# Patient Record
Sex: FEMALE | Race: WHITE | NOT HISPANIC OR LATINO
[De-identification: names, ages, dates, MRNs, and addresses within clinical notes are randomized per-mention and may not be internally consistent; named-entity substitution may affect disease eponyms.]

---

## 2017-04-27 ENCOUNTER — TRANSCRIPTION ENCOUNTER (OUTPATIENT)
Age: 62
End: 2017-04-27

## 2017-04-27 ENCOUNTER — EMERGENCY (EMERGENCY)
Facility: HOSPITAL | Age: 62
LOS: 1 days | End: 2017-04-27
Attending: EMERGENCY MEDICINE | Admitting: EMERGENCY MEDICINE
Payer: COMMERCIAL

## 2017-04-27 VITALS
OXYGEN SATURATION: 96 % | RESPIRATION RATE: 16 BRPM | HEIGHT: 60 IN | SYSTOLIC BLOOD PRESSURE: 183 MMHG | WEIGHT: 110.23 LBS | HEART RATE: 94 BPM | DIASTOLIC BLOOD PRESSURE: 86 MMHG | TEMPERATURE: 98 F

## 2017-04-27 VITALS
DIASTOLIC BLOOD PRESSURE: 72 MMHG | OXYGEN SATURATION: 97 % | RESPIRATION RATE: 16 BRPM | HEART RATE: 89 BPM | SYSTOLIC BLOOD PRESSURE: 164 MMHG | TEMPERATURE: 98 F

## 2017-04-27 DIAGNOSIS — Z98.89 OTHER SPECIFIED POSTPROCEDURAL STATES: Chronic | ICD-10-CM

## 2017-04-27 PROCEDURE — 99284 EMERGENCY DEPT VISIT MOD MDM: CPT | Mod: 25

## 2017-04-27 PROCEDURE — 72125 CT NECK SPINE W/O DYE: CPT

## 2017-04-27 PROCEDURE — 70450 CT HEAD/BRAIN W/O DYE: CPT

## 2017-04-27 PROCEDURE — 70450 CT HEAD/BRAIN W/O DYE: CPT | Mod: 26

## 2017-04-27 PROCEDURE — 72125 CT NECK SPINE W/O DYE: CPT | Mod: 26

## 2017-04-27 PROCEDURE — 71101 X-RAY EXAM UNILAT RIBS/CHEST: CPT

## 2017-04-27 PROCEDURE — 99284 EMERGENCY DEPT VISIT MOD MDM: CPT

## 2017-04-27 PROCEDURE — 71101 X-RAY EXAM UNILAT RIBS/CHEST: CPT | Mod: 26

## 2017-04-27 NOTE — ED ADULT NURSE NOTE - PMH
COPD (chronic obstructive pulmonary disease)    Depression    Drug abuse  was addicted to pain killers  Protein S deficiency

## 2017-04-27 NOTE — ED ADULT NURSE NOTE - CHIEF COMPLAINT QUOTE
" The doctor from [facility] sent me here,   I fell last night and hit my head on the wall, I have a headache and my neck hurts "

## 2017-04-27 NOTE — ED PROVIDER NOTE - OBJECTIVE STATEMENT
62 yo female on coumadin for chronic afib fell last night at home injuring left side of neck and left ribs. Denies head injury LOC, N, V, Cough SOB, or other symptom or injury.

## 2017-04-27 NOTE — ED ADULT TRIAGE NOTE - CHIEF COMPLAINT QUOTE
" The doctor from [facility] sent me here,   I fell last night and hit my head on the wall, I have a headache and my neck hurts "

## 2017-04-27 NOTE — ED PROVIDER NOTE - MEDICAL DECISION MAKING DETAILS
60 yo female on chronic anticoagulation for afib, fell at home last neck injuring left side of neck and rib cage. Contusion noted left neck over SCM. Neuro intact. Plan- CT head neck, xray left ribs and chest. Ice packs, Tylenol for pain as pt on suboxone chronically for drug abuse.

## 2019-09-26 ENCOUNTER — TRANSCRIPTION ENCOUNTER (OUTPATIENT)
Age: 64
End: 2019-09-26

## 2019-09-26 ENCOUNTER — INPATIENT (INPATIENT)
Facility: HOSPITAL | Age: 64
LOS: 0 days | Discharge: HOME | DRG: 300 | End: 2019-09-26
Attending: INTERNAL MEDICINE | Admitting: INTERNAL MEDICINE
Payer: MEDICAID

## 2019-09-26 VITALS
RESPIRATION RATE: 16 BRPM | HEART RATE: 107 BPM | SYSTOLIC BLOOD PRESSURE: 139 MMHG | OXYGEN SATURATION: 94 % | TEMPERATURE: 98 F | WEIGHT: 100.09 LBS | DIASTOLIC BLOOD PRESSURE: 77 MMHG | HEIGHT: 61 IN

## 2019-09-26 VITALS
OXYGEN SATURATION: 97 % | DIASTOLIC BLOOD PRESSURE: 88 MMHG | TEMPERATURE: 98 F | RESPIRATION RATE: 16 BRPM | SYSTOLIC BLOOD PRESSURE: 150 MMHG

## 2019-09-26 DIAGNOSIS — I82.622 ACUTE EMBOLISM AND THROMBOSIS OF DEEP VEINS OF LEFT UPPER EXTREMITY: ICD-10-CM

## 2019-09-26 DIAGNOSIS — R79.89 OTHER SPECIFIED ABNORMAL FINDINGS OF BLOOD CHEMISTRY: ICD-10-CM

## 2019-09-26 DIAGNOSIS — J44.9 CHRONIC OBSTRUCTIVE PULMONARY DISEASE, UNSPECIFIED: ICD-10-CM

## 2019-09-26 DIAGNOSIS — D72.828 OTHER ELEVATED WHITE BLOOD CELL COUNT: ICD-10-CM

## 2019-09-26 DIAGNOSIS — R07.9 CHEST PAIN, UNSPECIFIED: ICD-10-CM

## 2019-09-26 DIAGNOSIS — D50.0 IRON DEFICIENCY ANEMIA SECONDARY TO BLOOD LOSS (CHRONIC): ICD-10-CM

## 2019-09-26 DIAGNOSIS — L03.114 CELLULITIS OF LEFT UPPER LIMB: ICD-10-CM

## 2019-09-26 DIAGNOSIS — J47.9 BRONCHIECTASIS, UNCOMPLICATED: ICD-10-CM

## 2019-09-26 DIAGNOSIS — D68.59 OTHER PRIMARY THROMBOPHILIA: ICD-10-CM

## 2019-09-26 DIAGNOSIS — F19.10 OTHER PSYCHOACTIVE SUBSTANCE ABUSE, UNCOMPLICATED: ICD-10-CM

## 2019-09-26 DIAGNOSIS — Z98.89 OTHER SPECIFIED POSTPROCEDURAL STATES: Chronic | ICD-10-CM

## 2019-09-26 DIAGNOSIS — J41.0 SIMPLE CHRONIC BRONCHITIS: ICD-10-CM

## 2019-09-26 LAB
ALBUMIN SERPL ELPH-MCNC: 3.1 G/DL — LOW (ref 3.3–5)
ALP SERPL-CCNC: 158 U/L — HIGH (ref 30–120)
ALT FLD-CCNC: 19 U/L DA — SIGNIFICANT CHANGE UP (ref 10–60)
ANION GAP SERPL CALC-SCNC: 6 MMOL/L — SIGNIFICANT CHANGE UP (ref 5–17)
APTT BLD: 43.9 SEC — HIGH (ref 28.5–37)
AST SERPL-CCNC: 18 U/L — SIGNIFICANT CHANGE UP (ref 10–40)
BILIRUB SERPL-MCNC: 0.2 MG/DL — SIGNIFICANT CHANGE UP (ref 0.2–1.2)
BUN SERPL-MCNC: 15 MG/DL — SIGNIFICANT CHANGE UP (ref 7–23)
CALCIUM SERPL-MCNC: 8.8 MG/DL — SIGNIFICANT CHANGE UP (ref 8.4–10.5)
CHLORIDE SERPL-SCNC: 100 MMOL/L — SIGNIFICANT CHANGE UP (ref 96–108)
CK MB BLD-MCNC: 2.1 % — SIGNIFICANT CHANGE UP (ref 0–3.5)
CK MB CFR SERPL CALC: 2.8 NG/ML — SIGNIFICANT CHANGE UP (ref 0–3.6)
CK SERPL-CCNC: 136 U/L — SIGNIFICANT CHANGE UP (ref 26–192)
CO2 SERPL-SCNC: 31 MMOL/L — SIGNIFICANT CHANGE UP (ref 22–31)
CREAT SERPL-MCNC: 0.83 MG/DL — SIGNIFICANT CHANGE UP (ref 0.5–1.3)
GLUCOSE SERPL-MCNC: 88 MG/DL — SIGNIFICANT CHANGE UP (ref 70–99)
HCT VFR BLD CALC: 35.7 % — SIGNIFICANT CHANGE UP (ref 34.5–45)
HGB BLD-MCNC: 11.1 G/DL — LOW (ref 11.5–15.5)
INR BLD: 2.6 RATIO — HIGH (ref 0.88–1.16)
LACTATE SERPL-SCNC: 1 MMOL/L — SIGNIFICANT CHANGE UP (ref 0.7–2)
MCHC RBC-ENTMCNC: 26.5 PG — LOW (ref 27–34)
MCHC RBC-ENTMCNC: 31.1 GM/DL — LOW (ref 32–36)
MCV RBC AUTO: 85.2 FL — SIGNIFICANT CHANGE UP (ref 80–100)
NRBC # BLD: 0 /100 WBCS — SIGNIFICANT CHANGE UP (ref 0–0)
PLATELET # BLD AUTO: 566 K/UL — HIGH (ref 150–400)
POTASSIUM SERPL-MCNC: 3.8 MMOL/L — SIGNIFICANT CHANGE UP (ref 3.5–5.3)
POTASSIUM SERPL-SCNC: 3.8 MMOL/L — SIGNIFICANT CHANGE UP (ref 3.5–5.3)
PROT SERPL-MCNC: 8 G/DL — SIGNIFICANT CHANGE UP (ref 6–8.3)
PROTHROM AB SERPL-ACNC: 29.2 SEC — HIGH (ref 10–12.9)
RBC # BLD: 4.19 M/UL — SIGNIFICANT CHANGE UP (ref 3.8–5.2)
RBC # FLD: 14.5 % — SIGNIFICANT CHANGE UP (ref 10.3–14.5)
SODIUM SERPL-SCNC: 137 MMOL/L — SIGNIFICANT CHANGE UP (ref 135–145)
TROPONIN I SERPL-MCNC: 0 NG/ML — LOW (ref 0.02–0.06)
TROPONIN I SERPL-MCNC: 0 NG/ML — LOW (ref 0.02–0.06)
WBC # BLD: 10.69 K/UL — HIGH (ref 3.8–10.5)
WBC # FLD AUTO: 10.69 K/UL — HIGH (ref 3.8–10.5)

## 2019-09-26 PROCEDURE — 93971 EXTREMITY STUDY: CPT | Mod: 26,LT

## 2019-09-26 PROCEDURE — 99285 EMERGENCY DEPT VISIT HI MDM: CPT

## 2019-09-26 PROCEDURE — 93010 ELECTROCARDIOGRAM REPORT: CPT | Mod: 76

## 2019-09-26 PROCEDURE — 71045 X-RAY EXAM CHEST 1 VIEW: CPT | Mod: 26

## 2019-09-26 RX ORDER — ALBUTEROL 90 UG/1
2 AEROSOL, METERED ORAL
Qty: 1 | Refills: 0
Start: 2019-09-26 | End: 2019-10-25

## 2019-09-26 RX ORDER — APIXABAN 2.5 MG/1
10 TABLET, FILM COATED ORAL EVERY 12 HOURS
Refills: 0 | Status: DISCONTINUED | OUTPATIENT
Start: 2019-09-26 | End: 2019-09-26

## 2019-09-26 RX ORDER — TIOTROPIUM BROMIDE 18 UG/1
1 CAPSULE ORAL; RESPIRATORY (INHALATION)
Qty: 30 | Refills: 0
Start: 2019-09-26 | End: 2019-10-25

## 2019-09-26 RX ORDER — APIXABAN 2.5 MG/1
1 TABLET, FILM COATED ORAL
Qty: 1 | Refills: 0
Start: 2019-09-26

## 2019-09-26 RX ORDER — FLUTICASONE PROPIONATE AND SALMETEROL 50; 250 UG/1; UG/1
1 POWDER ORAL; RESPIRATORY (INHALATION)
Qty: 1 | Refills: 0
Start: 2019-09-26 | End: 2019-10-25

## 2019-09-26 RX ORDER — CEFAZOLIN SODIUM 1 G
1000 VIAL (EA) INJECTION ONCE
Refills: 0 | Status: COMPLETED | OUTPATIENT
Start: 2019-09-26 | End: 2019-09-26

## 2019-09-26 RX ORDER — KETOROLAC TROMETHAMINE 30 MG/ML
30 SYRINGE (ML) INJECTION ONCE
Refills: 0 | Status: DISCONTINUED | OUTPATIENT
Start: 2019-09-26 | End: 2019-09-26

## 2019-09-26 RX ORDER — INFLUENZA VIRUS VACCINE 15; 15; 15; 15 UG/.5ML; UG/.5ML; UG/.5ML; UG/.5ML
0.5 SUSPENSION INTRAMUSCULAR ONCE
Refills: 0 | Status: COMPLETED | OUTPATIENT
Start: 2019-09-26 | End: 2019-09-26

## 2019-09-26 RX ADMIN — Medication 30 MILLIGRAM(S): at 02:34

## 2019-09-26 RX ADMIN — Medication 100 MILLIGRAM(S): at 02:35

## 2019-09-26 RX ADMIN — Medication 30 MILLIGRAM(S): at 02:50

## 2019-09-26 RX ADMIN — APIXABAN 10 MILLIGRAM(S): 2.5 TABLET, FILM COATED ORAL at 10:30

## 2019-09-26 RX ADMIN — Medication 1000 MILLIGRAM(S): at 02:59

## 2019-09-26 NOTE — DISCHARGE NOTE PROVIDER - NSDCCPCAREPLAN_GEN_ALL_CORE_FT
PRINCIPAL DISCHARGE DIAGNOSIS  Diagnosis: Acute deep vein thrombosis (DVT) of other vein of left upper extremity  Assessment and Plan of Treatment: Continue current medications  Follow-up with Dr Peres (hematology) and Dr. Mas within 1 week.      SECONDARY DISCHARGE DIAGNOSES  Diagnosis: COPD (chronic obstructive pulmonary disease)  Assessment and Plan of Treatment: Continue current medications and inhalers  Follow-up with your primary care doctor within 1 week.      Diagnosis: Cellulitis of left upper extremity  Assessment and Plan of Treatment: Finish course of antibiotics.  Follow-up with your primary care doctor within 1 week.

## 2019-09-26 NOTE — ED PROVIDER NOTE - CARE PLAN
Principal Discharge DX:	Chest pain, unspecified type  Secondary Diagnosis:	Cellulitis of left upper extremity Principal Discharge DX:	Chest pain, unspecified type  Secondary Diagnosis:	Cellulitis of left upper extremity  Secondary Diagnosis:	Acute deep vein thrombosis (DVT) of other vein of left upper extremity

## 2019-09-26 NOTE — CONSULT NOTE ADULT - SUBJECTIVE AND OBJECTIVE BOX
Patient is a 64y old  Female who presents with a chief complaint of     HPI:  63 y/o  F with PMH of protein S def, followed by Dr Mas [KATHERINE Wynn] on coumadin since 36yo.   Hx of DVT in leg initially. Multiple family members with ProtS. Mother, 2 sisters. 1 brother. Brother  41yo from PE, was on coumadin for DVT.   1st DVT 36yo, admitted [facility].   2nd DVT in leg at later time, does not recell details/hosp.   in , had PE, admitted [facility].       Now present c/o left hand pain with redness x 3 days. Pt states pain radiates up arm.   On coumadin 5mg daily. INR generally therapeutic per pt.   Denies missed doses  Denies antecedent trauma, no travel, no immobility no IVDU.   Denies CP, palp, ALTMAN.   Pt denies any fever, HA, SOB, No N/V/D/cough/wt loss. HUANG/abd pain.     States PMD had prior discussed change to Xarelto or Eliquis, but had not changed as medication had not been on market for long time, worried about long term AE. Discussed, meds not in use for since .         PAST MEDICAL & SURGICAL HISTORY:  Afib  Pulmonary embolism  DVT (deep venous thrombosis)  COPD (chronic obstructive pulmonary disease)  Depression  Protein S deficiency  Drug abuse: was addicted to pain killers  History of back surgery      HEALTH ISSUES - PROBLEM Dx:  Chest pain (R07.9)  Afib (I48.91)  Pulmonary embolism (I26.99)  DVT (deep venous thrombosis) (I82.409)  COPD (chronic obstructive pulmonary disease) (J44.9)  Depression (F32.9)  Protein S deficiency (D68.59)  Drug abuse (F19.10)  History of back surgery (Z98.89)  Acute deep vein thrombosis (DVT) of other vein of left upper extremity (I82.622)  Cellulitis of left upper extremity (L03.114)      FAMILY HISTORY:  Family history of protein S deficiency (Sibling)  Family history of lung cancer    Mother alive 85yo, prot S def, DVT/PE, on coumadin  Father  54yo, Lung cancer, smoker.   Sister 1/5, alive 63yo, Pro S def, DVT, on coumadin [Maribel]  Patient 2/5   Brother 3/5,  41yo, PE. Was on coumadin for DVT. [John]  Brother 4/5, alive 60yo, neg for Prot S.   Sister 5/5, alive 53yo, Prot S def, no hx of VTE, not on AC. [Renetta]          [SOCIAL HISTORY: ]     smokinPPD smoker x35yrs, quit      EtOH:  in youth. No current EtOH     illicit drugs:  no IVDU. 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[employer], stopped      marital status:       Other:       [ALLERGIES/INTOLERANCES:]  Allergies     No Known Allergies  Intolerances          [MEDICATIONS]  MEDICATIONS  (STANDING):      influenza   Vaccine 0.5 milliLiter(s) IntraMuscular once  MEDICATIONS  (PRN):        [REVIEW OF SYSTEMS: ]  CONSTITUTIONAL: normal, no fever, no shakes, no chills   EYES: No eye pain, no visual disturbances, no discharge  ENMT:  no discharge  NECK: No pain, no stiffness  BREASTS: No pain, no masses, no nipple discharge  RESPIRATORY: No cough, no wheezing, no chills, no hemoptysis; No shortness of breath  CARDIOVASCULAR: No chest pain, no palpitations, no dizziness, no leg swelling  GASTROINTESTINAL: No abdominal, no epigastric pain. No nausea, no vomiting, no hematemesis; No diarrhea , no constipation. No melena, no hematochezia.  GENITOURINARY: No dysuria, no frequency, no hematuria, no incontinence  NEUROLOGICAL: No headaches, no memory loss, no loss of strength, no numbness, no tremors  SKIN: No itching, no burning, no rashes, no lesions   LYMPH NODES: No enlarged glands  ENDOCRINE: No heat or cold intolerance; No hair loss  MUSCULOSKELETAL: No joint pain or swelling; No muscle, no back, + LUE extremity pain  PSYCHIATRIC: No depression, no anxiety, no mood swings, no difficulty sleeping  HEME/LYMPH: No easy bruising, no bleeding gums      [VITALS SIGNS 24hrs]  Vital Signs Last 24 Hrs  T(C): 36.7 (26 Sep 2019 08:01), Max: 36.7 (26 Sep 2019 01:44)  T(F): 98.1 (26 Sep 2019 08:01), Max: 98.1 (26 Sep 2019 07:28)  HR: 82 (26 Sep 2019 08:01) (76 - 107)  BP: 174/93 (26 Sep 2019 08:01) (139/77 - 174/93)  BP(mean): --  RR: 16 (26 Sep 2019 08:01) (16 - 18)  SpO2: 97% (26 Sep 2019 08:01) (94% - 97%)    [PHYSICAL EXAM]  General: adult in NAD,  WN,  WD.  HEENT: clear oropharynx, anicteric sclera, pink conjunctivae.  Neck: supple, no masses.  CV: normal S1S2, no murmur, no rubs, no gallops.  Lungs: clear to auscultation, no wheezes, no rales, no rhonchi.  Abdomen: soft, non-tender, non-distended, no hepatosplenomegaly, normal BS, no guarding.  Ext: no clubbing, no cyanosis, no edema.  Skin: no rashes,  no petechiae, no venous stasis changes.  Neuro: alert and oriented X3, no focal motor deficits.  LN: no SC HUANG.      [LABS:]                        11.1   10.69 )-----------( 566      ( 26 Sep 2019 02:39 )             35.7     CBC Full  -  ( 26 Sep 2019 02:39 )  WBC Count : 10.69 K/uL  RBC Count : 4.19 M/uL  Hemoglobin : 11.1 g/dL  Hematocrit : 35.7 %  Platelet Count - Automated : 566 K/uL  Mean Cell Volume : 85.2 fl  Mean Cell Hemoglobin : 26.5 pg  Mean Cell Hemoglobin Concentration : 31.1 gm/dL        137  |  100  |  15  ----------------------------<  88  3.8   |  31  |  0.83  Ca    8.8      26 Sep 2019 02:38    TPro  8.0  /  Alb  3.1<L>  /  TBili  0.2  /  DBili  x   /  AST  18  /  ALT  19  /  AlkPhos  158<H>    PT/INR - ( 26 Sep 2019 02:38 )   PT: 29.2 sec;   INR: 2.60 ratio    PTT - ( 26 Sep 2019 02:38 )  PTT:43.9 sec    LIVER FUNCTIONS - ( 26 Sep 2019 02:38 )  Alb: 3.1 g/dL / Pro: 8.0 g/dL / ALK PHOS: 158 U/L / ALT: 19 U/L DA / AST: 18 U/L / GGT: x           CARDIAC MARKERS ( 26 Sep 2019 06:42 )  .000 ng/mL / x     / 136 U/L / x     / 2.8 ng/mL  CARDIAC MARKERS ( 26 Sep 2019 02:38 )  .000 ng/mL / x     / x     / x     / x        WBC  TREND (5 Days)  WBC Count: 10.69 K/uL ( @ 02:39)    HGB  TREND (5 Days)  Hemoglobin: 11.1 g/dL ( @ 02:39)    HCT  TREND (5 Days)  Hematocrit: 35.7 % ( @ 02:39)    PLT  TREND (5 Days)  Platelet Count - Automated: 566 K/uL ( @ 02:39)            [RADIOLOGY & ADDITIONAL STUDIES:]    < from: US Duplex Venous Upper Ext Ltd, Left (19 @ 03:39) >  EXAM:  US DPLX UPR EXT VEINS LTD LT                        PROCEDURE DATE:  2019    INTERPRETATION:  CLINICAL INFORMATION: Left upper extremity swelling and pain history of lower and upper extremity DVTs.  COMPARISON: Noneavailable.  TECHNIQUE: Duplex sonography of the LEFT UPPER extremity veins with color and spectral Doppler, with and without compression.      FINDINGS:  The left internal jugular, subclavian, axillary and proximal brachial veins are patent and compressible where applicable. In the mid and distal brachial veins there is echogenic material with absence of color flow and compressibility. The radial and ulna veins appear patent. The basilic and cephalic veins (superficial veins) are patent and without thrombus.     Doppler examination shows normal spontaneous and phasic flow.    IMPRESSION:   Acute deep venous thrombosis in the mid and distal brachial veins of the left upper extremity.  I discussed the findings with Dr. Betts at3:45 AM on 2019 with read back verification.  < end of copied text >          EXAM:  CHEST CT WITHOUT CONTRAST                        PROCEDURE DATE:  Mar  1 2016   INTERPRETATION:  Clinical information: Pneumonia  There are no prior studies present for comparison  Axial images obtained, coronal and sagittal images computer reformatted.   Intravenous contrast material not administered limiting evaluation.    Centrilobular emphysematous changes present with loss of normal airspace architecture most pronounced in the right lower lung. Bronchiectasis  present.    Extensive bilateral apical pleural-parenchymal scarring present. Central airway intact.    Nodular parenchymal lesions present, multifocal. At the right lung apex, 9 mm and 7 mm nodular lesions present. In the left lower lobe, irregular 13 mm nodule, 11 mm nodule, and 12 mm nodules present. Etiology indeterminate,   inflammatory versus neoplastic.      No effusion or vascular congestion.    Although evaluation of hilar regions is limited due to lack of IV contrast, evidence of hilar prominence-adenopathy. Small nonspecific mediastinal lymph nodes present.    No mediastinal lesions evident. No pericardial effusion evident. Normal appearance of adrenal glands. No acute osseous abnormalities evident. A hypodensity in the right hepatic lobe measuring 7 mm, series 4 image 124 likely represents a cyst.    IMPRESSION: Emphysematous changes with bronchiectasis. Pulmonary parenchymal nodular lesions, advise follow-up evaluation, see above report.          Procedure was performed at the [facility]  EXAM:  FOOT MIN 3 VIEWS LEFT    PROCEDURE DATE:  2015  INTERPRETATION:  3 VIEWS OF THE LEFT FOOT.  CLINICAL INDICATION: Left foot trauma with pain.  IMPRESSION: There is a minimally displaced intra-articular fracture at the tibial side base of the first digit proximal phalanx. There is a tiny avulsion fracture at the second digit proximal phalanx also at the tibial side of the bone. Mild interphalangeal joint space narrowing is present.          EXAM:  US TTE W DOPPLER COMPLETE                        PROCEDURE DATE:  Mar  1 2016   INTERPRETATION:  Ordering Physician: JACINTO HERNANDEZ  Indication: Evaluate for endocarditis.  Technician: Idania Alvarez  Study Quality: Fair   A complete echocardiographic study was performed utilizing standard protocol including spectral and color Doppler in all echocardiographic windows.    Height: 5 foot 1 inch  Weight: 102 pounds  BSA: 1.42 sq m  Blood Pressure: 130/70    MEASUREMENTS  IVS: 0.9 cm  PWT: 0.7 cm  LA: 2.9 cm  AO: 2.6 cm  LVIDd: 4.2 cm  LVIDs: 3.0 cm    LVEF: 63%  RVSP: 17 mmHg  RA Pressure: 3 mmHg  IVC: Normal in size with normal respiratory variation    FINDINGS  Left Ventricle: The left ventricle is normal in size and wall thickness.   Left ventricular systolic function is normal with ejection fraction calculated at 63%  Right Ventricle: Right ventricle is normal in size and systolic function  Left Atrium: Left atrium is normal  Right Atrium: Right atrium is normal  Mitral Valve: Mitral valve is mildly thickened without stenosis. Mild mitral regurgitation is present  Aortic Valve: Aortic valve is mildly thickened without stenosis. There is no aortic insufficiency  Tricuspid Valve: Tricuspid valve opens normally. Mild tricuspid regurgitation is present  Pulmonic Valve: Pulmonic valve is not well visualized  Pericardium: No pericardial effusion    CONCLUSIONS:  1. Normal cardiac chamber sizes.  2. Normal left and right ventricular systolic function.  3. Mildly thickened mitral valve without stenosis. Mild mitral regurgitation.  4. Mild tricuspid regurgitation. Pulmonic valve is inadequately visualized.   5. Mildly thickened aortic valve without stenosis or regurgitation.   6  No obvious valvular vegetations noted. Consider FRANCINE for further evaluation if clinically indicated.  JENNA MADRIGAL M.D., ATTENDING CARDIOLOGIST  This document has been electronically signed. Mar  2 2016  7:22A            EXAM:  CT BRAIN                        PROCEDURE DATE:  2017    INTERPRETATION:  Clinical information: Head trauma, patient taking Coumadin  No prior studies present for comparison  Axial images obtained, coronal and sagittal images computer-generated.    Images demonstrate a normal appearance of the ventricles basal cisterns and subarachnoid sulci. There is no sign of mass-effect midline shift epidural or subdural collection. No unusual calcifications are noted.     There is no sign of acute or recent hemorrhage. Prominent CSF collection in the sella turcica empty sella / partial empty sella.  The calvarium appears intact. Paranasal sinuses clear.    IMPRESSION: No acute intracranial pathology.            EXAM:  CT CERVICAL SPINE                        PROCEDURE DATE:  2017    INTERPRETATION:  Clinical information: Neck trauma, patient taking Coumadin, swelling left side of neck.  Axial images obtained, coronal and sagittal images computer reformatted.    No acute fracture or destructive bone lesions.  . Grade 1 retrolisthesis C4 under C3. Grade 1 retrolisthesis C5 over C6. Disc space narrowing present, C4-5, C5-6.   C1-C2 relationship unremarkable. Neural canal patent.    Dense pleural -parenchymal scarring at the lung apices.    There is asymmetrical thickening of the left sternocleidomastoid muscle as compared to the right. Subcutaneous fat infiltrated in the region  lateral to the left sternocleidomastoid muscle. No visible intramuscular hematoma. No adenopathy evident (limited due to lack of IV contrast).   Mastoid air cells normally pneumatized.    IMPRESSION: No acute fracture. Degenerative changes and disc degenerative disease.  Asymmetric enlargement of left sternocleidomastoid mastoid muscle as compared to the right. Clinical follow-up advised. See above report.              EXAM:  RIBS LEFT W CHEST (3 VIEWS)                        PROCEDURE DATE:  2017    INTERPRETATION:  Clinical information: Fall, left rib pain.  Frontal view of the chest, 5 views of the left ribs.    No evidence of left rib fracture or bone destruction.    Frontal chest study is compared to an exam dated 3/3/2016. No evidence of pneumothorax. No sign of infiltrate effusion or congestive failure. Heart size within normal limits. Old right rib fractures evident.   Calcifications aortic knob.    If symptoms persist, recommend short interval follow-up exam, rib fractures may not appear at time of injury.    IMPRESSION:  See above report. Patient is a 64y old  Female who presents with a chief complaint of     HPI:  63 y/o  F with PMH of protein S def, followed by Dr Mas [KATHERINE Wynn] on coumadin since 34yo.   Hx of DVT in leg initially. Multiple family members with ProtS. Mother, 2 sisters. 1 brother. Brother  41yo from PE, was on coumadin for DVT.   1st DVT 34yo, admitted [facility].   2nd DVT in leg at later time, does not recall details/hosp.   in , had PE, admitted [facility].       Now present c/o left hand pain with redness x 3 days. Pt states pain radiates up arm.   On coumadin 5mg daily. INR generally therapeutic per pt.   Denies missed doses  Denies antecedent trauma, no travel, no immobility no IVDU.   Denies CP, palp, ALTMAN.   Pt denies any fever, HA, SOB, No N/V/D/cough/wt loss. HUANG/abd pain.     States PMD had prior discussed change to Xarelto or Eliquis, but had not changed as medication had not been on market for long time, worried about long term AE. Discussed, meds not in use for since .         PAST MEDICAL & SURGICAL HISTORY:  Afib  Pulmonary embolism  DVT (deep venous thrombosis)  COPD (chronic obstructive pulmonary disease)  Depression  Protein S deficiency  Drug abuse: was addicted to pain killers  History of back surgery      HEALTH ISSUES - PROBLEM Dx:  Chest pain (R07.9)  Afib (I48.91)  Pulmonary embolism (I26.99)  DVT (deep venous thrombosis) (I82.409)  COPD (chronic obstructive pulmonary disease) (J44.9)  Depression (F32.9)  Protein S deficiency (D68.59)  Drug abuse (F19.10)  History of back surgery (Z98.89)  Acute deep vein thrombosis (DVT) of other vein of left upper extremity (I82.622)  Cellulitis of left upper extremity (L03.114)              FAMILY HISTORY:  Family history of protein S deficiency (Sibling)  Family history of lung cancer    Mother alive 87yo, prot S def, DVT/PE, on coumadin  Father  54yo, Lung cancer, smoker.   Sister 1/5, alive 65yo, Pro S def, DVT, on coumadin [Maribel]  Patient 2/5   Brother 3/5,  41yo, PE. Was on coumadin for DVT. [John]  Brother 4/5, alive 58yo, neg for Prot S.   Sister 5/5, alive 51yo, Prot S def, no hx of VTE, not on AC. [Renetta]          [SOCIAL HISTORY: ]     smokinPPD smoker x35yrs, quit      EtOH:  in youth. No current EtOH     illicit drugs:  no IVDU. 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[employer], stopped      marital status:       Other:       [ALLERGIES/INTOLERANCES:]  Allergies     No Known Allergies  Intolerances          [MEDICATIONS]  MEDICATIONS  (STANDING):      influenza   Vaccine 0.5 milliLiter(s) IntraMuscular once  MEDICATIONS  (PRN):        [REVIEW OF SYSTEMS: ]  CONSTITUTIONAL: normal, no fever, no shakes, no chills   EYES: No eye pain, no visual disturbances, no discharge  ENMT:  no discharge  NECK: No pain, no stiffness  BREASTS: No pain, no masses, no nipple discharge  RESPIRATORY: No cough, no wheezing, no chills, no hemoptysis; No shortness of breath  CARDIOVASCULAR: No chest pain, no palpitations, no dizziness, no leg swelling  GASTROINTESTINAL: No abdominal, no epigastric pain. No nausea, no vomiting, no hematemesis; No diarrhea , no constipation. No melena, no hematochezia.  GENITOURINARY: No dysuria, no frequency, no hematuria, no incontinence  NEUROLOGICAL: No headaches, no memory loss, no loss of strength, no numbness, no tremors  SKIN: No itching, no burning, no rashes, no lesions   LYMPH NODES: No enlarged glands  ENDOCRINE: No heat or cold intolerance; No hair loss  MUSCULOSKELETAL: No joint pain or swelling; No muscle, no back, + LUE extremity pain  PSYCHIATRIC: No depression, no anxiety, no mood swings, no difficulty sleeping  HEME/LYMPH: No easy bruising, no bleeding gums      [VITALS SIGNS 24hrs]  Vital Signs Last 24 Hrs  T(C): 36.7 (26 Sep 2019 08:01), Max: 36.7 (26 Sep 2019 01:44)  T(F): 98.1 (26 Sep 2019 08:01), Max: 98.1 (26 Sep 2019 07:28)  HR: 82 (26 Sep 2019 08:01) (76 - 107)  BP: 174/93 (26 Sep 2019 08:01) (139/77 - 174/93)  BP(mean): --  RR: 16 (26 Sep 2019 08:01) (16 - 18)  SpO2: 97% (26 Sep 2019 08:01) (94% - 97%)    [PHYSICAL EXAM]  General: adult in NAD,  WN,  WD.  HEENT: clear oropharynx, anicteric sclera, pink conjunctivae.  Neck: supple, no masses.  CV: normal S1S2, no murmur, no rubs, no gallops.  Lungs: clear to auscultation, +wheezes, no rales, no rhonchi.  Abdomen: soft, non-tender, non-distended, no hepatosplenomegaly, normal BS, no guarding.  Ext: no clubbing, no cyanosis, no edema.  Skin: no rashes,  no petechiae, no venous stasis changes.  Neuro: alert and oriented X3, no focal motor deficits.  LN: no SC HUANG.      [LABS:]                        11.1   10.69 )-----------( 566      ( 26 Sep 2019 02:39 )             35.7     CBC Full  -  ( 26 Sep 2019 02:39 )  WBC Count : 10.69 K/uL  RBC Count : 4.19 M/uL  Hemoglobin : 11.1 g/dL  Hematocrit : 35.7 %  Platelet Count - Automated : 566 K/uL  Mean Cell Volume : 85.2 fl  Mean Cell Hemoglobin : 26.5 pg  Mean Cell Hemoglobin Concentration : 31.1 gm/dL        137  |  100  |  15  ----------------------------<  88  3.8   |  31  |  0.83  Ca    8.8      26 Sep 2019 02:38    TPro  8.0  /  Alb  3.1<L>  /  TBili  0.2  /  DBili  x   /  AST  18  /  ALT  19  /  AlkPhos  158<H>    PT/INR - ( 26 Sep 2019 02:38 )   PT: 29.2 sec;   INR: 2.60 ratio    PTT - ( 26 Sep 2019 02:38 )  PTT:43.9 sec    LIVER FUNCTIONS - ( 26 Sep 2019 02:38 )  Alb: 3.1 g/dL / Pro: 8.0 g/dL / ALK PHOS: 158 U/L / ALT: 19 U/L DA / AST: 18 U/L / GGT: x           CARDIAC MARKERS ( 26 Sep 2019 06:42 )  .000 ng/mL / x     / 136 U/L / x     / 2.8 ng/mL  CARDIAC MARKERS ( 26 Sep 2019 02:38 )  .000 ng/mL / x     / x     / x     / x        WBC  TREND (5 Days)  WBC Count: 10.69 K/uL ( @ 02:39)    HGB  TREND (5 Days)  Hemoglobin: 11.1 g/dL ( @ 02:39)    HCT  TREND (5 Days)  Hematocrit: 35.7 % ( @ 02:39)    PLT  TREND (5 Days)  Platelet Count - Automated: 566 K/uL ( @ 02:39)            [RADIOLOGY & ADDITIONAL STUDIES:]    < from: US Duplex Venous Upper Ext Ltd, Left (19 @ 03:39) >  EXAM:  US DPLX UPR EXT VEINS LTD LT                        PROCEDURE DATE:  2019    INTERPRETATION:  CLINICAL INFORMATION: Left upper extremity swelling and pain history of lower and upper extremity DVTs.  COMPARISON: Noneavailable.  TECHNIQUE: Duplex sonography of the LEFT UPPER extremity veins with color and spectral Doppler, with and without compression.      FINDINGS:  The left internal jugular, subclavian, axillary and proximal brachial veins are patent and compressible where applicable. In the mid and distal brachial veins there is echogenic material with absence of color flow and compressibility. The radial and ulna veins appear patent. The basilic and cephalic veins (superficial veins) are patent and without thrombus.     Doppler examination shows normal spontaneous and phasic flow.    IMPRESSION:   Acute deep venous thrombosis in the mid and distal brachial veins of the left upper extremity.  I discussed the findings with Dr. Betts at3:45 AM on 2019 with read back verification.  < end of copied text >          EXAM:  CHEST CT WITHOUT CONTRAST                        PROCEDURE DATE:  Mar  1 2016   INTERPRETATION:  Clinical information: Pneumonia  There are no prior studies present for comparison  Axial images obtained, coronal and sagittal images computer reformatted.   Intravenous contrast material not administered limiting evaluation.    Centrilobular emphysematous changes present with loss of normal airspace architecture most pronounced in the right lower lung. Bronchiectasis  present.    Extensive bilateral apical pleural-parenchymal scarring present. Central airway intact.    Nodular parenchymal lesions present, multifocal. At the right lung apex, 9 mm and 7 mm nodular lesions present. In the left lower lobe, irregular 13 mm nodule, 11 mm nodule, and 12 mm nodules present. Etiology indeterminate,   inflammatory versus neoplastic.      No effusion or vascular congestion.    Although evaluation of hilar regions is limited due to lack of IV contrast, evidence of hilar prominence-adenopathy. Small nonspecific mediastinal lymph nodes present.    No mediastinal lesions evident. No pericardial effusion evident. Normal appearance of adrenal glands. No acute osseous abnormalities evident. A hypodensity in the right hepatic lobe measuring 7 mm, series 4 image 124 likely represents a cyst.    IMPRESSION: Emphysematous changes with bronchiectasis. Pulmonary parenchymal nodular lesions, advise follow-up evaluation, see above report.          Procedure was performed at the [facility]  EXAM:  FOOT MIN 3 VIEWS LEFT    PROCEDURE DATE:  2015  INTERPRETATION:  3 VIEWS OF THE LEFT FOOT.  CLINICAL INDICATION: Left foot trauma with pain.  IMPRESSION: There is a minimally displaced intra-articular fracture at the tibial side base of the first digit proximal phalanx. There is a tiny avulsion fracture at the second digit proximal phalanx also at the tibial side of the bone. Mild interphalangeal joint space narrowing is present.          EXAM:  US TTE W DOPPLER COMPLETE                        PROCEDURE DATE:  Mar  1 2016   INTERPRETATION:  Ordering Physician: JACINTO HERNANDEZ  Indication: Evaluate for endocarditis.  Technician: Idania Alvarez  Study Quality: Fair   A complete echocardiographic study was performed utilizing standard protocol including spectral and color Doppler in all echocardiographic windows.    Height: 5 foot 1 inch  Weight: 102 pounds  BSA: 1.42 sq m  Blood Pressure: 130/70    MEASUREMENTS  IVS: 0.9 cm  PWT: 0.7 cm  LA: 2.9 cm  AO: 2.6 cm  LVIDd: 4.2 cm  LVIDs: 3.0 cm    LVEF: 63%  RVSP: 17 mmHg  RA Pressure: 3 mmHg  IVC: Normal in size with normal respiratory variation    FINDINGS  Left Ventricle: The left ventricle is normal in size and wall thickness.   Left ventricular systolic function is normal with ejection fraction calculated at 63%  Right Ventricle: Right ventricle is normal in size and systolic function  Left Atrium: Left atrium is normal  Right Atrium: Right atrium is normal  Mitral Valve: Mitral valve is mildly thickened without stenosis. Mild mitral regurgitation is present  Aortic Valve: Aortic valve is mildly thickened without stenosis. There is no aortic insufficiency  Tricuspid Valve: Tricuspid valve opens normally. Mild tricuspid regurgitation is present  Pulmonic Valve: Pulmonic valve is not well visualized  Pericardium: No pericardial effusion    CONCLUSIONS:  1. Normal cardiac chamber sizes.  2. Normal left and right ventricular systolic function.  3. Mildly thickened mitral valve without stenosis. Mild mitral regurgitation.  4. Mild tricuspid regurgitation. Pulmonic valve is inadequately visualized.   5. Mildly thickened aortic valve without stenosis or regurgitation.   6  No obvious valvular vegetations noted. Consider FRANCINE for further evaluation if clinically indicated.  JENNA MADRIGAL M.D., ATTENDING CARDIOLOGIST  This document has been electronically signed. Mar  2 2016  7:22A            EXAM:  CT BRAIN                        PROCEDURE DATE:  2017    INTERPRETATION:  Clinical information: Head trauma, patient taking Coumadin  No prior studies present for comparison  Axial images obtained, coronal and sagittal images computer-generated.    Images demonstrate a normal appearance of the ventricles basal cisterns and subarachnoid sulci. There is no sign of mass-effect midline shift epidural or subdural collection. No unusual calcifications are noted.     There is no sign of acute or recent hemorrhage. Prominent CSF collection in the sella turcica empty sella / partial empty sella.  The calvarium appears intact. Paranasal sinuses clear.    IMPRESSION: No acute intracranial pathology.            EXAM:  CT CERVICAL SPINE                        PROCEDURE DATE:  2017    INTERPRETATION:  Clinical information: Neck trauma, patient taking Coumadin, swelling left side of neck.  Axial images obtained, coronal and sagittal images computer reformatted.    No acute fracture or destructive bone lesions.  . Grade 1 retrolisthesis C4 under C3. Grade 1 retrolisthesis C5 over C6. Disc space narrowing present, C4-5, C5-6.   C1-C2 relationship unremarkable. Neural canal patent.    Dense pleural -parenchymal scarring at the lung apices.    There is asymmetrical thickening of the left sternocleidomastoid muscle as compared to the right. Subcutaneous fat infiltrated in the region  lateral to the left sternocleidomastoid muscle. No visible intramuscular hematoma. No adenopathy evident (limited due to lack of IV contrast).   Mastoid air cells normally pneumatized.    IMPRESSION: No acute fracture. Degenerative changes and disc degenerative disease.  Asymmetric enlargement of left sternocleidomastoid mastoid muscle as compared to the right. Clinical follow-up advised. See above report.              EXAM:  RIBS LEFT W CHEST (3 VIEWS)                        PROCEDURE DATE:  2017    INTERPRETATION:  Clinical information: Fall, left rib pain.  Frontal view of the chest, 5 views of the left ribs.    No evidence of left rib fracture or bone destruction.    Frontal chest study is compared to an exam dated 3/3/2016. No evidence of pneumothorax. No sign of infiltrate effusion or congestive failure. Heart size within normal limits. Old right rib fractures evident.   Calcifications aortic knob.    If symptoms persist, recommend short interval follow-up exam, rib fractures may not appear at time of injury.    IMPRESSION:  See above report.

## 2019-09-26 NOTE — ED ADULT NURSE NOTE - NSIMPLEMENTINTERV_GEN_ALL_ED
Implemented All Fall with Harm Risk Interventions:  [place] to call system. Call bell, personal items and telephone within reach. Instruct patient to call for assistance. Room bathroom lighting operational. Non-slip footwear when patient is off stretcher. Physically safe environment: no spills, clutter or unnecessary equipment. Stretcher in lowest position, wheels locked, appropriate side rails in place. Provide visual cue, wrist band, yellow gown, etc. Monitor gait and stability. Monitor for mental status changes and reorient to person, place, and time. Review medications for side effects contributing to fall risk. Reinforce activity limits and safety measures with patient and family. Provide visual clues: red socks.

## 2019-09-26 NOTE — CONSULT NOTE ADULT - ASSESSMENT
[ASSESSMENT and  PLAN]      RECOMMENDATIONS  Transfuse PRBC as clinically indicated.   Transfuse PRBC if Hgb <7.0 or if symptomatic.   Follow CBC    Check Anemia studies.     DVT Prophyalxis    Thank you for consulting us.     I have discussed the above plan of care with patient/family in detail. They expressed understanding of the treatment plan . Risks, benefits and alternatives discussed in detail. I have asked if they have any questions or concerns and appropriately addressed them; all questions answered to their satisfactions and in lay terms.     Discussed with  xxxxxxx.    > xxxxxx minutes spent in direct patient care, examining and counseling patient,  reviewing  the notes, lab data/ imaging , discussion with multidisciplinary team. 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   At increased risk for recurrent thrombosis.     Significant family hx of Prot S def, along with more severe thrombosis, with PE in 3 family members, [mother, pt, brother] with brother having  frm PE.     Current episode apparently unprovoked.   Only possible cause with COPD/bronchiectasis exacerbation, but sx mild.     non-compliant with pulm followup. Counseled and pt agrees to make appts.   hx of substance abuse, on tx.     Fe def anemia. Prior ferritin 11, ferritin 20.   Anemia of chronic disease.   Last colonoscopy .   Mammo 2018  Pap >10yrs    Reactive thrombocytosis, due to lung dz or Fe def.     Reactive leukocytosis, no fever. Likely due to lung dz, DVT.       RECOMMENDATIONS  Given thrombosis on therapeutic INR 2.6, with INR already on high end eg >2.5,  does not sofia to have adequate protection from coumadin.   Start Eliquis 10mg q12h for 7d.  Then switch to Eliquis 5mg q12h.   DC coumadin.     To get antibiotics for possible cellulitis/COPD/bornnchiectasis.   Monitor for bleeding on tx.     To be placed on inhalers, corticosteroids briefly.     Check EKG.     Check Anemia studies.   Outpt  Given contact information for office.   pt states aware of location.   Agrees to make followup to sort out rest of lab abn.     Recommended pt resume pap screening and followup with GI for re-eval as had hx of Fe def.   last menses 52yo.     DVT Prophylaxis: on Couamdin. To change to Eliquis.     Thank you for consulting us.     I have discussed the above plan of care with patient/family in detail. They expressed understanding of the treatment plan . Risks, benefits and alternatives discussed in detail. I have asked if they have any questions or concerns and appropriately addressed them; all questions answered to their satisfactions and in lay terms.     Discussed with Dr Ronquillo    > 55 minutes spent in direct patient care, examining and counseling patient,  reviewing  the notes, lab data/ imaging , discussion with multidisciplinary team.

## 2019-09-26 NOTE — ED ADULT NURSE NOTE - PMH
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

## 2019-09-26 NOTE — ED PROVIDER NOTE - SECONDARY DIAGNOSIS.
Cellulitis of left upper extremity Acute deep vein thrombosis (DVT) of other vein of left upper extremity

## 2019-09-26 NOTE — ED ADULT TRIAGE NOTE - CHIEF COMPLAINT QUOTE
'I went to urgent care, they told me to come here, I think I have a clot'. pt presented with left wrist pain and swelling, pt is on Coumadin for protein s deficiency , c/o left wrist pain and swelling x3 days.

## 2019-09-26 NOTE — ED PROVIDER NOTE - CLINICAL SUMMARY MEDICAL DECISION MAKING FREE TEXT BOX
pt with left hand pain and redness radiating up to chest concerning for cardiac.   will w/u and admit

## 2019-09-26 NOTE — DISCHARGE NOTE NURSING/CASE MANAGEMENT/SOCIAL WORK - PATIENT PORTAL LINK FT
You can access the FollowMyHealth Patient Portal offered by [facility] by registering at the following website: [email or web address]. By joining [facility]’s FollowMyHealth portal, you will also be able to view your health information using other applications (apps) compatible with our system.

## 2019-09-26 NOTE — DISCHARGE NOTE PROVIDER - CARE PROVIDER_API CALL
Hon DIEUDONNE Westfall (MD)  Hematology; Internal Medicine; Medical Oncology  [address], [address]  [place] [address]  Phone: [phone]  Fax: [phone]  Follow Up Time: 1 week    Monroe Mas)  Family Medicine  [address] [place] [id number]  Phone: [phone]  Fax: [phone]  Follow Up Time: 1-3 days

## 2019-09-26 NOTE — DISCHARGE NOTE NURSING/CASE MANAGEMENT/SOCIAL WORK - NSDCVIVACCINE_GEN_ALL_CORE_FT
Influenza , 2016/3/12 13:59 , Rebekah Maldonado (RN)  Pneumococcal , 2016/3/12 14:00 , Rebekah Maldonado (VICKI)

## 2019-09-26 NOTE — DISCHARGE NOTE PROVIDER - HOSPITAL COURSE
65 y/o female in ED c/o left hand pain with redness x 3 days.   pt states h/o protein C def on coumadin.   states also with h/o DVTs and PEs.   pt states pain radiates up arm to chest.   pt denies any fever, HA, sob, n/v/d/abd pain.        Found to have LUE DVT    Seen by [facility]    Changed to eliquis    Mild LUE cellulitis/phlebitis    Doxy x 7 days    Patient wheezing -- reports being chronic and non-compliant with inhalers    Medrol dose pack and restart inhalers        >35 minutes spent on discharge

## 2019-09-26 NOTE — ED PROVIDER NOTE - OBJECTIVE STATEMENT
63 y/o female in ED c/o left hand pain with redness x 3 days.   pt states h/o protein C def on coumadin.   states also with h/o DVTs and PEs.   pt states pain radiates up arm to chest.   pt denies any fever, HA< sob, n/v/d/abd pain. 65 y/o female in ED c/o left hand pain with redness x 3 days.   pt states h/o protein C def on coumadin.   states also with h/o DVTs and PEs.   pt states pain radiates up arm to chest.   pt denies any fever, HA, sob, n/v/d/abd pain.

## 2019-09-26 NOTE — ED PROVIDER NOTE - PMH
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

## 2019-09-26 NOTE — H&P ADULT - HISTORY OF PRESENT ILLNESS
65 y/o female in ED c/o left hand pain with redness x 3 days.   pt states h/o protein C def on coumadin.   states also with h/o DVTs and PEs.   pt states pain radiates up arm to chest.   pt denies any fever, HA, sob, n/v/d/abd pain. She was found to have LUE DVT.

## 2019-09-26 NOTE — DISCHARGE NOTE PROVIDER - PROVIDER TOKENS
PROVIDER:[TOKEN:[[id number]],FOLLOWUP:[1 week]],PROVIDER:[TOKEN:[[id number]],FOLLOWUP:[1-3 days]]

## 2019-09-26 NOTE — H&P ADULT - NSICDXPASTMEDICALHX_GEN_ALL_CORE_FT
PAST MEDICAL HISTORY:  Afib     COPD (chronic obstructive pulmonary disease)     Depression     Drug abuse was addicted to pain killers    DVT (deep venous thrombosis)     Protein S deficiency     Pulmonary embolism

## 2019-09-26 NOTE — H&P ADULT - NSICDXFAMILYHX_GEN_ALL_CORE_FT
FAMILY HISTORY:  Family history of lung cancer    Sibling  Still living? Unknown  Family history of protein S deficiency, Age at diagnosis: Age Unknown

## 2019-09-26 NOTE — H&P ADULT - RESPIRATORY
Moderate asthma with exacerbation, unspecified whether persistent Breath Sounds equal & clear to percussion & auscultation, no accessory muscle use

## 2019-09-27 RX ORDER — BUDESONIDE AND FORMOTEROL FUMARATE DIHYDRATE 160; 4.5 UG/1; UG/1
2 AEROSOL RESPIRATORY (INHALATION)
Qty: 1 | Refills: 0
Start: 2019-09-27

## 2019-10-01 LAB
CULTURE RESULTS: SIGNIFICANT CHANGE UP
CULTURE RESULTS: SIGNIFICANT CHANGE UP
SPECIMEN SOURCE: SIGNIFICANT CHANGE UP
SPECIMEN SOURCE: SIGNIFICANT CHANGE UP

## 2019-10-31 PROCEDURE — 85610 PROTHROMBIN TIME: CPT

## 2019-10-31 PROCEDURE — 93005 ELECTROCARDIOGRAM TRACING: CPT

## 2019-10-31 PROCEDURE — 96365 THER/PROPH/DIAG IV INF INIT: CPT

## 2019-10-31 PROCEDURE — 82553 CREATINE MB FRACTION: CPT

## 2019-10-31 PROCEDURE — 82550 ASSAY OF CK (CPK): CPT

## 2019-10-31 PROCEDURE — 84484 ASSAY OF TROPONIN QUANT: CPT

## 2019-10-31 PROCEDURE — 96375 TX/PRO/DX INJ NEW DRUG ADDON: CPT

## 2019-10-31 PROCEDURE — 80053 COMPREHEN METABOLIC PANEL: CPT

## 2019-10-31 PROCEDURE — 85730 THROMBOPLASTIN TIME PARTIAL: CPT

## 2019-10-31 PROCEDURE — 71045 X-RAY EXAM CHEST 1 VIEW: CPT

## 2019-10-31 PROCEDURE — 36415 COLL VENOUS BLD VENIPUNCTURE: CPT

## 2019-10-31 PROCEDURE — 99285 EMERGENCY DEPT VISIT HI MDM: CPT | Mod: 25

## 2019-10-31 PROCEDURE — 83605 ASSAY OF LACTIC ACID: CPT

## 2019-10-31 PROCEDURE — G0378: CPT

## 2019-10-31 PROCEDURE — 93971 EXTREMITY STUDY: CPT

## 2019-10-31 PROCEDURE — 85027 COMPLETE CBC AUTOMATED: CPT

## 2019-10-31 PROCEDURE — 87040 BLOOD CULTURE FOR BACTERIA: CPT

## 2020-11-26 ENCOUNTER — TRANSCRIPTION ENCOUNTER (OUTPATIENT)
Age: 65
End: 2020-11-26

## 2023-11-05 ENCOUNTER — NON-APPOINTMENT (OUTPATIENT)
Age: 68
End: 2023-11-05

## 2023-11-27 ENCOUNTER — NON-APPOINTMENT (OUTPATIENT)
Age: 68
End: 2023-11-27

## 2023-11-27 PROBLEM — I26.99 OTHER PULMONARY EMBOLISM WITHOUT ACUTE COR PULMONALE: Chronic | Status: ACTIVE | Noted: 2019-09-26

## 2023-11-27 PROBLEM — I48.91 UNSPECIFIED ATRIAL FIBRILLATION: Chronic | Status: ACTIVE | Noted: 2019-09-26

## 2023-11-27 PROBLEM — I82.409 ACUTE EMBOLISM AND THROMBOSIS OF UNSPECIFIED DEEP VEINS OF UNSPECIFIED LOWER EXTREMITY: Chronic | Status: ACTIVE | Noted: 2019-09-26

## 2023-12-07 ENCOUNTER — APPOINTMENT (OUTPATIENT)
Dept: INTERNAL MEDICINE | Facility: CLINIC | Age: 68
End: 2023-12-07

## 2023-12-12 ENCOUNTER — LABORATORY RESULT (OUTPATIENT)
Age: 68
End: 2023-12-12

## 2023-12-12 ENCOUNTER — APPOINTMENT (OUTPATIENT)
Dept: INTERNAL MEDICINE | Facility: CLINIC | Age: 68
End: 2023-12-12
Payer: SELF-PAY

## 2023-12-12 VITALS
WEIGHT: 88 LBS | HEIGHT: 58.5 IN | RESPIRATION RATE: 16 BRPM | BODY MASS INDEX: 17.98 KG/M2 | DIASTOLIC BLOOD PRESSURE: 70 MMHG | HEART RATE: 159 BPM | OXYGEN SATURATION: 91 % | SYSTOLIC BLOOD PRESSURE: 108 MMHG

## 2023-12-12 DIAGNOSIS — Z83.2 FAMILY HISTORY OF DISEASES OF THE BLOOD AND BLOOD-FORMING ORGANS AND CERTAIN DISORDERS INVOLVING THE IMMUNE MECHANISM: ICD-10-CM

## 2023-12-12 DIAGNOSIS — R63.4 ABNORMAL WEIGHT LOSS: ICD-10-CM

## 2023-12-12 DIAGNOSIS — Z80.1 FAMILY HISTORY OF MALIGNANT NEOPLASM OF TRACHEA, BRONCHUS AND LUNG: ICD-10-CM

## 2023-12-12 DIAGNOSIS — Z87.891 PERSONAL HISTORY OF NICOTINE DEPENDENCE: ICD-10-CM

## 2023-12-12 DIAGNOSIS — Z00.00 ENCOUNTER FOR GENERAL ADULT MEDICAL EXAMINATION W/OUT ABNORMAL FINDINGS: ICD-10-CM

## 2023-12-12 DIAGNOSIS — Z80.3 FAMILY HISTORY OF MALIGNANT NEOPLASM OF BREAST: ICD-10-CM

## 2023-12-12 DIAGNOSIS — Z78.9 OTHER SPECIFIED HEALTH STATUS: ICD-10-CM

## 2023-12-12 DIAGNOSIS — Z12.2 ENCOUNTER FOR SCREENING FOR MALIGNANT NEOPLASM OF RESPIRATORY ORGANS: ICD-10-CM

## 2023-12-12 DIAGNOSIS — D68.59 OTHER PRIMARY THROMBOPHILIA: ICD-10-CM

## 2023-12-12 PROCEDURE — 99204 OFFICE O/P NEW MOD 45 MIN: CPT | Mod: 25

## 2023-12-12 PROCEDURE — G0296 VISIT TO DETERM LDCT ELIG: CPT

## 2023-12-12 PROCEDURE — 93000 ELECTROCARDIOGRAM COMPLETE: CPT

## 2023-12-12 RX ORDER — VENLAFAXINE HYDROCHLORIDE 75 MG/1
75 CAPSULE, EXTENDED RELEASE ORAL
Refills: 0 | Status: ACTIVE | COMMUNITY

## 2023-12-12 RX ORDER — BUPRENORPHINE HCL/NALOXONE HCL 8 MG-2 MG
TABLET, SUBLINGUAL SUBLINGUAL
Refills: 0 | Status: ACTIVE | COMMUNITY

## 2023-12-13 DIAGNOSIS — D72.829 ELEVATED WHITE BLOOD CELL COUNT, UNSPECIFIED: ICD-10-CM

## 2023-12-13 PROBLEM — Z12.2 ENCOUNTER FOR SCREENING FOR LUNG CANCER: Status: ACTIVE | Noted: 2023-12-12

## 2023-12-13 LAB
ALBUMIN SERPL ELPH-MCNC: 4.2 G/DL
ALP BLD-CCNC: 149 U/L
ALT SERPL-CCNC: 16 U/L
ANION GAP SERPL CALC-SCNC: 17 MMOL/L
APPEARANCE: ABNORMAL
AST SERPL-CCNC: 18 U/L
BILIRUB SERPL-MCNC: 0.2 MG/DL
BILIRUBIN URINE: NEGATIVE
BLOOD URINE: NEGATIVE
BUN SERPL-MCNC: 13 MG/DL
CALCIUM SERPL-MCNC: 10.4 MG/DL
CHLORIDE SERPL-SCNC: 91 MMOL/L
CHOLEST SERPL-MCNC: 203 MG/DL
CO2 SERPL-SCNC: 27 MMOL/L
COLOR: NORMAL
CREAT SERPL-MCNC: 0.63 MG/DL
EGFR: 97 ML/MIN/1.73M2
ESTIMATED AVERAGE GLUCOSE: 120 MG/DL
GLUCOSE QUALITATIVE U: NEGATIVE MG/DL
GLUCOSE SERPL-MCNC: 95 MG/DL
HBA1C MFR BLD HPLC: 5.8 %
HCT VFR BLD CALC: 34.4 %
HDLC SERPL-MCNC: 65 MG/DL
HGB BLD-MCNC: 10.2 G/DL
KETONES URINE: ABNORMAL MG/DL
LDLC SERPL CALC-MCNC: 119 MG/DL
LEUKOCYTE ESTERASE URINE: ABNORMAL
MCHC RBC-ENTMCNC: 25.4 PG
MCHC RBC-ENTMCNC: 29.7 GM/DL
MCV RBC AUTO: 85.6 FL
NITRITE URINE: NEGATIVE
NONHDLC SERPL-MCNC: 139 MG/DL
PH URINE: 5
PLATELET # BLD AUTO: 855 K/UL
POTASSIUM SERPL-SCNC: 4.2 MMOL/L
PROT SERPL-MCNC: 8.4 G/DL
PROTEIN URINE: 100 MG/DL
RBC # BLD: 4.02 M/UL
RBC # FLD: 15.5 %
SODIUM SERPL-SCNC: 135 MMOL/L
SPECIFIC GRAVITY URINE: >1.03
TRIGL SERPL-MCNC: 112 MG/DL
TSH SERPL-ACNC: 0.94 UIU/ML
UROBILINOGEN URINE: 1 MG/DL
WBC # FLD AUTO: 21.03 K/UL

## 2023-12-14 ENCOUNTER — INPATIENT (INPATIENT)
Facility: HOSPITAL | Age: 68
LOS: 12 days | Discharge: HOME HEALTH | DRG: 871 | End: 2023-12-27
Attending: STUDENT IN AN ORGANIZED HEALTH CARE EDUCATION/TRAINING PROGRAM | Admitting: HOSPITALIST
Payer: MEDICARE

## 2023-12-14 VITALS
RESPIRATION RATE: 24 BRPM | OXYGEN SATURATION: 96 % | TEMPERATURE: 99 F | HEIGHT: 58 IN | DIASTOLIC BLOOD PRESSURE: 95 MMHG | WEIGHT: 87.96 LBS | HEART RATE: 140 BPM | SYSTOLIC BLOOD PRESSURE: 178 MMHG

## 2023-12-14 DIAGNOSIS — Z98.89 OTHER SPECIFIED POSTPROCEDURAL STATES: Chronic | ICD-10-CM

## 2023-12-14 DIAGNOSIS — J44.1 CHRONIC OBSTRUCTIVE PULMONARY DISEASE WITH (ACUTE) EXACERBATION: ICD-10-CM

## 2023-12-14 LAB
ALBUMIN SERPL ELPH-MCNC: 4.2 G/DL — SIGNIFICANT CHANGE UP (ref 3.3–5)
ALBUMIN SERPL ELPH-MCNC: 4.2 G/DL — SIGNIFICANT CHANGE UP (ref 3.3–5)
ALP SERPL-CCNC: 138 U/L — HIGH (ref 40–120)
ALP SERPL-CCNC: 138 U/L — HIGH (ref 40–120)
ALT FLD-CCNC: 17 U/L — SIGNIFICANT CHANGE UP (ref 10–45)
ALT FLD-CCNC: 17 U/L — SIGNIFICANT CHANGE UP (ref 10–45)
ANION GAP SERPL CALC-SCNC: 14 MMOL/L — SIGNIFICANT CHANGE UP (ref 5–17)
ANION GAP SERPL CALC-SCNC: 14 MMOL/L — SIGNIFICANT CHANGE UP (ref 5–17)
APTT BLD: 40.2 SEC — HIGH (ref 24.5–35.6)
APTT BLD: 40.2 SEC — HIGH (ref 24.5–35.6)
AST SERPL-CCNC: 19 U/L — SIGNIFICANT CHANGE UP (ref 10–40)
AST SERPL-CCNC: 19 U/L — SIGNIFICANT CHANGE UP (ref 10–40)
BASE EXCESS BLDV CALC-SCNC: 8.9 MMOL/L — HIGH (ref -2–3)
BASE EXCESS BLDV CALC-SCNC: 8.9 MMOL/L — HIGH (ref -2–3)
BASOPHILS # BLD AUTO: 0.12 K/UL — SIGNIFICANT CHANGE UP (ref 0–0.2)
BASOPHILS # BLD AUTO: 0.12 K/UL — SIGNIFICANT CHANGE UP (ref 0–0.2)
BASOPHILS NFR BLD AUTO: 0.6 % — SIGNIFICANT CHANGE UP (ref 0–2)
BASOPHILS NFR BLD AUTO: 0.6 % — SIGNIFICANT CHANGE UP (ref 0–2)
BILIRUB SERPL-MCNC: 0.2 MG/DL — SIGNIFICANT CHANGE UP (ref 0.2–1.2)
BILIRUB SERPL-MCNC: 0.2 MG/DL — SIGNIFICANT CHANGE UP (ref 0.2–1.2)
BUN SERPL-MCNC: 12 MG/DL — SIGNIFICANT CHANGE UP (ref 7–23)
BUN SERPL-MCNC: 12 MG/DL — SIGNIFICANT CHANGE UP (ref 7–23)
CA-I SERPL-SCNC: 1.25 MMOL/L — SIGNIFICANT CHANGE UP (ref 1.15–1.33)
CA-I SERPL-SCNC: 1.25 MMOL/L — SIGNIFICANT CHANGE UP (ref 1.15–1.33)
CALCIUM SERPL-MCNC: 10.4 MG/DL — SIGNIFICANT CHANGE UP (ref 8.4–10.5)
CALCIUM SERPL-MCNC: 10.4 MG/DL — SIGNIFICANT CHANGE UP (ref 8.4–10.5)
CHLORIDE BLDV-SCNC: 97 MMOL/L — SIGNIFICANT CHANGE UP (ref 96–108)
CHLORIDE BLDV-SCNC: 97 MMOL/L — SIGNIFICANT CHANGE UP (ref 96–108)
CHLORIDE SERPL-SCNC: 94 MMOL/L — LOW (ref 96–108)
CHLORIDE SERPL-SCNC: 94 MMOL/L — LOW (ref 96–108)
CO2 BLDV-SCNC: 37 MMOL/L — HIGH (ref 22–26)
CO2 BLDV-SCNC: 37 MMOL/L — HIGH (ref 22–26)
CO2 SERPL-SCNC: 29 MMOL/L — SIGNIFICANT CHANGE UP (ref 22–31)
CO2 SERPL-SCNC: 29 MMOL/L — SIGNIFICANT CHANGE UP (ref 22–31)
CREAT SERPL-MCNC: 0.41 MG/DL — LOW (ref 0.5–1.3)
CREAT SERPL-MCNC: 0.41 MG/DL — LOW (ref 0.5–1.3)
EGFR: 108 ML/MIN/1.73M2 — SIGNIFICANT CHANGE UP
EGFR: 108 ML/MIN/1.73M2 — SIGNIFICANT CHANGE UP
EOSINOPHIL # BLD AUTO: 0.37 K/UL — SIGNIFICANT CHANGE UP (ref 0–0.5)
EOSINOPHIL # BLD AUTO: 0.37 K/UL — SIGNIFICANT CHANGE UP (ref 0–0.5)
EOSINOPHIL NFR BLD AUTO: 2 % — SIGNIFICANT CHANGE UP (ref 0–6)
EOSINOPHIL NFR BLD AUTO: 2 % — SIGNIFICANT CHANGE UP (ref 0–6)
FLUAV AG NPH QL: SIGNIFICANT CHANGE UP
FLUAV AG NPH QL: SIGNIFICANT CHANGE UP
FLUBV AG NPH QL: SIGNIFICANT CHANGE UP
FLUBV AG NPH QL: SIGNIFICANT CHANGE UP
GAS PNL BLDV: 135 MMOL/L — LOW (ref 136–145)
GAS PNL BLDV: 135 MMOL/L — LOW (ref 136–145)
GAS PNL BLDV: SIGNIFICANT CHANGE UP
GLUCOSE BLDV-MCNC: 150 MG/DL — HIGH (ref 70–99)
GLUCOSE BLDV-MCNC: 150 MG/DL — HIGH (ref 70–99)
GLUCOSE SERPL-MCNC: 128 MG/DL — HIGH (ref 70–99)
GLUCOSE SERPL-MCNC: 128 MG/DL — HIGH (ref 70–99)
HCO3 BLDV-SCNC: 35 MMOL/L — HIGH (ref 22–29)
HCO3 BLDV-SCNC: 35 MMOL/L — HIGH (ref 22–29)
HCT VFR BLD CALC: 32.9 % — LOW (ref 34.5–45)
HCT VFR BLD CALC: 32.9 % — LOW (ref 34.5–45)
HCT VFR BLDA CALC: 32 % — LOW (ref 34.5–46.5)
HCT VFR BLDA CALC: 32 % — LOW (ref 34.5–46.5)
HGB BLD CALC-MCNC: 10.7 G/DL — LOW (ref 11.7–16.1)
HGB BLD CALC-MCNC: 10.7 G/DL — LOW (ref 11.7–16.1)
HGB BLD-MCNC: 9.9 G/DL — LOW (ref 11.5–15.5)
HGB BLD-MCNC: 9.9 G/DL — LOW (ref 11.5–15.5)
IMM GRANULOCYTES NFR BLD AUTO: 0.5 % — SIGNIFICANT CHANGE UP (ref 0–0.9)
IMM GRANULOCYTES NFR BLD AUTO: 0.5 % — SIGNIFICANT CHANGE UP (ref 0–0.9)
INR BLD: 1.5 RATIO — HIGH (ref 0.85–1.18)
INR BLD: 1.5 RATIO — HIGH (ref 0.85–1.18)
LACTATE BLDV-MCNC: 2.8 MMOL/L — HIGH (ref 0.5–2)
LACTATE BLDV-MCNC: 2.8 MMOL/L — HIGH (ref 0.5–2)
LYMPHOCYTES # BLD AUTO: 1.26 K/UL — SIGNIFICANT CHANGE UP (ref 1–3.3)
LYMPHOCYTES # BLD AUTO: 1.26 K/UL — SIGNIFICANT CHANGE UP (ref 1–3.3)
LYMPHOCYTES # BLD AUTO: 6.7 % — LOW (ref 13–44)
LYMPHOCYTES # BLD AUTO: 6.7 % — LOW (ref 13–44)
MCHC RBC-ENTMCNC: 24.5 PG — LOW (ref 27–34)
MCHC RBC-ENTMCNC: 24.5 PG — LOW (ref 27–34)
MCHC RBC-ENTMCNC: 30.1 GM/DL — LOW (ref 32–36)
MCHC RBC-ENTMCNC: 30.1 GM/DL — LOW (ref 32–36)
MCV RBC AUTO: 81.4 FL — SIGNIFICANT CHANGE UP (ref 80–100)
MCV RBC AUTO: 81.4 FL — SIGNIFICANT CHANGE UP (ref 80–100)
MONOCYTES # BLD AUTO: 1.24 K/UL — HIGH (ref 0–0.9)
MONOCYTES # BLD AUTO: 1.24 K/UL — HIGH (ref 0–0.9)
MONOCYTES NFR BLD AUTO: 6.6 % — SIGNIFICANT CHANGE UP (ref 2–14)
MONOCYTES NFR BLD AUTO: 6.6 % — SIGNIFICANT CHANGE UP (ref 2–14)
NEUTROPHILS # BLD AUTO: 15.72 K/UL — HIGH (ref 1.8–7.4)
NEUTROPHILS # BLD AUTO: 15.72 K/UL — HIGH (ref 1.8–7.4)
NEUTROPHILS NFR BLD AUTO: 83.6 % — HIGH (ref 43–77)
NEUTROPHILS NFR BLD AUTO: 83.6 % — HIGH (ref 43–77)
NRBC # BLD: 0 /100 WBCS — SIGNIFICANT CHANGE UP (ref 0–0)
NRBC # BLD: 0 /100 WBCS — SIGNIFICANT CHANGE UP (ref 0–0)
PCO2 BLDV: 57 MMHG — HIGH (ref 39–42)
PCO2 BLDV: 57 MMHG — HIGH (ref 39–42)
PH BLDV: 7.4 — SIGNIFICANT CHANGE UP (ref 7.32–7.43)
PH BLDV: 7.4 — SIGNIFICANT CHANGE UP (ref 7.32–7.43)
PLATELET # BLD AUTO: 771 K/UL — HIGH (ref 150–400)
PLATELET # BLD AUTO: 771 K/UL — HIGH (ref 150–400)
PO2 BLDV: 46 MMHG — HIGH (ref 25–45)
PO2 BLDV: 46 MMHG — HIGH (ref 25–45)
POTASSIUM BLDV-SCNC: 4.2 MMOL/L — SIGNIFICANT CHANGE UP (ref 3.5–5.1)
POTASSIUM BLDV-SCNC: 4.2 MMOL/L — SIGNIFICANT CHANGE UP (ref 3.5–5.1)
POTASSIUM SERPL-MCNC: 4 MMOL/L — SIGNIFICANT CHANGE UP (ref 3.5–5.3)
POTASSIUM SERPL-MCNC: 4 MMOL/L — SIGNIFICANT CHANGE UP (ref 3.5–5.3)
POTASSIUM SERPL-SCNC: 4 MMOL/L — SIGNIFICANT CHANGE UP (ref 3.5–5.3)
POTASSIUM SERPL-SCNC: 4 MMOL/L — SIGNIFICANT CHANGE UP (ref 3.5–5.3)
PROCALCITONIN SERPL-MCNC: 0.24 NG/ML — HIGH (ref 0.02–0.1)
PROCALCITONIN SERPL-MCNC: 0.24 NG/ML — HIGH (ref 0.02–0.1)
PROT SERPL-MCNC: 8.7 G/DL — HIGH (ref 6–8.3)
PROT SERPL-MCNC: 8.7 G/DL — HIGH (ref 6–8.3)
PROTHROM AB SERPL-ACNC: 15.6 SEC — HIGH (ref 9.5–13)
PROTHROM AB SERPL-ACNC: 15.6 SEC — HIGH (ref 9.5–13)
RBC # BLD: 4.04 M/UL — SIGNIFICANT CHANGE UP (ref 3.8–5.2)
RBC # BLD: 4.04 M/UL — SIGNIFICANT CHANGE UP (ref 3.8–5.2)
RBC # FLD: 15.4 % — HIGH (ref 10.3–14.5)
RBC # FLD: 15.4 % — HIGH (ref 10.3–14.5)
RSV RNA NPH QL NAA+NON-PROBE: SIGNIFICANT CHANGE UP
RSV RNA NPH QL NAA+NON-PROBE: SIGNIFICANT CHANGE UP
SAO2 % BLDV: 76 % — SIGNIFICANT CHANGE UP (ref 67–88)
SAO2 % BLDV: 76 % — SIGNIFICANT CHANGE UP (ref 67–88)
SARS-COV-2 RNA SPEC QL NAA+PROBE: SIGNIFICANT CHANGE UP
SARS-COV-2 RNA SPEC QL NAA+PROBE: SIGNIFICANT CHANGE UP
SODIUM SERPL-SCNC: 137 MMOL/L — SIGNIFICANT CHANGE UP (ref 135–145)
SODIUM SERPL-SCNC: 137 MMOL/L — SIGNIFICANT CHANGE UP (ref 135–145)
TROPONIN T, HIGH SENSITIVITY RESULT: 14 NG/L — SIGNIFICANT CHANGE UP (ref 0–51)
TROPONIN T, HIGH SENSITIVITY RESULT: 14 NG/L — SIGNIFICANT CHANGE UP (ref 0–51)
WBC # BLD: 18.81 K/UL — HIGH (ref 3.8–10.5)
WBC # BLD: 18.81 K/UL — HIGH (ref 3.8–10.5)
WBC # FLD AUTO: 18.81 K/UL — HIGH (ref 3.8–10.5)
WBC # FLD AUTO: 18.81 K/UL — HIGH (ref 3.8–10.5)

## 2023-12-14 PROCEDURE — 71275 CT ANGIOGRAPHY CHEST: CPT | Mod: 26,MA

## 2023-12-14 PROCEDURE — 99285 EMERGENCY DEPT VISIT HI MDM: CPT

## 2023-12-14 PROCEDURE — 74177 CT ABD & PELVIS W/CONTRAST: CPT | Mod: 26,MA

## 2023-12-14 PROCEDURE — 99223 1ST HOSP IP/OBS HIGH 75: CPT | Mod: GC

## 2023-12-14 RX ORDER — INFLUENZA VIRUS VACCINE 15; 15; 15; 15 UG/.5ML; UG/.5ML; UG/.5ML; UG/.5ML
0.7 SUSPENSION INTRAMUSCULAR ONCE
Refills: 0 | Status: DISCONTINUED | OUTPATIENT
Start: 2023-12-14 | End: 2023-12-27

## 2023-12-14 RX ORDER — SODIUM CHLORIDE 9 MG/ML
1000 INJECTION, SOLUTION INTRAVENOUS ONCE
Refills: 0 | Status: COMPLETED | OUTPATIENT
Start: 2023-12-14 | End: 2023-12-14

## 2023-12-14 RX ORDER — AZITHROMYCIN 500 MG/1
500 TABLET, FILM COATED ORAL ONCE
Refills: 0 | Status: COMPLETED | OUTPATIENT
Start: 2023-12-14 | End: 2023-12-14

## 2023-12-14 RX ORDER — CEFTRIAXONE 500 MG/1
1000 INJECTION, POWDER, FOR SOLUTION INTRAMUSCULAR; INTRAVENOUS ONCE
Refills: 0 | Status: COMPLETED | OUTPATIENT
Start: 2023-12-14 | End: 2023-12-14

## 2023-12-14 RX ORDER — SODIUM CHLORIDE 9 MG/ML
1000 INJECTION INTRAMUSCULAR; INTRAVENOUS; SUBCUTANEOUS ONCE
Refills: 0 | Status: DISCONTINUED | OUTPATIENT
Start: 2023-12-14 | End: 2023-12-14

## 2023-12-14 RX ADMIN — CEFTRIAXONE 100 MILLIGRAM(S): 500 INJECTION, POWDER, FOR SOLUTION INTRAMUSCULAR; INTRAVENOUS at 13:11

## 2023-12-14 RX ADMIN — Medication 50 MILLIGRAM(S): at 13:31

## 2023-12-14 RX ADMIN — SODIUM CHLORIDE 1000 MILLILITER(S): 9 INJECTION, SOLUTION INTRAVENOUS at 13:32

## 2023-12-14 RX ADMIN — AZITHROMYCIN 255 MILLIGRAM(S): 500 TABLET, FILM COATED ORAL at 13:48

## 2023-12-14 NOTE — ED PROVIDER NOTE - OBJECTIVE STATEMENT
68-year-old female history of protein C deficiency, history of PEs on Eliquis, COPD on 3 L nasal cannula presenting with shortness of breath associated with cough.  Endorsing symptoms worsening over the past 1 week.  States decreased p.o. intake and 20 pound weight loss over the past few months.  Patient had outpatient blood work done which showed elevated white blood cell, as well as platelets.  Patient denies any fever at home, chest pain, abdominal pain, nausea vomiting diarrhea, urinary complaints, melena or hematochezia, recent travel or sick contacts

## 2023-12-14 NOTE — PATIENT PROFILE ADULT - VIOLENT/TRAUMATIC EVENT EXPERIENCED
1) Pt calls and says another ones of his BP readings from yesterday was 190/?.  Today after resting for 5 minutes his BP was 179/93. He has some lightheadedness but not new or worsening. He denies SOB.     2) He said he had vision changes that started a couple months ago where when he reads fonts \"some letters get rearranged in the text making it hard to read but if I cover up one eye then it is fine\".     3) He says yesterday day he was taking the garbage out and after about 5 feet \"his legs would give out and he would fall and takes a while to get back up.\" He said then he would walk another 5 feet and fall again. He says he falls about everyday. He cannot tell if it is leg weakness or balance issues that are causing him to fall. This started back about 1 year or greater ago when he passed out in the shower. She says that is when it all started to go down hill. He said it has been getting worse.     Previous office visit readings  Vitals 11/11/2020 11/11/2020 11/19/2020   SYSTOLIC 185 175 188   DIASTOLIC 90 85 72   Pulse 73 70 91      divorce of son 2 yrs ago who is staying with them

## 2023-12-14 NOTE — ED ADULT TRIAGE NOTE - CHIEF COMPLAINT QUOTE
Shortness of breath worsening x 3 weeks, decreased PO intake and decreased weight. Sent by MD due to elevated WBC

## 2023-12-14 NOTE — H&P ADULT - PROBLEM SELECTOR PLAN 3
Hx of protein S deficiency  Hx of LUE DVT in 2019 and PE on Eliquis at home  - Continue Eliquis 5 mg BID

## 2023-12-14 NOTE — H&P ADULT - PROBLEM SELECTOR PLAN 7
- Takes OTC omeprazole PRN at home for GERD symptoms  - Continue PRN pantoprazole 40 mg QD while inpatient (therapeutic equiv conversion)

## 2023-12-14 NOTE — H&P ADULT - PROBLEM SELECTOR PLAN 1
Chronic progressive exacerbation of COPD in the last two months, possibly 2/2 underlying respiratory infection/PNA  - On 3L NC O2 at home, increasing dyspnea with minimal exertion  - VBG unremarkable, mild hypercarbia  - Advair 500-50 DISKUS (fluticasone-salmeterol) at home  - Continue budesonide-formoterol inhaler while inpatient (therapeutic equivalent conversion)  - Albuterol inhaler PRN q2h  - S/p prednisone 50 mg PO x1 in ED on 12/14  - Continue home NC O2  - Continuous pulse ox

## 2023-12-14 NOTE — ED ADULT NURSE NOTE - OBJECTIVE STATEMENT
Pt is a 68y F, AxO3, PMH COPD on 3L O2 at baseline, hx of PE's recently dc Eliquis, presents to the ED for abnormal labs. Pt states she has felt increasingly more short of breath in the last month, as well as increased fatigue, and a 20 lb weight loss d/t decrease PO intake. Endorsing a productive cough with yellow sputum. Outpatient blood work showed elevated BC, so was referred into ED for eval.  Breathing spontaneous and unlabored, skin warm and dey. Denies chest pain,  symptoms, fevers, dizziness. Pt is well appearing, maintained on cardiac monitor, speaking full sentences, family at beside, no acute distress.

## 2023-12-14 NOTE — ED CLERICAL - NS ED CLERK NOTE PRE-ARRIVAL INFORMATION; ADDITIONAL PRE-ARRIVAL INFORMATION
CC/Reason For referral: Elevated WBC, leukemia, sepsis  Preferred Consultant(if applicable): N/A  Who admits for you (if needed): hospitalist  Do you have documents you would like to fax over? N/A  Would you still like to speak to an ED attending? Yes    call back # [phone] CC/Reason For referral: Elevated WBC, leukemia, sepsis  Preferred Consultant(if applicable): N/A  Who admits for you (if needed): hospitalist  Do you have documents you would like to fax over? N/A  Would you still like to speak to an ED attending? Yes    call back # [phone]

## 2023-12-14 NOTE — PATIENT PROFILE ADULT - FALL HARM RISK - RISK INTERVENTIONS
Assistance OOB with selected safe patient handling equipment/Assistance with ambulation/Communicate Fall Risk and Risk Factors to all staff, patient, and family/Reinforce activity limits and safety measures with patient and family/Visual Cue: Yellow wristband/Bed in lowest position, wheels locked, appropriate side rails in place/Call bell, personal items and telephone in reach/Instruct patient to call for assistance before getting out of bed or chair/Non-slip footwear when patient is out of bed/[place] to call system/Physically safe environment - no spills, clutter or unnecessary equipment/Purposeful Proactive Rounding/Room/bathroom lighting operational, light cord in reach Assistance OOB with selected safe patient handling equipment/Assistance with ambulation/Communicate Fall Risk and Risk Factors to all staff, patient, and family/Reinforce activity limits and safety measures with patient and family/Visual Cue: Yellow wristband/Bed in lowest position, wheels locked, appropriate side rails in place/Call bell, personal items and telephone in reach/Instruct patient to call for assistance before getting out of bed or chair/Non-slip footwear when patient is out of bed/[place] to call system/Physically safe environment - no spills, clutter or unnecessary equipment/Purposeful Proactive Rounding/Room/bathroom lighting operational, light cord in reach

## 2023-12-14 NOTE — H&P ADULT - ASSESSMENT
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 No imaging evidence of PE or malignancy in chest/abdomen/pelvis. Notable for leukocytosis and thrombocytosis, negative RVP. VSS     Pt is accompanied by  (Javier) at bedside. Pt reports experiencing worsening ALTMAN now even with minimal distance ambulation (e.g., from living room to bathroom) requiring supplemental oxygen, which is not her baseline. The symptom worsened gradually over the last 2 months. Also reports intermittent "green mucus" production with coughs during the same time period. No known sick contacts. Pt primarily stays home. Ambulates without assistive devices. Endorses occasional lightheadedness that spontaneously resolves. Denies fevers, chills, headaches, chest pain, nausea, emesis. Pt also reports reduced PO intake in recent months leading to ~20 lb weight loss in the last month, endorses low appetite eating 1 meal/day.     In the ED, vital signs stable T 36.7C,  sinus tachy, /74, on 3L NC O2 SpO2 >95%. PoCUS showed hypovolemia, s/p IVF boluses (NS 1L x1, LR 1L x1). Labs notable for leukocytosis 18.81, thrombocytosis 771k, elevated Alk Phos 138 and procal 0.24. Lactate 1.0. Negative RVP. Unremarkable serum chemistries, troponins, and pro-BNP. VBG unremarkable with normal pH, signs of hypercarbia. CTA chest and CT A/P on 12/14 notable for extensive airway thickening and tree-in-bud nodularity c/w chronic endobronchial dz, negative for PE or malignancy. Blood cultures sent, pending results.   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 No imaging evidence of PE or malignancy in chest/abdomen/pelvis. Notable for leukocytosis and thrombocytosis, negative RVP. VSS     Pt is accompanied by  (Javier) at bedside. Pt reports experiencing worsening ALTMAN now even with minimal distance ambulation (e.g., from living room to bathroom) requiring supplemental oxygen, which is not her baseline. The symptom worsened gradually over the last 2 months. Also reports intermittent "green mucus" production with coughs during the same time period. No known sick contacts. Pt primarily stays home. Ambulates without assistive devices. Endorses occasional lightheadedness that spontaneously resolves. Denies fevers, chills, headaches, chest pain, nausea, emesis. Pt also reports reduced PO intake in recent months leading to ~20 lb weight loss in the last month, endorses low appetite eating 1 meal/day.     In the ED, vital signs stable T 36.7C,  sinus tachy, /74, on 3L NC O2 SpO2 >95%. PoCUS showed hypovolemia, s/p IVF boluses (NS 1L x1, LR 1L x1). Labs notable for leukocytosis 18.81, thrombocytosis 771k, elevated Alk Phos 138 and procal 0.24. Lactate 1.0. Negative RVP. Unremarkable serum chemistries, troponins, and pro-BNP. VBG unremarkable with normal pH, signs of hypercarbia. CTA chest and CT A/P on 12/14 notable for extensive airway thickening and tree-in-bud nodularity c/w chronic endobronchial dz, negative for PE or malignancy. Blood cultures sent, pending results.   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 No imaging evidence of PE or malignancy in chest/abdomen/pelvis. Notable for leukocytosis and thrombocytosis, negative RVP. VSS     Pt is accompanied by  (Javier) at bedside. Pt reports experiencing worsening ALTMAN now even with minimal distance ambulation (e.g., from living room to bathroom) requiring supplemental oxygen, which is not her baseline. The symptom worsened gradually over the last 2 months. Also reports intermittent "green mucus" production with coughs during the same time period. No known sick contacts. Pt primarily stays home. Ambulates without assistive devices. Endorses occasional lightheadedness that spontaneously resolves. Denies fevers, chills, headaches, chest pain, nausea, emesis. Pt also reports reduced PO intake in recent months leading to ~20 lb weight loss in the last month, endorses low appetite eating 1 meal/day.     In the ED, vital signs stable T 36.7C,  sinus tachy, /74, on 3L NC O2 SpO2 >95%. PoCUS showed hypovolemia, s/p IVF boluses (NS 1L x1, LR 1L x1). Labs notable for leukocytosis 18.81, thrombocytosis 771k, elevated Alk Phos 138 and procal 0.24. Lactate 1.0. Negative RVP. Unremarkable serum chemistries, troponins, and pro-BNP. VBG unremarkable with normal pH, signs of hypercarbia. CTA chest and CT A/P on 12/14 notable for extensive airway thickening and tree-in-bud nodularity c/w chronic endobronchial dz, negative for PE or malignancy. Blood cultures sent, pending results.      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 No imaging evidence of PE or malignancy in chest/abdomen/pelvis. Notable for leukocytosis and thrombocytosis, negative RVP. VSS     Pt is accompanied by  (Javier) at bedside. Pt reports experiencing worsening ALTMAN now even with minimal distance ambulation (e.g., from living room to bathroom) requiring supplemental oxygen, which is not her baseline. The symptom worsened gradually over the last 2 months. Also reports intermittent "green mucus" production with coughs during the same time period. No known sick contacts. Pt primarily stays home. Ambulates without assistive devices. Endorses occasional lightheadedness that spontaneously resolves. Denies fevers, chills, headaches, chest pain, nausea, emesis. Pt also reports reduced PO intake in recent months leading to ~20 lb weight loss in the last month, endorses low appetite eating 1 meal/day.     In the ED, vital signs stable T 36.7C,  sinus tachy, /74, on 3L NC O2 SpO2 >95%. PoCUS showed hypovolemia, s/p IVF boluses (NS 1L x1, LR 1L x1). Labs notable for leukocytosis 18.81, thrombocytosis 771k, elevated Alk Phos 138 and procal 0.24. Lactate 1.0. Negative RVP. Unremarkable serum chemistries, troponins, and pro-BNP. VBG unremarkable with normal pH, signs of hypercarbia. CTA chest and CT A/P on 12/14 notable for extensive airway thickening and tree-in-bud nodularity c/w chronic endobronchial dz, negative for PE or malignancy. Blood cultures sent, pending results.      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 No imaging evidence of PE or malignancy in chest/abdomen/pelvis. Notable for leukocytosis and thrombocytosis, negative RVP. VSS, on 3L NC. Infection workup pending, managing COPD. 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 No imaging evidence of PE or malignancy in chest/abdomen/pelvis. Notable for leukocytosis and thrombocytosis, negative RVP. VSS, on 3L NC. Infection workup pending, managing COPD.

## 2023-12-14 NOTE — H&P ADULT - NSHPLABSRESULTS_GEN_ALL_CORE
LABS:                        9.9    18.81 )-----------( 771      ( 14 Dec 2023 13:28 )             32.9     12-14    137  |  94<L>  |  12  ----------------------------<  128<H>  4.0   |  29  |  0.41<L>    Ca    10.4      14 Dec 2023 13:28  Phos  3.2     12-14  Mg     1.7     12-14    TPro  8.7<H>  /  Alb  4.2  /  TBili  0.2  /  DBili  x   /  AST  19  /  ALT  17  /  AlkPhos  138<H>  12-14      PT/INR - ( 14 Dec 2023 13:28 )   PT: 15.6 sec;   INR: 1.50 ratio         PTT - ( 14 Dec 2023 13:28 )  PTT:40.2 sec  CAPILLARY BLOOD GLUCOSE                  Urinalysis Basic - ( 14 Dec 2023 13:28 )    Color: x / Appearance: x / SG: x / pH: x  Gluc: 128 mg/dL / Ketone: x  / Bili: x / Urobili: x   Blood: x / Protein: x / Nitrite: x   Leuk Esterase: x / RBC: x / WBC x   Sq Epi: x / Non Sq Epi: x / Bacteria: x            I & O Summary:      Microbiology:      Radiology:  CTA Chest + CT A/P with IV con (12/14/2023):  - Extensive airway thickening and tree-in-bud nodularity, compatible with chronic endobronchial disease, including MAC  - No CT evidence of pulmonary embolism or malignancy in the thorax, abdomen, and pelvis.

## 2023-12-14 NOTE — PATIENT PROFILE ADULT - FUNCTIONAL ASSESSMENT - BASIC MOBILITY 6.
4-calculated by average/Not able to assess (calculate score using [facility] averaging method)  4-calculated by average/Not able to assess (calculate score using [facility] averaging method)

## 2023-12-14 NOTE — H&P ADULT - HISTORY OF PRESENT ILLNESS
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    Pt is accompanied by  (Javier) at bedside. Pt reports experiencing worsening ALTMAN now even with minimal distance ambulation (e.g., from living room to bathroom) requiring supplemental oxygen, which is not her baseline. The symptom worsened gradually over the last 2 months. Also reports intermittent "green mucus" production with coughs during the same time period. No known sick contacts. Pt primarily stays home. Ambulates without assistive devices. Endorses occasional lightheadedness that spontaneously resolves. Denies fevers, chills, headaches, chest pain, nausea, emesis. Pt also reports reduced PO intake in recent months leading to ~20 lb weight loss in the last month, endorses low appetite eating 1 meal/day.     In the ED, vital signs stable T 36.7C,  sinus tachy, /74, on 3L NC O2 SpO2 >95%. PoCUS showed hypovolemia, s/p IVF boluses (NS 1L x1, LR 1L x1). Labs notable for leukocytosis 18.81, thrombocytosis 771k, elevated Alk Phos 138 and procal 0.24. Lactate 1.0. Negative RVP. Unremarkable serum chemistries, troponins, and pro-BNP. VBG unremarkable with normal pH, signs of hypercarbia. CTA chest and CT A/P on 12/14 notable for extensive airway thickening and tree-in-bud nodularity c/w chronic endobronchial dz, negative for PE or malignancy. Blood cultures sent, pending results. 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    Pt is accompanied by  (Javier) at bedside. Pt reports experiencing worsening ALTMAN now even with minimal distance ambulation (e.g., from living room to bathroom) requiring supplemental oxygen, which is not her baseline. The symptom worsened gradually over the last 2 months. Also reports intermittent "green mucus" production with coughs during the same time period. No known sick contacts. Pt primarily stays home. Ambulates without assistive devices. Endorses occasional lightheadedness that spontaneously resolves. Denies fevers, chills, headaches, chest pain, nausea, emesis. Pt also reports reduced PO intake in recent months leading to ~20 lb weight loss in the last month, endorses low appetite eating 1 meal/day.     In the ED, vital signs stable T 36.7C,  sinus tachy, /74, on 3L NC O2 SpO2 >95%. PoCUS showed hypovolemia, s/p IVF boluses (NS 1L x1, LR 1L x1). Labs notable for leukocytosis 18.81, thrombocytosis 771k, elevated Alk Phos 138 and procal 0.24. Lactate 1.0. Negative RVP. Unremarkable serum chemistries, troponins, and pro-BNP. VBG unremarkable with normal pH, signs of hypercarbia. CTA chest and CT A/P on 12/14 notable for extensive airway thickening and tree-in-bud nodularity c/w chronic endobronchial dz, negative for PE or malignancy. Blood cultures sent, pending results. 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    Pt is accompanied by  (Javier) at bedside. Pt reports experiencing worsening ALTMAN now even with minimal distance ambulation (e.g., from living room to bathroom) requiring supplemental oxygen, which is not her baseline. The symptom worsened gradually over the last 2 months. Also reports intermittent "green mucus" production with coughs during the same time period. No known sick contacts. Pt primarily stays home. Ambulates without assistive devices. Endorses occasional lightheadedness that spontaneously resolves. Denies fevers, chills, headaches, chest pain, nausea, emesis. Pt also reports reduced PO intake in recent months leading to ~20 lb weight loss in the last month, endorses low appetite eating 1 meal/day. Of note, pt reports having had a back surgery in 1980s for herniated disc and took opioid meds for a long time (oxy 15 mg q6h), currently on Suboxone SL.    In the ED, vital signs stable T 36.7C,  sinus tachy, /74, on 3L NC O2 SpO2 >95%. PoCUS showed hypovolemia, s/p IVF boluses (NS 1L x1, LR 1L x1). Labs notable for leukocytosis 18.81, thrombocytosis 771k, elevated Alk Phos 138 and procal 0.24. Lactate 1.0. Negative RVP. Unremarkable serum chemistries, troponins, and pro-BNP. VBG unremarkable with normal pH, signs of hypercarbia. CTA chest and CT A/P on 12/14 notable for extensive airway thickening and tree-in-bud nodularity c/w chronic endobronchial dz, negative for PE or malignancy. Blood cultures sent, pending results. 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    Pt is accompanied by  (Javier) at bedside. Pt reports experiencing worsening ALTMAN now even with minimal distance ambulation (e.g., from living room to bathroom) requiring supplemental oxygen, which is not her baseline. The symptom worsened gradually over the last 2 months. Also reports intermittent "green mucus" production with coughs during the same time period. No known sick contacts. Pt primarily stays home. Ambulates without assistive devices. Endorses occasional lightheadedness that spontaneously resolves. Denies fevers, chills, headaches, chest pain, nausea, emesis. Pt also reports reduced PO intake in recent months leading to ~20 lb weight loss in the last month, endorses low appetite eating 1 meal/day. Of note, pt reports having had a back surgery in 1980s for herniated disc and took opioid meds for a long time (oxy 15 mg q6h), currently on Suboxone SL.    In the ED, vital signs stable T 36.7C,  sinus tachy, /74, on 3L NC O2 SpO2 >95%. PoCUS showed hypovolemia, s/p IVF boluses (NS 1L x1, LR 1L x1). Labs notable for leukocytosis 18.81, thrombocytosis 771k, elevated Alk Phos 138 and procal 0.24. Lactate 1.0. Negative RVP. Unremarkable serum chemistries, troponins, and pro-BNP. VBG unremarkable with normal pH, signs of hypercarbia. CTA chest and CT A/P on 12/14 notable for extensive airway thickening and tree-in-bud nodularity c/w chronic endobronchial dz, negative for PE or malignancy. Blood cultures sent, pending results.

## 2023-12-14 NOTE — PATIENT PROFILE ADULT - ..
Anemia    Atrial fibrillation    CAD (coronary artery disease)    COPD (chronic obstructive pulmonary disease)    ESRD (end stage renal disease) on dialysis  MWF via [facility]  planned AVF creation  Gall stones    Gout    Hyperlipidemia    Hypertension    Pneumonia    Pulmonary hypertension    Sinusitis    Sinusitis, unspecified chronicity, unspecified location    Spinal fusion failure, initial encounter    Spinal stenosis    Tubal ligation status 14-Dec-2023 22:35:33

## 2023-12-14 NOTE — H&P ADULT - NSHPPHYSICALEXAM_GEN_ALL_CORE
VITALS:   T(C): 36.7 (12-14-23 @ 21:24), Max: 37.2 (12-14-23 @ 12:09)  HR: 112 (12-14-23 @ 21:24) (110 - 140)  BP: 135/84 (12-14-23 @ 21:24) (127/79 - 178/95)  RR: 20 (12-14-23 @ 21:24) (18 - 24)  SpO2: 96% (12-14-23 @ 21:24) (95% - 96%)    GENERAL: NAD, sitting in hospital bed  HEAD:  Atraumatic, normocephalic  EYES: EOMI, PERRLA, conjunctiva and sclera clear  ENT: Moist mucous membranes  NECK: Supple, no JVD  HEART: Tachycardic, regular rhythm, no murmurs, rubs, or gallops  LUNGS: Unlabored respirations on supplemental NC oxygen.  Clear to auscultation bilaterally, no crackles, wheezing, or rhonchi  ABDOMEN: Soft, nontender, nondistended, +BS  EXTREMITIES: 2+ peripheral pulses bilaterally. No peripheral edema, clubbing, or cyanosis  NERVOUS SYSTEM:  A&Ox3, no focal deficits   SKIN: No rashes or lesions

## 2023-12-14 NOTE — ED PROVIDER NOTE - PHYSICAL EXAMINATION
Fco Drake DO (PGY2)   Physical Exam:    Gen: NAD, AOx3  Head: NCAT  HEENT: EOMI, PEERLA  Lung: decreased breath sounds throughout all lung fields   CV: RRR, no murmurs, rubs or gallops  Abd: soft, NT, ND, no guarding, no rigidity, no rebound tenderness, no CVA tenderness   MSK: no visible deformities, ROM normal in UE/LE, no back pain  Neuro: No focal sensory or motor deficits. Sensation intact to light touch all extremities.  Skin: Warm, well perfused, no rash, no leg swelling

## 2023-12-14 NOTE — ED PROVIDER NOTE - CLINICAL SUMMARY MEDICAL DECISION MAKING FREE TEXT BOX
68-year-old female history of protein C deficiency, history of PEs on Eliquis, COPD on 3 L nasal cannula presenting with shortness of breath associated with cough.  Endorsing symptoms worsening over the past 1 week.  States decreased p.o. intake and 20 pound weight loss over the past few months.  Patient had outpatient blood work done which showed elevated white blood cell, as well as platelets.  Patient on 3L NC, afebrile, tachycardic. Plan for sepsis labs, CTA chest and abdomen, IV abx for COPD exacerbation, admission  Differential diagnosis includes but not limited to malignancy vs. COPD exacerbation vs. flu vs. covid vs. metabolic derangement

## 2023-12-14 NOTE — H&P ADULT - ATTENDING COMMENTS
68F PMHx protein S deficiency and VTE on eliquis, COPD on 3L at home, HTN, mood disorders. Presenting w/ productive cough x2mo and worsening ALTMAN. Had elevated wbc on outside lab. Also reports anorexia recently and about 20lb weightloss in the last few mo. Admitted for PNA and likely AE-COPD.      ROS neg except per HPI.    PEx: NAd, NCAT, nonicteric sclera, no jvd, heart tachy regular, lung CTAB w/o wheezing or crackles, abd soft nontender, AAOx4.      CTA chest neg for PE. CT a/p w/o evidence of malignancy.    Labs w/ wbc 18, procal 0.24     A&P   #Sepsis d/t CAP   #AE-COPD   -c/w ctx and azithromycin   -f/u cultures   -c/w home inhaler + albuterol prn    -hold off on systemic steroid for now given pt now at home O2 and not wheezing       #h/o substance use   -c/w suboxone      Mgnt of chronic illness per resident note      VTE ppx: full AC with eliquis

## 2023-12-14 NOTE — H&P ADULT - PROBLEM SELECTOR PLAN 8
- DVT ppx: Eliquis 5 mg BID, ambulation  - Diet: regular, DASH/TLC  - PT ordered  - Dispo: pending clinical course

## 2023-12-14 NOTE — H&P ADULT - TIME BILLING
Time-based billing (NON-critical care).   The documented time excludes time spent with resident teaching activities or time spent by the resident in taking care of the patient.   The necessity of the time spent during the encounter on this date of service was due to:     - Ordering, reviewing, and interpreting labs, testing, and imaging.  - Independently obtaining a review of systems and performing a physical exam  - Reviewing prior hospitalization and where necessary, outpatient records.  - Counselling and educating patient and family regarding interpretation of aforementioned items and plan of care.

## 2023-12-14 NOTE — ED PROCEDURE NOTE - PROCEDURE ADDITIONAL DETAILS
B-lines bilaterally, lung sliding bilaterally, no consolidations at bases, no pleural effusions  Poor cardiac windows, did not assess valves, but LV hyperdynamic, no RV dilation, IVC around 1.5-2cm, close to 100% collapse with inspiration, no pericardial effusion

## 2023-12-14 NOTE — H&P ADULT - NSHPSOCIALHISTORY_GEN_ALL_CORE
- EtOH hx: rarely  - Tobacco smoking: quit in 2013, 1.5 ppd x30 years previously  - No other recreational drug use/IVDU  - Lives at home with

## 2023-12-14 NOTE — H&P ADULT - PROBLEM SELECTOR PLAN 2
Notable for leukocytosis and thrombocytosis with unclear etiology. Sinus tachycardia, afebrile, otherwise HDS.  - Elevated alk phos and procal  - Normal lactate, s/p total 2L IVF bolus in ED 12/14  - CTA chest and CT A/P 12/14 notable for extensive airway thickening and tree-in-bud nodularity c/w chronic endobroncial dz, no consolidations  - S/p IV azithro and CTX x1 in ED 12/14  - Ordered urine Legionella and Strep pneumo Ag and MRSA/MSSA PCR  - Continue IV azithro 500 mg q24h and CTX 1 g q24h for empiric CAP coverage  - Follow up blood, urine, sputum cultures

## 2023-12-14 NOTE — ED ADULT NURSE NOTE - CHIEF COMPLAINT
Spoke with the patient and she was under the impression that she would be getting a repeat cervical MRI before she saw the surgeon. Nothing in the notes to indicate that. Lumbar MRI was ordered but not completed. Patient has an appointment on Wed with her neurologist and she will discuss during her visit. She will let us know if anything is needed from Dr. Seaman.   The patient is a 68y Female complaining of shortness of breath.

## 2023-12-14 NOTE — ED PROVIDER NOTE - ATTENDING CONTRIBUTION TO CARE
History and physical as documented above.  Overall non toxic and in NAD, cachectic, on home 3L NC, poor inspiratory and expiratory air movement but no significantly increased WOB, no crackles, tachycardic to 140s, ST on monitor, normotensive, no peripheral edema, abdomen benign, warm on exam. Will check sepsis labs, CTA chest to eval for PE (has been off Eliquis for a few days), CT a/p to eval for malignancy, will give IVF (appears hypovolemic on POCUS), CAP coverage, likely TBA. No role for HFNC or PPV at this time, but has been admitted to ICU for COPD exacerbation before (no intubation required).

## 2023-12-14 NOTE — H&P ADULT - PROBLEM SELECTOR PLAN 6
Hx of back surgery in 1980s for herniated disc, consistent opioid use afterwards for an extended period of time, currently on Suboxone SL film 4 mg BID  - Pharmacy does not have SL film form, give SL tablet for now

## 2023-12-15 DIAGNOSIS — Z29.9 ENCOUNTER FOR PROPHYLACTIC MEASURES, UNSPECIFIED: ICD-10-CM

## 2023-12-15 DIAGNOSIS — J44.1 CHRONIC OBSTRUCTIVE PULMONARY DISEASE WITH (ACUTE) EXACERBATION: ICD-10-CM

## 2023-12-15 DIAGNOSIS — J44.9 CHRONIC OBSTRUCTIVE PULMONARY DISEASE, UNSPECIFIED: ICD-10-CM

## 2023-12-15 DIAGNOSIS — J18.9 PNEUMONIA, UNSPECIFIED ORGANISM: ICD-10-CM

## 2023-12-15 DIAGNOSIS — I10 ESSENTIAL (PRIMARY) HYPERTENSION: ICD-10-CM

## 2023-12-15 DIAGNOSIS — Z86.711 PERSONAL HISTORY OF PULMONARY EMBOLISM: ICD-10-CM

## 2023-12-15 DIAGNOSIS — K21.9 GASTRO-ESOPHAGEAL REFLUX DISEASE WITHOUT ESOPHAGITIS: ICD-10-CM

## 2023-12-15 DIAGNOSIS — M54.9 DORSALGIA, UNSPECIFIED: ICD-10-CM

## 2023-12-15 DIAGNOSIS — F41.9 ANXIETY DISORDER, UNSPECIFIED: ICD-10-CM

## 2023-12-15 LAB
ALBUMIN SERPL ELPH-MCNC: 4.1 G/DL — SIGNIFICANT CHANGE UP (ref 3.3–5)
ALBUMIN SERPL ELPH-MCNC: 4.1 G/DL — SIGNIFICANT CHANGE UP (ref 3.3–5)
ALP SERPL-CCNC: 124 U/L — HIGH (ref 40–120)
ALP SERPL-CCNC: 124 U/L — HIGH (ref 40–120)
ALT FLD-CCNC: 15 U/L — SIGNIFICANT CHANGE UP (ref 10–45)
ALT FLD-CCNC: 15 U/L — SIGNIFICANT CHANGE UP (ref 10–45)
ANION GAP SERPL CALC-SCNC: 11 MMOL/L — SIGNIFICANT CHANGE UP (ref 5–17)
ANION GAP SERPL CALC-SCNC: 11 MMOL/L — SIGNIFICANT CHANGE UP (ref 5–17)
AST SERPL-CCNC: 16 U/L — SIGNIFICANT CHANGE UP (ref 10–40)
AST SERPL-CCNC: 16 U/L — SIGNIFICANT CHANGE UP (ref 10–40)
BASOPHILS # BLD AUTO: 0.06 K/UL — SIGNIFICANT CHANGE UP (ref 0–0.2)
BASOPHILS # BLD AUTO: 0.06 K/UL — SIGNIFICANT CHANGE UP (ref 0–0.2)
BASOPHILS NFR BLD AUTO: 0.5 % — SIGNIFICANT CHANGE UP (ref 0–2)
BASOPHILS NFR BLD AUTO: 0.5 % — SIGNIFICANT CHANGE UP (ref 0–2)
BILIRUB SERPL-MCNC: 0.2 MG/DL — SIGNIFICANT CHANGE UP (ref 0.2–1.2)
BILIRUB SERPL-MCNC: 0.2 MG/DL — SIGNIFICANT CHANGE UP (ref 0.2–1.2)
BUN SERPL-MCNC: 9 MG/DL — SIGNIFICANT CHANGE UP (ref 7–23)
BUN SERPL-MCNC: 9 MG/DL — SIGNIFICANT CHANGE UP (ref 7–23)
CALCIUM SERPL-MCNC: 10.4 MG/DL — SIGNIFICANT CHANGE UP (ref 8.4–10.5)
CALCIUM SERPL-MCNC: 10.4 MG/DL — SIGNIFICANT CHANGE UP (ref 8.4–10.5)
CHLORIDE SERPL-SCNC: 94 MMOL/L — LOW (ref 96–108)
CHLORIDE SERPL-SCNC: 94 MMOL/L — LOW (ref 96–108)
CO2 SERPL-SCNC: 32 MMOL/L — HIGH (ref 22–31)
CO2 SERPL-SCNC: 32 MMOL/L — HIGH (ref 22–31)
CREAT SERPL-MCNC: 0.42 MG/DL — LOW (ref 0.5–1.3)
CREAT SERPL-MCNC: 0.42 MG/DL — LOW (ref 0.5–1.3)
EGFR: 107 ML/MIN/1.73M2 — SIGNIFICANT CHANGE UP
EGFR: 107 ML/MIN/1.73M2 — SIGNIFICANT CHANGE UP
EOSINOPHIL # BLD AUTO: 0.19 K/UL — SIGNIFICANT CHANGE UP (ref 0–0.5)
EOSINOPHIL # BLD AUTO: 0.19 K/UL — SIGNIFICANT CHANGE UP (ref 0–0.5)
EOSINOPHIL NFR BLD AUTO: 1.5 % — SIGNIFICANT CHANGE UP (ref 0–6)
EOSINOPHIL NFR BLD AUTO: 1.5 % — SIGNIFICANT CHANGE UP (ref 0–6)
GLUCOSE SERPL-MCNC: 111 MG/DL — HIGH (ref 70–99)
GLUCOSE SERPL-MCNC: 111 MG/DL — HIGH (ref 70–99)
GRAM STN FLD: ABNORMAL
GRAM STN FLD: ABNORMAL
HCT VFR BLD CALC: 31.7 % — LOW (ref 34.5–45)
HCT VFR BLD CALC: 31.7 % — LOW (ref 34.5–45)
HCV AB S/CO SERPL IA: 0.16 S/CO — SIGNIFICANT CHANGE UP (ref 0–0.99)
HCV AB S/CO SERPL IA: 0.16 S/CO — SIGNIFICANT CHANGE UP (ref 0–0.99)
HCV AB SERPL-IMP: SIGNIFICANT CHANGE UP
HCV AB SERPL-IMP: SIGNIFICANT CHANGE UP
HGB BLD-MCNC: 9.7 G/DL — LOW (ref 11.5–15.5)
HGB BLD-MCNC: 9.7 G/DL — LOW (ref 11.5–15.5)
IMM GRANULOCYTES NFR BLD AUTO: 0.5 % — SIGNIFICANT CHANGE UP (ref 0–0.9)
IMM GRANULOCYTES NFR BLD AUTO: 0.5 % — SIGNIFICANT CHANGE UP (ref 0–0.9)
LEGIONELLA AG UR QL: NEGATIVE — SIGNIFICANT CHANGE UP
LEGIONELLA AG UR QL: NEGATIVE — SIGNIFICANT CHANGE UP
LYMPHOCYTES # BLD AUTO: 15.6 % — SIGNIFICANT CHANGE UP (ref 13–44)
LYMPHOCYTES # BLD AUTO: 15.6 % — SIGNIFICANT CHANGE UP (ref 13–44)
LYMPHOCYTES # BLD AUTO: 2 K/UL — SIGNIFICANT CHANGE UP (ref 1–3.3)
LYMPHOCYTES # BLD AUTO: 2 K/UL — SIGNIFICANT CHANGE UP (ref 1–3.3)
MAGNESIUM SERPL-MCNC: 1.9 MG/DL — SIGNIFICANT CHANGE UP (ref 1.6–2.6)
MAGNESIUM SERPL-MCNC: 1.9 MG/DL — SIGNIFICANT CHANGE UP (ref 1.6–2.6)
MCHC RBC-ENTMCNC: 24.8 PG — LOW (ref 27–34)
MCHC RBC-ENTMCNC: 24.8 PG — LOW (ref 27–34)
MCHC RBC-ENTMCNC: 30.6 GM/DL — LOW (ref 32–36)
MCHC RBC-ENTMCNC: 30.6 GM/DL — LOW (ref 32–36)
MCV RBC AUTO: 81.1 FL — SIGNIFICANT CHANGE UP (ref 80–100)
MCV RBC AUTO: 81.1 FL — SIGNIFICANT CHANGE UP (ref 80–100)
MONOCYTES # BLD AUTO: 0.97 K/UL — HIGH (ref 0–0.9)
MONOCYTES # BLD AUTO: 0.97 K/UL — HIGH (ref 0–0.9)
MONOCYTES NFR BLD AUTO: 7.6 % — SIGNIFICANT CHANGE UP (ref 2–14)
MONOCYTES NFR BLD AUTO: 7.6 % — SIGNIFICANT CHANGE UP (ref 2–14)
MRSA PCR RESULT.: SIGNIFICANT CHANGE UP
MRSA PCR RESULT.: SIGNIFICANT CHANGE UP
NEUTROPHILS # BLD AUTO: 9.51 K/UL — HIGH (ref 1.8–7.4)
NEUTROPHILS # BLD AUTO: 9.51 K/UL — HIGH (ref 1.8–7.4)
NEUTROPHILS NFR BLD AUTO: 74.3 % — SIGNIFICANT CHANGE UP (ref 43–77)
NEUTROPHILS NFR BLD AUTO: 74.3 % — SIGNIFICANT CHANGE UP (ref 43–77)
NIGHT BLUE STAIN TISS: SIGNIFICANT CHANGE UP
NIGHT BLUE STAIN TISS: SIGNIFICANT CHANGE UP
NRBC # BLD: 0 /100 WBCS — SIGNIFICANT CHANGE UP (ref 0–0)
NRBC # BLD: 0 /100 WBCS — SIGNIFICANT CHANGE UP (ref 0–0)
PHOSPHATE SERPL-MCNC: 3 MG/DL — SIGNIFICANT CHANGE UP (ref 2.5–4.5)
PHOSPHATE SERPL-MCNC: 3 MG/DL — SIGNIFICANT CHANGE UP (ref 2.5–4.5)
PLATELET # BLD AUTO: 700 K/UL — HIGH (ref 150–400)
PLATELET # BLD AUTO: 700 K/UL — HIGH (ref 150–400)
POTASSIUM SERPL-MCNC: 3.5 MMOL/L — SIGNIFICANT CHANGE UP (ref 3.5–5.3)
POTASSIUM SERPL-MCNC: 3.5 MMOL/L — SIGNIFICANT CHANGE UP (ref 3.5–5.3)
POTASSIUM SERPL-SCNC: 3.5 MMOL/L — SIGNIFICANT CHANGE UP (ref 3.5–5.3)
POTASSIUM SERPL-SCNC: 3.5 MMOL/L — SIGNIFICANT CHANGE UP (ref 3.5–5.3)
PROT SERPL-MCNC: 8.2 G/DL — SIGNIFICANT CHANGE UP (ref 6–8.3)
PROT SERPL-MCNC: 8.2 G/DL — SIGNIFICANT CHANGE UP (ref 6–8.3)
RBC # BLD: 3.91 M/UL — SIGNIFICANT CHANGE UP (ref 3.8–5.2)
RBC # BLD: 3.91 M/UL — SIGNIFICANT CHANGE UP (ref 3.8–5.2)
RBC # FLD: 15.7 % — HIGH (ref 10.3–14.5)
RBC # FLD: 15.7 % — HIGH (ref 10.3–14.5)
S AUREUS DNA NOSE QL NAA+PROBE: SIGNIFICANT CHANGE UP
S AUREUS DNA NOSE QL NAA+PROBE: SIGNIFICANT CHANGE UP
S PNEUM AG UR QL: NEGATIVE — SIGNIFICANT CHANGE UP
S PNEUM AG UR QL: NEGATIVE — SIGNIFICANT CHANGE UP
SODIUM SERPL-SCNC: 137 MMOL/L — SIGNIFICANT CHANGE UP (ref 135–145)
SODIUM SERPL-SCNC: 137 MMOL/L — SIGNIFICANT CHANGE UP (ref 135–145)
SPECIMEN SOURCE: SIGNIFICANT CHANGE UP
WBC # BLD: 12.8 K/UL — HIGH (ref 3.8–10.5)
WBC # BLD: 12.8 K/UL — HIGH (ref 3.8–10.5)
WBC # FLD AUTO: 12.8 K/UL — HIGH (ref 3.8–10.5)
WBC # FLD AUTO: 12.8 K/UL — HIGH (ref 3.8–10.5)

## 2023-12-15 PROCEDURE — 93010 ELECTROCARDIOGRAM REPORT: CPT

## 2023-12-15 PROCEDURE — 99233 SBSQ HOSP IP/OBS HIGH 50: CPT

## 2023-12-15 PROCEDURE — 99222 1ST HOSP IP/OBS MODERATE 55: CPT

## 2023-12-15 RX ORDER — BUDESONIDE AND FORMOTEROL FUMARATE DIHYDRATE 160; 4.5 UG/1; UG/1
2 AEROSOL RESPIRATORY (INHALATION)
Refills: 0 | Status: DISCONTINUED | OUTPATIENT
Start: 2023-12-15 | End: 2023-12-15

## 2023-12-15 RX ORDER — SODIUM CHLORIDE 9 MG/ML
1000 INJECTION, SOLUTION INTRAVENOUS ONCE
Refills: 0 | Status: COMPLETED | OUTPATIENT
Start: 2023-12-15 | End: 2023-12-15

## 2023-12-15 RX ORDER — ACETAMINOPHEN 500 MG
650 TABLET ORAL EVERY 6 HOURS
Refills: 0 | Status: DISCONTINUED | OUTPATIENT
Start: 2023-12-15 | End: 2023-12-27

## 2023-12-15 RX ORDER — PANTOPRAZOLE SODIUM 20 MG/1
40 TABLET, DELAYED RELEASE ORAL
Refills: 0 | Status: DISCONTINUED | OUTPATIENT
Start: 2023-12-15 | End: 2023-12-17

## 2023-12-15 RX ORDER — FLUTICASONE PROPIONATE AND SALMETEROL 50; 250 UG/1; UG/1
1 POWDER ORAL; RESPIRATORY (INHALATION)
Refills: 0 | DISCHARGE

## 2023-12-15 RX ORDER — FLUTICASONE PROPIONATE 50 MCG
1 SPRAY, SUSPENSION NASAL
Refills: 0 | Status: DISCONTINUED | OUTPATIENT
Start: 2023-12-15 | End: 2023-12-27

## 2023-12-15 RX ORDER — CEFTRIAXONE 500 MG/1
1000 INJECTION, POWDER, FOR SOLUTION INTRAMUSCULAR; INTRAVENOUS EVERY 24 HOURS
Refills: 0 | Status: DISCONTINUED | OUTPATIENT
Start: 2023-12-15 | End: 2023-12-15

## 2023-12-15 RX ORDER — AZITHROMYCIN 500 MG/1
500 TABLET, FILM COATED ORAL EVERY 24 HOURS
Refills: 0 | Status: DISCONTINUED | OUTPATIENT
Start: 2023-12-15 | End: 2023-12-15

## 2023-12-15 RX ORDER — AMLODIPINE BESYLATE 2.5 MG/1
1 TABLET ORAL
Refills: 0 | DISCHARGE

## 2023-12-15 RX ORDER — BUPRENORPHINE AND NALOXONE 2; .5 MG/1; MG/1
1 TABLET SUBLINGUAL
Refills: 0 | DISCHARGE

## 2023-12-15 RX ORDER — APIXABAN 2.5 MG/1
1 TABLET, FILM COATED ORAL
Refills: 0 | DISCHARGE

## 2023-12-15 RX ORDER — ALBUTEROL 90 UG/1
2 AEROSOL, METERED ORAL
Refills: 0 | Status: DISCONTINUED | OUTPATIENT
Start: 2023-12-15 | End: 2023-12-15

## 2023-12-15 RX ORDER — APIXABAN 2.5 MG/1
5 TABLET, FILM COATED ORAL EVERY 12 HOURS
Refills: 0 | Status: DISCONTINUED | OUTPATIENT
Start: 2023-12-15 | End: 2023-12-27

## 2023-12-15 RX ORDER — SODIUM CHLORIDE 9 MG/ML
1000 INJECTION, SOLUTION INTRAVENOUS
Refills: 0 | Status: DISCONTINUED | OUTPATIENT
Start: 2023-12-15 | End: 2023-12-19

## 2023-12-15 RX ORDER — SODIUM CHLORIDE 9 MG/ML
4 INJECTION INTRAMUSCULAR; INTRAVENOUS; SUBCUTANEOUS EVERY 6 HOURS
Refills: 0 | Status: DISCONTINUED | OUTPATIENT
Start: 2023-12-15 | End: 2023-12-27

## 2023-12-15 RX ORDER — VENLAFAXINE HCL 75 MG
75 CAPSULE, EXT RELEASE 24 HR ORAL DAILY
Refills: 0 | Status: DISCONTINUED | OUTPATIENT
Start: 2023-12-15 | End: 2023-12-27

## 2023-12-15 RX ORDER — IPRATROPIUM/ALBUTEROL SULFATE 18-103MCG
3 AEROSOL WITH ADAPTER (GRAM) INHALATION EVERY 6 HOURS
Refills: 0 | Status: DISCONTINUED | OUTPATIENT
Start: 2023-12-15 | End: 2023-12-23

## 2023-12-15 RX ORDER — AMLODIPINE BESYLATE 2.5 MG/1
5 TABLET ORAL DAILY
Refills: 0 | Status: DISCONTINUED | OUTPATIENT
Start: 2023-12-15 | End: 2023-12-27

## 2023-12-15 RX ORDER — BUPRENORPHINE AND NALOXONE 2; .5 MG/1; MG/1
2 TABLET SUBLINGUAL
Refills: 0 | Status: DISCONTINUED | OUTPATIENT
Start: 2023-12-15 | End: 2023-12-22

## 2023-12-15 RX ADMIN — Medication 40 MILLIGRAM(S): at 16:09

## 2023-12-15 RX ADMIN — Medication 3 MILLILITER(S): at 23:53

## 2023-12-15 RX ADMIN — BUPRENORPHINE AND NALOXONE 2 TABLET(S): 2; .5 TABLET SUBLINGUAL at 10:50

## 2023-12-15 RX ADMIN — AMLODIPINE BESYLATE 5 MILLIGRAM(S): 2.5 TABLET ORAL at 06:03

## 2023-12-15 RX ADMIN — Medication 650 MILLIGRAM(S): at 16:09

## 2023-12-15 RX ADMIN — BUPRENORPHINE AND NALOXONE 2 TABLET(S): 2; .5 TABLET SUBLINGUAL at 02:26

## 2023-12-15 RX ADMIN — APIXABAN 5 MILLIGRAM(S): 2.5 TABLET, FILM COATED ORAL at 00:40

## 2023-12-15 RX ADMIN — SODIUM CHLORIDE 4 MILLILITER(S): 9 INJECTION INTRAMUSCULAR; INTRAVENOUS; SUBCUTANEOUS at 23:53

## 2023-12-15 RX ADMIN — AZITHROMYCIN 255 MILLIGRAM(S): 500 TABLET, FILM COATED ORAL at 15:25

## 2023-12-15 RX ADMIN — Medication 1 SPRAY(S): at 18:16

## 2023-12-15 RX ADMIN — SODIUM CHLORIDE 4 MILLILITER(S): 9 INJECTION INTRAMUSCULAR; INTRAVENOUS; SUBCUTANEOUS at 18:19

## 2023-12-15 RX ADMIN — Medication 600 MILLIGRAM(S): at 18:22

## 2023-12-15 RX ADMIN — SODIUM CHLORIDE 1000 MILLILITER(S): 9 INJECTION, SOLUTION INTRAVENOUS at 18:23

## 2023-12-15 RX ADMIN — CEFTRIAXONE 100 MILLIGRAM(S): 500 INJECTION, POWDER, FOR SOLUTION INTRAMUSCULAR; INTRAVENOUS at 13:11

## 2023-12-15 RX ADMIN — BUPRENORPHINE AND NALOXONE 2 TABLET(S): 2; .5 TABLET SUBLINGUAL at 22:07

## 2023-12-15 RX ADMIN — Medication 3 MILLILITER(S): at 18:19

## 2023-12-15 RX ADMIN — SODIUM CHLORIDE 50 MILLILITER(S): 9 INJECTION, SOLUTION INTRAVENOUS at 12:56

## 2023-12-15 RX ADMIN — BUDESONIDE AND FORMOTEROL FUMARATE DIHYDRATE 2 PUFF(S): 160; 4.5 AEROSOL RESPIRATORY (INHALATION) at 06:05

## 2023-12-15 RX ADMIN — APIXABAN 5 MILLIGRAM(S): 2.5 TABLET, FILM COATED ORAL at 18:22

## 2023-12-15 RX ADMIN — SODIUM CHLORIDE 50 MILLILITER(S): 9 INJECTION, SOLUTION INTRAVENOUS at 22:08

## 2023-12-15 RX ADMIN — Medication 75 MILLIGRAM(S): at 12:55

## 2023-12-15 NOTE — PROGRESS NOTE ADULT - PROBLEM SELECTOR PLAN 2
COPD on home 3L O2, not on chronic steroid or antibiotics, never intubated, last exacerbation requiring admission was 2016. Now presenting with worsening ALTMAN and fatigue which appears to be 2/2 PNA as oppose to exacerbation    Plan:  - Sating well on baseline O2  - VBG unremarkable, mild hypercarbia,  - Advair 500-50 DISKUS (fluticasone-salmeterol) at home  - Continue budesonide-formoterol inhaler while inpatient (therapeutic equivalent conversion)  - Albuterol inhaler PRN q2h  - S/p prednisone 50 mg PO x1 in ED on 12/14  - Continue home NC O2  - Continuous pulse ox COPD on home 3L O2, not on chronic steroid or antibiotics, never intubated, last exacerbation requiring admission was 2016. Now presenting with worsening ALTMAN and fatigue which appears to be 2/2 PNA vs. MAC as oppose to exacerbation. Home COPD regimen Advair 500-50 + Albuterol    Plan:  >Sating well on baseline O2 3L  >VBG 7.4/57, bicarb 29 on admission (12/14 @ 1PM)  >s/p prednisone 50 mg PO x1 in ED on 12/14  - c/w Symbicort while inpatient  - c/w Albuterol q2h PRN  - Will Hold off systemic steroid for now  - c/w NC, wean as tolerated, continuous pulse ox On admission WBC 18, tachy to 140s, RR24, SpO2 96% on 3L, afebrile, minimally elevated procal 0.24    Plan:  >CTA Chest + CT A/P (12/14): Extensive airway thickening and tree-in-bud nodularity, compatible with chronic endobronchial disease, including MAC  - Met SIRS criteria on admission by WBC and tachy  - PNA vs. MAC  - S/p IV azithro and CTX x1 in ED 12/14  - Strep [ ], Legionella [ ], MRSA [ ] pending  - c/w CTX + Azithromycin for empiric CAP coverage  - Bcx (12/15):  - Sputum cx w/ AF (12/15):  - f/u pulmonary recs regarding roles of bronch to r/o MAC

## 2023-12-15 NOTE — PROGRESS NOTE ADULT - PROBLEM SELECTOR PLAN 8
- Takes OTC omeprazole PRN at home for GERD symptoms  - Continue PRN pantoprazole 40 mg QD while inpatient (therapeutic equiv conversion) DVT ppx: Eliquis 5 mg BID, ambulation  Diet: regular, DASH/TLC  Dispo: pending PT

## 2023-12-15 NOTE — CONSULT NOTE ADULT - ASSESSMENT
68 year old female with protein S deficiency on eliquis, COPD (3 LPM 24/7 at home), bronchiectasis presenting with progressive dyspnea, productive cough, weight loss found to have abnormal imaging for which pulmonary is consulted.     She has patchy areas of ground glass on her imaging, increased sputum production and wheeze; this in conjunction with her lab abnormalities raises concern for bronchiectasis exacerbation. We have no prior culture data, but at risk for PsA, so would change abx to ensure coverage and avoid Azithro with risk of NTM.   In addition, she has diffuse TIB nodularity, weight loss, chronic dyspnea which could be smoldering infection such as an NTM. However, usually does not cause an acute decompensation. Please send work up as outlined below for NTM. Need to rule out aspiration bronchiolitis as well.    Her emphysematous changes are prominent in the lower lobe, especially on the right. In addition to bronchiectasis work up, important to send A1AT as well.      Recommendations:   - Please STOP Azithromycin, would change from CTX to Levaquin  - Would start steroids, 40 mg pred x 5 days for bronchiectasis exacerbation  - Please send Routine sputum for culture, procalcitonin, urine strep, ESR, CRP  - Please send sputum for AFB x 3  - Please start airways clearance: Albuterol nebulizer followed by nebulized hypertonic saline, followed by use of acapella or chest vest and encouragement to cough and clear   - Please hold inhaled ICS. Can continue with standing duonebs for now.  - Speech and swallow eval  - Please sent RF, MAMADOU, quantitative Immunoglobulins, A1AT, IgE  - Pulmonary will continue to follow    Bibiana Chandler MD 68 year old female with protein S deficiency on eliquis, COPD (3 LPM 24/7 at home), bronchiectasis presenting with progressive dyspnea, productive cough, weight loss found to have abnormal imaging for which pulmonary is consulted.     She has patchy areas of ground glass on her imaging, increased sputum production and wheeze; this in conjunction with her lab abnormalities raises concern for bronchiectasis exacerbation. We have no prior culture data, but at risk for PsA, so would change abx to ensure coverage and avoid Azithro with risk of NTM.   In addition, she has diffuse TIB nodularity, weight loss, chronic dyspnea which could be smoldering infection such as an NTM. However, usually does not cause an acute decompensation. Please send work up as outlined below for NTM. Need to rule out aspiration bronchiolitis as well.    Her emphysematous changes are prominent in the lower lobe, especially on the right. In addition to bronchiectasis work up, important to send A1AT as well.      Recommendations:   - Please STOP Azithromycin, would change from CTX to Levaquin  - Would start steroids, 40 mg pred x 5 days for bronchiectasis exacerbation  - Please send Routine sputum for culture, procalcitonin, urine strep, ESR, CRP  - Please send sputum for AFB x 3  - Please start airways clearance: Albuterol nebulizer followed by nebulized hypertonic saline, followed by use of acapella or chest vest and encouragement to cough and clear   - Please hold inhaled ICS. Can continue with standing duonebs for now.  - Speech and swallow eval  - Please sent RF, MAMADOU, quantitative Immunoglobulins, A1AT, IgE  - Supplemental oxygen to goal >88%  - Pulmonary will continue to follow    Bibiana Chandler MD    #bronchiectasis with exacerbation  #COPD/Emphysema  #C/F acute pulmonary infection  #NTM  #Weight loss

## 2023-12-15 NOTE — PROGRESS NOTE ADULT - SUBJECTIVE AND OBJECTIVE BOX
Patient is a 68y old  Female who presents with a chief complaint of Chronic progressively worsening dyspnea with minimal exertion + coughs (14 Dec 2023 20:58)      ======Overnight/Subjective======  Overnight    Subjective  - c/o productive cough that appears to be baseline    Brief daily plan    ======Medications======  MEDICATIONS  (STANDING):  amLODIPine   Tablet 5 milliGRAM(s) Oral daily  apixaban 5 milliGRAM(s) Oral every 12 hours  azithromycin  IVPB 500 milliGRAM(s) IV Intermittent every 24 hours  budesonide 160 MICROgram(s)/formoterol 4.5 MICROgram(s) Inhaler 2 Puff(s) Inhalation two times a day  buprenorphine 2 mG/naloxone 0.5 mG SL  Tablet 2 Tablet(s) SubLingual <User Schedule>  cefTRIAXone   IVPB 1000 milliGRAM(s) IV Intermittent every 24 hours  influenza  Vaccine (HIGH DOSE) 0.7 milliLiter(s) IntraMuscular once  venlafaxine XR. 75 milliGRAM(s) Oral daily    MEDICATIONS  (PRN):  albuterol    90 MICROgram(s) HFA Inhaler 2 Puff(s) Inhalation every 2 hours PRN Wheezing  pantoprazole    Tablet 40 milliGRAM(s) Oral before breakfast PRN GERD      ======Vital Signs======  T(C): 36.5 (12-15-23 @ 05:14), Max: 37.2 (23 @ 12:09)  T(F): 97.7 (12-15-23 @ 05:14), Max: 99 (23 @ 12:09)  HR: 96 (12-15-23 @ 05:14) (96 - 140)  BP: 129/71 (12-15-23 @ 05:14) (116/77 - 178/95)  BP(mean): 96 (23 @ 21:24) (96 - 96)  RR: 18 (12-15-23 @ 05:14) (18 - 24)  SpO2: 100% (12-15-23 @ 05:14) (95% - 100%)    ======Physical exams======  GENERAL: AAOx3, NAD  Cardiovascular: RRR, S1 S2, no m/r/g. No extremities edema, no JVD  LUNGS: Unlabored respirations, CTABL no wheeze/rhonchi/crackles  ABDOMEN: Soft, NTND, no rebound or guarding, BS presents. No CVA tenderness.  EXTREMITIES: Warm extremities, no clubbing, cyanosis, or edema, pulses 2+ bilaterally  NEURO: CN 2-12 grossly intact, strength 5/5 throughout, sensation intact    ======Labs======                9.7<L>  12.80<H> >----------< 700<H>  (MCV: 81.1)                31.7<L>   137 | 94<L> | 9  -----------------------< 111<H>  3.5 | 32<H> | 0.42<L>    TPro: 8.2 / Alb: 4.1 / TBili: 0.2 / DBili: -- / AlkPhos: 124<H> / ALT: 15 / AST: 16 (12-15-23 @ 07:17)  Ca: 10.4 / Phos: 3.0 / M.9 (12-15-23 @ 07:17)    Gas: 7.40 / 57<H> / 46<H> / 35<H> / 76.0% / 8.9<H> (23 @ 13:10)  PT/INR - ( 14 Dec 2023 13:28 )   PT: 15.6 sec;   INR: 1.50 ratio         PTT - ( 14 Dec 2023 13:28 )  PTT:40.2 sec    ======Microbiology======  Urinalysis Basic - ( 15 Dec 2023 07:17 )    Color: x / Appearance: x / SG: x / pH: x  Gluc: 111 mg/dL / Ketone: x  / Bili: x / Urobili: x   Blood: x / Protein: x / Nitrite: x   Leuk Esterase: x / RBC: x / WBC x   Sq Epi: x / Non Sq Epi: x / Bacteria: x          ======I&O's======  I&O's Summary       Patient is a 68y old  Female who presents with a chief complaint of Chronic progressively worsening dyspnea with minimal exertion + coughs (14 Dec 2023 20:58)      ======Overnight/Subjective======  Overnight    Subjective  - c/o gradual worsening fatigue and ALTMAN since started on home O2 1 year ago, a/w gradual weight loss, acutely worsened 2 months ago  - c/o productive cough that appears to be baseline  - c/o nasal congestion  - Denies fever, chest pain, n/v/d, urinary symptoms  - Pertinent exam: Bilateral scattered end expiratory low pitched rhonchi, no high pitched wheezes heard, no crackles    Brief daily plan    ======Medications======  MEDICATIONS  (STANDING):  amLODIPine   Tablet 5 milliGRAM(s) Oral daily  apixaban 5 milliGRAM(s) Oral every 12 hours  azithromycin  IVPB 500 milliGRAM(s) IV Intermittent every 24 hours  budesonide 160 MICROgram(s)/formoterol 4.5 MICROgram(s) Inhaler 2 Puff(s) Inhalation two times a day  buprenorphine 2 mG/naloxone 0.5 mG SL  Tablet 2 Tablet(s) SubLingual <User Schedule>  cefTRIAXone   IVPB 1000 milliGRAM(s) IV Intermittent every 24 hours  influenza  Vaccine (HIGH DOSE) 0.7 milliLiter(s) IntraMuscular once  venlafaxine XR. 75 milliGRAM(s) Oral daily    MEDICATIONS  (PRN):  albuterol    90 MICROgram(s) HFA Inhaler 2 Puff(s) Inhalation every 2 hours PRN Wheezing  pantoprazole    Tablet 40 milliGRAM(s) Oral before breakfast PRN GERD      ======Vital Signs======  T(C): 36.5 (12-15-23 @ 05:14), Max: 37.2 (23 @ 12:09)  T(F): 97.7 (12-15-23 @ 05:14), Max: 99 (23 @ 12:09)  HR: 96 (12-15-23 @ 05:14) (96 - 140)  BP: 129/71 (12-15-23 @ 05:14) (116/77 - 178/95)  BP(mean): 96 (23 @ 21:24) (96 - 96)  RR: 18 (12-15-23 @ 05:14) (18 - 24)  SpO2: 100% (12-15-23 @ 05:14) (95% - 100%)    ======Physical exams======  GENERAL: AAOx3, NAD  Cardiovascular: RRR, S1 S2, no m/r/g. No extremities edema, no JVD  LUNGS: Unlabored respirations, CTABL no wheeze/rhonchi/crackles  ABDOMEN: Soft, NTND, no rebound or guarding, BS presents. No CVA tenderness.  EXTREMITIES: Warm extremities, no clubbing, cyanosis, or edema, pulses 2+ bilaterally  NEURO: CN 2-12 grossly intact, strength 5/5 throughout, sensation intact    ======Labs======                9.7<L>  12.80<H> >----------< 700<H>  (MCV: 81.1)                31.7<L>   137 | 94<L> | 9  -----------------------< 111<H>  3.5 | 32<H> | 0.42<L>    TPro: 8.2 / Alb: 4.1 / TBili: 0.2 / DBili: -- / AlkPhos: 124<H> / ALT: 15 / AST: 16 (12-15-23 @ 07:17)  Ca: 10.4 / Phos: 3.0 / M.9 (12-15-23 @ 07:17)    Gas: 7.40 / 57<H> / 46<H> / 35<H> / 76.0% / 8.9<H> (23 @ 13:10)  PT/INR - ( 14 Dec 2023 13:28 )   PT: 15.6 sec;   INR: 1.50 ratio         PTT - ( 14 Dec 2023 13:28 )  PTT:40.2 sec    ======Microbiology======  Urinalysis Basic - ( 15 Dec 2023 07:17 )    Color: x / Appearance: x / SG: x / pH: x  Gluc: 111 mg/dL / Ketone: x  / Bili: x / Urobili: x   Blood: x / Protein: x / Nitrite: x   Leuk Esterase: x / RBC: x / WBC x   Sq Epi: x / Non Sq Epi: x / Bacteria: x          ======I&O's======  I&O's Summary       Patient is a 68y old  Female who presents with a chief complaint of Chronic progressively worsening dyspnea with minimal exertion + coughs (14 Dec 2023 20:58)      ======Overnight/Subjective======  Overnight  - No acute event overnight    Subjective  - c/o gradual worsening fatigue and ALTAMN since started on home O2 1 year ago, a/w gradual weight loss, acutely worsened 2 months ago  - c/o productive cough that appears to be baseline  - c/o nasal congestion  - Denies fever, chest pain, n/v/d, urinary symptoms  - Pertinent exam: Bilateral scattered end expiratory low pitched rhonchi, no high pitched wheezes heard, no crackles    Brief daily plan  - Pulm consult regarding roles of bronch to r/o MAC    ======Medications======  MEDICATIONS  (STANDING):  amLODIPine   Tablet 5 milliGRAM(s) Oral daily  apixaban 5 milliGRAM(s) Oral every 12 hours  azithromycin  IVPB 500 milliGRAM(s) IV Intermittent every 24 hours  budesonide 160 MICROgram(s)/formoterol 4.5 MICROgram(s) Inhaler 2 Puff(s) Inhalation two times a day  buprenorphine 2 mG/naloxone 0.5 mG SL  Tablet 2 Tablet(s) SubLingual <User Schedule>  cefTRIAXone   IVPB 1000 milliGRAM(s) IV Intermittent every 24 hours  influenza  Vaccine (HIGH DOSE) 0.7 milliLiter(s) IntraMuscular once  venlafaxine XR. 75 milliGRAM(s) Oral daily    MEDICATIONS  (PRN):  albuterol    90 MICROgram(s) HFA Inhaler 2 Puff(s) Inhalation every 2 hours PRN Wheezing  pantoprazole    Tablet 40 milliGRAM(s) Oral before breakfast PRN GERD      ======Vital Signs======  T(C): 36.5 (12-15-23 @ 05:14), Max: 37.2 (23 @ 12:09)  T(F): 97.7 (12-15-23 @ 05:14), Max: 99 (23 @ 12:09)  HR: 96 (12-15-23 @ 05:14) (96 - 140)  BP: 129/71 (12-15-23 @ 05:14) (116/77 - 178/95)  BP(mean): 96 (23 @ 21:24) (96 - 96)  RR: 18 (12-15-23 @ 05:14) (18 - 24)  SpO2: 100% (12-15-23 @ 05:14) (95% - 100%)    ======Physical exams======  GENERAL: AAOx3, NAD  Cardiovascular: Tachy but regular, S1 S2, no m/r/g. No extremities edema, no JVD  LUNGS: On 3L NC, Bilateral scattered end expiratory low pitched rhonchi, no high pitched wheezes heard, no crackles  ABDOMEN: Soft, NTND, no rebound or guarding, BS presents. No CVA tenderness.  EXTREMITIES: Warm extremities, no clubbing, cyanosis, or edema, pulses 2+ bilaterally  NEURO: CN 2-12 grossly intact, strength 5/5 throughout, sensation intact    ======Labs======                9.7<L>  12.80<H> >----------< 700<H>  (MCV: 81.1)                31.7<L>   137 | 94<L> | 9  -----------------------< 111<H>  3.5 | 32<H> | 0.42<L>    TPro: 8.2 / Alb: 4.1 / TBili: 0.2 / DBili: -- / AlkPhos: 124<H> / ALT: 15 / AST: 16 (12-15-23 @ 07:17)  Ca: 10.4 / Phos: 3.0 / M.9 (12-15-23 @ 07:17)    Gas: 7.40 / 57<H> / 46<H> / 35<H> / 76.0% / 8.9<H> (23 @ 13:10)  PT/INR - ( 14 Dec 2023 13:28 )   PT: 15.6 sec;   INR: 1.50 ratio         PTT - ( 14 Dec 2023 13:28 )  PTT:40.2 sec    ======Microbiology======  Urinalysis Basic - ( 15 Dec 2023 07:17 )    Color: x / Appearance: x / SG: x / pH: x  Gluc: 111 mg/dL / Ketone: x  / Bili: x / Urobili: x   Blood: x / Protein: x / Nitrite: x   Leuk Esterase: x / RBC: x / WBC x   Sq Epi: x / Non Sq Epi: x / Bacteria: x          ======I&O's======  I&O's Summary       Patient is a 68y old  Female who presents with a chief complaint of Chronic progressively worsening dyspnea with minimal exertion + coughs (14 Dec 2023 20:58)      ======Overnight/Subjective======  Overnight  - No acute event overnight    Subjective  - c/o gradual worsening fatigue and ALTMAN since started on home O2 1 year ago, a/w gradual weight loss, acutely worsened 2 months ago  - c/o productive cough that appears to be baseline  - c/o nasal congestion  - Denies fever, chest pain, n/v/d, urinary symptoms  - Pertinent exam: Bilateral scattered end expiratory low pitched rhonchi, no high pitched wheezes heard, no crackles    Brief daily plan  - Pulm consult regarding roles of bronch to r/o MAC    ======Medications======  MEDICATIONS  (STANDING):  amLODIPine   Tablet 5 milliGRAM(s) Oral daily  apixaban 5 milliGRAM(s) Oral every 12 hours  azithromycin  IVPB 500 milliGRAM(s) IV Intermittent every 24 hours  budesonide 160 MICROgram(s)/formoterol 4.5 MICROgram(s) Inhaler 2 Puff(s) Inhalation two times a day  buprenorphine 2 mG/naloxone 0.5 mG SL  Tablet 2 Tablet(s) SubLingual <User Schedule>  cefTRIAXone   IVPB 1000 milliGRAM(s) IV Intermittent every 24 hours  influenza  Vaccine (HIGH DOSE) 0.7 milliLiter(s) IntraMuscular once  venlafaxine XR. 75 milliGRAM(s) Oral daily    MEDICATIONS  (PRN):  albuterol    90 MICROgram(s) HFA Inhaler 2 Puff(s) Inhalation every 2 hours PRN Wheezing  pantoprazole    Tablet 40 milliGRAM(s) Oral before breakfast PRN GERD      ======Vital Signs======  T(C): 36.5 (12-15-23 @ 05:14), Max: 37.2 (23 @ 12:09)  T(F): 97.7 (12-15-23 @ 05:14), Max: 99 (23 @ 12:09)  HR: 96 (12-15-23 @ 05:14) (96 - 140)  BP: 129/71 (12-15-23 @ 05:14) (116/77 - 178/95)  BP(mean): 96 (23 @ 21:24) (96 - 96)  RR: 18 (12-15-23 @ 05:14) (18 - 24)  SpO2: 100% (12-15-23 @ 05:14) (95% - 100%)    ======Physical exams======  GENERAL: AAOx3, NAD  Cardiovascular: Tachy but regular, S1 S2, no m/r/g. No extremities edema, no JVD  LUNGS: On 3L NC, Bilateral scattered end expiratory low pitched rhonchi, no high pitched wheezes heard, no crackles  ABDOMEN: Soft, NTND, no rebound or guarding, BS presents. No CVA tenderness.  EXTREMITIES: Warm extremities, no clubbing, cyanosis, or edema, pulses 2+ bilaterally  NEURO: CN 2-12 grossly intact, strength 5/5 throughout, sensation intact    ======Labs======                9.7<L>  12.80<H> >----------< 700<H>  (MCV: 81.1)                31.7<L>   137 | 94<L> | 9  -----------------------< 111<H>  3.5 | 32<H> | 0.42<L>    TPro: 8.2 / Alb: 4.1 / TBili: 0.2 / DBili: -- / AlkPhos: 124<H> / ALT: 15 / AST: 16 (12-15-23 @ 07:17)  Ca: 10.4 / Phos: 3.0 / M.9 (12-15-23 @ 07:17)    Gas: 7.40 / 57<H> / 46<H> / 35<H> / 76.0% / 8.9<H> (23 @ 13:10)  PT/INR - ( 14 Dec 2023 13:28 )   PT: 15.6 sec;   INR: 1.50 ratio         PTT - ( 14 Dec 2023 13:28 )  PTT:40.2 sec    ======Microbiology======  Urinalysis Basic - ( 15 Dec 2023 07:17 )    Color: x / Appearance: x / SG: x / pH: x  Gluc: 111 mg/dL / Ketone: x  / Bili: x / Urobili: x   Blood: x / Protein: x / Nitrite: x   Leuk Esterase: x / RBC: x / WBC x   Sq Epi: x / Non Sq Epi: x / Bacteria: x          ======I&O's======  I&O's Summary

## 2023-12-15 NOTE — PROGRESS NOTE ADULT - ASSESSMENT
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 No imaging evidence of PE or malignancy in chest/abdomen/pelvis. Notable for leukocytosis and thrombocytosis, negative RVP. VSS, on 3L NC. Infection workup pending, managing COPD. 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 No imaging evidence of PE or malignancy in chest/abdomen/pelvis. Notable for leukocytosis and thrombocytosis, negative RVP. VSS, on 3L NC. Infection workup pending, managing COPD. 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 p/w 2 months progressive worsening ALTMAN, fatigue, unintentional weight loss. Likely PNA vs. less likely COPD exacerbation 68 yoF w/ PMHx of Protein S deficiency c/b LUE DVT/PE (2019) on Eliquis, COPD (baseline 3L O2, no chronic steroid/abx, no intubation, last admission 2016), HTN, anxiety/depression, remote back surgery on Suboxone. p/w 2 months progressive worsening ALTMAN, fatigue, unintentional weight loss. Likely PNA vs. MAC vs. less likely COPD exacerbation

## 2023-12-15 NOTE — PHYSICAL THERAPY INITIAL EVALUATION ADULT - PERTINENT HX OF CURRENT PROBLEM, REHAB EVAL
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 p/w 2 months progressive worsening ALTMAN, fatigue, unintentional weight loss. Likely PNA vs. MAC vs. less likely COPD exacerbation    CT Angio Chest/Abd/Pelvis:  Extensive airway thickening and tree-in-bud nodularity, compatible with chronic endobronchial disease, including MAC. No CT evidence of malignancy in the thorax, abdomen, and pelvis.

## 2023-12-15 NOTE — CONSULT NOTE ADULT - TIME BILLING
Review of patient records, including history, laboratory data, imaging. Patient evaluation and assessment. Coordination of care. Time excludes teaching

## 2023-12-15 NOTE — PROGRESS NOTE ADULT - PROBLEM SELECTOR PLAN 7
Hx of back surgery in 1980s for herniated disc, consistent opioid use afterwards for an extended period of time, currently on Suboxone SL film 4 mg BID  - Pharmacy does not have SL film form, give SL tablet for now Hx of back surgery in 1980s for herniated disc, consistent opioid use afterwards for an extended period of time, currently on Suboxone SL film 4 mg BID    Plan:  - c/w Suboxone SL tab - Takes OTC omeprazole PRN at home for GERD symptoms  - Continue PRN pantoprazole 40 mg QD while inpatient (therapeutic equiv conversion)

## 2023-12-15 NOTE — PROGRESS NOTE ADULT - PROBLEM SELECTOR PLAN 1
Chronic progressive exacerbation of COPD in the last two months, possibly 2/2 underlying respiratory infection/PNA  - On 3L NC O2 at home, increasing dyspnea with minimal exertion  - VBG unremarkable, mild hypercarbia  - Advair 500-50 DISKUS (fluticasone-salmeterol) at home  - Continue budesonide-formoterol inhaler while inpatient (therapeutic equivalent conversion)  - Albuterol inhaler PRN q2h  - S/p prednisone 50 mg PO x1 in ED on 12/14  - Continue home NC O2  - Continuous pulse ox COPD on home 3L O2, not on chronic steroid or antibiotics, never intubated, last exacerbation requiring admission was 2016. Now presenting with worsening ALTMAN and fatigue which appears to be 2/2 PNA vs. MAC as oppose to exacerbation. Home COPD regimen Advair 500-50 + Albuterol    Plan:  >Sating well on baseline O2 3L  >VBG 7.4/57, bicarb 29 on admission (12/14 @ 1PM)  >s/p prednisone 50 mg PO x1 in ED on 12/14  - c/w Symbicort while inpatient  - Will give standing DuoNeb q6 + Hypersal 3% q6 + Mucinex + Flonase for airway clearance  - c/w NC, wean as tolerated, continuous pulse ox  - Will Hold off systemic steroid for now

## 2023-12-15 NOTE — PROGRESS NOTE ADULT - PROBLEM SELECTOR PLAN 6
- Continue home venlafaxine 75 mg QD Home regimen venlafaxine 75 qd    Plan:  - c/w home regimen as above Hx of back surgery in 1980s for herniated disc, consistent opioid use afterwards for an extended period of time, currently on Suboxone SL film 4 mg BID    Plan:  - c/w Suboxone SL tab

## 2023-12-15 NOTE — PHARMACOTHERAPY INTERVENTION NOTE - COMMENTS
Consistent with pulmonology note, recommended to discontinue azithromycin since patient is already on levofloxacin, which provides atypical coverage for the empiric treatment of pneumonia.    Manjinder Hamilton, PharmD, [facility]  Clinical Pharmacy Specialist, Infectious Diseases  Tele-Antimicrobial Stewardship Program (Tele-ASP)  Tele-ASP Phone: [phone]   Consistent with pulmonology note, recommended to discontinue azithromycin since patient is already on levofloxacin, which provides atypical coverage for the empiric treatment of pneumonia.    Manjinder Hamilton, PharmD, [facility]  Clinical Pharmacy Specialist, Infectious Diseases  Tele-Antimicrobial Stewardship Program (Tele-ASP)  Tele-ASP Phone: [phone]

## 2023-12-15 NOTE — PROGRESS NOTE ADULT - PROBLEM SELECTOR PLAN 4
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

## 2023-12-15 NOTE — CHART NOTE - NSCHARTNOTEFT_GEN_A_CORE
Search Terms: dione aceves, 1955Search Date: 12/15/2023 01:07:24 AM  The Drug Utilization Report below displays all of the controlled substance prescriptions, if any, that your patient has filled in the last twelve months. The information displayed on this report is compiled from pharmacy submissions to the Department, and accurately reflects the information as submitted by the pharmacies.    This report was requested by: Kirsten Camp | Reference #: [id number]    Practitioner Count: 1  Pharmacy Count: 1  Current Opioid Prescriptions: 1  Current Benzodiazepine Prescriptions: 0  Current Stimulant Prescriptions: 0      Patient Demographic Information (PDI)       PDI	First Name	Last Name	Birth Date	Gender	Street Address	[facility] Code  A	Dione Aceves	1955	Female	[address]	[place]	[address]    Prescription Information      PDI Filter:    PDI	My Rx	Current Rx	Drug Type	Rx Written	Rx Dispensed	Drug	Quantity	Days Supply	Prescriber Name	Prescriber CONNIE #	Payment Method	Dispenser  A	N	Y	O	11/17/2023	11/17/2023	buprenorphine-naloxone 4-1 mg sl film	60	30	SamanthaAquiles MD	[id number]	Medicare	[id number]  A	N	N	O	10/13/2023	10/17/2023	buprenorphine-naloxone 4-1 mg sl film	54	27	Samantha, Aquiles UREÑA MD	[id number]	Medicare	[id number]  A	N	N	O	10/04/2023	10/04/2023	buprenorphine-naloxone 4-1 mg sl film	14	7	SamanthaAquiles MD	[id number]	Medicare	[id number]  A	N	N	O	09/01/2023	09/05/2023	buprenorphine-naloxone 4-1 mg sl film	51	26	SamanthaAquiles MD	[id number]	Medicare	[id number]  A	N	N	O	08/03/2023	08/04/2023	buprenorphine-naloxone 4-1 mg sl film	60	30	Samantha, Aquiles UREÑA MD	[id number]	Medicare	[id number]  A	N	N	O	06/30/2023	07/03/2023	buprenorphine-naloxone 4-1 mg sl film	60	30	Samantha, Aquiles UREÑA MD	[id number]	Medicare	[id number]  A	N	N	O	05/26/2023	05/30/2023	suboxone 4 mg-1 mg sl film	60	30	Samantha, Aquiles UREÑA MD	[id number]	Medicare	[id number]  A	N	N	O	05/26/2023	05/27/2023	buprenorphine-naloxone 4-1 mg sl film	6	3	SamanthaAquiles MD	[id number]	[id number]  A	N	N	O	04/25/2023	04/25/2023	suboxone 4 mg-1 mg sl film	60	30	SamanthaAquiles MD	[id number]	Medicare	[id number]  A	N	N	O	04/21/2023	04/21/2023	buprenorphine-naloxone 4-1 mg sl film	10	5	SamanthaAquiles MD	[id number]	[id number]  A	N	N	O	03/17/2023	03/21/2023	suboxone 4 mg-1 mg sl film	54	27	Samantha, Aquiles UREÑA MD	[id number]	Medicare	[id number]  A	N	N	O	02/02/2023	02/05/2023	suboxone 4 mg-1 mg sl film	60	30	SamanthaAquiles MD	[id number]	Medicare	[id number]  A	N	N	O	01/30/2023	01/30/2023	buprenorphine-naloxone 4-1 mg sl film	8	4	SamanthaAquiles MD	[id number]	Medicaid	[id number]  A	N	N	O	12/23/2022	12/28/2022	buprenorphine-naloxone 4-1 mg sl film	52	26	SamanthaAquiles MD	[id number]	Medicaid	[id number] Search Terms: dione aceves, 1955Search Date: 12/15/2023 01:07:24 AM  The Drug Utilization Report below displays all of the controlled substance prescriptions, if any, that your patient has filled in the last twelve months. The information displayed on this report is compiled from pharmacy submissions to the Department, and accurately reflects the information as submitted by the pharmacies.    This report was requested by: Kirsten Camp | Reference #: [id number]    Practitioner Count: 1  Pharmacy Count: 1  Current Opioid Prescriptions: 1  Current Benzodiazepine Prescriptions: 0  Current Stimulant Prescriptions: 0      Patient Demographic Information (PDI)       PDI	First Name	Last Name	Birth Date	Gender	Street Address	[facility] Code  A	Dione Aceves	1955	Female	[address]	[place]	[address]    Prescription Information      PDI Filter:    PDI	My Rx	Current Rx	Drug Type	Rx Written	Rx Dispensed	Drug	Quantity	Days Supply	Prescriber Name	Prescriber CONNIE #	Payment Method	Dispenser  A	N	Y	O	11/17/2023	11/17/2023	buprenorphine-naloxone 4-1 mg sl film	60	30	SamanthaAquiles MD	[id number]	Medicare	[id number]  A	N	N	O	10/13/2023	10/17/2023	buprenorphine-naloxone 4-1 mg sl film	54	27	Samantha, Aquiles UREÑA MD	[id number]	Medicare	[id number]  A	N	N	O	10/04/2023	10/04/2023	buprenorphine-naloxone 4-1 mg sl film	14	7	SamanthaAquiles MD	[id number]	Medicare	[id number]  A	N	N	O	09/01/2023	09/05/2023	buprenorphine-naloxone 4-1 mg sl film	51	26	SamanthaAquiles MD	[id number]	Medicare	[id number]  A	N	N	O	08/03/2023	08/04/2023	buprenorphine-naloxone 4-1 mg sl film	60	30	Samantha, Aquiles UREÑA MD	[id number]	Medicare	[id number]  A	N	N	O	06/30/2023	07/03/2023	buprenorphine-naloxone 4-1 mg sl film	60	30	Samantha, Aquiles UREÑA MD	[id number]	Medicare	[id number]  A	N	N	O	05/26/2023	05/30/2023	suboxone 4 mg-1 mg sl film	60	30	Samantha, Aquiles UREÑA MD	[id number]	Medicare	[id number]  A	N	N	O	05/26/2023	05/27/2023	buprenorphine-naloxone 4-1 mg sl film	6	3	SamanthaAquiles MD	[id number]	[id number]  A	N	N	O	04/25/2023	04/25/2023	suboxone 4 mg-1 mg sl film	60	30	SamanthaAquiles MD	[id number]	Medicare	[id number]  A	N	N	O	04/21/2023	04/21/2023	buprenorphine-naloxone 4-1 mg sl film	10	5	SamanthaAquiles MD	[id number]	[facility] #[id number]  A	N	N	O	03/17/2023	03/21/2023	suboxone 4 mg-1 mg sl film	54	27	Samantha, Aquiles UREÑA MD	[id number]	Medicare	[id number]  A	N	N	O	02/02/2023	02/05/2023	suboxone 4 mg-1 mg sl film	60	30	SamanthaAquiles MD	[id number]	Medicare	[id number]  A	N	N	O	01/30/2023	01/30/2023	buprenorphine-naloxone 4-1 mg sl film	8	4	SamanthaAquiles MD	[id number]	Medicaid	[id number]  A	N	N	O	12/23/2022	12/28/2022	buprenorphine-naloxone 4-1 mg sl film	52	26	SamanthaAquiles MD	[id number]	Medicaid	[id number]

## 2023-12-15 NOTE — PHARMACOTHERAPY INTERVENTION NOTE - COMMENTS
Consistent with pulmonology note, recommended to discontinue ceftriaxone since patient is already on levofloxacin.     Manjinder Hamilton, PharmD, BCIDP  Clinical Pharmacy Specialist, Infectious Diseases  Tele-Antimicrobial Stewardship Program (Tele-ASP)  Tele-ASP Phone: [phone]   Consistent with pulmonology note, recommended to discontinue ceftriaxone since patient is already on levofloxacin.     Manjinder Hamilton, PharmD, BCIDP  Clinical Pharmacy Specialist, Infectious Diseases  Tele-Antimicrobial Stewardship Program (Tele-ASP)  Tele-ASP Phone: [phone]

## 2023-12-15 NOTE — PHYSICAL THERAPY INITIAL EVALUATION ADULT - ADDITIONAL COMMENTS
- sub q heparin for DVT ppx Pt lives in a house with  (can assist). Has no stairs to navigate. Reports being independent with functional mobility/ADLs without AD. Owns rolling walker and cane. +drive

## 2023-12-15 NOTE — CONSULT NOTE ADULT - SUBJECTIVE AND OBJECTIVE BOX
HPI: 68 year old female with protein S deficiency on eliquis, COPD (3 LPM 24/ at home), bronchiectasis presenting with progressive dyspnea, productive cough, weight loss found to have abnormal imaging for which pulmonary is consulted.     The patient reports that for the last year or so she has had progressive dyspnea on exertion. She also has had significant weight loss, cough productive of yellow sputum. She has been following with her primary care for COPD, who has kept her on Advair for many years. She has been on home oxygen 3 LPM >2 years. She was unaware of any history of bronchiectasis. She recently saw a new PCP, noting worsening symptoms who obtained labs which showed a significant leukocytosis and was advised to present to [facility] ED.  The patient tells me she is a long time former smoker (1.5 ppd x 40 years). She quit in .     PAST MEDICAL & SURGICAL HISTORY:  Protein S deficiency  Depression  COPD (chronic obstructive pulmonary disease)  DVT (deep venous thrombosis)  Pulmonary embolism  Afib  History of back surgery      FAMILY HISTORY:  Family history of lung cancer    Family history of protein S deficiency (Sibling)        SOCIAL HISTORY:  Smokin.5ppd x 40 years, quit   EtOH Use: Denies    ALLERGIES:  No Known Allergies            HOME PULM MEDICATIONS:  Advair    REVIEW OF SYSTEMS:  Constitutional: No fevers or chills. +weight loss. No fatigue + generalized malaise.  Eyes: No itching or discharge from the eyes  ENT: No ear pain. No ear discharge. No nasal congestion. No post nasal drip. No epistaxis. No throat pain. No sore throat. No difficulty swallowing.   CV: No chest pain. No palpitations. No lightheadedness or dizziness.   Resp: + dyspnea at rest.+dyspnea on exertion. No orthopnea. + wheezing. + cough. No stridor. + sputum production. No chest pain with respiration.  GI: No nausea. No vomiting. No diarrhea.  MSK: No joint pain or pain in any extremities  Integumentary: No skin lesions. No pedal edema.  Neurological: No gross motor weakness. No sensory changes.    [x ] All other systems negative  [ ] Unable to assess ROS because ________    OBJECTIVE:  ICU Vital Signs Last 24 Hrs  T(C): 36.7 (15 Dec 2023 14:05), Max: 36.7 (14 Dec 2023 17:20)  T(F): 98 (15 Dec 2023 14:05), Max: 98.1 (14 Dec 2023 17:20)  HR: 110 (15 Dec 2023 14:05) (96 - 115)  BP: 118/68 (15 Dec 2023 14:05) (116/77 - 143/83)  BP(mean): 96 (14 Dec 2023 21:24) (96 - 96)  ABP: --  ABP(mean): --  RR: 18 (15 Dec 2023 14:05) (18 - 20)  SpO2: 96% (15 Dec 2023 14:05) (95% - 100%)    O2 Parameters below as of 15 Dec 2023 14:05  Patient On (Oxygen Delivery Method): nasal cannula      PHYSICAL EXAM:  Thin elderly female no acute distress  Atraumatic, normocephalic  Normal work of breathing  Speaking in full sentences  Diffuse wheeze L>R  No rhonchi or rales  Cardiovascular: S1 S2 normal. No murmurs, rubs or gallops.   Soft, non-tender, non-distended. No organomegaly.  Ext: Warm to touch. Peripheral pulse palpable. No pedal edema.   Skin: No rashes or skin lesions  Neurological: Motor and sensory examination equal and normal in all four extremities.    HOSPITAL PULM MEDICATIONS:  MEDICATIONS  (STANDING):  albuterol/ipratropium for Nebulization 3 milliLiter(s) Nebulizer every 6 hours  azithromycin  IVPB 500 milliGRAM(s) IV Intermittent every 24 hours  budesonide 160 MICROgram(s)/formoterol 4.5 MICROgram(s) Inhaler 2 Puff(s) Inhalation two times a day  cefTRIAXone   IVPB 1000 milliGRAM(s) IV Intermittent every 24 hours        LABS:                        9.7    12.80 )-----------( 700      ( 15 Dec 2023 07:17 )             31.7     12-15    137  |  94<L>  |  9   ----------------------------<  111<H>  3.5   |  32<H>  |  0.42<L>    Ca    10.4      15 Dec 2023 07:17  Phos  3.0     12-15  Mg     1.9     12-15    TPro  8.2  /  Alb  4.1  /  TBili  0.2  /  DBili  x   /  AST  16  /  ALT  15  /  AlkPhos  124<H>  12-15    PT/INR - ( 14 Dec 2023 13:28 )   PT: 15.6 sec;   INR: 1.50 ratio         PTT - ( 14 Dec 2023 13:28 )  PTT:40.2 sec  Urinalysis Basic - ( 15 Dec 2023 07:17 )    Color: x / Appearance: x / SG: x / pH: x  Gluc: 111 mg/dL / Ketone: x  / Bili: x / Urobili: x   Blood: x / Protein: x / Nitrite: x   Leuk Esterase: x / RBC: x / WBC x   Sq Epi: x / Non Sq Epi: x / Bacteria: x        Venous Blood Gas:   @ 13:10  7.40/57/46/35/76.0  VBG Lactate: 2.8      MICROBIOLOGY:     RADIOLOGY:  [x] Reviewed and interpreted by me  CT Chest 23: Mild bronchiectasis, extensive TIB nodularity, patchy opacities RUL, LLL    CT Chest 3/2016: Emphysematous changes, most prominent in RLL, bronchiectasis, areas of tree in bud nodularity, patchy ggo    PFT: None for review     HPI: 68 year old female with protein S deficiency on eliquis, COPD (3 LPM 24/ at home), bronchiectasis presenting with progressive dyspnea, productive cough, weight loss found to have abnormal imaging for which pulmonary is consulted.     The patient reports that for the last year or so she has had progressive dyspnea on exertion. She also has had significant weight loss, cough productive of yellow sputum. She has been following with her primary care for COPD, who has kept her on Advair for many years. She has been on home oxygen 3 LPM >2 years. She was unaware of any history of bronchiectasis. She recently saw a new PCP, noting worsening symptoms who obtained labs which showed a significant leukocytosis and was advised to present to [facility] ED.  The patient tells me she is a long time former smoker (1.5 ppd x 40 years). She quit in .     PAST MEDICAL & SURGICAL HISTORY:  Protein S deficiency  Depression  COPD (chronic obstructive pulmonary disease)  DVT (deep venous thrombosis)  Pulmonary embolism  Afib  History of back surgery      FAMILY HISTORY:  Family history of lung cancer    Family history of protein S deficiency (Sibling)        SOCIAL HISTORY:  Smokin.5ppd x 40 years, quit   EtOH Use: Denies    ALLERGIES:  No Known Allergies            HOME PULM MEDICATIONS:  Advair    REVIEW OF SYSTEMS:  Constitutional: No fevers or chills. +weight loss. No fatigue + generalized malaise.  Eyes: No itching or discharge from the eyes  ENT: No ear pain. No ear discharge. No nasal congestion. No post nasal drip. No epistaxis. No throat pain. No sore throat. No difficulty swallowing.   CV: No chest pain. No palpitations. No lightheadedness or dizziness.   Resp: + dyspnea at rest.+dyspnea on exertion. No orthopnea. + wheezing. + cough. No stridor. + sputum production. No chest pain with respiration.  GI: No nausea. No vomiting. No diarrhea.  MSK: No joint pain or pain in any extremities  Integumentary: No skin lesions. No pedal edema.  Neurological: No gross motor weakness. No sensory changes.    [x ] All other systems negative  [ ] Unable to assess ROS because ________    OBJECTIVE:  ICU Vital Signs Last 24 Hrs  T(C): 36.7 (15 Dec 2023 14:05), Max: 36.7 (14 Dec 2023 17:20)  T(F): 98 (15 Dec 2023 14:05), Max: 98.1 (14 Dec 2023 17:20)  HR: 110 (15 Dec 2023 14:05) (96 - 115)  BP: 118/68 (15 Dec 2023 14:05) (116/77 - 143/83)  BP(mean): 96 (14 Dec 2023 21:24) (96 - 96)  ABP: --  ABP(mean): --  RR: 18 (15 Dec 2023 14:05) (18 - 20)  SpO2: 96% (15 Dec 2023 14:05) (95% - 100%)    O2 Parameters below as of 15 Dec 2023 14:05  Patient On (Oxygen Delivery Method): nasal cannula      PHYSICAL EXAM:  Thin elderly female no acute distress  Atraumatic, normocephalic  Normal work of breathing  Speaking in full sentences  Diffuse wheeze L>R  No rhonchi or rales  Cardiovascular: S1 S2 normal. No murmurs, rubs or gallops.   Soft, non-tender, non-distended. No organomegaly.  Ext: Warm to touch. Peripheral pulse palpable. No pedal edema.   Skin: No rashes or skin lesions  Neurological: Motor and sensory examination equal and normal in all four extremities.    HOSPITAL PULM MEDICATIONS:  MEDICATIONS  (STANDING):  albuterol/ipratropium for Nebulization 3 milliLiter(s) Nebulizer every 6 hours  azithromycin  IVPB 500 milliGRAM(s) IV Intermittent every 24 hours  budesonide 160 MICROgram(s)/formoterol 4.5 MICROgram(s) Inhaler 2 Puff(s) Inhalation two times a day  cefTRIAXone   IVPB 1000 milliGRAM(s) IV Intermittent every 24 hours        LABS:                        9.7    12.80 )-----------( 700      ( 15 Dec 2023 07:17 )             31.7     12-15    137  |  94<L>  |  9   ----------------------------<  111<H>  3.5   |  32<H>  |  0.42<L>    Ca    10.4      15 Dec 2023 07:17  Phos  3.0     12-15  Mg     1.9     12-15    TPro  8.2  /  Alb  4.1  /  TBili  0.2  /  DBili  x   /  AST  16  /  ALT  15  /  AlkPhos  124<H>  12-15    PT/INR - ( 14 Dec 2023 13:28 )   PT: 15.6 sec;   INR: 1.50 ratio         PTT - ( 14 Dec 2023 13:28 )  PTT:40.2 sec  Urinalysis Basic - ( 15 Dec 2023 07:17 )    Color: x / Appearance: x / SG: x / pH: x  Gluc: 111 mg/dL / Ketone: x  / Bili: x / Urobili: x   Blood: x / Protein: x / Nitrite: x   Leuk Esterase: x / RBC: x / WBC x   Sq Epi: x / Non Sq Epi: x / Bacteria: x        Venous Blood Gas:   @ 13:10  7.40/57/46/35/76.0  VBG Lactate: 2.8      MICROBIOLOGY:     RADIOLOGY:  [x] Reviewed and interpreted by me  CT Chest 23: Mild bronchiectasis, extensive TIB nodularity, patchy opacities RUL, LLL    CT Chest 3/2016: Emphysematous changes, most prominent in RLL, bronchiectasis, areas of tree in bud nodularity, patchy ggo    PFT: None for review

## 2023-12-15 NOTE — PROGRESS NOTE ADULT - PROBLEM SELECTOR PLAN 3
Notable for leukocytosis and thrombocytosis with unclear etiology. Sinus tachycardia, afebrile, otherwise HDS.  - Elevated alk phos and procal  - Normal lactate, s/p total 2L IVF bolus in ED 12/14  - CTA chest and CT A/P 12/14 notable for extensive airway thickening and tree-in-bud nodularity c/w chronic endobroncial dz, no consolidations  - S/p IV azithro and CTX x1 in ED 12/14  - Ordered urine Legionella and Strep pneumo Ag and MRSA/MSSA PCR  - Continue IV azithro 500 mg q24h and CTX 1 g q24h for empiric CAP coverage  - Follow up blood, urine, sputum cultures On admission WBC 18, tachy to 140s, RR24, SpO2 96% on 3L, afebrile, minimally elevated procal 0.24    Plan:  >CTA Chest + CT A/P (12/14): Extensive airway thickening and tree-in-bud nodularity, compatible with chronic endobronchial disease, including MAC  - Met SIRS criteria on admission by WBC and tachy  - PNA vs. MAC  - S/p IV azithro and CTX x1 in ED 12/14  - Strep [ ], Legionella [ ], MRSA [ ] pending  - c/w CTX + Azithromycin for empiric CAP coverage  - Bcx (12/15):  - Sputum cx w/ AF (12/15):  - f/u pulmonary recs regarding roles of bronch to r/o MAC Hx of protein S deficiency c/b LUE DVT and PE in 2019, on Eliquis    Plan:  - c/w Eliquis 5 mg BID

## 2023-12-15 NOTE — PHYSICAL THERAPY INITIAL EVALUATION ADULT - GENERAL OBSERVATIONS, REHAB EVAL
Pt rec'd semisupine in bed, in NAD, +IVL, +NC 3L, Yonatan monitoring. Agreeable to PT. RN cleared pt to be seen.

## 2023-12-15 NOTE — PROGRESS NOTE ADULT - ATTENDING COMMENTS
68F unexplained weight loss with chronic cough. Her respiratory status decline is more subacute in nature, doubt COPD exacerbation given she is at her baseline O2 requirements, not wheezing, and has compensated hypercapnia. CT suggestive of MAC, rather than CAP.   - pulm consult  - obtain AFB sputum x 3  - anemia workup    Rest of plan as above. D/w HS.    The necessity of the time spent during the encounter on this date of service was due to:   - Ordering, reviewing, and interpreting labs, testing, and imaging  - Independently obtaining a review of systems and performing a physical exam  - Reviewing prior hospitalization and where necessary, outpatient records  - Reviewing consultant recommendations/communicating with consultants  - Counselling and educating patient and family regarding interpretation of aforementioned items and plan of care    Time-based billing (NON-critical care). Total minutes spent: 52 68F unexplained weight loss with chronic cough. Her respiratory status decline is more subacute in nature, doubt COPD exacerbation given she is at her baseline O2 requirements, not wheezing, and has compensated hypercapnia. CT suggestive of MAC, rather than CAP.   - pulm consult  - obtain AFB sputum x 3  - anemia workup  - persistent tachycardia, monitor HR    Rest of plan as above. D/w HS.    The necessity of the time spent during the encounter on this date of service was due to:   - Ordering, reviewing, and interpreting labs, testing, and imaging  - Independently obtaining a review of systems and performing a physical exam  - Reviewing prior hospitalization and where necessary, outpatient records  - Reviewing consultant recommendations/communicating with consultants  - Counselling and educating patient and family regarding interpretation of aforementioned items and plan of care    Time-based billing (NON-critical care). Total minutes spent: 52

## 2023-12-15 NOTE — PROGRESS NOTE ADULT - PROBLEM SELECTOR PLAN 9
- DVT ppx: Eliquis 5 mg BID, ambulation  - Diet: regular, DASH/TLC  - PT ordered  - Dispo: pending clinical course DVT ppx: Eliquis 5 mg BID, ambulation  Diet: regular, DASH/TLC  Dispo: pending PT

## 2023-12-16 LAB
A1AT SERPL-MCNC: 190 MG/DL — SIGNIFICANT CHANGE UP (ref 90–200)
A1AT SERPL-MCNC: 190 MG/DL — SIGNIFICANT CHANGE UP (ref 90–200)
ALBUMIN SERPL ELPH-MCNC: 3.8 G/DL — SIGNIFICANT CHANGE UP (ref 3.3–5)
ALBUMIN SERPL ELPH-MCNC: 3.8 G/DL — SIGNIFICANT CHANGE UP (ref 3.3–5)
ALP SERPL-CCNC: 109 U/L — SIGNIFICANT CHANGE UP (ref 40–120)
ALP SERPL-CCNC: 109 U/L — SIGNIFICANT CHANGE UP (ref 40–120)
ALT FLD-CCNC: 17 U/L — SIGNIFICANT CHANGE UP (ref 10–45)
ALT FLD-CCNC: 17 U/L — SIGNIFICANT CHANGE UP (ref 10–45)
ANION GAP SERPL CALC-SCNC: 10 MMOL/L — SIGNIFICANT CHANGE UP (ref 5–17)
ANION GAP SERPL CALC-SCNC: 10 MMOL/L — SIGNIFICANT CHANGE UP (ref 5–17)
ANISOCYTOSIS BLD QL: SLIGHT — SIGNIFICANT CHANGE UP
ANISOCYTOSIS BLD QL: SLIGHT — SIGNIFICANT CHANGE UP
AST SERPL-CCNC: 18 U/L — SIGNIFICANT CHANGE UP (ref 10–40)
AST SERPL-CCNC: 18 U/L — SIGNIFICANT CHANGE UP (ref 10–40)
BASE EXCESS BLDV CALC-SCNC: 1.6 MMOL/L — SIGNIFICANT CHANGE UP (ref -2–3)
BASE EXCESS BLDV CALC-SCNC: 1.6 MMOL/L — SIGNIFICANT CHANGE UP (ref -2–3)
BASE EXCESS BLDV CALC-SCNC: 9.2 MMOL/L — HIGH (ref -2–3)
BASE EXCESS BLDV CALC-SCNC: 9.2 MMOL/L — HIGH (ref -2–3)
BASOPHILS # BLD AUTO: 0 K/UL — SIGNIFICANT CHANGE UP (ref 0–0.2)
BASOPHILS # BLD AUTO: 0 K/UL — SIGNIFICANT CHANGE UP (ref 0–0.2)
BASOPHILS NFR BLD AUTO: 0 % — SIGNIFICANT CHANGE UP (ref 0–2)
BASOPHILS NFR BLD AUTO: 0 % — SIGNIFICANT CHANGE UP (ref 0–2)
BILIRUB SERPL-MCNC: <0.1 MG/DL — LOW (ref 0.2–1.2)
BILIRUB SERPL-MCNC: <0.1 MG/DL — LOW (ref 0.2–1.2)
BLD GP AB SCN SERPL QL: NEGATIVE — SIGNIFICANT CHANGE UP
BLD GP AB SCN SERPL QL: NEGATIVE — SIGNIFICANT CHANGE UP
BUN SERPL-MCNC: 10 MG/DL — SIGNIFICANT CHANGE UP (ref 7–23)
BUN SERPL-MCNC: 10 MG/DL — SIGNIFICANT CHANGE UP (ref 7–23)
CA-I SERPL-SCNC: 1.25 MMOL/L — SIGNIFICANT CHANGE UP (ref 1.15–1.33)
CA-I SERPL-SCNC: 1.25 MMOL/L — SIGNIFICANT CHANGE UP (ref 1.15–1.33)
CA-I SERPL-SCNC: 1.31 MMOL/L — SIGNIFICANT CHANGE UP (ref 1.15–1.33)
CA-I SERPL-SCNC: 1.31 MMOL/L — SIGNIFICANT CHANGE UP (ref 1.15–1.33)
CALCIUM SERPL-MCNC: 10.1 MG/DL — SIGNIFICANT CHANGE UP (ref 8.4–10.5)
CALCIUM SERPL-MCNC: 10.1 MG/DL — SIGNIFICANT CHANGE UP (ref 8.4–10.5)
CHLORIDE BLDV-SCNC: 100 MMOL/L — SIGNIFICANT CHANGE UP (ref 96–108)
CHLORIDE BLDV-SCNC: 100 MMOL/L — SIGNIFICANT CHANGE UP (ref 96–108)
CHLORIDE BLDV-SCNC: 101 MMOL/L — SIGNIFICANT CHANGE UP (ref 96–108)
CHLORIDE BLDV-SCNC: 101 MMOL/L — SIGNIFICANT CHANGE UP (ref 96–108)
CHLORIDE SERPL-SCNC: 96 MMOL/L — SIGNIFICANT CHANGE UP (ref 96–108)
CHLORIDE SERPL-SCNC: 96 MMOL/L — SIGNIFICANT CHANGE UP (ref 96–108)
CO2 BLDV-SCNC: 30 MMOL/L — HIGH (ref 22–26)
CO2 BLDV-SCNC: 30 MMOL/L — HIGH (ref 22–26)
CO2 BLDV-SCNC: 37 MMOL/L — HIGH (ref 22–26)
CO2 BLDV-SCNC: 37 MMOL/L — HIGH (ref 22–26)
CO2 SERPL-SCNC: 31 MMOL/L — SIGNIFICANT CHANGE UP (ref 22–31)
CO2 SERPL-SCNC: 31 MMOL/L — SIGNIFICANT CHANGE UP (ref 22–31)
CREAT SERPL-MCNC: 0.49 MG/DL — LOW (ref 0.5–1.3)
CREAT SERPL-MCNC: 0.49 MG/DL — LOW (ref 0.5–1.3)
CRP SERPL-MCNC: 21 MG/L — HIGH (ref 0–4)
CRP SERPL-MCNC: 21 MG/L — HIGH (ref 0–4)
EGFR: 103 ML/MIN/1.73M2 — SIGNIFICANT CHANGE UP
EGFR: 103 ML/MIN/1.73M2 — SIGNIFICANT CHANGE UP
EOSINOPHIL # BLD AUTO: 0 K/UL — SIGNIFICANT CHANGE UP (ref 0–0.5)
EOSINOPHIL # BLD AUTO: 0 K/UL — SIGNIFICANT CHANGE UP (ref 0–0.5)
EOSINOPHIL NFR BLD AUTO: 0 % — SIGNIFICANT CHANGE UP (ref 0–6)
EOSINOPHIL NFR BLD AUTO: 0 % — SIGNIFICANT CHANGE UP (ref 0–6)
ERYTHROCYTE [SEDIMENTATION RATE] IN BLOOD: 43 MM/HR — HIGH (ref 0–20)
ERYTHROCYTE [SEDIMENTATION RATE] IN BLOOD: 43 MM/HR — HIGH (ref 0–20)
FERRITIN SERPL-MCNC: 45 NG/ML — SIGNIFICANT CHANGE UP (ref 13–330)
FERRITIN SERPL-MCNC: 45 NG/ML — SIGNIFICANT CHANGE UP (ref 13–330)
GAS PNL BLDV: 135 MMOL/L — LOW (ref 136–145)
GAS PNL BLDV: 135 MMOL/L — LOW (ref 136–145)
GAS PNL BLDV: 139 MMOL/L — SIGNIFICANT CHANGE UP (ref 136–145)
GAS PNL BLDV: 139 MMOL/L — SIGNIFICANT CHANGE UP (ref 136–145)
GAS PNL BLDV: SIGNIFICANT CHANGE UP
GLUCOSE BLDV-MCNC: 198 MG/DL — HIGH (ref 70–99)
GLUCOSE BLDV-MCNC: 198 MG/DL — HIGH (ref 70–99)
GLUCOSE BLDV-MCNC: 84 MG/DL — SIGNIFICANT CHANGE UP (ref 70–99)
GLUCOSE BLDV-MCNC: 84 MG/DL — SIGNIFICANT CHANGE UP (ref 70–99)
GLUCOSE SERPL-MCNC: 104 MG/DL — HIGH (ref 70–99)
GLUCOSE SERPL-MCNC: 104 MG/DL — HIGH (ref 70–99)
HCO3 BLDV-SCNC: 28 MMOL/L — SIGNIFICANT CHANGE UP (ref 22–29)
HCO3 BLDV-SCNC: 28 MMOL/L — SIGNIFICANT CHANGE UP (ref 22–29)
HCO3 BLDV-SCNC: 35 MMOL/L — HIGH (ref 22–29)
HCO3 BLDV-SCNC: 35 MMOL/L — HIGH (ref 22–29)
HCT VFR BLD CALC: 20.8 % — CRITICAL LOW (ref 34.5–45)
HCT VFR BLD CALC: 20.8 % — CRITICAL LOW (ref 34.5–45)
HCT VFR BLD CALC: 28.7 % — LOW (ref 34.5–45)
HCT VFR BLD CALC: 28.7 % — LOW (ref 34.5–45)
HCT VFR BLDA CALC: 22 % — LOW (ref 34.5–46.5)
HCT VFR BLDA CALC: 22 % — LOW (ref 34.5–46.5)
HCT VFR BLDA CALC: 28 % — LOW (ref 34.5–46.5)
HCT VFR BLDA CALC: 28 % — LOW (ref 34.5–46.5)
HGB BLD CALC-MCNC: 7.3 G/DL — LOW (ref 11.7–16.1)
HGB BLD CALC-MCNC: 7.3 G/DL — LOW (ref 11.7–16.1)
HGB BLD CALC-MCNC: 9.2 G/DL — LOW (ref 11.7–16.1)
HGB BLD CALC-MCNC: 9.2 G/DL — LOW (ref 11.7–16.1)
HGB BLD-MCNC: 6.3 G/DL — CRITICAL LOW (ref 11.5–15.5)
HGB BLD-MCNC: 6.3 G/DL — CRITICAL LOW (ref 11.5–15.5)
HGB BLD-MCNC: 8.8 G/DL — LOW (ref 11.5–15.5)
HGB BLD-MCNC: 8.8 G/DL — LOW (ref 11.5–15.5)
IRON SATN MFR SERPL: 25 UG/DL — LOW (ref 30–160)
IRON SATN MFR SERPL: 25 UG/DL — LOW (ref 30–160)
IRON SATN MFR SERPL: 9 % — LOW (ref 14–50)
IRON SATN MFR SERPL: 9 % — LOW (ref 14–50)
LACTATE BLDV-MCNC: 2 MMOL/L — SIGNIFICANT CHANGE UP (ref 0.5–2)
LACTATE BLDV-MCNC: 2 MMOL/L — SIGNIFICANT CHANGE UP (ref 0.5–2)
LACTATE BLDV-MCNC: 6.1 MMOL/L — CRITICAL HIGH (ref 0.5–2)
LACTATE BLDV-MCNC: 6.1 MMOL/L — CRITICAL HIGH (ref 0.5–2)
LYMPHOCYTES # BLD AUTO: 0.65 K/UL — LOW (ref 1–3.3)
LYMPHOCYTES # BLD AUTO: 0.65 K/UL — LOW (ref 1–3.3)
LYMPHOCYTES # BLD AUTO: 7 % — LOW (ref 13–44)
LYMPHOCYTES # BLD AUTO: 7 % — LOW (ref 13–44)
MAGNESIUM SERPL-MCNC: 1.8 MG/DL — SIGNIFICANT CHANGE UP (ref 1.6–2.6)
MAGNESIUM SERPL-MCNC: 1.8 MG/DL — SIGNIFICANT CHANGE UP (ref 1.6–2.6)
MANUAL SMEAR VERIFICATION: SIGNIFICANT CHANGE UP
MANUAL SMEAR VERIFICATION: SIGNIFICANT CHANGE UP
MCHC RBC-ENTMCNC: 24.6 PG — LOW (ref 27–34)
MCHC RBC-ENTMCNC: 24.6 PG — LOW (ref 27–34)
MCHC RBC-ENTMCNC: 25 PG — LOW (ref 27–34)
MCHC RBC-ENTMCNC: 25 PG — LOW (ref 27–34)
MCHC RBC-ENTMCNC: 30.3 GM/DL — LOW (ref 32–36)
MCHC RBC-ENTMCNC: 30.3 GM/DL — LOW (ref 32–36)
MCHC RBC-ENTMCNC: 30.7 GM/DL — LOW (ref 32–36)
MCHC RBC-ENTMCNC: 30.7 GM/DL — LOW (ref 32–36)
MCV RBC AUTO: 80.2 FL — SIGNIFICANT CHANGE UP (ref 80–100)
MCV RBC AUTO: 80.2 FL — SIGNIFICANT CHANGE UP (ref 80–100)
MCV RBC AUTO: 82.5 FL — SIGNIFICANT CHANGE UP (ref 80–100)
MCV RBC AUTO: 82.5 FL — SIGNIFICANT CHANGE UP (ref 80–100)
MONOCYTES # BLD AUTO: 0.16 K/UL — SIGNIFICANT CHANGE UP (ref 0–0.9)
MONOCYTES # BLD AUTO: 0.16 K/UL — SIGNIFICANT CHANGE UP (ref 0–0.9)
MONOCYTES NFR BLD AUTO: 1.7 % — LOW (ref 2–14)
MONOCYTES NFR BLD AUTO: 1.7 % — LOW (ref 2–14)
NEUTROPHILS # BLD AUTO: 8.52 K/UL — HIGH (ref 1.8–7.4)
NEUTROPHILS # BLD AUTO: 8.52 K/UL — HIGH (ref 1.8–7.4)
NEUTROPHILS NFR BLD AUTO: 91.3 % — HIGH (ref 43–77)
NEUTROPHILS NFR BLD AUTO: 91.3 % — HIGH (ref 43–77)
NIGHT BLUE STAIN TISS: SIGNIFICANT CHANGE UP
NIGHT BLUE STAIN TISS: SIGNIFICANT CHANGE UP
NRBC # BLD: 0 /100 WBCS — SIGNIFICANT CHANGE UP (ref 0–0)
NRBC # BLD: 0 /100 WBCS — SIGNIFICANT CHANGE UP (ref 0–0)
PCO2 BLDV: 53 MMHG — HIGH (ref 39–42)
PCO2 BLDV: 53 MMHG — HIGH (ref 39–42)
PCO2 BLDV: 54 MMHG — HIGH (ref 39–42)
PCO2 BLDV: 54 MMHG — HIGH (ref 39–42)
PH BLDV: 7.33 — SIGNIFICANT CHANGE UP (ref 7.32–7.43)
PH BLDV: 7.33 — SIGNIFICANT CHANGE UP (ref 7.32–7.43)
PH BLDV: 7.42 — SIGNIFICANT CHANGE UP (ref 7.32–7.43)
PH BLDV: 7.42 — SIGNIFICANT CHANGE UP (ref 7.32–7.43)
PHOSPHATE SERPL-MCNC: 3.6 MG/DL — SIGNIFICANT CHANGE UP (ref 2.5–4.5)
PHOSPHATE SERPL-MCNC: 3.6 MG/DL — SIGNIFICANT CHANGE UP (ref 2.5–4.5)
PLAT MORPH BLD: NORMAL — SIGNIFICANT CHANGE UP
PLAT MORPH BLD: NORMAL — SIGNIFICANT CHANGE UP
PLATELET # BLD AUTO: 511 K/UL — HIGH (ref 150–400)
PLATELET # BLD AUTO: 511 K/UL — HIGH (ref 150–400)
PLATELET # BLD AUTO: 672 K/UL — HIGH (ref 150–400)
PLATELET # BLD AUTO: 672 K/UL — HIGH (ref 150–400)
PO2 BLDV: 41 MMHG — SIGNIFICANT CHANGE UP (ref 25–45)
PO2 BLDV: 41 MMHG — SIGNIFICANT CHANGE UP (ref 25–45)
PO2 BLDV: 87 MMHG — HIGH (ref 25–45)
PO2 BLDV: 87 MMHG — HIGH (ref 25–45)
POTASSIUM BLDV-SCNC: 3.8 MMOL/L — SIGNIFICANT CHANGE UP (ref 3.5–5.1)
POTASSIUM BLDV-SCNC: 3.8 MMOL/L — SIGNIFICANT CHANGE UP (ref 3.5–5.1)
POTASSIUM BLDV-SCNC: 3.9 MMOL/L — SIGNIFICANT CHANGE UP (ref 3.5–5.1)
POTASSIUM BLDV-SCNC: 3.9 MMOL/L — SIGNIFICANT CHANGE UP (ref 3.5–5.1)
POTASSIUM SERPL-MCNC: 4.6 MMOL/L — SIGNIFICANT CHANGE UP (ref 3.5–5.3)
POTASSIUM SERPL-MCNC: 4.6 MMOL/L — SIGNIFICANT CHANGE UP (ref 3.5–5.3)
POTASSIUM SERPL-SCNC: 4.6 MMOL/L — SIGNIFICANT CHANGE UP (ref 3.5–5.3)
POTASSIUM SERPL-SCNC: 4.6 MMOL/L — SIGNIFICANT CHANGE UP (ref 3.5–5.3)
PROT SERPL-MCNC: 7 G/DL — SIGNIFICANT CHANGE UP (ref 6–8.3)
PROT SERPL-MCNC: 7 G/DL — SIGNIFICANT CHANGE UP (ref 6–8.3)
RBC # BLD: 2.52 M/UL — LOW (ref 3.8–5.2)
RBC # BLD: 2.52 M/UL — LOW (ref 3.8–5.2)
RBC # BLD: 3.58 M/UL — LOW (ref 3.8–5.2)
RBC # BLD: 3.58 M/UL — LOW (ref 3.8–5.2)
RBC # FLD: 15.7 % — HIGH (ref 10.3–14.5)
RBC # FLD: 15.7 % — HIGH (ref 10.3–14.5)
RBC # FLD: 15.8 % — HIGH (ref 10.3–14.5)
RBC # FLD: 15.8 % — HIGH (ref 10.3–14.5)
RBC BLD AUTO: ABNORMAL
RBC BLD AUTO: ABNORMAL
RH IG SCN BLD-IMP: POSITIVE — SIGNIFICANT CHANGE UP
RH IG SCN BLD-IMP: POSITIVE — SIGNIFICANT CHANGE UP
RHEUMATOID FACT SERPL-ACNC: 19 IU/ML — HIGH (ref 0–13)
RHEUMATOID FACT SERPL-ACNC: 19 IU/ML — HIGH (ref 0–13)
SAO2 % BLDV: 52.2 % — LOW (ref 67–88)
SAO2 % BLDV: 52.2 % — LOW (ref 67–88)
SAO2 % BLDV: 98.2 % — HIGH (ref 67–88)
SAO2 % BLDV: 98.2 % — HIGH (ref 67–88)
SODIUM SERPL-SCNC: 137 MMOL/L — SIGNIFICANT CHANGE UP (ref 135–145)
SODIUM SERPL-SCNC: 137 MMOL/L — SIGNIFICANT CHANGE UP (ref 135–145)
SPECIMEN SOURCE: SIGNIFICANT CHANGE UP
SPECIMEN SOURCE: SIGNIFICANT CHANGE UP
TARGETS BLD QL SMEAR: SLIGHT — SIGNIFICANT CHANGE UP
TARGETS BLD QL SMEAR: SLIGHT — SIGNIFICANT CHANGE UP
TIBC SERPL-MCNC: 287 UG/DL — SIGNIFICANT CHANGE UP (ref 220–430)
TIBC SERPL-MCNC: 287 UG/DL — SIGNIFICANT CHANGE UP (ref 220–430)
UIBC SERPL-MCNC: 262 UG/DL — SIGNIFICANT CHANGE UP (ref 110–370)
UIBC SERPL-MCNC: 262 UG/DL — SIGNIFICANT CHANGE UP (ref 110–370)
WBC # BLD: 11.03 K/UL — HIGH (ref 3.8–10.5)
WBC # BLD: 11.03 K/UL — HIGH (ref 3.8–10.5)
WBC # BLD: 9.33 K/UL — SIGNIFICANT CHANGE UP (ref 3.8–10.5)
WBC # BLD: 9.33 K/UL — SIGNIFICANT CHANGE UP (ref 3.8–10.5)
WBC # FLD AUTO: 11.03 K/UL — HIGH (ref 3.8–10.5)
WBC # FLD AUTO: 11.03 K/UL — HIGH (ref 3.8–10.5)
WBC # FLD AUTO: 9.33 K/UL — SIGNIFICANT CHANGE UP (ref 3.8–10.5)
WBC # FLD AUTO: 9.33 K/UL — SIGNIFICANT CHANGE UP (ref 3.8–10.5)

## 2023-12-16 PROCEDURE — 99233 SBSQ HOSP IP/OBS HIGH 50: CPT

## 2023-12-16 RX ORDER — LANOLIN ALCOHOL/MO/W.PET/CERES
3 CREAM (GRAM) TOPICAL AT BEDTIME
Refills: 0 | Status: DISCONTINUED | OUTPATIENT
Start: 2023-12-16 | End: 2023-12-21

## 2023-12-16 RX ADMIN — Medication 1 SPRAY(S): at 05:25

## 2023-12-16 RX ADMIN — APIXABAN 5 MILLIGRAM(S): 2.5 TABLET, FILM COATED ORAL at 05:24

## 2023-12-16 RX ADMIN — AMLODIPINE BESYLATE 5 MILLIGRAM(S): 2.5 TABLET ORAL at 05:23

## 2023-12-16 RX ADMIN — Medication 3 MILLIGRAM(S): at 22:59

## 2023-12-16 RX ADMIN — SODIUM CHLORIDE 4 MILLILITER(S): 9 INJECTION INTRAMUSCULAR; INTRAVENOUS; SUBCUTANEOUS at 17:07

## 2023-12-16 RX ADMIN — SODIUM CHLORIDE 4 MILLILITER(S): 9 INJECTION INTRAMUSCULAR; INTRAVENOUS; SUBCUTANEOUS at 11:59

## 2023-12-16 RX ADMIN — Medication 3 MILLILITER(S): at 17:07

## 2023-12-16 RX ADMIN — APIXABAN 5 MILLIGRAM(S): 2.5 TABLET, FILM COATED ORAL at 17:08

## 2023-12-16 RX ADMIN — Medication 40 MILLIGRAM(S): at 05:24

## 2023-12-16 RX ADMIN — Medication 600 MILLIGRAM(S): at 05:24

## 2023-12-16 RX ADMIN — Medication 3 MILLILITER(S): at 05:24

## 2023-12-16 RX ADMIN — Medication 3 MILLILITER(S): at 23:22

## 2023-12-16 RX ADMIN — BUPRENORPHINE AND NALOXONE 2 TABLET(S): 2; .5 TABLET SUBLINGUAL at 21:44

## 2023-12-16 RX ADMIN — BUPRENORPHINE AND NALOXONE 2 TABLET(S): 2; .5 TABLET SUBLINGUAL at 10:58

## 2023-12-16 RX ADMIN — Medication 1 SPRAY(S): at 17:07

## 2023-12-16 RX ADMIN — SODIUM CHLORIDE 4 MILLILITER(S): 9 INJECTION INTRAMUSCULAR; INTRAVENOUS; SUBCUTANEOUS at 05:23

## 2023-12-16 RX ADMIN — Medication 75 MILLIGRAM(S): at 11:59

## 2023-12-16 RX ADMIN — SODIUM CHLORIDE 4 MILLILITER(S): 9 INJECTION INTRAMUSCULAR; INTRAVENOUS; SUBCUTANEOUS at 23:22

## 2023-12-16 RX ADMIN — Medication 3 MILLILITER(S): at 11:58

## 2023-12-16 RX ADMIN — Medication 1 TABLET(S): at 11:59

## 2023-12-16 RX ADMIN — Medication 600 MILLIGRAM(S): at 17:07

## 2023-12-16 NOTE — PROGRESS NOTE ADULT - ASSESSMENT
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 p/w 2 months progressive worsening ALTMAN, fatigue, unintentional weight loss. Likely PNA vs. MAC vs. less likely COPD exacerbation 68 yoF w/ PMHx of Protein S deficiency c/b LUE DVT/PE (2019) on Eliquis, COPD (baseline 3L O2, no chronic steroid/abx, no intubation, last admission 2016), HTN, anxiety/depression, remote back surgery on Suboxone. p/w 2 months progressive worsening ALTMAN, fatigue, unintentional weight loss. Likely PNA vs. MAC vs. 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 p/w 2 months progressive worsening LATMAN, fatigue, unintentional weight loss. Likely PNA vs. MAC vs. COPD exacerbation

## 2023-12-16 NOTE — DIETITIAN INITIAL EVALUATION ADULT - NSICDXPASTSURGICALHX_GEN_ALL_CORE_FT
Agreeable to [facility] outreach 1/16/20, 1004   Referral Source & Reason High risk for admission, ED   Primary concerns per patient and/or pts family/caregiver My legs are really weak, would like more [place] therapy. Recent Hospitalization(s)/ED Visits [facility] 8/30, Fx of L humerus   Current with Home Health?  - Agency? No, but requests [facility] referral. Must see PCP first as last visit was > 3 mos ago. and pt declines at this time   Social Needs:  - Able to afford medications? - Financial assessment?  - Access to care? Transportation? Reports meds are affordable    Reports has [facility] 7days/week, they drive her to appmts, do housework, shopping. Lives alone. Nutritional Assessment  - Appetite? - Obesity?  - Failure to Thrive? - Bowels ? Reports no issues   Cognitive Assessment  - History of dementia  - Health literacy    Fair health literacy, no dementia hx   Mobility/Activity Assessment  - Bed/chair bound? - Make use of assistive devices? - Does patient still drive? Has w/c, walker and cane, reports mostly uses w/c. Denies any recent falls. Pt does not drive   Plan/Interventions/Education  - Follow up Appointments  - Referrals 3/18 PCP, Dr Joaquim Arango     Name is pronounced \"[place]\". Pt does report she has HEP handouts from previous [place] in Fall 2019, will review and use. Encouraged sooner f/u w/ Dr Joaquim Arango for [place] eval, she is noncommittal.  Plan to f/u next week, agreeable. This note will not be viewable in [address]
PAST SURGICAL HISTORY:  History of back surgery

## 2023-12-16 NOTE — DIETITIAN INITIAL EVALUATION ADULT - ETIOLOGY
increased physiological demand of nutrient in setting of chronic, metabolically demanding diagnosis  Concern for inadequate energy intake in setting of poor appetite with subsequent increased nutrient needs

## 2023-12-16 NOTE — PROGRESS NOTE ADULT - PROBLEM SELECTOR PLAN 8
DVT ppx: Eliquis 5 mg BID, ambulation  Diet: regular, DASH/TLC  Dispo: pending PT DVT ppx: Eliquis 5 mg BID, ambulation  Diet: regular, DASH/TLC  Dispo: pending course

## 2023-12-16 NOTE — DIETITIAN INITIAL EVALUATION ADULT - NSFNSGIIOFT_GEN_A_CORE
-Prescribed Lactated Ringers, infusing at 50ml/hr x 12 hrs.   -No documented BM's recorded thus far. Not on apparent bowel regimen. Prescribed Protonix in setting of hx of GERD.

## 2023-12-16 NOTE — DIETITIAN INITIAL EVALUATION ADULT - ORAL INTAKE PTA/DIET HISTORY
-Pt reports very poor appetite for "months"; consumed on average one meal daily. States appetite improving slowly since admission; was able to consume oatmeal for breakfast this AM. Requesting Ensure supplement, as she admits to drinking ~3/day at home PTA.   -Admits to wearing dentures at baseline, but takes them out during meal times to be able to "taste the food better". Denies intolerance to chewing/swallowing.   -Denies food allergies. Reports occasional constipation (denies intake of stool softeners, fiber supplements). Denies N/V/D.   -Takes daily vitamin D and women's multivitamin.

## 2023-12-16 NOTE — DIETITIAN INITIAL EVALUATION ADULT - REASON FOR ADMISSION
Pt is a 67 yo F with PMH: Protein S deficiency c/b LUE DVT/PE (2019) on Eliquis, COPD, HTN, anxiety/depression, remote back surgery. Presented with 2 months of progressive worsening ALTMAN, fatigue, unintentional weight loss. Likely PNA vs. MAC vs. less likely COPD exacerbation. Continues on antibiotics as ordered.

## 2023-12-16 NOTE — PROGRESS NOTE ADULT - ATTENDING COMMENTS
68F unexplained weight loss with chronic cough. Her respiratory status decline is more subacute in nature, doubt COPD exacerbation given she is at her baseline O2 requirements, not wheezing, and has compensated hypercapnia. CT suggestive of MAC, rather than CAP.   - pulm consult appreciated: abx switched to levaquin to cover pseudomonas, treat for bronchiectasis exacerbation w/ prednisone, pulm hygiene  - obtain AFB sputum x 3  - anemia workup  - persistent tachycardia, monitor HR    Rest of plan as above. D/w HS.    The necessity of the time spent during the encounter on this date of service was due to:   - Ordering, reviewing, and interpreting labs, testing, and imaging  - Independently obtaining a review of systems and performing a physical exam  - Reviewing prior hospitalization and where necessary, outpatient records  - Reviewing consultant recommendations/communicating with consultants  - Counselling and educating patient and family regarding interpretation of aforementioned items and plan of care    Time-based billing (NON-critical care). Total minutes spent: 56

## 2023-12-16 NOTE — DIETITIAN INITIAL EVALUATION ADULT - PERTINENT LABORATORY DATA
12-16    137  |  96  |  10  ----------------------------<  104<H>  4.6   |  31  |  0.49<L>    Ca    10.1      16 Dec 2023 07:28  Phos  3.6     12-16  Mg     1.8     12-16    TPro  7.0  /  Alb  3.8  /  TBili  <0.1<L>  /  DBili  x   /  AST  18  /  ALT  17  /  AlkPhos  109  12-16

## 2023-12-16 NOTE — DIETITIAN INITIAL EVALUATION ADULT - PERTINENT MEDS FT
MEDICATIONS  (STANDING):  albuterol/ipratropium for Nebulization 3 milliLiter(s) Nebulizer every 6 hours  amLODIPine   Tablet 5 milliGRAM(s) Oral daily  apixaban 5 milliGRAM(s) Oral every 12 hours  buprenorphine 2 mG/naloxone 0.5 mG SL  Tablet 2 Tablet(s) SubLingual <User Schedule>  fluticasone propionate 50 MICROgram(s)/spray Nasal Spray 1 Spray(s) Both Nostrils two times a day  guaiFENesin  milliGRAM(s) Oral every 12 hours  influenza  Vaccine (HIGH DOSE) 0.7 milliLiter(s) IntraMuscular once  lactated ringers. 1000 milliLiter(s) (50 mL/Hr) IV Continuous <Continuous>  levoFLOXacin IVPB 750 milliGRAM(s) IV Intermittent every 24 hours  predniSONE   Tablet 40 milliGRAM(s) Oral daily  sodium chloride 3%  Inhalation 4 milliLiter(s) Inhalation every 6 hours  venlafaxine XR. 75 milliGRAM(s) Oral daily    MEDICATIONS  (PRN):  acetaminophen     Tablet .. 650 milliGRAM(s) Oral every 6 hours PRN Temp greater or equal to 38C (100.4F), Mild Pain (1 - 3), Moderate Pain (4 - 6), Severe Pain (7 - 10)  pantoprazole    Tablet 40 milliGRAM(s) Oral before breakfast PRN GERD

## 2023-12-16 NOTE — PROGRESS NOTE ADULT - PROBLEM SELECTOR PLAN 2
On admission WBC 18, tachy to 140s, RR24, SpO2 96% on 3L, afebrile, minimally elevated procal 0.24    Plan:  >CTA Chest + CT A/P (12/14): Extensive airway thickening and tree-in-bud nodularity, compatible with chronic endobronchial disease, including MAC  - Met SIRS criteria on admission by WBC and tachy  - PNA vs. MAC  - S/p IV azithro and CTX x2 (12/14 - 12/15)  - c/w Levaquin 750qd for PsA coverage (12/15 - )  - Strep [-], Legionella [-], MRSA [-]  - Bcx (12/15):  - Sputum cx w/ AF (12/15):  - f/u pulmonary recs regarding roles of bronch to r/o MAC On admission WBC 18, tachy to 140s, RR24, SpO2 96% on 3L, afebrile, minimally elevated procal 0.24    Plan:  >CTA Chest + CT A/P (12/14): Extensive airway thickening and tree-in-bud nodularity, compatible with chronic endobronchial disease, including MAC  >s/p IV azithro and CTX x2 (12/14 - 12/15)  - Met SIRS criteria on admission by WBC and tachy  - PNA vs. MAC  - c/w Levaquin 750qd for PsA coverage (12/15 - ), pulm recs appreciated  - Strep [-], Legionella [-], MRSA [-]  - Bcx (12/15): NGTD  - Sputum cx w/ AF (12/15): negative x 1  - f/u pulmonary recs regarding COPD and MAC w/u

## 2023-12-16 NOTE — PROGRESS NOTE ADULT - SUBJECTIVE AND OBJECTIVE BOX
Patient is a 68y old  Female who presents with a chief complaint of Chronic progressively worsening dyspnea with minimal exertion + coughs (15 Dec 2023 14:49)      ======Overnight/Subjective======  Overnight  - No acute event overnight    Subjective  - c/o ongoing sob, productive cough, but states much improved from yesterday  - Denies other new complaints  - Pertinent exam: b/l scattered wheezes R > L, some rhonchi, no crackles    Brief daily plan    ======Medications======  MEDICATIONS  (STANDING):  albuterol/ipratropium for Nebulization 3 milliLiter(s) Nebulizer every 6 hours  amLODIPine   Tablet 5 milliGRAM(s) Oral daily  apixaban 5 milliGRAM(s) Oral every 12 hours  buprenorphine 2 mG/naloxone 0.5 mG SL  Tablet 2 Tablet(s) SubLingual <User Schedule>  fluticasone propionate 50 MICROgram(s)/spray Nasal Spray 1 Spray(s) Both Nostrils two times a day  guaiFENesin  milliGRAM(s) Oral every 12 hours  influenza  Vaccine (HIGH DOSE) 0.7 milliLiter(s) IntraMuscular once  lactated ringers. 1000 milliLiter(s) (50 mL/Hr) IV Continuous <Continuous>  levoFLOXacin IVPB 750 milliGRAM(s) IV Intermittent every 24 hours  predniSONE   Tablet 40 milliGRAM(s) Oral daily  sodium chloride 3%  Inhalation 4 milliLiter(s) Inhalation every 6 hours  venlafaxine XR. 75 milliGRAM(s) Oral daily    MEDICATIONS  (PRN):  acetaminophen     Tablet .. 650 milliGRAM(s) Oral every 6 hours PRN Temp greater or equal to 38C (100.4F), Mild Pain (1 - 3), Moderate Pain (4 - 6), Severe Pain (7 - 10)  pantoprazole    Tablet 40 milliGRAM(s) Oral before breakfast PRN GERD      ======Vital Signs======  T(C): 36.7 (23 @ 04:35), Max: 36.9 (12-15-23 @ 16:00)  T(F): 98.1 (23 @ 04:35), Max: 98.4 (12-15-23 @ 16:00)  HR: 102 (23 @ 04:35) (102 - 145)  BP: 123/77 (23 @ 04:35) (104/65 - 125/81)  BP(mean): --  RR: 18 (23 @ 04:35) (18 - 18)  SpO2: 100% (23 @ 04:35) (94% - 100%)    ======Physical exams======  GENERAL: AAOx3, NAD  Cardiovascular: RRR, S1 S2, no m/r/g. No extremities edema, no JVD  LUNGS: Unlabored respirations, CTABL no wheeze/rhonchi/crackles  ABDOMEN: Soft, NTND, no rebound or guarding, BS presents. No CVA tenderness.  EXTREMITIES: Warm extremities, no clubbing, cyanosis, or edema, pulses 2+ bilaterally  NEURO: CN 2-12 grossly intact, strength 5/5 throughout, sensation intact    ======Labs======                6.3<LL>  9.33 >----------< 511<H>  (MCV: 82.5)                20.8<LL>   137 | 96 | 10  -----------------------< 104<H>  4.6 | 31 | 0.49<L>    TPro: 7.0 / Alb: 3.8 / TBili: <0.1<L> / DBili: -- / AlkPhos: 109 / ALT: 17 / AST: 18 (23 @ 07:28)  Ca: 10.1 / Phos: 3.6 / M.8 (23 @ 07:28)    Gas: 7.33 / 53<H> / 41 / 28 / 52.2<L>% / 1.6 (23 @ 06:40)  PT/INR - ( 14 Dec 2023 13:28 )   PT: 15.6 sec;   INR: 1.50 ratio         PTT - ( 14 Dec 2023 13:28 )  PTT:40.2 sec    ======Microbiology======  Urinalysis Basic - ( 16 Dec 2023 07:28 )    Color: x / Appearance: x / SG: x / pH: x  Gluc: 104 mg/dL / Ketone: x  / Bili: x / Urobili: x   Blood: x / Protein: x / Nitrite: x   Leuk Esterase: x / RBC: x / WBC x   Sq Epi: x / Non Sq Epi: x / Bacteria: x        Culture - Acid Fast - Sputum w/Smear (collected 15 Dec 2023 15:29)  Source: .Sputum Sputum    Culture - Sputum (collected 15 Dec 2023 13:28)  Source: .Sputum Sputum  Gram Stain (15 Dec 2023 20:39):    No polymorphonuclear leukocytes per low power field    No Squamous epithelial cells per low power field    Rare Gram Negative Rods per oil power field    Culture - Blood (collected 14 Dec 2023 13:10)  Source: .Blood Blood-Peripheral  Preliminary Report (15 Dec 2023 18:03):    No growth at 24 hours    Culture - Blood (collected 14 Dec 2023 13:00)  Source: .Blood Blood-Peripheral  Preliminary Report (15 Dec 2023 18:03):    No growth at 24 hours        ======I&O's======  I&O's Summary    15 Dec 2023 07:01  -  16 Dec 2023 07:00  --------------------------------------------------------  IN: 1250 mL / OUT: 1881 mL / NET: -631 mL         Patient is a 68y old  Female who presents with a chief complaint of Chronic progressively worsening dyspnea with minimal exertion + coughs (15 Dec 2023 14:49)      ======Overnight/Subjective======  Overnight  - No acute event overnight    Subjective  - c/o ongoing sob, productive cough, but states much improved from yesterday  - Denies other new complaints  - Pertinent exam: b/l scattered wheezes R > L, some rhonchi, no crackles    Brief daily plan  - f/u pulm recs  - recheck PM CBC (Hgb 6.6 on AM labs, though appears dilutional), consider GI consult if persistently low    ======Medications======  MEDICATIONS  (STANDING):  albuterol/ipratropium for Nebulization 3 milliLiter(s) Nebulizer every 6 hours  amLODIPine   Tablet 5 milliGRAM(s) Oral daily  apixaban 5 milliGRAM(s) Oral every 12 hours  buprenorphine 2 mG/naloxone 0.5 mG SL  Tablet 2 Tablet(s) SubLingual <User Schedule>  fluticasone propionate 50 MICROgram(s)/spray Nasal Spray 1 Spray(s) Both Nostrils two times a day  guaiFENesin  milliGRAM(s) Oral every 12 hours  influenza  Vaccine (HIGH DOSE) 0.7 milliLiter(s) IntraMuscular once  lactated ringers. 1000 milliLiter(s) (50 mL/Hr) IV Continuous <Continuous>  levoFLOXacin IVPB 750 milliGRAM(s) IV Intermittent every 24 hours  predniSONE   Tablet 40 milliGRAM(s) Oral daily  sodium chloride 3%  Inhalation 4 milliLiter(s) Inhalation every 6 hours  venlafaxine XR. 75 milliGRAM(s) Oral daily    MEDICATIONS  (PRN):  acetaminophen     Tablet .. 650 milliGRAM(s) Oral every 6 hours PRN Temp greater or equal to 38C (100.4F), Mild Pain (1 - 3), Moderate Pain (4 - 6), Severe Pain (7 - 10)  pantoprazole    Tablet 40 milliGRAM(s) Oral before breakfast PRN GERD      ======Vital Signs======  T(C): 36.7 (23 @ 04:35), Max: 36.9 (12-15-23 @ 16:00)  T(F): 98.1 (23 @ 04:35), Max: 98.4 (12-15-23 @ 16:00)  HR: 102 (23 @ 04:35) (102 - 145)  BP: 123/77 (23 @ 04:35) (104/65 - 125/81)  BP(mean): --  RR: 18 (23 @ 04:35) (18 - 18)  SpO2: 100% (23 @ 04:35) (94% - 100%)    ======Physical exams======  GENERAL: AAOx3, NAD  Cardiovascular: RRR, S1 S2, no m/r/g. No extremities edema, no JVD  LUNGS: On NC, b/l scattered wheezes R > L, some rhonchi, no crackles  ABDOMEN: Soft, NTND, no rebound or guarding, BS presents. No CVA tenderness.  EXTREMITIES: Warm extremities, no clubbing, cyanosis, or edema, pulses 2+ bilaterally  NEURO: CN 2-12 grossly intact, strength 5/5 throughout, sensation intact    ======Labs======                6.3<LL>  9.33 >----------< 511<H>  (MCV: 82.5)                20.8<LL>   137 | 96 | 10  -----------------------< 104<H>  4.6 | 31 | 0.49<L>    TPro: 7.0 / Alb: 3.8 / TBili: <0.1<L> / DBili: -- / AlkPhos: 109 / ALT: 17 / AST: 18 (23 @ 07:28)  Ca: 10.1 / Phos: 3.6 / M.8 (23 @ 07:28)    Gas: 7.33 / 53<H> / 41 / 28 / 52.2<L>% / 1.6 (23 @ 06:40)  PT/INR - ( 14 Dec 2023 13:28 )   PT: 15.6 sec;   INR: 1.50 ratio         PTT - ( 14 Dec 2023 13:28 )  PTT:40.2 sec    ======Microbiology======  Urinalysis Basic - ( 16 Dec 2023 07:28 )    Color: x / Appearance: x / SG: x / pH: x  Gluc: 104 mg/dL / Ketone: x  / Bili: x / Urobili: x   Blood: x / Protein: x / Nitrite: x   Leuk Esterase: x / RBC: x / WBC x   Sq Epi: x / Non Sq Epi: x / Bacteria: x        Culture - Acid Fast - Sputum w/Smear (collected 15 Dec 2023 15:29)  Source: .Sputum Sputum    Culture - Sputum (collected 15 Dec 2023 13:28)  Source: .Sputum Sputum  Gram Stain (15 Dec 2023 20:39):    No polymorphonuclear leukocytes per low power field    No Squamous epithelial cells per low power field    Rare Gram Negative Rods per oil power field    Culture - Blood (collected 14 Dec 2023 13:10)  Source: .Blood Blood-Peripheral  Preliminary Report (15 Dec 2023 18:03):    No growth at 24 hours    Culture - Blood (collected 14 Dec 2023 13:00)  Source: .Blood Blood-Peripheral  Preliminary Report (15 Dec 2023 18:03):    No growth at 24 hours        ======I&O's======  I&O's Summary    15 Dec 2023 07:01  -  16 Dec 2023 07:00  --------------------------------------------------------  IN: 1250 mL / OUT: 1881 mL / NET: -631 mL

## 2023-12-16 NOTE — PROGRESS NOTE ADULT - PROBLEM SELECTOR PLAN 1
COPD on home 3L O2, not on chronic steroid or antibiotics, never intubated, last exacerbation requiring admission was 2016. Now presenting with worsening ALTMAN and fatigue which appears to be 2/2 PNA vs. MAC as oppose to exacerbation. Home COPD regimen Advair 500-50 + Albuterol    Plan:  >Sating well on baseline O2 3L  >VBG 7.4/57, bicarb 29 on admission (12/14 @ 1PM)  >s/p prednisone 50 mg PO x1 in ED on 12/14  - c/w Symbicort while inpatient  - Will give standing DuoNeb q6 + Hypersal 3% q6 + Mucinex + Flonase for airway clearance  - c/w NC, wean as tolerated, continuous pulse ox  - Will Hold off systemic steroid for now COPD on home 3L O2, not on chronic steroid or antibiotics, never intubated, last exacerbation requiring admission was 2016. Now presenting with worsening ALTMAN and fatigue which appears to be 2/2 PNA vs. MAC as oppose to exacerbation. Home COPD regimen Advair 500-50 + Albuterol    Plan:  >Sating well on baseline O2 3L  >VBG 7.4/57, bicarb 29 on admission (12/14 @ 1PM)  >s/p prednisone 50 mg PO x1 in ED on 12/14  - c/w prednisone 40mg qd to complete 5 days course (12/15 - 12/18)  - c/w DuoNeb q6 standing + Hypersal 3% q6 + Mucinex + Flonase for airway clearance  - Hold ICS-LABA while in exacerbation  - c/w NC, wean as tolerated, continuous pulse ox

## 2023-12-16 NOTE — PROGRESS NOTE ADULT - PROBLEM SELECTOR PLAN 6
Hx of back surgery in 1980s for herniated disc, consistent opioid use afterwards for an extended period of time, currently on Suboxone SL film 4 mg BID    Plan:  - c/w Suboxone SL tab

## 2023-12-16 NOTE — DIETITIAN INITIAL EVALUATION ADULT - OTHER INFO
Dosing wt (12/14): 87 lbs   [facility] 93 lbs. Net loss of 6.5% x ~1 mo.   Pt reports  lbs with gradual wt loss throughout the past few months in setting of decreased PO intake.  Dosing wt (12/14): 87 lbs   [facility] 93 lbs. Net loss of 6.5% x ~1 mo.   Pt reports  lbs with gradual wt loss throughout the past few months in setting of decreased PO intake.

## 2023-12-16 NOTE — DIETITIAN INITIAL EVALUATION ADULT - ADD RECOMMEND
1. Recommend discontinue DASH/TLC diet and change to NO therapeutic restrictions. Defer consistency to medical team, SLP.   2. Encourage and monitor PO intake. [place] dietary preferences as expressed as able.  3. Recommend Ensure Plus High Protein 3x daily.   4. Recommend multivitamin (if no medication contraindications) to aid in prevention of micronutrient deficiencies.   5. Monitor wt trends/labs/skin integrity/hydration status/bowel regularity.   6. Malnutrition sticker placed.  1. Recommend discontinue DASH/TLC diet and change to NO therapeutic restrictions. Defer consistency to medical team, SLP.   2. Encourage and monitor PO intake. [place] dietary preferences as expressed as able.  3. Recommend Ensure Plus High Protein 3x daily.   4. Recommend multivitamin (if no medication contraindications) to aid in prevention of micronutrient deficiencies.   5. Monitor wt trends/labs/skin integrity/hydration status/bowel regularity.   6. Malnutrition sticker placed.

## 2023-12-16 NOTE — DIETITIAN INITIAL EVALUATION ADULT - SIGNS/SYMPTOMS
PO intake </=75% x >/=1 mo, wt loss 6.5% x 1 mo, moderate body fat/muscle depletion hx of COPD PO intake </=75% x >/=1 mo, wt loss 6.5% x 1 mo, mod/severe body fat/muscle depletion, BMI <19

## 2023-12-17 ENCOUNTER — TRANSCRIPTION ENCOUNTER (OUTPATIENT)
Age: 68
End: 2023-12-17

## 2023-12-17 DIAGNOSIS — J47.9 BRONCHIECTASIS, UNCOMPLICATED: ICD-10-CM

## 2023-12-17 LAB
ALBUMIN SERPL ELPH-MCNC: 3.7 G/DL — SIGNIFICANT CHANGE UP (ref 3.3–5)
ALBUMIN SERPL ELPH-MCNC: 3.7 G/DL — SIGNIFICANT CHANGE UP (ref 3.3–5)
ALP SERPL-CCNC: 90 U/L — SIGNIFICANT CHANGE UP (ref 40–120)
ALP SERPL-CCNC: 90 U/L — SIGNIFICANT CHANGE UP (ref 40–120)
ALT FLD-CCNC: 17 U/L — SIGNIFICANT CHANGE UP (ref 10–45)
ALT FLD-CCNC: 17 U/L — SIGNIFICANT CHANGE UP (ref 10–45)
ANION GAP SERPL CALC-SCNC: 8 MMOL/L — SIGNIFICANT CHANGE UP (ref 5–17)
ANION GAP SERPL CALC-SCNC: 8 MMOL/L — SIGNIFICANT CHANGE UP (ref 5–17)
AST SERPL-CCNC: 17 U/L — SIGNIFICANT CHANGE UP (ref 10–40)
AST SERPL-CCNC: 17 U/L — SIGNIFICANT CHANGE UP (ref 10–40)
BASE EXCESS BLDV CALC-SCNC: 7.5 MMOL/L — HIGH (ref -2–3)
BASE EXCESS BLDV CALC-SCNC: 7.5 MMOL/L — HIGH (ref -2–3)
BASOPHILS # BLD AUTO: 0.04 K/UL — SIGNIFICANT CHANGE UP (ref 0–0.2)
BASOPHILS # BLD AUTO: 0.04 K/UL — SIGNIFICANT CHANGE UP (ref 0–0.2)
BASOPHILS NFR BLD AUTO: 0.3 % — SIGNIFICANT CHANGE UP (ref 0–2)
BASOPHILS NFR BLD AUTO: 0.3 % — SIGNIFICANT CHANGE UP (ref 0–2)
BILIRUB SERPL-MCNC: <0.1 MG/DL — LOW (ref 0.2–1.2)
BILIRUB SERPL-MCNC: <0.1 MG/DL — LOW (ref 0.2–1.2)
BUN SERPL-MCNC: 17 MG/DL — SIGNIFICANT CHANGE UP (ref 7–23)
BUN SERPL-MCNC: 17 MG/DL — SIGNIFICANT CHANGE UP (ref 7–23)
CA-I SERPL-SCNC: 1.29 MMOL/L — SIGNIFICANT CHANGE UP (ref 1.15–1.33)
CA-I SERPL-SCNC: 1.29 MMOL/L — SIGNIFICANT CHANGE UP (ref 1.15–1.33)
CALCIUM SERPL-MCNC: 9.4 MG/DL — SIGNIFICANT CHANGE UP (ref 8.4–10.5)
CALCIUM SERPL-MCNC: 9.4 MG/DL — SIGNIFICANT CHANGE UP (ref 8.4–10.5)
CHLORIDE BLDV-SCNC: 101 MMOL/L — SIGNIFICANT CHANGE UP (ref 96–108)
CHLORIDE BLDV-SCNC: 101 MMOL/L — SIGNIFICANT CHANGE UP (ref 96–108)
CHLORIDE SERPL-SCNC: 100 MMOL/L — SIGNIFICANT CHANGE UP (ref 96–108)
CHLORIDE SERPL-SCNC: 100 MMOL/L — SIGNIFICANT CHANGE UP (ref 96–108)
CO2 BLDV-SCNC: 35 MMOL/L — HIGH (ref 22–26)
CO2 BLDV-SCNC: 35 MMOL/L — HIGH (ref 22–26)
CO2 SERPL-SCNC: 32 MMOL/L — HIGH (ref 22–31)
CO2 SERPL-SCNC: 32 MMOL/L — HIGH (ref 22–31)
CREAT SERPL-MCNC: 0.6 MG/DL — SIGNIFICANT CHANGE UP (ref 0.5–1.3)
CREAT SERPL-MCNC: 0.6 MG/DL — SIGNIFICANT CHANGE UP (ref 0.5–1.3)
CULTURE RESULTS: ABNORMAL
CULTURE RESULTS: ABNORMAL
EGFR: 98 ML/MIN/1.73M2 — SIGNIFICANT CHANGE UP
EGFR: 98 ML/MIN/1.73M2 — SIGNIFICANT CHANGE UP
EOSINOPHIL # BLD AUTO: 0.2 K/UL — SIGNIFICANT CHANGE UP (ref 0–0.5)
EOSINOPHIL # BLD AUTO: 0.2 K/UL — SIGNIFICANT CHANGE UP (ref 0–0.5)
EOSINOPHIL NFR BLD AUTO: 1.6 % — SIGNIFICANT CHANGE UP (ref 0–6)
EOSINOPHIL NFR BLD AUTO: 1.6 % — SIGNIFICANT CHANGE UP (ref 0–6)
ERYTHROCYTE [SEDIMENTATION RATE] IN BLOOD: 94 MM/HR — HIGH (ref 0–20)
ERYTHROCYTE [SEDIMENTATION RATE] IN BLOOD: 94 MM/HR — HIGH (ref 0–20)
GAS PNL BLDV: 139 MMOL/L — SIGNIFICANT CHANGE UP (ref 136–145)
GAS PNL BLDV: 139 MMOL/L — SIGNIFICANT CHANGE UP (ref 136–145)
GAS PNL BLDV: SIGNIFICANT CHANGE UP
GAS PNL BLDV: SIGNIFICANT CHANGE UP
GLUCOSE BLDV-MCNC: 92 MG/DL — SIGNIFICANT CHANGE UP (ref 70–99)
GLUCOSE BLDV-MCNC: 92 MG/DL — SIGNIFICANT CHANGE UP (ref 70–99)
GLUCOSE SERPL-MCNC: 100 MG/DL — HIGH (ref 70–99)
GLUCOSE SERPL-MCNC: 100 MG/DL — HIGH (ref 70–99)
HCO3 BLDV-SCNC: 34 MMOL/L — HIGH (ref 22–29)
HCO3 BLDV-SCNC: 34 MMOL/L — HIGH (ref 22–29)
HCT VFR BLD CALC: 26.9 % — LOW (ref 34.5–45)
HCT VFR BLD CALC: 26.9 % — LOW (ref 34.5–45)
HCT VFR BLDA CALC: 26 % — LOW (ref 34.5–46.5)
HCT VFR BLDA CALC: 26 % — LOW (ref 34.5–46.5)
HGB BLD CALC-MCNC: 8.5 G/DL — LOW (ref 11.7–16.1)
HGB BLD CALC-MCNC: 8.5 G/DL — LOW (ref 11.7–16.1)
HGB BLD-MCNC: 8.2 G/DL — LOW (ref 11.5–15.5)
HGB BLD-MCNC: 8.2 G/DL — LOW (ref 11.5–15.5)
IGE SERPL-ACNC: 32 KU/L — SIGNIFICANT CHANGE UP
IGE SERPL-ACNC: 32 KU/L — SIGNIFICANT CHANGE UP
IMM GRANULOCYTES NFR BLD AUTO: 0.6 % — SIGNIFICANT CHANGE UP (ref 0–0.9)
IMM GRANULOCYTES NFR BLD AUTO: 0.6 % — SIGNIFICANT CHANGE UP (ref 0–0.9)
LACTATE BLDV-MCNC: 1.7 MMOL/L — SIGNIFICANT CHANGE UP (ref 0.5–2)
LACTATE BLDV-MCNC: 1.7 MMOL/L — SIGNIFICANT CHANGE UP (ref 0.5–2)
LYMPHOCYTES # BLD AUTO: 25 % — SIGNIFICANT CHANGE UP (ref 13–44)
LYMPHOCYTES # BLD AUTO: 25 % — SIGNIFICANT CHANGE UP (ref 13–44)
LYMPHOCYTES # BLD AUTO: 3.08 K/UL — SIGNIFICANT CHANGE UP (ref 1–3.3)
LYMPHOCYTES # BLD AUTO: 3.08 K/UL — SIGNIFICANT CHANGE UP (ref 1–3.3)
MAGNESIUM SERPL-MCNC: 1.8 MG/DL — SIGNIFICANT CHANGE UP (ref 1.6–2.6)
MAGNESIUM SERPL-MCNC: 1.8 MG/DL — SIGNIFICANT CHANGE UP (ref 1.6–2.6)
MCHC RBC-ENTMCNC: 25 PG — LOW (ref 27–34)
MCHC RBC-ENTMCNC: 25 PG — LOW (ref 27–34)
MCHC RBC-ENTMCNC: 30.5 GM/DL — LOW (ref 32–36)
MCHC RBC-ENTMCNC: 30.5 GM/DL — LOW (ref 32–36)
MCV RBC AUTO: 82 FL — SIGNIFICANT CHANGE UP (ref 80–100)
MCV RBC AUTO: 82 FL — SIGNIFICANT CHANGE UP (ref 80–100)
MONOCYTES # BLD AUTO: 1.03 K/UL — HIGH (ref 0–0.9)
MONOCYTES # BLD AUTO: 1.03 K/UL — HIGH (ref 0–0.9)
MONOCYTES NFR BLD AUTO: 8.4 % — SIGNIFICANT CHANGE UP (ref 2–14)
MONOCYTES NFR BLD AUTO: 8.4 % — SIGNIFICANT CHANGE UP (ref 2–14)
NEUTROPHILS # BLD AUTO: 7.9 K/UL — HIGH (ref 1.8–7.4)
NEUTROPHILS # BLD AUTO: 7.9 K/UL — HIGH (ref 1.8–7.4)
NEUTROPHILS NFR BLD AUTO: 64.1 % — SIGNIFICANT CHANGE UP (ref 43–77)
NEUTROPHILS NFR BLD AUTO: 64.1 % — SIGNIFICANT CHANGE UP (ref 43–77)
NRBC # BLD: 0 /100 WBCS — SIGNIFICANT CHANGE UP (ref 0–0)
NRBC # BLD: 0 /100 WBCS — SIGNIFICANT CHANGE UP (ref 0–0)
PCO2 BLDV: 57 MMHG — HIGH (ref 39–42)
PCO2 BLDV: 57 MMHG — HIGH (ref 39–42)
PH BLDV: 7.38 — SIGNIFICANT CHANGE UP (ref 7.32–7.43)
PH BLDV: 7.38 — SIGNIFICANT CHANGE UP (ref 7.32–7.43)
PHOSPHATE SERPL-MCNC: 3.2 MG/DL — SIGNIFICANT CHANGE UP (ref 2.5–4.5)
PHOSPHATE SERPL-MCNC: 3.2 MG/DL — SIGNIFICANT CHANGE UP (ref 2.5–4.5)
PLATELET # BLD AUTO: 643 K/UL — HIGH (ref 150–400)
PLATELET # BLD AUTO: 643 K/UL — HIGH (ref 150–400)
PO2 BLDV: 78 MMHG — HIGH (ref 25–45)
PO2 BLDV: 78 MMHG — HIGH (ref 25–45)
POTASSIUM BLDV-SCNC: 3.6 MMOL/L — SIGNIFICANT CHANGE UP (ref 3.5–5.1)
POTASSIUM BLDV-SCNC: 3.6 MMOL/L — SIGNIFICANT CHANGE UP (ref 3.5–5.1)
POTASSIUM SERPL-MCNC: 3.4 MMOL/L — LOW (ref 3.5–5.3)
POTASSIUM SERPL-MCNC: 3.4 MMOL/L — LOW (ref 3.5–5.3)
POTASSIUM SERPL-SCNC: 3.4 MMOL/L — LOW (ref 3.5–5.3)
POTASSIUM SERPL-SCNC: 3.4 MMOL/L — LOW (ref 3.5–5.3)
PROT SERPL-MCNC: 6.8 G/DL — SIGNIFICANT CHANGE UP (ref 6–8.3)
PROT SERPL-MCNC: 6.8 G/DL — SIGNIFICANT CHANGE UP (ref 6–8.3)
RBC # BLD: 3.28 M/UL — LOW (ref 3.8–5.2)
RBC # BLD: 3.28 M/UL — LOW (ref 3.8–5.2)
RBC # FLD: 16 % — HIGH (ref 10.3–14.5)
RBC # FLD: 16 % — HIGH (ref 10.3–14.5)
SAO2 % BLDV: 97.6 % — HIGH (ref 67–88)
SAO2 % BLDV: 97.6 % — HIGH (ref 67–88)
SODIUM SERPL-SCNC: 140 MMOL/L — SIGNIFICANT CHANGE UP (ref 135–145)
SODIUM SERPL-SCNC: 140 MMOL/L — SIGNIFICANT CHANGE UP (ref 135–145)
SPECIMEN SOURCE: SIGNIFICANT CHANGE UP
SPECIMEN SOURCE: SIGNIFICANT CHANGE UP
WBC # BLD: 12.32 K/UL — HIGH (ref 3.8–10.5)
WBC # BLD: 12.32 K/UL — HIGH (ref 3.8–10.5)
WBC # FLD AUTO: 12.32 K/UL — HIGH (ref 3.8–10.5)
WBC # FLD AUTO: 12.32 K/UL — HIGH (ref 3.8–10.5)

## 2023-12-17 PROCEDURE — 99233 SBSQ HOSP IP/OBS HIGH 50: CPT

## 2023-12-17 RX ORDER — PANTOPRAZOLE SODIUM 20 MG/1
40 TABLET, DELAYED RELEASE ORAL
Refills: 0 | Status: DISCONTINUED | OUTPATIENT
Start: 2023-12-17 | End: 2023-12-27

## 2023-12-17 RX ORDER — MAGNESIUM SULFATE 500 MG/ML
2 VIAL (ML) INJECTION ONCE
Refills: 0 | Status: COMPLETED | OUTPATIENT
Start: 2023-12-17 | End: 2023-12-17

## 2023-12-17 RX ORDER — FERROUS SULFATE 325(65) MG
325 TABLET ORAL
Refills: 0 | Status: DISCONTINUED | OUTPATIENT
Start: 2023-12-17 | End: 2023-12-27

## 2023-12-17 RX ORDER — POLYETHYLENE GLYCOL 3350 17 G/17G
17 POWDER, FOR SOLUTION ORAL DAILY
Refills: 0 | Status: DISCONTINUED | OUTPATIENT
Start: 2023-12-17 | End: 2023-12-27

## 2023-12-17 RX ORDER — METOPROLOL TARTRATE 50 MG
12.5 TABLET ORAL
Refills: 0 | Status: DISCONTINUED | OUTPATIENT
Start: 2023-12-17 | End: 2023-12-19

## 2023-12-17 RX ORDER — POTASSIUM CHLORIDE 20 MEQ
40 PACKET (EA) ORAL ONCE
Refills: 0 | Status: COMPLETED | OUTPATIENT
Start: 2023-12-17 | End: 2023-12-17

## 2023-12-17 RX ADMIN — APIXABAN 5 MILLIGRAM(S): 2.5 TABLET, FILM COATED ORAL at 06:37

## 2023-12-17 RX ADMIN — Medication 12.5 MILLIGRAM(S): at 17:12

## 2023-12-17 RX ADMIN — SODIUM CHLORIDE 4 MILLILITER(S): 9 INJECTION INTRAMUSCULAR; INTRAVENOUS; SUBCUTANEOUS at 06:36

## 2023-12-17 RX ADMIN — Medication 600 MILLIGRAM(S): at 17:12

## 2023-12-17 RX ADMIN — Medication 3 MILLILITER(S): at 23:46

## 2023-12-17 RX ADMIN — SODIUM CHLORIDE 4 MILLILITER(S): 9 INJECTION INTRAMUSCULAR; INTRAVENOUS; SUBCUTANEOUS at 17:13

## 2023-12-17 RX ADMIN — APIXABAN 5 MILLIGRAM(S): 2.5 TABLET, FILM COATED ORAL at 17:12

## 2023-12-17 RX ADMIN — Medication 40 MILLIGRAM(S): at 06:38

## 2023-12-17 RX ADMIN — BUPRENORPHINE AND NALOXONE 2 TABLET(S): 2; .5 TABLET SUBLINGUAL at 21:09

## 2023-12-17 RX ADMIN — Medication 3 MILLIGRAM(S): at 23:46

## 2023-12-17 RX ADMIN — Medication 75 MILLIGRAM(S): at 12:01

## 2023-12-17 RX ADMIN — Medication 3 MILLILITER(S): at 17:12

## 2023-12-17 RX ADMIN — POLYETHYLENE GLYCOL 3350 17 GRAM(S): 17 POWDER, FOR SOLUTION ORAL at 12:01

## 2023-12-17 RX ADMIN — AMLODIPINE BESYLATE 5 MILLIGRAM(S): 2.5 TABLET ORAL at 06:38

## 2023-12-17 RX ADMIN — Medication 1 SPRAY(S): at 17:12

## 2023-12-17 RX ADMIN — Medication 3 MILLILITER(S): at 06:37

## 2023-12-17 RX ADMIN — Medication 3 MILLILITER(S): at 12:01

## 2023-12-17 RX ADMIN — Medication 1 SPRAY(S): at 06:38

## 2023-12-17 RX ADMIN — Medication 600 MILLIGRAM(S): at 06:38

## 2023-12-17 RX ADMIN — Medication 40 MILLIEQUIVALENT(S): at 09:37

## 2023-12-17 RX ADMIN — BUPRENORPHINE AND NALOXONE 2 TABLET(S): 2; .5 TABLET SUBLINGUAL at 10:15

## 2023-12-17 RX ADMIN — Medication 25 GRAM(S): at 09:37

## 2023-12-17 RX ADMIN — Medication 1 TABLET(S): at 12:01

## 2023-12-17 RX ADMIN — SODIUM CHLORIDE 4 MILLILITER(S): 9 INJECTION INTRAMUSCULAR; INTRAVENOUS; SUBCUTANEOUS at 23:46

## 2023-12-17 RX ADMIN — SODIUM CHLORIDE 4 MILLILITER(S): 9 INJECTION INTRAMUSCULAR; INTRAVENOUS; SUBCUTANEOUS at 12:01

## 2023-12-17 NOTE — SWALLOW BEDSIDE ASSESSMENT ADULT - ORAL PHASE
pt self modifies food by breaking into small pieces and using liquid wash/Decreased anterior-posterior movement of the bolus/Delayed oral transit time

## 2023-12-17 NOTE — SWALLOW BEDSIDE ASSESSMENT ADULT - ASR SWALLOW DENTITION
dentures ill fitting; screw on mandible broken unable to place bottom dentures - does not wear during meals/upper dentures/lower dentures

## 2023-12-17 NOTE — DISCHARGE NOTE PROVIDER - NSFOLLOWUPCLINICS_GEN_ALL_ED_FT
[facility]  Hematology/Oncology  [address] [place] [address]  Phone: [phone]  Fax:     [facility] Pulmonolgy and Sleep Medicine  Pulmonology  [address] [facility]  [place] [address]  Phone: [phone]  Fax:      [facility]  Hematology/Oncology  [address] [place] [address]  Phone: [phone]  Fax:     [facility] Pulmonolgy and Sleep Medicine  Pulmonology  [address], [facility] [address] [facility] [address]  [place] [address]  Phone: [phone]  Fax:

## 2023-12-17 NOTE — DISCHARGE NOTE PROVIDER - PROVIDER TOKENS
PROVIDER:[TOKEN:[[id number]],FOLLOWUP:[2 weeks],ESTABLISHEDPATIENT:[T]] PROVIDER:[TOKEN:[[id number]],FOLLOWUP:[2 weeks],ESTABLISHEDPATIENT:[T]] PROVIDER:[TOKEN:[[id number]],FOLLOWUP:[2 weeks],ESTABLISHEDPATIENT:[T]],PROVIDER:[TOKEN:[[id number]]] PROVIDER:[TOKEN:[[id number]],FOLLOWUP:[2 weeks],ESTABLISHEDPATIENT:[T]],PROVIDER:[TOKEN:[[id number]]]

## 2023-12-17 NOTE — DISCHARGE NOTE PROVIDER - NSDCFUADDAPPT_GEN_ALL_CORE_FT
APPTS ARE READY TO BE MADE: [x ] YES    Best Family or Patient Contact (if needed):    Additional Information about above appointments (if needed):    1: Pulm with [facility] within 2-4 weeks   2: Hematology within 4 weeks  3:     Other comments or requests:    APPTS ARE READY TO BE MADE: [x ] YES    Best Family or Patient Contact (if needed):    Additional Information about above appointments (if needed):    1: Pulm with [facility] within 2-4 weeks   2: Hematology within 4 weeks  3:     Other comments or requests:   Patient was previously scheduled for an appointment on 1/2 at 3:30pm at [address] [place] [address] with Dr. Arguello.    APPTS ARE READY TO BE MADE: [x ] YES    Best Family or Patient Contact (if needed):    Additional Information about above appointments (if needed):    1: Pulm with [facility] within 2-4 weeks   2: Hematology within 4 weeks  3:     Other comments or requests:   Patient was previously scheduled for an appointment on 1/2 at 3:30pm at [address] [place] [address] with Dr. Arguello.    APPTS ARE READY TO BE MADE: [x ] YES    Best Family or Patient Contact (if needed):    Additional Information about above appointments (if needed):    1: Pulm with [facility] within 2-4 weeks   2: Hematology within 4 weeks  3:     Other comments or requests:   Patient was previously scheduled to see Dr. Lisker at 1:30PM on 12/28 through [facility], yet patient was a no show for the visit.     Patient was previously scheduled for an appointment on 1/2 at 3:30pm at [address] [place] [address] with Dr. Arguello.     Outreached on 12/28, 12/29, and 1/2 which have been unsuccessful. Will await call back from patient/caregiver to coordinate follow up care. APPTS ARE READY TO BE MADE: [x ] YES    Best Family or Patient Contact (if needed):    Additional Information about above appointments (if needed):    1: Pulm with [facility] within 2-4 weeks   2: Hematology within 4 weeks  3:     Other comments or requests:   Patient was previously scheduled to see Dr. Lisker at 1:30PM on 12/28 through [facility], yet patient was a no show for the visit.     Patient was previously scheduled for an appointment on 1/2 at 3:30pm at [address] [place] [address] with Dr. Arguello.     Outreached on 12/28, 12/29, and 1/2 which have been unsuccessful. Will await call back from patient/caregiver to coordinate follow up care.

## 2023-12-17 NOTE — DISCHARGE NOTE PROVIDER - NSDCCPCAREPLAN_GEN_ALL_CORE_FT
PRINCIPAL DISCHARGE DIAGNOSIS  Diagnosis: Bronchiectasis  Assessment and Plan of Treatment: You came to the hospital with trouble breathing. We did a CT scan of your lungs and it shows that you have severe pulmonary disease. We believe that disease is a condition called Bronchiectasis. Also with this, you also had pneumonia. For this we gave a combination of Antibiotics and steroids. You finished your course of Antibiotics in the hospital, pleas continue to finish your course of steroids as prescribed after discharge. We also tested you for a certain type of bacteria called Mycobacterium Avium. Initial testing was negative and please follow up final results with Pulmonology. Please follow up with your Pulmonologist after discharge for further evaluation. You will also need a repeat CT scan of your lungs in 6 weeks after discharge.   Please return to the hospital if you experience fever, chills, severe headache, dizziness, lightheadedness, loss of consciousness, visual disturbance, chest pain, palpitations, shortness of breath,  abdominal pain, nausea, vomiting, diarrhea, blood in your vomit/stool/urine, burning with urination or change in urinary pattern, numbness, weakness, tingling, or other alarming symptoms.        SECONDARY DISCHARGE DIAGNOSES  Diagnosis: Sinus tachycardia  Assessment and Plan of Treatment: While you were in the hospital, we noticed that your heart rate was very fast. We believe that this fast heart rate was caused by your severe lung disease. We did a ultrasound of your heart which did not detect any issues. We also consulted Cardiology who evaluated you. While in the hospital we also started you on a medication called Metoprolol to hlep control the rate. Please continue to take this medication after discharge.    Diagnosis: H/O thrombocytosis  Assessment and Plan of Treatment: We noticed that from prior blood work, and blood work during this hospitalization, you have always had an elevated platelet count. We did some initial testing regarding this platelet count. Please follow up with Hematology regarding the results of this testing.

## 2023-12-17 NOTE — DISCHARGE NOTE PROVIDER - NSDCCAREPROVSEEN_GEN_ALL_CORE_FT
[facility] T2 [facility] T2 [facility] T2 [facility] T2

## 2023-12-17 NOTE — DISCHARGE NOTE PROVIDER - CARE PROVIDERS DIRECT ADDRESSES
,[email or web address],[email or web address] ,[email or web address],DirectAddress_Unknown

## 2023-12-17 NOTE — PROGRESS NOTE ADULT - SUBJECTIVE AND OBJECTIVE BOX
PROGRESS NOTE:   Authored by Dr. Benny Avelar  Patient is a 68y old  Female who presents with a chief complaint of Pt is a 69 yo F with PMH: Protein S deficiency c/b LUE DVT/PE (2019) on Eliquis, COPD, HTN, anxiety/depression, remote back surgery. Presented with 2 months of progressive worsening ALTMAN, fatigue, unintentional weight loss. Likely PNA vs. MAC vs. less likely COPD exacerbation. Continues on antibiotics as ordered.      (16 Dec 2023 10:20)      SUBJECTIVE / OVERNIGHT EVENTS:    MEDICATIONS  (STANDING):  albuterol/ipratropium for Nebulization 3 milliLiter(s) Nebulizer every 6 hours  amLODIPine   Tablet 5 milliGRAM(s) Oral daily  apixaban 5 milliGRAM(s) Oral every 12 hours  buprenorphine 2 mG/naloxone 0.5 mG SL  Tablet 2 Tablet(s) SubLingual <User Schedule>  fluticasone propionate 50 MICROgram(s)/spray Nasal Spray 1 Spray(s) Both Nostrils two times a day  guaiFENesin  milliGRAM(s) Oral every 12 hours  influenza  Vaccine (HIGH DOSE) 0.7 milliLiter(s) IntraMuscular once  lactated ringers. 1000 milliLiter(s) (50 mL/Hr) IV Continuous <Continuous>  levoFLOXacin IVPB 750 milliGRAM(s) IV Intermittent every 24 hours  multivitamin 1 Tablet(s) Oral daily  predniSONE   Tablet 40 milliGRAM(s) Oral daily  sodium chloride 3%  Inhalation 4 milliLiter(s) Inhalation every 6 hours  venlafaxine XR. 75 milliGRAM(s) Oral daily    MEDICATIONS  (PRN):  acetaminophen     Tablet .. 650 milliGRAM(s) Oral every 6 hours PRN Temp greater or equal to 38C (100.4F), Mild Pain (1 - 3), Moderate Pain (4 - 6), Severe Pain (7 - 10)  melatonin 3 milliGRAM(s) Oral at bedtime PRN Insomnia  pantoprazole    Tablet 40 milliGRAM(s) Oral before breakfast PRN GERD      CAPILLARY BLOOD GLUCOSE        I&O's Summary    16 Dec 2023 07:01  -  17 Dec 2023 07:00  --------------------------------------------------------  IN: 250 mL / OUT: 900 mL / NET: -650 mL        PHYSICAL EXAM:  Vital Signs Last 24 Hrs  T(C): 36.6 (17 Dec 2023 06:36), Max: 37.3 (16 Dec 2023 21:07)  T(F): 97.9 (17 Dec 2023 06:36), Max: 99.2 (16 Dec 2023 21:07)  HR: 102 (17 Dec 2023 06:36) (102 - 117)  BP: 116/67 (17 Dec 2023 06:36) (115/61 - 130/68)  BP(mean): --  RR: 18 (17 Dec 2023 06:36) (18 - 18)  SpO2: 99% (17 Dec 2023 06:36) (96% - 99%)    Parameters below as of 17 Dec 2023 06:36  Patient On (Oxygen Delivery Method): nasal cannula  O2 Flow (L/min): 2      GENERAL: NAD, lying comfortably in bed  HEAD: Atraumatic, normocephalic  EYES: EOMI b/l PERRLA b/l, conjunctiva and sclera clear  NECK: Supple, No JVD, No LAD  RESPIRATORY: Normal respiratory effort; lungs are clear to auscultation bilaterally  CARDIOVASCULAR: Regular rate and rhythm, normal S1 and S2, no murmur/rub/gallop; No lower extremity edema  ABDOMEN: Nontender, normoactive bowel sounds, no rebound/guarding; No hepatosplenomegaly  MUSCULOSKELETAL: no clubbing or cyanosis of digits; no joint swelling or tenderness to palpation  NEURO: Non focal   PSYCH: A+O to person, place, and time; affect appropriate     PROGRESS NOTE:   Authored by Dr. Benny Avelar  Patient is a 68y old  Female who presents with a chief complaint of Pt is a 67 yo F with PMH: Protein S deficiency c/b LUE DVT/PE (2019) on Eliquis, COPD, HTN, anxiety/depression, remote back surgery. Presented with 2 months of progressive worsening ALTMAN, fatigue, unintentional weight loss. Likely PNA vs. MAC vs. less likely COPD exacerbation. Continues on antibiotics as ordered.      (16 Dec 2023 10:20)      SUBJECTIVE / OVERNIGHT EVENTS:    MEDICATIONS  (STANDING):  albuterol/ipratropium for Nebulization 3 milliLiter(s) Nebulizer every 6 hours  amLODIPine   Tablet 5 milliGRAM(s) Oral daily  apixaban 5 milliGRAM(s) Oral every 12 hours  buprenorphine 2 mG/naloxone 0.5 mG SL  Tablet 2 Tablet(s) SubLingual <User Schedule>  fluticasone propionate 50 MICROgram(s)/spray Nasal Spray 1 Spray(s) Both Nostrils two times a day  guaiFENesin  milliGRAM(s) Oral every 12 hours  influenza  Vaccine (HIGH DOSE) 0.7 milliLiter(s) IntraMuscular once  lactated ringers. 1000 milliLiter(s) (50 mL/Hr) IV Continuous <Continuous>  levoFLOXacin IVPB 750 milliGRAM(s) IV Intermittent every 24 hours  multivitamin 1 Tablet(s) Oral daily  predniSONE   Tablet 40 milliGRAM(s) Oral daily  sodium chloride 3%  Inhalation 4 milliLiter(s) Inhalation every 6 hours  venlafaxine XR. 75 milliGRAM(s) Oral daily    MEDICATIONS  (PRN):  acetaminophen     Tablet .. 650 milliGRAM(s) Oral every 6 hours PRN Temp greater or equal to 38C (100.4F), Mild Pain (1 - 3), Moderate Pain (4 - 6), Severe Pain (7 - 10)  melatonin 3 milliGRAM(s) Oral at bedtime PRN Insomnia  pantoprazole    Tablet 40 milliGRAM(s) Oral before breakfast PRN GERD      CAPILLARY BLOOD GLUCOSE        I&O's Summary    16 Dec 2023 07:01  -  17 Dec 2023 07:00  --------------------------------------------------------  IN: 250 mL / OUT: 900 mL / NET: -650 mL        PHYSICAL EXAM:  Vital Signs Last 24 Hrs  T(C): 36.6 (17 Dec 2023 06:36), Max: 37.3 (16 Dec 2023 21:07)  T(F): 97.9 (17 Dec 2023 06:36), Max: 99.2 (16 Dec 2023 21:07)  HR: 102 (17 Dec 2023 06:36) (102 - 117)  BP: 116/67 (17 Dec 2023 06:36) (115/61 - 130/68)  BP(mean): --  RR: 18 (17 Dec 2023 06:36) (18 - 18)  SpO2: 99% (17 Dec 2023 06:36) (96% - 99%)    Parameters below as of 17 Dec 2023 06:36  Patient On (Oxygen Delivery Method): nasal cannula  O2 Flow (L/min): 2      GENERAL: NAD, lying comfortably in bed  HEAD: Atraumatic, normocephalic  EYES: EOMI b/l PERRLA b/l, conjunctiva and sclera clear  NECK: Supple, No JVD, No LAD  RESPIRATORY: Normal respiratory effort; lungs are clear to auscultation bilaterally  CARDIOVASCULAR: Regular rate and rhythm, normal S1 and S2, no murmur/rub/gallop; No lower extremity edema  ABDOMEN: Nontender, normoactive bowel sounds, no rebound/guarding; No hepatosplenomegaly  MUSCULOSKELETAL: no clubbing or cyanosis of digits; no joint swelling or tenderness to palpation  NEURO: Non focal   PSYCH: A+O to person, place, and time; affect appropriate     PROGRESS NOTE:   Authored by Dr. Benny Avelar  Patient is a 68y old  Female who presents with a chief complaint of Pt is a 69 yo F with PMH: Protein S deficiency c/b LUE DVT/PE (2019) on Eliquis, COPD, HTN, anxiety/depression, remote back surgery. Presented with 2 months of progressive worsening ALTMAN, fatigue, unintentional weight loss. Likely PNA vs. MAC vs. less likely COPD exacerbation. Continues on antibiotics as ordered.      (16 Dec 2023 10:20)      SUBJECTIVE / OVERNIGHT EVENTS:  denied any sob, found to have electric vape in the room, also denied any f/c.     MEDICATIONS  (STANDING):  albuterol/ipratropium for Nebulization 3 milliLiter(s) Nebulizer every 6 hours  amLODIPine   Tablet 5 milliGRAM(s) Oral daily  apixaban 5 milliGRAM(s) Oral every 12 hours  buprenorphine 2 mG/naloxone 0.5 mG SL  Tablet 2 Tablet(s) SubLingual <User Schedule>  fluticasone propionate 50 MICROgram(s)/spray Nasal Spray 1 Spray(s) Both Nostrils two times a day  guaiFENesin  milliGRAM(s) Oral every 12 hours  influenza  Vaccine (HIGH DOSE) 0.7 milliLiter(s) IntraMuscular once  lactated ringers. 1000 milliLiter(s) (50 mL/Hr) IV Continuous <Continuous>  levoFLOXacin IVPB 750 milliGRAM(s) IV Intermittent every 24 hours  multivitamin 1 Tablet(s) Oral daily  predniSONE   Tablet 40 milliGRAM(s) Oral daily  sodium chloride 3%  Inhalation 4 milliLiter(s) Inhalation every 6 hours  venlafaxine XR. 75 milliGRAM(s) Oral daily    MEDICATIONS  (PRN):  acetaminophen     Tablet .. 650 milliGRAM(s) Oral every 6 hours PRN Temp greater or equal to 38C (100.4F), Mild Pain (1 - 3), Moderate Pain (4 - 6), Severe Pain (7 - 10)  melatonin 3 milliGRAM(s) Oral at bedtime PRN Insomnia  pantoprazole    Tablet 40 milliGRAM(s) Oral before breakfast PRN GERD      CAPILLARY BLOOD GLUCOSE        I&O's Summary    16 Dec 2023 07:01  -  17 Dec 2023 07:00  --------------------------------------------------------  IN: 250 mL / OUT: 900 mL / NET: -650 mL        PHYSICAL EXAM:  Vital Signs Last 24 Hrs  T(C): 36.6 (17 Dec 2023 06:36), Max: 37.3 (16 Dec 2023 21:07)  T(F): 97.9 (17 Dec 2023 06:36), Max: 99.2 (16 Dec 2023 21:07)  HR: 102 (17 Dec 2023 06:36) (102 - 117)  BP: 116/67 (17 Dec 2023 06:36) (115/61 - 130/68)  BP(mean): --  RR: 18 (17 Dec 2023 06:36) (18 - 18)  SpO2: 99% (17 Dec 2023 06:36) (96% - 99%)    Parameters below as of 17 Dec 2023 06:36  Patient On (Oxygen Delivery Method): nasal cannula  O2 Flow (L/min): 2      GENERAL: NAD, lying comfortably in bed  HEAD: Atraumatic, normocephalic  EYES: EOMI b/l PERRLA b/l, conjunctiva and sclera clear  NECK: Supple, No JVD, No LAD  RESPIRATORY: Normal respiratory effort; lungs rhonchi heard bilaterally.   CARDIOVASCULAR: Regular rate and rhythm, normal S1 and S2, no murmur/rub/gallop; No lower extremity edema  ABDOMEN: Nontender, normoactive bowel sounds, no rebound/guarding; No hepatosplenomegaly  MUSCULOSKELETAL: no clubbing or cyanosis of digits; no joint swelling or tenderness to palpation  NEURO: Non focal   PSYCH: A+O to person, place, and time; affect appropriate        LABS:                        8.2    12.32 )-----------( 643      ( 17 Dec 2023 07:17 )             26.9     12-17    140  |  100  |  17  ----------------------------<  100<H>  3.4<L>   |  32<H>  |  0.60    Ca    9.4      17 Dec 2023 07:17  Phos  3.2     12-17  Mg     1.8     12-17    TPro  6.8  /  Alb  3.7  /  TBili  <0.1<L>  /  DBili  x   /  AST  17  /  ALT  17  /  AlkPhos  90  12-17          Urinalysis Basic - ( 17 Dec 2023 07:17 )    Color: x / Appearance: x / SG: x / pH: x  Gluc: 100 mg/dL / Ketone: x  / Bili: x / Urobili: x   Blood: x / Protein: x / Nitrite: x   Leuk Esterase: x / RBC: x / WBC x   Sq Epi: x / Non Sq Epi: x / Bacteria: x        Culture - Acid Fast - Sputum w/Smear (collected 15 Dec 2023 15:39)  Source: .Sputum Sputum    Culture - Acid Fast - Sputum w/Smear (collected 15 Dec 2023 15:29)  Source: .Sputum Sputum    Culture - Sputum (collected 15 Dec 2023 13:28)  Source: .Sputum Sputum  Gram Stain (15 Dec 2023 20:39):    No polymorphonuclear leukocytes per low power field    No Squamous epithelial cells per low power field    Rare Gram Negative Rods per oil power field  Final Report (17 Dec 2023 06:41):    Normal Respiratory Blanca present    Culture - Blood (collected 14 Dec 2023 13:10)  Source: .Blood Blood-Peripheral  Preliminary Report (16 Dec 2023 18:02):    No growth at 48 Hours    Culture - Blood (collected 14 Dec 2023 13:00)  Source: .Blood Blood-Peripheral  Preliminary Report (16 Dec 2023 18:02):    No growth at 48 Hours

## 2023-12-17 NOTE — SWALLOW BEDSIDE ASSESSMENT ADULT - NS SPL SWALLOW CLINIC TRIAL FT
Patient would benefit from objective testing to guide recommendations and provide pt with more information on swallow profile. Of note, pt remains active smoker with note of nursing removing multiple vaps during this admission.

## 2023-12-17 NOTE — SWALLOW BEDSIDE ASSESSMENT ADULT - COMMENTS
Continued history:   > patchy areas of ground glass on her imaging, increased sputum production and wheeze; this in conjunction with her lab abnormalities raises concern for bronchiectasis exacerbation.   >Sepsis due to pneumonia.   - c/o productive cough that appears to be baseline  - c/o nasal congestion    >Pulm consult regarding roles of bronch to r/o MAC; found to have abnormal imaging for which pulmonary is consulted.  The patient tells me she is a long time former smoker (1.5 ppd x 40 years). She quit in 2013.     -CT Chest 12/14/23: Mild bronchiectasis, extensive TIB nodularity, patchy opacities RUL, LLL    -CT Chest 3/2016: Emphysematous changes, most prominent in RLL, bronchiectasis, areas of tree in bud nodularity, patchy ggo    RD note: -Admits to wearing dentures at baseline, but takes them out during meal times to be able to "taste the food better". Denies intolerance to chewing/swallowing. '    Speech & Swallow ; Pt with no reports in SCM  Order received to r/o aspiration PNA, per MD.

## 2023-12-17 NOTE — PROGRESS NOTE ADULT - ATTENDING COMMENTS
68F unexplained weight loss with chronic cough. Her respiratory status decline is more subacute in nature, doubt COPD exacerbation given she is at her baseline O2 requirements, not wheezing, and has compensated hypercapnia. CT suggestive of MAC, rather than CAP.   - pulm consult appreciated: abx switched to levaquin to cover pseudomonas, treat for bronchiectasis exacerbation w/ prednisone, pulm hygiene  - obtain AFB sputum x 3  - anemia workup - c/w HOLLY - start iron qod. needs gi follow-up for crc screening if not done recently  - persistent tachycardia, unexplained. not in distress, pain, or PE. possibly med side effect from standing duoneb. start low dose lopressor    Rest of plan as above. D/w HS.    The necessity of the time spent during the encounter on this date of service was due to:   - Ordering, reviewing, and interpreting labs, testing, and imaging  - Independently obtaining a review of systems and performing a physical exam  - Reviewing prior hospitalization and where necessary, outpatient records  - Reviewing consultant recommendations/communicating with consultants  - Counselling and educating patient and family regarding interpretation of aforementioned items and plan of care    Time-based billing (NON-critical care). Total minutes spent: 52

## 2023-12-17 NOTE — DISCHARGE NOTE PROVIDER - HOSPITAL COURSE
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    Pt is accompanied by  (Javier) at bedside. Pt reports experiencing worsening ALTMAN now even with minimal distance ambulation (e.g., from living room to bathroom) requiring supplemental oxygen, which is not her baseline. The symptom worsened gradually over the last 2 months. Also reports intermittent "green mucus" production with coughs during the same time period. No known sick contacts. Pt primarily stays home. Ambulates without assistive devices. Endorses occasional lightheadedness that spontaneously resolves. Denies fevers, chills, headaches, chest pain, nausea, emesis. Pt also reports reduced PO intake in recent months leading to ~20 lb weight loss in the last month, endorses low appetite eating 1 meal/day. Of note, pt reports having had a back surgery in 1980s for herniated disc and took opioid meds for a long time (oxy 15 mg q6h), currently on Suboxone SL.    In the ED, vital signs stable T 36.7C,  sinus tachy, /74, on 3L NC O2 SpO2 >95%. PoCUS showed hypovolemia, s/p IVF boluses (NS 1L x1, LR 1L x1). Labs notable for leukocytosis 18.81, thrombocytosis 771k, elevated Alk Phos 138 and procal 0.24. Lactate 1.0. Negative RVP. Unremarkable serum chemistries, troponins, and pro-BNP. VBG unremarkable with normal pH, signs of hypercarbia. CTA chest and CT A/P on 12/14 notable for extensive airway thickening and tree-in-bud nodularity c/w chronic endobronchial dz, negative for PE or malignancy. Blood cultures sent, pending results. (14 Dec 2023 20:58)    Hospital Course:  Patient admitted with presumed diagnosis of PNA and was initially continued on CTX and Azithromycin for empiric CAP coverage. Pulmonary was consulted deemed patient likely in bronchiectasis exacerbation and patient was transitioned to Levaquin for additional PsA coverage. Patient was also started on PO systemic steroid x 5 days for the exacerbation. Given CTC findings and unintentional weight loss, multiple AF sputum culture was obtained to r/o MAC infection.    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    Pt is accompanied by  (Javier) at bedside. Pt reports experiencing worsening ALTMAN now even with minimal distance ambulation (e.g., from living room to bathroom) requiring supplemental oxygen, which is not her baseline. The symptom worsened gradually over the last 2 months. Also reports intermittent "green mucus" production with coughs during the same time period. No known sick contacts. Pt primarily stays home. Ambulates without assistive devices. Endorses occasional lightheadedness that spontaneously resolves. Denies fevers, chills, headaches, chest pain, nausea, emesis. Pt also reports reduced PO intake in recent months leading to ~20 lb weight loss in the last month, endorses low appetite eating 1 meal/day. Of note, pt reports having had a back surgery in 1980s for herniated disc and took opioid meds for a long time (oxy 15 mg q6h), currently on Suboxone SL.    In the ED, vital signs stable T 36.7C,  sinus tachy, /74, on 3L NC O2 SpO2 >95%. PoCUS showed hypovolemia, s/p IVF boluses (NS 1L x1, LR 1L x1). Labs notable for leukocytosis 18.81, thrombocytosis 771k, elevated Alk Phos 138 and procal 0.24. Lactate 1.0. Negative RVP. Unremarkable serum chemistries, troponins, and pro-BNP. VBG unremarkable with normal pH, signs of hypercarbia. CTA chest and CT A/P on 12/14 notable for extensive airway thickening and tree-in-bud nodularity c/w chronic endobronchial dz, negative for PE or malignancy. Blood cultures sent, pending results. (14 Dec 2023 20:58)    Hospital Course:  Patient admitted with presumed diagnosis of PNA and was initially continued on CTX and Azithromycin for empiric CAP coverage. Pulmonary was consulted deemed patient likely in bronchiectasis exacerbation and patient was transitioned to Levaquin for additional PsA coverage. Patient was also started on PO systemic steroid x 5 days for the exacerbation. Given CTC findings and unintentional weight loss, multiple AF sputum culture was obtained to r/o MAC infection.    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    Pt is accompanied by  (Javier) at bedside. Pt reports experiencing worsening ALTMAN now even with minimal distance ambulation (e.g., from living room to bathroom) requiring supplemental oxygen, which is not her baseline. The symptom worsened gradually over the last 2 months. Also reports intermittent "green mucus" production with coughs during the same time period. No known sick contacts. Pt primarily stays home. Ambulates without assistive devices. Endorses occasional lightheadedness that spontaneously resolves. Denies fevers, chills, headaches, chest pain, nausea, emesis. Pt also reports reduced PO intake in recent months leading to ~20 lb weight loss in the last month, endorses low appetite eating 1 meal/day. Of note, pt reports having had a back surgery in 1980s for herniated disc and took opioid meds for a long time (oxy 15 mg q6h), currently on Suboxone SL.    In the ED, vital signs stable T 36.7C,  sinus tachy, /74, on 3L NC O2 SpO2 >95%. PoCUS showed hypovolemia, s/p IVF boluses (NS 1L x1, LR 1L x1). Labs notable for leukocytosis 18.81, thrombocytosis 771k, elevated Alk Phos 138 and procal 0.24. Lactate 1.0. Negative RVP. Unremarkable serum chemistries, troponins, and pro-BNP. VBG unremarkable with normal pH, signs of hypercarbia. CTA chest and CT A/P on 12/14 notable for extensive airway thickening and tree-in-bud nodularity c/w chronic endobronchial dz, negative for PE or malignancy. Blood cultures sent, pending results. (14 Dec 2023 20:58)    Hospital Course:  Patient admitted with presumed diagnosis of PNA and was initially continued on CTX and Azithromycin for empiric CAP coverage. Pulmonary was consulted deemed patient likely in bronchiectasis exacerbation and patient was transitioned to Levaquin for additional PsA coverage. Patient was also started on PO systemic steroid and she will be discharged on a taper. Given CTC findings and unintentional weight loss, multiple AF sputum culture was obtained to r/o MAC infection. Smears were negative in the hospital for AFB. Patient will need to undergo a repeat CT Chest outpatient. Patient also had sinus tachycardia inpatient that was believed to be secondary to her pulmonary disease. TTE was unremarkable and Cardiology was consulted for further evaluation. Due to Thrombocytosis we ordered some genetic testing for which the patient will f/u outpatient.     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    Pt is accompanied by  (Javier) at bedside. Pt reports experiencing worsening ALTMAN now even with minimal distance ambulation (e.g., from living room to bathroom) requiring supplemental oxygen, which is not her baseline. The symptom worsened gradually over the last 2 months. Also reports intermittent "green mucus" production with coughs during the same time period. No known sick contacts. Pt primarily stays home. Ambulates without assistive devices. Endorses occasional lightheadedness that spontaneously resolves. Denies fevers, chills, headaches, chest pain, nausea, emesis. Pt also reports reduced PO intake in recent months leading to ~20 lb weight loss in the last month, endorses low appetite eating 1 meal/day. Of note, pt reports having had a back surgery in 1980s for herniated disc and took opioid meds for a long time (oxy 15 mg q6h), currently on Suboxone SL.    In the ED, vital signs stable T 36.7C,  sinus tachy, /74, on 3L NC O2 SpO2 >95%. PoCUS showed hypovolemia, s/p IVF boluses (NS 1L x1, LR 1L x1). Labs notable for leukocytosis 18.81, thrombocytosis 771k, elevated Alk Phos 138 and procal 0.24. Lactate 1.0. Negative RVP. Unremarkable serum chemistries, troponins, and pro-BNP. VBG unremarkable with normal pH, signs of hypercarbia. CTA chest and CT A/P on 12/14 notable for extensive airway thickening and tree-in-bud nodularity c/w chronic endobronchial dz, negative for PE or malignancy. Blood cultures sent, pending results. (14 Dec 2023 20:58)    Hospital Course:  Patient admitted with presumed diagnosis of PNA and was initially continued on CTX and Azithromycin for empiric CAP coverage. Pulmonary was consulted deemed patient likely in bronchiectasis exacerbation and patient was transitioned to Levaquin for additional PsA coverage. Patient was also started on PO systemic steroid and she will be discharged on a taper. Given CTC findings and unintentional weight loss, multiple AF sputum culture was obtained to r/o MAC infection. Smears were negative in the hospital for AFB. Patient will need to undergo a repeat CT Chest outpatient. Patient also had sinus tachycardia inpatient that was believed to be secondary to her pulmonary disease. TTE was unremarkable and Cardiology was consulted for further evaluation. Due to Thrombocytosis we ordered some genetic testing for which the patient will f/u outpatient.     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 ED on 12/14/2023 for a chronic progressively worsening dyspnea with minimal exertion associated with productive coughs over the last 2 months i/s/o outpatient labs with leukocytosis and thrombocytosis.    Pt is accompanied by  (Javier) at bedside. Pt reports experiencing worsening ALTMAN now even with minimal distance ambulation (e.g., from living room to bathroom) requiring supplemental oxygen, which is not her baseline. The symptom worsened gradually over the last 2 months. Also reports intermittent "green mucus" production with coughs during the same time period. No known sick contacts. Pt primarily stays home. Ambulates without assistive devices. Endorses occasional lightheadedness that spontaneously resolves. Denies fevers, chills, headaches, chest pain, nausea, emesis. Pt also reports reduced PO intake in recent months leading to ~20 lb weight loss in the last month, endorses low appetite eating 1 meal/day. Of note, pt reports having had a back surgery in 1980s for herniated disc and took opioid meds for a long time (oxy 15 mg q6h), currently on Suboxone SL.    In the ED, vital signs stable T 36.7C,  sinus tachy, /74, on 3L NC O2 SpO2 >95%. PoCUS showed hypovolemia, s/p IVF boluses (NS 1L x1, LR 1L x1). Labs notable for leukocytosis 18.81, thrombocytosis 771k, elevated Alk Phos 138 and procal 0.24. Lactate 1.0. Negative RVP. Unremarkable serum chemistries, troponins, and pro-BNP. VBG unremarkable with normal pH, signs of hypercarbia. CTA chest and CT A/P on 12/14 notable for extensive airway thickening and tree-in-bud nodularity c/w chronic endobronchial dz, negative for PE or malignancy. Blood cultures sent, pending results. (14 Dec 2023 20:58)    Hospital Course:  Patient admitted with presumed diagnosis of PNA and was initially continued on CTX and Azithromycin for empiric CAP coverage. Pulmonary was consulted deemed patient likely in bronchiectasis exacerbation and patient was transitioned to Levaquin for additional PsA coverage. Patient was also started on PO systemic steroid. Given CTC findings and unintentional weight loss, multiple AF sputum culture was obtained to r/o MAC infection. Smears were negative in the hospital for AFB. Patient will need to undergo a repeat CT Chest outpatient. Patient also had sinus tachycardia inpatient that was believed to be secondary to her pulmonary disease. TTE was unremarkable and Cardiology was consulted for further evaluation. Due to Thrombocytosis we ordered some genetic testing for which the patient will f/u outpatient. Patient will be discharged on PO Prednisone and will f/u with Pulmonology outpatient regarding a taper dose.     Important Medication Changes and Reason:  Prednisone 20mg- f/u with Dr. Covington regarding taper.     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 ED on 12/14/2023 for a chronic progressively worsening dyspnea with minimal exertion associated with productive coughs over the last 2 months i/s/o outpatient labs with leukocytosis and thrombocytosis.    Pt is accompanied by  (Javier) at bedside. Pt reports experiencing worsening ALTMAN now even with minimal distance ambulation (e.g., from living room to bathroom) requiring supplemental oxygen, which is not her baseline. The symptom worsened gradually over the last 2 months. Also reports intermittent "green mucus" production with coughs during the same time period. No known sick contacts. Pt primarily stays home. Ambulates without assistive devices. Endorses occasional lightheadedness that spontaneously resolves. Denies fevers, chills, headaches, chest pain, nausea, emesis. Pt also reports reduced PO intake in recent months leading to ~20 lb weight loss in the last month, endorses low appetite eating 1 meal/day. Of note, pt reports having had a back surgery in 1980s for herniated disc and took opioid meds for a long time (oxy 15 mg q6h), currently on Suboxone SL.    In the ED, vital signs stable T 36.7C,  sinus tachy, /74, on 3L NC O2 SpO2 >95%. PoCUS showed hypovolemia, s/p IVF boluses (NS 1L x1, LR 1L x1). Labs notable for leukocytosis 18.81, thrombocytosis 771k, elevated Alk Phos 138 and procal 0.24. Lactate 1.0. Negative RVP. Unremarkable serum chemistries, troponins, and pro-BNP. VBG unremarkable with normal pH, signs of hypercarbia. CTA chest and CT A/P on 12/14 notable for extensive airway thickening and tree-in-bud nodularity c/w chronic endobronchial dz, negative for PE or malignancy. Blood cultures sent, pending results. (14 Dec 2023 20:58)    Hospital Course:  Patient admitted with presumed diagnosis of PNA and was initially continued on CTX and Azithromycin for empiric CAP coverage. Pulmonary was consulted deemed patient likely in bronchiectasis exacerbation and patient was transitioned to Levaquin for additional PsA coverage. Patient was also started on PO systemic steroid. Given CTC findings and unintentional weight loss, multiple AF sputum culture was obtained to r/o MAC infection. Smears were negative in the hospital for AFB. Patient will need to undergo a repeat CT Chest outpatient. Patient also had sinus tachycardia inpatient that was believed to be secondary to her pulmonary disease. TTE was unremarkable and Cardiology was consulted for further evaluation. Due to Thrombocytosis we ordered some genetic testing for which the patient will f/u outpatient. Patient will be discharged on PO Prednisone and will f/u with Pulmonology outpatient regarding a taper dose.     Important Medication Changes and Reason:  Prednisone 20mg- f/u with Dr. Covington regarding taper.     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    Pt is accompanied by  (Javier) at bedside. Pt reports experiencing worsening ALTMAN now even with minimal distance ambulation (e.g., from living room to bathroom) requiring supplemental oxygen, which is not her baseline. The symptom worsened gradually over the last 2 months. Also reports intermittent "green mucus" production with coughs during the same time period. No known sick contacts. Pt primarily stays home. Ambulates without assistive devices. Endorses occasional lightheadedness that spontaneously resolves. Denies fevers, chills, headaches, chest pain, nausea, emesis. Pt also reports reduced PO intake in recent months leading to ~20 lb weight loss in the last month, endorses low appetite eating 1 meal/day. Of note, pt reports having had a back surgery in 1980s for herniated disc and took opioid meds for a long time (oxy 15 mg q6h), currently on Suboxone SL.    In the ED, vital signs stable T 36.7C,  sinus tachy, /74, on 3L NC O2 SpO2 >95%. PoCUS showed hypovolemia, s/p IVF boluses (NS 1L x1, LR 1L x1). Labs notable for leukocytosis 18.81, thrombocytosis 771k, elevated Alk Phos 138 and procal 0.24. Lactate 1.0. Negative RVP. Unremarkable serum chemistries, troponins, and pro-BNP. VBG unremarkable with normal pH, signs of hypercarbia. CTA chest and CT A/P on 12/14 notable for extensive airway thickening and tree-in-bud nodularity c/w chronic endobronchial dz, negative for PE or malignancy. Blood cultures sent, pending results. (14 Dec 2023 20:58)    Hospital Course:  Patient admitted with presumed diagnosis of PNA and was initially continued on CTX and Azithromycin for empiric CAP coverage. Pulmonary was consulted deemed patient likely in bronchiectasis exacerbation and patient was transitioned to Levaquin for additional PsA coverage. Patient was also started on PO systemic steroid. Given CTC findings and unintentional weight loss, multiple AF sputum culture was obtained to r/o MAC infection. Smears were negative in the hospital for AFB. Patient will need to undergo a repeat CT Chest outpatient. Patient also had sinus tachycardia inpatient that was believed to be secondary to her pulmonary disease. TTE was unremarkable and Cardiology was consulted for further evaluation. Due to Thrombocytosis we ordered some genetic testing for which the patient will f/u outpatient. Patient will be discharged on PO Prednisone and will f/u with Pulmonology outpatient regarding a taper dose.     Important Medication Changes and Reason:  Prednisone 20mg- f/u with Dr. Covington regarding taper.   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 ED on 12/14/2023 for a chronic progressively worsening dyspnea with minimal exertion associated with productive coughs over the last 2 months i/s/o outpatient labs with leukocytosis and thrombocytosis.    Pt is accompanied by  (Javier) at bedside. Pt reports experiencing worsening ALTMAN now even with minimal distance ambulation (e.g., from living room to bathroom) requiring supplemental oxygen, which is not her baseline. The symptom worsened gradually over the last 2 months. Also reports intermittent "green mucus" production with coughs during the same time period. No known sick contacts. Pt primarily stays home. Ambulates without assistive devices. Endorses occasional lightheadedness that spontaneously resolves. Denies fevers, chills, headaches, chest pain, nausea, emesis. Pt also reports reduced PO intake in recent months leading to ~20 lb weight loss in the last month, endorses low appetite eating 1 meal/day. Of note, pt reports having had a back surgery in 1980s for herniated disc and took opioid meds for a long time (oxy 15 mg q6h), currently on Suboxone SL.    In the ED, vital signs stable T 36.7C,  sinus tachy, /74, on 3L NC O2 SpO2 >95%. PoCUS showed hypovolemia, s/p IVF boluses (NS 1L x1, LR 1L x1). Labs notable for leukocytosis 18.81, thrombocytosis 771k, elevated Alk Phos 138 and procal 0.24. Lactate 1.0. Negative RVP. Unremarkable serum chemistries, troponins, and pro-BNP. VBG unremarkable with normal pH, signs of hypercarbia. CTA chest and CT A/P on 12/14 notable for extensive airway thickening and tree-in-bud nodularity c/w chronic endobronchial dz, negative for PE or malignancy. Blood cultures sent, pending results. (14 Dec 2023 20:58)    Hospital Course:  Patient admitted with presumed diagnosis of PNA and was initially continued on CTX and Azithromycin for empiric CAP coverage. Pulmonary was consulted deemed patient likely in bronchiectasis exacerbation and patient was transitioned to Levaquin for additional PsA coverage. Patient was also started on PO systemic steroid. Given CTC findings and unintentional weight loss, multiple AF sputum culture was obtained to r/o MAC infection. Smears were negative in the hospital for AFB. Patient will need to undergo a repeat CT Chest outpatient. Patient also had sinus tachycardia inpatient that was believed to be secondary to her pulmonary disease. TTE was unremarkable and Cardiology was consulted for further evaluation. Due to Thrombocytosis we ordered some genetic testing for which the patient will f/u outpatient. Patient will be discharged on PO Prednisone and will f/u with Pulmonology outpatient regarding a taper dose.     Important Medication Changes and Reason:  Prednisone 20mg- f/u with Dr. Covington regarding taper.   Pulmonary rehab    Active or Pending Issues Requiring Follow-up:  - Pulm outpatient follow up   -HOME program follow up  -Hematology follow up for thrombocytosis     Advanced Directives:   [ ] Full code  [ ] DNR  [ ] Hospice    Discharge Diagnoses:  Bronchiectasis   COPD   sinus tachycardia  thrombocytosis

## 2023-12-17 NOTE — SWALLOW BEDSIDE ASSESSMENT ADULT - SWALLOW EVAL: DIAGNOSIS
presents with a chief complaint of Chronic progressively worsening dyspnea with minimal exertion + coughs presents with a chief complaint of Chronic progressively worsening dyspnea with minimal exertion + coughs; Oropharyngeal swallow profile p/w dysphagia in presence of reduced dental status (i.e. ill fitting dentures, edentulous state) with prolonged mastication/manipulation and reduced control of bolus with suspected latency in the swallow trigger leading to overt s/sx of aspiration, however baseline cough confounds results of exam to a degree. Would encourage objective testing to determine swallow profile and guide SLP recommendations.

## 2023-12-17 NOTE — DISCHARGE NOTE PROVIDER - NSFOLLOWUPCLINICSTOKEN_GEN_ALL_ED_FT
[id number]: || ||00\01||False;[id number]: || ||00\01||False; [id number]: || ||00\01||False;[id number]: || ||00\01||False;

## 2023-12-17 NOTE — SWALLOW BEDSIDE ASSESSMENT ADULT - PHARYNGEAL PHASE
suspected, initial swallow on thin liquids resulted in overt s/sx of aspiration, inconsistent throat clearing with additional trials, note of baseline cough which confounds-this exam to a point,/Delayed pharyngeal swallow

## 2023-12-17 NOTE — DISCHARGE NOTE PROVIDER - CARE PROVIDER_API CALL
Jonathon Arguello  Internal Medicine  [address] [place] [address]  Phone: [phone]  Fax: [phone]  Established Patient  Follow Up Time: 2 weeks   Jonathon Arguello  Internal Medicine  [address] [place] [address]  Phone: [phone]  Fax: [phone]  Established Patient  Follow Up Time: 2 weeks   Jonathon Arguello  Internal Medicine  [address] [place] [address]  Phone: [phone]  Fax: [phone]  Established Patient  Follow Up Time: 2 weeks    Chari Covington  Pulmonary Disease  [address] [place] [address]  Phone: [phone]  Fax: [phone]  Follow Up Time:    Jonathon Arguello  Internal Medicine  [address] [place] [id number]  Phone: [phone]  Fax: [phone]  Established Patient  Follow Up Time: 2 weeks    Chari Covington  Pulmonary Disease  [address] [place] [address]  Phone: [phone]  Fax: [phone]  Follow Up Time:

## 2023-12-17 NOTE — SWALLOW BEDSIDE ASSESSMENT ADULT - H & P REVIEW
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, COPD (on 3L O2 at home), HTN, anxiety/depression who presents to [facility] ED on 12/14/2023 for a chronic progressively worsening dyspnea with minimal exertion associated with productive coughs over the last 2 months i/s/o outpatient labs with leukocytosis and thrombocytosis.Pt also reports reduced PO intake in recent months leading to ~20 lb weight loss in the last month, endorses low appetite eating 1 meal/day./yes 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

## 2023-12-17 NOTE — PROGRESS NOTE ADULT - ASSESSMENT
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 p/w 2 months progressive worsening ALTMAN, fatigue, unintentional weight loss. Likely PNA vs. MAC vs. COPD exacerbation

## 2023-12-17 NOTE — DISCHARGE NOTE PROVIDER - DETAILS OF MALNUTRITION DIAGNOSIS/DIAGNOSES
This patient has been assessed with a concern for Malnutrition and was treated during this hospitalization for the following Nutrition diagnosis/diagnoses:     -  12/16/2023: Severe protein-calorie malnutrition   -  12/16/2023: Underweight (BMI < 19)

## 2023-12-17 NOTE — DISCHARGE NOTE PROVIDER - NSDCCPTREATMENT_GEN_ALL_CORE_FT
PRINCIPAL PROCEDURE  Procedure: CT chest, abdomen and pelvis  Findings and Treatment: IMPRESSION: Extensive airway thickening and tree-in-bud nodularity,   compatible with chronic endobronchial disease, including MAC. No CT   evidence of malignancy in the thorax, abdomen, and pelvis.

## 2023-12-17 NOTE — DISCHARGE NOTE PROVIDER - NSDCFUSCHEDAPPT_GEN_ALL_CORE_FT
[facility]  INTMED [address] [facility]  Scheduled Appointment: 01/02/2024     [facility]  INTMED [address] [facility]  Scheduled Appointment: 01/02/2024     Lisker, Gita N  [facility]  PULMMED [address] [facility]  Scheduled Appointment: 12/27/2023    Jonathon Arguello  [facility]  INTMED [address] [facility]  Scheduled Appointment: 01/02/2024     Lisker, Gita N  [facility]  PULMMED [address] [facility]  Scheduled Appointment: 12/27/2023    Jonathon Arguello  [facility]  INTMED [address] [facility]  Scheduled Appointment: 01/02/2024     Lisker, Gita N  [facility]  PULMMED [address] [facility]  Scheduled Appointment: 12/28/2023    Jonathon Arguello  [facility]  INTMED [address] [facility]  Scheduled Appointment: 01/02/2024     Lisker, Gita N  [facility]  PULMMED [address] [facility]  Scheduled Appointment: 12/28/2023    Jonathon Arguello  [facility]  INTMED [address] [facility]  Scheduled Appointment: 01/02/2024     [facility]  INTMED [address] [facility]  Scheduled Appointment: 01/02/2024     [facility]  INTMED [address] [facility]  Scheduled Appointment: 01/02/2024

## 2023-12-17 NOTE — DISCHARGE NOTE PROVIDER - NSDCMRMEDTOKEN_GEN_ALL_CORE_FT
Advair Diskus 500 mcg-50 mcg inhalation powder: 1 inhaled 2 times a day  amLODIPine 5 mg oral tablet: 1 tab(s) orally once a day  Effexor XR 75 mg oral capsule, extended release: 1 cap(s) orally once a day  Eliquis 5 mg oral tablet: 1 tab(s) orally 2 times a day  Suboxone 4 mg-1 mg sublingual film: 1 film(s) sublingually 2 times a day   Advair Diskus 500 mcg-50 mcg inhalation powder: 1 inhaled 2 times a day  amLODIPine 5 mg oral tablet: 1 tab(s) orally once a day  Effexor XR 75 mg oral capsule, extended release: 1 cap(s) orally once a day  Eliquis 5 mg oral tablet: 1 tab(s) orally 2 times a day  FeroSul 325 mg (65 mg elemental iron) oral tablet: 1 tab(s) orally every other day  fluticasone 50 mcg/inh nasal spray: 1 spray(s) nasal 2 times a day  levalbuterol 0.31 mg/3 mL inhalation solution: 3 milliliter(s) inhaled every 6 hours as needed for  shortness of breath and/or wheezing  metoprolol tartrate 25 mg oral tablet: 1 tab(s) orally 2 times a day  Mucinex Max Strength 1200 mg oral tablet, extended release: 1 tab(s) orally every 12 hours  Multiple Vitamins oral tablet: 1 tab(s) orally once a day  NebuSal 3% inhalation solution: 4 milliliter(s) inhaled 2 times a day  pantoprazole 40 mg oral delayed release tablet: 1 tab(s) orally once a day (before a meal)  predniSONE 20 mg oral tablet: 1 tab(s) orally once a day  Spiriva Respimat 60 ACT 2.5 mcg/inh inhalation aerosol: 2 puff(s) inhaled once a day  Suboxone 4 mg-1 mg sublingual film: 1 film(s) sublingually 2 times a day  Symbicort 160 mcg-4.5 mcg/inh inhalation aerosol: 2 puff(s) inhaled 2 times a day Inhale 2 puffs BID   Advair Diskus 500 mcg-50 mcg inhalation powder: 1 inhaled 2 times a day  amLODIPine 5 mg oral tablet: 1 tab(s) orally once a day  Effexor XR 75 mg oral capsule, extended release: 1 cap(s) orally once a day  Eliquis 5 mg oral tablet: 1 tab(s) orally 2 times a day  FeroSul 325 mg (65 mg elemental iron) oral tablet: 1 tab(s) orally every other day  fluticasone 50 mcg/inh nasal spray: 1 spray(s) nasal 2 times a day  Incruse Ellipta 62.5 mcg/inh inhalation powder: 1 inhaler(s) inhaled once a day  levalbuterol 0.31 mg/3 mL inhalation solution: 3 milliliter(s) inhaled every 6 hours as needed for  shortness of breath and/or wheezing  metoprolol tartrate 25 mg oral tablet: 1 tab(s) orally 2 times a day  Mucinex Max Strength 1200 mg oral tablet, extended release: 1 tab(s) orally every 12 hours  Multiple Vitamins oral tablet: 1 tab(s) orally once a day  NebuSal 3% inhalation solution: 4 milliliter(s) inhaled 2 times a day  NebuSal 3% inhalation solution: 4 milliliter(s) inhaled 2 times a day  pantoprazole 40 mg oral delayed release tablet: 1 tab(s) orally once a day (before a meal)  predniSONE 20 mg oral tablet: 1 tab(s) orally once a day  Suboxone 4 mg-1 mg sublingual film: 1 film(s) sublingually 2 times a day  Symbicort 160 mcg-4.5 mcg/inh inhalation aerosol: 2 puff(s) inhaled 2 times a day Inhale 2 puffs BID   Advair Diskus 500 mcg-50 mcg inhalation powder: 1 inhaled 2 times a day  amLODIPine 5 mg oral tablet: 1 tab(s) orally once a day  Effexor XR 75 mg oral capsule, extended release: 1 cap(s) orally once a day  Eliquis 5 mg oral tablet: 1 tab(s) orally 2 times a day  FeroSul 325 mg (65 mg elemental iron) oral tablet: 1 tab(s) orally every other day  fluticasone 50 mcg/inh nasal spray: 1 spray(s) nasal 2 times a day  Incruse Ellipta 62.5 mcg/inh inhalation powder: 1 inhaler(s) inhaled once a day  levalbuterol 0.31 mg/3 mL inhalation solution: 3 milliliter(s) inhaled every 6 hours as needed for  shortness of breath and/or wheezing  metoprolol tartrate 25 mg oral tablet: 1 tab(s) orally 2 times a day  Mucinex Max Strength 1200 mg oral tablet, extended release: 1 tab(s) orally every 12 hours  Multiple Vitamins oral tablet: 1 tab(s) orally once a day  NebuSal 3% inhalation solution: 4 milliliter(s) inhaled 2 times a day  NebuSal 3% inhalation solution: 4 milliliter(s) inhaled 2 times a day  pantoprazole 40 mg oral delayed release tablet: 1 tab(s) orally once a day (before a meal)  predniSONE 20 mg oral tablet: 1 tab(s) orally once a day  Pulmonary Rehabilitation: Please evaluate and treat. ICD10 :J47.9, J44.9  Suboxone 4 mg-1 mg sublingual film: 1 film(s) sublingually 2 times a day  Symbicort 160 mcg-4.5 mcg/inh inhalation aerosol: 2 puff(s) inhaled 2 times a day Inhale 2 puffs BID   Advair Diskus 500 mcg-50 mcg inhalation powder: 1 inhaled 2 times a day  amLODIPine 5 mg oral tablet: 1 tab(s) orally once a day  Effexor XR 75 mg oral capsule, extended release: 1 cap(s) orally once a day  Eliquis 5 mg oral tablet: 1 tab(s) orally 2 times a day  FeroSul 325 mg (65 mg elemental iron) oral tablet: 1 tab(s) orally every other day  fluticasone 50 mcg/inh nasal spray: 1 spray(s) nasal 2 times a day  Incruse Ellipta 62.5 mcg/inh inhalation powder: 1 inhaler(s) inhaled once a day  levalbuterol 0.31 mg/3 mL inhalation solution: 3 milliliter(s) inhaled every 6 hours as needed for  shortness of breath and/or wheezing  metoprolol tartrate 25 mg oral tablet: 1 tab(s) orally 2 times a day  Mucinex Max Strength 1200 mg oral tablet, extended release: 1 tab(s) orally every 12 hours  Multiple Vitamins oral tablet: 1 tab(s) orally once a day  NebuSal 3% inhalation solution: 4 milliliter(s) inhaled 2 times a day  NebuSal 3% inhalation solution: 4 milliliter(s) inhaled 2 times a day  pantoprazole 40 mg oral delayed release tablet: 1 tab(s) orally once a day (before a meal)  predniSONE 20 mg oral tablet: 1 tab(s) orally once a day  Pulmonary Rehabilitation: Please evaluate and treat. ICD10 :J47.9, J44.9  Suboxone 4 mg-1 mg sublingual film: 1 film(s) sublingually 2 times a day  Symbicort 160 mcg-4.5 mcg/inh inhalation aerosol: 2 puff(s) inhaled 2 times a day Inhale 2 puffs BID. Dispense as written.

## 2023-12-17 NOTE — PROGRESS NOTE ADULT - PROBLEM SELECTOR PLAN 2
On admission WBC 18, tachy to 140s, RR24, SpO2 96% on 3L, afebrile, minimally elevated procal 0.24    Plan:  >CTA Chest + CT A/P (12/14): Extensive airway thickening and tree-in-bud nodularity, compatible with chronic endobronchial disease, including MAC  >s/p IV azithro and CTX x2 (12/14 - 12/15)  - Met SIRS criteria on admission by WBC and tachy  - PNA vs. MAC  - c/w Levaquin 750qd for PsA coverage (12/15 - ), pulm recs appreciated  - Strep [-], Legionella [-], MRSA [-]  - Bcx (12/15): NGTD  - Sputum cx w/ AF (12/15): negative x 1  - f/u pulmonary recs regarding COPD and MAC w/u

## 2023-12-17 NOTE — PROGRESS NOTE ADULT - PROBLEM SELECTOR PLAN 1
COPD on home 3L O2, not on chronic steroid or antibiotics, never intubated, last exacerbation requiring admission was 2016. Now presenting with worsening ALTMAN and fatigue which appears to be 2/2 PNA vs. MAC as oppose to exacerbation. Home COPD regimen Advair 500-50 + Albuterol    Plan:  >Sating well on baseline O2 3L  >VBG 7.4/57, bicarb 29 on admission (12/14 @ 1PM)  >s/p prednisone 50 mg PO x1 in ED on 12/14  - c/w prednisone 40mg qd to complete 5 days course (12/15 - 12/18)  - c/w DuoNeb q6 standing + Hypersal 3% q6 + Mucinex + Flonase for airway clearance  - Hold ICS-LABA while in exacerbation  - c/w NC, wean as tolerated, continuous pulse ox Now presenting with worsening ALTMAN and fatigue which appears to be 2/2 PNA vs. MAC as oppose to exacerbation. Home pulm regimen Advair 500-50 + Albuterol    Plan:  >Sating well on baseline O2 3L  >VBG 7.4/57, bicarb 29 on admission (12/14 @ 1PM)  >s/p prednisone 50 mg PO x1 in ED on 12/14  - c/w prednisone 40mg qd to complete 5 days course (12/15 - 12/18)  - c/w DuoNeb q6 standing + Hypersal 3% q6 + Mucinex + Flonase for airway clearance  - Hold ICS-LABA while in exacerbation  - c/w NC, wean as tolerated, continuous pulse ox

## 2023-12-18 DIAGNOSIS — R00.0 TACHYCARDIA, UNSPECIFIED: ICD-10-CM

## 2023-12-18 LAB
ALBUMIN SERPL ELPH-MCNC: 3.7 G/DL — SIGNIFICANT CHANGE UP (ref 3.3–5)
ALBUMIN SERPL ELPH-MCNC: 3.7 G/DL — SIGNIFICANT CHANGE UP (ref 3.3–5)
ALBUMIN SERPL ELPH-MCNC: 3.9 G/DL — SIGNIFICANT CHANGE UP (ref 3.3–5)
ALBUMIN SERPL ELPH-MCNC: 3.9 G/DL — SIGNIFICANT CHANGE UP (ref 3.3–5)
ALP SERPL-CCNC: 33 U/L — LOW (ref 40–120)
ALP SERPL-CCNC: 33 U/L — LOW (ref 40–120)
ALP SERPL-CCNC: 94 U/L — SIGNIFICANT CHANGE UP (ref 40–120)
ALP SERPL-CCNC: 94 U/L — SIGNIFICANT CHANGE UP (ref 40–120)
ALT FLD-CCNC: 20 U/L — SIGNIFICANT CHANGE UP (ref 10–45)
ALT FLD-CCNC: 20 U/L — SIGNIFICANT CHANGE UP (ref 10–45)
ALT FLD-CCNC: 22 U/L — SIGNIFICANT CHANGE UP (ref 10–45)
ALT FLD-CCNC: 22 U/L — SIGNIFICANT CHANGE UP (ref 10–45)
ANION GAP SERPL CALC-SCNC: 22 MMOL/L — HIGH (ref 5–17)
ANION GAP SERPL CALC-SCNC: 22 MMOL/L — HIGH (ref 5–17)
ANION GAP SERPL CALC-SCNC: 9 MMOL/L — SIGNIFICANT CHANGE UP (ref 5–17)
ANION GAP SERPL CALC-SCNC: 9 MMOL/L — SIGNIFICANT CHANGE UP (ref 5–17)
AST SERPL-CCNC: 16 U/L — SIGNIFICANT CHANGE UP (ref 10–40)
AST SERPL-CCNC: 16 U/L — SIGNIFICANT CHANGE UP (ref 10–40)
AST SERPL-CCNC: 24 U/L — SIGNIFICANT CHANGE UP (ref 10–40)
AST SERPL-CCNC: 24 U/L — SIGNIFICANT CHANGE UP (ref 10–40)
BASOPHILS # BLD AUTO: 0.08 K/UL — SIGNIFICANT CHANGE UP (ref 0–0.2)
BASOPHILS # BLD AUTO: 0.08 K/UL — SIGNIFICANT CHANGE UP (ref 0–0.2)
BASOPHILS NFR BLD AUTO: 0.5 % — SIGNIFICANT CHANGE UP (ref 0–2)
BASOPHILS NFR BLD AUTO: 0.5 % — SIGNIFICANT CHANGE UP (ref 0–2)
BILIRUB SERPL-MCNC: 0.1 MG/DL — LOW (ref 0.2–1.2)
BILIRUB SERPL-MCNC: 0.1 MG/DL — LOW (ref 0.2–1.2)
BILIRUB SERPL-MCNC: <0.1 MG/DL — LOW (ref 0.2–1.2)
BILIRUB SERPL-MCNC: <0.1 MG/DL — LOW (ref 0.2–1.2)
BUN SERPL-MCNC: 23 MG/DL — SIGNIFICANT CHANGE UP (ref 7–23)
BUN SERPL-MCNC: 23 MG/DL — SIGNIFICANT CHANGE UP (ref 7–23)
BUN SERPL-MCNC: 24 MG/DL — HIGH (ref 7–23)
BUN SERPL-MCNC: 24 MG/DL — HIGH (ref 7–23)
CALCIUM SERPL-MCNC: 9.7 MG/DL — SIGNIFICANT CHANGE UP (ref 8.4–10.5)
CALCIUM SERPL-MCNC: 9.7 MG/DL — SIGNIFICANT CHANGE UP (ref 8.4–10.5)
CALCIUM SERPL-MCNC: <3 MG/DL — CRITICAL LOW (ref 8.4–10.5)
CALCIUM SERPL-MCNC: <3 MG/DL — CRITICAL LOW (ref 8.4–10.5)
CHLORIDE SERPL-SCNC: 93 MMOL/L — LOW (ref 96–108)
CHLORIDE SERPL-SCNC: 93 MMOL/L — LOW (ref 96–108)
CHLORIDE SERPL-SCNC: 97 MMOL/L — SIGNIFICANT CHANGE UP (ref 96–108)
CHLORIDE SERPL-SCNC: 97 MMOL/L — SIGNIFICANT CHANGE UP (ref 96–108)
CO2 SERPL-SCNC: 23 MMOL/L — SIGNIFICANT CHANGE UP (ref 22–31)
CO2 SERPL-SCNC: 23 MMOL/L — SIGNIFICANT CHANGE UP (ref 22–31)
CO2 SERPL-SCNC: 31 MMOL/L — SIGNIFICANT CHANGE UP (ref 22–31)
CO2 SERPL-SCNC: 31 MMOL/L — SIGNIFICANT CHANGE UP (ref 22–31)
CREAT SERPL-MCNC: 0.59 MG/DL — SIGNIFICANT CHANGE UP (ref 0.5–1.3)
CREAT SERPL-MCNC: 0.59 MG/DL — SIGNIFICANT CHANGE UP (ref 0.5–1.3)
CREAT SERPL-MCNC: 0.62 MG/DL — SIGNIFICANT CHANGE UP (ref 0.5–1.3)
CREAT SERPL-MCNC: 0.62 MG/DL — SIGNIFICANT CHANGE UP (ref 0.5–1.3)
EGFR: 98 ML/MIN/1.73M2 — SIGNIFICANT CHANGE UP
EGFR: 98 ML/MIN/1.73M2 — SIGNIFICANT CHANGE UP
EGFR: 99 ML/MIN/1.73M2 — SIGNIFICANT CHANGE UP
EGFR: 99 ML/MIN/1.73M2 — SIGNIFICANT CHANGE UP
EOSINOPHIL # BLD AUTO: 0.32 K/UL — SIGNIFICANT CHANGE UP (ref 0–0.5)
EOSINOPHIL # BLD AUTO: 0.32 K/UL — SIGNIFICANT CHANGE UP (ref 0–0.5)
EOSINOPHIL NFR BLD AUTO: 2.2 % — SIGNIFICANT CHANGE UP (ref 0–6)
EOSINOPHIL NFR BLD AUTO: 2.2 % — SIGNIFICANT CHANGE UP (ref 0–6)
GLUCOSE SERPL-MCNC: 87 MG/DL — SIGNIFICANT CHANGE UP (ref 70–99)
GLUCOSE SERPL-MCNC: 87 MG/DL — SIGNIFICANT CHANGE UP (ref 70–99)
GLUCOSE SERPL-MCNC: 95 MG/DL — SIGNIFICANT CHANGE UP (ref 70–99)
GLUCOSE SERPL-MCNC: 95 MG/DL — SIGNIFICANT CHANGE UP (ref 70–99)
HCT VFR BLD CALC: 32.2 % — LOW (ref 34.5–45)
HCT VFR BLD CALC: 32.2 % — LOW (ref 34.5–45)
HGB BLD-MCNC: 9.5 G/DL — LOW (ref 11.5–15.5)
HGB BLD-MCNC: 9.5 G/DL — LOW (ref 11.5–15.5)
IGA FLD-MCNC: 441 MG/DL — SIGNIFICANT CHANGE UP (ref 84–499)
IGA FLD-MCNC: 441 MG/DL — SIGNIFICANT CHANGE UP (ref 84–499)
IGG FLD-MCNC: 1062 MG/DL — SIGNIFICANT CHANGE UP (ref 610–1660)
IGG FLD-MCNC: 1062 MG/DL — SIGNIFICANT CHANGE UP (ref 610–1660)
IGM SERPL-MCNC: 78 MG/DL — SIGNIFICANT CHANGE UP (ref 35–242)
IGM SERPL-MCNC: 78 MG/DL — SIGNIFICANT CHANGE UP (ref 35–242)
IMM GRANULOCYTES NFR BLD AUTO: 0.7 % — SIGNIFICANT CHANGE UP (ref 0–0.9)
IMM GRANULOCYTES NFR BLD AUTO: 0.7 % — SIGNIFICANT CHANGE UP (ref 0–0.9)
KAPPA LC SER QL IFE: 2.76 MG/DL — HIGH (ref 0.33–1.94)
KAPPA LC SER QL IFE: 2.76 MG/DL — HIGH (ref 0.33–1.94)
KAPPA/LAMBDA FREE LIGHT CHAIN RATIO, SERUM: 1.48 RATIO — SIGNIFICANT CHANGE UP (ref 0.26–1.65)
KAPPA/LAMBDA FREE LIGHT CHAIN RATIO, SERUM: 1.48 RATIO — SIGNIFICANT CHANGE UP (ref 0.26–1.65)
LAMBDA LC SER QL IFE: 1.87 MG/DL — SIGNIFICANT CHANGE UP (ref 0.57–2.63)
LAMBDA LC SER QL IFE: 1.87 MG/DL — SIGNIFICANT CHANGE UP (ref 0.57–2.63)
LYMPHOCYTES # BLD AUTO: 21.6 % — SIGNIFICANT CHANGE UP (ref 13–44)
LYMPHOCYTES # BLD AUTO: 21.6 % — SIGNIFICANT CHANGE UP (ref 13–44)
LYMPHOCYTES # BLD AUTO: 3.2 K/UL — SIGNIFICANT CHANGE UP (ref 1–3.3)
LYMPHOCYTES # BLD AUTO: 3.2 K/UL — SIGNIFICANT CHANGE UP (ref 1–3.3)
MAGNESIUM SERPL-MCNC: 2.2 MG/DL — SIGNIFICANT CHANGE UP (ref 1.6–2.6)
MAGNESIUM SERPL-MCNC: 2.2 MG/DL — SIGNIFICANT CHANGE UP (ref 1.6–2.6)
MAGNESIUM SERPL-MCNC: <.6 MG/DL — CRITICAL LOW (ref 1.6–2.6)
MAGNESIUM SERPL-MCNC: <.6 MG/DL — CRITICAL LOW (ref 1.6–2.6)
MCHC RBC-ENTMCNC: 24.4 PG — LOW (ref 27–34)
MCHC RBC-ENTMCNC: 24.4 PG — LOW (ref 27–34)
MCHC RBC-ENTMCNC: 29.5 GM/DL — LOW (ref 32–36)
MCHC RBC-ENTMCNC: 29.5 GM/DL — LOW (ref 32–36)
MCV RBC AUTO: 82.6 FL — SIGNIFICANT CHANGE UP (ref 80–100)
MCV RBC AUTO: 82.6 FL — SIGNIFICANT CHANGE UP (ref 80–100)
MONOCYTES # BLD AUTO: 1.32 K/UL — HIGH (ref 0–0.9)
MONOCYTES # BLD AUTO: 1.32 K/UL — HIGH (ref 0–0.9)
MONOCYTES NFR BLD AUTO: 8.9 % — SIGNIFICANT CHANGE UP (ref 2–14)
MONOCYTES NFR BLD AUTO: 8.9 % — SIGNIFICANT CHANGE UP (ref 2–14)
NEUTROPHILS # BLD AUTO: 9.8 K/UL — HIGH (ref 1.8–7.4)
NEUTROPHILS # BLD AUTO: 9.8 K/UL — HIGH (ref 1.8–7.4)
NEUTROPHILS NFR BLD AUTO: 66.1 % — SIGNIFICANT CHANGE UP (ref 43–77)
NEUTROPHILS NFR BLD AUTO: 66.1 % — SIGNIFICANT CHANGE UP (ref 43–77)
NRBC # BLD: 0 /100 WBCS — SIGNIFICANT CHANGE UP (ref 0–0)
NRBC # BLD: 0 /100 WBCS — SIGNIFICANT CHANGE UP (ref 0–0)
PHOSPHATE SERPL-MCNC: 3.5 MG/DL — SIGNIFICANT CHANGE UP (ref 2.5–4.5)
PHOSPHATE SERPL-MCNC: 3.5 MG/DL — SIGNIFICANT CHANGE UP (ref 2.5–4.5)
PHOSPHATE SERPL-MCNC: 3.6 MG/DL — SIGNIFICANT CHANGE UP (ref 2.5–4.5)
PHOSPHATE SERPL-MCNC: 3.6 MG/DL — SIGNIFICANT CHANGE UP (ref 2.5–4.5)
PLATELET # BLD AUTO: 689 K/UL — HIGH (ref 150–400)
PLATELET # BLD AUTO: 689 K/UL — HIGH (ref 150–400)
POTASSIUM SERPL-MCNC: 4.4 MMOL/L — SIGNIFICANT CHANGE UP (ref 3.5–5.3)
POTASSIUM SERPL-MCNC: 4.4 MMOL/L — SIGNIFICANT CHANGE UP (ref 3.5–5.3)
POTASSIUM SERPL-MCNC: >9 MMOL/L — CRITICAL HIGH (ref 3.5–5.3)
POTASSIUM SERPL-MCNC: >9 MMOL/L — CRITICAL HIGH (ref 3.5–5.3)
POTASSIUM SERPL-SCNC: 4.4 MMOL/L — SIGNIFICANT CHANGE UP (ref 3.5–5.3)
POTASSIUM SERPL-SCNC: 4.4 MMOL/L — SIGNIFICANT CHANGE UP (ref 3.5–5.3)
POTASSIUM SERPL-SCNC: >9 MMOL/L — CRITICAL HIGH (ref 3.5–5.3)
POTASSIUM SERPL-SCNC: >9 MMOL/L — CRITICAL HIGH (ref 3.5–5.3)
PROT SERPL-MCNC: 7.3 G/DL — SIGNIFICANT CHANGE UP (ref 6–8.3)
RBC # BLD: 3.9 M/UL — SIGNIFICANT CHANGE UP (ref 3.8–5.2)
RBC # BLD: 3.9 M/UL — SIGNIFICANT CHANGE UP (ref 3.8–5.2)
RBC # FLD: 16.5 % — HIGH (ref 10.3–14.5)
RBC # FLD: 16.5 % — HIGH (ref 10.3–14.5)
SODIUM SERPL-SCNC: 137 MMOL/L — SIGNIFICANT CHANGE UP (ref 135–145)
SODIUM SERPL-SCNC: 137 MMOL/L — SIGNIFICANT CHANGE UP (ref 135–145)
SODIUM SERPL-SCNC: 138 MMOL/L — SIGNIFICANT CHANGE UP (ref 135–145)
SODIUM SERPL-SCNC: 138 MMOL/L — SIGNIFICANT CHANGE UP (ref 135–145)
WBC # BLD: 14.83 K/UL — HIGH (ref 3.8–10.5)
WBC # BLD: 14.83 K/UL — HIGH (ref 3.8–10.5)
WBC # FLD AUTO: 14.83 K/UL — HIGH (ref 3.8–10.5)
WBC # FLD AUTO: 14.83 K/UL — HIGH (ref 3.8–10.5)

## 2023-12-18 PROCEDURE — 93010 ELECTROCARDIOGRAM REPORT: CPT

## 2023-12-18 PROCEDURE — 99233 SBSQ HOSP IP/OBS HIGH 50: CPT | Mod: GC

## 2023-12-18 PROCEDURE — 74230 X-RAY XM SWLNG FUNCJ C+: CPT | Mod: 26

## 2023-12-18 RX ADMIN — Medication 600 MILLIGRAM(S): at 06:27

## 2023-12-18 RX ADMIN — APIXABAN 5 MILLIGRAM(S): 2.5 TABLET, FILM COATED ORAL at 06:27

## 2023-12-18 RX ADMIN — Medication 1 TABLET(S): at 11:40

## 2023-12-18 RX ADMIN — Medication 1 SPRAY(S): at 06:29

## 2023-12-18 RX ADMIN — Medication 12.5 MILLIGRAM(S): at 18:34

## 2023-12-18 RX ADMIN — Medication 3 MILLILITER(S): at 06:27

## 2023-12-18 RX ADMIN — Medication 3 MILLIGRAM(S): at 22:36

## 2023-12-18 RX ADMIN — BUPRENORPHINE AND NALOXONE 2 TABLET(S): 2; .5 TABLET SUBLINGUAL at 22:35

## 2023-12-18 RX ADMIN — SODIUM CHLORIDE 4 MILLILITER(S): 9 INJECTION INTRAMUSCULAR; INTRAVENOUS; SUBCUTANEOUS at 06:27

## 2023-12-18 RX ADMIN — SODIUM CHLORIDE 4 MILLILITER(S): 9 INJECTION INTRAMUSCULAR; INTRAVENOUS; SUBCUTANEOUS at 11:40

## 2023-12-18 RX ADMIN — Medication 600 MILLIGRAM(S): at 18:35

## 2023-12-18 RX ADMIN — Medication 40 MILLIGRAM(S): at 11:41

## 2023-12-18 RX ADMIN — Medication 1 SPRAY(S): at 18:34

## 2023-12-18 RX ADMIN — Medication 12.5 MILLIGRAM(S): at 06:29

## 2023-12-18 RX ADMIN — Medication 75 MILLIGRAM(S): at 11:40

## 2023-12-18 RX ADMIN — POLYETHYLENE GLYCOL 3350 17 GRAM(S): 17 POWDER, FOR SOLUTION ORAL at 11:40

## 2023-12-18 RX ADMIN — BUPRENORPHINE AND NALOXONE 2 TABLET(S): 2; .5 TABLET SUBLINGUAL at 09:27

## 2023-12-18 RX ADMIN — Medication 325 MILLIGRAM(S): at 09:27

## 2023-12-18 RX ADMIN — PANTOPRAZOLE SODIUM 40 MILLIGRAM(S): 20 TABLET, DELAYED RELEASE ORAL at 06:30

## 2023-12-18 RX ADMIN — SODIUM CHLORIDE 4 MILLILITER(S): 9 INJECTION INTRAMUSCULAR; INTRAVENOUS; SUBCUTANEOUS at 18:34

## 2023-12-18 RX ADMIN — Medication 3 MILLILITER(S): at 11:40

## 2023-12-18 RX ADMIN — SODIUM CHLORIDE 4 MILLILITER(S): 9 INJECTION INTRAMUSCULAR; INTRAVENOUS; SUBCUTANEOUS at 22:37

## 2023-12-18 RX ADMIN — Medication 3 MILLILITER(S): at 22:36

## 2023-12-18 RX ADMIN — AMLODIPINE BESYLATE 5 MILLIGRAM(S): 2.5 TABLET ORAL at 06:39

## 2023-12-18 RX ADMIN — APIXABAN 5 MILLIGRAM(S): 2.5 TABLET, FILM COATED ORAL at 18:35

## 2023-12-18 RX ADMIN — Medication 3 MILLILITER(S): at 18:34

## 2023-12-18 NOTE — SWALLOW VFSS/MBS ASSESSMENT ADULT - CONSISTENCIES ADMINISTERED
PUREE TSP moderately thick liquids via cup, mildly thick liquids cup and straw multiple swallows , softer solids- Fig benson thin liquids single small cup sips, thin liquids large cup sips, thin liquids straw sips multiple

## 2023-12-18 NOTE — SWALLOW VFSS/MBS ASSESSMENT ADULT - ORAL PHASE
Delayed oral transit time/Reduced anterior - posterior transport/Residue in oral cavity/Uncontrolled bolus / spillover in elliott-pharynx/Uncontrolled bolus / spillover in hypopharynx Delayed oral transit time/Reduced anterior - posterior transport/Uncontrolled bolus / spillover in elliott-pharynx/Uncontrolled bolus / spillover in hypopharynx Uncontrolled bolus / spillover in elliott-pharynx/Uncontrolled bolus / spillover in hypopharynx

## 2023-12-18 NOTE — SWALLOW VFSS/MBS ASSESSMENT ADULT - PHARYNGEAL PHASE COMMENTS
Pharyngeal phase c/b latency in the swallow trigger to the level of the valleculae; spillover onto the laryngeal surface of the epiglottis with more viscous liquids; spillover to the level of the pyriform sinuses with less viscous liquids and onto arytenoids . Base of tongue/BoT retraction and pharyngeal constriction were reduced and resulted in reduced pharyngeal bolus propulsion. Trace vallecular residue present.  Hyolaryngeal elevation and excursion were mildly reduced. Adequate epiglottic inversion. Adequate relaxation of UES. No aspiration visualized.     To note, motion artifact obscured viewing throughout this study. Pharyngeal phase c/b latency in the swallow trigger to the level of the valleculae with spillover into the pyriform sinuses. Base of tongue/BoT retraction and pharyngeal constriction were reduced and resulted in reduced pharyngeal bolus propulsion. Trace vallecular residue present.  Hyolaryngeal elevation and excursion were mildly reduced. Adequate epiglottic inversion. Adequate relaxation of UES.     Appreciated x1 instance with large controlled cup sips of thin liquids; airway invasion onto the vocal cords which appeared to eject, pt with cough response however mildly latent. This was not duplicated with other trials. Improvement with control with single, small cup sips.   To note, motion artifact obscured viewing throughout this study as did calcifications along anterior portion of trachea. Pharyngeal phase c/b latency in the swallow trigger to the level of the valleculae. Base of tongue/BoT retraction and pharyngeal constriction were reduced and resulted in reduced pharyngeal bolus propulsion. Trace vallecular residue present.  Hyolaryngeal elevation and excursion were mildly reduced. Adequate epiglottic inversion. Adequate relaxation of UES. No penetration or aspiration were visualized.     To note, motion artifact obscured viewing throughout this study.

## 2023-12-18 NOTE — SWALLOW VFSS/MBS ASSESSMENT ADULT - ROSENBEK'S PENETRATION ASPIRATION SCALE
(2) contrast enters airway, remains above the vocal cords, no residue remains (penetration) 4=thin liquids large sips cup ; 2 = thin liquid small cup sips ; limited viewing with straw sips given pt cough response which is also noted to be baseline without po (1) no aspiration, contrast does not enter airway

## 2023-12-18 NOTE — PROGRESS NOTE ADULT - PROBLEM SELECTOR PLAN 2
On admission WBC 18, tachy to 140s, RR24, SpO2 96% on 3L, afebrile, minimally elevated procal 0.24    Plan:  >CTA Chest + CT A/P (12/14): Extensive airway thickening and tree-in-bud nodularity, compatible with chronic endobronchial disease, including MAC  >s/p IV azithro and CTX x2 (12/14 - 12/15)  - Met SIRS criteria on admission by WBC and tachy  - PNA vs. MAC  - c/w Levaquin 750qd for PsA coverage (12/15 - ), pulm recs appreciated  - Strep [-], Legionella [-], MRSA [-]  - Bcx (12/15): NGTD  - Sputum cx w/ AF (12/15): negative x 1  - f/u pulmonary recs regarding COPD and MAC w/u Etiology unclear- possibly due to underlying infection. CTA negative for PE  -Will repeat EKG to make sure the patient is truly in sinus tach

## 2023-12-18 NOTE — PROGRESS NOTE ADULT - PROBLEM SELECTOR PLAN 3
Hx of protein S deficiency c/b LUE DVT and PE in 2019, on Eliquis    Plan:  - c/w Eliquis 5 mg BID On admission WBC 18, tachy to 140s, RR24, SpO2 96% on 3L, afebrile, minimally elevated procal 0.24    Plan:  >CTA Chest + CT A/P (12/14): Extensive airway thickening and tree-in-bud nodularity, compatible with chronic endobronchial disease, including MAC  >s/p IV azithro and CTX x2 (12/14 - 12/15)  - Met SIRS criteria on admission by WBC and tachy  - PNA vs. MAC  - c/w Levaquin 750qd for PsA coverage (12/15 - ), pulm recs appreciated  - Strep [-], Legionella [-], MRSA [-]  - Bcx (12/15): NGTD  - Sputum cx w/ AF (12/15): negative x 1  - f/u pulmonary recs regarding COPD and MAC w/u

## 2023-12-18 NOTE — SWALLOW VFSS/MBS ASSESSMENT ADULT - RECOMMENDED FEEDING/EATING TECHNIQUES
encouraged intermittent throat clearing / cough during meals/alternate food with liquid/no straws/oral hygiene/position upright (90 degrees)/small sips/bites

## 2023-12-18 NOTE — PROGRESS NOTE ADULT - PROBLEM SELECTOR PLAN 8
DVT ppx: Eliquis 5 mg BID, ambulation  Diet: regular, DASH/TLC  Dispo: pending course - Takes OTC omeprazole PRN at home for GERD symptoms  - Continue PRN pantoprazole 40 mg QD while inpatient (therapeutic equiv conversion)

## 2023-12-18 NOTE — PROGRESS NOTE ADULT - PROBLEM SELECTOR PLAN 5
Home regimen venlafaxine 75 qd    Plan:  - c/w home regimen as above Home regimen Amlodipine 5    Plan:  - c/w home regimen as above

## 2023-12-18 NOTE — PROGRESS NOTE ADULT - PROBLEM SELECTOR PLAN 4
[de-identified] : Mr. HAHN is a 37 year male L ear clogging which started over the summer used decongestants and it resolved, now 2 weeks ago swam and has clogging, using flonase daily and afrin x 3 days, as well as allegra\par denies tinnitus, discharge or otalgia\par denies sinus pressure, rhinorrhea, nasal obstruction\par \par  Home regimen Amlodipine 5    Plan:  - c/w home regimen as above Hx of protein S deficiency c/b LUE DVT and PE in 2019, on Eliquis    Plan:  - c/w Eliquis 5 mg BID

## 2023-12-18 NOTE — PROGRESS NOTE ADULT - ASSESSMENT
67 yo F with PMHx Protein S deficiency on eliquis, COPD (3 LPM 24/7 at home), former smoker (1.5ppd x 40yrs, quit 2013), bronchiectasis presenting with progressive dyspnea, productive cough, weight loss found to have abnormal imaging for which pulmonary is consulted. She has patchy areas of ground glass on her imaging, increased sputum production and wheeze; this in conjunction with her lab abnormalities raises concern for COPD exacerbation. 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       #Bronchiectasis  #COPD/Emphysema exacerbation  #NTM Pulmonary disease?  #Weight loss?    Recommendations:   - Can complete 7day course of Levaquin  - Continue 40 mg pred for now, will benefit from longer course of steroids  - AFB smear (-) x 2, would send additional AFB smear/culture and repeat Sputum culture  - ACT with Duonebs q6 + 3% hypersal q6h, Acapella  - Follow-up Cinesophagram results  - Supplemental oxygen to goal >88%  - TTE for tachycardia    Chari Covington MD

## 2023-12-18 NOTE — SWALLOW VFSS/MBS ASSESSMENT ADULT - DIAGNOSTIC IMPRESSIONS
Ms. Aceves p/w a mild-moderate oropharyngeal dysphagia in presence of current edentulous state and swallow profile. Oropharyngeal swallow profile p/w reduced manipulation, mastication, a-p transport, and control of bolus leading to premature spillage and reduced laryngeal closure with less viscous textures. Intermittent trace penetration occurred w/ more viscous liquids and advanced solids which were ejected. One instance of bolus navigation onto vocal cords appreciated with large cup sips of thin liquids, appearing to be fully ejected with latent cough response by the patient; this was not reduplicated. Improvement in control of thin liquid bolus with controlled, single small sips via cup.     Of note appreciated frequent eructation following study suspected 2/2 esophageal component; notable air distention during study and intermittent retrograde flow suspected in presence of cervical osteophytes and appearance of CP-Bar.

## 2023-12-18 NOTE — SWALLOW VFSS/MBS ASSESSMENT ADULT - RECOMMENDED CONSISTENCY
REGULAR SOLIDS, Pt to choose softer options and self modify   THIN liquids- SINGLE CONTROLLED SIPS ONLY     NO STRAW   MEDICATION WITH PUREE TEXTURES -- NOT WITH WATER--   SEPARATE MIXED CONSISTENCIES

## 2023-12-18 NOTE — PROGRESS NOTE ADULT - ATTENDING COMMENTS
68F unexplained weight loss with chronic cough. Her respiratory status decline is more subacute in nature, doubt COPD exacerbation given she is at her baseline O2 requirements, not wheezing, and has compensated hypercapnia. CT suggestive of MAC, rather than CAP.     - pulm consult appreciated: abx switched to levaquin to cover pseudomonas, treat for bronchiectasis exacerbation w/ prednisone, pulm hygiene, chest PT  - obtain AFB sputum x 3 (last one pending)  - anemia workup - c/w HOLLY - start iron qod. needs outpatient GI follow-up for crc screening if not done recently  - persistent tachycardia, unexplained. not in distress, pain, or PE. possibly med side effect from standing duoneb. start low dose lopressor, repeat EKG was sinus tach on 12/18, no QTc prolongation   - pending MBS today    d/w HS 2 68F unexplained weight loss with chronic cough. Her respiratory status decline is more subacute in nature, doubt COPD exacerbation given she is at her baseline O2 requirements, not wheezing, and has compensated hypercapnia. CT suggestive of MAC, rather than CAP.     - pulm consult appreciated: abx switched to levaquin to cover pseudomonas, treat for bronchiectasis exacerbation w/ prednisone, pulm hygiene, chest PT  - completed steroid course today  - obtain AFB sputum x 3 (last one pending)  - anemia workup - c/w HOLLY - start iron qod. needs outpatient GI follow-up for crc screening if not done recently  - persistent tachycardia, unexplained. not in distress, pain, or PE. possibly med side effect from standing duoneb. start low dose lopressor, repeat EKG was sinus tach on 12/18, no QTc prolongation   - pending MBS today    d/w HS 2

## 2023-12-18 NOTE — SWALLOW VFSS/MBS ASSESSMENT ADULT - ADDITIONAL INFORMATION
+Oral screw for implants noted   +Cervical spinal changes   +Appearing to have CP-Bar apprxo C 4-5   +calcifications of the laryngeal structures, along the laryngeal surface of the epiglottis, along the anterior tracheal wall, and of the pyriform sinuses

## 2023-12-18 NOTE — PROGRESS NOTE ADULT - PROBLEM SELECTOR PLAN 6
Hx of back surgery in 1980s for herniated disc, consistent opioid use afterwards for an extended period of time, currently on Suboxone SL film 4 mg BID    Plan:  - c/w Suboxone SL tab Home regimen venlafaxine 75 qd    Plan:  - c/w home regimen as above

## 2023-12-18 NOTE — PROGRESS NOTE ADULT - PROBLEM SELECTOR PLAN 1
Now presenting with worsening ALTMAN and fatigue which appears to be 2/2 PNA vs. MAC as oppose to exacerbation. Home pulm regimen Advair 500-50 + Albuterol    Plan:  >Sating well on baseline O2 3L  >VBG 7.4/57, bicarb 29 on admission (12/14 @ 1PM)  >s/p prednisone 50 mg PO x1 in ED on 12/14  - c/w prednisone 40mg qd to complete 5 days course (12/15 - 12/18)  - c/w DuoNeb q6 standing + Hypersal 3% q6 + Mucinex + Flonase for airway clearance  - Hold ICS-LABA while in exacerbation  - c/w NC, wean as tolerated, continuous pulse ox

## 2023-12-18 NOTE — PROGRESS NOTE ADULT - SUBJECTIVE AND OBJECTIVE BOX
PROGRESS NOTE:   Authored by Amadeo Garcia MD  Patient is a 68y old  Female who presents with a chief complaint of Chronic progressively worsening dyspnea with minimal exertion + coughs (17 Dec 2023 12:40)      SUBJECTIVE / OVERNIGHT EVENTS:  No acute events overnight.     ADDITIONAL REVIEW OF SYSTEMS:    MEDICATIONS  (STANDING):  albuterol/ipratropium for Nebulization 3 milliLiter(s) Nebulizer every 6 hours  amLODIPine   Tablet 5 milliGRAM(s) Oral daily  apixaban 5 milliGRAM(s) Oral every 12 hours  buprenorphine 2 mG/naloxone 0.5 mG SL  Tablet 2 Tablet(s) SubLingual <User Schedule>  ferrous    sulfate 325 milliGRAM(s) Oral <User Schedule>  fluticasone propionate 50 MICROgram(s)/spray Nasal Spray 1 Spray(s) Both Nostrils two times a day  guaiFENesin  milliGRAM(s) Oral every 12 hours  influenza  Vaccine (HIGH DOSE) 0.7 milliLiter(s) IntraMuscular once  lactated ringers. 1000 milliLiter(s) (50 mL/Hr) IV Continuous <Continuous>  levoFLOXacin IVPB 750 milliGRAM(s) IV Intermittent every 24 hours  metoprolol tartrate 12.5 milliGRAM(s) Oral two times a day  multivitamin 1 Tablet(s) Oral daily  pantoprazole    Tablet 40 milliGRAM(s) Oral before breakfast  polyethylene glycol 3350 17 Gram(s) Oral daily  predniSONE   Tablet 40 milliGRAM(s) Oral daily  sodium chloride 3%  Inhalation 4 milliLiter(s) Inhalation every 6 hours  venlafaxine XR. 75 milliGRAM(s) Oral daily    MEDICATIONS  (PRN):  acetaminophen     Tablet .. 650 milliGRAM(s) Oral every 6 hours PRN Temp greater or equal to 38C (100.4F), Mild Pain (1 - 3), Moderate Pain (4 - 6), Severe Pain (7 - 10)  melatonin 3 milliGRAM(s) Oral at bedtime PRN Insomnia      CAPILLARY BLOOD GLUCOSE        I&O's Summary    17 Dec 2023 07:01  -  18 Dec 2023 07:00  --------------------------------------------------------  IN: 0 mL / OUT: 1400 mL / NET: -1400 mL        PHYSICAL EXAM:  Vital Signs Last 24 Hrs  T(C): 36.4 (18 Dec 2023 06:38), Max: 36.8 (17 Dec 2023 15:17)  T(F): 97.5 (18 Dec 2023 06:38), Max: 98.3 (17 Dec 2023 15:17)  HR: 94 (18 Dec 2023 06:38) (94 - 120)  BP: 121/77 (18 Dec 2023 06:38) (111/75 - 128/72)  BP(mean): --  RR: 18 (18 Dec 2023 06:38) (18 - 18)  SpO2: 98% (18 Dec 2023 06:38) (97% - 98%)    Parameters below as of 18 Dec 2023 06:38  Patient On (Oxygen Delivery Method): nasal cannula  O2 Flow (L/min): 2      GENERAL: No apparent distress.   HEAD:  Atraumatic, Normocephalic  EYES: EOMI, PERRLA, conjunctiva and sclera clear  NECK: Supple, no lymphadenopathy, no elevated JVP  CHEST/LUNG: Clear to auscultation bilateral and symmetric; No wheezes, rales, or rhonchi  HEART: S1 and S2 normal. Regular rate and rhythm; No murmurs, rubs, or gallops  ABDOMEN: Soft, non-tender, non-distended; normal bowel sounds  EXTREMITIES:  2+ peripheral pulses b/l, No clubbing, cyanosis, or edema  NEUROLOGY: A&O x 3, no focal deficits  SKIN: No rashes or lesions    LABS:                        8.2    12.32 )-----------( 643      ( 17 Dec 2023 07:17 )             26.9     12-17    140  |  100  |  17  ----------------------------<  100<H>  3.4<L>   |  32<H>  |  0.60    Ca    9.4      17 Dec 2023 07:17  Phos  3.2     12-17  Mg     1.8     12-17    TPro  6.8  /  Alb  3.7  /  TBili  <0.1<L>  /  DBili  x   /  AST  17  /  ALT  17  /  AlkPhos  90  12-17          Urinalysis Basic - ( 17 Dec 2023 07:17 )    Color: x / Appearance: x / SG: x / pH: x  Gluc: 100 mg/dL / Ketone: x  / Bili: x / Urobili: x   Blood: x / Protein: x / Nitrite: x   Leuk Esterase: x / RBC: x / WBC x   Sq Epi: x / Non Sq Epi: x / Bacteria: x        Culture - Acid Fast - Sputum w/Smear (collected 15 Dec 2023 15:39)  Source: .Sputum Sputum    Culture - Acid Fast - Sputum w/Smear (collected 15 Dec 2023 15:29)  Source: .Sputum Sputum    Culture - Sputum (collected 15 Dec 2023 13:28)  Source: .Sputum Sputum  Gram Stain (15 Dec 2023 20:39):    No polymorphonuclear leukocytes per low power field    No Squamous epithelial cells per low power field    Rare Gram Negative Rods per oil power field  Final Report (17 Dec 2023 06:41):    Normal Respiratory Blanca present        RADIOLOGY & ADDITIONAL TESTS:  Lab Results Reviewed   Imaging Reviewed  Electrocardiogram Reviewed   PROGRESS NOTE:   Authored by Amadeo Garcia MD  Patient is a 68y old  Female who presents with a chief complaint of Chronic progressively worsening dyspnea with minimal exertion + coughs (17 Dec 2023 12:40)      SUBJECTIVE / OVERNIGHT EVENTS:  No acute events overnight. Patient was seen and examined at bedside and did not appear to be in any acute distress. She reports that her dyspnea is improved from prior. Has a productive cough, less sputum that in the past.       MEDICATIONS  (STANDING):  albuterol/ipratropium for Nebulization 3 milliLiter(s) Nebulizer every 6 hours  amLODIPine   Tablet 5 milliGRAM(s) Oral daily  apixaban 5 milliGRAM(s) Oral every 12 hours  buprenorphine 2 mG/naloxone 0.5 mG SL  Tablet 2 Tablet(s) SubLingual <User Schedule>  ferrous    sulfate 325 milliGRAM(s) Oral <User Schedule>  fluticasone propionate 50 MICROgram(s)/spray Nasal Spray 1 Spray(s) Both Nostrils two times a day  guaiFENesin  milliGRAM(s) Oral every 12 hours  influenza  Vaccine (HIGH DOSE) 0.7 milliLiter(s) IntraMuscular once  lactated ringers. 1000 milliLiter(s) (50 mL/Hr) IV Continuous <Continuous>  levoFLOXacin IVPB 750 milliGRAM(s) IV Intermittent every 24 hours  metoprolol tartrate 12.5 milliGRAM(s) Oral two times a day  multivitamin 1 Tablet(s) Oral daily  pantoprazole    Tablet 40 milliGRAM(s) Oral before breakfast  polyethylene glycol 3350 17 Gram(s) Oral daily  predniSONE   Tablet 40 milliGRAM(s) Oral daily  sodium chloride 3%  Inhalation 4 milliLiter(s) Inhalation every 6 hours  venlafaxine XR. 75 milliGRAM(s) Oral daily    MEDICATIONS  (PRN):  acetaminophen     Tablet .. 650 milliGRAM(s) Oral every 6 hours PRN Temp greater or equal to 38C (100.4F), Mild Pain (1 - 3), Moderate Pain (4 - 6), Severe Pain (7 - 10)  melatonin 3 milliGRAM(s) Oral at bedtime PRN Insomnia      CAPILLARY BLOOD GLUCOSE        I&O's Summary    17 Dec 2023 07:01  -  18 Dec 2023 07:00  --------------------------------------------------------  IN: 0 mL / OUT: 1400 mL / NET: -1400 mL        PHYSICAL EXAM:  Vital Signs Last 24 Hrs  T(C): 36.4 (18 Dec 2023 06:38), Max: 36.8 (17 Dec 2023 15:17)  T(F): 97.5 (18 Dec 2023 06:38), Max: 98.3 (17 Dec 2023 15:17)  HR: 94 (18 Dec 2023 06:38) (94 - 120)  BP: 121/77 (18 Dec 2023 06:38) (111/75 - 128/72)  BP(mean): --  RR: 18 (18 Dec 2023 06:38) (18 - 18)  SpO2: 98% (18 Dec 2023 06:38) (97% - 98%)    Parameters below as of 18 Dec 2023 06:38  Patient On (Oxygen Delivery Method): nasal cannula  O2 Flow (L/min): 2      GENERAL: No apparent distress.   HEAD:  Atraumatic, Normocephalic  EYES: EOMI, PERRLA, conjunctiva and sclera clear  NECK: Supple, no lymphadenopathy, no elevated JVP  CHEST/LUNG: Clear to auscultation bilateral and symmetric; No wheezes, rales, or rhonchi  HEART: S1 and S2 normal. Regular rate and rhythm; No murmurs, rubs, or gallops  ABDOMEN: Soft, non-tender, non-distended; normal bowel sounds  EXTREMITIES:  2+ peripheral pulses b/l, No clubbing, cyanosis, or edema  NEUROLOGY: A&O x 3, no focal deficits  SKIN: No rashes or lesions    LABS:                        8.2    12.32 )-----------( 643      ( 17 Dec 2023 07:17 )             26.9     12-17    140  |  100  |  17  ----------------------------<  100<H>  3.4<L>   |  32<H>  |  0.60    Ca    9.4      17 Dec 2023 07:17  Phos  3.2     12-17  Mg     1.8     12-17    TPro  6.8  /  Alb  3.7  /  TBili  <0.1<L>  /  DBili  x   /  AST  17  /  ALT  17  /  AlkPhos  90  12-17          Urinalysis Basic - ( 17 Dec 2023 07:17 )    Color: x / Appearance: x / SG: x / pH: x  Gluc: 100 mg/dL / Ketone: x  / Bili: x / Urobili: x   Blood: x / Protein: x / Nitrite: x   Leuk Esterase: x / RBC: x / WBC x   Sq Epi: x / Non Sq Epi: x / Bacteria: x        Culture - Acid Fast - Sputum w/Smear (collected 15 Dec 2023 15:39)  Source: .Sputum Sputum    Culture - Acid Fast - Sputum w/Smear (collected 15 Dec 2023 15:29)  Source: .Sputum Sputum    Culture - Sputum (collected 15 Dec 2023 13:28)  Source: .Sputum Sputum  Gram Stain (15 Dec 2023 20:39):    No polymorphonuclear leukocytes per low power field    No Squamous epithelial cells per low power field    Rare Gram Negative Rods per oil power field  Final Report (17 Dec 2023 06:41):    Normal Respiratory Blanca present        RADIOLOGY & ADDITIONAL TESTS:  Lab Results Reviewed   Imaging Reviewed  Electrocardiogram Reviewed

## 2023-12-18 NOTE — PROGRESS NOTE ADULT - SUBJECTIVE AND OBJECTIVE BOX
SUBJECTIVE / OVERNIGHT EVENTS:  No acute events overnight. She reports that her dyspnea is improved but persists. Cough also persists but less productive.  Patient observed returning from study getting from wheelchair to bed, O2Sat 93% on 3L NC. HR 130s.      MEDICATIONS  (STANDING):  albuterol/ipratropium for Nebulization 3 milliLiter(s) Nebulizer every 6 hours  amLODIPine   Tablet 5 milliGRAM(s) Oral daily  apixaban 5 milliGRAM(s) Oral every 12 hours  buprenorphine 2 mG/naloxone 0.5 mG SL  Tablet 2 Tablet(s) SubLingual <User Schedule>  ferrous    sulfate 325 milliGRAM(s) Oral <User Schedule>  fluticasone propionate 50 MICROgram(s)/spray Nasal Spray 1 Spray(s) Both Nostrils two times a day  guaiFENesin  milliGRAM(s) Oral every 12 hours  influenza  Vaccine (HIGH DOSE) 0.7 milliLiter(s) IntraMuscular once  lactated ringers. 1000 milliLiter(s) (50 mL/Hr) IV Continuous <Continuous>  levoFLOXacin IVPB 750 milliGRAM(s) IV Intermittent every 24 hours  metoprolol tartrate 12.5 milliGRAM(s) Oral two times a day  multivitamin 1 Tablet(s) Oral daily  pantoprazole    Tablet 40 milliGRAM(s) Oral before breakfast  polyethylene glycol 3350 17 Gram(s) Oral daily  predniSONE   Tablet 40 milliGRAM(s) Oral daily  sodium chloride 3%  Inhalation 4 milliLiter(s) Inhalation every 6 hours  venlafaxine XR. 75 milliGRAM(s) Oral daily    MEDICATIONS  (PRN):  acetaminophen     Tablet .. 650 milliGRAM(s) Oral every 6 hours PRN Temp greater or equal to 38C (100.4F), Mild Pain (1 - 3), Moderate Pain (4 - 6), Severe Pain (7 - 10)  melatonin 3 milliGRAM(s) Oral at bedtime PRN Insomnia      CAPILLARY BLOOD GLUCOSE        I&O's Summary    17 Dec 2023 07:01  -  18 Dec 2023 07:00  --------------------------------------------------------  IN: 0 mL / OUT: 1400 mL / NET: -1400 mL        PHYSICAL EXAM:  Vital Signs Last 24 Hrs  T(C): 36.4 (18 Dec 2023 06:38), Max: 36.8 (17 Dec 2023 15:17)  T(F): 97.5 (18 Dec 2023 06:38), Max: 98.3 (17 Dec 2023 15:17)  HR: 94 (18 Dec 2023 06:38) (94 - 120)  BP: 121/77 (18 Dec 2023 06:38) (111/75 - 128/72)  BP(mean): --  RR: 18 (18 Dec 2023 06:38) (18 - 18)  SpO2: 98% (18 Dec 2023 06:38) (97% - 98%)    Parameters below as of 18 Dec 2023 06:38  Patient On (Oxygen Delivery Method): nasal cannula  O2 Flow (L/min): 2      GENERAL: No apparent distress.   HEAD:  Atraumatic, Normocephalic  EYES: EOMI, PERRLA, conjunctiva and sclera clear  NECK: Supple, no lymphadenopathy, no elevated JVP  CHEST/LUNG: Clear to auscultation bilateral and symmetric; No wheezes, rales, or rhonchi  HEART: S1 and S2 normal. Regular rate and rhythm; No murmurs, rubs, or gallops  ABDOMEN: Soft, non-tender, non-distended; normal bowel sounds  EXTREMITIES:  2+ peripheral pulses b/l, No clubbing, cyanosis, or edema  NEUROLOGY: A&O x 3, no focal deficits  SKIN: No rashes or lesions    LABS:                        8.2    12.32 )-----------( 643      ( 17 Dec 2023 07:17 )             26.9     12-17    140  |  100  |  17  ----------------------------<  100<H>  3.4<L>   |  32<H>  |  0.60    Ca    9.4      17 Dec 2023 07:17  Phos  3.2     12-17  Mg     1.8     12-17    TPro  6.8  /  Alb  3.7  /  TBili  <0.1<L>  /  DBili  x   /  AST  17  /  ALT  17  /  AlkPhos  90  12-17          Urinalysis Basic - ( 17 Dec 2023 07:17 )    Color: x / Appearance: x / SG: x / pH: x  Gluc: 100 mg/dL / Ketone: x  / Bili: x / Urobili: x   Blood: x / Protein: x / Nitrite: x   Leuk Esterase: x / RBC: x / WBC x   Sq Epi: x / Non Sq Epi: x / Bacteria: x        Culture - Acid Fast - Sputum w/Smear (collected 15 Dec 2023 15:39)  Source: .Sputum Sputum    Culture - Acid Fast - Sputum w/Smear (collected 15 Dec 2023 15:29)  Source: .Sputum Sputum    Culture - Sputum (collected 15 Dec 2023 13:28)  Source: .Sputum Sputum  Gram Stain (15 Dec 2023 20:39):    No polymorphonuclear leukocytes per low power field    No Squamous epithelial cells per low power field    Rare Gram Negative Rods per oil power field  Final Report (17 Dec 2023 06:41):    Normal Respiratory Blanca present        RADIOLOGY & ADDITIONAL TESTS:  Lab Results Reviewed   Imaging Reviewed  Electrocardiogram Reviewed

## 2023-12-18 NOTE — PROGRESS NOTE ADULT - PROBLEM SELECTOR PLAN 7
- Takes OTC omeprazole PRN at home for GERD symptoms  - Continue PRN pantoprazole 40 mg QD while inpatient (therapeutic equiv conversion) Hx of back surgery in 1980s for herniated disc, consistent opioid use afterwards for an extended period of time, currently on Suboxone SL film 4 mg BID    Plan:  - c/w Suboxone SL tab

## 2023-12-18 NOTE — SWALLOW VFSS/MBS ASSESSMENT ADULT - H & P REVIEW
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

## 2023-12-19 LAB
ALBUMIN SERPL ELPH-MCNC: 4.1 G/DL — SIGNIFICANT CHANGE UP (ref 3.3–5)
ALBUMIN SERPL ELPH-MCNC: 4.1 G/DL — SIGNIFICANT CHANGE UP (ref 3.3–5)
ALP SERPL-CCNC: 93 U/L — SIGNIFICANT CHANGE UP (ref 40–120)
ALP SERPL-CCNC: 93 U/L — SIGNIFICANT CHANGE UP (ref 40–120)
ALT FLD-CCNC: 17 U/L — SIGNIFICANT CHANGE UP (ref 10–45)
ALT FLD-CCNC: 17 U/L — SIGNIFICANT CHANGE UP (ref 10–45)
ANION GAP SERPL CALC-SCNC: 10 MMOL/L — SIGNIFICANT CHANGE UP (ref 5–17)
ANION GAP SERPL CALC-SCNC: 10 MMOL/L — SIGNIFICANT CHANGE UP (ref 5–17)
APPEARANCE UR: CLEAR — SIGNIFICANT CHANGE UP
APPEARANCE UR: CLEAR — SIGNIFICANT CHANGE UP
AST SERPL-CCNC: 10 U/L — SIGNIFICANT CHANGE UP (ref 10–40)
AST SERPL-CCNC: 10 U/L — SIGNIFICANT CHANGE UP (ref 10–40)
BACTERIA # UR AUTO: NEGATIVE /HPF — SIGNIFICANT CHANGE UP
BACTERIA # UR AUTO: NEGATIVE /HPF — SIGNIFICANT CHANGE UP
BILIRUB SERPL-MCNC: <0.1 MG/DL — LOW (ref 0.2–1.2)
BILIRUB SERPL-MCNC: <0.1 MG/DL — LOW (ref 0.2–1.2)
BILIRUB UR-MCNC: NEGATIVE — SIGNIFICANT CHANGE UP
BILIRUB UR-MCNC: NEGATIVE — SIGNIFICANT CHANGE UP
BUN SERPL-MCNC: 24 MG/DL — HIGH (ref 7–23)
BUN SERPL-MCNC: 24 MG/DL — HIGH (ref 7–23)
CALCIUM SERPL-MCNC: 9.8 MG/DL — SIGNIFICANT CHANGE UP (ref 8.4–10.5)
CALCIUM SERPL-MCNC: 9.8 MG/DL — SIGNIFICANT CHANGE UP (ref 8.4–10.5)
CAST: 0 /LPF — SIGNIFICANT CHANGE UP (ref 0–4)
CAST: 0 /LPF — SIGNIFICANT CHANGE UP (ref 0–4)
CHLORIDE SERPL-SCNC: 92 MMOL/L — LOW (ref 96–108)
CHLORIDE SERPL-SCNC: 92 MMOL/L — LOW (ref 96–108)
CO2 SERPL-SCNC: 34 MMOL/L — HIGH (ref 22–31)
CO2 SERPL-SCNC: 34 MMOL/L — HIGH (ref 22–31)
COLOR SPEC: YELLOW — SIGNIFICANT CHANGE UP
COLOR SPEC: YELLOW — SIGNIFICANT CHANGE UP
CREAT SERPL-MCNC: 0.55 MG/DL — SIGNIFICANT CHANGE UP (ref 0.5–1.3)
CREAT SERPL-MCNC: 0.55 MG/DL — SIGNIFICANT CHANGE UP (ref 0.5–1.3)
CULTURE RESULTS: SIGNIFICANT CHANGE UP
DIFF PNL FLD: NEGATIVE — SIGNIFICANT CHANGE UP
DIFF PNL FLD: NEGATIVE — SIGNIFICANT CHANGE UP
EGFR: 100 ML/MIN/1.73M2 — SIGNIFICANT CHANGE UP
EGFR: 100 ML/MIN/1.73M2 — SIGNIFICANT CHANGE UP
GLUCOSE SERPL-MCNC: 102 MG/DL — HIGH (ref 70–99)
GLUCOSE SERPL-MCNC: 102 MG/DL — HIGH (ref 70–99)
GLUCOSE UR QL: NEGATIVE MG/DL — SIGNIFICANT CHANGE UP
GLUCOSE UR QL: NEGATIVE MG/DL — SIGNIFICANT CHANGE UP
HCT VFR BLD CALC: 29.1 % — LOW (ref 34.5–45)
HCT VFR BLD CALC: 29.1 % — LOW (ref 34.5–45)
HGB BLD-MCNC: 8.8 G/DL — LOW (ref 11.5–15.5)
HGB BLD-MCNC: 8.8 G/DL — LOW (ref 11.5–15.5)
KETONES UR-MCNC: NEGATIVE MG/DL — SIGNIFICANT CHANGE UP
KETONES UR-MCNC: NEGATIVE MG/DL — SIGNIFICANT CHANGE UP
LEUKOCYTE ESTERASE UR-ACNC: NEGATIVE — SIGNIFICANT CHANGE UP
LEUKOCYTE ESTERASE UR-ACNC: NEGATIVE — SIGNIFICANT CHANGE UP
MAGNESIUM SERPL-MCNC: 2.1 MG/DL — SIGNIFICANT CHANGE UP (ref 1.6–2.6)
MAGNESIUM SERPL-MCNC: 2.1 MG/DL — SIGNIFICANT CHANGE UP (ref 1.6–2.6)
MCHC RBC-ENTMCNC: 24.4 PG — LOW (ref 27–34)
MCHC RBC-ENTMCNC: 24.4 PG — LOW (ref 27–34)
MCHC RBC-ENTMCNC: 30.2 GM/DL — LOW (ref 32–36)
MCHC RBC-ENTMCNC: 30.2 GM/DL — LOW (ref 32–36)
MCV RBC AUTO: 80.6 FL — SIGNIFICANT CHANGE UP (ref 80–100)
MCV RBC AUTO: 80.6 FL — SIGNIFICANT CHANGE UP (ref 80–100)
NIGHT BLUE STAIN TISS: SIGNIFICANT CHANGE UP
NIGHT BLUE STAIN TISS: SIGNIFICANT CHANGE UP
NITRITE UR-MCNC: NEGATIVE — SIGNIFICANT CHANGE UP
NITRITE UR-MCNC: NEGATIVE — SIGNIFICANT CHANGE UP
NRBC # BLD: 0 /100 WBCS — SIGNIFICANT CHANGE UP (ref 0–0)
NRBC # BLD: 0 /100 WBCS — SIGNIFICANT CHANGE UP (ref 0–0)
PH UR: 7.5 — SIGNIFICANT CHANGE UP (ref 5–8)
PH UR: 7.5 — SIGNIFICANT CHANGE UP (ref 5–8)
PHOSPHATE SERPL-MCNC: 3.2 MG/DL — SIGNIFICANT CHANGE UP (ref 2.5–4.5)
PHOSPHATE SERPL-MCNC: 3.2 MG/DL — SIGNIFICANT CHANGE UP (ref 2.5–4.5)
PLATELET # BLD AUTO: 692 K/UL — HIGH (ref 150–400)
PLATELET # BLD AUTO: 692 K/UL — HIGH (ref 150–400)
POTASSIUM SERPL-MCNC: 4.1 MMOL/L — SIGNIFICANT CHANGE UP (ref 3.5–5.3)
POTASSIUM SERPL-MCNC: 4.1 MMOL/L — SIGNIFICANT CHANGE UP (ref 3.5–5.3)
POTASSIUM SERPL-SCNC: 4.1 MMOL/L — SIGNIFICANT CHANGE UP (ref 3.5–5.3)
POTASSIUM SERPL-SCNC: 4.1 MMOL/L — SIGNIFICANT CHANGE UP (ref 3.5–5.3)
PROT SERPL-MCNC: 7.2 G/DL — SIGNIFICANT CHANGE UP (ref 6–8.3)
PROT SERPL-MCNC: 7.2 G/DL — SIGNIFICANT CHANGE UP (ref 6–8.3)
PROT UR-MCNC: NEGATIVE MG/DL — SIGNIFICANT CHANGE UP
PROT UR-MCNC: NEGATIVE MG/DL — SIGNIFICANT CHANGE UP
RBC # BLD: 3.61 M/UL — LOW (ref 3.8–5.2)
RBC # BLD: 3.61 M/UL — LOW (ref 3.8–5.2)
RBC # FLD: 16.4 % — HIGH (ref 10.3–14.5)
RBC # FLD: 16.4 % — HIGH (ref 10.3–14.5)
RBC CASTS # UR COMP ASSIST: 0 /HPF — SIGNIFICANT CHANGE UP (ref 0–4)
RBC CASTS # UR COMP ASSIST: 0 /HPF — SIGNIFICANT CHANGE UP (ref 0–4)
SODIUM SERPL-SCNC: 136 MMOL/L — SIGNIFICANT CHANGE UP (ref 135–145)
SODIUM SERPL-SCNC: 136 MMOL/L — SIGNIFICANT CHANGE UP (ref 135–145)
SP GR SPEC: 1.01 — SIGNIFICANT CHANGE UP (ref 1–1.03)
SP GR SPEC: 1.01 — SIGNIFICANT CHANGE UP (ref 1–1.03)
SPECIMEN SOURCE: SIGNIFICANT CHANGE UP
SQUAMOUS # UR AUTO: 0 /HPF — SIGNIFICANT CHANGE UP (ref 0–5)
SQUAMOUS # UR AUTO: 0 /HPF — SIGNIFICANT CHANGE UP (ref 0–5)
UROBILINOGEN FLD QL: 0.2 MG/DL — SIGNIFICANT CHANGE UP (ref 0.2–1)
UROBILINOGEN FLD QL: 0.2 MG/DL — SIGNIFICANT CHANGE UP (ref 0.2–1)
WBC # BLD: 16.9 K/UL — HIGH (ref 3.8–10.5)
WBC # BLD: 16.9 K/UL — HIGH (ref 3.8–10.5)
WBC # FLD AUTO: 16.9 K/UL — HIGH (ref 3.8–10.5)
WBC # FLD AUTO: 16.9 K/UL — HIGH (ref 3.8–10.5)
WBC UR QL: 0 /HPF — SIGNIFICANT CHANGE UP (ref 0–5)
WBC UR QL: 0 /HPF — SIGNIFICANT CHANGE UP (ref 0–5)

## 2023-12-19 PROCEDURE — 99233 SBSQ HOSP IP/OBS HIGH 50: CPT

## 2023-12-19 PROCEDURE — 99232 SBSQ HOSP IP/OBS MODERATE 35: CPT | Mod: GC

## 2023-12-19 RX ORDER — METOPROLOL TARTRATE 50 MG
25 TABLET ORAL
Refills: 0 | Status: DISCONTINUED | OUTPATIENT
Start: 2023-12-19 | End: 2023-12-27

## 2023-12-19 RX ADMIN — Medication 1 TABLET(S): at 11:51

## 2023-12-19 RX ADMIN — SODIUM CHLORIDE 4 MILLILITER(S): 9 INJECTION INTRAMUSCULAR; INTRAVENOUS; SUBCUTANEOUS at 11:51

## 2023-12-19 RX ADMIN — Medication 75 MILLIGRAM(S): at 11:50

## 2023-12-19 RX ADMIN — Medication 3 MILLILITER(S): at 06:03

## 2023-12-19 RX ADMIN — Medication 3 MILLIGRAM(S): at 22:21

## 2023-12-19 RX ADMIN — Medication 1 SPRAY(S): at 17:31

## 2023-12-19 RX ADMIN — Medication 3 MILLILITER(S): at 17:31

## 2023-12-19 RX ADMIN — POLYETHYLENE GLYCOL 3350 17 GRAM(S): 17 POWDER, FOR SOLUTION ORAL at 11:51

## 2023-12-19 RX ADMIN — SODIUM CHLORIDE 4 MILLILITER(S): 9 INJECTION INTRAMUSCULAR; INTRAVENOUS; SUBCUTANEOUS at 17:31

## 2023-12-19 RX ADMIN — Medication 600 MILLIGRAM(S): at 17:31

## 2023-12-19 RX ADMIN — Medication 25 MILLIGRAM(S): at 17:31

## 2023-12-19 RX ADMIN — Medication 3 MILLILITER(S): at 23:05

## 2023-12-19 RX ADMIN — Medication 40 MILLIGRAM(S): at 10:18

## 2023-12-19 RX ADMIN — SODIUM CHLORIDE 4 MILLILITER(S): 9 INJECTION INTRAMUSCULAR; INTRAVENOUS; SUBCUTANEOUS at 06:03

## 2023-12-19 RX ADMIN — APIXABAN 5 MILLIGRAM(S): 2.5 TABLET, FILM COATED ORAL at 17:31

## 2023-12-19 RX ADMIN — SODIUM CHLORIDE 4 MILLILITER(S): 9 INJECTION INTRAMUSCULAR; INTRAVENOUS; SUBCUTANEOUS at 23:05

## 2023-12-19 RX ADMIN — Medication 12.5 MILLIGRAM(S): at 06:03

## 2023-12-19 RX ADMIN — APIXABAN 5 MILLIGRAM(S): 2.5 TABLET, FILM COATED ORAL at 06:04

## 2023-12-19 RX ADMIN — Medication 600 MILLIGRAM(S): at 06:04

## 2023-12-19 RX ADMIN — AMLODIPINE BESYLATE 5 MILLIGRAM(S): 2.5 TABLET ORAL at 06:04

## 2023-12-19 RX ADMIN — BUPRENORPHINE AND NALOXONE 2 TABLET(S): 2; .5 TABLET SUBLINGUAL at 10:18

## 2023-12-19 RX ADMIN — PANTOPRAZOLE SODIUM 40 MILLIGRAM(S): 20 TABLET, DELAYED RELEASE ORAL at 06:07

## 2023-12-19 RX ADMIN — Medication 1 SPRAY(S): at 06:03

## 2023-12-19 RX ADMIN — BUPRENORPHINE AND NALOXONE 2 TABLET(S): 2; .5 TABLET SUBLINGUAL at 22:21

## 2023-12-19 RX ADMIN — Medication 3 MILLILITER(S): at 11:51

## 2023-12-19 NOTE — PROGRESS NOTE ADULT - PROBLEM SELECTOR PLAN 2
Etiology unclear- possibly due to underlying infection. CTA negative for PE  -Will repeat EKG to make sure the patient is truly in sinus tach Etiology unclear- possibly due to underlying infection. CTA negative for PE  -Sinus on repeat EKG   -Ordered TTE Etiology unclear- possibly due to underlying infection. CTA negative for PE  -Sinus on repeat EKG   -Ordered TTE for further evaluation.

## 2023-12-19 NOTE — PROGRESS NOTE ADULT - ASSESSMENT
67 yo F with PMHx Protein S deficiency on eliquis, COPD (3 LPM 24/7 at home), former smoker (1.5ppd x 40yrs, quit 2013), bronchiectasis presenting with progressive dyspnea, productive cough, weight loss found to have abnormal imaging for which pulmonary is consulted. She has patchy areas of ground glass on her imaging, increased sputum production and wheeze; this in conjunction with her lab abnormalities raises concern for COPD exacerbation. 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       #Bronchiectasis  #COPD/Emphysema exacerbation  #NTM Pulmonary disease?  #Weight loss?  #Sinus tachycardia    Recommendations:   - Can complete 7day course of Levaquin  - Continue 40 mg pred for now, still with wheezing  - AFB smear (-) x 3, would repeat Sputum culture  - ACT with Duonebs q6 + 3% hypersal q6h, Acapella (reviewed order of use with patient)  - Follow-up Cinesophagram results  - Supplemental oxygen to goal >88%, remains on 2L NC  - TTE for tachycardia, awaiting  - patient will benefit from Pulmonary rehab as outpatient    Chari Covington MD

## 2023-12-19 NOTE — PROGRESS NOTE ADULT - PROBLEM SELECTOR PLAN 1
Now presenting with worsening ALTMAN and fatigue which appears to be 2/2 PNA vs. MAC as oppose to exacerbation. Home pulm regimen Advair 500-50 + Albuterol    Plan:  >Sating well on baseline O2 3L  >VBG 7.4/57, bicarb 29 on admission (12/14 @ 1PM)  >s/p prednisone 50 mg PO x1 in ED on 12/14  - c/w prednisone 40mg qd to complete 5 days course (12/15 - 12/18)  - c/w DuoNeb q6 standing + Hypersal 3% q6 + Mucinex + Flonase for airway clearance  - Hold ICS-LABA while in exacerbation  - c/w NC, wean as tolerated, continuous pulse ox Now presenting with worsening ALTMAN and fatigue which appears to be 2/2 PNA vs. MAC as oppose to exacerbation. Home pulm regimen Advair 500-50 + Albuterol    Plan:  >Sating well on baseline O2 3L  - c/w prednisone 40mg qd  - c/w DuoNeb q6 standing + Hypersal 3% q6 + Mucinex + Flonase for airway clearance  - Hold ICS-LABA while in exacerbation  - c/w NC, wean as tolerated, continuous pulse ox

## 2023-12-19 NOTE — PROGRESS NOTE ADULT - PROBLEM SELECTOR PLAN 3
On admission WBC 18, tachy to 140s, RR24, SpO2 96% on 3L, afebrile, minimally elevated procal 0.24    Plan:  >CTA Chest + CT A/P (12/14): Extensive airway thickening and tree-in-bud nodularity, compatible with chronic endobronchial disease, including MAC  >s/p IV azithro and CTX x2 (12/14 - 12/15)  - Met SIRS criteria on admission by WBC and tachy  - PNA vs. MAC  - c/w Levaquin 750qd for PsA coverage (12/15 - ), pulm recs appreciated  - Strep [-], Legionella [-], MRSA [-]  - Bcx (12/15): NGTD  - Sputum cx w/ AF (12/15): negative x 1  - f/u pulmonary recs regarding COPD and MAC w/u On admission WBC 18, tachy to 140s, RR24, SpO2 96% on 3L, afebrile, minimally elevated procal 0.24    Plan:  >CTA Chest + CT A/P (12/14): Extensive airway thickening and tree-in-bud nodularity, compatible with chronic endobronchial disease, including MAC  >s/p IV azithro and CTX x2 (12/14 - 12/15)  - Met SIRS criteria on admission by WBC and tachy  - PNA vs. MAC  - c/w Levaquin 750qd for PsA coverage (12/15 -12/21 ), pulm recs appreciated  - Strep [-], Legionella [-], MRSA [-]  - Bcx (12/15): NGTD  - Sputum cx w/ AF (12/15): negative x 1  - f/u pulmonary recs regarding COPD and MAC w/u

## 2023-12-19 NOTE — PROGRESS NOTE ADULT - SUBJECTIVE AND OBJECTIVE BOX
PROGRESS NOTE:   Authored by Amadeo Garcia MD  Patient is a 68y old  Female who presents with a chief complaint of Chronic progressively worsening dyspnea with minimal exertion + coughs (18 Dec 2023 22:37)      SUBJECTIVE / OVERNIGHT EVENTS:  No acute events overnight.     ADDITIONAL REVIEW OF SYSTEMS:    MEDICATIONS  (STANDING):  albuterol/ipratropium for Nebulization 3 milliLiter(s) Nebulizer every 6 hours  amLODIPine   Tablet 5 milliGRAM(s) Oral daily  apixaban 5 milliGRAM(s) Oral every 12 hours  buprenorphine 2 mG/naloxone 0.5 mG SL  Tablet 2 Tablet(s) SubLingual <User Schedule>  ferrous    sulfate 325 milliGRAM(s) Oral <User Schedule>  fluticasone propionate 50 MICROgram(s)/spray Nasal Spray 1 Spray(s) Both Nostrils two times a day  guaiFENesin  milliGRAM(s) Oral every 12 hours  influenza  Vaccine (HIGH DOSE) 0.7 milliLiter(s) IntraMuscular once  lactated ringers. 1000 milliLiter(s) (50 mL/Hr) IV Continuous <Continuous>  levoFLOXacin IVPB 750 milliGRAM(s) IV Intermittent every 24 hours  metoprolol tartrate 12.5 milliGRAM(s) Oral two times a day  multivitamin 1 Tablet(s) Oral daily  pantoprazole    Tablet 40 milliGRAM(s) Oral before breakfast  polyethylene glycol 3350 17 Gram(s) Oral daily  sodium chloride 3%  Inhalation 4 milliLiter(s) Inhalation every 6 hours  venlafaxine XR. 75 milliGRAM(s) Oral daily    MEDICATIONS  (PRN):  acetaminophen     Tablet .. 650 milliGRAM(s) Oral every 6 hours PRN Temp greater or equal to 38C (100.4F), Mild Pain (1 - 3), Moderate Pain (4 - 6), Severe Pain (7 - 10)  melatonin 3 milliGRAM(s) Oral at bedtime PRN Insomnia      CAPILLARY BLOOD GLUCOSE        I&O's Summary    18 Dec 2023 07:01  -  19 Dec 2023 07:00  --------------------------------------------------------  IN: 300 mL / OUT: 501 mL / NET: -201 mL        PHYSICAL EXAM:  Vital Signs Last 24 Hrs  T(C): 36.6 (19 Dec 2023 05:30), Max: 37.1 (18 Dec 2023 20:04)  T(F): 97.8 (19 Dec 2023 05:30), Max: 98.8 (18 Dec 2023 20:04)  HR: 106 (19 Dec 2023 05:30) (106 - 121)  BP: 123/64 (19 Dec 2023 05:30) (114/70 - 123/75)  BP(mean): --  RR: 18 (19 Dec 2023 05:30) (18 - 18)  SpO2: 98% (19 Dec 2023 05:30) (96% - 98%)    Parameters below as of 19 Dec 2023 05:30  Patient On (Oxygen Delivery Method): nasal cannula  O2 Flow (L/min): 2      GENERAL: No apparent distress.   HEAD:  Atraumatic, Normocephalic  EYES: EOMI, PERRLA, conjunctiva and sclera clear  NECK: Supple, no lymphadenopathy, no elevated JVP  CHEST/LUNG: Clear to auscultation bilateral and symmetric; No wheezes, rales, or rhonchi  HEART: S1 and S2 normal. Regular rate and rhythm; No murmurs, rubs, or gallops  ABDOMEN: Soft, non-tender, non-distended; normal bowel sounds  EXTREMITIES:  2+ peripheral pulses b/l, No clubbing, cyanosis, or edema  NEUROLOGY: A&O x 3, no focal deficits  SKIN: No rashes or lesions    LABS:                        9.5    14.83 )-----------( 689      ( 18 Dec 2023 07:23 )             32.2     12-18    137  |  97  |  23  ----------------------------<  95  4.4   |  31  |  0.59    Ca    9.7      18 Dec 2023 09:24  Phos  3.5     12-18  Mg     2.2     12-18    TPro  7.3  /  Alb  3.9  /  TBili  <0.1<L>  /  DBili  x   /  AST  16  /  ALT  20  /  AlkPhos  94  12-18          Urinalysis Basic - ( 18 Dec 2023 09:24 )    Color: x / Appearance: x / SG: x / pH: x  Gluc: 95 mg/dL / Ketone: x  / Bili: x / Urobili: x   Blood: x / Protein: x / Nitrite: x   Leuk Esterase: x / RBC: x / WBC x   Sq Epi: x / Non Sq Epi: x / Bacteria: x          RADIOLOGY & ADDITIONAL TESTS:  Lab Results Reviewed   Imaging Reviewed  Electrocardiogram Reviewed   PROGRESS NOTE:   Authored by Amadeo Garcia MD  Patient is a 68y old  Female who presents with a chief complaint of Chronic progressively worsening dyspnea with minimal exertion + coughs (18 Dec 2023 22:37)      SUBJECTIVE / OVERNIGHT EVENTS:  No acute events overnight. Patient was seen and examined at bedside and did not appear to be in any acute distress. She reports that her dyspnea and cough are improving. Denies any fevers, chills or chest pain.       MEDICATIONS  (STANDING):  albuterol/ipratropium for Nebulization 3 milliLiter(s) Nebulizer every 6 hours  amLODIPine   Tablet 5 milliGRAM(s) Oral daily  apixaban 5 milliGRAM(s) Oral every 12 hours  buprenorphine 2 mG/naloxone 0.5 mG SL  Tablet 2 Tablet(s) SubLingual <User Schedule>  ferrous    sulfate 325 milliGRAM(s) Oral <User Schedule>  fluticasone propionate 50 MICROgram(s)/spray Nasal Spray 1 Spray(s) Both Nostrils two times a day  guaiFENesin  milliGRAM(s) Oral every 12 hours  influenza  Vaccine (HIGH DOSE) 0.7 milliLiter(s) IntraMuscular once  lactated ringers. 1000 milliLiter(s) (50 mL/Hr) IV Continuous <Continuous>  levoFLOXacin IVPB 750 milliGRAM(s) IV Intermittent every 24 hours  metoprolol tartrate 12.5 milliGRAM(s) Oral two times a day  multivitamin 1 Tablet(s) Oral daily  pantoprazole    Tablet 40 milliGRAM(s) Oral before breakfast  polyethylene glycol 3350 17 Gram(s) Oral daily  sodium chloride 3%  Inhalation 4 milliLiter(s) Inhalation every 6 hours  venlafaxine XR. 75 milliGRAM(s) Oral daily    MEDICATIONS  (PRN):  acetaminophen     Tablet .. 650 milliGRAM(s) Oral every 6 hours PRN Temp greater or equal to 38C (100.4F), Mild Pain (1 - 3), Moderate Pain (4 - 6), Severe Pain (7 - 10)  melatonin 3 milliGRAM(s) Oral at bedtime PRN Insomnia      CAPILLARY BLOOD GLUCOSE        I&O's Summary    18 Dec 2023 07:01  -  19 Dec 2023 07:00  --------------------------------------------------------  IN: 300 mL / OUT: 501 mL / NET: -201 mL        PHYSICAL EXAM:  Vital Signs Last 24 Hrs  T(C): 36.6 (19 Dec 2023 05:30), Max: 37.1 (18 Dec 2023 20:04)  T(F): 97.8 (19 Dec 2023 05:30), Max: 98.8 (18 Dec 2023 20:04)  HR: 106 (19 Dec 2023 05:30) (106 - 121)  BP: 123/64 (19 Dec 2023 05:30) (114/70 - 123/75)  BP(mean): --  RR: 18 (19 Dec 2023 05:30) (18 - 18)  SpO2: 98% (19 Dec 2023 05:30) (96% - 98%)    Parameters below as of 19 Dec 2023 05:30  Patient On (Oxygen Delivery Method): nasal cannula  O2 Flow (L/min): 2      GENERAL: No apparent distress.   HEAD:  Atraumatic, Normocephalic  EYES: EOMI, PERRLA, conjunctiva and sclera clear  NECK: Supple, no lymphadenopathy, no elevated JVP  CHEST/LUNG: Expiratory wheeze, RUL  HEART: S1 and S2 normal. Regular rate and rhythm; No murmurs, rubs, or gallops  ABDOMEN: Soft, non-tender, non-distended; normal bowel sounds  EXTREMITIES:  2+ peripheral pulses b/l, No clubbing, cyanosis, or edema  NEUROLOGY: A&O x 3, no focal deficits  SKIN: No rashes or lesions    LABS:                        9.5    14.83 )-----------( 689      ( 18 Dec 2023 07:23 )             32.2     12-18    137  |  97  |  23  ----------------------------<  95  4.4   |  31  |  0.59    Ca    9.7      18 Dec 2023 09:24  Phos  3.5     12-18  Mg     2.2     12-18    TPro  7.3  /  Alb  3.9  /  TBili  <0.1<L>  /  DBili  x   /  AST  16  /  ALT  20  /  AlkPhos  94  12-18          Urinalysis Basic - ( 18 Dec 2023 09:24 )    Color: x / Appearance: x / SG: x / pH: x  Gluc: 95 mg/dL / Ketone: x  / Bili: x / Urobili: x   Blood: x / Protein: x / Nitrite: x   Leuk Esterase: x / RBC: x / WBC x   Sq Epi: x / Non Sq Epi: x / Bacteria: x          RADIOLOGY & ADDITIONAL TESTS:  Lab Results Reviewed   Imaging Reviewed  Electrocardiogram Reviewed

## 2023-12-19 NOTE — PROGRESS NOTE ADULT - SUBJECTIVE AND OBJECTIVE BOX
Patient is a 68y old  Female who presents with a chief complaint of Chronic progressively worsening dyspnea with minimal exertion + coughs (18 Dec 2023 22:37)      SUBJECTIVE / OVERNIGHT EVENTS:  No acute events overnight.She reports that her dyspnea and cough are improving. Persistent tachycardia 120s at rest, notes worsens with any movement and makes her feel more short of breath.      MEDICATIONS  (STANDING):  albuterol/ipratropium for Nebulization 3 milliLiter(s) Nebulizer every 6 hours  amLODIPine   Tablet 5 milliGRAM(s) Oral daily  apixaban 5 milliGRAM(s) Oral every 12 hours  buprenorphine 2 mG/naloxone 0.5 mG SL  Tablet 2 Tablet(s) SubLingual <User Schedule>  ferrous    sulfate 325 milliGRAM(s) Oral <User Schedule>  fluticasone propionate 50 MICROgram(s)/spray Nasal Spray 1 Spray(s) Both Nostrils two times a day  guaiFENesin  milliGRAM(s) Oral every 12 hours  influenza  Vaccine (HIGH DOSE) 0.7 milliLiter(s) IntraMuscular once  lactated ringers. 1000 milliLiter(s) (50 mL/Hr) IV Continuous <Continuous>  levoFLOXacin IVPB 750 milliGRAM(s) IV Intermittent every 24 hours  metoprolol tartrate 12.5 milliGRAM(s) Oral two times a day  multivitamin 1 Tablet(s) Oral daily  pantoprazole    Tablet 40 milliGRAM(s) Oral before breakfast  polyethylene glycol 3350 17 Gram(s) Oral daily  sodium chloride 3%  Inhalation 4 milliLiter(s) Inhalation every 6 hours  venlafaxine XR. 75 milliGRAM(s) Oral daily    MEDICATIONS  (PRN):  acetaminophen     Tablet .. 650 milliGRAM(s) Oral every 6 hours PRN Temp greater or equal to 38C (100.4F), Mild Pain (1 - 3), Moderate Pain (4 - 6), Severe Pain (7 - 10)  melatonin 3 milliGRAM(s) Oral at bedtime PRN Insomnia      CAPILLARY BLOOD GLUCOSE        I&O's Summary    18 Dec 2023 07:01  -  19 Dec 2023 07:00  --------------------------------------------------------  IN: 300 mL / OUT: 501 mL / NET: -201 mL        PHYSICAL EXAM:  Vital Signs Last 24 Hrs  T(C): 36.6 (19 Dec 2023 05:30), Max: 37.1 (18 Dec 2023 20:04)  T(F): 97.8 (19 Dec 2023 05:30), Max: 98.8 (18 Dec 2023 20:04)  HR: 106 (19 Dec 2023 05:30) (106 - 121)  BP: 123/64 (19 Dec 2023 05:30) (114/70 - 123/75)  BP(mean): --  RR: 18 (19 Dec 2023 05:30) (18 - 18)  SpO2: 98% (19 Dec 2023 05:30) (96% - 98%)    Parameters below as of 19 Dec 2023 05:30  Patient On (Oxygen Delivery Method): nasal cannula  O2 Flow (L/min): 2      GENERAL: No apparent distress.   HEAD:  Atraumatic, Normocephalic  EYES: EOMI, PERRLA, conjunctiva and sclera clear  NECK: Supple, no lymphadenopathy, no elevated JVP  CHEST/LUNG: Expiratory wheeze, RLL  HEART: S1 and S2 normal. Regular rate and rhythm; No murmurs, rubs, or gallops  ABDOMEN: Soft, non-tender, non-distended; normal bowel sounds  EXTREMITIES:  2+ peripheral pulses b/l, No clubbing, cyanosis, or edema  NEUROLOGY: A&O x 3, no focal deficits  SKIN: No rashes or lesions    LABS:                        9.5    14.83 )-----------( 689      ( 18 Dec 2023 07:23 )             32.2     12-18    137  |  97  |  23  ----------------------------<  95  4.4   |  31  |  0.59    Ca    9.7      18 Dec 2023 09:24  Phos  3.5     12-18  Mg     2.2     12-18    TPro  7.3  /  Alb  3.9  /  TBili  <0.1<L>  /  DBili  x   /  AST  16  /  ALT  20  /  AlkPhos  94  12-18          Urinalysis Basic - ( 18 Dec 2023 09:24 )    Color: x / Appearance: x / SG: x / pH: x  Gluc: 95 mg/dL / Ketone: x  / Bili: x / Urobili: x   Blood: x / Protein: x / Nitrite: x   Leuk Esterase: x / RBC: x / WBC x   Sq Epi: x / Non Sq Epi: x / Bacteria: x          RADIOLOGY & ADDITIONAL TESTS:  Lab Results Reviewed   Imaging Reviewed  Electrocardiogram Reviewed

## 2023-12-20 LAB
ALBUMIN SERPL ELPH-MCNC: 4.1 G/DL — SIGNIFICANT CHANGE UP (ref 3.3–5)
ALBUMIN SERPL ELPH-MCNC: 4.1 G/DL — SIGNIFICANT CHANGE UP (ref 3.3–5)
ALP SERPL-CCNC: 92 U/L — SIGNIFICANT CHANGE UP (ref 40–120)
ALP SERPL-CCNC: 92 U/L — SIGNIFICANT CHANGE UP (ref 40–120)
ALT FLD-CCNC: 17 U/L — SIGNIFICANT CHANGE UP (ref 10–45)
ALT FLD-CCNC: 17 U/L — SIGNIFICANT CHANGE UP (ref 10–45)
ANA PAT FLD IF-IMP: ABNORMAL
ANA PAT FLD IF-IMP: ABNORMAL
ANA TITR SER: ABNORMAL
ANA TITR SER: ABNORMAL
ANION GAP SERPL CALC-SCNC: 13 MMOL/L — SIGNIFICANT CHANGE UP (ref 5–17)
ANION GAP SERPL CALC-SCNC: 13 MMOL/L — SIGNIFICANT CHANGE UP (ref 5–17)
AST SERPL-CCNC: 15 U/L — SIGNIFICANT CHANGE UP (ref 10–40)
AST SERPL-CCNC: 15 U/L — SIGNIFICANT CHANGE UP (ref 10–40)
BILIRUB SERPL-MCNC: <0.1 MG/DL — LOW (ref 0.2–1.2)
BILIRUB SERPL-MCNC: <0.1 MG/DL — LOW (ref 0.2–1.2)
BUN SERPL-MCNC: 26 MG/DL — HIGH (ref 7–23)
BUN SERPL-MCNC: 26 MG/DL — HIGH (ref 7–23)
CALCIUM SERPL-MCNC: 10 MG/DL — SIGNIFICANT CHANGE UP (ref 8.4–10.5)
CALCIUM SERPL-MCNC: 10 MG/DL — SIGNIFICANT CHANGE UP (ref 8.4–10.5)
CHLORIDE SERPL-SCNC: 94 MMOL/L — LOW (ref 96–108)
CHLORIDE SERPL-SCNC: 94 MMOL/L — LOW (ref 96–108)
CO2 SERPL-SCNC: 30 MMOL/L — SIGNIFICANT CHANGE UP (ref 22–31)
CO2 SERPL-SCNC: 30 MMOL/L — SIGNIFICANT CHANGE UP (ref 22–31)
CREAT SERPL-MCNC: 0.68 MG/DL — SIGNIFICANT CHANGE UP (ref 0.5–1.3)
CREAT SERPL-MCNC: 0.68 MG/DL — SIGNIFICANT CHANGE UP (ref 0.5–1.3)
CRP SERPL-MCNC: 6 MG/L — HIGH (ref 0–4)
CRP SERPL-MCNC: 6 MG/L — HIGH (ref 0–4)
CULTURE RESULTS: NO GROWTH — SIGNIFICANT CHANGE UP
CULTURE RESULTS: NO GROWTH — SIGNIFICANT CHANGE UP
EGFR: 95 ML/MIN/1.73M2 — SIGNIFICANT CHANGE UP
EGFR: 95 ML/MIN/1.73M2 — SIGNIFICANT CHANGE UP
ERYTHROCYTE [SEDIMENTATION RATE] IN BLOOD: 99 MM/HR — HIGH (ref 0–20)
ERYTHROCYTE [SEDIMENTATION RATE] IN BLOOD: 99 MM/HR — HIGH (ref 0–20)
GLUCOSE SERPL-MCNC: 88 MG/DL — SIGNIFICANT CHANGE UP (ref 70–99)
GLUCOSE SERPL-MCNC: 88 MG/DL — SIGNIFICANT CHANGE UP (ref 70–99)
GRAM STN FLD: ABNORMAL
GRAM STN FLD: ABNORMAL
HCT VFR BLD CALC: 31.4 % — LOW (ref 34.5–45)
HCT VFR BLD CALC: 31.4 % — LOW (ref 34.5–45)
HGB BLD-MCNC: 9.5 G/DL — LOW (ref 11.5–15.5)
HGB BLD-MCNC: 9.5 G/DL — LOW (ref 11.5–15.5)
MAGNESIUM SERPL-MCNC: 2.2 MG/DL — SIGNIFICANT CHANGE UP (ref 1.6–2.6)
MAGNESIUM SERPL-MCNC: 2.2 MG/DL — SIGNIFICANT CHANGE UP (ref 1.6–2.6)
MCHC RBC-ENTMCNC: 24.8 PG — LOW (ref 27–34)
MCHC RBC-ENTMCNC: 24.8 PG — LOW (ref 27–34)
MCHC RBC-ENTMCNC: 30.3 GM/DL — LOW (ref 32–36)
MCHC RBC-ENTMCNC: 30.3 GM/DL — LOW (ref 32–36)
MCV RBC AUTO: 82 FL — SIGNIFICANT CHANGE UP (ref 80–100)
MCV RBC AUTO: 82 FL — SIGNIFICANT CHANGE UP (ref 80–100)
NRBC # BLD: 0 /100 WBCS — SIGNIFICANT CHANGE UP (ref 0–0)
NRBC # BLD: 0 /100 WBCS — SIGNIFICANT CHANGE UP (ref 0–0)
PHOSPHATE SERPL-MCNC: 3.5 MG/DL — SIGNIFICANT CHANGE UP (ref 2.5–4.5)
PHOSPHATE SERPL-MCNC: 3.5 MG/DL — SIGNIFICANT CHANGE UP (ref 2.5–4.5)
PLATELET # BLD AUTO: 793 K/UL — HIGH (ref 150–400)
PLATELET # BLD AUTO: 793 K/UL — HIGH (ref 150–400)
POTASSIUM SERPL-MCNC: 4.3 MMOL/L — SIGNIFICANT CHANGE UP (ref 3.5–5.3)
POTASSIUM SERPL-MCNC: 4.3 MMOL/L — SIGNIFICANT CHANGE UP (ref 3.5–5.3)
POTASSIUM SERPL-SCNC: 4.3 MMOL/L — SIGNIFICANT CHANGE UP (ref 3.5–5.3)
POTASSIUM SERPL-SCNC: 4.3 MMOL/L — SIGNIFICANT CHANGE UP (ref 3.5–5.3)
PROT SERPL-MCNC: 7.3 G/DL — SIGNIFICANT CHANGE UP (ref 6–8.3)
PROT SERPL-MCNC: 7.3 G/DL — SIGNIFICANT CHANGE UP (ref 6–8.3)
RBC # BLD: 3.83 M/UL — SIGNIFICANT CHANGE UP (ref 3.8–5.2)
RBC # BLD: 3.83 M/UL — SIGNIFICANT CHANGE UP (ref 3.8–5.2)
RBC # FLD: 16.8 % — HIGH (ref 10.3–14.5)
RBC # FLD: 16.8 % — HIGH (ref 10.3–14.5)
SODIUM SERPL-SCNC: 137 MMOL/L — SIGNIFICANT CHANGE UP (ref 135–145)
SODIUM SERPL-SCNC: 137 MMOL/L — SIGNIFICANT CHANGE UP (ref 135–145)
SPECIMEN SOURCE: SIGNIFICANT CHANGE UP
T4 AB SER-ACNC: 7.6 UG/DL — SIGNIFICANT CHANGE UP (ref 4.6–12)
T4 AB SER-ACNC: 7.6 UG/DL — SIGNIFICANT CHANGE UP (ref 4.6–12)
TSH SERPL-MCNC: 0.8 UIU/ML — SIGNIFICANT CHANGE UP (ref 0.27–4.2)
TSH SERPL-MCNC: 0.8 UIU/ML — SIGNIFICANT CHANGE UP (ref 0.27–4.2)
WBC # BLD: 17.36 K/UL — HIGH (ref 3.8–10.5)
WBC # BLD: 17.36 K/UL — HIGH (ref 3.8–10.5)
WBC # FLD AUTO: 17.36 K/UL — HIGH (ref 3.8–10.5)
WBC # FLD AUTO: 17.36 K/UL — HIGH (ref 3.8–10.5)

## 2023-12-20 PROCEDURE — 71045 X-RAY EXAM CHEST 1 VIEW: CPT | Mod: 26

## 2023-12-20 PROCEDURE — 99232 SBSQ HOSP IP/OBS MODERATE 35: CPT | Mod: GC

## 2023-12-20 PROCEDURE — 76376 3D RENDER W/INTRP POSTPROCES: CPT | Mod: 26

## 2023-12-20 PROCEDURE — 93306 TTE W/DOPPLER COMPLETE: CPT | Mod: 26

## 2023-12-20 PROCEDURE — 93356 MYOCRD STRAIN IMG SPCKL TRCK: CPT

## 2023-12-20 PROCEDURE — 99233 SBSQ HOSP IP/OBS HIGH 50: CPT

## 2023-12-20 RX ADMIN — Medication 1 SPRAY(S): at 06:30

## 2023-12-20 RX ADMIN — AMLODIPINE BESYLATE 5 MILLIGRAM(S): 2.5 TABLET ORAL at 06:31

## 2023-12-20 RX ADMIN — SODIUM CHLORIDE 4 MILLILITER(S): 9 INJECTION INTRAMUSCULAR; INTRAVENOUS; SUBCUTANEOUS at 06:29

## 2023-12-20 RX ADMIN — Medication 25 MILLIGRAM(S): at 18:09

## 2023-12-20 RX ADMIN — Medication 600 MILLIGRAM(S): at 18:09

## 2023-12-20 RX ADMIN — Medication 3 MILLILITER(S): at 18:09

## 2023-12-20 RX ADMIN — APIXABAN 5 MILLIGRAM(S): 2.5 TABLET, FILM COATED ORAL at 18:09

## 2023-12-20 RX ADMIN — Medication 1 SPRAY(S): at 18:10

## 2023-12-20 RX ADMIN — Medication 3 MILLILITER(S): at 13:33

## 2023-12-20 RX ADMIN — Medication 1 TABLET(S): at 13:33

## 2023-12-20 RX ADMIN — APIXABAN 5 MILLIGRAM(S): 2.5 TABLET, FILM COATED ORAL at 06:30

## 2023-12-20 RX ADMIN — POLYETHYLENE GLYCOL 3350 17 GRAM(S): 17 POWDER, FOR SOLUTION ORAL at 13:34

## 2023-12-20 RX ADMIN — Medication 325 MILLIGRAM(S): at 13:34

## 2023-12-20 RX ADMIN — Medication 25 MILLIGRAM(S): at 06:31

## 2023-12-20 RX ADMIN — PANTOPRAZOLE SODIUM 40 MILLIGRAM(S): 20 TABLET, DELAYED RELEASE ORAL at 06:30

## 2023-12-20 RX ADMIN — Medication 75 MILLIGRAM(S): at 13:33

## 2023-12-20 RX ADMIN — SODIUM CHLORIDE 4 MILLILITER(S): 9 INJECTION INTRAMUSCULAR; INTRAVENOUS; SUBCUTANEOUS at 18:09

## 2023-12-20 RX ADMIN — Medication 40 MILLIGRAM(S): at 06:31

## 2023-12-20 RX ADMIN — BUPRENORPHINE AND NALOXONE 2 TABLET(S): 2; .5 TABLET SUBLINGUAL at 13:35

## 2023-12-20 RX ADMIN — BUPRENORPHINE AND NALOXONE 2 TABLET(S): 2; .5 TABLET SUBLINGUAL at 21:38

## 2023-12-20 RX ADMIN — Medication 3 MILLILITER(S): at 06:30

## 2023-12-20 RX ADMIN — SODIUM CHLORIDE 4 MILLILITER(S): 9 INJECTION INTRAMUSCULAR; INTRAVENOUS; SUBCUTANEOUS at 13:33

## 2023-12-20 RX ADMIN — Medication 600 MILLIGRAM(S): at 06:31

## 2023-12-20 NOTE — PROGRESS NOTE ADULT - PROBLEM SELECTOR PLAN 2
Etiology unclear- possibly due to underlying infection. CTA negative for PE  -Sinus on repeat EKG   -Ordered TTE for further evaluation. Etiology unclear- possibly due to underlying infection. CTA negative for PE  -Sinus on repeat EKG   -Ordered TTE for further evaluation- will f/u

## 2023-12-20 NOTE — PROGRESS NOTE ADULT - ASSESSMENT
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 p/w 2 months progressive worsening ALTMAN, fatigue, unintentional weight loss. Likely PNA vs. MAC vs. 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 p/w 2 months progressive worsening ALTMAN, fatigue, unintentional weight loss. Likely PNA vs COPD Exacerbation.

## 2023-12-20 NOTE — PROGRESS NOTE ADULT - ATTENDING COMMENTS
68F unexplained weight loss with chronic cough. Her respiratory status decline is more subacute in nature, doubt COPD exacerbation given she is at her baseline O2 requirements, not wheezing, and has compensated hypercapnia. CT suggestive of MAC, rather than CAP.     - pulm consult appreciated: abx switched to levaquin to cover pseudomonas, treat for bronchiectasis exacerbation w/ prednisone, pulm hygiene, chest PT  - continue steroids  - s/p AFB sputum x 3 all neg  - anemia workup - c/w HOLLY - start iron qod. needs outpatient GI follow-up for crc screening if not done recently  - persistent tachycardia, unexplained. not in distress, pain, or PE. possibly med side effect from standing duoneb. uptitrated lopressor, TSH nml, echo with no obvious abnormalities. No PE on previous CTA, f/u CXR     d/w HS 2.

## 2023-12-20 NOTE — PROGRESS NOTE ADULT - SUBJECTIVE AND OBJECTIVE BOX
Patient is a 68y old  Female who presents with a chief complaint of Chronic progressively worsening dyspnea with minimal exertion + coughs (19 Dec 2023 19:34)      SUBJECTIVE / OVERNIGHT EVENTS:  No acute events overnight. HR in low 100s.  Sitting comfortably in chair.  Remains on 2L NC with O2Sat 98%.          MEDICATIONS  (STANDING):  albuterol/ipratropium for Nebulization 3 milliLiter(s) Nebulizer every 6 hours  amLODIPine   Tablet 5 milliGRAM(s) Oral daily  apixaban 5 milliGRAM(s) Oral every 12 hours  buprenorphine 2 mG/naloxone 0.5 mG SL  Tablet 2 Tablet(s) SubLingual <User Schedule>  ferrous    sulfate 325 milliGRAM(s) Oral <User Schedule>  fluticasone propionate 50 MICROgram(s)/spray Nasal Spray 1 Spray(s) Both Nostrils two times a day  guaiFENesin  milliGRAM(s) Oral every 12 hours  influenza  Vaccine (HIGH DOSE) 0.7 milliLiter(s) IntraMuscular once  levoFLOXacin IVPB 750 milliGRAM(s) IV Intermittent every 24 hours  metoprolol tartrate 25 milliGRAM(s) Oral two times a day  multivitamin 1 Tablet(s) Oral daily  pantoprazole    Tablet 40 milliGRAM(s) Oral before breakfast  polyethylene glycol 3350 17 Gram(s) Oral daily  predniSONE   Tablet 40 milliGRAM(s) Oral daily  sodium chloride 3%  Inhalation 4 milliLiter(s) Inhalation every 6 hours  venlafaxine XR. 75 milliGRAM(s) Oral daily    MEDICATIONS  (PRN):  acetaminophen     Tablet .. 650 milliGRAM(s) Oral every 6 hours PRN Temp greater or equal to 38C (100.4F), Mild Pain (1 - 3), Moderate Pain (4 - 6), Severe Pain (7 - 10)  melatonin 3 milliGRAM(s) Oral at bedtime PRN Insomnia      CAPILLARY BLOOD GLUCOSE        I&O's Summary    19 Dec 2023 07:01  -  20 Dec 2023 07:00  --------------------------------------------------------  IN: 240 mL / OUT: 500 mL / NET: -260 mL        PHYSICAL EXAM:  Vital Signs Last 24 Hrs  T(C): 36.6 (20 Dec 2023 05:03), Max: 37.8 (19 Dec 2023 15:25)  T(F): 97.8 (20 Dec 2023 05:03), Max: 100.1 (19 Dec 2023 15:25)  HR: 107 (20 Dec 2023 05:03) (98 - 125)  BP: 128/74 (20 Dec 2023 05:03) (111/60 - 139/85)  BP(mean): --  RR: 18 (20 Dec 2023 05:03) (18 - 18)  SpO2: 98% (20 Dec 2023 05:03) (96% - 98%)    Parameters below as of 20 Dec 2023 05:03  Patient On (Oxygen Delivery Method): nasal cannula  O2 Flow (L/min): 2      GENERAL: No apparent distress.   HEAD:  Atraumatic, Normocephalic  EYES: EOMI, PERRLA, conjunctiva and sclera clear  NECK: Supple, no lymphadenopathy, no elevated JVP  CHEST/LUNG: focal wheezing on right, no wheezing on left  HEART: S1 and S2 normal. Regular rate and rhythm; No murmurs, rubs, or gallops  ABDOMEN: Soft, non-tender, non-distended; normal bowel sounds  EXTREMITIES:  2+ peripheral pulses b/l, No clubbing, cyanosis, or edema  NEUROLOGY: A&O x 3, no focal deficits  SKIN: No rashes or lesions    LABS:                        9.5    17.36 )-----------( 793      ( 20 Dec 2023 07:24 )             31.4     12-19    136  |  92<L>  |  24<H>  ----------------------------<  102<H>  4.1   |  34<H>  |  0.55    Ca    9.8      19 Dec 2023 09:25  Phos  3.2     12-  Mg     2.1     12-    TPro  7.2  /  Alb  4.1  /  TBili  <0.1<L>  /  DBili  x   /  AST  10  /  ALT  17  /  AlkPhos  93  12-19          Urinalysis Basic - ( 19 Dec 2023 17:39 )    Color: Yellow / Appearance: Clear / S.006 / pH: x  Gluc: x / Ketone: Negative mg/dL  / Bili: Negative / Urobili: 0.2 mg/dL   Blood: x / Protein: Negative mg/dL / Nitrite: Negative   Leuk Esterase: Negative / RBC: 0 /HPF / WBC 0 /HPF   Sq Epi: x / Non Sq Epi: 0 /HPF / Bacteria: Negative /HPF        Culture - Sputum (collected 19 Dec 2023 17:39)  Source: .Sputum Sputum  Gram Stain (20 Dec 2023 02:19):    Rare polymorphonuclear leukocytes per low power field    No Squamous epithelial cells per low power field    Rare Gram Positive Cocci in Pairs and Chains per oil power field    Culture - Acid Fast - Sputum w/Smear (collected 18 Dec 2023 19:18)  Source: .Sputum Sputum        RADIOLOGY & ADDITIONAL TESTS:  Lab Results Reviewed   Imaging Reviewed  Electrocardiogram Reviewed     Patient is a 68y old  Female who presents with a chief complaint of Chronic progressively worsening dyspnea with minimal exertion + coughs (19 Dec 2023 19:34)      SUBJECTIVE / OVERNIGHT EVENTS:  No acute events overnight. HR in low 100s.  Sitting comfortably in chair.  Remains on 2L NC with O2Sat 98%. Feeling better with improved HR control.          MEDICATIONS  (STANDING):  albuterol/ipratropium for Nebulization 3 milliLiter(s) Nebulizer every 6 hours  amLODIPine   Tablet 5 milliGRAM(s) Oral daily  apixaban 5 milliGRAM(s) Oral every 12 hours  buprenorphine 2 mG/naloxone 0.5 mG SL  Tablet 2 Tablet(s) SubLingual <User Schedule>  ferrous    sulfate 325 milliGRAM(s) Oral <User Schedule>  fluticasone propionate 50 MICROgram(s)/spray Nasal Spray 1 Spray(s) Both Nostrils two times a day  guaiFENesin  milliGRAM(s) Oral every 12 hours  influenza  Vaccine (HIGH DOSE) 0.7 milliLiter(s) IntraMuscular once  levoFLOXacin IVPB 750 milliGRAM(s) IV Intermittent every 24 hours  metoprolol tartrate 25 milliGRAM(s) Oral two times a day  multivitamin 1 Tablet(s) Oral daily  pantoprazole    Tablet 40 milliGRAM(s) Oral before breakfast  polyethylene glycol 3350 17 Gram(s) Oral daily  predniSONE   Tablet 40 milliGRAM(s) Oral daily  sodium chloride 3%  Inhalation 4 milliLiter(s) Inhalation every 6 hours  venlafaxine XR. 75 milliGRAM(s) Oral daily    MEDICATIONS  (PRN):  acetaminophen     Tablet .. 650 milliGRAM(s) Oral every 6 hours PRN Temp greater or equal to 38C (100.4F), Mild Pain (1 - 3), Moderate Pain (4 - 6), Severe Pain (7 - 10)  melatonin 3 milliGRAM(s) Oral at bedtime PRN Insomnia      CAPILLARY BLOOD GLUCOSE        I&O's Summary    19 Dec 2023 07:01  -  20 Dec 2023 07:00  --------------------------------------------------------  IN: 240 mL / OUT: 500 mL / NET: -260 mL        PHYSICAL EXAM:  Vital Signs Last 24 Hrs  T(C): 36.6 (20 Dec 2023 05:03), Max: 37.8 (19 Dec 2023 15:25)  T(F): 97.8 (20 Dec 2023 05:03), Max: 100.1 (19 Dec 2023 15:25)  HR: 107 (20 Dec 2023 05:03) (98 - 125)  BP: 128/74 (20 Dec 2023 05:03) (111/60 - 139/85)  BP(mean): --  RR: 18 (20 Dec 2023 05:03) (18 - 18)  SpO2: 98% (20 Dec 2023 05:03) (96% - 98%)    Parameters below as of 20 Dec 2023 05:03  Patient On (Oxygen Delivery Method): nasal cannula  O2 Flow (L/min): 2      GENERAL: No apparent distress.   HEAD:  Atraumatic, Normocephalic  EYES: EOMI, PERRLA, conjunctiva and sclera clear  NECK: Supple, no lymphadenopathy, no elevated JVP  CHEST/LUNG: focal wheezing on right, no wheezing on left  HEART: S1 and S2 normal. Regular rate and rhythm; No murmurs, rubs, or gallops  ABDOMEN: Soft, non-tender, non-distended; normal bowel sounds  EXTREMITIES:  2+ peripheral pulses b/l, No clubbing, cyanosis, or edema  NEUROLOGY: A&O x 3, no focal deficits  SKIN: No rashes or lesions    LABS:                        9.5    17.36 )-----------( 793      ( 20 Dec 2023 07:24 )             31.4     12-19    136  |  92<L>  |  24<H>  ----------------------------<  102<H>  4.1   |  34<H>  |  0.55    Ca    9.8      19 Dec 2023 09:25  Phos  3.2     12-  Mg     2.1     12-    TPro  7.2  /  Alb  4.1  /  TBili  <0.1<L>  /  DBili  x   /  AST  10  /  ALT  17  /  AlkPhos  93  12-19          Urinalysis Basic - ( 19 Dec 2023 17:39 )    Color: Yellow / Appearance: Clear / S.006 / pH: x  Gluc: x / Ketone: Negative mg/dL  / Bili: Negative / Urobili: 0.2 mg/dL   Blood: x / Protein: Negative mg/dL / Nitrite: Negative   Leuk Esterase: Negative / RBC: 0 /HPF / WBC 0 /HPF   Sq Epi: x / Non Sq Epi: 0 /HPF / Bacteria: Negative /HPF        Culture - Sputum (collected 19 Dec 2023 17:39)  Source: .Sputum Sputum  Gram Stain (20 Dec 2023 02:19):    Rare polymorphonuclear leukocytes per low power field    No Squamous epithelial cells per low power field    Rare Gram Positive Cocci in Pairs and Chains per oil power field    Culture - Acid Fast - Sputum w/Smear (collected 18 Dec 2023 19:18)  Source: .Sputum Sputum        RADIOLOGY & ADDITIONAL TESTS:  Lab Results Reviewed   Imaging Reviewed  Electrocardiogram Reviewed

## 2023-12-20 NOTE — PROGRESS NOTE ADULT - SUBJECTIVE AND OBJECTIVE BOX
PROGRESS NOTE:   Authored by Amadeo Garcia MD  Patient is a 68y old  Female who presents with a chief complaint of Chronic progressively worsening dyspnea with minimal exertion + coughs (19 Dec 2023 19:34)      SUBJECTIVE / OVERNIGHT EVENTS:  No acute events overnight. Patient was seen and examined at bedside and did not appear to be in any acute distress.           MEDICATIONS  (STANDING):  albuterol/ipratropium for Nebulization 3 milliLiter(s) Nebulizer every 6 hours  amLODIPine   Tablet 5 milliGRAM(s) Oral daily  apixaban 5 milliGRAM(s) Oral every 12 hours  buprenorphine 2 mG/naloxone 0.5 mG SL  Tablet 2 Tablet(s) SubLingual <User Schedule>  ferrous    sulfate 325 milliGRAM(s) Oral <User Schedule>  fluticasone propionate 50 MICROgram(s)/spray Nasal Spray 1 Spray(s) Both Nostrils two times a day  guaiFENesin  milliGRAM(s) Oral every 12 hours  influenza  Vaccine (HIGH DOSE) 0.7 milliLiter(s) IntraMuscular once  levoFLOXacin IVPB 750 milliGRAM(s) IV Intermittent every 24 hours  metoprolol tartrate 25 milliGRAM(s) Oral two times a day  multivitamin 1 Tablet(s) Oral daily  pantoprazole    Tablet 40 milliGRAM(s) Oral before breakfast  polyethylene glycol 3350 17 Gram(s) Oral daily  predniSONE   Tablet 40 milliGRAM(s) Oral daily  sodium chloride 3%  Inhalation 4 milliLiter(s) Inhalation every 6 hours  venlafaxine XR. 75 milliGRAM(s) Oral daily    MEDICATIONS  (PRN):  acetaminophen     Tablet .. 650 milliGRAM(s) Oral every 6 hours PRN Temp greater or equal to 38C (100.4F), Mild Pain (1 - 3), Moderate Pain (4 - 6), Severe Pain (7 - 10)  melatonin 3 milliGRAM(s) Oral at bedtime PRN Insomnia      CAPILLARY BLOOD GLUCOSE        I&O's Summary    19 Dec 2023 07:01  -  20 Dec 2023 07:00  --------------------------------------------------------  IN: 240 mL / OUT: 500 mL / NET: -260 mL        PHYSICAL EXAM:  Vital Signs Last 24 Hrs  T(C): 36.6 (20 Dec 2023 05:03), Max: 37.8 (19 Dec 2023 15:25)  T(F): 97.8 (20 Dec 2023 05:03), Max: 100.1 (19 Dec 2023 15:25)  HR: 107 (20 Dec 2023 05:03) (98 - 125)  BP: 128/74 (20 Dec 2023 05:03) (111/60 - 139/85)  BP(mean): --  RR: 18 (20 Dec 2023 05:03) (18 - 18)  SpO2: 98% (20 Dec 2023 05:03) (96% - 98%)    Parameters below as of 20 Dec 2023 05:03  Patient On (Oxygen Delivery Method): nasal cannula  O2 Flow (L/min): 2      GENERAL: No apparent distress.   HEAD:  Atraumatic, Normocephalic  EYES: EOMI, PERRLA, conjunctiva and sclera clear  NECK: Supple, no lymphadenopathy, no elevated JVP  CHEST/LUNG: Clear to auscultation bilateral and symmetric; No wheezes, rales, or rhonchi  HEART: S1 and S2 normal. Regular rate and rhythm; No murmurs, rubs, or gallops  ABDOMEN: Soft, non-tender, non-distended; normal bowel sounds  EXTREMITIES:  2+ peripheral pulses b/l, No clubbing, cyanosis, or edema  NEUROLOGY: A&O x 3, no focal deficits  SKIN: No rashes or lesions    LABS:                        9.5    17.36 )-----------( 793      ( 20 Dec 2023 07:24 )             31.4     12-    136  |  92<L>  |  24<H>  ----------------------------<  102<H>  4.1   |  34<H>  |  0.55    Ca    9.8      19 Dec 2023 09:25  Phos  3.2     12  Mg     2.1         TPro  7.2  /  Alb  4.1  /  TBili  <0.1<L>  /  DBili  x   /  AST  10  /  ALT  17  /  AlkPhos  93  12-          Urinalysis Basic - ( 19 Dec 2023 17:39 )    Color: Yellow / Appearance: Clear / S.006 / pH: x  Gluc: x / Ketone: Negative mg/dL  / Bili: Negative / Urobili: 0.2 mg/dL   Blood: x / Protein: Negative mg/dL / Nitrite: Negative   Leuk Esterase: Negative / RBC: 0 /HPF / WBC 0 /HPF   Sq Epi: x / Non Sq Epi: 0 /HPF / Bacteria: Negative /HPF        Culture - Sputum (collected 19 Dec 2023 17:39)  Source: .Sputum Sputum  Gram Stain (20 Dec 2023 02:19):    Rare polymorphonuclear leukocytes per low power field    No Squamous epithelial cells per low power field    Rare Gram Positive Cocci in Pairs and Chains per oil power field    Culture - Acid Fast - Sputum w/Smear (collected 18 Dec 2023 19:18)  Source: .Sputum Sputum        RADIOLOGY & ADDITIONAL TESTS:  Lab Results Reviewed   Imaging Reviewed  Electrocardiogram Reviewed   PROGRESS NOTE:   Authored by Amadeo Garcia MD  Patient is a 68y old  Female who presents with a chief complaint of Chronic progressively worsening dyspnea with minimal exertion + coughs (19 Dec 2023 19:34)      SUBJECTIVE / OVERNIGHT EVENTS:  No acute events overnight. Patient was seen and examined at bedside and did not appear to be in any acute distress. States that dyspnea is stable. Her cough continues to improve. Denies any chest pain or palpitations.           MEDICATIONS  (STANDING):  albuterol/ipratropium for Nebulization 3 milliLiter(s) Nebulizer every 6 hours  amLODIPine   Tablet 5 milliGRAM(s) Oral daily  apixaban 5 milliGRAM(s) Oral every 12 hours  buprenorphine 2 mG/naloxone 0.5 mG SL  Tablet 2 Tablet(s) SubLingual <User Schedule>  ferrous    sulfate 325 milliGRAM(s) Oral <User Schedule>  fluticasone propionate 50 MICROgram(s)/spray Nasal Spray 1 Spray(s) Both Nostrils two times a day  guaiFENesin  milliGRAM(s) Oral every 12 hours  influenza  Vaccine (HIGH DOSE) 0.7 milliLiter(s) IntraMuscular once  levoFLOXacin IVPB 750 milliGRAM(s) IV Intermittent every 24 hours  metoprolol tartrate 25 milliGRAM(s) Oral two times a day  multivitamin 1 Tablet(s) Oral daily  pantoprazole    Tablet 40 milliGRAM(s) Oral before breakfast  polyethylene glycol 3350 17 Gram(s) Oral daily  predniSONE   Tablet 40 milliGRAM(s) Oral daily  sodium chloride 3%  Inhalation 4 milliLiter(s) Inhalation every 6 hours  venlafaxine XR. 75 milliGRAM(s) Oral daily    MEDICATIONS  (PRN):  acetaminophen     Tablet .. 650 milliGRAM(s) Oral every 6 hours PRN Temp greater or equal to 38C (100.4F), Mild Pain (1 - 3), Moderate Pain (4 - 6), Severe Pain (7 - 10)  melatonin 3 milliGRAM(s) Oral at bedtime PRN Insomnia      CAPILLARY BLOOD GLUCOSE        I&O's Summary    19 Dec 2023 07:01  -  20 Dec 2023 07:00  --------------------------------------------------------  IN: 240 mL / OUT: 500 mL / NET: -260 mL        PHYSICAL EXAM:  Vital Signs Last 24 Hrs  T(C): 36.6 (20 Dec 2023 05:03), Max: 37.8 (19 Dec 2023 15:25)  T(F): 97.8 (20 Dec 2023 05:03), Max: 100.1 (19 Dec 2023 15:25)  HR: 107 (20 Dec 2023 05:03) (98 - 125)  BP: 128/74 (20 Dec 2023 05:03) (111/60 - 139/85)  BP(mean): --  RR: 18 (20 Dec 2023 05:03) (18 - 18)  SpO2: 98% (20 Dec 2023 05:03) (96% - 98%)    Parameters below as of 20 Dec 2023 05:03  Patient On (Oxygen Delivery Method): nasal cannula  O2 Flow (L/min): 2      GENERAL: No apparent distress.   HEAD:  Atraumatic, Normocephalic  EYES: EOMI, PERRLA, conjunctiva and sclera clear  NECK: Supple, no lymphadenopathy, no elevated JVP  CHEST/LUNG: B/L Expiratory wheeze   HEART: S1 and S2 normal. Regular rate and rhythm; No murmurs, rubs, or gallops  ABDOMEN: Soft, non-tender, non-distended; normal bowel sounds  EXTREMITIES:  2+ peripheral pulses b/l, No clubbing, cyanosis, or edema  NEUROLOGY: A&O x 3, no focal deficits  SKIN: No rashes or lesions    LABS:                        9.5    17.36 )-----------( 793      ( 20 Dec 2023 07:24 )             31.4     12-    136  |  92<L>  |  24<H>  ----------------------------<  102<H>  4.1   |  34<H>  |  0.55    Ca    9.8      19 Dec 2023 09:25  Phos  3.2       Mg     2.1         TPro  7.2  /  Alb  4.1  /  TBili  <0.1<L>  /  DBili  x   /  AST  10  /  ALT  17  /  AlkPhos  93            Urinalysis Basic - ( 19 Dec 2023 17:39 )    Color: Yellow / Appearance: Clear / S.006 / pH: x  Gluc: x / Ketone: Negative mg/dL  / Bili: Negative / Urobili: 0.2 mg/dL   Blood: x / Protein: Negative mg/dL / Nitrite: Negative   Leuk Esterase: Negative / RBC: 0 /HPF / WBC 0 /HPF   Sq Epi: x / Non Sq Epi: 0 /HPF / Bacteria: Negative /HPF        Culture - Sputum (collected 19 Dec 2023 17:39)  Source: .Sputum Sputum  Gram Stain (20 Dec 2023 02:19):    Rare polymorphonuclear leukocytes per low power field    No Squamous epithelial cells per low power field    Rare Gram Positive Cocci in Pairs and Chains per oil power field    Culture - Acid Fast - Sputum w/Smear (collected 18 Dec 2023 19:18)  Source: .Sputum Sputum        RADIOLOGY & ADDITIONAL TESTS:  Lab Results Reviewed   Imaging Reviewed  Electrocardiogram Reviewed

## 2023-12-20 NOTE — PROGRESS NOTE ADULT - PROBLEM SELECTOR PLAN 3
On admission WBC 18, tachy to 140s, RR24, SpO2 96% on 3L, afebrile, minimally elevated procal 0.24  >CTA Chest + CT A/P (12/14): Extensive airway thickening and tree-in-bud nodularity, compatible with chronic endobronchial disease, including MAC  >s/p IV azithro and CTX x2 (12/14 - 12/15)  - PNA vs. MAC  - Strep [-], Legionella [-], MRSA [-]  - Bcx (12/15): NGTD  - Sputum cx w/ AF- negative x 3  -Repeat sputum cultre- rare gram positive cocci in pairs and chains   Plan  - c/w Levaquin 750qd for PsA coverage (12/15 -12/21 ), pulm recs appreciated

## 2023-12-20 NOTE — PROGRESS NOTE ADULT - ASSESSMENT
67 yo F with PMHx Protein S deficiency on eliquis, COPD (3 LPM 24/7 at home), former smoker (1.5ppd x 40yrs, quit 2013), bronchiectasis presenting with progressive dyspnea, productive cough, weight loss found to have abnormal imaging for which pulmonary is consulted. Undergoing management for COPD exacerbation. Also tachycardic. 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       #Bronchiectasis  #COPD/Emphysema exacerbation  #NTM Pulmonary disease?  #Weight loss?  #Sinus tachycardia    Recommendations:   - Can complete 7day course of Levaquin  - Continue 40 mg pred for now, still with wheezing  - AFB smear (-) x 3, would repeat Sputum culture  - ACT with Duonebs q6 + 3% hypersal q6h, Acapella (reviewed order of use with patient)  - Supplemental oxygen to goal >88%, remains on 2L NC  - TTE for tachycardia, completed this AM, f/u report  - Check Quantiferon  - patient will benefit from Pulmonary rehab as outpatient    Chari Covington MD     69 yo F with PMHx Protein S deficiency on eliquis, COPD (3 LPM 24/7 at home), former smoker (1.5ppd x 40yrs, quit 2013), bronchiectasis presenting with progressive dyspnea, productive cough, weight loss found to have abnormal imaging for which pulmonary is consulted. Undergoing management for COPD exacerbation. Also tachycardic. 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       #Bronchiectasis  #COPD/Emphysema exacerbation  #NTM Pulmonary disease?  #Weight loss?  #Sinus tachycardia    Recommendations:   - Can complete 7day course of Levaquin  - Continue 40 mg pred for now, still with wheezing  - AFB smear (-) x 3, would repeat Sputum culture  - ACT with Duonebs q6 + 3% hypersal q6h, Acapella (reviewed order of use with patient)  - Supplemental oxygen to goal >88%, remains on 2L NC  - TTE for tachycardia, completed this AM, f/u report  - Check Quantiferon  - patient will benefit from Pulmonary rehab as outpatient    Chari Covington MD     69 yo F with PMHx Protein S deficiency on eliquis, COPD (3 LPM 24/7 at home), former smoker (1.5ppd x 40yrs, quit 2013), bronchiectasis presenting with progressive dyspnea, productive cough, weight loss found to have abnormal imaging for which pulmonary is consulted. Undergoing management for COPD exacerbation. Also tachycardic. 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 Focal Right mid lung zone wheezing on exam.  Chest CT with possible HUANG at station 11R?  Have asked team to to review with Radiology. Thrombocytosis, leukocytosis and increasing ESR of unclear etiology.      #Bronchiectasis  #COPD/Emphysema exacerbation  #NTM Pulmonary disease?  #Weight loss?  #Sinus tachycardia  #Thrombocytosis    Recommendations:   - Can complete 7day course of Levaquin  - repeat procal  - check D-dimer and LE dopplers  - Continue 40 mg pred for now, still with wheezing but focal, would decrease steroids to 20mg on 12/21/23  - check CXR, no clear indication for repeat Chest CT at this time  - AFB smear (-) x 3, would repeat Sputum culture  - ACT with Duonebs q6 + 3% hypersal q6h, Acapella (reviewed order of use with patient)  - Supplemental oxygen to goal >88%, remains on 2L NC  - TTE for tachycardia, completed this AM, f/u report  - Check Quantiferon  - Mucinex 1200mg BID  - may benefit from diagnostic bronchoscopy if repeat sputum is unrevealing and focal wheezing persists  - patient will benefit from Pulmonary rehab as outpatient    Chari Covington MD     67 yo F with PMHx Protein S deficiency on eliquis, COPD (3 LPM 24/7 at home), former smoker (1.5ppd x 40yrs, quit 2013), bronchiectasis presenting with progressive dyspnea, productive cough, weight loss found to have abnormal imaging for which pulmonary is consulted. Undergoing management for COPD exacerbation. Also tachycardic. 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 Focal Right mid lung zone wheezing on exam.  Chest CT with possible HUANG at station 11R?  Have asked team to to review with Radiology. Thrombocytosis, leukocytosis and increasing ESR of unclear etiology.      #Bronchiectasis  #COPD/Emphysema exacerbation  #NTM Pulmonary disease?  #Weight loss?  #Sinus tachycardia  #Thrombocytosis    Recommendations:   - Can complete 7day course of Levaquin  - repeat procal  - check D-dimer and LE dopplers  - Continue 40 mg pred for now, still with wheezing but focal, would decrease steroids to 20mg on 12/21/23  - check CXR, no clear indication for repeat Chest CT at this time  - AFB smear (-) x 3, would repeat Sputum culture  - ACT with Duonebs q6 + 3% hypersal q6h, Acapella (reviewed order of use with patient)  - Supplemental oxygen to goal >88%, remains on 2L NC  - TTE for tachycardia, completed this AM, f/u report  - Check Quantiferon  - Mucinex 1200mg BID  - may benefit from diagnostic bronchoscopy if repeat sputum is unrevealing and focal wheezing persists  - patient will benefit from Pulmonary rehab as outpatient    Chari Covington MD

## 2023-12-20 NOTE — PROGRESS NOTE ADULT - PROBLEM SELECTOR PLAN 1
Now presenting with worsening ALTMAN and fatigue which appears to be 2/2 PNA vs. MAC as oppose to exacerbation. Home pulm regimen Advair 500-50 + Albuterol    Plan:  >Sating well on baseline O2 3L  - c/w prednisone 40mg qd  - c/w DuoNeb q6 standing + Hypersal 3% q6 + Mucinex + Flonase for airway clearance  - Hold ICS-LABA while in exacerbation  - c/w NC, wean as tolerated, continuous pulse ox Now presenting with worsening ALTMAN and fatigue which appears to be 2/2 PNA vs. MAC as oppose to exacerbation. Home pulm regimen Advair 500-50 + Albuterol    Plan:  >Sating well on baseline O2 3L  - c/w prednisone 40mg qd  - c/w DuoNeb q6 standing + Hypersal 3% q6 + Mucinex + Flonase for airway clearance  - Hold ICS-LABA while in exacerbation  - c/w NC, wean as tolerated, continuous pulse ox  -Ordering CXR  -Will discuss with radiology about CTA results

## 2023-12-21 LAB
ALBUMIN SERPL ELPH-MCNC: 3.9 G/DL — SIGNIFICANT CHANGE UP (ref 3.3–5)
ALBUMIN SERPL ELPH-MCNC: 3.9 G/DL — SIGNIFICANT CHANGE UP (ref 3.3–5)
ALP SERPL-CCNC: 94 U/L — SIGNIFICANT CHANGE UP (ref 40–120)
ALP SERPL-CCNC: 94 U/L — SIGNIFICANT CHANGE UP (ref 40–120)
ALT FLD-CCNC: 28 U/L — SIGNIFICANT CHANGE UP (ref 10–45)
ALT FLD-CCNC: 28 U/L — SIGNIFICANT CHANGE UP (ref 10–45)
ANION GAP SERPL CALC-SCNC: 13 MMOL/L — SIGNIFICANT CHANGE UP (ref 5–17)
ANION GAP SERPL CALC-SCNC: 13 MMOL/L — SIGNIFICANT CHANGE UP (ref 5–17)
AST SERPL-CCNC: 17 U/L — SIGNIFICANT CHANGE UP (ref 10–40)
AST SERPL-CCNC: 17 U/L — SIGNIFICANT CHANGE UP (ref 10–40)
BASOPHILS # BLD AUTO: 0 K/UL — SIGNIFICANT CHANGE UP (ref 0–0.2)
BASOPHILS # BLD AUTO: 0 K/UL — SIGNIFICANT CHANGE UP (ref 0–0.2)
BASOPHILS NFR BLD AUTO: 0 % — SIGNIFICANT CHANGE UP (ref 0–2)
BASOPHILS NFR BLD AUTO: 0 % — SIGNIFICANT CHANGE UP (ref 0–2)
BILIRUB SERPL-MCNC: <0.1 MG/DL — LOW (ref 0.2–1.2)
BILIRUB SERPL-MCNC: <0.1 MG/DL — LOW (ref 0.2–1.2)
BUN SERPL-MCNC: 31 MG/DL — HIGH (ref 7–23)
BUN SERPL-MCNC: 31 MG/DL — HIGH (ref 7–23)
CALCIUM SERPL-MCNC: 9.6 MG/DL — SIGNIFICANT CHANGE UP (ref 8.4–10.5)
CALCIUM SERPL-MCNC: 9.6 MG/DL — SIGNIFICANT CHANGE UP (ref 8.4–10.5)
CHLORIDE SERPL-SCNC: 93 MMOL/L — LOW (ref 96–108)
CHLORIDE SERPL-SCNC: 93 MMOL/L — LOW (ref 96–108)
CO2 SERPL-SCNC: 31 MMOL/L — SIGNIFICANT CHANGE UP (ref 22–31)
CO2 SERPL-SCNC: 31 MMOL/L — SIGNIFICANT CHANGE UP (ref 22–31)
CREAT SERPL-MCNC: 0.76 MG/DL — SIGNIFICANT CHANGE UP (ref 0.5–1.3)
CREAT SERPL-MCNC: 0.76 MG/DL — SIGNIFICANT CHANGE UP (ref 0.5–1.3)
CULTURE RESULTS: ABNORMAL
CULTURE RESULTS: ABNORMAL
EGFR: 86 ML/MIN/1.73M2 — SIGNIFICANT CHANGE UP
EGFR: 86 ML/MIN/1.73M2 — SIGNIFICANT CHANGE UP
EOSINOPHIL # BLD AUTO: 0.42 K/UL — SIGNIFICANT CHANGE UP (ref 0–0.5)
EOSINOPHIL # BLD AUTO: 0.42 K/UL — SIGNIFICANT CHANGE UP (ref 0–0.5)
EOSINOPHIL NFR BLD AUTO: 2.3 % — SIGNIFICANT CHANGE UP (ref 0–6)
EOSINOPHIL NFR BLD AUTO: 2.3 % — SIGNIFICANT CHANGE UP (ref 0–6)
GLUCOSE SERPL-MCNC: 90 MG/DL — SIGNIFICANT CHANGE UP (ref 70–99)
GLUCOSE SERPL-MCNC: 90 MG/DL — SIGNIFICANT CHANGE UP (ref 70–99)
HCT VFR BLD CALC: 31 % — LOW (ref 34.5–45)
HCT VFR BLD CALC: 31 % — LOW (ref 34.5–45)
HGB BLD-MCNC: 9.3 G/DL — LOW (ref 11.5–15.5)
HGB BLD-MCNC: 9.3 G/DL — LOW (ref 11.5–15.5)
IMM GRANULOCYTES NFR BLD AUTO: 2.3 % — HIGH (ref 0–0.9)
IMM GRANULOCYTES NFR BLD AUTO: 2.3 % — HIGH (ref 0–0.9)
LDH SERPL L TO P-CCNC: 186 U/L — SIGNIFICANT CHANGE UP (ref 50–242)
LDH SERPL L TO P-CCNC: 186 U/L — SIGNIFICANT CHANGE UP (ref 50–242)
LYMPHOCYTES # BLD AUTO: 21.2 % — SIGNIFICANT CHANGE UP (ref 13–44)
LYMPHOCYTES # BLD AUTO: 21.2 % — SIGNIFICANT CHANGE UP (ref 13–44)
LYMPHOCYTES # BLD AUTO: 3.88 K/UL — HIGH (ref 1–3.3)
LYMPHOCYTES # BLD AUTO: 3.88 K/UL — HIGH (ref 1–3.3)
MAGNESIUM SERPL-MCNC: 2.2 MG/DL — SIGNIFICANT CHANGE UP (ref 1.6–2.6)
MAGNESIUM SERPL-MCNC: 2.2 MG/DL — SIGNIFICANT CHANGE UP (ref 1.6–2.6)
MANUAL SMEAR VERIFICATION: SIGNIFICANT CHANGE UP
MANUAL SMEAR VERIFICATION: SIGNIFICANT CHANGE UP
MCHC RBC-ENTMCNC: 24.7 PG — LOW (ref 27–34)
MCHC RBC-ENTMCNC: 24.7 PG — LOW (ref 27–34)
MCHC RBC-ENTMCNC: 30 GM/DL — LOW (ref 32–36)
MCHC RBC-ENTMCNC: 30 GM/DL — LOW (ref 32–36)
MCV RBC AUTO: 82.4 FL — SIGNIFICANT CHANGE UP (ref 80–100)
MCV RBC AUTO: 82.4 FL — SIGNIFICANT CHANGE UP (ref 80–100)
MONOCYTES # BLD AUTO: 1.13 K/UL — HIGH (ref 0–0.9)
MONOCYTES # BLD AUTO: 1.13 K/UL — HIGH (ref 0–0.9)
MONOCYTES NFR BLD AUTO: 6.1 % — SIGNIFICANT CHANGE UP (ref 2–14)
MONOCYTES NFR BLD AUTO: 6.1 % — SIGNIFICANT CHANGE UP (ref 2–14)
MYELOCYTES NFR BLD: 0.9 % — HIGH (ref 0–0)
MYELOCYTES NFR BLD: 0.9 % — HIGH (ref 0–0)
NEUTROPHILS # BLD AUTO: 13.4 K/UL — HIGH (ref 1.8–7.4)
NEUTROPHILS # BLD AUTO: 13.4 K/UL — HIGH (ref 1.8–7.4)
NEUTROPHILS NFR BLD AUTO: 72.1 % — SIGNIFICANT CHANGE UP (ref 43–77)
NEUTROPHILS NFR BLD AUTO: 72.1 % — SIGNIFICANT CHANGE UP (ref 43–77)
NRBC # BLD: 0 /100 WBCS — SIGNIFICANT CHANGE UP (ref 0–0)
NRBC # BLD: 0 /100 WBCS — SIGNIFICANT CHANGE UP (ref 0–0)
OB PNL STL IA: NEGATIVE — SIGNIFICANT CHANGE UP
OB PNL STL IA: NEGATIVE — SIGNIFICANT CHANGE UP
PHOSPHATE SERPL-MCNC: 3.4 MG/DL — SIGNIFICANT CHANGE UP (ref 2.5–4.5)
PHOSPHATE SERPL-MCNC: 3.4 MG/DL — SIGNIFICANT CHANGE UP (ref 2.5–4.5)
PLAT MORPH BLD: NORMAL — SIGNIFICANT CHANGE UP
PLAT MORPH BLD: NORMAL — SIGNIFICANT CHANGE UP
PLATELET # BLD AUTO: 804 K/UL — HIGH (ref 150–400)
PLATELET # BLD AUTO: 804 K/UL — HIGH (ref 150–400)
POTASSIUM SERPL-MCNC: 4.1 MMOL/L — SIGNIFICANT CHANGE UP (ref 3.5–5.3)
POTASSIUM SERPL-MCNC: 4.1 MMOL/L — SIGNIFICANT CHANGE UP (ref 3.5–5.3)
POTASSIUM SERPL-SCNC: 4.1 MMOL/L — SIGNIFICANT CHANGE UP (ref 3.5–5.3)
POTASSIUM SERPL-SCNC: 4.1 MMOL/L — SIGNIFICANT CHANGE UP (ref 3.5–5.3)
PROCALCITONIN SERPL-MCNC: 0.07 NG/ML — SIGNIFICANT CHANGE UP (ref 0.02–0.1)
PROCALCITONIN SERPL-MCNC: 0.07 NG/ML — SIGNIFICANT CHANGE UP (ref 0.02–0.1)
PROT SERPL-MCNC: 7 G/DL — SIGNIFICANT CHANGE UP (ref 6–8.3)
PROT SERPL-MCNC: 7 G/DL — SIGNIFICANT CHANGE UP (ref 6–8.3)
RBC # BLD: 3.76 M/UL — LOW (ref 3.8–5.2)
RBC # BLD: 3.76 M/UL — LOW (ref 3.8–5.2)
RBC # FLD: 17 % — HIGH (ref 10.3–14.5)
RBC # FLD: 17 % — HIGH (ref 10.3–14.5)
RBC BLD AUTO: SIGNIFICANT CHANGE UP
RBC BLD AUTO: SIGNIFICANT CHANGE UP
SODIUM SERPL-SCNC: 137 MMOL/L — SIGNIFICANT CHANGE UP (ref 135–145)
SODIUM SERPL-SCNC: 137 MMOL/L — SIGNIFICANT CHANGE UP (ref 135–145)
SPECIMEN SOURCE: SIGNIFICANT CHANGE UP
SPECIMEN SOURCE: SIGNIFICANT CHANGE UP
WBC # BLD: 18.58 K/UL — HIGH (ref 3.8–10.5)
WBC # BLD: 18.58 K/UL — HIGH (ref 3.8–10.5)
WBC # FLD AUTO: 18.58 K/UL — HIGH (ref 3.8–10.5)
WBC # FLD AUTO: 18.58 K/UL — HIGH (ref 3.8–10.5)

## 2023-12-21 PROCEDURE — 99233 SBSQ HOSP IP/OBS HIGH 50: CPT

## 2023-12-21 PROCEDURE — 93970 EXTREMITY STUDY: CPT | Mod: 26

## 2023-12-21 PROCEDURE — 99233 SBSQ HOSP IP/OBS HIGH 50: CPT | Mod: GC

## 2023-12-21 RX ORDER — LANOLIN ALCOHOL/MO/W.PET/CERES
5 CREAM (GRAM) TOPICAL AT BEDTIME
Refills: 0 | Status: DISCONTINUED | OUTPATIENT
Start: 2023-12-21 | End: 2023-12-27

## 2023-12-21 RX ORDER — BUPRENORPHINE AND NALOXONE 2; .5 MG/1; MG/1
2 TABLET SUBLINGUAL
Refills: 0 | Status: DISCONTINUED | OUTPATIENT
Start: 2023-12-23 | End: 2023-12-27

## 2023-12-21 RX ADMIN — Medication 3 MILLIGRAM(S): at 00:41

## 2023-12-21 RX ADMIN — SODIUM CHLORIDE 4 MILLILITER(S): 9 INJECTION INTRAMUSCULAR; INTRAVENOUS; SUBCUTANEOUS at 06:25

## 2023-12-21 RX ADMIN — SODIUM CHLORIDE 4 MILLILITER(S): 9 INJECTION INTRAMUSCULAR; INTRAVENOUS; SUBCUTANEOUS at 23:26

## 2023-12-21 RX ADMIN — Medication 1 SPRAY(S): at 06:25

## 2023-12-21 RX ADMIN — BUPRENORPHINE AND NALOXONE 2 TABLET(S): 2; .5 TABLET SUBLINGUAL at 11:37

## 2023-12-21 RX ADMIN — BUPRENORPHINE AND NALOXONE 2 TABLET(S): 2; .5 TABLET SUBLINGUAL at 22:23

## 2023-12-21 RX ADMIN — SODIUM CHLORIDE 4 MILLILITER(S): 9 INJECTION INTRAMUSCULAR; INTRAVENOUS; SUBCUTANEOUS at 18:22

## 2023-12-21 RX ADMIN — Medication 3 MILLILITER(S): at 00:41

## 2023-12-21 RX ADMIN — Medication 40 MILLIGRAM(S): at 06:24

## 2023-12-21 RX ADMIN — Medication 600 MILLIGRAM(S): at 06:24

## 2023-12-21 RX ADMIN — AMLODIPINE BESYLATE 5 MILLIGRAM(S): 2.5 TABLET ORAL at 06:24

## 2023-12-21 RX ADMIN — Medication 3 MILLILITER(S): at 06:25

## 2023-12-21 RX ADMIN — SODIUM CHLORIDE 4 MILLILITER(S): 9 INJECTION INTRAMUSCULAR; INTRAVENOUS; SUBCUTANEOUS at 11:30

## 2023-12-21 RX ADMIN — Medication 3 MILLILITER(S): at 11:30

## 2023-12-21 RX ADMIN — Medication 25 MILLIGRAM(S): at 18:23

## 2023-12-21 RX ADMIN — Medication 3 MILLILITER(S): at 23:25

## 2023-12-21 RX ADMIN — SODIUM CHLORIDE 4 MILLILITER(S): 9 INJECTION INTRAMUSCULAR; INTRAVENOUS; SUBCUTANEOUS at 00:41

## 2023-12-21 RX ADMIN — Medication 5 MILLIGRAM(S): at 22:23

## 2023-12-21 RX ADMIN — Medication 75 MILLIGRAM(S): at 11:30

## 2023-12-21 RX ADMIN — Medication 1200 MILLIGRAM(S): at 18:23

## 2023-12-21 RX ADMIN — APIXABAN 5 MILLIGRAM(S): 2.5 TABLET, FILM COATED ORAL at 06:24

## 2023-12-21 RX ADMIN — Medication 25 MILLIGRAM(S): at 06:24

## 2023-12-21 RX ADMIN — Medication 3 MILLILITER(S): at 18:22

## 2023-12-21 RX ADMIN — Medication 1 TABLET(S): at 11:32

## 2023-12-21 RX ADMIN — Medication 1 SPRAY(S): at 18:23

## 2023-12-21 RX ADMIN — PANTOPRAZOLE SODIUM 40 MILLIGRAM(S): 20 TABLET, DELAYED RELEASE ORAL at 06:24

## 2023-12-21 RX ADMIN — APIXABAN 5 MILLIGRAM(S): 2.5 TABLET, FILM COATED ORAL at 18:23

## 2023-12-21 NOTE — PROGRESS NOTE ADULT - SUBJECTIVE AND OBJECTIVE BOX
Patient is a 68y old  Female who presents with a chief complaint of Chronic progressively worsening dyspnea with minimal exertion + coughs (20 Dec 2023 12:46)      SUBJECTIVE / OVERNIGHT EVENTS:  No acute events overnight. Progressive thrombocytosis on labs.        MEDICATIONS  (STANDING):  albuterol/ipratropium for Nebulization 3 milliLiter(s) Nebulizer every 6 hours  amLODIPine   Tablet 5 milliGRAM(s) Oral daily  apixaban 5 milliGRAM(s) Oral every 12 hours  buprenorphine 2 mG/naloxone 0.5 mG SL  Tablet 2 Tablet(s) SubLingual <User Schedule>  ferrous    sulfate 325 milliGRAM(s) Oral <User Schedule>  fluticasone propionate 50 MICROgram(s)/spray Nasal Spray 1 Spray(s) Both Nostrils two times a day  guaiFENesin ER 1200 milliGRAM(s) Oral every 12 hours  influenza  Vaccine (HIGH DOSE) 0.7 milliLiter(s) IntraMuscular once  levoFLOXacin IVPB 750 milliGRAM(s) IV Intermittent every 24 hours  metoprolol tartrate 25 milliGRAM(s) Oral two times a day  multivitamin 1 Tablet(s) Oral daily  pantoprazole    Tablet 40 milliGRAM(s) Oral before breakfast  polyethylene glycol 3350 17 Gram(s) Oral daily  sodium chloride 3%  Inhalation 4 milliLiter(s) Inhalation every 6 hours  venlafaxine XR. 75 milliGRAM(s) Oral daily    MEDICATIONS  (PRN):  acetaminophen     Tablet .. 650 milliGRAM(s) Oral every 6 hours PRN Temp greater or equal to 38C (100.4F), Mild Pain (1 - 3), Moderate Pain (4 - 6), Severe Pain (7 - 10)  melatonin 3 milliGRAM(s) Oral at bedtime PRN Insomnia      CAPILLARY BLOOD GLUCOSE        I&O's Summary    20 Dec 2023 07:01  -  21 Dec 2023 07:00  --------------------------------------------------------  IN: 150 mL / OUT: 451 mL / NET: -301 mL        PHYSICAL EXAM:  Vital Signs Last 24 Hrs  T(C): 36.3 (21 Dec 2023 06:22), Max: 37.1 (20 Dec 2023 19:44)  T(F): 97.4 (21 Dec 2023 06:22), Max: 98.8 (20 Dec 2023 19:44)  HR: 89 (21 Dec 2023 06:22) (89 - 116)  BP: 121/65 (21 Dec 2023 06:22) (121/65 - 149/100)  BP(mean): --  RR: 18 (21 Dec 2023 06:22) (18 - 18)  SpO2: 98% (21 Dec 2023 06:22) (98% - 100%)    Parameters below as of 21 Dec 2023 06:22  Patient On (Oxygen Delivery Method): room air        GENERAL: No apparent distress.   HEAD:  Atraumatic, Normocephalic  EYES: EOMI, PERRLA, conjunctiva and sclera clear  NECK: Supple, no lymphadenopathy, no elevated JVP  CHEST/LUNG: left sided wheeze  HEART: S1 and S2 normal. Regular rate and rhythm; No murmurs, rubs, or gallops  ABDOMEN: Soft, non-tender, non-distended; normal bowel sounds  EXTREMITIES:  2+ peripheral pulses b/l, No clubbing, cyanosis, or edema  NEUROLOGY: A&O x 3, no focal deficits  SKIN: No rashes or lesions    LABS:                        9.5    17.36 )-----------( 793      ( 20 Dec 2023 07:24 )             31.4     12-20    137  |  94<L>  |  26<H>  ----------------------------<  88  4.3   |  30  |  0.68    Ca    10.0      20 Dec 2023 07:26  Phos  3.5     12-20  Mg     2.2     12-20    TPro  7.3  /  Alb  4.1  /  TBili  <0.1<L>  /  DBili  x   /  AST  15  /  ALT  17  /  AlkPhos  92  12-20          Urinalysis Basic - ( 20 Dec 2023 07:26 )    Color: x / Appearance: x / SG: x / pH: x  Gluc: 88 mg/dL / Ketone: x  / Bili: x / Urobili: x   Blood: x / Protein: x / Nitrite: x   Leuk Esterase: x / RBC: x / WBC x   Sq Epi: x / Non Sq Epi: x / Bacteria: x        Culture - Urine (collected 19 Dec 2023 17:40)  Source: Clean Catch Clean Catch (Midstream)  Final Report (20 Dec 2023 23:41):    No growth    Culture - Sputum (collected 19 Dec 2023 17:39)  Source: .Sputum Sputum  Gram Stain (20 Dec 2023 02:19):    Rare polymorphonuclear leukocytes per low power field    No Squamous epithelial cells per low power field    Rare Gram Positive Cocci in Pairs and Chains per oil power field  Preliminary Report (20 Dec 2023 16:34):    Normal Respiratory Blanca present    Culture - Acid Fast - Sputum w/Smear (collected 18 Dec 2023 19:18)  Source: .Sputum Sputum  Preliminary Report (20 Dec 2023 15:08):    Culture is being performed.        RADIOLOGY & ADDITIONAL TESTS:  Lab Results Reviewed   Imaging Reviewed  Electrocardiogram Reviewed     Patient is a 68y old  Female who presents with a chief complaint of Chronic progressively worsening dyspnea with minimal exertion + coughs (20 Dec 2023 12:46)      SUBJECTIVE / OVERNIGHT EVENTS:  No acute events overnight. Progressive leukocytosis and thrombocytosis on labs. ESR rising.        MEDICATIONS  (STANDING):  albuterol/ipratropium for Nebulization 3 milliLiter(s) Nebulizer every 6 hours  amLODIPine   Tablet 5 milliGRAM(s) Oral daily  apixaban 5 milliGRAM(s) Oral every 12 hours  buprenorphine 2 mG/naloxone 0.5 mG SL  Tablet 2 Tablet(s) SubLingual <User Schedule>  ferrous    sulfate 325 milliGRAM(s) Oral <User Schedule>  fluticasone propionate 50 MICROgram(s)/spray Nasal Spray 1 Spray(s) Both Nostrils two times a day  guaiFENesin ER 1200 milliGRAM(s) Oral every 12 hours  influenza  Vaccine (HIGH DOSE) 0.7 milliLiter(s) IntraMuscular once  levoFLOXacin IVPB 750 milliGRAM(s) IV Intermittent every 24 hours  metoprolol tartrate 25 milliGRAM(s) Oral two times a day  multivitamin 1 Tablet(s) Oral daily  pantoprazole    Tablet 40 milliGRAM(s) Oral before breakfast  polyethylene glycol 3350 17 Gram(s) Oral daily  sodium chloride 3%  Inhalation 4 milliLiter(s) Inhalation every 6 hours  venlafaxine XR. 75 milliGRAM(s) Oral daily    MEDICATIONS  (PRN):  acetaminophen     Tablet .. 650 milliGRAM(s) Oral every 6 hours PRN Temp greater or equal to 38C (100.4F), Mild Pain (1 - 3), Moderate Pain (4 - 6), Severe Pain (7 - 10)  melatonin 3 milliGRAM(s) Oral at bedtime PRN Insomnia      CAPILLARY BLOOD GLUCOSE        I&O's Summary    20 Dec 2023 07:01  -  21 Dec 2023 07:00  --------------------------------------------------------  IN: 150 mL / OUT: 451 mL / NET: -301 mL        PHYSICAL EXAM:  Vital Signs Last 24 Hrs  T(C): 36.3 (21 Dec 2023 06:22), Max: 37.1 (20 Dec 2023 19:44)  T(F): 97.4 (21 Dec 2023 06:22), Max: 98.8 (20 Dec 2023 19:44)  HR: 89 (21 Dec 2023 06:22) (89 - 116)  BP: 121/65 (21 Dec 2023 06:22) (121/65 - 149/100)  BP(mean): --  RR: 18 (21 Dec 2023 06:22) (18 - 18)  SpO2: 98% (21 Dec 2023 06:22) (98% - 100%)    Parameters below as of 21 Dec 2023 06:22  Patient On (Oxygen Delivery Method): room air        GENERAL: No apparent distress.   HEAD:  Atraumatic, Normocephalic  EYES: EOMI, PERRLA, conjunctiva and sclera clear  NECK: Supple, no lymphadenopathy, no elevated JVP  CHEST/LUNG: left sided wheeze  HEART: S1 and S2 normal. Regular rate and rhythm; No murmurs, rubs, or gallops  ABDOMEN: Soft, non-tender, non-distended; normal bowel sounds  EXTREMITIES:  2+ peripheral pulses b/l, No clubbing, cyanosis, or edema  NEUROLOGY: A&O x 3, no focal deficits  SKIN: No rashes or lesions    LABS:                        9.5    17.36 )-----------( 793      ( 20 Dec 2023 07:24 )             31.4     12-20    137  |  94<L>  |  26<H>  ----------------------------<  88  4.3   |  30  |  0.68    Ca    10.0      20 Dec 2023 07:26  Phos  3.5     12-20  Mg     2.2     12-20    TPro  7.3  /  Alb  4.1  /  TBili  <0.1<L>  /  DBili  x   /  AST  15  /  ALT  17  /  AlkPhos  92  12-20          Urinalysis Basic - ( 20 Dec 2023 07:26 )    Color: x / Appearance: x / SG: x / pH: x  Gluc: 88 mg/dL / Ketone: x  / Bili: x / Urobili: x   Blood: x / Protein: x / Nitrite: x   Leuk Esterase: x / RBC: x / WBC x   Sq Epi: x / Non Sq Epi: x / Bacteria: x        Culture - Urine (collected 19 Dec 2023 17:40)  Source: Clean Catch Clean Catch (Midstream)  Final Report (20 Dec 2023 23:41):    No growth    Culture - Sputum (collected 19 Dec 2023 17:39)  Source: .Sputum Sputum  Gram Stain (20 Dec 2023 02:19):    Rare polymorphonuclear leukocytes per low power field    No Squamous epithelial cells per low power field    Rare Gram Positive Cocci in Pairs and Chains per oil power field  Preliminary Report (20 Dec 2023 16:34):    Normal Respiratory Blanca present    Culture - Acid Fast - Sputum w/Smear (collected 18 Dec 2023 19:18)  Source: .Sputum Sputum  Preliminary Report (20 Dec 2023 15:08):    Culture is being performed.        RADIOLOGY & ADDITIONAL TESTS:  Lab Results Reviewed   Imaging Reviewed  Electrocardiogram Reviewed

## 2023-12-21 NOTE — PROGRESS NOTE ADULT - PROBLEM SELECTOR PLAN 1
Now presenting with worsening ALTMAN and fatigue which appears to be 2/2 PNA vs. MAC as oppose to exacerbation. Home pulm regimen Advair 500-50 + Albuterol    Plan:  >Sating well on baseline O2 3L  - c/w prednisone 40mg qd  - c/w DuoNeb q6 standing + Hypersal 3% q6 + Mucinex + Flonase for airway clearance  - Hold ICS-LABA while in exacerbation  - c/w NC, wean as tolerated, continuous pulse ox  -Ordering CXR  -Will discuss with radiology about CTA results Now presenting with worsening ALTMAN and fatigue which appears to be 2/2 PNA vs. MAC as oppose to exacerbation. Home pulm regimen Advair 500-50 + Albuterol    Plan:  >Sating well on baseline O2 3L  - Lowered Prednisone to 20mg PO daily  - c/w DuoNeb q6 standing + Hypersal 3% q6 + Mucinex + Flonase for airway clearance  - Hold ICS-LABA while in exacerbation  - c/w NC, wean as tolerated, continuous pulse ox  -Ordering CXR Now presenting with worsening ALTMAN and fatigue which appears to be 2/2 PNA vs. MAC as oppose to exacerbation. Home pulm regimen Advair 500-50 + Albuterol    Plan:  >Sating well on baseline O2 3L  - Lowered Prednisone to 20mg PO daily  - c/w DuoNeb q6 standing + Hypersal 3% q6 + Mucinex + Flonase for airway clearance  - Hold ICS-LABA while in exacerbation  - c/w NC, wean as tolerated, continuous pulse ox  -No new findings on CXR   -Pulmonology on board-appreciate further recommendations.

## 2023-12-21 NOTE — PROGRESS NOTE ADULT - PROBLEM SELECTOR PLAN 2
Etiology unclear- possibly due to underlying infection. CTA negative for PE  -Sinus on repeat EKG   -Ordered TTE for further evaluation- will f/u Etiology unclear- possibly due to underlying infection. CTA negative for PE  -Sinus on repeat EKG   -TTE- no major abnormalities   -Will order D-dimer and Lower extremity dopplers Etiology unclear- possibly due to underlying infection. CTA negative for PE  -Sinus on repeat EKG   -TTE- no major abnormalities   -Lower extremity doppler- No DVT  -Will order D-dimer

## 2023-12-21 NOTE — PROGRESS NOTE ADULT - ATTENDING COMMENTS
68F unexplained weight loss with chronic cough. Her respiratory status decline is more subacute in nature, doubt COPD exacerbation given she is at her baseline O2 requirements, not wheezing, and has compensated hypercapnia. CT suggestive of MAC, rather than CAP.     - pulm consult appreciated: c/w levaquin to cover pseudomonas for 7 days, treat for bronchiectasis exacerbation w/ prednisone, pulm hygiene, chest PT  - continue steroids, appreciate pulm taper plan  - dc with duonebs/hypersal, acapella and outpt pulm rehab  - outpt repeat CT [facility] in 4-6 weeks for HUANG and RUL 9m nodule and then determine need for EBUS and navigational bronch at that time   - s/p AFB sputum x 3 all neg  - anemia workup - c/w HOLLY - start iron qod. needs outpatient GI follow-up for crc screening if not done recently  - persistent tachycardia, likely driven by extensive pulmonary disease. not in distress, pain, or PE. possibly med side effect from standing duoneb. c/w lopressor, TSH nml, echo with no obvious abnormalities. No PE on previous CTA    dc planning within next 1-2 days, d/w HS 2.

## 2023-12-21 NOTE — PROGRESS NOTE ADULT - ASSESSMENT
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 Undergoing management for COPD exacerbation. Also tachycardic. 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 Focal Right mid lung zone wheezing on exam.  Chest CT with possible HUANG at station 11R? Thrombocytosis, leukocytosis and increasing ESR of unclear etiology. LE dopplers negative. Repeat CXR unrevealing.  TTE normal - no PH.      #Bronchiectasis  #COPD/Emphysema exacerbation  #NTM Pulmonary disease?  #Weight loss?  #Sinus tachycardia  #Thrombocytosis    Recommendations:   - Consider heme consult for progressive thrombocytosis  - Completed course of Abx  - repeat procal  - Continue Prednisone 20mg x 5 days then 10mg x 5 days and can stop  - AFB smear (-) x 3, f/u cultures  - should be discharged with Duonebs+ 3% hypersal q12h, continue while inpatient along with Acapella device  - Supplemental oxygen to goal >88%, remains on 2L NCreport  - Check Quantiferon  - Mucinex 1200mg BID  - patient will benefit from Pulmonary rehab as outpatient  - will need repeat Chest CT in 4-6 weeks for HUANG and RUL 9mm nodule, will determine need for EBUS and Navigational bronch at that time pending also AFB culture results    Chari Covington MD     67 yo F with PMHx Protein S deficiency, 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 Undergoing management for COPD exacerbation. Also tachycardic. 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 Focal Right mid lung zone wheezing on exam.  Chest CT with possible HUANG at station 11R? Thrombocytosis, leukocytosis and increasing ESR of unclear etiology. LE dopplers negative. Repeat CXR unrevealing.  TTE normal - no PH.      #Bronchiectasis  #COPD/Emphysema exacerbation  #NTM Pulmonary disease?  #Weight loss?  #Sinus tachycardia  #Thrombocytosis    Recommendations:   - Consider heme consult for progressive thrombocytosis  - Completed course of Abx  - repeat procal  - Continue Prednisone 20mg x 5 days then 10mg x 5 days and can stop  - AFB smear (-) x 3, f/u cultures  - should be discharged with Duonebs+ 3% hypersal q12h, continue while inpatient along with Acapella device  - Supplemental oxygen to goal >88%, remains on 2L NCreport  - Check Quantiferon  - Mucinex 1200mg BID  - patient will benefit from Pulmonary rehab as outpatient  - will need repeat Chest CT in 4-6 weeks for HUANG and RUL 9mm nodule, will determine need for EBUS and Navigational bronch at that time pending also AFB culture results    Chari Covnigton MD     69 yo F with PMHx Protein S deficiency, 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 Undergoing management for COPD exacerbation. Also tachycardic. 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 Focal Right mid lung zone wheezing on exam.  Chest CT with possible HUANG at station 11R? Thrombocytosis, leukocytosis and increasing ESR of unclear etiology. LE dopplers negative. Repeat CXR unrevealing.  TTE normal - no PH. Procal now 0.07.  Wheezing again on PE today. Currently on 1L NC with O2Sat 97%.  HR in 120s.      #Bronchiectasis  #COPD/Emphysema exacerbation  #NTM Pulmonary disease?  #Weight loss?  #Sinus tachycardia  #Thrombocytosis    Recommendations:   - Consider heme consult for progressive thrombocytosis  - Completed course of Abx  - Continue Prednisone 20mg x 5 days then 10mg x 5 days and can stop  - ICS not advised when NTM infection under suspicion however in patient's situation benefits outweigh risks, start Symbicort 80/4.5 BID (rinse mouth after use) given recurrence of wheezing  - AFB smear (-) x 3, f/u cultures  - should be discharged with Duonebs+ 3% hypersal q12h, continue while inpatient along with Acapella device  - Supplemental oxygen to goal >88%, remains on 2L NC  - Check Quantiferon  - Mucinex 1200mg BID  - patient will benefit from Pulmonary rehab as outpatient  - will need repeat Chest CT in 4-6 weeks for HUANG and RUL 9mm nodule, will determine need for EBUS and Navigational bronch at that time pending also AFB culture results    Chari Covington MD

## 2023-12-21 NOTE — PROGRESS NOTE ADULT - SUBJECTIVE AND OBJECTIVE BOX
PROGRESS NOTE:   Authored by Amadeo Garcia MD  Patient is a 68y old  Female who presents with a chief complaint of Chronic progressively worsening dyspnea with minimal exertion + coughs (20 Dec 2023 12:46)      SUBJECTIVE / OVERNIGHT EVENTS:  No acute events overnight.     ADDITIONAL REVIEW OF SYSTEMS:    MEDICATIONS  (STANDING):  albuterol/ipratropium for Nebulization 3 milliLiter(s) Nebulizer every 6 hours  amLODIPine   Tablet 5 milliGRAM(s) Oral daily  apixaban 5 milliGRAM(s) Oral every 12 hours  buprenorphine 2 mG/naloxone 0.5 mG SL  Tablet 2 Tablet(s) SubLingual <User Schedule>  ferrous    sulfate 325 milliGRAM(s) Oral <User Schedule>  fluticasone propionate 50 MICROgram(s)/spray Nasal Spray 1 Spray(s) Both Nostrils two times a day  guaiFENesin ER 1200 milliGRAM(s) Oral every 12 hours  influenza  Vaccine (HIGH DOSE) 0.7 milliLiter(s) IntraMuscular once  levoFLOXacin IVPB 750 milliGRAM(s) IV Intermittent every 24 hours  metoprolol tartrate 25 milliGRAM(s) Oral two times a day  multivitamin 1 Tablet(s) Oral daily  pantoprazole    Tablet 40 milliGRAM(s) Oral before breakfast  polyethylene glycol 3350 17 Gram(s) Oral daily  sodium chloride 3%  Inhalation 4 milliLiter(s) Inhalation every 6 hours  venlafaxine XR. 75 milliGRAM(s) Oral daily    MEDICATIONS  (PRN):  acetaminophen     Tablet .. 650 milliGRAM(s) Oral every 6 hours PRN Temp greater or equal to 38C (100.4F), Mild Pain (1 - 3), Moderate Pain (4 - 6), Severe Pain (7 - 10)  melatonin 3 milliGRAM(s) Oral at bedtime PRN Insomnia      CAPILLARY BLOOD GLUCOSE        I&O's Summary    20 Dec 2023 07:01  -  21 Dec 2023 07:00  --------------------------------------------------------  IN: 150 mL / OUT: 451 mL / NET: -301 mL        PHYSICAL EXAM:  Vital Signs Last 24 Hrs  T(C): 36.3 (21 Dec 2023 06:22), Max: 37.1 (20 Dec 2023 19:44)  T(F): 97.4 (21 Dec 2023 06:22), Max: 98.8 (20 Dec 2023 19:44)  HR: 89 (21 Dec 2023 06:22) (89 - 116)  BP: 121/65 (21 Dec 2023 06:22) (121/65 - 149/100)  BP(mean): --  RR: 18 (21 Dec 2023 06:22) (18 - 18)  SpO2: 98% (21 Dec 2023 06:22) (98% - 100%)    Parameters below as of 21 Dec 2023 06:22  Patient On (Oxygen Delivery Method): room air        GENERAL: No apparent distress.   HEAD:  Atraumatic, Normocephalic  EYES: EOMI, PERRLA, conjunctiva and sclera clear  NECK: Supple, no lymphadenopathy, no elevated JVP  CHEST/LUNG: Clear to auscultation bilateral and symmetric; No wheezes, rales, or rhonchi  HEART: S1 and S2 normal. Regular rate and rhythm; No murmurs, rubs, or gallops  ABDOMEN: Soft, non-tender, non-distended; normal bowel sounds  EXTREMITIES:  2+ peripheral pulses b/l, No clubbing, cyanosis, or edema  NEUROLOGY: A&O x 3, no focal deficits  SKIN: No rashes or lesions    LABS:                        9.5    17.36 )-----------( 793      ( 20 Dec 2023 07:24 )             31.4     12-20    137  |  94<L>  |  26<H>  ----------------------------<  88  4.3   |  30  |  0.68    Ca    10.0      20 Dec 2023 07:26  Phos  3.5     12-20  Mg     2.2     12-20    TPro  7.3  /  Alb  4.1  /  TBili  <0.1<L>  /  DBili  x   /  AST  15  /  ALT  17  /  AlkPhos  92  12-20          Urinalysis Basic - ( 20 Dec 2023 07:26 )    Color: x / Appearance: x / SG: x / pH: x  Gluc: 88 mg/dL / Ketone: x  / Bili: x / Urobili: x   Blood: x / Protein: x / Nitrite: x   Leuk Esterase: x / RBC: x / WBC x   Sq Epi: x / Non Sq Epi: x / Bacteria: x        Culture - Urine (collected 19 Dec 2023 17:40)  Source: Clean Catch Clean Catch (Midstream)  Final Report (20 Dec 2023 23:41):    No growth    Culture - Sputum (collected 19 Dec 2023 17:39)  Source: .Sputum Sputum  Gram Stain (20 Dec 2023 02:19):    Rare polymorphonuclear leukocytes per low power field    No Squamous epithelial cells per low power field    Rare Gram Positive Cocci in Pairs and Chains per oil power field  Preliminary Report (20 Dec 2023 16:34):    Normal Respiratory Blanca present    Culture - Acid Fast - Sputum w/Smear (collected 18 Dec 2023 19:18)  Source: .Sputum Sputum  Preliminary Report (20 Dec 2023 15:08):    Culture is being performed.        RADIOLOGY & ADDITIONAL TESTS:  Lab Results Reviewed   Imaging Reviewed  Electrocardiogram Reviewed   PROGRESS NOTE:   Authored by Amadeo Garcia MD  Patient is a 68y old  Female who presents with a chief complaint of Chronic progressively worsening dyspnea with minimal exertion + coughs (20 Dec 2023 12:46)      SUBJECTIVE / OVERNIGHT EVENTS:  No acute events overnight. Patient was seen and examined at bedside and did not appear to be in any acute distress. Reports that her cough is improving, and is having minimal sputum production. Denies chest pain, or palpitations. Dyspnea has been stable. Denies fevers, chills or abdominal pain.         MEDICATIONS  (STANDING):  albuterol/ipratropium for Nebulization 3 milliLiter(s) Nebulizer every 6 hours  amLODIPine   Tablet 5 milliGRAM(s) Oral daily  apixaban 5 milliGRAM(s) Oral every 12 hours  buprenorphine 2 mG/naloxone 0.5 mG SL  Tablet 2 Tablet(s) SubLingual <User Schedule>  ferrous    sulfate 325 milliGRAM(s) Oral <User Schedule>  fluticasone propionate 50 MICROgram(s)/spray Nasal Spray 1 Spray(s) Both Nostrils two times a day  guaiFENesin ER 1200 milliGRAM(s) Oral every 12 hours  influenza  Vaccine (HIGH DOSE) 0.7 milliLiter(s) IntraMuscular once  levoFLOXacin IVPB 750 milliGRAM(s) IV Intermittent every 24 hours  metoprolol tartrate 25 milliGRAM(s) Oral two times a day  multivitamin 1 Tablet(s) Oral daily  pantoprazole    Tablet 40 milliGRAM(s) Oral before breakfast  polyethylene glycol 3350 17 Gram(s) Oral daily  sodium chloride 3%  Inhalation 4 milliLiter(s) Inhalation every 6 hours  venlafaxine XR. 75 milliGRAM(s) Oral daily    MEDICATIONS  (PRN):  acetaminophen     Tablet .. 650 milliGRAM(s) Oral every 6 hours PRN Temp greater or equal to 38C (100.4F), Mild Pain (1 - 3), Moderate Pain (4 - 6), Severe Pain (7 - 10)  melatonin 3 milliGRAM(s) Oral at bedtime PRN Insomnia      CAPILLARY BLOOD GLUCOSE        I&O's Summary    20 Dec 2023 07:01  -  21 Dec 2023 07:00  --------------------------------------------------------  IN: 150 mL / OUT: 451 mL / NET: -301 mL        PHYSICAL EXAM:  Vital Signs Last 24 Hrs  T(C): 36.3 (21 Dec 2023 06:22), Max: 37.1 (20 Dec 2023 19:44)  T(F): 97.4 (21 Dec 2023 06:22), Max: 98.8 (20 Dec 2023 19:44)  HR: 89 (21 Dec 2023 06:22) (89 - 116)  BP: 121/65 (21 Dec 2023 06:22) (121/65 - 149/100)  BP(mean): --  RR: 18 (21 Dec 2023 06:22) (18 - 18)  SpO2: 98% (21 Dec 2023 06:22) (98% - 100%)    Parameters below as of 21 Dec 2023 06:22  Patient On (Oxygen Delivery Method): room air        GENERAL: No apparent distress.   HEAD:  Atraumatic, Normocephalic  EYES: EOMI, PERRLA, conjunctiva and sclera clear  NECK: Supple, no lymphadenopathy, no elevated JVP  CHEST/LUNG: Clear to auscultation bilateral and symmetric; No wheezes, rales, or rhonchi  HEART: S1 and S2 normal. Regular rate and rhythm; No murmurs, rubs, or gallops  ABDOMEN: Soft, non-tender, non-distended; normal bowel sounds  EXTREMITIES:  2+ peripheral pulses b/l, No clubbing, cyanosis, or edema  NEUROLOGY: A&O x 3, no focal deficits  SKIN: No rashes or lesions    LABS:                        9.5    17.36 )-----------( 793      ( 20 Dec 2023 07:24 )             31.4     12-20    137  |  94<L>  |  26<H>  ----------------------------<  88  4.3   |  30  |  0.68    Ca    10.0      20 Dec 2023 07:26  Phos  3.5     12-20  Mg     2.2     12-20    TPro  7.3  /  Alb  4.1  /  TBili  <0.1<L>  /  DBili  x   /  AST  15  /  ALT  17  /  AlkPhos  92  12-20          Urinalysis Basic - ( 20 Dec 2023 07:26 )    Color: x / Appearance: x / SG: x / pH: x  Gluc: 88 mg/dL / Ketone: x  / Bili: x / Urobili: x   Blood: x / Protein: x / Nitrite: x   Leuk Esterase: x / RBC: x / WBC x   Sq Epi: x / Non Sq Epi: x / Bacteria: x        Culture - Urine (collected 19 Dec 2023 17:40)  Source: Clean Catch Clean Catch (Midstream)  Final Report (20 Dec 2023 23:41):    No growth    Culture - Sputum (collected 19 Dec 2023 17:39)  Source: .Sputum Sputum  Gram Stain (20 Dec 2023 02:19):    Rare polymorphonuclear leukocytes per low power field    No Squamous epithelial cells per low power field    Rare Gram Positive Cocci in Pairs and Chains per oil power field  Preliminary Report (20 Dec 2023 16:34):    Normal Respiratory Blanca present    Culture - Acid Fast - Sputum w/Smear (collected 18 Dec 2023 19:18)  Source: .Sputum Sputum  Preliminary Report (20 Dec 2023 15:08):    Culture is being performed.        RADIOLOGY & ADDITIONAL TESTS:  Lab Results Reviewed   Imaging Reviewed  Electrocardiogram Reviewed   PROGRESS NOTE:   Authored by Amadeo Garcia MD  Patient is a 68y old  Female who presents with a chief complaint of Chronic progressively worsening dyspnea with minimal exertion + coughs (20 Dec 2023 12:46)      SUBJECTIVE / OVERNIGHT EVENTS:  No acute events overnight. Patient was seen and examined at bedside and did not appear to be in any acute distress. Reports that her cough is improving, and is having minimal sputum production. Denies chest pain, or palpitations. Dyspnea has been stable. Denies fevers, chills or abdominal pain.         MEDICATIONS  (STANDING):  albuterol/ipratropium for Nebulization 3 milliLiter(s) Nebulizer every 6 hours  amLODIPine   Tablet 5 milliGRAM(s) Oral daily  apixaban 5 milliGRAM(s) Oral every 12 hours  buprenorphine 2 mG/naloxone 0.5 mG SL  Tablet 2 Tablet(s) SubLingual <User Schedule>  ferrous    sulfate 325 milliGRAM(s) Oral <User Schedule>  fluticasone propionate 50 MICROgram(s)/spray Nasal Spray 1 Spray(s) Both Nostrils two times a day  guaiFENesin ER 1200 milliGRAM(s) Oral every 12 hours  influenza  Vaccine (HIGH DOSE) 0.7 milliLiter(s) IntraMuscular once  levoFLOXacin IVPB 750 milliGRAM(s) IV Intermittent every 24 hours  metoprolol tartrate 25 milliGRAM(s) Oral two times a day  multivitamin 1 Tablet(s) Oral daily  pantoprazole    Tablet 40 milliGRAM(s) Oral before breakfast  polyethylene glycol 3350 17 Gram(s) Oral daily  sodium chloride 3%  Inhalation 4 milliLiter(s) Inhalation every 6 hours  venlafaxine XR. 75 milliGRAM(s) Oral daily    MEDICATIONS  (PRN):  acetaminophen     Tablet .. 650 milliGRAM(s) Oral every 6 hours PRN Temp greater or equal to 38C (100.4F), Mild Pain (1 - 3), Moderate Pain (4 - 6), Severe Pain (7 - 10)  melatonin 3 milliGRAM(s) Oral at bedtime PRN Insomnia      CAPILLARY BLOOD GLUCOSE        I&O's Summary    20 Dec 2023 07:01  -  21 Dec 2023 07:00  --------------------------------------------------------  IN: 150 mL / OUT: 451 mL / NET: -301 mL        PHYSICAL EXAM:  Vital Signs Last 24 Hrs  T(C): 36.3 (21 Dec 2023 06:22), Max: 37.1 (20 Dec 2023 19:44)  T(F): 97.4 (21 Dec 2023 06:22), Max: 98.8 (20 Dec 2023 19:44)  HR: 89 (21 Dec 2023 06:22) (89 - 116)  BP: 121/65 (21 Dec 2023 06:22) (121/65 - 149/100)  BP(mean): --  RR: 18 (21 Dec 2023 06:22) (18 - 18)  SpO2: 98% (21 Dec 2023 06:22) (98% - 100%)    Parameters below as of 21 Dec 2023 06:22  Patient On (Oxygen Delivery Method): room air        GENERAL: No apparent distress.   HEAD:  Atraumatic, Normocephalic  EYES: EOMI, PERRLA, conjunctiva and sclera clear  NECK: Supple, no lymphadenopathy, no elevated JVP  CHEST/LUNG: left sided wheeze  HEART: S1 and S2 normal. Regular rate and rhythm; No murmurs, rubs, or gallops  ABDOMEN: Soft, non-tender, non-distended; normal bowel sounds  EXTREMITIES:  2+ peripheral pulses b/l, No clubbing, cyanosis, or edema  NEUROLOGY: A&O x 3, no focal deficits  SKIN: No rashes or lesions    LABS:                        9.5    17.36 )-----------( 793      ( 20 Dec 2023 07:24 )             31.4     12-20    137  |  94<L>  |  26<H>  ----------------------------<  88  4.3   |  30  |  0.68    Ca    10.0      20 Dec 2023 07:26  Phos  3.5     12-20  Mg     2.2     12-20    TPro  7.3  /  Alb  4.1  /  TBili  <0.1<L>  /  DBili  x   /  AST  15  /  ALT  17  /  AlkPhos  92  12-20          Urinalysis Basic - ( 20 Dec 2023 07:26 )    Color: x / Appearance: x / SG: x / pH: x  Gluc: 88 mg/dL / Ketone: x  / Bili: x / Urobili: x   Blood: x / Protein: x / Nitrite: x   Leuk Esterase: x / RBC: x / WBC x   Sq Epi: x / Non Sq Epi: x / Bacteria: x        Culture - Urine (collected 19 Dec 2023 17:40)  Source: Clean Catch Clean Catch (Midstream)  Final Report (20 Dec 2023 23:41):    No growth    Culture - Sputum (collected 19 Dec 2023 17:39)  Source: .Sputum Sputum  Gram Stain (20 Dec 2023 02:19):    Rare polymorphonuclear leukocytes per low power field    No Squamous epithelial cells per low power field    Rare Gram Positive Cocci in Pairs and Chains per oil power field  Preliminary Report (20 Dec 2023 16:34):    Normal Respiratory Blanca present    Culture - Acid Fast - Sputum w/Smear (collected 18 Dec 2023 19:18)  Source: .Sputum Sputum  Preliminary Report (20 Dec 2023 15:08):    Culture is being performed.        RADIOLOGY & ADDITIONAL TESTS:  Lab Results Reviewed   Imaging Reviewed  Electrocardiogram Reviewed

## 2023-12-21 NOTE — PROGRESS NOTE ADULT - ASSESSMENT
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 p/w 2 months progressive worsening ALTMAN, fatigue, unintentional weight loss. Likely PNA vs COPD Exacerbation.  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 p/w 2 months progressive worsening ALTMAN, fatigue, unintentional weight loss. Likely PNA vs COPD Exacerbation. Patient still persistently tachycardic.  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 p/w 2 months progressive worsening ALTMAN, fatigue, unintentional weight loss. Likely PNA vs COPD Exacerbation. Patient still persistently tachycardic. Will discuss with Pulm about further treatment options.

## 2023-12-22 DIAGNOSIS — A41.9 SEPSIS, UNSPECIFIED ORGANISM: ICD-10-CM

## 2023-12-22 LAB
1,3 BETA GLUCAN SER QL: NEGATIVE — SIGNIFICANT CHANGE UP
1,3 BETA GLUCAN SER QL: NEGATIVE — SIGNIFICANT CHANGE UP
ALBUMIN SERPL ELPH-MCNC: 3.8 G/DL — SIGNIFICANT CHANGE UP (ref 3.3–5)
ALBUMIN SERPL ELPH-MCNC: 3.8 G/DL — SIGNIFICANT CHANGE UP (ref 3.3–5)
ALP SERPL-CCNC: 83 U/L — SIGNIFICANT CHANGE UP (ref 40–120)
ALP SERPL-CCNC: 83 U/L — SIGNIFICANT CHANGE UP (ref 40–120)
ALT FLD-CCNC: 23 U/L — SIGNIFICANT CHANGE UP (ref 10–45)
ALT FLD-CCNC: 23 U/L — SIGNIFICANT CHANGE UP (ref 10–45)
ANION GAP SERPL CALC-SCNC: 7 MMOL/L — SIGNIFICANT CHANGE UP (ref 5–17)
ANION GAP SERPL CALC-SCNC: 7 MMOL/L — SIGNIFICANT CHANGE UP (ref 5–17)
AST SERPL-CCNC: 10 U/L — SIGNIFICANT CHANGE UP (ref 10–40)
AST SERPL-CCNC: 10 U/L — SIGNIFICANT CHANGE UP (ref 10–40)
BASE EXCESS BLDV CALC-SCNC: 8.9 MMOL/L — HIGH (ref -2–3)
BASE EXCESS BLDV CALC-SCNC: 8.9 MMOL/L — HIGH (ref -2–3)
BILIRUB SERPL-MCNC: <0.1 MG/DL — LOW (ref 0.2–1.2)
BILIRUB SERPL-MCNC: <0.1 MG/DL — LOW (ref 0.2–1.2)
BUN SERPL-MCNC: 28 MG/DL — HIGH (ref 7–23)
BUN SERPL-MCNC: 28 MG/DL — HIGH (ref 7–23)
CA-I SERPL-SCNC: 1.22 MMOL/L — SIGNIFICANT CHANGE UP (ref 1.15–1.33)
CA-I SERPL-SCNC: 1.22 MMOL/L — SIGNIFICANT CHANGE UP (ref 1.15–1.33)
CALCIUM SERPL-MCNC: 9.6 MG/DL — SIGNIFICANT CHANGE UP (ref 8.4–10.5)
CALCIUM SERPL-MCNC: 9.6 MG/DL — SIGNIFICANT CHANGE UP (ref 8.4–10.5)
CHLORIDE BLDV-SCNC: 96 MMOL/L — SIGNIFICANT CHANGE UP (ref 96–108)
CHLORIDE BLDV-SCNC: 96 MMOL/L — SIGNIFICANT CHANGE UP (ref 96–108)
CHLORIDE SERPL-SCNC: 97 MMOL/L — SIGNIFICANT CHANGE UP (ref 96–108)
CHLORIDE SERPL-SCNC: 97 MMOL/L — SIGNIFICANT CHANGE UP (ref 96–108)
CO2 BLDV-SCNC: 36 MMOL/L — HIGH (ref 22–26)
CO2 BLDV-SCNC: 36 MMOL/L — HIGH (ref 22–26)
CO2 SERPL-SCNC: 35 MMOL/L — HIGH (ref 22–31)
CO2 SERPL-SCNC: 35 MMOL/L — HIGH (ref 22–31)
CREAT SERPL-MCNC: 0.69 MG/DL — SIGNIFICANT CHANGE UP (ref 0.5–1.3)
CREAT SERPL-MCNC: 0.69 MG/DL — SIGNIFICANT CHANGE UP (ref 0.5–1.3)
EGFR: 95 ML/MIN/1.73M2 — SIGNIFICANT CHANGE UP
EGFR: 95 ML/MIN/1.73M2 — SIGNIFICANT CHANGE UP
FUNGITELL: <31 PG/ML — SIGNIFICANT CHANGE UP
FUNGITELL: <31 PG/ML — SIGNIFICANT CHANGE UP
GAS PNL BLDV: 132 MMOL/L — LOW (ref 136–145)
GAS PNL BLDV: 132 MMOL/L — LOW (ref 136–145)
GAS PNL BLDV: SIGNIFICANT CHANGE UP
GAS PNL BLDV: SIGNIFICANT CHANGE UP
GLUCOSE BLDV-MCNC: 166 MG/DL — HIGH (ref 70–99)
GLUCOSE BLDV-MCNC: 166 MG/DL — HIGH (ref 70–99)
GLUCOSE SERPL-MCNC: 88 MG/DL — SIGNIFICANT CHANGE UP (ref 70–99)
GLUCOSE SERPL-MCNC: 88 MG/DL — SIGNIFICANT CHANGE UP (ref 70–99)
HCO3 BLDV-SCNC: 34 MMOL/L — HIGH (ref 22–29)
HCO3 BLDV-SCNC: 34 MMOL/L — HIGH (ref 22–29)
HCT VFR BLD CALC: 30.3 % — LOW (ref 34.5–45)
HCT VFR BLD CALC: 30.3 % — LOW (ref 34.5–45)
HCT VFR BLDA CALC: 29 % — LOW (ref 34.5–46.5)
HCT VFR BLDA CALC: 29 % — LOW (ref 34.5–46.5)
HGB BLD CALC-MCNC: 9.8 G/DL — LOW (ref 11.7–16.1)
HGB BLD CALC-MCNC: 9.8 G/DL — LOW (ref 11.7–16.1)
HGB BLD-MCNC: 9 G/DL — LOW (ref 11.5–15.5)
HGB BLD-MCNC: 9 G/DL — LOW (ref 11.5–15.5)
LACTATE BLDV-MCNC: 2.6 MMOL/L — HIGH (ref 0.5–2)
LACTATE BLDV-MCNC: 2.6 MMOL/L — HIGH (ref 0.5–2)
MAGNESIUM SERPL-MCNC: 2.2 MG/DL — SIGNIFICANT CHANGE UP (ref 1.6–2.6)
MAGNESIUM SERPL-MCNC: 2.2 MG/DL — SIGNIFICANT CHANGE UP (ref 1.6–2.6)
MCHC RBC-ENTMCNC: 24.7 PG — LOW (ref 27–34)
MCHC RBC-ENTMCNC: 24.7 PG — LOW (ref 27–34)
MCHC RBC-ENTMCNC: 29.7 GM/DL — LOW (ref 32–36)
MCHC RBC-ENTMCNC: 29.7 GM/DL — LOW (ref 32–36)
MCV RBC AUTO: 83 FL — SIGNIFICANT CHANGE UP (ref 80–100)
MCV RBC AUTO: 83 FL — SIGNIFICANT CHANGE UP (ref 80–100)
NRBC # BLD: 0 /100 WBCS — SIGNIFICANT CHANGE UP (ref 0–0)
NRBC # BLD: 0 /100 WBCS — SIGNIFICANT CHANGE UP (ref 0–0)
OTHER CELLS CSF MANUAL: 13.7 ML/DL — LOW (ref 18–22)
OTHER CELLS CSF MANUAL: 13.7 ML/DL — LOW (ref 18–22)
PCO2 BLDV: 52 MMHG — HIGH (ref 39–42)
PCO2 BLDV: 52 MMHG — HIGH (ref 39–42)
PH BLDV: 7.43 — SIGNIFICANT CHANGE UP (ref 7.32–7.43)
PH BLDV: 7.43 — SIGNIFICANT CHANGE UP (ref 7.32–7.43)
PHOSPHATE SERPL-MCNC: 3.4 MG/DL — SIGNIFICANT CHANGE UP (ref 2.5–4.5)
PHOSPHATE SERPL-MCNC: 3.4 MG/DL — SIGNIFICANT CHANGE UP (ref 2.5–4.5)
PLATELET # BLD AUTO: 754 K/UL — HIGH (ref 150–400)
PLATELET # BLD AUTO: 754 K/UL — HIGH (ref 150–400)
PO2 BLDV: 178 MMHG — HIGH (ref 25–45)
PO2 BLDV: 178 MMHG — HIGH (ref 25–45)
POTASSIUM BLDV-SCNC: 4.4 MMOL/L — SIGNIFICANT CHANGE UP (ref 3.5–5.1)
POTASSIUM BLDV-SCNC: 4.4 MMOL/L — SIGNIFICANT CHANGE UP (ref 3.5–5.1)
POTASSIUM SERPL-MCNC: 4.2 MMOL/L — SIGNIFICANT CHANGE UP (ref 3.5–5.3)
POTASSIUM SERPL-MCNC: 4.2 MMOL/L — SIGNIFICANT CHANGE UP (ref 3.5–5.3)
POTASSIUM SERPL-SCNC: 4.2 MMOL/L — SIGNIFICANT CHANGE UP (ref 3.5–5.3)
POTASSIUM SERPL-SCNC: 4.2 MMOL/L — SIGNIFICANT CHANGE UP (ref 3.5–5.3)
PROT SERPL-MCNC: 6.6 G/DL — SIGNIFICANT CHANGE UP (ref 6–8.3)
PROT SERPL-MCNC: 6.6 G/DL — SIGNIFICANT CHANGE UP (ref 6–8.3)
RBC # BLD: 3.65 M/UL — LOW (ref 3.8–5.2)
RBC # BLD: 3.65 M/UL — LOW (ref 3.8–5.2)
RBC # FLD: 17.2 % — HIGH (ref 10.3–14.5)
RBC # FLD: 17.2 % — HIGH (ref 10.3–14.5)
SAO2 % BLDV: 98.7 % — HIGH (ref 67–88)
SAO2 % BLDV: 98.7 % — HIGH (ref 67–88)
SODIUM SERPL-SCNC: 139 MMOL/L — SIGNIFICANT CHANGE UP (ref 135–145)
SODIUM SERPL-SCNC: 139 MMOL/L — SIGNIFICANT CHANGE UP (ref 135–145)
WBC # BLD: 17.24 K/UL — HIGH (ref 3.8–10.5)
WBC # BLD: 17.24 K/UL — HIGH (ref 3.8–10.5)
WBC # FLD AUTO: 17.24 K/UL — HIGH (ref 3.8–10.5)
WBC # FLD AUTO: 17.24 K/UL — HIGH (ref 3.8–10.5)

## 2023-12-22 PROCEDURE — 99233 SBSQ HOSP IP/OBS HIGH 50: CPT | Mod: GC

## 2023-12-22 PROCEDURE — 93010 ELECTROCARDIOGRAM REPORT: CPT

## 2023-12-22 PROCEDURE — 99233 SBSQ HOSP IP/OBS HIGH 50: CPT

## 2023-12-22 RX ORDER — BUDESONIDE AND FORMOTEROL FUMARATE DIHYDRATE 160; 4.5 UG/1; UG/1
2 AEROSOL RESPIRATORY (INHALATION)
Refills: 0 | Status: DISCONTINUED | OUTPATIENT
Start: 2023-12-22 | End: 2023-12-27

## 2023-12-22 RX ORDER — BUDESONIDE AND FORMOTEROL FUMARATE DIHYDRATE 160; 4.5 UG/1; UG/1
2 AEROSOL RESPIRATORY (INHALATION)
Refills: 0 | Status: DISCONTINUED | OUTPATIENT
Start: 2023-12-22 | End: 2023-12-22

## 2023-12-22 RX ADMIN — Medication 3 MILLILITER(S): at 17:25

## 2023-12-22 RX ADMIN — Medication 25 MILLIGRAM(S): at 05:16

## 2023-12-22 RX ADMIN — Medication 325 MILLIGRAM(S): at 09:08

## 2023-12-22 RX ADMIN — Medication 20 MILLIGRAM(S): at 15:55

## 2023-12-22 RX ADMIN — Medication 1 SPRAY(S): at 17:27

## 2023-12-22 RX ADMIN — Medication 75 MILLIGRAM(S): at 13:01

## 2023-12-22 RX ADMIN — SODIUM CHLORIDE 4 MILLILITER(S): 9 INJECTION INTRAMUSCULAR; INTRAVENOUS; SUBCUTANEOUS at 17:25

## 2023-12-22 RX ADMIN — AMLODIPINE BESYLATE 5 MILLIGRAM(S): 2.5 TABLET ORAL at 05:16

## 2023-12-22 RX ADMIN — Medication 5 MILLIGRAM(S): at 23:23

## 2023-12-22 RX ADMIN — BUPRENORPHINE AND NALOXONE 2 TABLET(S): 2; .5 TABLET SUBLINGUAL at 09:09

## 2023-12-22 RX ADMIN — Medication 1 SPRAY(S): at 05:18

## 2023-12-22 RX ADMIN — BUPRENORPHINE AND NALOXONE 2 TABLET(S): 2; .5 TABLET SUBLINGUAL at 22:10

## 2023-12-22 RX ADMIN — Medication 20 MILLIGRAM(S): at 05:16

## 2023-12-22 RX ADMIN — SODIUM CHLORIDE 4 MILLILITER(S): 9 INJECTION INTRAMUSCULAR; INTRAVENOUS; SUBCUTANEOUS at 05:18

## 2023-12-22 RX ADMIN — Medication 3 MILLILITER(S): at 12:59

## 2023-12-22 RX ADMIN — Medication 1 TABLET(S): at 13:00

## 2023-12-22 RX ADMIN — PANTOPRAZOLE SODIUM 40 MILLIGRAM(S): 20 TABLET, DELAYED RELEASE ORAL at 05:18

## 2023-12-22 RX ADMIN — Medication 1200 MILLIGRAM(S): at 17:26

## 2023-12-22 RX ADMIN — SODIUM CHLORIDE 4 MILLILITER(S): 9 INJECTION INTRAMUSCULAR; INTRAVENOUS; SUBCUTANEOUS at 12:59

## 2023-12-22 RX ADMIN — APIXABAN 5 MILLIGRAM(S): 2.5 TABLET, FILM COATED ORAL at 05:16

## 2023-12-22 RX ADMIN — Medication 1200 MILLIGRAM(S): at 05:16

## 2023-12-22 RX ADMIN — Medication 3 MILLILITER(S): at 23:21

## 2023-12-22 RX ADMIN — POLYETHYLENE GLYCOL 3350 17 GRAM(S): 17 POWDER, FOR SOLUTION ORAL at 13:00

## 2023-12-22 RX ADMIN — SODIUM CHLORIDE 4 MILLILITER(S): 9 INJECTION INTRAMUSCULAR; INTRAVENOUS; SUBCUTANEOUS at 23:22

## 2023-12-22 RX ADMIN — Medication 25 MILLIGRAM(S): at 17:30

## 2023-12-22 RX ADMIN — Medication 3 MILLILITER(S): at 05:18

## 2023-12-22 RX ADMIN — APIXABAN 5 MILLIGRAM(S): 2.5 TABLET, FILM COATED ORAL at 17:25

## 2023-12-22 RX ADMIN — BUDESONIDE AND FORMOTEROL FUMARATE DIHYDRATE 2 PUFF(S): 160; 4.5 AEROSOL RESPIRATORY (INHALATION) at 15:55

## 2023-12-22 NOTE — PROGRESS NOTE ADULT - ASSESSMENT
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 Undergoing management for COPD exacerbation. Also tachycardic. 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 Focal Right mid lung zone wheezing on exam.  Chest CT with possible HUANG at station 11R? Thrombocytosis, leukocytosis and increasing ESR of unclear etiology. LE dopplers negative. Repeat CXR unrevealing.  TTE normal - no PH. Procal now 0.07.  Wheezing still on PE today. Currently on 1L NC with O2Sat 97%.  HR in 106-129s, elevated just with deep breathing in bed.  Gets SOB due to tachycardia with ambulating near bed.      #Bronchiectasis  #COPD/Emphysema exacerbation  #NTM Pulmonary disease?  #Weight loss?  #Sinus tachycardia  #Thrombocytosis    Recommendations:   - Consider heme consult for progressive thrombocytosis  - Completed course of Abx  - Continue Prednisone but will need to increase back to 40mg given recurrent wheezing on PE  - ICS not advised when NTM infection under suspicion however in patient's situation benefits outweigh risks, cont Symbicort and increase to 160/4.5 BID (rinse mouth after use) given recurrence of wheezing  - AFB smear (-) x 3, f/u cultures  - should be discharged with Duonebs+ 3% hypersal q12h, continue while inpatient along with Acapella device, needs home nebulizer  - Supplemental oxygen to goal >88%, remains on 1-2L NC  - Check Quantiferon  - Mucinex 1200mg BID  - patient will benefit from Pulmonary rehab as outpatient  - will need repeat Chest CT in 4-6 weeks for HUANG and RUL 9mm nodule, will determine need for EBUS and Navigational bronch at that time pending also AFB culture results    Please have patient follow-up with me when she is ready for discharge at [address] [place] [address] Rd.  Should follow-up with Chest CT completed.    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 Undergoing management for COPD exacerbation. Also tachycardic. 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 Focal Right mid lung zone wheezing on exam.  Chest CT with possible HUANG at station 11R? Thrombocytosis, leukocytosis and increasing ESR of unclear etiology. LE dopplers negative. Repeat CXR unrevealing.  TTE normal - no PH. Procal now 0.07.  Wheezing still on PE today. Currently on 1L NC with O2Sat 97%.  HR in 106-129s, elevated just with deep breathing in bed.  Gets SOB due to tachycardia with ambulating near bed.      #Bronchiectasis  #COPD/Emphysema exacerbation  #NTM Pulmonary disease?  #Weight loss?  #Sinus tachycardia  #Thrombocytosis    Recommendations:   - Consider heme consult for progressive thrombocytosis  - Completed course of Abx  - Continue Prednisone but will need to increase back to 40mg given recurrent wheezing on PE  - ICS not advised when NTM infection under suspicion however in patient's situation benefits outweigh risks, cont Symbicort and increase to 160/4.5 BID (rinse mouth after use) given recurrence of wheezing  - AFB smear (-) x 3, f/u cultures  - should be discharged with Duonebs+ 3% hypersal q12h, continue while inpatient along with Acapella device, needs home nebulizer  - Supplemental oxygen to goal >88%, remains on 1-2L NC  - Check Quantiferon  - Mucinex 1200mg BID  - patient will benefit from Pulmonary rehab as outpatient  - will need repeat Chest CT in 4-6 weeks for HUANG and RUL 9mm nodule, will determine need for EBUS and Navigational bronch at that time pending also AFB culture results    Please have patient follow-up with me when she is ready for discharge at [address] [place] [address] Rd.  Should follow-up with Chest CT completed.    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 Undergoing management for COPD exacerbation. Also tachycardic. 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 Focal Right mid lung zone wheezing on exam.  Chest CT with possible HUANG at station 11R? Thrombocytosis, leukocytosis and increasing ESR of unclear etiology. LE dopplers negative. Repeat CXR unrevealing.  TTE normal - no PH. Procal now 0.07.  Wheezing still on PE today. Currently on 1L NC with O2Sat 97%.  HR in 106-129s, elevated just with deep breathing in bed.  Gets SOB due to tachycardia with ambulating near bed.      #Bronchiectasis  #COPD/Emphysema exacerbation  #NTM Pulmonary disease?  #Weight loss?  #Sinus tachycardia  #Thrombocytosis    Recommendations:   - Consider heme consult for progressive thrombocytosis  - Cardiology consulted for tachycardia, will increase Metoprolol  - Completed course of Abx  - Continue Prednisone but will need to increase back to 40mg given recurrent wheezing on PE  - ICS not advised when NTM infection under suspicion however in patient's situation benefits outweigh risks, cont Symbicort and increase to 160/4.5 BID (rinse mouth after use) given recurrence of wheezing  - AFB smear (-) x 3, f/u cultures  - should be discharged with Duonebs+ 3% hypersal q12h, continue while inpatient along with Acapella device, needs home nebulizer  - Supplemental oxygen to goal >88%, remains on 1-2L NC  - Check Quantiferon  - Mucinex 1200mg BID  - patient will benefit from Pulmonary rehab as outpatient  - will need repeat Chest CT in 4-6 weeks for HUANG and RUL 9mm nodule, will determine need for EBUS and Navigational bronch at that time pending also AFB culture results    Please have patient follow-up with me when she is ready for discharge at [address]  Should follow-up with Chest CT completed.    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 Undergoing management for COPD exacerbation. Also tachycardic. 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 Focal Right mid lung zone wheezing on exam.  Chest CT with possible HUANG at station 11R? Thrombocytosis, leukocytosis and increasing ESR of unclear etiology. LE dopplers negative. Repeat CXR unrevealing.  TTE normal - no PH. Procal now 0.07.  Wheezing still on PE today. Currently on 1L NC with O2Sat 97%.  HR in 106-129s, elevated just with deep breathing in bed.  Gets SOB due to tachycardia with ambulating near bed.      #Bronchiectasis  #COPD/Emphysema exacerbation  #NTM Pulmonary disease?  #Weight loss?  #Sinus tachycardia  #Thrombocytosis    Recommendations:   - Consider heme consult for progressive thrombocytosis  - Cardiology consulted for tachycardia, will increase Metoprolol  - Completed course of Abx  - Continue Prednisone but will need to increase back to 40mg given recurrent wheezing on PE  - ICS not advised when NTM infection under suspicion however in patient's situation benefits outweigh risks, cont Symbicort and increase to 160/4.5 BID (rinse mouth after use) given recurrence of wheezing  - AFB smear (-) x 3, f/u cultures  - should be discharged with Duonebs+ 3% hypersal q12h, continue while inpatient along with Acapella device, needs home nebulizer  - Supplemental oxygen to goal >88%, remains on 1-2L NC  - Check Quantiferon  - Mucinex 1200mg BID  - patient will benefit from Pulmonary rehab as outpatient  - will need repeat Chest CT in 4-6 weeks for HUANG and RUL 9mm nodule, will determine need for EBUS and Navigational bronch at that time pending also AFB culture results    Please have patient follow-up with me when she is ready for discharge at [address]  Should follow-up with Chest CT completed.    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 Undergoing management for COPD exacerbation. Also tachycardic. 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 Focal Right mid lung zone wheezing on exam.  Chest CT with possible HUANG at station 11R? Thrombocytosis, leukocytosis and increasing ESR of unclear etiology. LE dopplers negative. Repeat CXR unrevealing.  TTE normal - no PH. Procal now 0.07.  LE dopplers negative.    Wheezing still on PE today. Currently on 1L NC with O2Sat 97%.  HR in 106-129s, elevated just with deep breathing in bed.  Gets SOB due to tachycardia with ambulating near bed.  HCO3 35 today from 31.      #Bronchiectasis  #COPD/Emphysema exacerbation  #NTM Pulmonary disease?  #Weight loss, unintentional  #Sinus tachycardia  #Thrombocytosis    Recommendations:   - check VBG to eval for worsening hypercapnea  - Consider heme consult for progressive thrombocytosis  - Cardiology consulted for tachycardia, increasing Metoprolol   - Completed course of Abx  - Continue Prednisone but will need to increase back to 40mg given recurrent wheezing on PE  - continue Duonebs to Xopenex q6h to see if this helps with tachycardia, continue Hypersal q6h  - ICS not advised when NTM infection under suspicion however in patient's situation benefits outweigh risks, cont Symbicort and increase to 160/4.5 BID (rinse mouth after use) given recurrence of wheezing  - start Spiriva respimat   - AFB smear (-) x 3, f/u cultures  - should be discharged with Duonebs+ 3% hypersal q12h, continue while inpatient along with Acapella device, needs home nebulizer  - Supplemental oxygen to goal >88%, remains on 1-2L NC  - f/u Quantiferon  - Mucinex 1200mg BID  - patient will benefit from Pulmonary rehab as outpatient  - will need repeat Chest CT in 4-6 weeks for HUANG and RUL 9mm nodule, will determine need for EBUS and Navigational bronch at that time pending also AFB culture results    Please have patient follow-up with me when she is ready for discharge at [address]  Should follow-up with Chest CT completed.    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 Undergoing management for COPD exacerbation. Also tachycardic. 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 Focal Right mid lung zone wheezing on exam.  Chest CT with possible HUANG at station 11R? Thrombocytosis, leukocytosis and increasing ESR of unclear etiology. LE dopplers negative. Repeat CXR unrevealing.  TTE normal - no PH. Procal now 0.07.  LE dopplers negative.    Wheezing still on PE today. Currently on 1L NC with O2Sat 97%.  HR in 106-129s, elevated just with deep breathing in bed.  Gets SOB due to tachycardia with ambulating near bed.  HCO3 35 today from 31.      #Bronchiectasis  #COPD/Emphysema exacerbation  #NTM Pulmonary disease?  #Weight loss, unintentional  #Sinus tachycardia  #Thrombocytosis    Recommendations:   - check VBG to eval for worsening hypercapnea  - Consider heme consult for progressive thrombocytosis  - Cardiology consulted for tachycardia, increasing Metoprolol   - Completed course of Abx  - Continue Prednisone but will need to increase back to 40mg given recurrent wheezing on PE  - continue Duonebs to Xopenex q6h to see if this helps with tachycardia, continue Hypersal q6h  - ICS not advised when NTM infection under suspicion however in patient's situation benefits outweigh risks, cont Symbicort and increase to 160/4.5 BID (rinse mouth after use) given recurrence of wheezing  - start Spiriva respimat   - AFB smear (-) x 3, f/u cultures  - should be discharged with Duonebs+ 3% hypersal q12h, continue while inpatient along with Acapella device, needs home nebulizer  - Supplemental oxygen to goal >88%, remains on 1-2L NC  - f/u Quantiferon  - Mucinex 1200mg BID  - patient will benefit from Pulmonary rehab as outpatient  - will need repeat Chest CT in 4-6 weeks for HUANG and RUL 9mm nodule, will determine need for EBUS and Navigational bronch at that time pending also AFB culture results    Please have patient follow-up with me when she is ready for discharge at [address]  Should follow-up with Chest CT completed.    Chari Covington MD

## 2023-12-22 NOTE — PROGRESS NOTE ADULT - SUBJECTIVE AND OBJECTIVE BOX
SUBJECTIVE / OVERNIGHT EVENTS:  No acute events overnight. Patient was seen and examined at bedside and did not appear to be in any acute distress. She reports that her cough is continuing to improve and is non-productive. Dyspnea is stable. Denies chest pain, palpitations, fevers or chills.       MEDICATIONS  (STANDING):  albuterol/ipratropium for Nebulization 3 milliLiter(s) Nebulizer every 6 hours  amLODIPine   Tablet 5 milliGRAM(s) Oral daily  apixaban 5 milliGRAM(s) Oral every 12 hours  budesonide  80 MICROgram(s)/formoterol 4.5 MICROgram(s) Inhaler 2 Puff(s) Inhalation two times a day  buprenorphine 2 mG/naloxone 0.5 mG SL  Tablet 2 Tablet(s) SubLingual <User Schedule>  ferrous    sulfate 325 milliGRAM(s) Oral <User Schedule>  fluticasone propionate 50 MICROgram(s)/spray Nasal Spray 1 Spray(s) Both Nostrils two times a day  guaiFENesin ER 1200 milliGRAM(s) Oral every 12 hours  influenza  Vaccine (HIGH DOSE) 0.7 milliLiter(s) IntraMuscular once  metoprolol tartrate 25 milliGRAM(s) Oral two times a day  multivitamin 1 Tablet(s) Oral daily  pantoprazole    Tablet 40 milliGRAM(s) Oral before breakfast  polyethylene glycol 3350 17 Gram(s) Oral daily  predniSONE   Tablet 20 milliGRAM(s) Oral daily  sodium chloride 3%  Inhalation 4 milliLiter(s) Inhalation every 6 hours  venlafaxine XR. 75 milliGRAM(s) Oral daily    MEDICATIONS  (PRN):  acetaminophen     Tablet .. 650 milliGRAM(s) Oral every 6 hours PRN Temp greater or equal to 38C (100.4F), Mild Pain (1 - 3), Moderate Pain (4 - 6), Severe Pain (7 - 10)  melatonin 5 milliGRAM(s) Oral at bedtime PRN Insomnia      CAPILLARY BLOOD GLUCOSE        I&O's Summary    21 Dec 2023 07:01  -  22 Dec 2023 07:00  --------------------------------------------------------  IN: 340 mL / OUT: 450 mL / NET: -110 mL        PHYSICAL EXAM:  Vital Signs Last 24 Hrs  T(C): 36.6 (22 Dec 2023 03:56), Max: 36.9 (21 Dec 2023 20:57)  T(F): 97.8 (22 Dec 2023 03:56), Max: 98.5 (21 Dec 2023 20:57)  HR: 107 (22 Dec 2023 03:56) (85 - 134)  BP: 119/68 (22 Dec 2023 03:56) (108/67 - 119/68)  BP(mean): --  RR: 21 (22 Dec 2023 03:56) (18 - 22)  SpO2: 97% (22 Dec 2023 03:56) (92% - 98%)    Parameters below as of 22 Dec 2023 03:56  Patient On (Oxygen Delivery Method): nasal cannula        GENERAL: No apparent distress.   HEAD:  Atraumatic, Normocephalic  EYES: EOMI, PERRLA, conjunctiva and sclera clear  NECK: Supple, no lymphadenopathy, no elevated JVP  CHEST/LUNG: wheezing bilaterally  HEART: S1 and S2 normal. tachycardic; No murmurs, rubs, or gallops  ABDOMEN: Soft, non-tender, non-distended; normal bowel sounds  EXTREMITIES:  2+ peripheral pulses b/l, No clubbing, cyanosis, or edema  NEUROLOGY: A&O x 3, no focal deficits  SKIN: No rashes or lesions    LABS:                        9.3    18.58 )-----------( 804      ( 21 Dec 2023 07:33 )             31.0     12-21    137  |  93<L>  |  31<H>  ----------------------------<  90  4.1   |  31  |  0.76    Ca    9.6      21 Dec 2023 07:33  Phos  3.4     12-21  Mg     2.2     12-21    TPro  7.0  /  Alb  3.9  /  TBili  <0.1<L>  /  DBili  x   /  AST  17  /  ALT  28  /  AlkPhos  94  12-21          Urinalysis Basic - ( 21 Dec 2023 07:33 )    Color: x / Appearance: x / SG: x / pH: x  Gluc: 90 mg/dL / Ketone: x  / Bili: x / Urobili: x   Blood: x / Protein: x / Nitrite: x   Leuk Esterase: x / RBC: x / WBC x   Sq Epi: x / Non Sq Epi: x / Bacteria: x        Culture - Blood (collected 20 Dec 2023 07:26)  Source: .Blood Blood-Peripheral  Preliminary Report (21 Dec 2023 11:02):    No growth at 24 hours    Culture - Blood (collected 20 Dec 2023 07:26)  Source: .Blood Blood-Peripheral  Preliminary Report (21 Dec 2023 11:02):    No growth at 24 hours    Culture - Urine (collected 19 Dec 2023 17:40)  Source: Clean Catch Clean Catch (Midstream)  Final Report (20 Dec 2023 23:41):    No growth    Culture - Sputum (collected 19 Dec 2023 17:39)  Source: .Sputum Sputum  Gram Stain (20 Dec 2023 02:19):    Rare polymorphonuclear leukocytes per low power field    No Squamous epithelial cells per low power field    Rare Gram Positive Cocci in Pairs and Chains per oil power field  Final Report (21 Dec 2023 08:44):    Normal Respiratory Blanca present        RADIOLOGY & ADDITIONAL TESTS:  Lab Results Reviewed   Imaging Reviewed  Electrocardiogram Reviewed     SUBJECTIVE / OVERNIGHT EVENTS:  No acute events overnight. Patient was seen and examined at bedside and did not appear to be in any acute distress. She reports that her cough is continuing to improve and is non-productive. Dyspnea is stable. Wheezing again today.      MEDICATIONS  (STANDING):  albuterol/ipratropium for Nebulization 3 milliLiter(s) Nebulizer every 6 hours  amLODIPine   Tablet 5 milliGRAM(s) Oral daily  apixaban 5 milliGRAM(s) Oral every 12 hours  budesonide  80 MICROgram(s)/formoterol 4.5 MICROgram(s) Inhaler 2 Puff(s) Inhalation two times a day  buprenorphine 2 mG/naloxone 0.5 mG SL  Tablet 2 Tablet(s) SubLingual <User Schedule>  ferrous    sulfate 325 milliGRAM(s) Oral <User Schedule>  fluticasone propionate 50 MICROgram(s)/spray Nasal Spray 1 Spray(s) Both Nostrils two times a day  guaiFENesin ER 1200 milliGRAM(s) Oral every 12 hours  influenza  Vaccine (HIGH DOSE) 0.7 milliLiter(s) IntraMuscular once  metoprolol tartrate 25 milliGRAM(s) Oral two times a day  multivitamin 1 Tablet(s) Oral daily  pantoprazole    Tablet 40 milliGRAM(s) Oral before breakfast  polyethylene glycol 3350 17 Gram(s) Oral daily  predniSONE   Tablet 20 milliGRAM(s) Oral daily  sodium chloride 3%  Inhalation 4 milliLiter(s) Inhalation every 6 hours  venlafaxine XR. 75 milliGRAM(s) Oral daily    MEDICATIONS  (PRN):  acetaminophen     Tablet .. 650 milliGRAM(s) Oral every 6 hours PRN Temp greater or equal to 38C (100.4F), Mild Pain (1 - 3), Moderate Pain (4 - 6), Severe Pain (7 - 10)  melatonin 5 milliGRAM(s) Oral at bedtime PRN Insomnia      CAPILLARY BLOOD GLUCOSE        I&O's Summary    21 Dec 2023 07:01  -  22 Dec 2023 07:00  --------------------------------------------------------  IN: 340 mL / OUT: 450 mL / NET: -110 mL        PHYSICAL EXAM:  Vital Signs Last 24 Hrs  T(C): 36.6 (22 Dec 2023 03:56), Max: 36.9 (21 Dec 2023 20:57)  T(F): 97.8 (22 Dec 2023 03:56), Max: 98.5 (21 Dec 2023 20:57)  HR: 107 (22 Dec 2023 03:56) (85 - 134)  BP: 119/68 (22 Dec 2023 03:56) (108/67 - 119/68)  BP(mean): --  RR: 21 (22 Dec 2023 03:56) (18 - 22)  SpO2: 97% (22 Dec 2023 03:56) (92% - 98%)    Parameters below as of 22 Dec 2023 03:56  Patient On (Oxygen Delivery Method): nasal cannula        GENERAL: No apparent distress.   HEAD:  Atraumatic, Normocephalic  EYES: EOMI, PERRLA, conjunctiva and sclera clear  NECK: Supple, no lymphadenopathy, no elevated JVP  CHEST/LUNG: wheezing bilaterally  HEART: S1 and S2 normal. tachycardic; No murmurs, rubs, or gallops  ABDOMEN: Soft, non-tender, non-distended; normal bowel sounds  EXTREMITIES:  2+ peripheral pulses b/l, No clubbing, cyanosis, or edema  NEUROLOGY: A&O x 3, no focal deficits  SKIN: No rashes or lesions    LABS:                        9.3    18.58 )-----------( 804      ( 21 Dec 2023 07:33 )             31.0     12-21    137  |  93<L>  |  31<H>  ----------------------------<  90  4.1   |  31  |  0.76    Ca    9.6      21 Dec 2023 07:33  Phos  3.4     12-21  Mg     2.2     12-21    TPro  7.0  /  Alb  3.9  /  TBili  <0.1<L>  /  DBili  x   /  AST  17  /  ALT  28  /  AlkPhos  94  12-21          Urinalysis Basic - ( 21 Dec 2023 07:33 )    Color: x / Appearance: x / SG: x / pH: x  Gluc: 90 mg/dL / Ketone: x  / Bili: x / Urobili: x   Blood: x / Protein: x / Nitrite: x   Leuk Esterase: x / RBC: x / WBC x   Sq Epi: x / Non Sq Epi: x / Bacteria: x        Culture - Blood (collected 20 Dec 2023 07:26)  Source: .Blood Blood-Peripheral  Preliminary Report (21 Dec 2023 11:02):    No growth at 24 hours    Culture - Blood (collected 20 Dec 2023 07:26)  Source: .Blood Blood-Peripheral  Preliminary Report (21 Dec 2023 11:02):    No growth at 24 hours    Culture - Urine (collected 19 Dec 2023 17:40)  Source: Clean Catch Clean Catch (Midstream)  Final Report (20 Dec 2023 23:41):    No growth    Culture - Sputum (collected 19 Dec 2023 17:39)  Source: .Sputum Sputum  Gram Stain (20 Dec 2023 02:19):    Rare polymorphonuclear leukocytes per low power field    No Squamous epithelial cells per low power field    Rare Gram Positive Cocci in Pairs and Chains per oil power field  Final Report (21 Dec 2023 08:44):    Normal Respiratory Blanca present        RADIOLOGY & ADDITIONAL TESTS:  Lab Results Reviewed   Imaging Reviewed  Electrocardiogram Reviewed

## 2023-12-22 NOTE — CONSULT NOTE ADULT - SUBJECTIVE AND OBJECTIVE BOX
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    Pt is accompanied by  (Javier) at bedside. Pt reports experiencing worsening ALTMAN now even with minimal distance ambulation (e.g., from living room to bathroom) requiring supplemental oxygen, which is not her baseline. The symptom worsened gradually over the last 2 months. Also reports intermittent "green mucus" production with coughs during the same time period. No known sick contacts. Pt primarily stays home. Ambulates without assistive devices. Endorses occasional lightheadedness that spontaneously resolves. Denies fevers, chills, headaches, chest pain, nausea, emesis. Pt also reports reduced PO intake in recent months leading to ~20 lb weight loss in the last month, endorses low appetite eating 1 meal/day. Of note, pt reports having had a back surgery in 1980s for herniated disc and took opioid meds for a long time (oxy 15 mg q6h), currently on Suboxone SL.    In the ED, vital signs stable T 36.7C,  sinus tachy, /74, on 3L NC O2 SpO2 >95%. PoCUS showed hypovolemia, s/p IVF boluses (NS 1L x1, LR 1L x1). Labs notable for leukocytosis 18.81, thrombocytosis 771k, elevated Alk Phos 138 and procal 0.24. Lactate 1.0. Negative RVP. Unremarkable serum chemistries, troponins, and pro-BNP. VBG unremarkable with normal pH, signs of hypercarbia. CTA chest and CT A/P on 12/14 notable for extensive airway thickening and tree-in-bud nodularity c/w chronic endobronchial dz, negative for PE or malignancy. Blood cultures sent, pending results. (14 Dec 2023 20:58)      PAST MEDICAL & SURGICAL HISTORY:  Drug abuse  was addicted to pain killers      Protein S deficiency      Depression      COPD (chronic obstructive pulmonary disease)      DVT (deep venous thrombosis)      Pulmonary embolism      Afib      History of back surgery              MEDICATIONS:  amLODIPine   Tablet 5 milliGRAM(s) Oral daily  apixaban 5 milliGRAM(s) Oral every 12 hours  metoprolol tartrate 25 milliGRAM(s) Oral two times a day      albuterol/ipratropium for Nebulization 3 milliLiter(s) Nebulizer every 6 hours  budesonide 160 MICROgram(s)/formoterol 4.5 MICROgram(s) Inhaler 2 Puff(s) Inhalation two times a day  guaiFENesin ER 1200 milliGRAM(s) Oral every 12 hours  sodium chloride 3%  Inhalation 4 milliLiter(s) Inhalation every 6 hours    acetaminophen     Tablet .. 650 milliGRAM(s) Oral every 6 hours PRN  buprenorphine 2 mG/naloxone 0.5 mG SL  Tablet 2 Tablet(s) SubLingual <User Schedule>  melatonin 5 milliGRAM(s) Oral at bedtime PRN  venlafaxine XR. 75 milliGRAM(s) Oral daily    pantoprazole    Tablet 40 milliGRAM(s) Oral before breakfast  polyethylene glycol 3350 17 Gram(s) Oral daily    predniSONE   Tablet 20 milliGRAM(s) Oral daily    ferrous    sulfate 325 milliGRAM(s) Oral <User Schedule>  fluticasone propionate 50 MICROgram(s)/spray Nasal Spray 1 Spray(s) Both Nostrils two times a day  influenza  Vaccine (HIGH DOSE) 0.7 milliLiter(s) IntraMuscular once  multivitamin 1 Tablet(s) Oral daily      FAMILY HISTORY:  Family history of lung cancer    Family history of protein S deficiency (Sibling)        Non-contributory    SOCIAL HISTORY:    [ ] Tobacco  [ ] Drugs  [ ] Alcohol    Allergies    No Known Allergies    Intolerances    	    REVIEW OF SYSTEMS:  CONSTITUTIONAL: No fever  EYES: No eye pain, visual disturbances, or discharge  ENMT:  No difficulty hearing, tinnitus  NECK: No pain or stiffness  RESPIRATORY: No cough, wheezing,  CARDIOVASCULAR: No chest pain, palpitations, passing out, dizziness, or leg swelling  GASTROINTESTINAL:  No nausea, vomiting, diarrhea or constipation. No melena.  GENITOURINARY: No dysuria, hematuria  NEUROLOGICAL: No stroke like symptoms  SKIN: No burning or lesions   ENDOCRINE: No heat or cold intolerance  MUSCULOSKELETAL: No joint pain or swelling  PSYCHIATRIC: No  anxiety, mood swings  HEME/LYMPH: No bleeding gums  ALLERGY AND IMMUNOLOGIC: No hives or eczema	    All other ROS negative    PHYSICAL EXAM:  T(C): 36.8 (12-22-23 @ 11:57), Max: 36.9 (12-21-23 @ 20:57)  HR: 108 (12-22-23 @ 11:57) (85 - 108)  BP: 123/70 (12-22-23 @ 11:57) (115/66 - 123/70)  RR: 20 (12-22-23 @ 11:57) (20 - 21)  SpO2: 98% (12-22-23 @ 11:57) (97% - 98%)  Wt(kg): --  I&O's Summary    21 Dec 2023 07:01  -  22 Dec 2023 07:00  --------------------------------------------------------  IN: 340 mL / OUT: 450 mL / NET: -110 mL        Appearance: Normal	  HEENT:   Normal oral mucosa, EOMI	  Cardiovascular:  S1 S2, No JVD,    Respiratory: Lungs clear to auscultation	  Psychiatry: Alert  Gastrointestinal:  Soft, Non-tender, + BS	  Skin: No rashes   Neurologic: Non-focal  Extremities:  No edema  Vascular: Peripheral pulses palpable    	    	  	  CARDIAC MARKERS:  Labs personally reviewed by me                                  9.0    17.24 )-----------( 754      ( 22 Dec 2023 07:02 )             30.3     12-22    139  |  97  |  28<H>  ----------------------------<  88  4.2   |  35<H>  |  0.69    Ca    9.6      22 Dec 2023 07:00  Phos  3.4     12-22  Mg     2.2     12-22    TPro  6.6  /  Alb  3.8  /  TBili  <0.1<L>  /  DBili  x   /  AST  10  /  ALT  23  /  AlkPhos  83  12-22          EKG: Personally reviewed by nadine BALDWIN with Christina  Radiology: Personally reviewed by me -   < from: Xray Chest 1 View- PORTABLE-Urgent (Xray Chest 1 View- PORTABLE-Urgent .) (12.20.23 @ 15:36) >    IMPRESSION: Hyperinflated lungs. Diffuse tree-in-bud nodularity. No   evidence of confluent airspace opacity.    < end of copied text >  < from: CT Abdomen and Pelvis w/ IV Cont (12.14.23 @ 16:11) >  IMPRESSION: Extensive airway thickening and tree-in-bud nodularity,   compatible with chronic endobronchial disease, including MAC. No CT   evidence of malignancy in the thorax, abdomen, and pelvis.    < end of copied text >  < from: TTE W or WO Ultrasound Enhancing Agent (12.20.23 @ 07:37) >  CONCLUSIONS:      1. Left ventricular cavity is small. Left ventricular wall thickness is normal. Left ventricular systolic function is normal. There are no regional wall motion abnormalities seen.   2. Left ventricular global longitudinal strain is -17.1 % is borderline (range: -18 to -16%). Images were acquired on a Hartman ultrasound system and processed using Pylba analysis software with a heart rate of 100 bpm and a blood pressure of 128/74 mmHg.   3. Normal left ventricular diastolic function.   4. Normal right ventricular cavity size, wall thickness, and systolic function.   5. Normal left and right atrial size.   6. No significant valvular disease.   7. No pericardial effusion seen.   8. Estimated pulmonary artery systolic pressure is 12 mmHg.   9. No prior echocardiogram is available for comparison.    < end of copied text >      Assessment /Plan:     68 yoF w/ PMHx of Protein S deficiency c/b LUE DVT/PE (2019) on Eliquis, COPD (baseline 3L O2, no chronic steroid/abx, no intubation, last admission 2016), HTN, anxiety/depression, remote back surgery on Suboxone. p/w 2 months progressive worsening ALTMAN, fatigue, unintentional weight loss. Likely PNA vs COPD Exacerbation. Patient still persistently tachycardic. Will discuss with Pulm about further treatment options.     1. Sinus Tachycardia  - ECG ST with no ischemic characteristics noted  - TTE with preserved EF, no WMA  - CT neg for PE  - Neg for LE DVT  - D-dimer pending  - Likely 2/2 acute infection  - Cont with Metoprolol 25mg PO BID    2. COPD Exacerbation   AFB smear (-) x 3, f/u cultures; follow up quantiferon gold  - Pulm recs appreciated: - Continue Prednisone, Symbicort 160/4.5 BID- AFB smear (-) x 3, f/u cultures, Mucinex  - Supplemental oxygen to goal >88%, remains on 1-2L NC    3. Hx of DVT  - DVT negative  - CT neg for PE  - Dimer pending  - c/w Eliquis 5mg PO BID    4. Hypertension  - BP well controlled on Metoprolol and amlodipine 5mg PO daily     Differential diagnosis and plan of care discussed with patient after the evaluation. Counseling on diet, nutritional counseling, weight management, exercise and medication compliance was done.   Advanced care planning/advanced directives discussed with patient/family. DNR status including forceful chest compressions to attempt to restart the heart, ventilator support/artificial breathing, electric shock, artificial nutrition, health care proxy, Molst form all discussed with pt. Pt wishes to consider. More than fifteen minutes spent on discussing advanced directives.       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    Pt is accompanied by  (Javier) at bedside. Pt reports experiencing worsening ALTMAN now even with minimal distance ambulation (e.g., from living room to bathroom) requiring supplemental oxygen, which is not her baseline. The symptom worsened gradually over the last 2 months. Also reports intermittent "green mucus" production with coughs during the same time period. No known sick contacts. Pt primarily stays home. Ambulates without assistive devices. Endorses occasional lightheadedness that spontaneously resolves. Denies fevers, chills, headaches, chest pain, nausea, emesis. Pt also reports reduced PO intake in recent months leading to ~20 lb weight loss in the last month, endorses low appetite eating 1 meal/day. Of note, pt reports having had a back surgery in 1980s for herniated disc and took opioid meds for a long time (oxy 15 mg q6h), currently on Suboxone SL.    In the ED, vital signs stable T 36.7C,  sinus tachy, /74, on 3L NC O2 SpO2 >95%. PoCUS showed hypovolemia, s/p IVF boluses (NS 1L x1, LR 1L x1). Labs notable for leukocytosis 18.81, thrombocytosis 771k, elevated Alk Phos 138 and procal 0.24. Lactate 1.0. Negative RVP. Unremarkable serum chemistries, troponins, and pro-BNP. VBG unremarkable with normal pH, signs of hypercarbia. CTA chest and CT A/P on 12/14 notable for extensive airway thickening and tree-in-bud nodularity c/w chronic endobronchial dz, negative for PE or malignancy. Blood cultures sent, pending results. (14 Dec 2023 20:58)      PAST MEDICAL & SURGICAL HISTORY:  Drug abuse  was addicted to pain killers      Protein S deficiency      Depression      COPD (chronic obstructive pulmonary disease)      DVT (deep venous thrombosis)      Pulmonary embolism      Afib      History of back surgery              MEDICATIONS:  amLODIPine   Tablet 5 milliGRAM(s) Oral daily  apixaban 5 milliGRAM(s) Oral every 12 hours  metoprolol tartrate 25 milliGRAM(s) Oral two times a day      albuterol/ipratropium for Nebulization 3 milliLiter(s) Nebulizer every 6 hours  budesonide 160 MICROgram(s)/formoterol 4.5 MICROgram(s) Inhaler 2 Puff(s) Inhalation two times a day  guaiFENesin ER 1200 milliGRAM(s) Oral every 12 hours  sodium chloride 3%  Inhalation 4 milliLiter(s) Inhalation every 6 hours    acetaminophen     Tablet .. 650 milliGRAM(s) Oral every 6 hours PRN  buprenorphine 2 mG/naloxone 0.5 mG SL  Tablet 2 Tablet(s) SubLingual <User Schedule>  melatonin 5 milliGRAM(s) Oral at bedtime PRN  venlafaxine XR. 75 milliGRAM(s) Oral daily    pantoprazole    Tablet 40 milliGRAM(s) Oral before breakfast  polyethylene glycol 3350 17 Gram(s) Oral daily    predniSONE   Tablet 20 milliGRAM(s) Oral daily    ferrous    sulfate 325 milliGRAM(s) Oral <User Schedule>  fluticasone propionate 50 MICROgram(s)/spray Nasal Spray 1 Spray(s) Both Nostrils two times a day  influenza  Vaccine (HIGH DOSE) 0.7 milliLiter(s) IntraMuscular once  multivitamin 1 Tablet(s) Oral daily      FAMILY HISTORY:  Family history of lung cancer    Family history of protein S deficiency (Sibling)        Non-contributory    SOCIAL HISTORY:    [ ] Tobacco  [ ] Drugs  [ ] Alcohol    Allergies    No Known Allergies    Intolerances    	    REVIEW OF SYSTEMS:  CONSTITUTIONAL: No fever  EYES: No eye pain, visual disturbances, or discharge  ENMT:  No difficulty hearing, tinnitus  NECK: No pain or stiffness  RESPIRATORY: No cough, wheezing,  CARDIOVASCULAR: No chest pain, palpitations, passing out, dizziness, or leg swelling  GASTROINTESTINAL:  No nausea, vomiting, diarrhea or constipation. No melena.  GENITOURINARY: No dysuria, hematuria  NEUROLOGICAL: No stroke like symptoms  SKIN: No burning or lesions   ENDOCRINE: No heat or cold intolerance  MUSCULOSKELETAL: No joint pain or swelling  PSYCHIATRIC: No  anxiety, mood swings  HEME/LYMPH: No bleeding gums  ALLERGY AND IMMUNOLOGIC: No hives or eczema	    All other ROS negative    PHYSICAL EXAM:  T(C): 36.8 (12-22-23 @ 11:57), Max: 36.9 (12-21-23 @ 20:57)  HR: 108 (12-22-23 @ 11:57) (85 - 108)  BP: 123/70 (12-22-23 @ 11:57) (115/66 - 123/70)  RR: 20 (12-22-23 @ 11:57) (20 - 21)  SpO2: 98% (12-22-23 @ 11:57) (97% - 98%)  Wt(kg): --  I&O's Summary    21 Dec 2023 07:01  -  22 Dec 2023 07:00  --------------------------------------------------------  IN: 340 mL / OUT: 450 mL / NET: -110 mL        Appearance: Normal	  HEENT:   Normal oral mucosa, EOMI	  Cardiovascular:  S1 S2, No JVD,    Respiratory: Lungs clear to auscultation	  Psychiatry: Alert  Gastrointestinal:  Soft, Non-tender, + BS	  Skin: No rashes   Neurologic: Non-focal  Extremities:  No edema  Vascular: Peripheral pulses palpable    	    	  	  CARDIAC MARKERS:  Labs personally reviewed by me                                  9.0    17.24 )-----------( 754      ( 22 Dec 2023 07:02 )             30.3     12-22    139  |  97  |  28<H>  ----------------------------<  88  4.2   |  35<H>  |  0.69    Ca    9.6      22 Dec 2023 07:00  Phos  3.4     12-22  Mg     2.2     12-22    TPro  6.6  /  Alb  3.8  /  TBili  <0.1<L>  /  DBili  x   /  AST  10  /  ALT  23  /  AlkPhos  83  12-22          EKG: Personally reviewed by nadine BALDWIN with Christina  Radiology: Personally reviewed by me -   < from: Xray Chest 1 View- PORTABLE-Urgent (Xray Chest 1 View- PORTABLE-Urgent .) (12.20.23 @ 15:36) >    IMPRESSION: Hyperinflated lungs. Diffuse tree-in-bud nodularity. No   evidence of confluent airspace opacity.    < end of copied text >  < from: CT Abdomen and Pelvis w/ IV Cont (12.14.23 @ 16:11) >  IMPRESSION: Extensive airway thickening and tree-in-bud nodularity,   compatible with chronic endobronchial disease, including MAC. No CT   evidence of malignancy in the thorax, abdomen, and pelvis.    < end of copied text >  < from: TTE W or WO Ultrasound Enhancing Agent (12.20.23 @ 07:37) >  CONCLUSIONS:      1. Left ventricular cavity is small. Left ventricular wall thickness is normal. Left ventricular systolic function is normal. There are no regional wall motion abnormalities seen.   2. Left ventricular global longitudinal strain is -17.1 % is borderline (range: -18 to -16%). Images were acquired on a Hartman ultrasound system and processed using [employer]AL[employer] analysis software with a heart rate of 100 bpm and a blood pressure of 128/74 mmHg.   3. Normal left ventricular diastolic function.   4. Normal right ventricular cavity size, wall thickness, and systolic function.   5. Normal left and right atrial size.   6. No significant valvular disease.   7. No pericardial effusion seen.   8. Estimated pulmonary artery systolic pressure is 12 mmHg.   9. No prior echocardiogram is available for comparison.    < end of copied text >      Assessment /Plan:     68 yoF w/ PMHx of Protein S deficiency c/b LUE DVT/PE (2019) on Eliquis, COPD (baseline 3L O2, no chronic steroid/abx, no intubation, last admission 2016), HTN, anxiety/depression, remote back surgery on Suboxone. p/w 2 months progressive worsening ALTMAN, fatigue, unintentional weight loss. Likely PNA vs COPD Exacerbation. Patient still persistently tachycardic. Will discuss with Pulm about further treatment options.     1. Sinus Tachycardia  - ECG ST with no ischemic characteristics noted  - TTE with preserved EF, no WMA  - CT neg for PE  - Neg for LE DVT  - D-dimer pending  - Likely 2/2 acute infection  - Cont with Metoprolol 25mg PO BID    2. COPD Exacerbation   AFB smear (-) x 3, f/u cultures; follow up quantiferon gold  - Pulm recs appreciated: - Continue Prednisone, Symbicort 160/4.5 BID- AFB smear (-) x 3, f/u cultures, Mucinex  - Supplemental oxygen to goal >88%, remains on 1-2L NC    3. Hx of DVT  - DVT negative  - CT neg for PE  - Dimer pending  - c/w Eliquis 5mg PO BID    4. Hypertension  - BP well controlled on Metoprolol and amlodipine 5mg PO daily     Differential diagnosis and plan of care discussed with patient after the evaluation. Counseling on diet, nutritional counseling, weight management, exercise and medication compliance was done.   Advanced care planning/advanced directives discussed with patient/family. DNR status including forceful chest compressions to attempt to restart the heart, ventilator support/artificial breathing, electric shock, artificial nutrition, health care proxy, Molst form all discussed with pt. Pt wishes to consider. More than fifteen minutes spent on discussing advanced directives.       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    Pt is accompanied by  (Javier) at bedside. Pt reports experiencing worsening ALTMAN now even with minimal distance ambulation (e.g., from living room to bathroom) requiring supplemental oxygen, which is not her baseline. The symptom worsened gradually over the last 2 months. Also reports intermittent "green mucus" production with coughs during the same time period. No known sick contacts. Pt primarily stays home. Ambulates without assistive devices. Endorses occasional lightheadedness that spontaneously resolves. Denies fevers, chills, headaches, chest pain, nausea, emesis. Pt also reports reduced PO intake in recent months leading to ~20 lb weight loss in the last month, endorses low appetite eating 1 meal/day. Of note, pt reports having had a back surgery in 1980s for herniated disc and took opioid meds for a long time (oxy 15 mg q6h), currently on Suboxone SL.    In the ED, vital signs stable T 36.7C,  sinus tachy, /74, on 3L NC O2 SpO2 >95%. PoCUS showed hypovolemia, s/p IVF boluses (NS 1L x1, LR 1L x1). Labs notable for leukocytosis 18.81, thrombocytosis 771k, elevated Alk Phos 138 and procal 0.24. Lactate 1.0. Negative RVP. Unremarkable serum chemistries, troponins, and pro-BNP. VBG unremarkable with normal pH, signs of hypercarbia. CTA chest and CT A/P on 12/14 notable for extensive airway thickening and tree-in-bud nodularity c/w chronic endobronchial dz, negative for PE or malignancy. Blood cultures sent, pending results. (14 Dec 2023 20:58)      PAST MEDICAL & SURGICAL HISTORY:  Drug abuse  was addicted to pain killers      Protein S deficiency      Depression      COPD (chronic obstructive pulmonary disease)      DVT (deep venous thrombosis)      Pulmonary embolism      Afib      History of back surgery              MEDICATIONS:  amLODIPine   Tablet 5 milliGRAM(s) Oral daily  apixaban 5 milliGRAM(s) Oral every 12 hours  metoprolol tartrate 25 milliGRAM(s) Oral two times a day      albuterol/ipratropium for Nebulization 3 milliLiter(s) Nebulizer every 6 hours  budesonide 160 MICROgram(s)/formoterol 4.5 MICROgram(s) Inhaler 2 Puff(s) Inhalation two times a day  guaiFENesin ER 1200 milliGRAM(s) Oral every 12 hours  sodium chloride 3%  Inhalation 4 milliLiter(s) Inhalation every 6 hours    acetaminophen     Tablet .. 650 milliGRAM(s) Oral every 6 hours PRN  buprenorphine 2 mG/naloxone 0.5 mG SL  Tablet 2 Tablet(s) SubLingual <User Schedule>  melatonin 5 milliGRAM(s) Oral at bedtime PRN  venlafaxine XR. 75 milliGRAM(s) Oral daily    pantoprazole    Tablet 40 milliGRAM(s) Oral before breakfast  polyethylene glycol 3350 17 Gram(s) Oral daily    predniSONE   Tablet 20 milliGRAM(s) Oral daily    ferrous    sulfate 325 milliGRAM(s) Oral <User Schedule>  fluticasone propionate 50 MICROgram(s)/spray Nasal Spray 1 Spray(s) Both Nostrils two times a day  influenza  Vaccine (HIGH DOSE) 0.7 milliLiter(s) IntraMuscular once  multivitamin 1 Tablet(s) Oral daily      FAMILY HISTORY:  Family history of lung cancer    Family history of protein S deficiency (Sibling)        Non-contributory    SOCIAL HISTORY:    [ ] Tobacco  [ ] Drugs  [ ] Alcohol    Allergies    No Known Allergies    Intolerances    	    REVIEW OF SYSTEMS:  CONSTITUTIONAL: No fever  EYES: No eye pain, visual disturbances, or discharge  ENMT:  No difficulty hearing, tinnitus  NECK: No pain or stiffness  RESPIRATORY: No cough, wheezing,  CARDIOVASCULAR: No chest pain, palpitations, passing out, dizziness, or leg swelling  GASTROINTESTINAL:  No nausea, vomiting, diarrhea or constipation. No melena.  GENITOURINARY: No dysuria, hematuria  NEUROLOGICAL: No stroke like symptoms  SKIN: No burning or lesions   ENDOCRINE: No heat or cold intolerance  MUSCULOSKELETAL: No joint pain or swelling  PSYCHIATRIC: No  anxiety, mood swings  HEME/LYMPH: No bleeding gums  ALLERGY AND IMMUNOLOGIC: No hives or eczema	    All other ROS negative    PHYSICAL EXAM:  T(C): 36.8 (12-22-23 @ 11:57), Max: 36.9 (12-21-23 @ 20:57)  HR: 108 (12-22-23 @ 11:57) (85 - 108)  BP: 123/70 (12-22-23 @ 11:57) (115/66 - 123/70)  RR: 20 (12-22-23 @ 11:57) (20 - 21)  SpO2: 98% (12-22-23 @ 11:57) (97% - 98%)  Wt(kg): --  I&O's Summary    21 Dec 2023 07:01  -  22 Dec 2023 07:00  --------------------------------------------------------  IN: 340 mL / OUT: 450 mL / NET: -110 mL        Appearance: Normal	  HEENT:   Normal oral mucosa, EOMI	  Cardiovascular:  S1 S2, No JVD,    Respiratory: Lungs clear to auscultation	  Psychiatry: Alert  Gastrointestinal:  Soft, Non-tender, + BS	  Skin: No rashes   Neurologic: Non-focal  Extremities:  No edema  Vascular: Peripheral pulses palpable    	    	  	  CARDIAC MARKERS:  Labs personally reviewed by me                                  9.0    17.24 )-----------( 754      ( 22 Dec 2023 07:02 )             30.3     12-22    139  |  97  |  28<H>  ----------------------------<  88  4.2   |  35<H>  |  0.69    Ca    9.6      22 Dec 2023 07:00  Phos  3.4     12-22  Mg     2.2     12-22    TPro  6.6  /  Alb  3.8  /  TBili  <0.1<L>  /  DBili  x   /  AST  10  /  ALT  23  /  AlkPhos  83  12-22          EKG: Personally reviewed by nadine BALDWIN with Christina  Radiology: Personally reviewed by me -   < from: Xray Chest 1 View- PORTABLE-Urgent (Xray Chest 1 View- PORTABLE-Urgent .) (12.20.23 @ 15:36) >    IMPRESSION: Hyperinflated lungs. Diffuse tree-in-bud nodularity. No   evidence of confluent airspace opacity.    < end of copied text >  < from: CT Abdomen and Pelvis w/ IV Cont (12.14.23 @ 16:11) >  IMPRESSION: Extensive airway thickening and tree-in-bud nodularity,   compatible with chronic endobronchial disease, including MAC. No CT   evidence of malignancy in the thorax, abdomen, and pelvis.    < end of copied text >  < from: TTE W or WO Ultrasound Enhancing Agent (12.20.23 @ 07:37) >  CONCLUSIONS:      1. Left ventricular cavity is small. Left ventricular wall thickness is normal. Left ventricular systolic function is normal. There are no regional wall motion abnormalities seen.   2. Left ventricular global longitudinal strain is -17.1 % is borderline (range: -18 to -16%). Images were acquired on a Hartman ultrasound system and processed using [employer] analysis software with a heart rate of 100 bpm and a blood pressure of 128/74 mmHg.   3. Normal left ventricular diastolic function.   4. Normal right ventricular cavity size, wall thickness, and systolic function.   5. Normal left and right atrial size.   6. No significant valvular disease.   7. No pericardial effusion seen.   8. Estimated pulmonary artery systolic pressure is 12 mmHg.   9. No prior echocardiogram is available for comparison.    < end of copied text >      Assessment /Plan:     68 yoF w/ PMHx of Protein S deficiency c/b LUE DVT/PE (2019) on Eliquis, COPD (baseline 3L O2, no chronic steroid/abx, no intubation, last admission 2016), HTN, anxiety/depression, remote back surgery on Suboxone. p/w 2 months progressive worsening ALTMAN, fatigue, unintentional weight loss. Likely PNA vs COPD Exacerbation. Patient still persistently tachycardic. Will discuss with Pulm about further treatment options.     1. Sinus Tachycardia  - ECG ST with no ischemic characteristics noted  - TTE with preserved EF, no WMA  - CT neg for PE  - Neg for LE DVT  - D-dimer pending  - Likely 2/2 acute infection  - Cont with Metoprolol 25mg PO BID  - Repeat EKG to r/o Atrial Flutter    2. COPD Exacerbation   AFB smear (-) x 3, f/u cultures; follow up quantiferon gold  - Pulm recs appreciated: - Continue Prednisone, Symbicort 160/4.5 BID- AFB smear (-) x 3, f/u cultures, Mucinex  - Supplemental oxygen to goal >88%, remains on 1-2L NC    3. Hx of DVT  - DVT negative  - CT neg for PE  - Dimer pending  - c/w Eliquis 5mg PO BID    4. Hypertension  - BP well controlled on Metoprolol and amlodipine 5mg PO daily     Differential diagnosis and plan of care discussed with patient after the evaluation. Counseling on diet, nutritional counseling, weight management, exercise and medication compliance was done.   Advanced care planning/advanced directives discussed with patient/family. DNR status including forceful chest compressions to attempt to restart the heart, ventilator support/artificial breathing, electric shock, artificial nutrition, health care proxy, Molst form all discussed with pt. Pt wishes to consider. More than fifteen minutes spent on discussing advanced directives.       Andrei Daniel DO [facility]  Cardiovascular Medicine  [address] [place] [address] Dr, [address]  Available for call or text via [employer] TE[employer]  Office [phone]   DATE OF SERVICE: 12-22-23 @ 16:01    CHIEF COMPLAINT:Patient is a 68y old  Female who presents with a chief complaint of Chronic progressively worsening dyspnea with minimal exertion + coughs (22 Dec 2023 15:27)      HISTORY OF PRESENT ILLNESS:HPI:  Pt is a 68F with PMH significant for Protein S deficiency, prior LUE DVT/PE on Eliquis, COPD (on 3L O2 at home), HTN, anxiety/depression who presents to [facility] ED on 12/14/2023 for a chronic progressively worsening dyspnea with minimal exertion associated with productive coughs over the last 2 months i/s/o outpatient labs with leukocytosis and thrombocytosis.    Pt is accompanied by  (Javier) at bedside. Pt reports experiencing worsening ALTMAN now even with minimal distance ambulation (e.g., from living room to bathroom) requiring supplemental oxygen, which is not her baseline. The symptom worsened gradually over the last 2 months. Also reports intermittent "green mucus" production with coughs during the same time period. No known sick contacts. Pt primarily stays home. Ambulates without assistive devices. Endorses occasional lightheadedness that spontaneously resolves. Denies fevers, chills, headaches, chest pain, nausea, emesis. Pt also reports reduced PO intake in recent months leading to ~20 lb weight loss in the last month, endorses low appetite eating 1 meal/day. Of note, pt reports having had a back surgery in 1980s for herniated disc and took opioid meds for a long time (oxy 15 mg q6h), currently on Suboxone SL.    In the ED, vital signs stable T 36.7C,  sinus tachy, /74, on 3L NC O2 SpO2 >95%. PoCUS showed hypovolemia, s/p IVF boluses (NS 1L x1, LR 1L x1). Labs notable for leukocytosis 18.81, thrombocytosis 771k, elevated Alk Phos 138 and procal 0.24. Lactate 1.0. Negative RVP. Unremarkable serum chemistries, troponins, and pro-BNP. VBG unremarkable with normal pH, signs of hypercarbia. CTA chest and CT A/P on 12/14 notable for extensive airway thickening and tree-in-bud nodularity c/w chronic endobronchial dz, negative for PE or malignancy. Blood cultures sent, pending results. (14 Dec 2023 20:58)      PAST MEDICAL & SURGICAL HISTORY:  Drug abuse  was addicted to pain killers      Protein S deficiency      Depression      COPD (chronic obstructive pulmonary disease)      DVT (deep venous thrombosis)      Pulmonary embolism      Afib      History of back surgery              MEDICATIONS:  amLODIPine   Tablet 5 milliGRAM(s) Oral daily  apixaban 5 milliGRAM(s) Oral every 12 hours  metoprolol tartrate 25 milliGRAM(s) Oral two times a day      albuterol/ipratropium for Nebulization 3 milliLiter(s) Nebulizer every 6 hours  budesonide 160 MICROgram(s)/formoterol 4.5 MICROgram(s) Inhaler 2 Puff(s) Inhalation two times a day  guaiFENesin ER 1200 milliGRAM(s) Oral every 12 hours  sodium chloride 3%  Inhalation 4 milliLiter(s) Inhalation every 6 hours    acetaminophen     Tablet .. 650 milliGRAM(s) Oral every 6 hours PRN  buprenorphine 2 mG/naloxone 0.5 mG SL  Tablet 2 Tablet(s) SubLingual <User Schedule>  melatonin 5 milliGRAM(s) Oral at bedtime PRN  venlafaxine XR. 75 milliGRAM(s) Oral daily    pantoprazole    Tablet 40 milliGRAM(s) Oral before breakfast  polyethylene glycol 3350 17 Gram(s) Oral daily    predniSONE   Tablet 20 milliGRAM(s) Oral daily    ferrous    sulfate 325 milliGRAM(s) Oral <User Schedule>  fluticasone propionate 50 MICROgram(s)/spray Nasal Spray 1 Spray(s) Both Nostrils two times a day  influenza  Vaccine (HIGH DOSE) 0.7 milliLiter(s) IntraMuscular once  multivitamin 1 Tablet(s) Oral daily      FAMILY HISTORY:  Family history of lung cancer    Family history of protein S deficiency (Sibling)        Non-contributory    SOCIAL HISTORY:    [ ] Tobacco  [ ] Drugs  [ ] Alcohol    Allergies    No Known Allergies    Intolerances    	    REVIEW OF SYSTEMS:  CONSTITUTIONAL: No fever  EYES: No eye pain, visual disturbances, or discharge  ENMT:  No difficulty hearing, tinnitus  NECK: No pain or stiffness  RESPIRATORY: No cough, wheezing,  CARDIOVASCULAR: No chest pain, palpitations, passing out, dizziness, or leg swelling  GASTROINTESTINAL:  No nausea, vomiting, diarrhea or constipation. No melena.  GENITOURINARY: No dysuria, hematuria  NEUROLOGICAL: No stroke like symptoms  SKIN: No burning or lesions   ENDOCRINE: No heat or cold intolerance  MUSCULOSKELETAL: No joint pain or swelling  PSYCHIATRIC: No  anxiety, mood swings  HEME/LYMPH: No bleeding gums  ALLERGY AND IMMUNOLOGIC: No hives or eczema	    All other ROS negative    PHYSICAL EXAM:  T(C): 36.8 (12-22-23 @ 11:57), Max: 36.9 (12-21-23 @ 20:57)  HR: 108 (12-22-23 @ 11:57) (85 - 108)  BP: 123/70 (12-22-23 @ 11:57) (115/66 - 123/70)  RR: 20 (12-22-23 @ 11:57) (20 - 21)  SpO2: 98% (12-22-23 @ 11:57) (97% - 98%)  Wt(kg): --  I&O's Summary    21 Dec 2023 07:01  -  22 Dec 2023 07:00  --------------------------------------------------------  IN: 340 mL / OUT: 450 mL / NET: -110 mL        Appearance: Normal	  HEENT:   Normal oral mucosa, EOMI	  Cardiovascular:  S1 S2, No JVD,    Respiratory: Lungs clear to auscultation	  Psychiatry: Alert  Gastrointestinal:  Soft, Non-tender, + BS	  Skin: No rashes   Neurologic: Non-focal  Extremities:  No edema  Vascular: Peripheral pulses palpable    	    	  	  CARDIAC MARKERS:  Labs personally reviewed by me                                  9.0    17.24 )-----------( 754      ( 22 Dec 2023 07:02 )             30.3     12-22    139  |  97  |  28<H>  ----------------------------<  88  4.2   |  35<H>  |  0.69    Ca    9.6      22 Dec 2023 07:00  Phos  3.4     12-22  Mg     2.2     12-22    TPro  6.6  /  Alb  3.8  /  TBili  <0.1<L>  /  DBili  x   /  AST  10  /  ALT  23  /  AlkPhos  83  12-22          EKG: Personally reviewed by nadine BALDWIN with Christina  Radiology: Personally reviewed by me -   < from: Xray Chest 1 View- PORTABLE-Urgent (Xray Chest 1 View- PORTABLE-Urgent .) (12.20.23 @ 15:36) >    IMPRESSION: Hyperinflated lungs. Diffuse tree-in-bud nodularity. No   evidence of confluent airspace opacity.    < end of copied text >  < from: CT Abdomen and Pelvis w/ IV Cont (12.14.23 @ 16:11) >  IMPRESSION: Extensive airway thickening and tree-in-bud nodularity,   compatible with chronic endobronchial disease, including MAC. No CT   evidence of malignancy in the thorax, abdomen, and pelvis.    < end of copied text >  < from: TTE W or WO Ultrasound Enhancing Agent (12.20.23 @ 07:37) >  CONCLUSIONS:      1. Left ventricular cavity is small. Left ventricular wall thickness is normal. Left ventricular systolic function is normal. There are no regional wall motion abnormalities seen.   2. Left ventricular global longitudinal strain is -17.1 % is borderline (range: -18 to -16%). Images were acquired on a Hartman ultrasound system and processed using [employer] analysis software with a heart rate of 100 bpm and a blood pressure of 128/74 mmHg.   3. Normal left ventricular diastolic function.   4. Normal right ventricular cavity size, wall thickness, and systolic function.   5. Normal left and right atrial size.   6. No significant valvular disease.   7. No pericardial effusion seen.   8. Estimated pulmonary artery systolic pressure is 12 mmHg.   9. No prior echocardiogram is available for comparison.    < end of copied text >      Assessment /Plan:     68 yoF w/ PMHx of Protein S deficiency c/b LUE DVT/PE (2019) on Eliquis, COPD (baseline 3L O2, no chronic steroid/abx, no intubation, last admission 2016), HTN, anxiety/depression, remote back surgery on Suboxone. p/w 2 months progressive worsening ALTMAN, fatigue, unintentional weight loss. Likely PNA vs COPD Exacerbation. Patient still persistently tachycardic. Will discuss with Pulm about further treatment options.     1. Sinus Tachycardia  - ECG ST with no ischemic characteristics noted  - TTE with preserved EF, no WMA  - CT neg for PE  - Neg for LE DVT  - D-dimer pending  - Likely 2/2 acute infection  - Cont with Metoprolol 25mg PO BID  - Repeat EKG to r/o Atrial Flutter    2. COPD Exacerbation   AFB smear (-) x 3, f/u cultures; follow up quantiferon gold  - Pulm recs appreciated: - Continue Prednisone, Symbicort 160/4.5 BID- AFB smear (-) x 3, f/u cultures, Mucinex  - Supplemental oxygen to goal >88%, remains on 1-2L NC    3. Hx of DVT  - DVT negative  - CT neg for PE  - Dimer pending  - c/w Eliquis 5mg PO BID    4. Hypertension  - BP well controlled on Metoprolol and amlodipine 5mg PO daily     Differential diagnosis and plan of care discussed with patient after the evaluation. Counseling on diet, nutritional counseling, weight management, exercise and medication compliance was done.   Advanced care planning/advanced directives discussed with patient/family. DNR status including forceful chest compressions to attempt to restart the heart, ventilator support/artificial breathing, electric shock, artificial nutrition, health care proxy, Molst form all discussed with pt. Pt wishes to consider. More than fifteen minutes spent on discussing advanced directives.       Andrei Daniel DO [facility]  Cardiovascular Medicine  [address] [place] [address] Dr, [address]  Available for call or text via [employer] TE[employer]  Office [phone]   DATE OF SERVICE: 12-22-23 @ 16:01    CHIEF COMPLAINT:Patient is a 68y old  Female who presents with a chief complaint of Chronic progressively worsening dyspnea with minimal exertion + coughs (22 Dec 2023 15:27)      HISTORY OF PRESENT ILLNESS:HPI:  Pt is a 68F with PMH significant for Protein S deficiency, prior LUE DVT/PE on Eliquis, COPD (on 3L O2 at home), HTN, anxiety/depression who presents to [facility] ED on 12/14/2023 for a chronic progressively worsening dyspnea with minimal exertion associated with productive coughs over the last 2 months i/s/o outpatient labs with leukocytosis and thrombocytosis.    Pt is accompanied by  (Javier) at bedside. Pt reports experiencing worsening ALTMAN now even with minimal distance ambulation (e.g., from living room to bathroom) requiring supplemental oxygen, which is not her baseline. The symptom worsened gradually over the last 2 months. Also reports intermittent "green mucus" production with coughs during the same time period. No known sick contacts. Pt primarily stays home. Ambulates without assistive devices. Endorses occasional lightheadedness that spontaneously resolves. Denies fevers, chills, headaches, chest pain, nausea, emesis. Pt also reports reduced PO intake in recent months leading to ~20 lb weight loss in the last month, endorses low appetite eating 1 meal/day. Of note, pt reports having had a back surgery in 1980s for herniated disc and took opioid meds for a long time (oxy 15 mg q6h), currently on Suboxone SL.    In the ED, vital signs stable T 36.7C,  sinus tachy, /74, on 3L NC O2 SpO2 >95%. PoCUS showed hypovolemia, s/p IVF boluses (NS 1L x1, LR 1L x1). Labs notable for leukocytosis 18.81, thrombocytosis 771k, elevated Alk Phos 138 and procal 0.24. Lactate 1.0. Negative RVP. Unremarkable serum chemistries, troponins, and pro-BNP. VBG unremarkable with normal pH, signs of hypercarbia. CTA chest and CT A/P on 12/14 notable for extensive airway thickening and tree-in-bud nodularity c/w chronic endobronchial dz, negative for PE or malignancy. Blood cultures sent, pending results. (14 Dec 2023 20:58)      PAST MEDICAL & SURGICAL HISTORY:  Drug abuse  was addicted to pain killers      Protein S deficiency      Depression      COPD (chronic obstructive pulmonary disease)      DVT (deep venous thrombosis)      Pulmonary embolism      Afib      History of back surgery              MEDICATIONS:  amLODIPine   Tablet 5 milliGRAM(s) Oral daily  apixaban 5 milliGRAM(s) Oral every 12 hours  metoprolol tartrate 25 milliGRAM(s) Oral two times a day      albuterol/ipratropium for Nebulization 3 milliLiter(s) Nebulizer every 6 hours  budesonide 160 MICROgram(s)/formoterol 4.5 MICROgram(s) Inhaler 2 Puff(s) Inhalation two times a day  guaiFENesin ER 1200 milliGRAM(s) Oral every 12 hours  sodium chloride 3%  Inhalation 4 milliLiter(s) Inhalation every 6 hours    acetaminophen     Tablet .. 650 milliGRAM(s) Oral every 6 hours PRN  buprenorphine 2 mG/naloxone 0.5 mG SL  Tablet 2 Tablet(s) SubLingual <User Schedule>  melatonin 5 milliGRAM(s) Oral at bedtime PRN  venlafaxine XR. 75 milliGRAM(s) Oral daily    pantoprazole    Tablet 40 milliGRAM(s) Oral before breakfast  polyethylene glycol 3350 17 Gram(s) Oral daily    predniSONE   Tablet 20 milliGRAM(s) Oral daily    ferrous    sulfate 325 milliGRAM(s) Oral <User Schedule>  fluticasone propionate 50 MICROgram(s)/spray Nasal Spray 1 Spray(s) Both Nostrils two times a day  influenza  Vaccine (HIGH DOSE) 0.7 milliLiter(s) IntraMuscular once  multivitamin 1 Tablet(s) Oral daily      FAMILY HISTORY:  Family history of lung cancer    Family history of protein S deficiency (Sibling)        Non-contributory    SOCIAL HISTORY:    [ ] not a smoker    Allergies    No Known Allergies    Intolerances    	    REVIEW OF SYSTEMS:  CONSTITUTIONAL: No fever  EYES: No eye pain, visual disturbances, or discharge  ENMT:  No difficulty hearing, tinnitus  NECK: No pain or stiffness  RESPIRATORY: No cough, wheezing,  CARDIOVASCULAR: No chest pain, palpitations, passing out, dizziness, or leg swelling  GASTROINTESTINAL:  No nausea, vomiting, diarrhea or constipation. No melena.  GENITOURINARY: No dysuria, hematuria  NEUROLOGICAL: No stroke like symptoms  SKIN: No burning or lesions   ENDOCRINE: No heat or cold intolerance  MUSCULOSKELETAL: No joint pain or swelling  PSYCHIATRIC: No  anxiety, mood swings  HEME/LYMPH: No bleeding gums  ALLERGY AND IMMUNOLOGIC: No hives or eczema	    All other ROS negative    PHYSICAL EXAM:  T(C): 36.8 (12-22-23 @ 11:57), Max: 36.9 (12-21-23 @ 20:57)  HR: 108 (12-22-23 @ 11:57) (85 - 108)  BP: 123/70 (12-22-23 @ 11:57) (115/66 - 123/70)  RR: 20 (12-22-23 @ 11:57) (20 - 21)  SpO2: 98% (12-22-23 @ 11:57) (97% - 98%)  Wt(kg): --  I&O's Summary    21 Dec 2023 07:01  -  22 Dec 2023 07:00  --------------------------------------------------------  IN: 340 mL / OUT: 450 mL / NET: -110 mL        Appearance: Normal	  HEENT:   Normal oral mucosa, EOMI	  Cardiovascular:  S1 S2, No JVD,    Respiratory: Lungs clear to auscultation	  Psychiatry: Alert  Gastrointestinal:  Soft, Non-tender, + BS	  Skin: No rashes   Neurologic: Non-focal  Extremities:  No edema  Vascular: Peripheral pulses palpable    	    	  	  CARDIAC MARKERS:  Labs personally reviewed by me                                  9.0    17.24 )-----------( 754      ( 22 Dec 2023 07:02 )             30.3     12-22    139  |  97  |  28<H>  ----------------------------<  88  4.2   |  35<H>  |  0.69    Ca    9.6      22 Dec 2023 07:00  Phos  3.4     12-22  Mg     2.2     12-22    TPro  6.6  /  Alb  3.8  /  TBili  <0.1<L>  /  DBili  x   /  AST  10  /  ALT  23  /  AlkPhos  83  12-22          EKG: Personally reviewed by nadine BALDWIN with PVCs  Radiology: Personally reviewed by me -   < from: Xray Chest 1 View- PORTABLE-Urgent (Xray Chest 1 View- PORTABLE-Urgent .) (12.20.23 @ 15:36) >    IMPRESSION: Hyperinflated lungs. Diffuse tree-in-bud nodularity. No   evidence of confluent airspace opacity.    < end of copied text >  < from: CT Abdomen and Pelvis w/ IV Cont (12.14.23 @ 16:11) >  IMPRESSION: Extensive airway thickening and tree-in-bud nodularity,   compatible with chronic endobronchial disease, including MAC. No CT   evidence of malignancy in the thorax, abdomen, and pelvis.    < end of copied text >  < from: TTE W or WO Ultrasound Enhancing Agent (12.20.23 @ 07:37) >  CONCLUSIONS:      1. Left ventricular cavity is small. Left ventricular wall thickness is normal. Left ventricular systolic function is normal. There are no regional wall motion abnormalities seen.   2. Left ventricular global longitudinal strain is -17.1 % is borderline (range: -18 to -16%). Images were acquired on a Hartman ultrasound system and processed using [employer] analysis software with a heart rate of 100 bpm and a blood pressure of 128/74 mmHg.   3. Normal left ventricular diastolic function.   4. Normal right ventricular cavity size, wall thickness, and systolic function.   5. Normal left and right atrial size.   6. No significant valvular disease.   7. No pericardial effusion seen.   8. Estimated pulmonary artery systolic pressure is 12 mmHg.   9. No prior echocardiogram is available for comparison.    < end of copied text >          Assessment /Plan:   68 yoF w/ PMHx of Protein S deficiency c/b LUE DVT/PE (2019) on Eliquis, COPD (baseline 3L O2, no chronic steroid/abx, no intubation, last admission 2016), HTN, anxiety/depression, remote back surgery on Suboxone. p/w 2 months progressive worsening ALTMAN, fatigue, unintentional weight loss. Likely PNA vs COPD Exacerbation. Patient still persistently tachycardic. Will discuss with Pulm about further treatment options.     1. Sinus Tachycardia  - ECG ST with no ischemic characteristics noted  - TTE with preserved EF, no WMA  - CT neg for PE  - Neg for LE DVT  - D-dimer pending  - Likely 2/2 acute infection  - Cont with Metoprolol 25mg PO BID  - Repeat EKG to r/o Atrial fib/Flutter    2. COPD Exacerbation   AFB smear (-) x 3, f/u cultures; follow up quantiferon gold  - Pulm recs appreciated: - Continue Prednisone, Symbicort 160/4.5 BID- AFB smear (-) x 3, f/u cultures, Mucinex  - Supplemental oxygen to goal >88%, remains on 1-2L NC    3. Hx of DVT  - DVT negative  - CT neg for PE  - Dimer pending  - c/w Eliquis 5mg PO BID    4. Hypertension  - BP well controlled on Metoprolol and amlodipine 5mg PO daily         Differential diagnosis and plan of care discussed with patient after the evaluation. Counseling on diet, nutritional counseling, weight management, exercise and medication compliance was done.   Advanced care planning/advanced directives discussed with patient/family. DNR status including forceful chest compressions to attempt to restart the heart, ventilator support/artificial breathing, electric shock, artificial nutrition, health care proxy, Molst form all discussed with pt. Pt wishes to consider. More than fifteen minutes spent on discussing advanced directives.       Andrei Daniel DO [facility]  Cardiovascular Medicine  [address] [place] [address] Dr, [address]  Available for call or text via [employer] TE[employer]  Office [phone]   DATE OF SERVICE: 12-22-23 @ 16:01    CHIEF COMPLAINT:Patient is a 68y old  Female who presents with a chief complaint of Chronic progressively worsening dyspnea with minimal exertion + coughs (22 Dec 2023 15:27)      HISTORY OF PRESENT ILLNESS:HPI:  Pt is a 68F with PMH significant for Protein S deficiency, prior LUE DVT/PE on Eliquis, COPD (on 3L O2 at home), HTN, anxiety/depression who presents to [facility] ED on 12/14/2023 for a chronic progressively worsening dyspnea with minimal exertion associated with productive coughs over the last 2 months i/s/o outpatient labs with leukocytosis and thrombocytosis.    Pt is accompanied by  (Javier) at bedside. Pt reports experiencing worsening ALTMAN now even with minimal distance ambulation (e.g., from living room to bathroom) requiring supplemental oxygen, which is not her baseline. The symptom worsened gradually over the last 2 months. Also reports intermittent "green mucus" production with coughs during the same time period. No known sick contacts. Pt primarily stays home. Ambulates without assistive devices. Endorses occasional lightheadedness that spontaneously resolves. Denies fevers, chills, headaches, chest pain, nausea, emesis. Pt also reports reduced PO intake in recent months leading to ~20 lb weight loss in the last month, endorses low appetite eating 1 meal/day. Of note, pt reports having had a back surgery in 1980s for herniated disc and took opioid meds for a long time (oxy 15 mg q6h), currently on Suboxone SL.    In the ED, vital signs stable T 36.7C,  sinus tachy, /74, on 3L NC O2 SpO2 >95%. PoCUS showed hypovolemia, s/p IVF boluses (NS 1L x1, LR 1L x1). Labs notable for leukocytosis 18.81, thrombocytosis 771k, elevated Alk Phos 138 and procal 0.24. Lactate 1.0. Negative RVP. Unremarkable serum chemistries, troponins, and pro-BNP. VBG unremarkable with normal pH, signs of hypercarbia. CTA chest and CT A/P on 12/14 notable for extensive airway thickening and tree-in-bud nodularity c/w chronic endobronchial dz, negative for PE or malignancy. Blood cultures sent, pending results. (14 Dec 2023 20:58)      PAST MEDICAL & SURGICAL HISTORY:  Drug abuse  was addicted to pain killers      Protein S deficiency      Depression      COPD (chronic obstructive pulmonary disease)      DVT (deep venous thrombosis)      Pulmonary embolism      Afib      History of back surgery              MEDICATIONS:  amLODIPine   Tablet 5 milliGRAM(s) Oral daily  apixaban 5 milliGRAM(s) Oral every 12 hours  metoprolol tartrate 25 milliGRAM(s) Oral two times a day      albuterol/ipratropium for Nebulization 3 milliLiter(s) Nebulizer every 6 hours  budesonide 160 MICROgram(s)/formoterol 4.5 MICROgram(s) Inhaler 2 Puff(s) Inhalation two times a day  guaiFENesin ER 1200 milliGRAM(s) Oral every 12 hours  sodium chloride 3%  Inhalation 4 milliLiter(s) Inhalation every 6 hours    acetaminophen     Tablet .. 650 milliGRAM(s) Oral every 6 hours PRN  buprenorphine 2 mG/naloxone 0.5 mG SL  Tablet 2 Tablet(s) SubLingual <User Schedule>  melatonin 5 milliGRAM(s) Oral at bedtime PRN  venlafaxine XR. 75 milliGRAM(s) Oral daily    pantoprazole    Tablet 40 milliGRAM(s) Oral before breakfast  polyethylene glycol 3350 17 Gram(s) Oral daily    predniSONE   Tablet 20 milliGRAM(s) Oral daily    ferrous    sulfate 325 milliGRAM(s) Oral <User Schedule>  fluticasone propionate 50 MICROgram(s)/spray Nasal Spray 1 Spray(s) Both Nostrils two times a day  influenza  Vaccine (HIGH DOSE) 0.7 milliLiter(s) IntraMuscular once  multivitamin 1 Tablet(s) Oral daily      FAMILY HISTORY:  Family history of lung cancer    Family history of protein S deficiency (Sibling)        Non-contributory    SOCIAL HISTORY:    [ ] not a smoker    Allergies    No Known Allergies    Intolerances    	    REVIEW OF SYSTEMS:  CONSTITUTIONAL: No fever  EYES: No eye pain, visual disturbances, or discharge  ENMT:  No difficulty hearing, tinnitus  NECK: No pain or stiffness  RESPIRATORY: No cough, wheezing,  CARDIOVASCULAR: No chest pain, palpitations, passing out, dizziness, or leg swelling  GASTROINTESTINAL:  No nausea, vomiting, diarrhea or constipation. No melena.  GENITOURINARY: No dysuria, hematuria  NEUROLOGICAL: No stroke like symptoms  SKIN: No burning or lesions   ENDOCRINE: No heat or cold intolerance  MUSCULOSKELETAL: No joint pain or swelling  PSYCHIATRIC: No  anxiety, mood swings  HEME/LYMPH: No bleeding gums  ALLERGY AND IMMUNOLOGIC: No hives or eczema	    All other ROS negative    PHYSICAL EXAM:  T(C): 36.8 (12-22-23 @ 11:57), Max: 36.9 (12-21-23 @ 20:57)  HR: 108 (12-22-23 @ 11:57) (85 - 108)  BP: 123/70 (12-22-23 @ 11:57) (115/66 - 123/70)  RR: 20 (12-22-23 @ 11:57) (20 - 21)  SpO2: 98% (12-22-23 @ 11:57) (97% - 98%)  Wt(kg): --  I&O's Summary    21 Dec 2023 07:01  -  22 Dec 2023 07:00  --------------------------------------------------------  IN: 340 mL / OUT: 450 mL / NET: -110 mL        Appearance: Normal	  HEENT:   Normal oral mucosa, EOMI	  Cardiovascular:  S1 S2, No JVD,    Respiratory: Lungs clear to auscultation	  Psychiatry: Alert  Gastrointestinal:  Soft, Non-tender, + BS	  Skin: No rashes   Neurologic: Non-focal  Extremities:  No edema  Vascular: Peripheral pulses palpable    	    	  	  CARDIAC MARKERS:  Labs personally reviewed by me                                  9.0    17.24 )-----------( 754      ( 22 Dec 2023 07:02 )             30.3     12-22    139  |  97  |  28<H>  ----------------------------<  88  4.2   |  35<H>  |  0.69    Ca    9.6      22 Dec 2023 07:00  Phos  3.4     12-22  Mg     2.2     12-22    TPro  6.6  /  Alb  3.8  /  TBili  <0.1<L>  /  DBili  x   /  AST  10  /  ALT  23  /  AlkPhos  83  12-22          EKG: Personally reviewed by nadine BALDWIN with PVCs  Radiology: Personally reviewed by me -   < from: Xray Chest 1 View- PORTABLE-Urgent (Xray Chest 1 View- PORTABLE-Urgent .) (12.20.23 @ 15:36) >    IMPRESSION: Hyperinflated lungs. Diffuse tree-in-bud nodularity. No   evidence of confluent airspace opacity.    < end of copied text >  < from: CT Abdomen and Pelvis w/ IV Cont (12.14.23 @ 16:11) >  IMPRESSION: Extensive airway thickening and tree-in-bud nodularity,   compatible with chronic endobronchial disease, including MAC. No CT   evidence of malignancy in the thorax, abdomen, and pelvis.    < end of copied text >  < from: TTE W or WO Ultrasound Enhancing Agent (12.20.23 @ 07:37) >  CONCLUSIONS:      1. Left ventricular cavity is small. Left ventricular wall thickness is normal. Left ventricular systolic function is normal. There are no regional wall motion abnormalities seen.   2. Left ventricular global longitudinal strain is -17.1 % is borderline (range: -18 to -16%). Images were acquired on a [employer]man ultrasound system and processed using [employer] analysis software with a heart rate of 100 bpm and a blood pressure of 128/74 mmHg.   3. Normal left ventricular diastolic function.   4. Normal right ventricular cavity size, wall thickness, and systolic function.   5. Normal left and right atrial size.   6. No significant valvular disease.   7. No pericardial effusion seen.   8. Estimated pulmonary artery systolic pressure is 12 mmHg.   9. No prior echocardiogram is available for comparison.    < end of copied text >          Assessment /Plan:   68 yoF w/ PMHx of Protein S deficiency c/b LUE DVT/PE (2019) on Eliquis, COPD (baseline 3L O2, no chronic steroid/abx, no intubation, last admission 2016), HTN, anxiety/depression, remote back surgery on Suboxone. p/w 2 months progressive worsening ALTMAN, fatigue, unintentional weight loss. Likely PNA vs COPD Exacerbation. Patient still persistently tachycardic. Will discuss with Pulm about further treatment options.     1. Sinus Tachycardia  - ECG ST with no ischemic characteristics noted  - TTE with preserved EF, no WMA  - CT neg for PE  - Neg for LE DVT  - D-dimer pending  - Likely 2/2 acute infection  - Cont with Metoprolol 25mg PO BID  - Repeat EKG to r/o Atrial fib/Flutter    2. COPD Exacerbation   AFB smear (-) x 3, f/u cultures; follow up quantiferon gold  - Pulm recs appreciated: - Continue Prednisone, Symbicort 160/4.5 BID- AFB smear (-) x 3, f/u cultures, Mucinex  - Supplemental oxygen to goal >88%, remains on 1-2L NC    3. Hx of DVT  - DVT negative  - CT neg for PE  - Dimer pending  - c/w Eliquis 5mg PO BID    4. Hypertension  - BP well controlled on Metoprolol and amlodipine 5mg PO daily         Differential diagnosis and plan of care discussed with patient after the evaluation. Counseling on diet, nutritional counseling, weight management, exercise and medication compliance was done.   Advanced care planning/advanced directives discussed with patient/family. DNR status including forceful chest compressions to attempt to restart the heart, ventilator support/artificial breathing, electric shock, artificial nutrition, health care proxy, Molst form all discussed with pt. Pt wishes to consider. More than fifteen minutes spent on discussing advanced directives.       Andrei Daniel DO [facility]  Cardiovascular Medicine  [address] [place] [address] Dr, [address]  Available for call or text via [employer]  Office [phone]

## 2023-12-22 NOTE — PROGRESS NOTE ADULT - SUBJECTIVE AND OBJECTIVE BOX
PROGRESS NOTE:   Authored by Amadeo Garcia MD  Patient is a 68y old  Female who presents with a chief complaint of Chronic progressively worsening dyspnea with minimal exertion + coughs (21 Dec 2023 13:51)      SUBJECTIVE / OVERNIGHT EVENTS:  No acute events overnight.     ADDITIONAL REVIEW OF SYSTEMS:    MEDICATIONS  (STANDING):  albuterol/ipratropium for Nebulization 3 milliLiter(s) Nebulizer every 6 hours  amLODIPine   Tablet 5 milliGRAM(s) Oral daily  apixaban 5 milliGRAM(s) Oral every 12 hours  budesonide  80 MICROgram(s)/formoterol 4.5 MICROgram(s) Inhaler 2 Puff(s) Inhalation two times a day  buprenorphine 2 mG/naloxone 0.5 mG SL  Tablet 2 Tablet(s) SubLingual <User Schedule>  ferrous    sulfate 325 milliGRAM(s) Oral <User Schedule>  fluticasone propionate 50 MICROgram(s)/spray Nasal Spray 1 Spray(s) Both Nostrils two times a day  guaiFENesin ER 1200 milliGRAM(s) Oral every 12 hours  influenza  Vaccine (HIGH DOSE) 0.7 milliLiter(s) IntraMuscular once  metoprolol tartrate 25 milliGRAM(s) Oral two times a day  multivitamin 1 Tablet(s) Oral daily  pantoprazole    Tablet 40 milliGRAM(s) Oral before breakfast  polyethylene glycol 3350 17 Gram(s) Oral daily  predniSONE   Tablet 20 milliGRAM(s) Oral daily  sodium chloride 3%  Inhalation 4 milliLiter(s) Inhalation every 6 hours  venlafaxine XR. 75 milliGRAM(s) Oral daily    MEDICATIONS  (PRN):  acetaminophen     Tablet .. 650 milliGRAM(s) Oral every 6 hours PRN Temp greater or equal to 38C (100.4F), Mild Pain (1 - 3), Moderate Pain (4 - 6), Severe Pain (7 - 10)  melatonin 5 milliGRAM(s) Oral at bedtime PRN Insomnia      CAPILLARY BLOOD GLUCOSE        I&O's Summary    21 Dec 2023 07:01  -  22 Dec 2023 07:00  --------------------------------------------------------  IN: 340 mL / OUT: 450 mL / NET: -110 mL        PHYSICAL EXAM:  Vital Signs Last 24 Hrs  T(C): 36.6 (22 Dec 2023 03:56), Max: 36.9 (21 Dec 2023 20:57)  T(F): 97.8 (22 Dec 2023 03:56), Max: 98.5 (21 Dec 2023 20:57)  HR: 107 (22 Dec 2023 03:56) (85 - 134)  BP: 119/68 (22 Dec 2023 03:56) (108/67 - 119/68)  BP(mean): --  RR: 21 (22 Dec 2023 03:56) (18 - 22)  SpO2: 97% (22 Dec 2023 03:56) (92% - 98%)    Parameters below as of 22 Dec 2023 03:56  Patient On (Oxygen Delivery Method): nasal cannula        GENERAL: No apparent distress.   HEAD:  Atraumatic, Normocephalic  EYES: EOMI, PERRLA, conjunctiva and sclera clear  NECK: Supple, no lymphadenopathy, no elevated JVP  CHEST/LUNG: Clear to auscultation bilateral and symmetric; No wheezes, rales, or rhonchi  HEART: S1 and S2 normal. Regular rate and rhythm; No murmurs, rubs, or gallops  ABDOMEN: Soft, non-tender, non-distended; normal bowel sounds  EXTREMITIES:  2+ peripheral pulses b/l, No clubbing, cyanosis, or edema  NEUROLOGY: A&O x 3, no focal deficits  SKIN: No rashes or lesions    LABS:                        9.3    18.58 )-----------( 804      ( 21 Dec 2023 07:33 )             31.0     12-21    137  |  93<L>  |  31<H>  ----------------------------<  90  4.1   |  31  |  0.76    Ca    9.6      21 Dec 2023 07:33  Phos  3.4     12-21  Mg     2.2     12-21    TPro  7.0  /  Alb  3.9  /  TBili  <0.1<L>  /  DBili  x   /  AST  17  /  ALT  28  /  AlkPhos  94  12-21          Urinalysis Basic - ( 21 Dec 2023 07:33 )    Color: x / Appearance: x / SG: x / pH: x  Gluc: 90 mg/dL / Ketone: x  / Bili: x / Urobili: x   Blood: x / Protein: x / Nitrite: x   Leuk Esterase: x / RBC: x / WBC x   Sq Epi: x / Non Sq Epi: x / Bacteria: x        Culture - Blood (collected 20 Dec 2023 07:26)  Source: .Blood Blood-Peripheral  Preliminary Report (21 Dec 2023 11:02):    No growth at 24 hours    Culture - Blood (collected 20 Dec 2023 07:26)  Source: .Blood Blood-Peripheral  Preliminary Report (21 Dec 2023 11:02):    No growth at 24 hours    Culture - Urine (collected 19 Dec 2023 17:40)  Source: Clean Catch Clean Catch (Midstream)  Final Report (20 Dec 2023 23:41):    No growth    Culture - Sputum (collected 19 Dec 2023 17:39)  Source: .Sputum Sputum  Gram Stain (20 Dec 2023 02:19):    Rare polymorphonuclear leukocytes per low power field    No Squamous epithelial cells per low power field    Rare Gram Positive Cocci in Pairs and Chains per oil power field  Final Report (21 Dec 2023 08:44):    Normal Respiratory Blanca present        RADIOLOGY & ADDITIONAL TESTS:  Lab Results Reviewed   Imaging Reviewed  Electrocardiogram Reviewed   PROGRESS NOTE:   Authored by Amadeo Garcia MD  Patient is a 68y old  Female who presents with a chief complaint of Chronic progressively worsening dyspnea with minimal exertion + coughs (21 Dec 2023 13:51)      SUBJECTIVE / OVERNIGHT EVENTS:  No acute events overnight. Patient was seen and examined at bedside and did not appear to be in any acute distress. She reports that her cough is continuing to improve and is non-productive. Dyspnea is stable. Denies chest pain, palpitations, fevers or chills.       MEDICATIONS  (STANDING):  albuterol/ipratropium for Nebulization 3 milliLiter(s) Nebulizer every 6 hours  amLODIPine   Tablet 5 milliGRAM(s) Oral daily  apixaban 5 milliGRAM(s) Oral every 12 hours  budesonide  80 MICROgram(s)/formoterol 4.5 MICROgram(s) Inhaler 2 Puff(s) Inhalation two times a day  buprenorphine 2 mG/naloxone 0.5 mG SL  Tablet 2 Tablet(s) SubLingual <User Schedule>  ferrous    sulfate 325 milliGRAM(s) Oral <User Schedule>  fluticasone propionate 50 MICROgram(s)/spray Nasal Spray 1 Spray(s) Both Nostrils two times a day  guaiFENesin ER 1200 milliGRAM(s) Oral every 12 hours  influenza  Vaccine (HIGH DOSE) 0.7 milliLiter(s) IntraMuscular once  metoprolol tartrate 25 milliGRAM(s) Oral two times a day  multivitamin 1 Tablet(s) Oral daily  pantoprazole    Tablet 40 milliGRAM(s) Oral before breakfast  polyethylene glycol 3350 17 Gram(s) Oral daily  predniSONE   Tablet 20 milliGRAM(s) Oral daily  sodium chloride 3%  Inhalation 4 milliLiter(s) Inhalation every 6 hours  venlafaxine XR. 75 milliGRAM(s) Oral daily    MEDICATIONS  (PRN):  acetaminophen     Tablet .. 650 milliGRAM(s) Oral every 6 hours PRN Temp greater or equal to 38C (100.4F), Mild Pain (1 - 3), Moderate Pain (4 - 6), Severe Pain (7 - 10)  melatonin 5 milliGRAM(s) Oral at bedtime PRN Insomnia      CAPILLARY BLOOD GLUCOSE        I&O's Summary    21 Dec 2023 07:01  -  22 Dec 2023 07:00  --------------------------------------------------------  IN: 340 mL / OUT: 450 mL / NET: -110 mL        PHYSICAL EXAM:  Vital Signs Last 24 Hrs  T(C): 36.6 (22 Dec 2023 03:56), Max: 36.9 (21 Dec 2023 20:57)  T(F): 97.8 (22 Dec 2023 03:56), Max: 98.5 (21 Dec 2023 20:57)  HR: 107 (22 Dec 2023 03:56) (85 - 134)  BP: 119/68 (22 Dec 2023 03:56) (108/67 - 119/68)  BP(mean): --  RR: 21 (22 Dec 2023 03:56) (18 - 22)  SpO2: 97% (22 Dec 2023 03:56) (92% - 98%)    Parameters below as of 22 Dec 2023 03:56  Patient On (Oxygen Delivery Method): nasal cannula        GENERAL: No apparent distress.   HEAD:  Atraumatic, Normocephalic  EYES: EOMI, PERRLA, conjunctiva and sclera clear  NECK: Supple, no lymphadenopathy, no elevated JVP  CHEST/LUNG: Clear to auscultation bilateral and symmetric; No wheezes, rales, or rhonchi  HEART: S1 and S2 normal. Regular rate and rhythm; No murmurs, rubs, or gallops  ABDOMEN: Soft, non-tender, non-distended; normal bowel sounds  EXTREMITIES:  2+ peripheral pulses b/l, No clubbing, cyanosis, or edema  NEUROLOGY: A&O x 3, no focal deficits  SKIN: No rashes or lesions    LABS:                        9.3    18.58 )-----------( 804      ( 21 Dec 2023 07:33 )             31.0     12-21    137  |  93<L>  |  31<H>  ----------------------------<  90  4.1   |  31  |  0.76    Ca    9.6      21 Dec 2023 07:33  Phos  3.4     12-21  Mg     2.2     12-21    TPro  7.0  /  Alb  3.9  /  TBili  <0.1<L>  /  DBili  x   /  AST  17  /  ALT  28  /  AlkPhos  94  12-21          Urinalysis Basic - ( 21 Dec 2023 07:33 )    Color: x / Appearance: x / SG: x / pH: x  Gluc: 90 mg/dL / Ketone: x  / Bili: x / Urobili: x   Blood: x / Protein: x / Nitrite: x   Leuk Esterase: x / RBC: x / WBC x   Sq Epi: x / Non Sq Epi: x / Bacteria: x        Culture - Blood (collected 20 Dec 2023 07:26)  Source: .Blood Blood-Peripheral  Preliminary Report (21 Dec 2023 11:02):    No growth at 24 hours    Culture - Blood (collected 20 Dec 2023 07:26)  Source: .Blood Blood-Peripheral  Preliminary Report (21 Dec 2023 11:02):    No growth at 24 hours    Culture - Urine (collected 19 Dec 2023 17:40)  Source: Clean Catch Clean Catch (Midstream)  Final Report (20 Dec 2023 23:41):    No growth    Culture - Sputum (collected 19 Dec 2023 17:39)  Source: .Sputum Sputum  Gram Stain (20 Dec 2023 02:19):    Rare polymorphonuclear leukocytes per low power field    No Squamous epithelial cells per low power field    Rare Gram Positive Cocci in Pairs and Chains per oil power field  Final Report (21 Dec 2023 08:44):    Normal Respiratory Blanca present        RADIOLOGY & ADDITIONAL TESTS:  Lab Results Reviewed   Imaging Reviewed  Electrocardiogram Reviewed

## 2023-12-22 NOTE — PROGRESS NOTE ADULT - ASSESSMENT
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 p/w 2 months progressive worsening ALTMAN, fatigue, unintentional weight loss. Likely PNA vs COPD Exacerbation. Patient still persistently tachycardic. Will discuss with Pulm about further treatment options.

## 2023-12-22 NOTE — PROGRESS NOTE ADULT - PROBLEM SELECTOR PLAN 1
Now presenting with worsening ALTMAN and fatigue which appears to be 2/2 PNA vs. MAC as oppose to exacerbation. Home pulm regimen Advair 500-50 + Albuterol    Plan:  >Sating well on baseline O2 3L  - Lowered Prednisone to 20mg PO daily  - c/w DuoNeb q6 standing + Hypersal 3% q6 + Mucinex + Flonase for airway clearance  - Hold ICS-LABA while in exacerbation  - c/w NC, wean as tolerated, continuous pulse ox  -No new findings on CXR   -Pulmonology on board-appreciate further recommendations. Now presenting with worsening ALTMAN and fatigue which appears to be 2/2 PNA vs. MAC as oppose to exacerbation. Home pulm regimen Advair 500-50 + Albuterol    Plan:  >Sating well on baseline O2 3L  - Lowered Prednisone to 20mg PO daily, will continue for 5 days, then a 5 day course of 10mg   - c/w DuoNeb q6 standing + Hypersal 3% q6 + Mucinex + Flonase for airway clearance  - Hold ICS-LABA while in exacerbation  - c/w NC, wean as tolerated, continuous pulse ox  -No new findings on CXR   -Started Symbicort BID   -Pulmonology on board-appreciate further recommendations.

## 2023-12-22 NOTE — PROGRESS NOTE ADULT - TIME BILLING
Review of patient records, including history, laboratory data, imaging. Patient evaluation and assessment. Coordination of care. Time excludes teaching
Time-based billing (NON-critical care).   The documented time excludes time spent with resident teaching activities or time spent by the resident in taking care of the patient.   The necessity of the time spent during the encounter on this date of service was due to:     - Ordering, reviewing, and interpreting labs, testing, and imaging.  - Independently obtaining a review of systems and performing a physical exam  - Reviewing prior hospitalization and where necessary, outpatient records.  - Counselling and educating patient and family regarding interpretation of aforementioned items and plan of care.

## 2023-12-22 NOTE — PROGRESS NOTE ADULT - PROBLEM SELECTOR PLAN 2
Etiology unclear- possibly due to underlying infection. CTA negative for PE  -Sinus on repeat EKG   -TTE- no major abnormalities   -Lower extremity doppler- No DVT  -Will order D-dimer Etiology unclear- possibly due to underlying infection. CTA negative for PE  -Sinus on repeat EKG   -TTE- no major abnormalities   -Lower extremity doppler- No DVT  -Will order D-dimer  -Cardiology consulted- appreciate further recommendations

## 2023-12-22 NOTE — CONSULT NOTE ADULT - REASON FOR ADMISSION
Chronic progressively worsening dyspnea with minimal exertion + coughs
Chronic progressively worsening dyspnea with minimal exertion + coughs

## 2023-12-22 NOTE — CHART NOTE - NSCHARTNOTEFT_GEN_A_CORE
Patient needs Nebulizer machine    68 yr old female PMHx of Protein S deficiency c/b LUE DVT/PE (2019) on Eliquis, COPD (baseline 3L O2, no chronic steroid/abx, no intubation, last admission 2016), presents with 2 months progressive worsening shortness of breath and dyspnea on exertion. Patient was admitted for COPD exacerbation with progression of underlying pulmonary pathology.     This patient needs a home nebulizer for use for mobilizing secretions with hypertonic saline inhalation and for albuterol/iptratropium inhalation, as officially recommended by pulmonology.

## 2023-12-22 NOTE — PROGRESS NOTE ADULT - ATTENDING COMMENTS
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 Concerning for NTM pulm disease, bronchiectasis, possible COPD exacerbation, and persistent sinus tachycardia.     Significant L sided wheezing on exam today    - pulm consult appreciated: s/p 7 day course of levaquin to cover pseudomonas, treat for bronchiectasis exacerbation w/ prednisone, pulm hygiene, chest PT, added symbicort  - increase steroids back to 40mg pred daily, f/u pulm recs for taper closer to dc  - dc with duonebs/hypersal, acapella and outpt pulm rehab-script for home nebulizer signed  - outpt repeat CT Chest in 4-6 weeks for HUANG and RUL 9m nodule and then determine need for EBUS and navigational bronch at that time   - s/p AFB sputum x 3 all neg  - anemia workup - c/w HOLLY - start iron qod. needs outpatient GI follow-up for CRC screening if not done recently  - thrombocytosis noted, present in 2016, pt has Hematologist in [place] whom she hasn't seen in years, encouraged outpatient establishment with [facility]. JAK2, MPL, Calreticulin genetic tests ordered for AM which can be followed up as outpatient.   - persistent tachycardia, unclear etiology, ?driven by extensive pulmonary disease. not in distress, pain, or PE. EKG showed sinus tach. On lopressor 25 BID, TSH nml, echo with no obvious abnormalities. No PE on previous CTA. Cardiology consulted, appreciate recs--will need repeat EKG    rest of plan as per above, d/w HS 2. 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 Concerning for NTM pulm disease, bronchiectasis, possible COPD exacerbation, and persistent sinus tachycardia.     Significant L sided wheezing on exam today    - pulm consult appreciated: s/p 7 day course of levaquin to cover pseudomonas, treat for bronchiectasis exacerbation w/ prednisone, pulm hygiene, chest PT, added symbicort  - increase steroids back to 40mg pred daily, f/u pulm recs for taper closer to dc  - dc with duonebs/hypersal, acapella and outpt pulm rehab-script for home nebulizer signed  - outpt repeat CT Chest in 4-6 weeks for HUANG and RUL 9m nodule and then determine need for EBUS and navigational bronch at that time   - s/p AFB sputum x 3 all neg  - anemia workup - c/w HOLLY - start iron qod. needs outpatient GI follow-up for CRC screening if not done recently  - thrombocytosis noted, present in 2016, pt has Hematologist in [place] whom she hasn't seen in years, encouraged outpatient establishment with [facility]. JAK2, MPL, Calreticulin genetic tests ordered for AM which can be followed up as outpatient.   - persistent tachycardia, unclear etiology, ?driven by extensive pulmonary disease. not in distress, pain, or PE. EKG showed sinus tach. On lopressor 25 BID, TSH nml, echo with no obvious abnormalities. No PE on previous CTA. Cardiology consulted, appreciate recs--will need repeat EKG    rest of plan as per above, d/w HS 2.

## 2023-12-23 LAB
ALBUMIN SERPL ELPH-MCNC: 4 G/DL — SIGNIFICANT CHANGE UP (ref 3.3–5)
ALBUMIN SERPL ELPH-MCNC: 4 G/DL — SIGNIFICANT CHANGE UP (ref 3.3–5)
ALP SERPL-CCNC: 87 U/L — SIGNIFICANT CHANGE UP (ref 40–120)
ALP SERPL-CCNC: 87 U/L — SIGNIFICANT CHANGE UP (ref 40–120)
ALT FLD-CCNC: 21 U/L — SIGNIFICANT CHANGE UP (ref 10–45)
ALT FLD-CCNC: 21 U/L — SIGNIFICANT CHANGE UP (ref 10–45)
ANION GAP SERPL CALC-SCNC: 10 MMOL/L — SIGNIFICANT CHANGE UP (ref 5–17)
ANION GAP SERPL CALC-SCNC: 10 MMOL/L — SIGNIFICANT CHANGE UP (ref 5–17)
AST SERPL-CCNC: 13 U/L — SIGNIFICANT CHANGE UP (ref 10–40)
AST SERPL-CCNC: 13 U/L — SIGNIFICANT CHANGE UP (ref 10–40)
BASE EXCESS BLDV CALC-SCNC: 8.5 MMOL/L — HIGH (ref -2–3)
BASE EXCESS BLDV CALC-SCNC: 8.5 MMOL/L — HIGH (ref -2–3)
BILIRUB SERPL-MCNC: <0.1 MG/DL — LOW (ref 0.2–1.2)
BILIRUB SERPL-MCNC: <0.1 MG/DL — LOW (ref 0.2–1.2)
BUN SERPL-MCNC: 26 MG/DL — HIGH (ref 7–23)
BUN SERPL-MCNC: 26 MG/DL — HIGH (ref 7–23)
CA-I SERPL-SCNC: 1.27 MMOL/L — SIGNIFICANT CHANGE UP (ref 1.15–1.33)
CA-I SERPL-SCNC: 1.27 MMOL/L — SIGNIFICANT CHANGE UP (ref 1.15–1.33)
CALCIUM SERPL-MCNC: 9.8 MG/DL — SIGNIFICANT CHANGE UP (ref 8.4–10.5)
CALCIUM SERPL-MCNC: 9.8 MG/DL — SIGNIFICANT CHANGE UP (ref 8.4–10.5)
CHLORIDE BLDV-SCNC: 97 MMOL/L — SIGNIFICANT CHANGE UP (ref 96–108)
CHLORIDE BLDV-SCNC: 97 MMOL/L — SIGNIFICANT CHANGE UP (ref 96–108)
CHLORIDE SERPL-SCNC: 93 MMOL/L — LOW (ref 96–108)
CHLORIDE SERPL-SCNC: 93 MMOL/L — LOW (ref 96–108)
CO2 BLDV-SCNC: 36 MMOL/L — HIGH (ref 22–26)
CO2 BLDV-SCNC: 36 MMOL/L — HIGH (ref 22–26)
CO2 SERPL-SCNC: 35 MMOL/L — HIGH (ref 22–31)
CO2 SERPL-SCNC: 35 MMOL/L — HIGH (ref 22–31)
CREAT SERPL-MCNC: 0.59 MG/DL — SIGNIFICANT CHANGE UP (ref 0.5–1.3)
CREAT SERPL-MCNC: 0.59 MG/DL — SIGNIFICANT CHANGE UP (ref 0.5–1.3)
EGFR: 99 ML/MIN/1.73M2 — SIGNIFICANT CHANGE UP
EGFR: 99 ML/MIN/1.73M2 — SIGNIFICANT CHANGE UP
GAS PNL BLDV: 134 MMOL/L — LOW (ref 136–145)
GAS PNL BLDV: 134 MMOL/L — LOW (ref 136–145)
GAS PNL BLDV: SIGNIFICANT CHANGE UP
GAS PNL BLDV: SIGNIFICANT CHANGE UP
GLUCOSE BLDV-MCNC: 124 MG/DL — HIGH (ref 70–99)
GLUCOSE BLDV-MCNC: 124 MG/DL — HIGH (ref 70–99)
GLUCOSE SERPL-MCNC: 91 MG/DL — SIGNIFICANT CHANGE UP (ref 70–99)
GLUCOSE SERPL-MCNC: 91 MG/DL — SIGNIFICANT CHANGE UP (ref 70–99)
HCO3 BLDV-SCNC: 35 MMOL/L — HIGH (ref 22–29)
HCO3 BLDV-SCNC: 35 MMOL/L — HIGH (ref 22–29)
HCT VFR BLD CALC: 31 % — LOW (ref 34.5–45)
HCT VFR BLD CALC: 31 % — LOW (ref 34.5–45)
HCT VFR BLDA CALC: 31 % — LOW (ref 34.5–46.5)
HCT VFR BLDA CALC: 31 % — LOW (ref 34.5–46.5)
HGB BLD CALC-MCNC: 10.4 G/DL — LOW (ref 11.7–16.1)
HGB BLD CALC-MCNC: 10.4 G/DL — LOW (ref 11.7–16.1)
HGB BLD-MCNC: 9.3 G/DL — LOW (ref 11.5–15.5)
HGB BLD-MCNC: 9.3 G/DL — LOW (ref 11.5–15.5)
LACTATE BLDV-MCNC: 1.8 MMOL/L — SIGNIFICANT CHANGE UP (ref 0.5–2)
LACTATE BLDV-MCNC: 1.8 MMOL/L — SIGNIFICANT CHANGE UP (ref 0.5–2)
MAGNESIUM SERPL-MCNC: 2.3 MG/DL — SIGNIFICANT CHANGE UP (ref 1.6–2.6)
MAGNESIUM SERPL-MCNC: 2.3 MG/DL — SIGNIFICANT CHANGE UP (ref 1.6–2.6)
MCHC RBC-ENTMCNC: 24.7 PG — LOW (ref 27–34)
MCHC RBC-ENTMCNC: 24.7 PG — LOW (ref 27–34)
MCHC RBC-ENTMCNC: 30 GM/DL — LOW (ref 32–36)
MCHC RBC-ENTMCNC: 30 GM/DL — LOW (ref 32–36)
MCV RBC AUTO: 82.4 FL — SIGNIFICANT CHANGE UP (ref 80–100)
MCV RBC AUTO: 82.4 FL — SIGNIFICANT CHANGE UP (ref 80–100)
NRBC # BLD: 0 /100 WBCS — SIGNIFICANT CHANGE UP (ref 0–0)
NRBC # BLD: 0 /100 WBCS — SIGNIFICANT CHANGE UP (ref 0–0)
PCO2 BLDV: 56 MMHG — HIGH (ref 39–42)
PCO2 BLDV: 56 MMHG — HIGH (ref 39–42)
PH BLDV: 7.4 — SIGNIFICANT CHANGE UP (ref 7.32–7.43)
PH BLDV: 7.4 — SIGNIFICANT CHANGE UP (ref 7.32–7.43)
PHOSPHATE SERPL-MCNC: 3.5 MG/DL — SIGNIFICANT CHANGE UP (ref 2.5–4.5)
PHOSPHATE SERPL-MCNC: 3.5 MG/DL — SIGNIFICANT CHANGE UP (ref 2.5–4.5)
PLATELET # BLD AUTO: 786 K/UL — HIGH (ref 150–400)
PLATELET # BLD AUTO: 786 K/UL — HIGH (ref 150–400)
PO2 BLDV: 70 MMHG — HIGH (ref 25–45)
PO2 BLDV: 70 MMHG — HIGH (ref 25–45)
POTASSIUM BLDV-SCNC: 4.5 MMOL/L — SIGNIFICANT CHANGE UP (ref 3.5–5.1)
POTASSIUM BLDV-SCNC: 4.5 MMOL/L — SIGNIFICANT CHANGE UP (ref 3.5–5.1)
POTASSIUM SERPL-MCNC: 4.3 MMOL/L — SIGNIFICANT CHANGE UP (ref 3.5–5.3)
POTASSIUM SERPL-MCNC: 4.3 MMOL/L — SIGNIFICANT CHANGE UP (ref 3.5–5.3)
POTASSIUM SERPL-SCNC: 4.3 MMOL/L — SIGNIFICANT CHANGE UP (ref 3.5–5.3)
POTASSIUM SERPL-SCNC: 4.3 MMOL/L — SIGNIFICANT CHANGE UP (ref 3.5–5.3)
PROT SERPL-MCNC: 7.1 G/DL — SIGNIFICANT CHANGE UP (ref 6–8.3)
PROT SERPL-MCNC: 7.1 G/DL — SIGNIFICANT CHANGE UP (ref 6–8.3)
RBC # BLD: 3.76 M/UL — LOW (ref 3.8–5.2)
RBC # BLD: 3.76 M/UL — LOW (ref 3.8–5.2)
RBC # FLD: 17.5 % — HIGH (ref 10.3–14.5)
RBC # FLD: 17.5 % — HIGH (ref 10.3–14.5)
SAO2 % BLDV: 93.8 % — HIGH (ref 67–88)
SAO2 % BLDV: 93.8 % — HIGH (ref 67–88)
SODIUM SERPL-SCNC: 138 MMOL/L — SIGNIFICANT CHANGE UP (ref 135–145)
SODIUM SERPL-SCNC: 138 MMOL/L — SIGNIFICANT CHANGE UP (ref 135–145)
WBC # BLD: 20.83 K/UL — HIGH (ref 3.8–10.5)
WBC # BLD: 20.83 K/UL — HIGH (ref 3.8–10.5)
WBC # FLD AUTO: 20.83 K/UL — HIGH (ref 3.8–10.5)
WBC # FLD AUTO: 20.83 K/UL — HIGH (ref 3.8–10.5)

## 2023-12-23 PROCEDURE — G0452: CPT | Mod: 26

## 2023-12-23 PROCEDURE — 99232 SBSQ HOSP IP/OBS MODERATE 35: CPT | Mod: GC

## 2023-12-23 PROCEDURE — 99233 SBSQ HOSP IP/OBS HIGH 50: CPT

## 2023-12-23 RX ORDER — LEVALBUTEROL 1.25 MG/.5ML
0.31 SOLUTION, CONCENTRATE RESPIRATORY (INHALATION) EVERY 6 HOURS
Refills: 0 | Status: DISCONTINUED | OUTPATIENT
Start: 2023-12-23 | End: 2023-12-27

## 2023-12-23 RX ORDER — HYDROXYZINE HCL 10 MG
25 TABLET ORAL ONCE
Refills: 0 | Status: COMPLETED | OUTPATIENT
Start: 2023-12-23 | End: 2023-12-23

## 2023-12-23 RX ORDER — TIOTROPIUM BROMIDE 18 UG/1
2 CAPSULE ORAL; RESPIRATORY (INHALATION) DAILY
Refills: 0 | Status: DISCONTINUED | OUTPATIENT
Start: 2023-12-23 | End: 2023-12-27

## 2023-12-23 RX ADMIN — Medication 20 MILLIGRAM(S): at 12:23

## 2023-12-23 RX ADMIN — Medication 75 MILLIGRAM(S): at 12:22

## 2023-12-23 RX ADMIN — BUDESONIDE AND FORMOTEROL FUMARATE DIHYDRATE 2 PUFF(S): 160; 4.5 AEROSOL RESPIRATORY (INHALATION) at 17:36

## 2023-12-23 RX ADMIN — Medication 20 MILLIGRAM(S): at 05:35

## 2023-12-23 RX ADMIN — Medication 1 TABLET(S): at 12:20

## 2023-12-23 RX ADMIN — Medication 1 SPRAY(S): at 05:33

## 2023-12-23 RX ADMIN — BUPRENORPHINE AND NALOXONE 2 TABLET(S): 2; .5 TABLET SUBLINGUAL at 12:24

## 2023-12-23 RX ADMIN — LEVALBUTEROL 0.31 MILLIGRAM(S): 1.25 SOLUTION, CONCENTRATE RESPIRATORY (INHALATION) at 23:09

## 2023-12-23 RX ADMIN — AMLODIPINE BESYLATE 5 MILLIGRAM(S): 2.5 TABLET ORAL at 05:32

## 2023-12-23 RX ADMIN — PANTOPRAZOLE SODIUM 40 MILLIGRAM(S): 20 TABLET, DELAYED RELEASE ORAL at 05:39

## 2023-12-23 RX ADMIN — SODIUM CHLORIDE 4 MILLILITER(S): 9 INJECTION INTRAMUSCULAR; INTRAVENOUS; SUBCUTANEOUS at 12:20

## 2023-12-23 RX ADMIN — Medication 25 MILLIGRAM(S): at 17:36

## 2023-12-23 RX ADMIN — Medication 25 MILLIGRAM(S): at 23:25

## 2023-12-23 RX ADMIN — SODIUM CHLORIDE 4 MILLILITER(S): 9 INJECTION INTRAMUSCULAR; INTRAVENOUS; SUBCUTANEOUS at 23:09

## 2023-12-23 RX ADMIN — APIXABAN 5 MILLIGRAM(S): 2.5 TABLET, FILM COATED ORAL at 17:37

## 2023-12-23 RX ADMIN — SODIUM CHLORIDE 4 MILLILITER(S): 9 INJECTION INTRAMUSCULAR; INTRAVENOUS; SUBCUTANEOUS at 05:33

## 2023-12-23 RX ADMIN — TIOTROPIUM BROMIDE 2 PUFF(S): 18 CAPSULE ORAL; RESPIRATORY (INHALATION) at 12:24

## 2023-12-23 RX ADMIN — Medication 1 SPRAY(S): at 17:39

## 2023-12-23 RX ADMIN — BUPRENORPHINE AND NALOXONE 2 TABLET(S): 2; .5 TABLET SUBLINGUAL at 21:50

## 2023-12-23 RX ADMIN — Medication 25 MILLIGRAM(S): at 05:34

## 2023-12-23 RX ADMIN — SODIUM CHLORIDE 4 MILLILITER(S): 9 INJECTION INTRAMUSCULAR; INTRAVENOUS; SUBCUTANEOUS at 17:39

## 2023-12-23 RX ADMIN — BUDESONIDE AND FORMOTEROL FUMARATE DIHYDRATE 2 PUFF(S): 160; 4.5 AEROSOL RESPIRATORY (INHALATION) at 05:35

## 2023-12-23 RX ADMIN — Medication 1200 MILLIGRAM(S): at 17:37

## 2023-12-23 RX ADMIN — Medication 1200 MILLIGRAM(S): at 05:34

## 2023-12-23 RX ADMIN — Medication 3 MILLILITER(S): at 05:33

## 2023-12-23 RX ADMIN — APIXABAN 5 MILLIGRAM(S): 2.5 TABLET, FILM COATED ORAL at 05:31

## 2023-12-23 RX ADMIN — LEVALBUTEROL 0.31 MILLIGRAM(S): 1.25 SOLUTION, CONCENTRATE RESPIRATORY (INHALATION) at 18:24

## 2023-12-23 RX ADMIN — POLYETHYLENE GLYCOL 3350 17 GRAM(S): 17 POWDER, FOR SOLUTION ORAL at 12:20

## 2023-12-23 NOTE — PROGRESS NOTE ADULT - SUBJECTIVE AND OBJECTIVE BOX
PROGRESS NOTE:   Authored by Amadeo Garcia MD  Patient is a 68y old  Female who presents with a chief complaint of Chronic progressively worsening dyspnea with minimal exertion + coughs (22 Dec 2023 16:00)      SUBJECTIVE / OVERNIGHT EVENTS:  No acute events overnight.     ADDITIONAL REVIEW OF SYSTEMS:    MEDICATIONS  (STANDING):  albuterol/ipratropium for Nebulization 3 milliLiter(s) Nebulizer every 6 hours  amLODIPine   Tablet 5 milliGRAM(s) Oral daily  apixaban 5 milliGRAM(s) Oral every 12 hours  budesonide 160 MICROgram(s)/formoterol 4.5 MICROgram(s) Inhaler 2 Puff(s) Inhalation two times a day  buprenorphine 2 mG/naloxone 0.5 mG SL  Tablet 2 Tablet(s) SubLingual <User Schedule>  ferrous    sulfate 325 milliGRAM(s) Oral <User Schedule>  fluticasone propionate 50 MICROgram(s)/spray Nasal Spray 1 Spray(s) Both Nostrils two times a day  guaiFENesin ER 1200 milliGRAM(s) Oral every 12 hours  influenza  Vaccine (HIGH DOSE) 0.7 milliLiter(s) IntraMuscular once  metoprolol tartrate 25 milliGRAM(s) Oral two times a day  multivitamin 1 Tablet(s) Oral daily  pantoprazole    Tablet 40 milliGRAM(s) Oral before breakfast  polyethylene glycol 3350 17 Gram(s) Oral daily  predniSONE   Tablet 20 milliGRAM(s) Oral once  sodium chloride 3%  Inhalation 4 milliLiter(s) Inhalation every 6 hours  tiotropium 2.5 MICROgram(s) Inhaler 2 Puff(s) Inhalation daily  venlafaxine XR. 75 milliGRAM(s) Oral daily    MEDICATIONS  (PRN):  acetaminophen     Tablet .. 650 milliGRAM(s) Oral every 6 hours PRN Temp greater or equal to 38C (100.4F), Mild Pain (1 - 3), Moderate Pain (4 - 6), Severe Pain (7 - 10)  melatonin 5 milliGRAM(s) Oral at bedtime PRN Insomnia      CAPILLARY BLOOD GLUCOSE        I&O's Summary      PHYSICAL EXAM:  Vital Signs Last 24 Hrs  T(C): 36.6 (23 Dec 2023 06:00), Max: 36.9 (22 Dec 2023 21:29)  T(F): 97.8 (23 Dec 2023 06:00), Max: 98.4 (22 Dec 2023 21:29)  HR: 110 (23 Dec 2023 06:00) (86 - 110)  BP: 131/77 (23 Dec 2023 06:00) (123/70 - 136/77)  BP(mean): --  RR: 20 (23 Dec 2023 06:00) (20 - 20)  SpO2: 96% (23 Dec 2023 06:00) (96% - 98%)    Parameters below as of 23 Dec 2023 06:00  Patient On (Oxygen Delivery Method): nasal cannula        GENERAL: No apparent distress.   HEAD:  Atraumatic, Normocephalic  EYES: EOMI, PERRLA, conjunctiva and sclera clear  NECK: Supple, no lymphadenopathy, no elevated JVP  CHEST/LUNG: Clear to auscultation bilateral and symmetric; No wheezes, rales, or rhonchi  HEART: S1 and S2 normal. Regular rate and rhythm; No murmurs, rubs, or gallops  ABDOMEN: Soft, non-tender, non-distended; normal bowel sounds  EXTREMITIES:  2+ peripheral pulses b/l, No clubbing, cyanosis, or edema  NEUROLOGY: A&O x 3, no focal deficits  SKIN: No rashes or lesions    LABS:                        9.3    20.83 )-----------( 786      ( 23 Dec 2023 06:34 )             31.0     12-23    138  |  93<L>  |  26<H>  ----------------------------<  91  4.3   |  35<H>  |  0.59    Ca    9.8      23 Dec 2023 06:37  Phos  3.5     12-23  Mg     2.3     12-23    TPro  7.1  /  Alb  4.0  /  TBili  <0.1<L>  /  DBili  x   /  AST  13  /  ALT  21  /  AlkPhos  87  12-23          Urinalysis Basic - ( 23 Dec 2023 06:37 )    Color: x / Appearance: x / SG: x / pH: x  Gluc: 91 mg/dL / Ketone: x  / Bili: x / Urobili: x   Blood: x / Protein: x / Nitrite: x   Leuk Esterase: x / RBC: x / WBC x   Sq Epi: x / Non Sq Epi: x / Bacteria: x        Culture - Blood (collected 20 Dec 2023 07:26)  Source: .Blood Blood-Peripheral  Preliminary Report (22 Dec 2023 11:01):    No growth at 48 Hours    Culture - Blood (collected 20 Dec 2023 07:26)  Source: .Blood Blood-Peripheral  Preliminary Report (22 Dec 2023 11:01):    No growth at 48 Hours        RADIOLOGY & ADDITIONAL TESTS:  Lab Results Reviewed   Imaging Reviewed  Electrocardiogram Reviewed   PROGRESS NOTE:   Authored by Amadeo Garcia MD  Patient is a 68y old  Female who presents with a chief complaint of Chronic progressively worsening dyspnea with minimal exertion + coughs (22 Dec 2023 16:00)      SUBJECTIVE / OVERNIGHT EVENTS:  No acute events overnight. Patient was seen and examined at bedside and did not appear to be in any acute distress. She reports that her dyspnea and cough have been stable. Denies any chest pain or palpitations.       MEDICATIONS  (STANDING):  albuterol/ipratropium for Nebulization 3 milliLiter(s) Nebulizer every 6 hours  amLODIPine   Tablet 5 milliGRAM(s) Oral daily  apixaban 5 milliGRAM(s) Oral every 12 hours  budesonide 160 MICROgram(s)/formoterol 4.5 MICROgram(s) Inhaler 2 Puff(s) Inhalation two times a day  buprenorphine 2 mG/naloxone 0.5 mG SL  Tablet 2 Tablet(s) SubLingual <User Schedule>  ferrous    sulfate 325 milliGRAM(s) Oral <User Schedule>  fluticasone propionate 50 MICROgram(s)/spray Nasal Spray 1 Spray(s) Both Nostrils two times a day  guaiFENesin ER 1200 milliGRAM(s) Oral every 12 hours  influenza  Vaccine (HIGH DOSE) 0.7 milliLiter(s) IntraMuscular once  metoprolol tartrate 25 milliGRAM(s) Oral two times a day  multivitamin 1 Tablet(s) Oral daily  pantoprazole    Tablet 40 milliGRAM(s) Oral before breakfast  polyethylene glycol 3350 17 Gram(s) Oral daily  predniSONE   Tablet 20 milliGRAM(s) Oral once  sodium chloride 3%  Inhalation 4 milliLiter(s) Inhalation every 6 hours  tiotropium 2.5 MICROgram(s) Inhaler 2 Puff(s) Inhalation daily  venlafaxine XR. 75 milliGRAM(s) Oral daily    MEDICATIONS  (PRN):  acetaminophen     Tablet .. 650 milliGRAM(s) Oral every 6 hours PRN Temp greater or equal to 38C (100.4F), Mild Pain (1 - 3), Moderate Pain (4 - 6), Severe Pain (7 - 10)  melatonin 5 milliGRAM(s) Oral at bedtime PRN Insomnia      CAPILLARY BLOOD GLUCOSE        I&O's Summary      PHYSICAL EXAM:  Vital Signs Last 24 Hrs  T(C): 36.6 (23 Dec 2023 06:00), Max: 36.9 (22 Dec 2023 21:29)  T(F): 97.8 (23 Dec 2023 06:00), Max: 98.4 (22 Dec 2023 21:29)  HR: 110 (23 Dec 2023 06:00) (86 - 110)  BP: 131/77 (23 Dec 2023 06:00) (123/70 - 136/77)  BP(mean): --  RR: 20 (23 Dec 2023 06:00) (20 - 20)  SpO2: 96% (23 Dec 2023 06:00) (96% - 98%)    Parameters below as of 23 Dec 2023 06:00  Patient On (Oxygen Delivery Method): nasal cannula        GENERAL: No apparent distress.   HEAD:  Atraumatic, Normocephalic  EYES: EOMI, PERRLA, conjunctiva and sclera clear  NECK: Supple, no lymphadenopathy, no elevated JVP  CHEST/LUNG: B/L expiratory wheeze prominent in upper lobes.   HEART: S1 and S2 normal. Regular rate and rhythm; No murmurs, rubs, or gallops  ABDOMEN: Soft, non-tender, non-distended; normal bowel sounds  EXTREMITIES:  2+ peripheral pulses b/l, No clubbing, cyanosis, or edema  NEUROLOGY: A&O x 3, no focal deficits  SKIN: No rashes or lesions    LABS:                        9.3    20.83 )-----------( 786      ( 23 Dec 2023 06:34 )             31.0     12-23    138  |  93<L>  |  26<H>  ----------------------------<  91  4.3   |  35<H>  |  0.59    Ca    9.8      23 Dec 2023 06:37  Phos  3.5     12-23  Mg     2.3     12-23    TPro  7.1  /  Alb  4.0  /  TBili  <0.1<L>  /  DBili  x   /  AST  13  /  ALT  21  /  AlkPhos  87  12-23          Urinalysis Basic - ( 23 Dec 2023 06:37 )    Color: x / Appearance: x / SG: x / pH: x  Gluc: 91 mg/dL / Ketone: x  / Bili: x / Urobili: x   Blood: x / Protein: x / Nitrite: x   Leuk Esterase: x / RBC: x / WBC x   Sq Epi: x / Non Sq Epi: x / Bacteria: x        Culture - Blood (collected 20 Dec 2023 07:26)  Source: .Blood Blood-Peripheral  Preliminary Report (22 Dec 2023 11:01):    No growth at 48 Hours    Culture - Blood (collected 20 Dec 2023 07:26)  Source: .Blood Blood-Peripheral  Preliminary Report (22 Dec 2023 11:01):    No growth at 48 Hours        RADIOLOGY & ADDITIONAL TESTS:  Lab Results Reviewed   Imaging Reviewed  Electrocardiogram Reviewed

## 2023-12-23 NOTE — PROGRESS NOTE ADULT - PROBLEM SELECTOR PLAN 2
Etiology unclear- possibly due to underlying infection. CTA negative for PE  -Sinus on repeat EKG   -TTE- no major abnormalities   -Lower extremity doppler- No DVT  -Will order D-dimer  -Cardiology consulted- appreciate further recommendations Etiology unclear- possibly due to underlying infection. CTA negative for PE  -Sinus on repeat EKG   -Continue Lopressor  -TTE- no major abnormalities   -Lower extremity doppler- No DVT  -Cardiology consulted- appreciate further recommendations

## 2023-12-23 NOTE — PROGRESS NOTE ADULT - PROBLEM SELECTOR PLAN 1
Now presenting with worsening ALTMAN and fatigue which appears to be 2/2 PNA vs. MAC as oppose to exacerbation. Home pulm regimen Advair 500-50 + Albuterol    Plan:  >Sating well on baseline O2 3L  - Lowered Prednisone to 20mg PO daily, will continue for 5 days, then a 5 day course of 10mg   - c/w DuoNeb q6 standing + Hypersal 3% q6 + Mucinex + Flonase for airway clearance  - Hold ICS-LABA while in exacerbation  - c/w NC, wean as tolerated, continuous pulse ox  -No new findings on CXR   -Started Symbicort BID   -Pulmonology on board-appreciate further recommendations.

## 2023-12-23 NOTE — PROGRESS NOTE ADULT - ATTENDING COMMENTS
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   Stable SOB/ALTMAN, exam with b/l wheezing L>>R    - s/p 7 day course of levaquin  - steroids increased back to 40mg pred daily 12/22 --> decrease back to 20mg tomorrow per pulm   - plan to dc with duonebs/hypersal, acapella and outpt pulm rehab-script for home nebulizer signed  - outpt repeat CT Chest in 4-6 weeks for HUANG and RUL 9m nodule and then determine need for EBUS and navigational bronch at that time   - s/p AFB sputum x 3 all neg  - anemia workup - c/w HOLLY - start iron qod. needs outpatient GI follow-up for CRC screening if not done recently  - thrombocytosis noted, present in 2016, pt has Hematologist in [place] whom she hasn't seen in years, encouraged outpatient establishment with [facility]. JAK2, MPL, Calreticulin genetic tests ordered for AM which can be followed up as outpatient.   - persistent tachycardia, unclear etiology, ?driven by extensive pulmonary disease. not in distress, pain, or PE. EKG showed sinus tach. On lopressor 25 BID, TSH nml, echo with no obvious abnormalities. No PE on previous CTA. 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   Stable SOB/ALTMAN, exam with b/l wheezing L>>R    - s/p 7 day course of levaquin  - steroids increased back to 40mg pred daily 12/22 --> decrease back to 20mg tomorrow per pulm   - plan to dc with duonebs/hypersal, acapella and outpt pulm rehab-script for home nebulizer signed  - outpt repeat CT Chest in 4-6 weeks for HUANG and RUL 9m nodule and then determine need for EBUS and navigational bronch at that time   - s/p AFB sputum x 3 all neg  - anemia workup - c/w HOLLY - start iron qod. needs outpatient GI follow-up for CRC screening if not done recently  - thrombocytosis noted, present in 2016, pt has Hematologist in [place] whom she hasn't seen in years, encouraged outpatient establishment with [facility]. JAK2, MPL, Calreticulin genetic tests ordered for AM which can be followed up as outpatient.   - persistent tachycardia, unclear etiology, ?driven by extensive pulmonary disease. not in distress, pain, or PE. EKG showed sinus tach. On lopressor 25 BID, TSH nml, echo with no obvious abnormalities. No PE on previous CTA. Cardiology f/u    Possible d/c planning home tomorrow with Home O2 (pt already has) if pulmonary clears

## 2023-12-23 NOTE — PROGRESS NOTE ADULT - ASSESSMENT
69 y/o woman, acute on chronic resp failure hypoxia, copd / bronchiect exac  slow but steady improvement  recommend slow pred taper. today is down to 20  cont symb/spiriva/ alb(levalb she is on), airway clearance  has been tx with levaquin  chronic AC, eliquis

## 2023-12-23 NOTE — PROGRESS NOTE ADULT - SUBJECTIVE AND OBJECTIVE BOX
Interval Events: overall better, still some wheeze c/o and alvarez. cough improved    REVIEW OF SYSTEMS:  Constitutional: No fevers or chills. No weight loss. No fatigue or generalised malaise.  Eyes: No itching or discharge from the eyes  ENT: No ear pain. No ear discharge. No nasal congestion. No post nasal drip. No epistaxis. No throat pain. No sore throat. No difficulty swallowing.   CV: No chest pain. No palpitations. No lightheadedness or dizziness.   Resp:hpi  GI: No nausea. No vomiting. No diarrhea.  MSK: No joint pain or pain in any extremities  Integumentary: No skin lesions. No pedal edema.  Neurological: No gross motor weakness. No sensory changes.  [ ] All other systems negative  [ ] Unable to assess ROS because ________    OBJECTIVE:  ICU Vital Signs Last 24 Hrs  T(C): 36.6 (23 Dec 2023 06:00), Max: 36.9 (22 Dec 2023 21:29)  T(F): 97.8 (23 Dec 2023 06:00), Max: 98.4 (22 Dec 2023 21:29)  HR: 110 (23 Dec 2023 06:00) (86 - 110)  BP: 131/77 (23 Dec 2023 06:00) (123/70 - 136/77)  BP(mean): --  ABP: --  ABP(mean): --  RR: 20 (23 Dec 2023 06:00) (20 - 20)  SpO2: 96% (23 Dec 2023 06:00) (96% - 98%)    O2 Parameters below as of 23 Dec 2023 06:00  Patient On (Oxygen Delivery Method): nasal cannula                PHYSICAL EXAM: sitting in bed, no distress  General: Awake, alert, oriented X 3.   HEENT: Atraumatic, normocephalic.                 Mallampatti Grade                 No nasal congestion.                No tonsillar or pharyngeal exudates.  Lymph Nodes: No palpable lymphadenopathy  Neck: No JVD. No carotid bruit.   Respiratory:       min b/l exp wheez  Cardiovascular: S1 S2 normal. No murmurs, rubs or gallops.   Abdomen: Soft, non-tender, non-distended. No organomegaly.  Extremities: Warm to touch. Peripheral pulse palpable. No pedal edema.   Skin: No rashes or skin lesions  Neurological: Motor and sensory examination equal and normal in all four extremities.  Psychiatry: Appropriate mood and affect.    HOSPITAL MEDICATIONS:  MEDICATIONS  (STANDING):  amLODIPine   Tablet 5 milliGRAM(s) Oral daily  apixaban 5 milliGRAM(s) Oral every 12 hours  budesonide 160 MICROgram(s)/formoterol 4.5 MICROgram(s) Inhaler 2 Puff(s) Inhalation two times a day  buprenorphine 2 mG/naloxone 0.5 mG SL  Tablet 2 Tablet(s) SubLingual <User Schedule>  ferrous    sulfate 325 milliGRAM(s) Oral <User Schedule>  fluticasone propionate 50 MICROgram(s)/spray Nasal Spray 1 Spray(s) Both Nostrils two times a day  guaiFENesin ER 1200 milliGRAM(s) Oral every 12 hours  influenza  Vaccine (HIGH DOSE) 0.7 milliLiter(s) IntraMuscular once  levalbuterol Inhalation 0.31 milliGRAM(s) Inhalation every 6 hours  metoprolol tartrate 25 milliGRAM(s) Oral two times a day  multivitamin 1 Tablet(s) Oral daily  pantoprazole    Tablet 40 milliGRAM(s) Oral before breakfast  polyethylene glycol 3350 17 Gram(s) Oral daily  predniSONE   Tablet 20 milliGRAM(s) Oral once  sodium chloride 3%  Inhalation 4 milliLiter(s) Inhalation every 6 hours  tiotropium 2.5 MICROgram(s) Inhaler 2 Puff(s) Inhalation daily  venlafaxine XR. 75 milliGRAM(s) Oral daily    MEDICATIONS  (PRN):  acetaminophen     Tablet .. 650 milliGRAM(s) Oral every 6 hours PRN Temp greater or equal to 38C (100.4F), Mild Pain (1 - 3), Moderate Pain (4 - 6), Severe Pain (7 - 10)  melatonin 5 milliGRAM(s) Oral at bedtime PRN Insomnia      LABS:                        9.3    20.83 )-----------( 786      ( 23 Dec 2023 06:34 )             31.0     12-23    138  |  93<L>  |  26<H>  ----------------------------<  91  4.3   |  35<H>  |  0.59    Ca    9.8      23 Dec 2023 06:37  Phos  3.5     12-23  Mg     2.3     12-23    TPro  7.1  /  Alb  4.0  /  TBili  <0.1<L>  /  DBili  x   /  AST  13  /  ALT  21  /  AlkPhos  87  12-23      Urinalysis Basic - ( 23 Dec 2023 06:37 )    Color: x / Appearance: x / SG: x / pH: x  Gluc: 91 mg/dL / Ketone: x  / Bili: x / Urobili: x   Blood: x / Protein: x / Nitrite: x   Leuk Esterase: x / RBC: x / WBC x   Sq Epi: x / Non Sq Epi: x / Bacteria: x        Venous Blood Gas:  12-22 @ 20:35  7.43/52/178/34/98.7  VBG Lactate: 2.6      MICROBIOLOGY:     RADIOLOGY:  [ ] Reviewed and interpreted by me    Point of Care Ultrasound Findings:    PFT:    EKG:

## 2023-12-24 LAB
ALBUMIN SERPL ELPH-MCNC: 4 G/DL — SIGNIFICANT CHANGE UP (ref 3.3–5)
ALBUMIN SERPL ELPH-MCNC: 4 G/DL — SIGNIFICANT CHANGE UP (ref 3.3–5)
ALP SERPL-CCNC: 87 U/L — SIGNIFICANT CHANGE UP (ref 40–120)
ALP SERPL-CCNC: 87 U/L — SIGNIFICANT CHANGE UP (ref 40–120)
ALT FLD-CCNC: 22 U/L — SIGNIFICANT CHANGE UP (ref 10–45)
ALT FLD-CCNC: 22 U/L — SIGNIFICANT CHANGE UP (ref 10–45)
ANION GAP SERPL CALC-SCNC: 8 MMOL/L — SIGNIFICANT CHANGE UP (ref 5–17)
ANION GAP SERPL CALC-SCNC: 8 MMOL/L — SIGNIFICANT CHANGE UP (ref 5–17)
AST SERPL-CCNC: 15 U/L — SIGNIFICANT CHANGE UP (ref 10–40)
AST SERPL-CCNC: 15 U/L — SIGNIFICANT CHANGE UP (ref 10–40)
BASE EXCESS BLDV CALC-SCNC: 11.5 MMOL/L — HIGH (ref -2–3)
BASE EXCESS BLDV CALC-SCNC: 11.5 MMOL/L — HIGH (ref -2–3)
BASOPHILS # BLD AUTO: 0.07 K/UL — SIGNIFICANT CHANGE UP (ref 0–0.2)
BASOPHILS # BLD AUTO: 0.07 K/UL — SIGNIFICANT CHANGE UP (ref 0–0.2)
BASOPHILS NFR BLD AUTO: 0.3 % — SIGNIFICANT CHANGE UP (ref 0–2)
BASOPHILS NFR BLD AUTO: 0.3 % — SIGNIFICANT CHANGE UP (ref 0–2)
BILIRUB SERPL-MCNC: <0.1 MG/DL — LOW (ref 0.2–1.2)
BILIRUB SERPL-MCNC: <0.1 MG/DL — LOW (ref 0.2–1.2)
BUN SERPL-MCNC: 34 MG/DL — HIGH (ref 7–23)
BUN SERPL-MCNC: 34 MG/DL — HIGH (ref 7–23)
CA-I SERPL-SCNC: 1.2 MMOL/L — SIGNIFICANT CHANGE UP (ref 1.15–1.33)
CA-I SERPL-SCNC: 1.2 MMOL/L — SIGNIFICANT CHANGE UP (ref 1.15–1.33)
CALCIUM SERPL-MCNC: 9.9 MG/DL — SIGNIFICANT CHANGE UP (ref 8.4–10.5)
CALCIUM SERPL-MCNC: 9.9 MG/DL — SIGNIFICANT CHANGE UP (ref 8.4–10.5)
CHLORIDE BLDV-SCNC: 97 MMOL/L — SIGNIFICANT CHANGE UP (ref 96–108)
CHLORIDE BLDV-SCNC: 97 MMOL/L — SIGNIFICANT CHANGE UP (ref 96–108)
CHLORIDE SERPL-SCNC: 93 MMOL/L — LOW (ref 96–108)
CHLORIDE SERPL-SCNC: 93 MMOL/L — LOW (ref 96–108)
CO2 BLDV-SCNC: 37 MMOL/L — HIGH (ref 22–26)
CO2 BLDV-SCNC: 37 MMOL/L — HIGH (ref 22–26)
CO2 SERPL-SCNC: 37 MMOL/L — HIGH (ref 22–31)
CO2 SERPL-SCNC: 37 MMOL/L — HIGH (ref 22–31)
CREAT SERPL-MCNC: 0.63 MG/DL — SIGNIFICANT CHANGE UP (ref 0.5–1.3)
CREAT SERPL-MCNC: 0.63 MG/DL — SIGNIFICANT CHANGE UP (ref 0.5–1.3)
EGFR: 97 ML/MIN/1.73M2 — SIGNIFICANT CHANGE UP
EGFR: 97 ML/MIN/1.73M2 — SIGNIFICANT CHANGE UP
EOSINOPHIL # BLD AUTO: 0.16 K/UL — SIGNIFICANT CHANGE UP (ref 0–0.5)
EOSINOPHIL # BLD AUTO: 0.16 K/UL — SIGNIFICANT CHANGE UP (ref 0–0.5)
EOSINOPHIL NFR BLD AUTO: 0.7 % — SIGNIFICANT CHANGE UP (ref 0–6)
EOSINOPHIL NFR BLD AUTO: 0.7 % — SIGNIFICANT CHANGE UP (ref 0–6)
GAS PNL BLDV: 133 MMOL/L — LOW (ref 136–145)
GAS PNL BLDV: 133 MMOL/L — LOW (ref 136–145)
GAS PNL BLDV: SIGNIFICANT CHANGE UP
GLUCOSE BLDV-MCNC: 84 MG/DL — SIGNIFICANT CHANGE UP (ref 70–99)
GLUCOSE BLDV-MCNC: 84 MG/DL — SIGNIFICANT CHANGE UP (ref 70–99)
GLUCOSE SERPL-MCNC: 77 MG/DL — SIGNIFICANT CHANGE UP (ref 70–99)
GLUCOSE SERPL-MCNC: 77 MG/DL — SIGNIFICANT CHANGE UP (ref 70–99)
HCO3 BLDV-SCNC: 36 MMOL/L — HIGH (ref 22–29)
HCO3 BLDV-SCNC: 36 MMOL/L — HIGH (ref 22–29)
HCT VFR BLD CALC: 33.3 % — LOW (ref 34.5–45)
HCT VFR BLD CALC: 33.3 % — LOW (ref 34.5–45)
HCT VFR BLDA CALC: 29 % — LOW (ref 34.5–46.5)
HCT VFR BLDA CALC: 29 % — LOW (ref 34.5–46.5)
HGB BLD CALC-MCNC: 9.8 G/DL — LOW (ref 11.7–16.1)
HGB BLD CALC-MCNC: 9.8 G/DL — LOW (ref 11.7–16.1)
HGB BLD-MCNC: 9.8 G/DL — LOW (ref 11.5–15.5)
HGB BLD-MCNC: 9.8 G/DL — LOW (ref 11.5–15.5)
IMM GRANULOCYTES NFR BLD AUTO: 2.1 % — HIGH (ref 0–0.9)
IMM GRANULOCYTES NFR BLD AUTO: 2.1 % — HIGH (ref 0–0.9)
LACTATE BLDV-MCNC: 1.6 MMOL/L — SIGNIFICANT CHANGE UP (ref 0.5–2)
LACTATE BLDV-MCNC: 1.6 MMOL/L — SIGNIFICANT CHANGE UP (ref 0.5–2)
LYMPHOCYTES # BLD AUTO: 13.8 % — SIGNIFICANT CHANGE UP (ref 13–44)
LYMPHOCYTES # BLD AUTO: 13.8 % — SIGNIFICANT CHANGE UP (ref 13–44)
LYMPHOCYTES # BLD AUTO: 3.02 K/UL — SIGNIFICANT CHANGE UP (ref 1–3.3)
LYMPHOCYTES # BLD AUTO: 3.02 K/UL — SIGNIFICANT CHANGE UP (ref 1–3.3)
MAGNESIUM SERPL-MCNC: 2.3 MG/DL — SIGNIFICANT CHANGE UP (ref 1.6–2.6)
MAGNESIUM SERPL-MCNC: 2.3 MG/DL — SIGNIFICANT CHANGE UP (ref 1.6–2.6)
MCHC RBC-ENTMCNC: 24.3 PG — LOW (ref 27–34)
MCHC RBC-ENTMCNC: 24.3 PG — LOW (ref 27–34)
MCHC RBC-ENTMCNC: 29.4 GM/DL — LOW (ref 32–36)
MCHC RBC-ENTMCNC: 29.4 GM/DL — LOW (ref 32–36)
MCV RBC AUTO: 82.6 FL — SIGNIFICANT CHANGE UP (ref 80–100)
MCV RBC AUTO: 82.6 FL — SIGNIFICANT CHANGE UP (ref 80–100)
MONOCYTES # BLD AUTO: 1.78 K/UL — HIGH (ref 0–0.9)
MONOCYTES # BLD AUTO: 1.78 K/UL — HIGH (ref 0–0.9)
MONOCYTES NFR BLD AUTO: 8.1 % — SIGNIFICANT CHANGE UP (ref 2–14)
MONOCYTES NFR BLD AUTO: 8.1 % — SIGNIFICANT CHANGE UP (ref 2–14)
NEUTROPHILS # BLD AUTO: 16.42 K/UL — HIGH (ref 1.8–7.4)
NEUTROPHILS # BLD AUTO: 16.42 K/UL — HIGH (ref 1.8–7.4)
NEUTROPHILS NFR BLD AUTO: 75 % — SIGNIFICANT CHANGE UP (ref 43–77)
NEUTROPHILS NFR BLD AUTO: 75 % — SIGNIFICANT CHANGE UP (ref 43–77)
NRBC # BLD: 0 /100 WBCS — SIGNIFICANT CHANGE UP (ref 0–0)
NRBC # BLD: 0 /100 WBCS — SIGNIFICANT CHANGE UP (ref 0–0)
PCO2 BLDV: 46 MMHG — HIGH (ref 39–42)
PCO2 BLDV: 46 MMHG — HIGH (ref 39–42)
PH BLDV: 7.5 — HIGH (ref 7.32–7.43)
PH BLDV: 7.5 — HIGH (ref 7.32–7.43)
PHOSPHATE SERPL-MCNC: 3.3 MG/DL — SIGNIFICANT CHANGE UP (ref 2.5–4.5)
PHOSPHATE SERPL-MCNC: 3.3 MG/DL — SIGNIFICANT CHANGE UP (ref 2.5–4.5)
PLATELET # BLD AUTO: 826 K/UL — HIGH (ref 150–400)
PLATELET # BLD AUTO: 826 K/UL — HIGH (ref 150–400)
PO2 BLDV: 193 MMHG — HIGH (ref 25–45)
PO2 BLDV: 193 MMHG — HIGH (ref 25–45)
POTASSIUM BLDV-SCNC: 4.5 MMOL/L — SIGNIFICANT CHANGE UP (ref 3.5–5.1)
POTASSIUM BLDV-SCNC: 4.5 MMOL/L — SIGNIFICANT CHANGE UP (ref 3.5–5.1)
POTASSIUM SERPL-MCNC: 4.2 MMOL/L — SIGNIFICANT CHANGE UP (ref 3.5–5.3)
POTASSIUM SERPL-MCNC: 4.2 MMOL/L — SIGNIFICANT CHANGE UP (ref 3.5–5.3)
POTASSIUM SERPL-SCNC: 4.2 MMOL/L — SIGNIFICANT CHANGE UP (ref 3.5–5.3)
POTASSIUM SERPL-SCNC: 4.2 MMOL/L — SIGNIFICANT CHANGE UP (ref 3.5–5.3)
PROT SERPL-MCNC: 7.2 G/DL — SIGNIFICANT CHANGE UP (ref 6–8.3)
PROT SERPL-MCNC: 7.2 G/DL — SIGNIFICANT CHANGE UP (ref 6–8.3)
RBC # BLD: 4.03 M/UL — SIGNIFICANT CHANGE UP (ref 3.8–5.2)
RBC # BLD: 4.03 M/UL — SIGNIFICANT CHANGE UP (ref 3.8–5.2)
RBC # FLD: 17.7 % — HIGH (ref 10.3–14.5)
RBC # FLD: 17.7 % — HIGH (ref 10.3–14.5)
SAO2 % BLDV: 99.7 % — HIGH (ref 67–88)
SAO2 % BLDV: 99.7 % — HIGH (ref 67–88)
SODIUM SERPL-SCNC: 138 MMOL/L — SIGNIFICANT CHANGE UP (ref 135–145)
SODIUM SERPL-SCNC: 138 MMOL/L — SIGNIFICANT CHANGE UP (ref 135–145)
WBC # BLD: 21.9 K/UL — HIGH (ref 3.8–10.5)
WBC # BLD: 21.9 K/UL — HIGH (ref 3.8–10.5)
WBC # FLD AUTO: 21.9 K/UL — HIGH (ref 3.8–10.5)
WBC # FLD AUTO: 21.9 K/UL — HIGH (ref 3.8–10.5)

## 2023-12-24 PROCEDURE — 99232 SBSQ HOSP IP/OBS MODERATE 35: CPT | Mod: GC

## 2023-12-24 PROCEDURE — 93010 ELECTROCARDIOGRAM REPORT: CPT

## 2023-12-24 PROCEDURE — 99233 SBSQ HOSP IP/OBS HIGH 50: CPT

## 2023-12-24 RX ADMIN — LEVALBUTEROL 0.31 MILLIGRAM(S): 1.25 SOLUTION, CONCENTRATE RESPIRATORY (INHALATION) at 05:30

## 2023-12-24 RX ADMIN — AMLODIPINE BESYLATE 5 MILLIGRAM(S): 2.5 TABLET ORAL at 05:29

## 2023-12-24 RX ADMIN — Medication 1 SPRAY(S): at 05:29

## 2023-12-24 RX ADMIN — Medication 25 MILLIGRAM(S): at 05:29

## 2023-12-24 RX ADMIN — LEVALBUTEROL 0.31 MILLIGRAM(S): 1.25 SOLUTION, CONCENTRATE RESPIRATORY (INHALATION) at 12:12

## 2023-12-24 RX ADMIN — Medication 1200 MILLIGRAM(S): at 17:57

## 2023-12-24 RX ADMIN — Medication 1 SPRAY(S): at 18:00

## 2023-12-24 RX ADMIN — Medication 75 MILLIGRAM(S): at 12:12

## 2023-12-24 RX ADMIN — Medication 1200 MILLIGRAM(S): at 05:29

## 2023-12-24 RX ADMIN — TIOTROPIUM BROMIDE 2 PUFF(S): 18 CAPSULE ORAL; RESPIRATORY (INHALATION) at 12:13

## 2023-12-24 RX ADMIN — Medication 25 MILLIGRAM(S): at 17:58

## 2023-12-24 RX ADMIN — Medication 325 MILLIGRAM(S): at 10:06

## 2023-12-24 RX ADMIN — BUPRENORPHINE AND NALOXONE 2 TABLET(S): 2; .5 TABLET SUBLINGUAL at 21:25

## 2023-12-24 RX ADMIN — BUDESONIDE AND FORMOTEROL FUMARATE DIHYDRATE 2 PUFF(S): 160; 4.5 AEROSOL RESPIRATORY (INHALATION) at 17:59

## 2023-12-24 RX ADMIN — SODIUM CHLORIDE 4 MILLILITER(S): 9 INJECTION INTRAMUSCULAR; INTRAVENOUS; SUBCUTANEOUS at 05:29

## 2023-12-24 RX ADMIN — PANTOPRAZOLE SODIUM 40 MILLIGRAM(S): 20 TABLET, DELAYED RELEASE ORAL at 05:28

## 2023-12-24 RX ADMIN — Medication 20 MILLIGRAM(S): at 05:28

## 2023-12-24 RX ADMIN — BUDESONIDE AND FORMOTEROL FUMARATE DIHYDRATE 2 PUFF(S): 160; 4.5 AEROSOL RESPIRATORY (INHALATION) at 05:32

## 2023-12-24 RX ADMIN — APIXABAN 5 MILLIGRAM(S): 2.5 TABLET, FILM COATED ORAL at 05:29

## 2023-12-24 RX ADMIN — APIXABAN 5 MILLIGRAM(S): 2.5 TABLET, FILM COATED ORAL at 17:56

## 2023-12-24 RX ADMIN — SODIUM CHLORIDE 4 MILLILITER(S): 9 INJECTION INTRAMUSCULAR; INTRAVENOUS; SUBCUTANEOUS at 17:58

## 2023-12-24 RX ADMIN — BUPRENORPHINE AND NALOXONE 2 TABLET(S): 2; .5 TABLET SUBLINGUAL at 10:09

## 2023-12-24 RX ADMIN — Medication 1 TABLET(S): at 12:12

## 2023-12-24 RX ADMIN — LEVALBUTEROL 0.31 MILLIGRAM(S): 1.25 SOLUTION, CONCENTRATE RESPIRATORY (INHALATION) at 17:58

## 2023-12-24 RX ADMIN — SODIUM CHLORIDE 4 MILLILITER(S): 9 INJECTION INTRAMUSCULAR; INTRAVENOUS; SUBCUTANEOUS at 12:33

## 2023-12-24 NOTE — PROGRESS NOTE ADULT - ASSESSMENT
acute on chronic respiratory failure with hypoxia, underlying COPD and home 02, bronchiectasis  admitted with exacerbation  completed a course of abx  steroids tapering  symbicort, spiriva, levalb  she is on baseline 02  needs PT, has been deconditioning  also, monitor WBC count. rising despite taper of steroids. afebrile

## 2023-12-24 NOTE — PROGRESS NOTE ADULT - PROBLEM SELECTOR PLAN 2
Etiology unclear- possibly due to underlying infection. CTA negative for PE  -Sinus on repeat EKG   -Continue Lopressor  -TTE- no major abnormalities   -Lower extremity doppler- No DVT  -Cardiology consulted- appreciate further recommendations

## 2023-12-24 NOTE — PROGRESS NOTE ADULT - SUBJECTIVE AND OBJECTIVE BOX
DATE OF SERVICE: 12-24-23 @ 13:51    Patient is a 68y old  Female who presents with a chief complaint of Chronic progressively worsening dyspnea with minimal exertion + coughs (24 Dec 2023 09:37)      INTERVAL HISTORY: no complaints     REVIEW OF SYSTEMS:  CONSTITUTIONAL: No weakness  EYES/ENT: No visual changes;  No throat pain   NECK: No pain or stiffness  RESPIRATORY: No cough, wheezing; No shortness of breath  CARDIOVASCULAR: No chest pain or palpitations  GASTROINTESTINAL: No abdominal  pain. No nausea, vomiting, or hematemesis  GENITOURINARY: No dysuria, frequency or hematuria  NEUROLOGICAL: No stroke like symptoms  SKIN: No rashes      	  MEDICATIONS:  amLODIPine   Tablet 5 milliGRAM(s) Oral daily  metoprolol tartrate 25 milliGRAM(s) Oral two times a day        PHYSICAL EXAM:  T(C): 36.7 (12-24-23 @ 05:30), Max: 37.1 (12-23-23 @ 14:25)  HR: 100 (12-24-23 @ 05:30) (96 - 100)  BP: 148/79 (12-24-23 @ 05:30) (125/76 - 148/79)  RR: 20 (12-24-23 @ 05:30) (20 - 20)  SpO2: 94% (12-24-23 @ 05:30) (94% - 97%)  Wt(kg): --  I&O's Summary    23 Dec 2023 07:01  -  24 Dec 2023 07:00  --------------------------------------------------------  IN: 0 mL / OUT: 1450 mL / NET: -1450 mL          Appearance: In no distress	  HEENT:    PERRL, EOMI	  Cardiovascular:  S1 S2, No JVD  Respiratory: Lungs clear to auscultation	  Gastrointestinal:  Soft, Non-tender, + BS	  Vascularature:  No edema of LE  Psychiatric: Appropriate affect   Neuro: no acute focal deficits                               9.8    21.90 )-----------( 826      ( 24 Dec 2023 06:50 )             33.3     12-24    138  |  93<L>  |  34<H>  ----------------------------<  77  4.2   |  37<H>  |  0.63    Ca    9.9      24 Dec 2023 07:00  Phos  3.3     12-24  Mg     2.3     12-24    TPro  7.2  /  Alb  4.0  /  TBili  <0.1<L>  /  DBili  x   /  AST  15  /  ALT  22  /  AlkPhos  87  12-24        Labs personally reviewed      ASSESSMENT/PLAN: 	        68 yoF w/ PMHx of Protein S deficiency c/b LUE DVT/PE (2019) on Eliquis, COPD (baseline 3L O2, no chronic steroid/abx, no intubation, last admission 2016), HTN, anxiety/depression, remote back surgery on Suboxone. p/w 2 months progressive worsening ALTMAN, fatigue, unintentional weight loss. Likely PNA vs COPD Exacerbation. Patient still persistently tachycardic. Will discuss with Pulm about further treatment options.     1. Sinus Tachycardia  - ECG ST with no ischemic characteristics noted  - TTE with preserved EF, no WMA  - CT neg for PE  - Neg for LE DVT  - Likely 2/2 acute infection  - Cont with Metoprolol 25mg PO BID  - Repeat EKG pending to r/o Atrial fib/Flutter    2. COPD Exacerbation   AFB smear (-) x 3, f/u cultures; follow up quantiferon gold  - Pulm recs appreciated: - Continue Prednisone, Symbicort 160/4.5 BID- AFB smear (-) x 3, f/u cultures, Mucinex  - Supplemental oxygen to goal >88%, remains on 1-2L NC    3. Hx of DVT  - DVT negative  - CT neg for PE  - Dimer pending  - c/w Eliquis 5mg PO BID    4. Hypertension  - BP well controlled on Metoprolol and amlodipine 5mg PO daily           STEPHANIE Feldman DO [facility]  Cardiovascular Medicine  [address], [address]  Office: [phone]  Available via call/text on Microsoft Teams  DATE OF SERVICE: 12-24-23 @ 13:51    Patient is a 68y old  Female who presents with a chief complaint of Chronic progressively worsening dyspnea with minimal exertion + coughs (24 Dec 2023 09:37)      INTERVAL HISTORY: no complaints     REVIEW OF SYSTEMS:  CONSTITUTIONAL: No weakness  EYES/ENT: No visual changes;  No throat pain   NECK: No pain or stiffness  RESPIRATORY: No cough, wheezing; No shortness of breath  CARDIOVASCULAR: No chest pain or palpitations  GASTROINTESTINAL: No abdominal  pain. No nausea, vomiting, or hematemesis  GENITOURINARY: No dysuria, frequency or hematuria  NEUROLOGICAL: No stroke like symptoms  SKIN: No rashes      	  MEDICATIONS:  amLODIPine   Tablet 5 milliGRAM(s) Oral daily  metoprolol tartrate 25 milliGRAM(s) Oral two times a day        PHYSICAL EXAM:  T(C): 36.7 (12-24-23 @ 05:30), Max: 37.1 (12-23-23 @ 14:25)  HR: 100 (12-24-23 @ 05:30) (96 - 100)  BP: 148/79 (12-24-23 @ 05:30) (125/76 - 148/79)  RR: 20 (12-24-23 @ 05:30) (20 - 20)  SpO2: 94% (12-24-23 @ 05:30) (94% - 97%)  Wt(kg): --  I&O's Summary    23 Dec 2023 07:01  -  24 Dec 2023 07:00  --------------------------------------------------------  IN: 0 mL / OUT: 1450 mL / NET: -1450 mL          Appearance: In no distress	  HEENT:    PERRL, EOMI	  Cardiovascular:  S1 S2, No JVD  Respiratory: Lungs clear to auscultation	  Gastrointestinal:  Soft, Non-tender, + BS	  Vascularature:  No edema of LE  Psychiatric: Appropriate affect   Neuro: no acute focal deficits                               9.8    21.90 )-----------( 826      ( 24 Dec 2023 06:50 )             33.3     12-24    138  |  93<L>  |  34<H>  ----------------------------<  77  4.2   |  37<H>  |  0.63    Ca    9.9      24 Dec 2023 07:00  Phos  3.3     12-24  Mg     2.3     12-24    TPro  7.2  /  Alb  4.0  /  TBili  <0.1<L>  /  DBili  x   /  AST  15  /  ALT  22  /  AlkPhos  87  12-24        Labs personally reviewed      ASSESSMENT/PLAN: 	        68 yoF w/ PMHx of Protein S deficiency c/b LUE DVT/PE (2019) on Eliquis, COPD (baseline 3L O2, no chronic steroid/abx, no intubation, last admission 2016), HTN, anxiety/depression, remote back surgery on Suboxone. p/w 2 months progressive worsening ALTMAN, fatigue, unintentional weight loss. Likely PNA vs COPD Exacerbation. Patient still persistently tachycardic. Will discuss with Pulm about further treatment options.     1. Sinus Tachycardia  - ECG ST with no ischemic characteristics noted  - TTE with preserved EF, no WMA  - CT neg for PE  - Neg for LE DVT  - Likely 2/2 acute infection  - Cont with Metoprolol 25mg PO BID  - Repeat EKG pending to r/o Atrial fib/Flutter    2. COPD Exacerbation   AFB smear (-) x 3, f/u cultures; follow up quantiferon gold  - Pulm recs appreciated: - Continue Prednisone, Symbicort 160/4.5 BID- AFB smear (-) x 3, f/u cultures, Mucinex  - Supplemental oxygen to goal >88%, remains on 1-2L NC    3. Hx of DVT  - DVT negative  - CT neg for PE  - Dimer pending  - c/w Eliquis 5mg PO BID    4. Hypertension  - BP well controlled on Metoprolol and amlodipine 5mg PO daily           STEPHANIE Feldman DO [facility]  Cardiovascular Medicine  [address], [address]  Office: [phone]  Available via call/text on Microsoft Teams  DATE OF SERVICE: 12-24-23 @ 13:51    Patient is a 68y old  Female who presents with a chief complaint of Chronic progressively worsening dyspnea with minimal exertion + coughs (24 Dec 2023 09:37)      INTERVAL HISTORY: no complaints     REVIEW OF SYSTEMS:  CONSTITUTIONAL: No weakness  EYES/ENT: No visual changes;  No throat pain   NECK: No pain or stiffness  RESPIRATORY: No cough, wheezing; No shortness of breath  CARDIOVASCULAR: No chest pain or palpitations  GASTROINTESTINAL: No abdominal  pain. No nausea, vomiting, or hematemesis  GENITOURINARY: No dysuria, frequency or hematuria  NEUROLOGICAL: No stroke like symptoms  SKIN: No rashes      	  MEDICATIONS:  amLODIPine   Tablet 5 milliGRAM(s) Oral daily  metoprolol tartrate 25 milliGRAM(s) Oral two times a day        PHYSICAL EXAM:  T(C): 36.7 (12-24-23 @ 05:30), Max: 37.1 (12-23-23 @ 14:25)  HR: 100 (12-24-23 @ 05:30) (96 - 100)  BP: 148/79 (12-24-23 @ 05:30) (125/76 - 148/79)  RR: 20 (12-24-23 @ 05:30) (20 - 20)  SpO2: 94% (12-24-23 @ 05:30) (94% - 97%)  Wt(kg): --  I&O's Summary    23 Dec 2023 07:01  -  24 Dec 2023 07:00  --------------------------------------------------------  IN: 0 mL / OUT: 1450 mL / NET: -1450 mL          Appearance: In no distress	  HEENT:    PERRL, EOMI	  Cardiovascular:  S1 S2, No JVD  Respiratory: Lungs clear to auscultation	  Gastrointestinal:  Soft, Non-tender, + BS	  Vascularature:  No edema of LE  Psychiatric: Appropriate affect   Neuro: no acute focal deficits                               9.8    21.90 )-----------( 826      ( 24 Dec 2023 06:50 )             33.3     12-24    138  |  93<L>  |  34<H>  ----------------------------<  77  4.2   |  37<H>  |  0.63    Ca    9.9      24 Dec 2023 07:00  Phos  3.3     12-24  Mg     2.3     12-24    TPro  7.2  /  Alb  4.0  /  TBili  <0.1<L>  /  DBili  x   /  AST  15  /  ALT  22  /  AlkPhos  87  12-24        Labs personally reviewed      ASSESSMENT/PLAN: 	        68 yoF w/ PMHx of Protein S deficiency c/b LUE DVT/PE (2019) on Eliquis, COPD (baseline 3L O2, no chronic steroid/abx, no intubation, last admission 2016), HTN, anxiety/depression, remote back surgery on Suboxone. p/w 2 months progressive worsening ALTMAN, fatigue, unintentional weight loss. Likely PNA vs COPD Exacerbation. Patient still persistently tachycardic. Will discuss with Pulm about further treatment options.     1. Sinus Tachycardia  - ECG ST with no ischemic characteristics noted  - TTE with preserved EF, no WMA  - CT neg for PE  - Neg for LE DVT  - Likely 2/2 acute infection  - Cont with Metoprolol 25mg PO BID  - Repeat EKG 12/24 NSR     2. COPD Exacerbation   AFB smear (-) x 3, f/u cultures; follow up quantiferon gold  - Pulm recs appreciated: - Continue Prednisone, Symbicort 160/4.5 BID- AFB smear (-) x 3, f/u cultures, Mucinex  - Supplemental oxygen to goal >88%, remains on 1-2L NC    3. Hx of DVT  - DVT negative  - CT neg for PE  - Dimer pending  - c/w Eliquis 5mg PO BID    4. Hypertension  - BP well controlled on Metoprolol and amlodipine 5mg PO daily           STEPHANIE Feldman DO [facility]  Cardiovascular Medicine  [address], [address]  Office: [phone]  Available via call/text on Microsoft Teams  DATE OF SERVICE: 12-24-23 @ 13:51    Patient is a 68y old  Female who presents with a chief complaint of Chronic progressively worsening dyspnea with minimal exertion + coughs (24 Dec 2023 09:37)      INTERVAL HISTORY: no complaints     REVIEW OF SYSTEMS:  CONSTITUTIONAL: No weakness  EYES/ENT: No visual changes;  No throat pain   NECK: No pain or stiffness  RESPIRATORY: No cough, wheezing; No shortness of breath  CARDIOVASCULAR: No chest pain or palpitations  GASTROINTESTINAL: No abdominal  pain. No nausea, vomiting, or hematemesis  GENITOURINARY: No dysuria, frequency or hematuria  NEUROLOGICAL: No stroke like symptoms  SKIN: No rashes      	  MEDICATIONS:  amLODIPine   Tablet 5 milliGRAM(s) Oral daily  metoprolol tartrate 25 milliGRAM(s) Oral two times a day        PHYSICAL EXAM:  T(C): 36.7 (12-24-23 @ 05:30), Max: 37.1 (12-23-23 @ 14:25)  HR: 100 (12-24-23 @ 05:30) (96 - 100)  BP: 148/79 (12-24-23 @ 05:30) (125/76 - 148/79)  RR: 20 (12-24-23 @ 05:30) (20 - 20)  SpO2: 94% (12-24-23 @ 05:30) (94% - 97%)  Wt(kg): --  I&O's Summary    23 Dec 2023 07:01  -  24 Dec 2023 07:00  --------------------------------------------------------  IN: 0 mL / OUT: 1450 mL / NET: -1450 mL          Appearance: In no distress	  HEENT:    PERRL, EOMI	  Cardiovascular:  S1 S2, No JVD  Respiratory: Lungs clear to auscultation	  Gastrointestinal:  Soft, Non-tender, + BS	  Vascularature:  No edema of LE  Psychiatric: Appropriate affect   Neuro: no acute focal deficits                               9.8    21.90 )-----------( 826      ( 24 Dec 2023 06:50 )             33.3     12-24    138  |  93<L>  |  34<H>  ----------------------------<  77  4.2   |  37<H>  |  0.63    Ca    9.9      24 Dec 2023 07:00  Phos  3.3     12-24  Mg     2.3     12-24    TPro  7.2  /  Alb  4.0  /  TBili  <0.1<L>  /  DBili  x   /  AST  15  /  ALT  22  /  AlkPhos  87  12-24        Labs personally reviewed      ASSESSMENT/PLAN: 	        68 yoF w/ PMHx of Protein S deficiency c/b LUE DVT/PE (2019) on Eliquis, COPD (baseline 3L O2, no chronic steroid/abx, no intubation, last admission 2016), HTN, anxiety/depression, remote back surgery on Suboxone. p/w 2 months progressive worsening ALTMAN, fatigue, unintentional weight loss. Likely PNA vs COPD Exacerbation. Patient still persistently tachycardic. Will discuss with Pulm about further treatment options.     1. Sinus Tachycardia  - ECG ST with no ischemic characteristics noted  - TTE with preserved EF, no WMA  - CT neg for PE  - Neg for LE DVT  - Likely 2/2 acute infection  - Cont with Metoprolol 25mg PO BID  - Repeat EKG 12/24 NSR     2. COPD Exacerbation   AFB smear (-) x 3, f/u cultures; follow up quantiferon gold  - Pulm recs appreciated: - Continue Prednisone, Symbicort 160/4.5 BID- AFB smear (-) x 3, f/u cultures, Mucinex  - Supplemental oxygen to goal >88%, remains on 1-2L NC    3. Hx of DVT  - DVT negative  - CT neg for PE  - Dimer pending  - c/w Eliquis 5mg PO BID    4. Hypertension  - BP well controlled on Metoprolol and amlodipine 5mg PO daily           STEPHANIE Feldman DO [facility]  Cardiovascular Medicine  [address], [address]  Office: [phone]  Available via call/text on Microsoft Teams  DATE OF SERVICE: 12-24-23 @ 13:51    Patient is a 68y old  Female who presents with a chief complaint of Chronic progressively worsening dyspnea with minimal exertion + coughs (24 Dec 2023 09:37)      INTERVAL HISTORY: no complaints     REVIEW OF SYSTEMS:  CONSTITUTIONAL: No weakness  EYES/ENT: No visual changes;  No throat pain   NECK: No pain or stiffness  RESPIRATORY: No cough, wheezing; No shortness of breath  CARDIOVASCULAR: No chest pain or palpitations  GASTROINTESTINAL: No abdominal  pain. No nausea, vomiting, or hematemesis  GENITOURINARY: No dysuria, frequency or hematuria  NEUROLOGICAL: No stroke like symptoms  SKIN: No rashes      	  MEDICATIONS:  amLODIPine   Tablet 5 milliGRAM(s) Oral daily  metoprolol tartrate 25 milliGRAM(s) Oral two times a day        PHYSICAL EXAM:  T(C): 36.7 (12-24-23 @ 05:30), Max: 37.1 (12-23-23 @ 14:25)  HR: 100 (12-24-23 @ 05:30) (96 - 100)  BP: 148/79 (12-24-23 @ 05:30) (125/76 - 148/79)  RR: 20 (12-24-23 @ 05:30) (20 - 20)  SpO2: 94% (12-24-23 @ 05:30) (94% - 97%)  Wt(kg): --  I&O's Summary    23 Dec 2023 07:01  -  24 Dec 2023 07:00  --------------------------------------------------------  IN: 0 mL / OUT: 1450 mL / NET: -1450 mL          Appearance: In no distress	  HEENT:    PERRL, EOMI	  Cardiovascular:  S1 S2, No JVD  Respiratory: Lungs clear to auscultation	  Gastrointestinal:  Soft, Non-tender, + BS	  Vascularature:  No edema of LE  Psychiatric: Appropriate affect   Neuro: no acute focal deficits                               9.8    21.90 )-----------( 826      ( 24 Dec 2023 06:50 )             33.3     12-24    138  |  93<L>  |  34<H>  ----------------------------<  77  4.2   |  37<H>  |  0.63    Ca    9.9      24 Dec 2023 07:00  Phos  3.3     12-24  Mg     2.3     12-24    TPro  7.2  /  Alb  4.0  /  TBili  <0.1<L>  /  DBili  x   /  AST  15  /  ALT  22  /  AlkPhos  87  12-24        Labs personally reviewed      ASSESSMENT/PLAN: 	  68 yoF w/ PMHx of Protein S deficiency c/b LUE DVT/PE (2019) on Eliquis, COPD (baseline 3L O2, no chronic steroid/abx, no intubation, last admission 2016), HTN, anxiety/depression, remote back surgery on Suboxone. p/w 2 months progressive worsening ALTMAN, fatigue, unintentional weight loss. Likely PNA vs COPD Exacerbation. Patient still persistently tachycardic. Will discuss with Pulm about further treatment options.     1. Sinus Tachycardia  - ECG ST with no ischemic characteristics noted  - TTE with preserved EF, no WMA  - CT neg for PE  - Neg for LE DVT  - Likely 2/2 acute infection  - Cont with Metoprolol 25mg PO BID  - Repeat EKG 12/24 NSR     2. COPD Exacerbation   AFB smear (-) x 3, f/u cultures; follow up quantiferon gold  - Pulm recs appreciated: - Continue Prednisone, Symbicort 160/4.5 BID- AFB smear (-) x 3, f/u cultures, Mucinex  - Supplemental oxygen to goal >88%, remains on 1-2L NC    3. Hx of DVT  - DVT negative  - CT neg for PE  - Dimer pending  - c/w Eliquis 5mg PO BID    4. Hypertension  - BP well controlled on Metoprolol and amlodipine 5mg PO daily           STEPHANIE Feldman DO [facility]  Cardiovascular Medicine  [address], [address]  Office: [phone]  Available via call/text on Microsoft Teams  DATE OF SERVICE: 12-24-23 @ 13:51    Patient is a 68y old  Female who presents with a chief complaint of Chronic progressively worsening dyspnea with minimal exertion + coughs (24 Dec 2023 09:37)      INTERVAL HISTORY: no complaints     REVIEW OF SYSTEMS:  CONSTITUTIONAL: No weakness  EYES/ENT: No visual changes;  No throat pain   NECK: No pain or stiffness  RESPIRATORY: No cough, wheezing; No shortness of breath  CARDIOVASCULAR: No chest pain or palpitations  GASTROINTESTINAL: No abdominal  pain. No nausea, vomiting, or hematemesis  GENITOURINARY: No dysuria, frequency or hematuria  NEUROLOGICAL: No stroke like symptoms  SKIN: No rashes      	  MEDICATIONS:  amLODIPine   Tablet 5 milliGRAM(s) Oral daily  metoprolol tartrate 25 milliGRAM(s) Oral two times a day        PHYSICAL EXAM:  T(C): 36.7 (12-24-23 @ 05:30), Max: 37.1 (12-23-23 @ 14:25)  HR: 100 (12-24-23 @ 05:30) (96 - 100)  BP: 148/79 (12-24-23 @ 05:30) (125/76 - 148/79)  RR: 20 (12-24-23 @ 05:30) (20 - 20)  SpO2: 94% (12-24-23 @ 05:30) (94% - 97%)  Wt(kg): --  I&O's Summary    23 Dec 2023 07:01  -  24 Dec 2023 07:00  --------------------------------------------------------  IN: 0 mL / OUT: 1450 mL / NET: -1450 mL          Appearance: In no distress	  HEENT:    PERRL, EOMI	  Cardiovascular:  S1 S2, No JVD  Respiratory: Lungs clear to auscultation	  Gastrointestinal:  Soft, Non-tender, + BS	  Vascularature:  No edema of LE  Psychiatric: Appropriate affect   Neuro: no acute focal deficits                               9.8    21.90 )-----------( 826      ( 24 Dec 2023 06:50 )             33.3     12-24    138  |  93<L>  |  34<H>  ----------------------------<  77  4.2   |  37<H>  |  0.63    Ca    9.9      24 Dec 2023 07:00  Phos  3.3     12-24  Mg     2.3     12-24    TPro  7.2  /  Alb  4.0  /  TBili  <0.1<L>  /  DBili  x   /  AST  15  /  ALT  22  /  AlkPhos  87  12-24        Labs personally reviewed      ASSESSMENT/PLAN: 	  68 yoF w/ PMHx of Protein S deficiency c/b LUE DVT/PE (2019) on Eliquis, COPD (baseline 3L O2, no chronic steroid/abx, no intubation, last admission 2016), HTN, anxiety/depression, remote back surgery on Suboxone. p/w 2 months progressive worsening ALTMAN, fatigue, unintentional weight loss. Likely PNA vs COPD Exacerbation. Patient still persistently tachycardic. Will discuss with Pulm about further treatment options.     1. Sinus Tachycardia  - ECG ST with no ischemic characteristics noted  - TTE with preserved EF, no WMA  - CT neg for PE  - Neg for LE DVT  - Likely 2/2 acute infection  - Cont with Metoprolol 25mg PO BID  - Repeat EKG 12/24 NSR     2. COPD Exacerbation   AFB smear (-) x 3, f/u cultures; follow up quantiferon gold  - Pulm recs appreciated: - Continue Prednisone, Symbicort 160/4.5 BID- AFB smear (-) x 3, f/u cultures, Mucinex  - Supplemental oxygen to goal >88%, remains on 1-2L NC    3. Hx of DVT  - DVT negative  - CT neg for PE  - Dimer pending  - c/w Eliquis 5mg PO BID    4. Hypertension  - BP well controlled on Metoprolol and amlodipine 5mg PO daily           STEPHANIE Feldman DO [facility]  Cardiovascular Medicine  [address], [address]  Office: [phone]  Available via call/text on Microsoft Teams

## 2023-12-24 NOTE — PROGRESS NOTE ADULT - SUBJECTIVE AND OBJECTIVE BOX
PROGRESS NOTE:   Patient is a 68y old  Female who presents with a chief complaint of Chronic progressively worsening dyspnea with minimal exertion + coughs (22 Dec 2023 16:00)      SUBJECTIVE / OVERNIGHT EVENTS:  No acute events overnight. Patient was seen and examined at bedside and did not appear to be in any acute distress. She reports that her dyspnea and cough have been stable. Denies any chest pain or palpitations.       MEDICATIONS  (STANDING):  albuterol/ipratropium for Nebulization 3 milliLiter(s) Nebulizer every 6 hours  amLODIPine   Tablet 5 milliGRAM(s) Oral daily  apixaban 5 milliGRAM(s) Oral every 12 hours  budesonide 160 MICROgram(s)/formoterol 4.5 MICROgram(s) Inhaler 2 Puff(s) Inhalation two times a day  buprenorphine 2 mG/naloxone 0.5 mG SL  Tablet 2 Tablet(s) SubLingual <User Schedule>  ferrous    sulfate 325 milliGRAM(s) Oral <User Schedule>  fluticasone propionate 50 MICROgram(s)/spray Nasal Spray 1 Spray(s) Both Nostrils two times a day  guaiFENesin ER 1200 milliGRAM(s) Oral every 12 hours  influenza  Vaccine (HIGH DOSE) 0.7 milliLiter(s) IntraMuscular once  metoprolol tartrate 25 milliGRAM(s) Oral two times a day  multivitamin 1 Tablet(s) Oral daily  pantoprazole    Tablet 40 milliGRAM(s) Oral before breakfast  polyethylene glycol 3350 17 Gram(s) Oral daily  predniSONE   Tablet 20 milliGRAM(s) Oral once  sodium chloride 3%  Inhalation 4 milliLiter(s) Inhalation every 6 hours  tiotropium 2.5 MICROgram(s) Inhaler 2 Puff(s) Inhalation daily  venlafaxine XR. 75 milliGRAM(s) Oral daily    MEDICATIONS  (PRN):  acetaminophen     Tablet .. 650 milliGRAM(s) Oral every 6 hours PRN Temp greater or equal to 38C (100.4F), Mild Pain (1 - 3), Moderate Pain (4 - 6), Severe Pain (7 - 10)  melatonin 5 milliGRAM(s) Oral at bedtime PRN Insomnia      CAPILLARY BLOOD GLUCOSE        I&O's Summary      PHYSICAL EXAM:  Vital Signs Last 24 Hrs  T(C): 36.7 (24 Dec 2023 05:30), Max: 37.1 (23 Dec 2023 14:25)  T(F): 98.1 (24 Dec 2023 05:30), Max: 98.8 (23 Dec 2023 14:25)  HR: 100 (24 Dec 2023 05:30) (96 - 100)  BP: 148/79 (24 Dec 2023 05:30) (125/76 - 148/79)  BP(mean): --  RR: 20 (24 Dec 2023 05:30) (20 - 20)  SpO2: 94% (24 Dec 2023 05:30) (94% - 97%)    Parameters below as of 24 Dec 2023 05:30  Patient On (Oxygen Delivery Method): nasal cannula  O2 Flow (L/min): 2            GENERAL: No apparent distress.   HEAD:  Atraumatic, Normocephalic  EYES: EOMI, PERRLA, conjunctiva and sclera clear  NECK: Supple, no lymphadenopathy, no elevated JVP  CHEST/LUNG: B/L expiratory wheeze prominent in upper lobes.   HEART: S1 and S2 normal. Regular rate and rhythm; No murmurs, rubs, or gallops  ABDOMEN: Soft, non-tender, non-distended; normal bowel sounds  EXTREMITIES:  2+ peripheral pulses b/l, No clubbing, cyanosis, or edema  NEUROLOGY: A&O x 3, no focal deficits  SKIN: No rashes or lesions    LABS:                                   9.8    21.90 )-----------( 826      ( 24 Dec 2023 06:50 )             33.3     12-23    138  |  93<L>  |  26<H>  ----------------------------<  91  4.3   |  35<H>  |  0.59    Ca    9.8      23 Dec 2023 06:37  Phos  3.5     12-23  Mg     2.3     12-23    TPro  7.1  /  Alb  4.0  /  TBili  <0.1<L>  /  DBili  x   /  AST  13  /  ALT  21  /  AlkPhos  87  12-23          Urinalysis Basic - ( 23 Dec 2023 06:37 )    Color: x / Appearance: x / SG: x / pH: x  Gluc: 91 mg/dL / Ketone: x  / Bili: x / Urobili: x   Blood: x / Protein: x / Nitrite: x   Leuk Esterase: x / RBC: x / WBC x   Sq Epi: x / Non Sq Epi: x / Bacteria: x        Culture - Blood (collected 20 Dec 2023 07:26)  Source: .Blood Blood-Peripheral  Preliminary Report (22 Dec 2023 11:01):    No growth at 48 Hours    Culture - Blood (collected 20 Dec 2023 07:26)  Source: .Blood Blood-Peripheral  Preliminary Report (22 Dec 2023 11:01):    No growth at 48 Hours        RADIOLOGY & ADDITIONAL TESTS:  Lab Results Reviewed   Imaging Reviewed  Electrocardiogram Reviewed   PROGRESS NOTE:   Patient is a 68y old  Female who presents with a chief complaint of Chronic progressively worsening dyspnea with minimal exertion + coughs (22 Dec 2023 16:00)      SUBJECTIVE / OVERNIGHT EVENTS:  No acute events overnight. Patient was seen and examined at bedside and did not appear to be in any acute distress. She reports that her dyspnea and cough have been stable. Denies any chest pain or palpitations.       MEDICATIONS  (STANDING):  albuterol/ipratropium for Nebulization 3 milliLiter(s) Nebulizer every 6 hours  amLODIPine   Tablet 5 milliGRAM(s) Oral daily  apixaban 5 milliGRAM(s) Oral every 12 hours  budesonide 160 MICROgram(s)/formoterol 4.5 MICROgram(s) Inhaler 2 Puff(s) Inhalation two times a day  buprenorphine 2 mG/naloxone 0.5 mG SL  Tablet 2 Tablet(s) SubLingual <User Schedule>  ferrous    sulfate 325 milliGRAM(s) Oral <User Schedule>  fluticasone propionate 50 MICROgram(s)/spray Nasal Spray 1 Spray(s) Both Nostrils two times a day  guaiFENesin ER 1200 milliGRAM(s) Oral every 12 hours  influenza  Vaccine (HIGH DOSE) 0.7 milliLiter(s) IntraMuscular once  metoprolol tartrate 25 milliGRAM(s) Oral two times a day  multivitamin 1 Tablet(s) Oral daily  pantoprazole    Tablet 40 milliGRAM(s) Oral before breakfast  polyethylene glycol 3350 17 Gram(s) Oral daily  predniSONE   Tablet 20 milliGRAM(s) Oral once  sodium chloride 3%  Inhalation 4 milliLiter(s) Inhalation every 6 hours  tiotropium 2.5 MICROgram(s) Inhaler 2 Puff(s) Inhalation daily  venlafaxine XR. 75 milliGRAM(s) Oral daily    MEDICATIONS  (PRN):  acetaminophen     Tablet .. 650 milliGRAM(s) Oral every 6 hours PRN Temp greater or equal to 38C (100.4F), Mild Pain (1 - 3), Moderate Pain (4 - 6), Severe Pain (7 - 10)  melatonin 5 milliGRAM(s) Oral at bedtime PRN Insomnia      CAPILLARY BLOOD GLUCOSE        I&O's Summary      PHYSICAL EXAM:  Vital Signs Last 24 Hrs  T(C): 36.7 (24 Dec 2023 05:30), Max: 37.1 (23 Dec 2023 14:25)  T(F): 98.1 (24 Dec 2023 05:30), Max: 98.8 (23 Dec 2023 14:25)  HR: 100 (24 Dec 2023 05:30) (96 - 100)  BP: 148/79 (24 Dec 2023 05:30) (125/76 - 148/79)  BP(mean): --  RR: 20 (24 Dec 2023 05:30) (20 - 20)  SpO2: 94% (24 Dec 2023 05:30) (94% - 97%)    Parameters below as of 24 Dec 2023 05:30  Patient On (Oxygen Delivery Method): nasal cannula  O2 Flow (L/min): 2            GENERAL: No apparent distress.   HEAD:  Atraumatic, Normocephalic  EYES: EOMI, PERRLA, conjunctiva and sclera clear  NECK: Supple, no lymphadenopathy, no elevated JVP  CHEST/LUNG: B/L expiratory wheeze prominent in upper lobes.   HEART: S1 and S2 normal. Regular rate and rhythm; No murmurs, rubs, or gallops  ABDOMEN: Soft, non-tender, non-distended; normal bowel sounds  EXTREMITIES:  2+ peripheral pulses b/l, No clubbing, cyanosis, or edema  NEUROLOGY: A&O x 3, no focal deficits  SKIN: No rashes or lesions    LABS:                                   9.8    21.90 )-----------( 826      ( 24 Dec 2023 06:50 )             33.3     12-24    138  |  93<L>  |  34<H>  ----------------------------<  77  4.2   |  37<H>  |  0.63    Ca    9.9      24 Dec 2023 07:00  Phos  3.3     12-24  Mg     2.3     12-24    TPro  7.2  /  Alb  4.0  /  TBili  <0.1<L>  /  DBili  x   /  AST  15  /  ALT  22  /  AlkPhos  87  12-24            Urinalysis Basic - ( 23 Dec 2023 06:37 )    Color: x / Appearance: x / SG: x / pH: x  Gluc: 91 mg/dL / Ketone: x  / Bili: x / Urobili: x   Blood: x / Protein: x / Nitrite: x   Leuk Esterase: x / RBC: x / WBC x   Sq Epi: x / Non Sq Epi: x / Bacteria: x        Culture - Blood (collected 20 Dec 2023 07:26)  Source: .Blood Blood-Peripheral  Preliminary Report (22 Dec 2023 11:01):    No growth at 48 Hours    Culture - Blood (collected 20 Dec 2023 07:26)  Source: .Blood Blood-Peripheral  Preliminary Report (22 Dec 2023 11:01):    No growth at 48 Hours        RADIOLOGY & ADDITIONAL TESTS:  Lab Results Reviewed   Imaging Reviewed  Electrocardiogram Reviewed

## 2023-12-24 NOTE — PROGRESS NOTE ADULT - PROBLEM SELECTOR PLAN 9
DVT ppx: Eliquis 5 mg BID, ambulation  Diet: regular, DASH/TLC  Dispo: pending course DVT ppx: Eliquis 5 mg BID, ambulation  Diet: regular, DASH/TLC  Dispo:dc planning home PT

## 2023-12-24 NOTE — PROGRESS NOTE ADULT - ATTENDING COMMENTS
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   Stable SOB/ALTMAN, exam with b/l wheezing L>>R    - s/p 7 day course of levaquin  - steroids increased back to 40mg pred daily 12/22 --> decreased back to 20mg (12/24--)  - plan to dc with duonebs/hypersal, acapella and outpt pulm rehab-script for home nebulizer signed  - outpt repeat CT Chest in 4-6 weeks for HUANG and RUL 9m nodule and then determine need for EBUS and navigational bronch at that time   - s/p AFB sputum x 3 all neg  - anemia workup - c/w HOLLY - start iron qod. needs outpatient GI follow-up for CRC screening if not done recently  - thrombocytosis noted, present in 2016, pt has Hematologist in [place] whom she hasn't seen in years, encouraged outpatient establishment with [facility]. JAK2, MPL, Calreticulin genetic tests ordered for AM which can be followed up as outpatient.   - persistent tachycardia, unclear etiology, ?driven by extensive pulmonary disease. not in distress, pain, or PE. EKG showed sinus tach. On lopressor 25 BID, TSH nml, echo with no obvious abnormalities. No PE on previous CTA. Cardiology f/u    Will monitor WBC count now that steroids are being tapered, if stable/improved, will hopefully plan for d/c tomorrow with home O2 68F protein S deficiency, multiple PE/DVT on eliquis, COPD (on home 3L), former smoker, bronchiectasis presenting with dyspnea, cough, weight loss. 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   Stable SOB/ALTMAN, exam with b/l wheezing L>>R    - s/p 7 day course of levaquin  - steroids increased back to 40mg pred daily 12/22 --> decreased back to 20mg (12/24--)  - plan to dc with duonebs/hypersal, acapella and outpt pulm rehab-script for home nebulizer signed  - outpt repeat CT Chest in 4-6 weeks for HUANG and RUL 9m nodule and then determine need for EBUS and navigational bronch at that time   - s/p AFB sputum x 3 all neg  - anemia workup - c/w HOLLY - start iron qod. needs outpatient GI follow-up for CRC screening if not done recently  - thrombocytosis noted, present in 2016, pt has Hematologist in [place] whom she hasn't seen in years, encouraged outpatient establishment with [facility]. JAK2, MPL, Calreticulin genetic tests ordered for AM which can be followed up as outpatient.   - persistent tachycardia, unclear etiology, ?driven by extensive pulmonary disease. not in distress, pain, or PE. EKG showed sinus tach. On lopressor 25 BID, TSH nml, echo with no obvious abnormalities. No PE on previous CTA. Cardiology f/u    Will monitor WBC count now that steroids are being tapered, if stable/improved, will hopefully plan for d/c tomorrow with home O2

## 2023-12-24 NOTE — PROGRESS NOTE ADULT - SUBJECTIVE AND OBJECTIVE BOX
REVIEW OF SYSTEMS: breathless after walking to and from the [place]  Constitutional: No fevers or chills. No weight loss. No fatigue or generalised malaise.  Eyes: No itching or discharge from the eyes  ENT: No ear pain. No ear discharge. No nasal congestion. No post nasal drip. No epistaxis. No throat pain. No sore throat. No difficulty swallowing.   CV: No chest pain. No palpitations. No lightheadedness or dizziness.   Resp: No dyspnea at rest. No dyspnea on exertion. No orthopnea. No wheezing. No cough. No stridor. No sputum production. No chest pain with respiration.  GI: No nausea. No vomiting. No diarrhea.  MSK: No joint pain or pain in any extremities  Integumentary: No skin lesions. No pedal edema.  Neurological: No gross motor weakness. No sensory changes.  [ x] All other systems negative  [ ] Unable to assess ROS because ________    OBJECTIVE:  ICU Vital Signs Last 24 Hrs  T(C): 36.7 (24 Dec 2023 05:30), Max: 37.1 (23 Dec 2023 14:25)  T(F): 98.1 (24 Dec 2023 05:30), Max: 98.8 (23 Dec 2023 14:25)  HR: 100 (24 Dec 2023 05:30) (96 - 100)  BP: 148/79 (24 Dec 2023 05:30) (125/76 - 148/79)  BP(mean): --  ABP: --  ABP(mean): --  RR: 20 (24 Dec 2023 05:30) (20 - 20)  SpO2: 94% (24 Dec 2023 05:30) (94% - 97%)    O2 Parameters below as of 24 Dec 2023 05:30  Patient On (Oxygen Delivery Method): nasal cannula  O2 Flow (L/min): 2            12-23 @ 07:01  -  12-24 @ 07:00  --------------------------------------------------------  IN: 0 mL / OUT: 1450 mL / NET: -1450 mL      CAPILLARY BLOOD GLUCOSE          PHYSICAL EXAM:  General: Awake, alert, oriented X 3.   HEENT: Atraumatic, normocephalic.                 Mallampatti Grade                 No nasal congestion.                No tonsillar or pharyngeal exudates.  Lymph Nodes: No palpable lymphadenopathy  Neck: No JVD. No carotid bruit.   Respiratory: Normal chest expansion                         Normal percussion                         Normal and equal air entry                         No wheeze, rhonchi or rales.  Cardiovascular: S1 S2 normal. No murmurs, rubs or gallops.   Abdomen: Soft, non-tender, non-distended. No organomegaly.  Extremities: Warm to touch. Peripheral pulse palpable. No pedal edema.   Skin: No rashes or skin lesions  Neurological: Motor and sensory examination equal and normal in all four extremities. resting tremor  Psychiatry: Appropriate mood and affect.    HOSPITAL MEDICATIONS:  MEDICATIONS  (STANDING):  amLODIPine   Tablet 5 milliGRAM(s) Oral daily  apixaban 5 milliGRAM(s) Oral every 12 hours  budesonide 160 MICROgram(s)/formoterol 4.5 MICROgram(s) Inhaler 2 Puff(s) Inhalation two times a day  buprenorphine 2 mG/naloxone 0.5 mG SL  Tablet 2 Tablet(s) SubLingual <User Schedule>  ferrous    sulfate 325 milliGRAM(s) Oral <User Schedule>  fluticasone propionate 50 MICROgram(s)/spray Nasal Spray 1 Spray(s) Both Nostrils two times a day  guaiFENesin ER 1200 milliGRAM(s) Oral every 12 hours  influenza  Vaccine (HIGH DOSE) 0.7 milliLiter(s) IntraMuscular once  levalbuterol Inhalation 0.31 milliGRAM(s) Inhalation every 6 hours  metoprolol tartrate 25 milliGRAM(s) Oral two times a day  multivitamin 1 Tablet(s) Oral daily  pantoprazole    Tablet 40 milliGRAM(s) Oral before breakfast  polyethylene glycol 3350 17 Gram(s) Oral daily  predniSONE   Tablet 20 milliGRAM(s) Oral daily  sodium chloride 3%  Inhalation 4 milliLiter(s) Inhalation every 6 hours  tiotropium 2.5 MICROgram(s) Inhaler 2 Puff(s) Inhalation daily  venlafaxine XR. 75 milliGRAM(s) Oral daily    MEDICATIONS  (PRN):  acetaminophen     Tablet .. 650 milliGRAM(s) Oral every 6 hours PRN Temp greater or equal to 38C (100.4F), Mild Pain (1 - 3), Moderate Pain (4 - 6), Severe Pain (7 - 10)  melatonin 5 milliGRAM(s) Oral at bedtime PRN Insomnia      LABS:                        9.8    21.90 )-----------( 826      ( 24 Dec 2023 06:50 )             33.3     12-24    138  |  93<L>  |  34<H>  ----------------------------<  77  4.2   |  37<H>  |  0.63    Ca    9.9      24 Dec 2023 07:00  Phos  3.3     12-24  Mg     2.3     12-24    TPro  7.2  /  Alb  4.0  /  TBili  <0.1<L>  /  DBili  x   /  AST  15  /  ALT  22  /  AlkPhos  87  12-24      Urinalysis Basic - ( 24 Dec 2023 07:00 )    Color: x / Appearance: x / SG: x / pH: x  Gluc: 77 mg/dL / Ketone: x  / Bili: x / Urobili: x   Blood: x / Protein: x / Nitrite: x   Leuk Esterase: x / RBC: x / WBC x   Sq Epi: x / Non Sq Epi: x / Bacteria: x        Venous Blood Gas:  12-24 @ 06:17  7.50/46/193/36/99.7  VBG Lactate: 1.6  Venous Blood Gas:  12-23 @ 12:17  7.40/56/70/35/93.8  VBG Lactate: 1.8  Venous Blood Gas:  12-22 @ 20:35  7.43/52/178/34/98.7  VBG Lactate: 2.6

## 2023-12-24 NOTE — PROVIDER CONTACT NOTE (OTHER) - ACTION/TREATMENT ORDERED:
Caitie Mahajan notified via teams, no new orders at this time
MD duque stated the patient is tachycardic at baseline continue to monitor

## 2023-12-25 LAB
ALBUMIN SERPL ELPH-MCNC: 4 G/DL — SIGNIFICANT CHANGE UP (ref 3.3–5)
ALBUMIN SERPL ELPH-MCNC: 4 G/DL — SIGNIFICANT CHANGE UP (ref 3.3–5)
ALP SERPL-CCNC: 90 U/L — SIGNIFICANT CHANGE UP (ref 40–120)
ALP SERPL-CCNC: 90 U/L — SIGNIFICANT CHANGE UP (ref 40–120)
ALT FLD-CCNC: 30 U/L — SIGNIFICANT CHANGE UP (ref 10–45)
ALT FLD-CCNC: 30 U/L — SIGNIFICANT CHANGE UP (ref 10–45)
ANION GAP SERPL CALC-SCNC: 10 MMOL/L — SIGNIFICANT CHANGE UP (ref 5–17)
ANION GAP SERPL CALC-SCNC: 10 MMOL/L — SIGNIFICANT CHANGE UP (ref 5–17)
AST SERPL-CCNC: 19 U/L — SIGNIFICANT CHANGE UP (ref 10–40)
AST SERPL-CCNC: 19 U/L — SIGNIFICANT CHANGE UP (ref 10–40)
BASE EXCESS BLDV CALC-SCNC: 7.3 MMOL/L — HIGH (ref -2–3)
BASE EXCESS BLDV CALC-SCNC: 7.3 MMOL/L — HIGH (ref -2–3)
BASOPHILS # BLD AUTO: 0.08 K/UL — SIGNIFICANT CHANGE UP (ref 0–0.2)
BASOPHILS # BLD AUTO: 0.08 K/UL — SIGNIFICANT CHANGE UP (ref 0–0.2)
BASOPHILS NFR BLD AUTO: 0.4 % — SIGNIFICANT CHANGE UP (ref 0–2)
BASOPHILS NFR BLD AUTO: 0.4 % — SIGNIFICANT CHANGE UP (ref 0–2)
BILIRUB SERPL-MCNC: 0.2 MG/DL — SIGNIFICANT CHANGE UP (ref 0.2–1.2)
BILIRUB SERPL-MCNC: 0.2 MG/DL — SIGNIFICANT CHANGE UP (ref 0.2–1.2)
BUN SERPL-MCNC: 35 MG/DL — HIGH (ref 7–23)
BUN SERPL-MCNC: 35 MG/DL — HIGH (ref 7–23)
CA-I SERPL-SCNC: 1.2 MMOL/L — SIGNIFICANT CHANGE UP (ref 1.15–1.33)
CA-I SERPL-SCNC: 1.2 MMOL/L — SIGNIFICANT CHANGE UP (ref 1.15–1.33)
CALCIUM SERPL-MCNC: 9.1 MG/DL — SIGNIFICANT CHANGE UP (ref 8.4–10.5)
CALCIUM SERPL-MCNC: 9.1 MG/DL — SIGNIFICANT CHANGE UP (ref 8.4–10.5)
CHLORIDE BLDV-SCNC: 96 MMOL/L — SIGNIFICANT CHANGE UP (ref 96–108)
CHLORIDE BLDV-SCNC: 96 MMOL/L — SIGNIFICANT CHANGE UP (ref 96–108)
CHLORIDE SERPL-SCNC: 94 MMOL/L — LOW (ref 96–108)
CHLORIDE SERPL-SCNC: 94 MMOL/L — LOW (ref 96–108)
CO2 BLDV-SCNC: 36 MMOL/L — HIGH (ref 22–26)
CO2 BLDV-SCNC: 36 MMOL/L — HIGH (ref 22–26)
CO2 SERPL-SCNC: 30 MMOL/L — SIGNIFICANT CHANGE UP (ref 22–31)
CO2 SERPL-SCNC: 30 MMOL/L — SIGNIFICANT CHANGE UP (ref 22–31)
CREAT SERPL-MCNC: 0.66 MG/DL — SIGNIFICANT CHANGE UP (ref 0.5–1.3)
CREAT SERPL-MCNC: 0.66 MG/DL — SIGNIFICANT CHANGE UP (ref 0.5–1.3)
CULTURE RESULTS: SIGNIFICANT CHANGE UP
EGFR: 96 ML/MIN/1.73M2 — SIGNIFICANT CHANGE UP
EGFR: 96 ML/MIN/1.73M2 — SIGNIFICANT CHANGE UP
EOSINOPHIL # BLD AUTO: 0.27 K/UL — SIGNIFICANT CHANGE UP (ref 0–0.5)
EOSINOPHIL # BLD AUTO: 0.27 K/UL — SIGNIFICANT CHANGE UP (ref 0–0.5)
EOSINOPHIL NFR BLD AUTO: 1.2 % — SIGNIFICANT CHANGE UP (ref 0–6)
EOSINOPHIL NFR BLD AUTO: 1.2 % — SIGNIFICANT CHANGE UP (ref 0–6)
GAMMA INTERFERON BACKGROUND BLD IA-ACNC: 0.01 IU/ML — SIGNIFICANT CHANGE UP
GAMMA INTERFERON BACKGROUND BLD IA-ACNC: 0.01 IU/ML — SIGNIFICANT CHANGE UP
GAS PNL BLDV: 133 MMOL/L — LOW (ref 136–145)
GAS PNL BLDV: 133 MMOL/L — LOW (ref 136–145)
GAS PNL BLDV: SIGNIFICANT CHANGE UP
GLUCOSE BLDV-MCNC: 151 MG/DL — HIGH (ref 70–99)
GLUCOSE BLDV-MCNC: 151 MG/DL — HIGH (ref 70–99)
GLUCOSE SERPL-MCNC: 142 MG/DL — HIGH (ref 70–99)
GLUCOSE SERPL-MCNC: 142 MG/DL — HIGH (ref 70–99)
HCO3 BLDV-SCNC: 34 MMOL/L — HIGH (ref 22–29)
HCO3 BLDV-SCNC: 34 MMOL/L — HIGH (ref 22–29)
HCT VFR BLD CALC: 34.5 % — SIGNIFICANT CHANGE UP (ref 34.5–45)
HCT VFR BLD CALC: 34.5 % — SIGNIFICANT CHANGE UP (ref 34.5–45)
HCT VFR BLDA CALC: 32 % — LOW (ref 34.5–46.5)
HCT VFR BLDA CALC: 32 % — LOW (ref 34.5–46.5)
HGB BLD CALC-MCNC: 10.6 G/DL — LOW (ref 11.7–16.1)
HGB BLD CALC-MCNC: 10.6 G/DL — LOW (ref 11.7–16.1)
HGB BLD-MCNC: 10.2 G/DL — LOW (ref 11.5–15.5)
HGB BLD-MCNC: 10.2 G/DL — LOW (ref 11.5–15.5)
IMM GRANULOCYTES NFR BLD AUTO: 2 % — HIGH (ref 0–0.9)
IMM GRANULOCYTES NFR BLD AUTO: 2 % — HIGH (ref 0–0.9)
LACTATE BLDV-MCNC: 1.6 MMOL/L — SIGNIFICANT CHANGE UP (ref 0.5–2)
LACTATE BLDV-MCNC: 1.6 MMOL/L — SIGNIFICANT CHANGE UP (ref 0.5–2)
LYMPHOCYTES # BLD AUTO: 1.39 K/UL — SIGNIFICANT CHANGE UP (ref 1–3.3)
LYMPHOCYTES # BLD AUTO: 1.39 K/UL — SIGNIFICANT CHANGE UP (ref 1–3.3)
LYMPHOCYTES # BLD AUTO: 6.4 % — LOW (ref 13–44)
LYMPHOCYTES # BLD AUTO: 6.4 % — LOW (ref 13–44)
M TB IFN-G BLD-IMP: ABNORMAL
M TB IFN-G BLD-IMP: ABNORMAL
M TB IFN-G CD4+ BCKGRND COR BLD-ACNC: 0 IU/ML — SIGNIFICANT CHANGE UP
M TB IFN-G CD4+ BCKGRND COR BLD-ACNC: 0 IU/ML — SIGNIFICANT CHANGE UP
M TB IFN-G CD4+CD8+ BCKGRND COR BLD-ACNC: 0 IU/ML — SIGNIFICANT CHANGE UP
M TB IFN-G CD4+CD8+ BCKGRND COR BLD-ACNC: 0 IU/ML — SIGNIFICANT CHANGE UP
MAGNESIUM SERPL-MCNC: 2.3 MG/DL — SIGNIFICANT CHANGE UP (ref 1.6–2.6)
MAGNESIUM SERPL-MCNC: 2.3 MG/DL — SIGNIFICANT CHANGE UP (ref 1.6–2.6)
MCHC RBC-ENTMCNC: 24.1 PG — LOW (ref 27–34)
MCHC RBC-ENTMCNC: 24.1 PG — LOW (ref 27–34)
MCHC RBC-ENTMCNC: 29.6 GM/DL — LOW (ref 32–36)
MCHC RBC-ENTMCNC: 29.6 GM/DL — LOW (ref 32–36)
MCV RBC AUTO: 81.6 FL — SIGNIFICANT CHANGE UP (ref 80–100)
MCV RBC AUTO: 81.6 FL — SIGNIFICANT CHANGE UP (ref 80–100)
MONOCYTES # BLD AUTO: 0.54 K/UL — SIGNIFICANT CHANGE UP (ref 0–0.9)
MONOCYTES # BLD AUTO: 0.54 K/UL — SIGNIFICANT CHANGE UP (ref 0–0.9)
MONOCYTES NFR BLD AUTO: 2.5 % — SIGNIFICANT CHANGE UP (ref 2–14)
MONOCYTES NFR BLD AUTO: 2.5 % — SIGNIFICANT CHANGE UP (ref 2–14)
NEUTROPHILS # BLD AUTO: 19.1 K/UL — HIGH (ref 1.8–7.4)
NEUTROPHILS # BLD AUTO: 19.1 K/UL — HIGH (ref 1.8–7.4)
NEUTROPHILS NFR BLD AUTO: 87.5 % — HIGH (ref 43–77)
NEUTROPHILS NFR BLD AUTO: 87.5 % — HIGH (ref 43–77)
NRBC # BLD: 0 /100 WBCS — SIGNIFICANT CHANGE UP (ref 0–0)
NRBC # BLD: 0 /100 WBCS — SIGNIFICANT CHANGE UP (ref 0–0)
PCO2 BLDV: 59 MMHG — HIGH (ref 39–42)
PCO2 BLDV: 59 MMHG — HIGH (ref 39–42)
PH BLDV: 7.37 — SIGNIFICANT CHANGE UP (ref 7.32–7.43)
PH BLDV: 7.37 — SIGNIFICANT CHANGE UP (ref 7.32–7.43)
PHOSPHATE SERPL-MCNC: 3.2 MG/DL — SIGNIFICANT CHANGE UP (ref 2.5–4.5)
PHOSPHATE SERPL-MCNC: 3.2 MG/DL — SIGNIFICANT CHANGE UP (ref 2.5–4.5)
PLATELET # BLD AUTO: 855 K/UL — HIGH (ref 150–400)
PLATELET # BLD AUTO: 855 K/UL — HIGH (ref 150–400)
PO2 BLDV: 59 MMHG — HIGH (ref 25–45)
PO2 BLDV: 59 MMHG — HIGH (ref 25–45)
POTASSIUM BLDV-SCNC: 4.6 MMOL/L — SIGNIFICANT CHANGE UP (ref 3.5–5.1)
POTASSIUM BLDV-SCNC: 4.6 MMOL/L — SIGNIFICANT CHANGE UP (ref 3.5–5.1)
POTASSIUM SERPL-MCNC: 4.5 MMOL/L — SIGNIFICANT CHANGE UP (ref 3.5–5.3)
POTASSIUM SERPL-MCNC: 4.5 MMOL/L — SIGNIFICANT CHANGE UP (ref 3.5–5.3)
POTASSIUM SERPL-SCNC: 4.5 MMOL/L — SIGNIFICANT CHANGE UP (ref 3.5–5.3)
POTASSIUM SERPL-SCNC: 4.5 MMOL/L — SIGNIFICANT CHANGE UP (ref 3.5–5.3)
PROT SERPL-MCNC: 7.1 G/DL — SIGNIFICANT CHANGE UP (ref 6–8.3)
PROT SERPL-MCNC: 7.1 G/DL — SIGNIFICANT CHANGE UP (ref 6–8.3)
QUANT TB PLUS MITOGEN MINUS NIL: 0.34 IU/ML — SIGNIFICANT CHANGE UP
QUANT TB PLUS MITOGEN MINUS NIL: 0.34 IU/ML — SIGNIFICANT CHANGE UP
RBC # BLD: 4.23 M/UL — SIGNIFICANT CHANGE UP (ref 3.8–5.2)
RBC # BLD: 4.23 M/UL — SIGNIFICANT CHANGE UP (ref 3.8–5.2)
RBC # FLD: 18.3 % — HIGH (ref 10.3–14.5)
RBC # FLD: 18.3 % — HIGH (ref 10.3–14.5)
SAO2 % BLDV: 88.7 % — HIGH (ref 67–88)
SAO2 % BLDV: 88.7 % — HIGH (ref 67–88)
SODIUM SERPL-SCNC: 134 MMOL/L — LOW (ref 135–145)
SODIUM SERPL-SCNC: 134 MMOL/L — LOW (ref 135–145)
SPECIMEN SOURCE: SIGNIFICANT CHANGE UP
WBC # BLD: 21.82 K/UL — HIGH (ref 3.8–10.5)
WBC # BLD: 21.82 K/UL — HIGH (ref 3.8–10.5)
WBC # FLD AUTO: 21.82 K/UL — HIGH (ref 3.8–10.5)
WBC # FLD AUTO: 21.82 K/UL — HIGH (ref 3.8–10.5)

## 2023-12-25 PROCEDURE — 99232 SBSQ HOSP IP/OBS MODERATE 35: CPT | Mod: GC

## 2023-12-25 RX ORDER — SODIUM CHLORIDE 9 MG/ML
4 INJECTION INTRAMUSCULAR; INTRAVENOUS; SUBCUTANEOUS
Qty: 60 | Refills: 3
Start: 2023-12-25 | End: 2024-04-22

## 2023-12-25 RX ORDER — METOPROLOL TARTRATE 50 MG
1 TABLET ORAL
Qty: 60 | Refills: 0
Start: 2023-12-25 | End: 2024-01-23

## 2023-12-25 RX ORDER — PANTOPRAZOLE SODIUM 20 MG/1
1 TABLET, DELAYED RELEASE ORAL
Qty: 30 | Refills: 0
Start: 2023-12-25 | End: 2024-01-23

## 2023-12-25 RX ORDER — FERROUS SULFATE 325(65) MG
1 TABLET ORAL
Qty: 15 | Refills: 2
Start: 2023-12-25 | End: 2024-03-23

## 2023-12-25 RX ORDER — BUDESONIDE AND FORMOTEROL FUMARATE DIHYDRATE 160; 4.5 UG/1; UG/1
2 AEROSOL RESPIRATORY (INHALATION)
Qty: 1 | Refills: 1
Start: 2023-12-25

## 2023-12-25 RX ORDER — HYDROXYZINE HCL 10 MG
25 TABLET ORAL AT BEDTIME
Refills: 0 | Status: COMPLETED | OUTPATIENT
Start: 2023-12-25 | End: 2023-12-26

## 2023-12-25 RX ORDER — LEVALBUTEROL 1.25 MG/.5ML
3 SOLUTION, CONCENTRATE RESPIRATORY (INHALATION)
Qty: 1 | Refills: 2
Start: 2023-12-25

## 2023-12-25 RX ORDER — FLUTICASONE PROPIONATE 50 MCG
1 SPRAY, SUSPENSION NASAL
Qty: 1 | Refills: 2
Start: 2023-12-25

## 2023-12-25 RX ORDER — TIOTROPIUM BROMIDE 18 UG/1
2 CAPSULE ORAL; RESPIRATORY (INHALATION)
Qty: 1 | Refills: 0
Start: 2023-12-25

## 2023-12-25 RX ORDER — HYDROXYZINE HCL 10 MG
25 TABLET ORAL ONCE
Refills: 0 | Status: COMPLETED | OUTPATIENT
Start: 2023-12-25 | End: 2023-12-25

## 2023-12-25 RX ADMIN — LEVALBUTEROL 0.31 MILLIGRAM(S): 1.25 SOLUTION, CONCENTRATE RESPIRATORY (INHALATION) at 06:51

## 2023-12-25 RX ADMIN — AMLODIPINE BESYLATE 5 MILLIGRAM(S): 2.5 TABLET ORAL at 06:52

## 2023-12-25 RX ADMIN — Medication 1200 MILLIGRAM(S): at 06:52

## 2023-12-25 RX ADMIN — Medication 1200 MILLIGRAM(S): at 17:10

## 2023-12-25 RX ADMIN — BUDESONIDE AND FORMOTEROL FUMARATE DIHYDRATE 2 PUFF(S): 160; 4.5 AEROSOL RESPIRATORY (INHALATION) at 06:53

## 2023-12-25 RX ADMIN — SODIUM CHLORIDE 4 MILLILITER(S): 9 INJECTION INTRAMUSCULAR; INTRAVENOUS; SUBCUTANEOUS at 06:52

## 2023-12-25 RX ADMIN — Medication 1 TABLET(S): at 11:25

## 2023-12-25 RX ADMIN — LEVALBUTEROL 0.31 MILLIGRAM(S): 1.25 SOLUTION, CONCENTRATE RESPIRATORY (INHALATION) at 17:09

## 2023-12-25 RX ADMIN — SODIUM CHLORIDE 4 MILLILITER(S): 9 INJECTION INTRAMUSCULAR; INTRAVENOUS; SUBCUTANEOUS at 23:15

## 2023-12-25 RX ADMIN — Medication 75 MILLIGRAM(S): at 11:25

## 2023-12-25 RX ADMIN — TIOTROPIUM BROMIDE 2 PUFF(S): 18 CAPSULE ORAL; RESPIRATORY (INHALATION) at 11:25

## 2023-12-25 RX ADMIN — LEVALBUTEROL 0.31 MILLIGRAM(S): 1.25 SOLUTION, CONCENTRATE RESPIRATORY (INHALATION) at 14:35

## 2023-12-25 RX ADMIN — APIXABAN 5 MILLIGRAM(S): 2.5 TABLET, FILM COATED ORAL at 06:52

## 2023-12-25 RX ADMIN — Medication 20 MILLIGRAM(S): at 06:53

## 2023-12-25 RX ADMIN — SODIUM CHLORIDE 4 MILLILITER(S): 9 INJECTION INTRAMUSCULAR; INTRAVENOUS; SUBCUTANEOUS at 11:25

## 2023-12-25 RX ADMIN — LEVALBUTEROL 0.31 MILLIGRAM(S): 1.25 SOLUTION, CONCENTRATE RESPIRATORY (INHALATION) at 23:15

## 2023-12-25 RX ADMIN — Medication 25 MILLIGRAM(S): at 17:10

## 2023-12-25 RX ADMIN — BUDESONIDE AND FORMOTEROL FUMARATE DIHYDRATE 2 PUFF(S): 160; 4.5 AEROSOL RESPIRATORY (INHALATION) at 17:10

## 2023-12-25 RX ADMIN — Medication 1 SPRAY(S): at 06:52

## 2023-12-25 RX ADMIN — PANTOPRAZOLE SODIUM 40 MILLIGRAM(S): 20 TABLET, DELAYED RELEASE ORAL at 06:53

## 2023-12-25 RX ADMIN — BUPRENORPHINE AND NALOXONE 2 TABLET(S): 2; .5 TABLET SUBLINGUAL at 09:55

## 2023-12-25 RX ADMIN — SODIUM CHLORIDE 4 MILLILITER(S): 9 INJECTION INTRAMUSCULAR; INTRAVENOUS; SUBCUTANEOUS at 00:42

## 2023-12-25 RX ADMIN — Medication 25 MILLIGRAM(S): at 01:04

## 2023-12-25 RX ADMIN — Medication 1 SPRAY(S): at 17:10

## 2023-12-25 RX ADMIN — BUPRENORPHINE AND NALOXONE 2 TABLET(S): 2; .5 TABLET SUBLINGUAL at 21:12

## 2023-12-25 RX ADMIN — SODIUM CHLORIDE 4 MILLILITER(S): 9 INJECTION INTRAMUSCULAR; INTRAVENOUS; SUBCUTANEOUS at 17:09

## 2023-12-25 RX ADMIN — APIXABAN 5 MILLIGRAM(S): 2.5 TABLET, FILM COATED ORAL at 17:10

## 2023-12-25 RX ADMIN — Medication 25 MILLIGRAM(S): at 06:52

## 2023-12-25 RX ADMIN — LEVALBUTEROL 0.31 MILLIGRAM(S): 1.25 SOLUTION, CONCENTRATE RESPIRATORY (INHALATION) at 00:42

## 2023-12-25 NOTE — PROGRESS NOTE ADULT - SUBJECTIVE AND OBJECTIVE BOX
PROGRESS NOTE:   Patient is a 68y old  Female who presents with a chief complaint of Chronic progressively worsening dyspnea with minimal exertion + coughs (22 Dec 2023 16:00)      SUBJECTIVE / OVERNIGHT EVENTS:  No acute events overnight. Patient was seen and examined at bedside and did not appear to be in any acute distress. She reports that her dyspnea and cough have been stable. Denies any chest pain or palpitations.       MEDICATIONS  (STANDING):  albuterol/ipratropium for Nebulization 3 milliLiter(s) Nebulizer every 6 hours  amLODIPine   Tablet 5 milliGRAM(s) Oral daily  apixaban 5 milliGRAM(s) Oral every 12 hours  budesonide 160 MICROgram(s)/formoterol 4.5 MICROgram(s) Inhaler 2 Puff(s) Inhalation two times a day  buprenorphine 2 mG/naloxone 0.5 mG SL  Tablet 2 Tablet(s) SubLingual <User Schedule>  ferrous    sulfate 325 milliGRAM(s) Oral <User Schedule>  fluticasone propionate 50 MICROgram(s)/spray Nasal Spray 1 Spray(s) Both Nostrils two times a day  guaiFENesin ER 1200 milliGRAM(s) Oral every 12 hours  influenza  Vaccine (HIGH DOSE) 0.7 milliLiter(s) IntraMuscular once  metoprolol tartrate 25 milliGRAM(s) Oral two times a day  multivitamin 1 Tablet(s) Oral daily  pantoprazole    Tablet 40 milliGRAM(s) Oral before breakfast  polyethylene glycol 3350 17 Gram(s) Oral daily  predniSONE   Tablet 20 milliGRAM(s) Oral once  sodium chloride 3%  Inhalation 4 milliLiter(s) Inhalation every 6 hours  tiotropium 2.5 MICROgram(s) Inhaler 2 Puff(s) Inhalation daily  venlafaxine XR. 75 milliGRAM(s) Oral daily    MEDICATIONS  (PRN):  acetaminophen     Tablet .. 650 milliGRAM(s) Oral every 6 hours PRN Temp greater or equal to 38C (100.4F), Mild Pain (1 - 3), Moderate Pain (4 - 6), Severe Pain (7 - 10)  melatonin 5 milliGRAM(s) Oral at bedtime PRN Insomnia      CAPILLARY BLOOD GLUCOSE      Vital Signs Last 24 Hrs  T(C): 37.3 (24 Dec 2023 20:02), Max: 37.3 (24 Dec 2023 20:02)  T(F): 99.2 (24 Dec 2023 20:02), Max: 99.2 (24 Dec 2023 20:02)  HR: 86 (24 Dec 2023 20:02) (86 - 112)  BP: 132/75 (24 Dec 2023 20:02) (121/78 - 148/86)  BP(mean): --  RR: 18 (24 Dec 2023 20:02) (18 - 19)  SpO2: 98% (24 Dec 2023 20:02) (95% - 98%)    Parameters below as of 24 Dec 2023 20:02  Patient On (Oxygen Delivery Method): nasal cannula      I&O's Summary      GENERAL: No apparent distress.   HEAD:  Atraumatic, Normocephalic  EYES: EOMI, PERRLA, conjunctiva and sclera clear  NECK: Supple, no lymphadenopathy, no elevated JVP  CHEST/LUNG: B/L expiratory wheeze prominent in upper lobes.   HEART: S1 and S2 normal. Regular rate and rhythm; No murmurs, rubs, or gallops  ABDOMEN: Soft, non-tender, non-distended; normal bowel sounds  EXTREMITIES:  2+ peripheral pulses b/l, No clubbing, cyanosis, or edema  NEUROLOGY: A&O x 3, no focal deficits  SKIN: No rashes or lesions    LABS:                        9.8    21.90 )-----------( 826      ( 24 Dec 2023 06:50 )             33.3     12-24    138  |  93<L>  |  34<H>  ----------------------------<  77  4.2   |  37<H>  |  0.63    Ca    9.9      24 Dec 2023 07:00  Phos  3.3     12-24  Mg     2.3     12-24    TPro  7.2  /  Alb  4.0  /  TBili  <0.1<L>  /  DBili  x   /  AST  15  /  ALT  22  /  AlkPhos  87  12-24          Urinalysis Basic - ( 24 Dec 2023 07:00 )    Color: x / Appearance: x / SG: x / pH: x  Gluc: 77 mg/dL / Ketone: x  / Bili: x / Urobili: x   Blood: x / Protein: x / Nitrite: x   Leuk Esterase: x / RBC: x / WBC x   Sq Epi: x / Non Sq Epi: x / Bacteria: x

## 2023-12-25 NOTE — PROGRESS NOTE ADULT - PROBLEM SELECTOR PLAN 3
On admission WBC 18, tachy to 140s, RR24, SpO2 96% on 3L, afebrile, minimally elevated procal 0.24  >CTA Chest + CT A/P (12/14): Extensive airway thickening and tree-in-bud nodularity, compatible with chronic endobronchial disease, including MAC  >s/p IV azithro and CTX x2 (12/14 - 12/15)  - PNA vs. MAC  - Strep [-], Legionella [-], MRSA [-]  - Bcx (12/15): NGTD  - Sputum cx w/ AF- negative x 3  -Repeat sputum cultre- rare gram positive cocci in pairs and chains   Plan  - s/p Levaquin 750qd for PsA coverage (12/15 -12/21 ), pulm recs appreciated

## 2023-12-25 NOTE — PROGRESS NOTE ADULT - PROBLEM SELECTOR PLAN 1
Now presenting with worsening ALTMAN and fatigue which appears to be 2/2 PNA vs. MAC as oppose to exacerbation. Home pulm regimen Advair 500-50 + Albuterol    Plan:  >Sating well on baseline O2 3L  - Lowered Prednisone to 20mg PO daily, will continue for 5 days, then a 5 day course of 10mg   - c/w xopenex q6 standing+ Hypersal 3% q6 + Mucinex + Flonase for airway clearance  - Hold ICS-LABA while in exacerbation  - c/w NC, wean as tolerated, continuous pulse ox  -No new findings on CXR   -Started Symbicort BID   -Pulmonology on board-appreciate further recommendations.

## 2023-12-25 NOTE — PROGRESS NOTE ADULT - ATTENDING COMMENTS
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   Stable SOB/ALTMAN, exam with b/l wheezing L>>R, but slightly improved from prior exams     - s/p 7 day course of levaquin  - steroids increased back to 40mg pred daily 12/22 --> decreased back to 20mg (12/24--)  - plan to dc with duonebs/hypersal, acapella and outpt pulm rehab-script for home nebulizer signed  - outpt repeat CT Chest in 4-6 weeks for HUANG and RUL 9m nodule and then determine need for EBUS and navigational bronch at that time   - s/p AFB sputum x 3 all neg  - anemia workup - c/w HOLLY - start iron qod. needs outpatient GI follow-up for CRC screening if not done recently  - thrombocytosis noted, present in 2016, pt has Hematologist in [place] whom she hasn't seen in years, encouraged outpatient establishment with [facility]. JAK2, MPL, Calreticulin genetic tests ordered for AM which can be followed up as outpatient.   - persistent tachycardia, unclear etiology, ?driven by extensive pulmonary disease. not in distress, pain, or PE. EKG showed sinus tach. On lopressor 25 BID, TSH nml, echo with no obvious abnormalities. No PE on previous CTA. Cardiology f/u    WBC stable, pt stable for d/c home on prednisone 20mg until outpt f/u with Dr Covington; unable to send scripts to pts pharmacy as they are closed today - plan for d/c tomorrow. 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   Stable SOB/ALTMAN, exam with b/l wheezing L>>R, but slightly improved from prior exams     - s/p 7 day course of levaquin  - steroids increased back to 40mg pred daily 12/22 --> decreased back to 20mg (12/24--)  - plan to dc with duonebs/hypersal, acapella and outpt pulm rehab-script for home nebulizer signed  - outpt repeat CT Chest in 4-6 weeks for HUANG and RUL 9m nodule and then determine need for EBUS and navigational bronch at that time   - s/p AFB sputum x 3 all neg  - anemia workup - c/w HOLLY - start iron qod. needs outpatient GI follow-up for CRC screening if not done recently  - thrombocytosis noted, present in 2016, pt has Hematologist in [place] whom she hasn't seen in years, encouraged outpatient establishment with [facility]. JAK2, MPL, Calreticulin genetic tests ordered for AM which can be followed up as outpatient.   - persistent tachycardia, unclear etiology, ?driven by extensive pulmonary disease. not in distress, pain, or PE. EKG showed sinus tach. On lopressor 25 BID, TSH nml, echo with no obvious abnormalities. No PE on previous CTA. Cardiology f/u    WBC stable, pt stable for d/c home on prednisone 20mg until outpt f/u with Dr Covington; unable to send scripts to pts pharmacy as they are closed today - plan for d/c tomorrow.

## 2023-12-25 NOTE — PROGRESS NOTE ADULT - SUBJECTIVE AND OBJECTIVE BOX
DATE OF SERVICE: 12-25-23 @ 13:39    Patient is a 68y old  Female who presents with a chief complaint of Chronic progressively worsening dyspnea with minimal exertion + coughs (25 Dec 2023 07:45)      INTERVAL HISTORY: no complaints     REVIEW OF SYSTEMS:  CONSTITUTIONAL: No weakness  EYES/ENT: No visual changes;  No throat pain   NECK: No pain or stiffness  RESPIRATORY: No cough, wheezing; No shortness of breath  CARDIOVASCULAR: No chest pain or palpitations  GASTROINTESTINAL: No abdominal  pain. No nausea, vomiting, or hematemesis  GENITOURINARY: No dysuria, frequency or hematuria  NEUROLOGICAL: No stroke like symptoms  SKIN: No rashes    	  MEDICATIONS:  amLODIPine   Tablet 5 milliGRAM(s) Oral daily  metoprolol tartrate 25 milliGRAM(s) Oral two times a day        PHYSICAL EXAM:  T(C): 37.3 (12-24-23 @ 20:02), Max: 37.3 (12-24-23 @ 20:02)  HR: 86 (12-24-23 @ 20:02) (86 - 112)  BP: 132/75 (12-24-23 @ 20:02) (121/78 - 148/86)  RR: 18 (12-24-23 @ 20:02) (18 - 19)  SpO2: 98% (12-24-23 @ 20:02) (95% - 98%)  Wt(kg): --  I&O's Summary    24 Dec 2023 07:01  -  25 Dec 2023 07:00  --------------------------------------------------------  IN: 600 mL / OUT: 0 mL / NET: 600 mL          Appearance: In no distress	  HEENT:    PERRL, EOMI	  Cardiovascular:  S1 S2, No JVD  Respiratory: Lungs clear to auscultation	  Gastrointestinal:  Soft, Non-tender, + BS	  Vascularature:  No edema of LE  Psychiatric: Appropriate affect   Neuro: no acute focal deficits                               10.2   21.82 )-----------( 855      ( 25 Dec 2023 12:04 )             34.5     12-25    134<L>  |  94<L>  |  35<H>  ----------------------------<  142<H>  4.5   |  30  |  0.66    Ca    9.1      25 Dec 2023 12:04  Phos  3.2     12-25  Mg     2.3     12-25    TPro  7.1  /  Alb  4.0  /  TBili  0.2  /  DBili  x   /  AST  19  /  ALT  30  /  AlkPhos  90  12-25        Labs personally reviewed      ASSESSMENT/PLAN: 	    68 yoF w/ PMHx of Protein S deficiency c/b LUE DVT/PE (2019) on Eliquis, COPD (baseline 3L O2, no chronic steroid/abx, no intubation, last admission 2016), HTN, anxiety/depression, remote back surgery on Suboxone. p/w 2 months progressive worsening ALTMAN, fatigue, unintentional weight loss. Likely PNA vs COPD Exacerbation. Patient still persistently tachycardic. Will discuss with Pulm about further treatment options.     1. Sinus Tachycardia  - ECG ST with no ischemic characteristics noted  - TTE with preserved EF, no WMA  - CT neg for PE  - Neg for LE DVT  - Likely 2/2 acute infection  - Cont with Metoprolol 25mg PO BID  - Repeat EKG 12/24 NSR     2. COPD Exacerbation   AFB smear (-) x 3, f/u cultures; follow up quantiferon gold  - Pulm recs appreciated: - Continue Prednisone, Symbicort 160/4.5 BID- AFB smear (-) x 3, f/u cultures, Mucinex  - Supplemental oxygen to goal >88%, remains on 1-2L NC    3. Hx of DVT  - DVT negative  - CT neg for PE  - Dimer pending  - c/w Eliquis 5mg PO BID    4. Hypertension  - BP well controlled on Metoprolol and amlodipine 5mg PO daily           STEPHANIE Feldman DO [facility]  Cardiovascular Medicine  [address], [address]  Office: [phone]  Available via call/text on [employer] Teams  DATE OF SERVICE: 12-25-23 @ 13:39    Patient is a 68y old  Female who presents with a chief complaint of Chronic progressively worsening dyspnea with minimal exertion + coughs (25 Dec 2023 07:45)      INTERVAL HISTORY: no complaints     REVIEW OF SYSTEMS:  CONSTITUTIONAL: No weakness  EYES/ENT: No visual changes;  No throat pain   NECK: No pain or stiffness  RESPIRATORY: No cough, wheezing; No shortness of breath  CARDIOVASCULAR: No chest pain or palpitations  GASTROINTESTINAL: No abdominal  pain. No nausea, vomiting, or hematemesis  GENITOURINARY: No dysuria, frequency or hematuria  NEUROLOGICAL: No stroke like symptoms  SKIN: No rashes    	  MEDICATIONS:  amLODIPine   Tablet 5 milliGRAM(s) Oral daily  metoprolol tartrate 25 milliGRAM(s) Oral two times a day        PHYSICAL EXAM:  T(C): 37.3 (12-24-23 @ 20:02), Max: 37.3 (12-24-23 @ 20:02)  HR: 86 (12-24-23 @ 20:02) (86 - 112)  BP: 132/75 (12-24-23 @ 20:02) (121/78 - 148/86)  RR: 18 (12-24-23 @ 20:02) (18 - 19)  SpO2: 98% (12-24-23 @ 20:02) (95% - 98%)  Wt(kg): --  I&O's Summary    24 Dec 2023 07:01  -  25 Dec 2023 07:00  --------------------------------------------------------  IN: 600 mL / OUT: 0 mL / NET: 600 mL          Appearance: In no distress	  HEENT:    PERRL, EOMI	  Cardiovascular:  S1 S2, No JVD  Respiratory: Lungs clear to auscultation	  Gastrointestinal:  Soft, Non-tender, + BS	  Vascularature:  No edema of LE  Psychiatric: Appropriate affect   Neuro: no acute focal deficits                               10.2   21.82 )-----------( 855      ( 25 Dec 2023 12:04 )             34.5     12-25    134<L>  |  94<L>  |  35<H>  ----------------------------<  142<H>  4.5   |  30  |  0.66    Ca    9.1      25 Dec 2023 12:04  Phos  3.2     12-25  Mg     2.3     12-25    TPro  7.1  /  Alb  4.0  /  TBili  0.2  /  DBili  x   /  AST  19  /  ALT  30  /  AlkPhos  90  12-25        Labs personally reviewed      ASSESSMENT/PLAN: 	    68 yoF w/ PMHx of Protein S deficiency c/b LUE DVT/PE (2019) on Eliquis, COPD (baseline 3L O2, no chronic steroid/abx, no intubation, last admission 2016), HTN, anxiety/depression, remote back surgery on Suboxone. p/w 2 months progressive worsening ALTMAN, fatigue, unintentional weight loss. Likely PNA vs COPD Exacerbation. Patient still persistently tachycardic. Will discuss with Pulm about further treatment options.     1. Sinus Tachycardia  - ECG ST with no ischemic characteristics noted  - TTE with preserved EF, no WMA  - CT neg for PE  - Neg for LE DVT  - Likely 2/2 acute infection  - Cont with Metoprolol 25mg PO BID  - Repeat EKG 12/24 NSR     2. COPD Exacerbation   AFB smear (-) x 3, f/u cultures; follow up quantiferon gold  - Pulm recs appreciated: - Continue Prednisone, Symbicort 160/4.5 BID- AFB smear (-) x 3, f/u cultures, Mucinex  - Supplemental oxygen to goal >88%, remains on 1-2L NC    3. Hx of DVT  - DVT negative  - CT neg for PE  - Dimer pending  - c/w Eliquis 5mg PO BID    4. Hypertension  - BP well controlled on Metoprolol and amlodipine 5mg PO daily           STEPHANIE Feldman DO [facility]  Cardiovascular Medicine  [address], [address]  Office: [phone]  Available via call/text on [employer] Teams

## 2023-12-26 ENCOUNTER — TRANSCRIPTION ENCOUNTER (OUTPATIENT)
Age: 68
End: 2023-12-26

## 2023-12-26 LAB
ANION GAP SERPL CALC-SCNC: 8 MMOL/L — SIGNIFICANT CHANGE UP (ref 5–17)
ANION GAP SERPL CALC-SCNC: 8 MMOL/L — SIGNIFICANT CHANGE UP (ref 5–17)
ANISOCYTOSIS BLD QL: SLIGHT — SIGNIFICANT CHANGE UP
ANISOCYTOSIS BLD QL: SLIGHT — SIGNIFICANT CHANGE UP
BASE EXCESS BLDV CALC-SCNC: 11.2 MMOL/L — HIGH (ref -2–3)
BASE EXCESS BLDV CALC-SCNC: 11.2 MMOL/L — HIGH (ref -2–3)
BASOPHILS # BLD AUTO: 0 K/UL — SIGNIFICANT CHANGE UP (ref 0–0.2)
BASOPHILS # BLD AUTO: 0 K/UL — SIGNIFICANT CHANGE UP (ref 0–0.2)
BASOPHILS NFR BLD AUTO: 0 % — SIGNIFICANT CHANGE UP (ref 0–2)
BASOPHILS NFR BLD AUTO: 0 % — SIGNIFICANT CHANGE UP (ref 0–2)
BUN SERPL-MCNC: 31 MG/DL — HIGH (ref 7–23)
BUN SERPL-MCNC: 31 MG/DL — HIGH (ref 7–23)
CA-I SERPL-SCNC: 1.23 MMOL/L — SIGNIFICANT CHANGE UP (ref 1.15–1.33)
CA-I SERPL-SCNC: 1.23 MMOL/L — SIGNIFICANT CHANGE UP (ref 1.15–1.33)
CALCIUM SERPL-MCNC: 9.5 MG/DL — SIGNIFICANT CHANGE UP (ref 8.4–10.5)
CALCIUM SERPL-MCNC: 9.5 MG/DL — SIGNIFICANT CHANGE UP (ref 8.4–10.5)
CHLORIDE BLDV-SCNC: 97 MMOL/L — SIGNIFICANT CHANGE UP (ref 96–108)
CHLORIDE BLDV-SCNC: 97 MMOL/L — SIGNIFICANT CHANGE UP (ref 96–108)
CHLORIDE SERPL-SCNC: 95 MMOL/L — LOW (ref 96–108)
CHLORIDE SERPL-SCNC: 95 MMOL/L — LOW (ref 96–108)
CO2 BLDV-SCNC: 40 MMOL/L — HIGH (ref 22–26)
CO2 BLDV-SCNC: 40 MMOL/L — HIGH (ref 22–26)
CO2 SERPL-SCNC: 33 MMOL/L — HIGH (ref 22–31)
CO2 SERPL-SCNC: 33 MMOL/L — HIGH (ref 22–31)
CREAT SERPL-MCNC: 0.61 MG/DL — SIGNIFICANT CHANGE UP (ref 0.5–1.3)
CREAT SERPL-MCNC: 0.61 MG/DL — SIGNIFICANT CHANGE UP (ref 0.5–1.3)
EGFR: 98 ML/MIN/1.73M2 — SIGNIFICANT CHANGE UP
EGFR: 98 ML/MIN/1.73M2 — SIGNIFICANT CHANGE UP
EOSINOPHIL # BLD AUTO: 0.66 K/UL — HIGH (ref 0–0.5)
EOSINOPHIL # BLD AUTO: 0.66 K/UL — HIGH (ref 0–0.5)
EOSINOPHIL NFR BLD AUTO: 3.5 % — SIGNIFICANT CHANGE UP (ref 0–6)
EOSINOPHIL NFR BLD AUTO: 3.5 % — SIGNIFICANT CHANGE UP (ref 0–6)
GAS PNL BLDV: 136 MMOL/L — SIGNIFICANT CHANGE UP (ref 136–145)
GAS PNL BLDV: 136 MMOL/L — SIGNIFICANT CHANGE UP (ref 136–145)
GAS PNL BLDV: SIGNIFICANT CHANGE UP
GLUCOSE BLDV-MCNC: 74 MG/DL — SIGNIFICANT CHANGE UP (ref 70–99)
GLUCOSE BLDV-MCNC: 74 MG/DL — SIGNIFICANT CHANGE UP (ref 70–99)
GLUCOSE SERPL-MCNC: 73 MG/DL — SIGNIFICANT CHANGE UP (ref 70–99)
GLUCOSE SERPL-MCNC: 73 MG/DL — SIGNIFICANT CHANGE UP (ref 70–99)
HCO3 BLDV-SCNC: 38 MMOL/L — HIGH (ref 22–29)
HCO3 BLDV-SCNC: 38 MMOL/L — HIGH (ref 22–29)
HCT VFR BLD CALC: 31.2 % — LOW (ref 34.5–45)
HCT VFR BLD CALC: 31.2 % — LOW (ref 34.5–45)
HCT VFR BLDA CALC: 30 % — LOW (ref 34.5–46.5)
HCT VFR BLDA CALC: 30 % — LOW (ref 34.5–46.5)
HGB BLD CALC-MCNC: 10 G/DL — LOW (ref 11.7–16.1)
HGB BLD CALC-MCNC: 10 G/DL — LOW (ref 11.7–16.1)
HGB BLD-MCNC: 9.6 G/DL — LOW (ref 11.5–15.5)
HGB BLD-MCNC: 9.6 G/DL — LOW (ref 11.5–15.5)
HYPOCHROMIA BLD QL: SIGNIFICANT CHANGE UP
HYPOCHROMIA BLD QL: SIGNIFICANT CHANGE UP
LACTATE BLDV-MCNC: 2.6 MMOL/L — HIGH (ref 0.5–2)
LACTATE BLDV-MCNC: 2.6 MMOL/L — HIGH (ref 0.5–2)
LYMPHOCYTES # BLD AUTO: 27.8 % — SIGNIFICANT CHANGE UP (ref 13–44)
LYMPHOCYTES # BLD AUTO: 27.8 % — SIGNIFICANT CHANGE UP (ref 13–44)
LYMPHOCYTES # BLD AUTO: 5.22 K/UL — HIGH (ref 1–3.3)
LYMPHOCYTES # BLD AUTO: 5.22 K/UL — HIGH (ref 1–3.3)
MACROCYTES BLD QL: SLIGHT — SIGNIFICANT CHANGE UP
MACROCYTES BLD QL: SLIGHT — SIGNIFICANT CHANGE UP
MAGNESIUM SERPL-MCNC: 2.2 MG/DL — SIGNIFICANT CHANGE UP (ref 1.6–2.6)
MAGNESIUM SERPL-MCNC: 2.2 MG/DL — SIGNIFICANT CHANGE UP (ref 1.6–2.6)
MANUAL SMEAR VERIFICATION: SIGNIFICANT CHANGE UP
MANUAL SMEAR VERIFICATION: SIGNIFICANT CHANGE UP
MCHC RBC-ENTMCNC: 25.1 PG — LOW (ref 27–34)
MCHC RBC-ENTMCNC: 25.1 PG — LOW (ref 27–34)
MCHC RBC-ENTMCNC: 30.8 GM/DL — LOW (ref 32–36)
MCHC RBC-ENTMCNC: 30.8 GM/DL — LOW (ref 32–36)
MCV RBC AUTO: 81.5 FL — SIGNIFICANT CHANGE UP (ref 80–100)
MCV RBC AUTO: 81.5 FL — SIGNIFICANT CHANGE UP (ref 80–100)
MONOCYTES # BLD AUTO: 0.98 K/UL — HIGH (ref 0–0.9)
MONOCYTES # BLD AUTO: 0.98 K/UL — HIGH (ref 0–0.9)
MONOCYTES NFR BLD AUTO: 5.2 % — SIGNIFICANT CHANGE UP (ref 2–14)
MONOCYTES NFR BLD AUTO: 5.2 % — SIGNIFICANT CHANGE UP (ref 2–14)
MYELOCYTES NFR BLD: 0.9 % — HIGH (ref 0–0)
MYELOCYTES NFR BLD: 0.9 % — HIGH (ref 0–0)
NEUTROPHILS # BLD AUTO: 11.76 K/UL — HIGH (ref 1.8–7.4)
NEUTROPHILS # BLD AUTO: 11.76 K/UL — HIGH (ref 1.8–7.4)
NEUTROPHILS NFR BLD AUTO: 62.6 % — SIGNIFICANT CHANGE UP (ref 43–77)
NEUTROPHILS NFR BLD AUTO: 62.6 % — SIGNIFICANT CHANGE UP (ref 43–77)
PCO2 BLDV: 63 MMHG — HIGH (ref 39–42)
PCO2 BLDV: 63 MMHG — HIGH (ref 39–42)
PH BLDV: 7.39 — SIGNIFICANT CHANGE UP (ref 7.32–7.43)
PH BLDV: 7.39 — SIGNIFICANT CHANGE UP (ref 7.32–7.43)
PHOSPHATE SERPL-MCNC: 3.4 MG/DL — SIGNIFICANT CHANGE UP (ref 2.5–4.5)
PHOSPHATE SERPL-MCNC: 3.4 MG/DL — SIGNIFICANT CHANGE UP (ref 2.5–4.5)
PLAT MORPH BLD: NORMAL — SIGNIFICANT CHANGE UP
PLAT MORPH BLD: NORMAL — SIGNIFICANT CHANGE UP
PLATELET # BLD AUTO: 699 K/UL — HIGH (ref 150–400)
PLATELET # BLD AUTO: 699 K/UL — HIGH (ref 150–400)
PO2 BLDV: 80 MMHG — HIGH (ref 25–45)
PO2 BLDV: 80 MMHG — HIGH (ref 25–45)
POIKILOCYTOSIS BLD QL AUTO: SLIGHT — SIGNIFICANT CHANGE UP
POIKILOCYTOSIS BLD QL AUTO: SLIGHT — SIGNIFICANT CHANGE UP
POLYCHROMASIA BLD QL SMEAR: SLIGHT — SIGNIFICANT CHANGE UP
POLYCHROMASIA BLD QL SMEAR: SLIGHT — SIGNIFICANT CHANGE UP
POTASSIUM BLDV-SCNC: 4.3 MMOL/L — SIGNIFICANT CHANGE UP (ref 3.5–5.1)
POTASSIUM BLDV-SCNC: 4.3 MMOL/L — SIGNIFICANT CHANGE UP (ref 3.5–5.1)
POTASSIUM SERPL-MCNC: 4.3 MMOL/L — SIGNIFICANT CHANGE UP (ref 3.5–5.3)
POTASSIUM SERPL-MCNC: 4.3 MMOL/L — SIGNIFICANT CHANGE UP (ref 3.5–5.3)
POTASSIUM SERPL-SCNC: 4.3 MMOL/L — SIGNIFICANT CHANGE UP (ref 3.5–5.3)
POTASSIUM SERPL-SCNC: 4.3 MMOL/L — SIGNIFICANT CHANGE UP (ref 3.5–5.3)
RBC # BLD: 3.83 M/UL — SIGNIFICANT CHANGE UP (ref 3.8–5.2)
RBC # BLD: 3.83 M/UL — SIGNIFICANT CHANGE UP (ref 3.8–5.2)
RBC # FLD: 18 % — HIGH (ref 10.3–14.5)
RBC # FLD: 18 % — HIGH (ref 10.3–14.5)
RBC BLD AUTO: ABNORMAL
RBC BLD AUTO: ABNORMAL
SAO2 % BLDV: 95.7 % — HIGH (ref 67–88)
SAO2 % BLDV: 95.7 % — HIGH (ref 67–88)
SODIUM SERPL-SCNC: 136 MMOL/L — SIGNIFICANT CHANGE UP (ref 135–145)
SODIUM SERPL-SCNC: 136 MMOL/L — SIGNIFICANT CHANGE UP (ref 135–145)
TARGETS BLD QL SMEAR: SLIGHT — SIGNIFICANT CHANGE UP
TARGETS BLD QL SMEAR: SLIGHT — SIGNIFICANT CHANGE UP
WBC # BLD: 18.79 K/UL — HIGH (ref 3.8–10.5)
WBC # BLD: 18.79 K/UL — HIGH (ref 3.8–10.5)
WBC # FLD AUTO: 18.79 K/UL — HIGH (ref 3.8–10.5)
WBC # FLD AUTO: 18.79 K/UL — HIGH (ref 3.8–10.5)

## 2023-12-26 PROCEDURE — 99231 SBSQ HOSP IP/OBS SF/LOW 25: CPT | Mod: GC

## 2023-12-26 RX ORDER — UMECLIDINIUM 62.5 UG/1
1 AEROSOL, POWDER ORAL
Qty: 1 | Refills: 1
Start: 2023-12-26 | End: 2024-02-23

## 2023-12-26 RX ORDER — BUDESONIDE AND FORMOTEROL FUMARATE DIHYDRATE 160; 4.5 UG/1; UG/1
2 AEROSOL RESPIRATORY (INHALATION)
Qty: 1 | Refills: 1
Start: 2023-12-26

## 2023-12-26 RX ORDER — HYDROXYZINE HCL 10 MG
25 TABLET ORAL AT BEDTIME
Refills: 0 | Status: COMPLETED | OUTPATIENT
Start: 2023-12-26 | End: 2023-12-26

## 2023-12-26 RX ORDER — LEVALBUTEROL 1.25 MG/.5ML
3 SOLUTION, CONCENTRATE RESPIRATORY (INHALATION)
Qty: 1 | Refills: 2
Start: 2023-12-26

## 2023-12-26 RX ORDER — SODIUM CHLORIDE 9 MG/ML
4 INJECTION INTRAMUSCULAR; INTRAVENOUS; SUBCUTANEOUS
Qty: 60 | Refills: 0
Start: 2023-12-26 | End: 2024-01-24

## 2023-12-26 RX ORDER — TIOTROPIUM BROMIDE 18 UG/1
2 CAPSULE ORAL; RESPIRATORY (INHALATION)
Qty: 1 | Refills: 0
Start: 2023-12-26

## 2023-12-26 RX ADMIN — LEVALBUTEROL 0.31 MILLIGRAM(S): 1.25 SOLUTION, CONCENTRATE RESPIRATORY (INHALATION) at 23:02

## 2023-12-26 RX ADMIN — PANTOPRAZOLE SODIUM 40 MILLIGRAM(S): 20 TABLET, DELAYED RELEASE ORAL at 05:11

## 2023-12-26 RX ADMIN — APIXABAN 5 MILLIGRAM(S): 2.5 TABLET, FILM COATED ORAL at 05:09

## 2023-12-26 RX ADMIN — SODIUM CHLORIDE 4 MILLILITER(S): 9 INJECTION INTRAMUSCULAR; INTRAVENOUS; SUBCUTANEOUS at 11:24

## 2023-12-26 RX ADMIN — SODIUM CHLORIDE 4 MILLILITER(S): 9 INJECTION INTRAMUSCULAR; INTRAVENOUS; SUBCUTANEOUS at 17:16

## 2023-12-26 RX ADMIN — BUPRENORPHINE AND NALOXONE 2 TABLET(S): 2; .5 TABLET SUBLINGUAL at 21:17

## 2023-12-26 RX ADMIN — TIOTROPIUM BROMIDE 2 PUFF(S): 18 CAPSULE ORAL; RESPIRATORY (INHALATION) at 11:24

## 2023-12-26 RX ADMIN — Medication 1 SPRAY(S): at 05:14

## 2023-12-26 RX ADMIN — Medication 25 MILLIGRAM(S): at 23:00

## 2023-12-26 RX ADMIN — BUDESONIDE AND FORMOTEROL FUMARATE DIHYDRATE 2 PUFF(S): 160; 4.5 AEROSOL RESPIRATORY (INHALATION) at 05:09

## 2023-12-26 RX ADMIN — Medication 25 MILLIGRAM(S): at 05:09

## 2023-12-26 RX ADMIN — AMLODIPINE BESYLATE 5 MILLIGRAM(S): 2.5 TABLET ORAL at 05:08

## 2023-12-26 RX ADMIN — Medication 25 MILLIGRAM(S): at 17:15

## 2023-12-26 RX ADMIN — BUDESONIDE AND FORMOTEROL FUMARATE DIHYDRATE 2 PUFF(S): 160; 4.5 AEROSOL RESPIRATORY (INHALATION) at 17:16

## 2023-12-26 RX ADMIN — LEVALBUTEROL 0.31 MILLIGRAM(S): 1.25 SOLUTION, CONCENTRATE RESPIRATORY (INHALATION) at 05:09

## 2023-12-26 RX ADMIN — Medication 20 MILLIGRAM(S): at 05:08

## 2023-12-26 RX ADMIN — Medication 75 MILLIGRAM(S): at 11:24

## 2023-12-26 RX ADMIN — LEVALBUTEROL 0.31 MILLIGRAM(S): 1.25 SOLUTION, CONCENTRATE RESPIRATORY (INHALATION) at 11:24

## 2023-12-26 RX ADMIN — LEVALBUTEROL 0.31 MILLIGRAM(S): 1.25 SOLUTION, CONCENTRATE RESPIRATORY (INHALATION) at 17:16

## 2023-12-26 RX ADMIN — Medication 25 MILLIGRAM(S): at 00:01

## 2023-12-26 RX ADMIN — SODIUM CHLORIDE 4 MILLILITER(S): 9 INJECTION INTRAMUSCULAR; INTRAVENOUS; SUBCUTANEOUS at 23:01

## 2023-12-26 RX ADMIN — Medication 1 SPRAY(S): at 17:15

## 2023-12-26 RX ADMIN — Medication 1 TABLET(S): at 11:24

## 2023-12-26 RX ADMIN — Medication 1200 MILLIGRAM(S): at 17:15

## 2023-12-26 RX ADMIN — POLYETHYLENE GLYCOL 3350 17 GRAM(S): 17 POWDER, FOR SOLUTION ORAL at 11:24

## 2023-12-26 RX ADMIN — BUPRENORPHINE AND NALOXONE 2 TABLET(S): 2; .5 TABLET SUBLINGUAL at 10:23

## 2023-12-26 RX ADMIN — Medication 325 MILLIGRAM(S): at 09:41

## 2023-12-26 RX ADMIN — SODIUM CHLORIDE 4 MILLILITER(S): 9 INJECTION INTRAMUSCULAR; INTRAVENOUS; SUBCUTANEOUS at 05:09

## 2023-12-26 RX ADMIN — APIXABAN 5 MILLIGRAM(S): 2.5 TABLET, FILM COATED ORAL at 17:15

## 2023-12-26 RX ADMIN — Medication 1200 MILLIGRAM(S): at 05:08

## 2023-12-26 NOTE — PROGRESS NOTE ADULT - SUBJECTIVE AND OBJECTIVE BOX
PROGRESS NOTE:   Authored by Amadeo Garcia MD  Patient is a 68y old  Female who presents with a chief complaint of Chronic progressively worsening dyspnea with minimal exertion + coughs (25 Dec 2023 13:39)      SUBJECTIVE / OVERNIGHT EVENTS:  No acute events overnight.     ADDITIONAL REVIEW OF SYSTEMS:    MEDICATIONS  (STANDING):  amLODIPine   Tablet 5 milliGRAM(s) Oral daily  apixaban 5 milliGRAM(s) Oral every 12 hours  budesonide 160 MICROgram(s)/formoterol 4.5 MICROgram(s) Inhaler 2 Puff(s) Inhalation two times a day  buprenorphine 2 mG/naloxone 0.5 mG SL  Tablet 2 Tablet(s) SubLingual <User Schedule>  ferrous    sulfate 325 milliGRAM(s) Oral <User Schedule>  fluticasone propionate 50 MICROgram(s)/spray Nasal Spray 1 Spray(s) Both Nostrils two times a day  guaiFENesin ER 1200 milliGRAM(s) Oral every 12 hours  influenza  Vaccine (HIGH DOSE) 0.7 milliLiter(s) IntraMuscular once  levalbuterol Inhalation 0.31 milliGRAM(s) Inhalation every 6 hours  metoprolol tartrate 25 milliGRAM(s) Oral two times a day  multivitamin 1 Tablet(s) Oral daily  pantoprazole    Tablet 40 milliGRAM(s) Oral before breakfast  polyethylene glycol 3350 17 Gram(s) Oral daily  predniSONE   Tablet 20 milliGRAM(s) Oral daily  sodium chloride 3%  Inhalation 4 milliLiter(s) Inhalation every 6 hours  tiotropium 2.5 MICROgram(s) Inhaler 2 Puff(s) Inhalation daily  venlafaxine XR. 75 milliGRAM(s) Oral daily    MEDICATIONS  (PRN):  acetaminophen     Tablet .. 650 milliGRAM(s) Oral every 6 hours PRN Temp greater or equal to 38C (100.4F), Mild Pain (1 - 3), Moderate Pain (4 - 6), Severe Pain (7 - 10)  melatonin 5 milliGRAM(s) Oral at bedtime PRN Insomnia      CAPILLARY BLOOD GLUCOSE        I&O's Summary      PHYSICAL EXAM:  Vital Signs Last 24 Hrs  T(C): 36.8 (26 Dec 2023 04:00), Max: 37.3 (25 Dec 2023 20:33)  T(F): 98.2 (26 Dec 2023 04:00), Max: 99.2 (25 Dec 2023 20:33)  HR: 104 (26 Dec 2023 04:00) (85 - 123)  BP: 144/72 (26 Dec 2023 04:00) (124/80 - 144/72)  BP(mean): --  RR: 18 (26 Dec 2023 04:00) (18 - 18)  SpO2: 97% (26 Dec 2023 04:00) (95% - 97%)    Parameters below as of 26 Dec 2023 04:00  Patient On (Oxygen Delivery Method): nasal cannula  O2 Flow (L/min): 2      GENERAL: No apparent distress.   HEAD:  Atraumatic, Normocephalic  EYES: EOMI, PERRLA, conjunctiva and sclera clear  NECK: Supple, no lymphadenopathy, no elevated JVP  CHEST/LUNG: Clear to auscultation bilateral and symmetric; No wheezes, rales, or rhonchi  HEART: S1 and S2 normal. Regular rate and rhythm; No murmurs, rubs, or gallops  ABDOMEN: Soft, non-tender, non-distended; normal bowel sounds  EXTREMITIES:  2+ peripheral pulses b/l, No clubbing, cyanosis, or edema  NEUROLOGY: A&O x 3, no focal deficits  SKIN: No rashes or lesions    LABS:                        10.2   21.82 )-----------( 855      ( 25 Dec 2023 12:04 )             34.5     12-25    134<L>  |  94<L>  |  35<H>  ----------------------------<  142<H>  4.5   |  30  |  0.66    Ca    9.1      25 Dec 2023 12:04  Phos  3.2     12-25  Mg     2.3     12-25    TPro  7.1  /  Alb  4.0  /  TBili  0.2  /  DBili  x   /  AST  19  /  ALT  30  /  AlkPhos  90  12-25          Urinalysis Basic - ( 25 Dec 2023 12:04 )    Color: x / Appearance: x / SG: x / pH: x  Gluc: 142 mg/dL / Ketone: x  / Bili: x / Urobili: x   Blood: x / Protein: x / Nitrite: x   Leuk Esterase: x / RBC: x / WBC x   Sq Epi: x / Non Sq Epi: x / Bacteria: x          RADIOLOGY & ADDITIONAL TESTS:  Lab Results Reviewed   Imaging Reviewed  Electrocardiogram Reviewed   PROGRESS NOTE:   Authored by Amadeo Garcia MD  Patient is a 68y old  Female who presents with a chief complaint of Chronic progressively worsening dyspnea with minimal exertion + coughs (25 Dec 2023 13:39)      SUBJECTIVE / OVERNIGHT EVENTS:  No acute events overnight. Patient was seen and examined at bedside and did not appear to be in any acute distress. She states that her dyspnea is stable and cough is improving. She denies fever, chills, chest pain or abdominal pain.         MEDICATIONS  (STANDING):  amLODIPine   Tablet 5 milliGRAM(s) Oral daily  apixaban 5 milliGRAM(s) Oral every 12 hours  budesonide 160 MICROgram(s)/formoterol 4.5 MICROgram(s) Inhaler 2 Puff(s) Inhalation two times a day  buprenorphine 2 mG/naloxone 0.5 mG SL  Tablet 2 Tablet(s) SubLingual <User Schedule>  ferrous    sulfate 325 milliGRAM(s) Oral <User Schedule>  fluticasone propionate 50 MICROgram(s)/spray Nasal Spray 1 Spray(s) Both Nostrils two times a day  guaiFENesin ER 1200 milliGRAM(s) Oral every 12 hours  influenza  Vaccine (HIGH DOSE) 0.7 milliLiter(s) IntraMuscular once  levalbuterol Inhalation 0.31 milliGRAM(s) Inhalation every 6 hours  metoprolol tartrate 25 milliGRAM(s) Oral two times a day  multivitamin 1 Tablet(s) Oral daily  pantoprazole    Tablet 40 milliGRAM(s) Oral before breakfast  polyethylene glycol 3350 17 Gram(s) Oral daily  predniSONE   Tablet 20 milliGRAM(s) Oral daily  sodium chloride 3%  Inhalation 4 milliLiter(s) Inhalation every 6 hours  tiotropium 2.5 MICROgram(s) Inhaler 2 Puff(s) Inhalation daily  venlafaxine XR. 75 milliGRAM(s) Oral daily    MEDICATIONS  (PRN):  acetaminophen     Tablet .. 650 milliGRAM(s) Oral every 6 hours PRN Temp greater or equal to 38C (100.4F), Mild Pain (1 - 3), Moderate Pain (4 - 6), Severe Pain (7 - 10)  melatonin 5 milliGRAM(s) Oral at bedtime PRN Insomnia      CAPILLARY BLOOD GLUCOSE        I&O's Summary      PHYSICAL EXAM:  Vital Signs Last 24 Hrs  T(C): 36.8 (26 Dec 2023 04:00), Max: 37.3 (25 Dec 2023 20:33)  T(F): 98.2 (26 Dec 2023 04:00), Max: 99.2 (25 Dec 2023 20:33)  HR: 104 (26 Dec 2023 04:00) (85 - 123)  BP: 144/72 (26 Dec 2023 04:00) (124/80 - 144/72)  BP(mean): --  RR: 18 (26 Dec 2023 04:00) (18 - 18)  SpO2: 97% (26 Dec 2023 04:00) (95% - 97%)    Parameters below as of 26 Dec 2023 04:00  Patient On (Oxygen Delivery Method): nasal cannula  O2 Flow (L/min): 2      GENERAL: No apparent distress.   HEAD:  Atraumatic, Normocephalic  EYES: EOMI, PERRLA, conjunctiva and sclera clear  NECK: Supple, no lymphadenopathy, no elevated JVP  CHEST/LUNG: B/L Expiratory wheeze more prominent in the upper lobes.   HEART: S1 and S2 normal. Regular rate and rhythm; No murmurs, rubs, or gallops  ABDOMEN: Soft, non-tender, non-distended; normal bowel sounds  EXTREMITIES:  2+ peripheral pulses b/l, No clubbing, cyanosis, or edema  NEUROLOGY: A&O x 3, no focal deficits  SKIN: No rashes or lesions    LABS:                        10.2   21.82 )-----------( 855      ( 25 Dec 2023 12:04 )             34.5     12-25    134<L>  |  94<L>  |  35<H>  ----------------------------<  142<H>  4.5   |  30  |  0.66    Ca    9.1      25 Dec 2023 12:04  Phos  3.2     12-25  Mg     2.3     12-25    TPro  7.1  /  Alb  4.0  /  TBili  0.2  /  DBili  x   /  AST  19  /  ALT  30  /  AlkPhos  90  12-25          Urinalysis Basic - ( 25 Dec 2023 12:04 )    Color: x / Appearance: x / SG: x / pH: x  Gluc: 142 mg/dL / Ketone: x  / Bili: x / Urobili: x   Blood: x / Protein: x / Nitrite: x   Leuk Esterase: x / RBC: x / WBC x   Sq Epi: x / Non Sq Epi: x / Bacteria: x          RADIOLOGY & ADDITIONAL TESTS:  Lab Results Reviewed   Imaging Reviewed  Electrocardiogram Reviewed

## 2023-12-26 NOTE — PROGRESS NOTE ADULT - SUBJECTIVE AND OBJECTIVE BOX
DATE OF SERVICE: 12-26-23 @ 16:23    Patient is a 68y old  Female who presents with a chief complaint of Chronic progressively worsening dyspnea with minimal exertion + coughs (26 Dec 2023 07:06)      INTERVAL HISTORY: Feels ok.     REVIEW OF SYSTEMS:  CONSTITUTIONAL: No weakness  EYES/ENT: No visual changes;  No throat pain   NECK: No pain or stiffness  RESPIRATORY: No cough, wheezing; No shortness of breath  CARDIOVASCULAR: No chest pain or palpitations  GASTROINTESTINAL: No abdominal  pain. No nausea, vomiting, or hematemesis  GENITOURINARY: No dysuria, frequency or hematuria  NEUROLOGICAL: No stroke like symptoms  SKIN: No rashes    	  MEDICATIONS:  amLODIPine   Tablet 5 milliGRAM(s) Oral daily  metoprolol tartrate 25 milliGRAM(s) Oral two times a day        PHYSICAL EXAM:  T(C): 36.6 (12-26-23 @ 13:28), Max: 37.3 (12-25-23 @ 20:33)  HR: 82 (12-26-23 @ 13:28) (82 - 123)  BP: 113/63 (12-26-23 @ 13:28) (113/63 - 144/72)  RR: 18 (12-26-23 @ 13:28) (18 - 18)  SpO2: 96% (12-26-23 @ 13:28) (96% - 97%)  Wt(kg): --  I&O's Summary    26 Dec 2023 07:01  -  26 Dec 2023 16:23  --------------------------------------------------------  IN: 350 mL / OUT: 0 mL / NET: 350 mL          Appearance: In no distress	  HEENT:    PERRL, EOMI	  Cardiovascular:  S1 S2, No JVD  Respiratory: Lungs clear to auscultation	  Gastrointestinal:  Soft, Non-tender, + BS	  Vascularature:  No edema of LE  Psychiatric: Appropriate affect   Neuro: no acute focal deficits                               9.6    18.79 )-----------( 699      ( 26 Dec 2023 07:09 )             31.2     12-26    136  |  95<L>  |  31<H>  ----------------------------<  73  4.3   |  33<H>  |  0.61    Ca    9.5      26 Dec 2023 07:08  Phos  3.4     12-26  Mg     2.2     12-26    TPro  7.1  /  Alb  4.0  /  TBili  0.2  /  DBili  x   /  AST  19  /  ALT  30  /  AlkPhos  90  12-25        Labs personally reviewed      ASSESSMENT/PLAN: 	      68 yoF w/ PMHx of Protein S deficiency c/b LUE DVT/PE (2019) on Eliquis, COPD (baseline 3L O2, no chronic steroid/abx, no intubation, last admission 2016), HTN, anxiety/depression, remote back surgery on Suboxone. p/w 2 months progressive worsening ALTMAN, fatigue, unintentional weight loss. Likely PNA vs COPD Exacerbation. Patient still persistently tachycardic. Will discuss with Pulm about further treatment options.     1. Sinus Tachycardia  - ECG ST with no ischemic characteristics noted  - TTE with preserved EF, no WMA  - CT neg for PE  - Neg for LE DVT  - Likely 2/2 acute infection  - Cont with Metoprolol 25mg PO BID  - Repeat EKG 12/24 NSR     2. COPD Exacerbation   AFB smear (-) x 3, f/u cultures; follow up quantiferon gold (indeterminate)  - Pulm recs appreciated: - Continue Prednisone, Symbicort 160/4.5 BID- AFB smear (-) x 3, f/u cultures, Mucinex  - Supplemental oxygen to goal >88%, remains on 1-2L NC    3. Hx of DVT  - DVT negative  - CT neg for PE  - Dimer pending  - c/w Eliquis 5mg PO BID    4. Hypertension  - BP well controlled on Metoprolol and amlodipine 5mg PO daily         Lilia Schumacher, FILIBERTO-NP   Andrei Daniel DO [facility]  Cardiovascular Medicine  [address], [address]  Available through call or text on [employer] TEAMs  Office: [phone]   DATE OF SERVICE: 12-26-23 @ 16:23    Patient is a 68y old  Female who presents with a chief complaint of Chronic progressively worsening dyspnea with minimal exertion + coughs (26 Dec 2023 07:06)      INTERVAL HISTORY: Feels ok.     REVIEW OF SYSTEMS:  CONSTITUTIONAL: No weakness  EYES/ENT: No visual changes;  No throat pain   NECK: No pain or stiffness  RESPIRATORY: No cough, wheezing; No shortness of breath  CARDIOVASCULAR: No chest pain or palpitations  GASTROINTESTINAL: No abdominal  pain. No nausea, vomiting, or hematemesis  GENITOURINARY: No dysuria, frequency or hematuria  NEUROLOGICAL: No stroke like symptoms  SKIN: No rashes    	  MEDICATIONS:  amLODIPine   Tablet 5 milliGRAM(s) Oral daily  metoprolol tartrate 25 milliGRAM(s) Oral two times a day        PHYSICAL EXAM:  T(C): 36.6 (12-26-23 @ 13:28), Max: 37.3 (12-25-23 @ 20:33)  HR: 82 (12-26-23 @ 13:28) (82 - 123)  BP: 113/63 (12-26-23 @ 13:28) (113/63 - 144/72)  RR: 18 (12-26-23 @ 13:28) (18 - 18)  SpO2: 96% (12-26-23 @ 13:28) (96% - 97%)  Wt(kg): --  I&O's Summary    26 Dec 2023 07:01  -  26 Dec 2023 16:23  --------------------------------------------------------  IN: 350 mL / OUT: 0 mL / NET: 350 mL          Appearance: In no distress	  HEENT:    PERRL, EOMI	  Cardiovascular:  S1 S2, No JVD  Respiratory: Lungs clear to auscultation	  Gastrointestinal:  Soft, Non-tender, + BS	  Vascularature:  No edema of LE  Psychiatric: Appropriate affect   Neuro: no acute focal deficits                               9.6    18.79 )-----------( 699      ( 26 Dec 2023 07:09 )             31.2     12-26    136  |  95<L>  |  31<H>  ----------------------------<  73  4.3   |  33<H>  |  0.61    Ca    9.5      26 Dec 2023 07:08  Phos  3.4     12-26  Mg     2.2     12-26    TPro  7.1  /  Alb  4.0  /  TBili  0.2  /  DBili  x   /  AST  19  /  ALT  30  /  AlkPhos  90  12-25        Labs personally reviewed      ASSESSMENT/PLAN: 	      68 yoF w/ PMHx of Protein S deficiency c/b LUE DVT/PE (2019) on Eliquis, COPD (baseline 3L O2, no chronic steroid/abx, no intubation, last admission 2016), HTN, anxiety/depression, remote back surgery on Suboxone. p/w 2 months progressive worsening ALTMAN, fatigue, unintentional weight loss. Likely PNA vs COPD Exacerbation. Patient still persistently tachycardic. Will discuss with Pulm about further treatment options.     1. Sinus Tachycardia  - ECG ST with no ischemic characteristics noted  - TTE with preserved EF, no WMA  - CT neg for PE  - Neg for LE DVT  - Likely 2/2 acute infection  - Cont with Metoprolol 25mg PO BID  - Repeat EKG 12/24 NSR     2. COPD Exacerbation   AFB smear (-) x 3, f/u cultures; follow up quantiferon gold (indeterminate)  - Pulm recs appreciated: - Continue Prednisone, Symbicort 160/4.5 BID- AFB smear (-) x 3, f/u cultures, Mucinex  - Supplemental oxygen to goal >88%, remains on 1-2L NC    3. Hx of DVT  - DVT negative  - CT neg for PE  - Dimer pending  - c/w Eliquis 5mg PO BID    4. Hypertension  - BP well controlled on Metoprolol and amlodipine 5mg PO daily         Lilia Schumacher, FILIBERTO-NP   Andrei Daniel DO [facility]  Cardiovascular Medicine  [address], [address]  Available through call or text on [employer] TEAMs  Office: [phone]   DATE OF SERVICE: 12-26-23 @ 16:23    Patient is a 68y old  Female who presents with a chief complaint of Chronic progressively worsening dyspnea with minimal exertion + coughs (26 Dec 2023 07:06)      INTERVAL HISTORY: Feels ok.     REVIEW OF SYSTEMS:  CONSTITUTIONAL: No weakness  EYES/ENT: No visual changes;  No throat pain   NECK: No pain or stiffness  RESPIRATORY: No cough, wheezing; No shortness of breath  CARDIOVASCULAR: No chest pain or palpitations  GASTROINTESTINAL: No abdominal  pain. No nausea, vomiting, or hematemesis  GENITOURINARY: No dysuria, frequency or hematuria  NEUROLOGICAL: No stroke like symptoms  SKIN: No rashes    	  MEDICATIONS:  amLODIPine   Tablet 5 milliGRAM(s) Oral daily  metoprolol tartrate 25 milliGRAM(s) Oral two times a day        PHYSICAL EXAM:  T(C): 36.6 (12-26-23 @ 13:28), Max: 37.3 (12-25-23 @ 20:33)  HR: 82 (12-26-23 @ 13:28) (82 - 123)  BP: 113/63 (12-26-23 @ 13:28) (113/63 - 144/72)  RR: 18 (12-26-23 @ 13:28) (18 - 18)  SpO2: 96% (12-26-23 @ 13:28) (96% - 97%)  Wt(kg): --  I&O's Summary    26 Dec 2023 07:01  -  26 Dec 2023 16:23  --------------------------------------------------------  IN: 350 mL / OUT: 0 mL / NET: 350 mL          Appearance: In no distress	  HEENT:    PERRL, EOMI	  Cardiovascular:  S1 S2, No JVD  Respiratory: Lungs clear to auscultation	  Gastrointestinal:  Soft, Non-tender, + BS	  Vascularature:  No edema of LE  Psychiatric: Appropriate affect   Neuro: no acute focal deficits                               9.6    18.79 )-----------( 699      ( 26 Dec 2023 07:09 )             31.2     12-26    136  |  95<L>  |  31<H>  ----------------------------<  73  4.3   |  33<H>  |  0.61    Ca    9.5      26 Dec 2023 07:08  Phos  3.4     12-26  Mg     2.2     12-26    TPro  7.1  /  Alb  4.0  /  TBili  0.2  /  DBili  x   /  AST  19  /  ALT  30  /  AlkPhos  90  12-25        Labs personally reviewed      ASSESSMENT/PLAN: 	  68 yoF w/ PMHx of Protein S deficiency c/b LUE DVT/PE (2019) on Eliquis, COPD (baseline 3L O2, no chronic steroid/abx, no intubation, last admission 2016), HTN, anxiety/depression, remote back surgery on Suboxone. p/w 2 months progressive worsening ALTMAN, fatigue, unintentional weight loss. Likely PNA vs COPD Exacerbation. Patient still persistently tachycardic. Will discuss with Pulm about further treatment options.     1. Sinus Tachycardia  - ECG ST with no ischemic characteristics noted  - TTE with preserved EF, no WMA  - CT neg for PE  - Neg for LE DVT  - Likely 2/2 acute infection  - Cont with Metoprolol 25mg PO BID  - Repeat EKG 12/24 NSR     2. COPD Exacerbation   AFB smear (-) x 3, f/u cultures; follow up quantiferon gold (indeterminate)  - Pulm recs appreciated: - Continue Prednisone, Symbicort 160/4.5 BID- AFB smear (-) x 3, f/u cultures, Mucinex  - Supplemental oxygen to goal >88%, remains on 1-2L NC    3. Hx of DVT  - DVT negative  - CT neg for PE  - c/w Eliquis 5mg PO BID    4. Hypertension  - BP well controlled on Metoprolol and amlodipine 5mg PO daily         Lilia Schumacher, AG-NP   Andrei Daniel DO [facility]  Cardiovascular Medicine  [address], [address]  Available through call or text on [employer] TEAMs  Office: [phone]   DATE OF SERVICE: 12-26-23 @ 16:23    Patient is a 68y old  Female who presents with a chief complaint of Chronic progressively worsening dyspnea with minimal exertion + coughs (26 Dec 2023 07:06)      INTERVAL HISTORY: Feels ok.     REVIEW OF SYSTEMS:  CONSTITUTIONAL: No weakness  EYES/ENT: No visual changes;  No throat pain   NECK: No pain or stiffness  RESPIRATORY: No cough, wheezing; No shortness of breath  CARDIOVASCULAR: No chest pain or palpitations  GASTROINTESTINAL: No abdominal  pain. No nausea, vomiting, or hematemesis  GENITOURINARY: No dysuria, frequency or hematuria  NEUROLOGICAL: No stroke like symptoms  SKIN: No rashes    	  MEDICATIONS:  amLODIPine   Tablet 5 milliGRAM(s) Oral daily  metoprolol tartrate 25 milliGRAM(s) Oral two times a day        PHYSICAL EXAM:  T(C): 36.6 (12-26-23 @ 13:28), Max: 37.3 (12-25-23 @ 20:33)  HR: 82 (12-26-23 @ 13:28) (82 - 123)  BP: 113/63 (12-26-23 @ 13:28) (113/63 - 144/72)  RR: 18 (12-26-23 @ 13:28) (18 - 18)  SpO2: 96% (12-26-23 @ 13:28) (96% - 97%)  Wt(kg): --  I&O's Summary    26 Dec 2023 07:01  -  26 Dec 2023 16:23  --------------------------------------------------------  IN: 350 mL / OUT: 0 mL / NET: 350 mL          Appearance: In no distress	  HEENT:    PERRL, EOMI	  Cardiovascular:  S1 S2, No JVD  Respiratory: Lungs clear to auscultation	  Gastrointestinal:  Soft, Non-tender, + BS	  Vascularature:  No edema of LE  Psychiatric: Appropriate affect   Neuro: no acute focal deficits                               9.6    18.79 )-----------( 699      ( 26 Dec 2023 07:09 )             31.2     12-26    136  |  95<L>  |  31<H>  ----------------------------<  73  4.3   |  33<H>  |  0.61    Ca    9.5      26 Dec 2023 07:08  Phos  3.4     12-26  Mg     2.2     12-26    TPro  7.1  /  Alb  4.0  /  TBili  0.2  /  DBili  x   /  AST  19  /  ALT  30  /  AlkPhos  90  12-25        Labs personally reviewed      ASSESSMENT/PLAN: 	  68 yoF w/ PMHx of Protein S deficiency c/b LUE DVT/PE (2019) on Eliquis, COPD (baseline 3L O2, no chronic steroid/abx, no intubation, last admission 2016), HTN, anxiety/depression, remote back surgery on Suboxone. p/w 2 months progressive worsening ALTMAN, fatigue, unintentional weight loss. Likely PNA vs COPD Exacerbation. Patient still persistently tachycardic. Will discuss with Pulm about further treatment options.     1. Sinus Tachycardia  - ECG ST with no ischemic characteristics noted  - TTE with preserved EF, no WMA  - CT neg for PE  - Neg for LE DVT  - Likely 2/2 acute infection  - Cont with Metoprolol 25mg PO BID  - Repeat EKG 12/24 NSR     2. COPD Exacerbation   AFB smear (-) x 3, f/u cultures; follow up quantiferon gold (indeterminate)  - Pulm recs appreciated: - Continue Prednisone, Symbicort 160/4.5 BID- AFB smear (-) x 3, f/u cultures, Mucinex  - Supplemental oxygen to goal >88%, remains on 1-2L NC    3. Hx of DVT  - DVT negative  - CT neg for PE  - c/w Eliquis 5mg PO BID    4. Hypertension  - BP well controlled on Metoprolol and amlodipine 5mg PO daily         Lilia Schumacher, AG-NP   Andrei Daniel DO [facility]  Cardiovascular Medicine  [address], [address]  Available through call or text on [employer] TEAM[employer]  Office: [phone]

## 2023-12-26 NOTE — DISCHARGE NOTE NURSING/CASE MANAGEMENT/SOCIAL WORK - PATIENT PORTAL LINK FT
You can access the FollowMyHealth Patient Portal offered by [facility] by registering at the following website: [email or web address]. By joining [employer]’s FollowMyHealth portal, you will also be able to view your health information using other applications (apps) compatible with our system. You can access the FollowMyHealth Patient Portal offered by [facility] by registering at the following website: [email or web address]. By joining [employer]’s FollowMyHealth portal, you will also be able to view your health information using other applications (apps) compatible with our system.

## 2023-12-26 NOTE — PROGRESS NOTE ADULT - ATTENDING COMMENTS
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     Seen after duoneb treatment this AM with improved wheezing on exam     - s/p 7 day course of levaquin  - steroids increased back to 40mg pred daily 12/22 --> decreased back to 20mg (12/24--) would continue steroids until seen by pulm as outpatient  - plan to dc with duonebs/hypersal, acapella and outpt pulm rehab--nebulizer delivered to bedside   - outpt repeat CT Chest in 4-6 weeks for HUANG and RUL 9m nodule and then determine need for EBUS and navigational bronch at that time   - s/p AFB sputum x 3 all neg  - anemia workup - c/w HOLLY - start iron qod. needs outpatient GI follow-up for CRC screening if not done recently  - thrombocytosis noted, present in 2016, pt has Hematologist in [place] whom she hasn't seen in years, encouraged outpatient establishment with [facility]. JAK2, MPL, Calreticulin genetic tests sent and can be followed up as outpatient.   - persistent tachycardia, unclear etiology, ?driven by extensive pulmonary disease. not in distress, pain, or PE. EKG showed sinus tach. On lopressor 25 BID, TSH nml, echo with no obvious abnormalities. No PE on previous CTA. Cardiology f/u outpt    WBC stable, pt stable for d/c home on prednisone 20mg until outpt f/u with Dr Covington; scripts to be sent to [facility]. Discharge today    d/w HS 2. total dc planning time 36 minutes 68F protein S deficiency, multiple PE/DVT on eliquis, COPD (on home 3L), former smoker, bronchiectasis presenting with dyspnea, cough, weight loss. 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     Seen after duoneb treatment this AM with improved wheezing on exam     - s/p 7 day course of levaquin  - steroids increased back to 40mg pred daily 12/22 --> decreased back to 20mg (12/24--) would continue steroids until seen by pulm as outpatient  - plan to dc with duonebs/hypersal, acapella and outpt pulm rehab--nebulizer delivered to bedside   - outpt repeat CT Chest in 4-6 weeks for HUANG and RUL 9m nodule and then determine need for EBUS and navigational bronch at that time   - s/p AFB sputum x 3 all neg  - anemia workup - c/w HOLLY - start iron qod. needs outpatient GI follow-up for CRC screening if not done recently  - thrombocytosis noted, present in 2016, pt [place]ologist in [place] whom she hasn't seen in years, [facility]. JAK2, MPL, Calreticulin genetic tests sent and can be followed up as outpatient.   - persistent tachycardia, unclear etiology, ?driven by extensive pulmonary disease. not in distress, pain, or PE. EKG showed sinus tach. On lopressor 25 BID, TSH nml, echo with no obvious abnormalities. No PE on previous CTA. Cardiology f/u outpt    WBC stable, pt stable for d/c home on prednisone 20mg until outpt f/u with Dr Covington; scripts to be sent to [facility]. Discharge today    d/w HS 2. total dc planning time 36 minutes 68F protein S deficiency, multiple PE/DVT on eliquis, COPD (on home 3L), former smoker, bronchiectasis presenting with dyspnea, cough, weight loss. 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     Seen after duoneb treatment this AM with improved wheezing on exam     - s/p 7 day course of levaquin  - steroids increased back to 40mg pred daily 12/22 --> decreased back to 20mg (12/24--) would continue steroids until seen by pulm as outpatient  - plan to dc with duonebs/hypersal, acapella and outpt pulm rehab--nebulizer delivered to bedside   - outpt repeat CT Chest in 4-6 weeks for HUANG and RUL 9m nodule and then determine need for EBUS and navigational bronch at that time   - s/p AFB sputum x 3 all neg  - anemia workup - c/w HOLLY - start iron qod. needs outpatient GI follow-up for CRC screening if not done recently  - thrombocytosis noted, present in 2016, pt [place]ologist in [place] whom she hasn't seen in years, encouraged outpatient establishment with [facility]. JAK2, MPL, Calreticulin genetic tests sent and can be followed up as outpatient.   - persistent tachycardia, unclear etiology, ?driven by extensive pulmonary disease. not in distress, pain, or PE. EKG showed sinus tach. On lopressor 25 BID, TSH nml, echo with no obvious abnormalities. No PE on previous CTA. Cardiology f/u outpt    WBC stable, pt stable for d/c home on prednisone 20mg until outpt f/u with Dr Covington; scripts to be sent to [facility]--will have HTS and xopenex for tomorrow    d/w HS 2. dc [place] pending pulmonary medications from [facility] 68F protein S deficiency, multiple PE/DVT on eliquis, COPD (on home 3L), former smoker, bronchiectasis presenting with dyspnea, cough, weight loss. 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     Seen after duoneb treatment this AM with improved wheezing on exam     - s/p 7 day course of levaquin  - steroids increased back to 40mg pred daily 12/22 --> decreased back to 20mg (12/24--) would continue steroids until seen by pulm as outpatient  - plan to dc with duonebs/hypersal, acapella and outpt pulm rehab--nebulizer delivered to bedside   - outpt repeat CT Chest in 4-6 weeks for HUANG and RUL 9m nodule and then determine need for EBUS and navigational bronch at that time   - s/p AFB sputum x 3 all neg  - anemia workup - c/w HOLLY - start iron qod. needs outpatient GI follow-up for CRC screening if not done recently  - thrombocytosis noted, present in 2016, pt has [place]ologist in [place] whom she hasn't seen in years, encouraged outpatient establishment with [facility]. JAK2, MPL, Calreticulin genetic tests sent and can be followed up as outpatient.   - persistent tachycardia, unclear etiology, ?driven by extensive pulmonary disease. not in distress, pain, or PE. EKG showed sinus tach. On lopressor 25 BID, TSH nml, echo with no obvious abnormalities. No PE on previous CTA. Cardiology f/u outpt    WBC stable, pt stable for d/c home on prednisone 20mg until outpt f/u with Dr Covington; scripts to be sent to [facility]--will have HTS and xopenex for tomorrow    d/w HS 2. dc [place] pending pulmonary medications from [facility]

## 2023-12-26 NOTE — DISCHARGE NOTE NURSING/CASE MANAGEMENT/SOCIAL WORK - NSDCPEFALRISK_GEN_ALL_CORE
For information on Fall & Injury Prevention, visit: [email or web address] OR  [email or web address] OR  https://www.cdc.gov/steadi/patient.html For information on Fall & Injury Prevention, visit: [email or web address] OR  [email or web address] OR  https://www.cdc.gov/steadi/patient.html

## 2023-12-26 NOTE — DISCHARGE NOTE NURSING/CASE MANAGEMENT/SOCIAL WORK - NSDCVIVACCINE_GEN_ALL_CORE_FT
influenza, injectable, quadrivalent, preservative free; 12-Mar-2016 13:59; Rebekah Maldonado (VICKI); [employer]; [id number]; IntraMuscular; Dorsogluteal Left.; 0.5 milliLiter(s); VIS (VIS Published: 07-Aug-2015, VIS Presented: 12-Mar-2016);   pneumococcal polysaccharide PPV23; 12-Mar-2016 14:00; Rebekah Maldonado (VICKI); [employer].; [id number]; IntraMuscular; Deltoid Left.; 0.5 milliLiter(s); VIS (VIS Published: 06-Oct-2009, VIS Presented: 12-Mar-2016);    influenza, injectable, quadrivalent, preservative free; 12-Mar-2016 13:59; Rebekah Maldonado (VICKI); [employer]; [id number]; IntraMuscular; Dorsogluteal Left.; 0.5 milliLiter(s); VIS (VIS Published: 07-Aug-2015, VIS Presented: 12-Mar-2016);   pneumococcal polysaccharide PPV23; 12-Mar-2016 14:00; Rebekah Maldonado (VICKI); [employer] [id number]; IntraMuscular; Deltoid Left.; 0.5 milliLiter(s); VIS (VIS Published: 06-Oct-2009, VIS Presented: 12-Mar-2016);

## 2023-12-26 NOTE — PROGRESS NOTE ADULT - ASSESSMENT
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 p/w 2 months progressive worsening ALTMAN, fatigue, unintentional weight loss. Likely PNA vs COPD Exacerbation. Patient still persistently tachycardic. Will discuss with Pulm about further treatment options.  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 p/w 2 months progressive worsening ALTMAN, fatigue, unintentional weight loss. Likely PNA vs COPD Exacerbation. Patient pending D/C home with Nebulizer treatments.

## 2023-12-27 VITALS
OXYGEN SATURATION: 95 % | DIASTOLIC BLOOD PRESSURE: 80 MMHG | TEMPERATURE: 99 F | WEIGHT: 100.97 LBS | SYSTOLIC BLOOD PRESSURE: 127 MMHG | HEART RATE: 120 BPM | RESPIRATION RATE: 17 BRPM

## 2023-12-27 PROCEDURE — 83880 ASSAY OF NATRIURETIC PEPTIDE: CPT

## 2023-12-27 PROCEDURE — 71045 X-RAY EXAM CHEST 1 VIEW: CPT

## 2023-12-27 PROCEDURE — 71275 CT ANGIOGRAPHY CHEST: CPT | Mod: MA

## 2023-12-27 PROCEDURE — 84295 ASSAY OF SERUM SODIUM: CPT

## 2023-12-27 PROCEDURE — 92610 EVALUATE SWALLOWING FUNCTION: CPT

## 2023-12-27 PROCEDURE — 86480 TB TEST CELL IMMUN MEASURE: CPT

## 2023-12-27 PROCEDURE — 87015 SPECIMEN INFECT AGNT CONCNTJ: CPT

## 2023-12-27 PROCEDURE — 84443 ASSAY THYROID STIM HORMONE: CPT

## 2023-12-27 PROCEDURE — 87637 SARSCOV2&INF A&B&RSV AMP PRB: CPT

## 2023-12-27 PROCEDURE — 99239 HOSP IP/OBS DSCHRG MGMT >30: CPT | Mod: GC

## 2023-12-27 PROCEDURE — 82803 BLOOD GASES ANY COMBINATION: CPT

## 2023-12-27 PROCEDURE — 86901 BLOOD TYPING SEROLOGIC RH(D): CPT

## 2023-12-27 PROCEDURE — 82785 ASSAY OF IGE: CPT

## 2023-12-27 PROCEDURE — 74177 CT ABD & PELVIS W/CONTRAST: CPT | Mod: MA

## 2023-12-27 PROCEDURE — 97161 PT EVAL LOW COMPLEX 20 MIN: CPT

## 2023-12-27 PROCEDURE — 82728 ASSAY OF FERRITIN: CPT

## 2023-12-27 PROCEDURE — 76376 3D RENDER W/INTRP POSTPROCES: CPT

## 2023-12-27 PROCEDURE — 82330 ASSAY OF CALCIUM: CPT

## 2023-12-27 PROCEDURE — 82435 ASSAY OF BLOOD CHLORIDE: CPT

## 2023-12-27 PROCEDURE — 84484 ASSAY OF TROPONIN QUANT: CPT

## 2023-12-27 PROCEDURE — 87449 NOS EACH ORGANISM AG IA: CPT

## 2023-12-27 PROCEDURE — 87641 MR-STAPH DNA AMP PROBE: CPT

## 2023-12-27 PROCEDURE — 85014 HEMATOCRIT: CPT

## 2023-12-27 PROCEDURE — 87206 SMEAR FLUORESCENT/ACID STAI: CPT

## 2023-12-27 PROCEDURE — 83605 ASSAY OF LACTIC ACID: CPT

## 2023-12-27 PROCEDURE — 96375 TX/PRO/DX INJ NEW DRUG ADDON: CPT

## 2023-12-27 PROCEDURE — 93356 MYOCRD STRAIN IMG SPCKL TRCK: CPT

## 2023-12-27 PROCEDURE — 36415 COLL VENOUS BLD VENIPUNCTURE: CPT

## 2023-12-27 PROCEDURE — 92611 MOTION FLUOROSCOPY/SWALLOW: CPT

## 2023-12-27 PROCEDURE — 84436 ASSAY OF TOTAL THYROXINE: CPT

## 2023-12-27 PROCEDURE — 85027 COMPLETE CBC AUTOMATED: CPT

## 2023-12-27 PROCEDURE — 93306 TTE W/DOPPLER COMPLETE: CPT

## 2023-12-27 PROCEDURE — 74230 X-RAY XM SWLNG FUNCJ C+: CPT

## 2023-12-27 PROCEDURE — 80053 COMPREHEN METABOLIC PANEL: CPT

## 2023-12-27 PROCEDURE — 93005 ELECTROCARDIOGRAM TRACING: CPT

## 2023-12-27 PROCEDURE — 97116 GAIT TRAINING THERAPY: CPT

## 2023-12-27 PROCEDURE — 87640 STAPH A DNA AMP PROBE: CPT

## 2023-12-27 PROCEDURE — 87040 BLOOD CULTURE FOR BACTERIA: CPT

## 2023-12-27 PROCEDURE — 99285 EMERGENCY DEPT VISIT HI MDM: CPT

## 2023-12-27 PROCEDURE — 86803 HEPATITIS C AB TEST: CPT

## 2023-12-27 PROCEDURE — 86850 RBC ANTIBODY SCREEN: CPT

## 2023-12-27 PROCEDURE — 85652 RBC SED RATE AUTOMATED: CPT

## 2023-12-27 PROCEDURE — 87086 URINE CULTURE/COLONY COUNT: CPT

## 2023-12-27 PROCEDURE — 85018 HEMOGLOBIN: CPT

## 2023-12-27 PROCEDURE — 85025 COMPLETE CBC W/AUTO DIFF WBC: CPT

## 2023-12-27 PROCEDURE — 96374 THER/PROPH/DIAG INJ IV PUSH: CPT

## 2023-12-27 PROCEDURE — 87116 MYCOBACTERIA CULTURE: CPT

## 2023-12-27 PROCEDURE — 83550 IRON BINDING TEST: CPT

## 2023-12-27 PROCEDURE — 80048 BASIC METABOLIC PNL TOTAL CA: CPT

## 2023-12-27 PROCEDURE — 82103 ALPHA-1-ANTITRYPSIN TOTAL: CPT

## 2023-12-27 PROCEDURE — 86900 BLOOD TYPING SEROLOGIC ABO: CPT

## 2023-12-27 PROCEDURE — 81001 URINALYSIS AUTO W/SCOPE: CPT

## 2023-12-27 PROCEDURE — 93970 EXTREMITY STUDY: CPT

## 2023-12-27 PROCEDURE — 84132 ASSAY OF SERUM POTASSIUM: CPT

## 2023-12-27 PROCEDURE — 85610 PROTHROMBIN TIME: CPT

## 2023-12-27 PROCEDURE — 85730 THROMBOPLASTIN TIME PARTIAL: CPT

## 2023-12-27 PROCEDURE — 82274 ASSAY TEST FOR BLOOD FECAL: CPT

## 2023-12-27 PROCEDURE — 81338 MPL GENE COMMON VARIANTS: CPT

## 2023-12-27 PROCEDURE — 82784 ASSAY IGA/IGD/IGG/IGM EACH: CPT

## 2023-12-27 PROCEDURE — 87899 AGENT NOS ASSAY W/OPTIC: CPT

## 2023-12-27 PROCEDURE — 84145 PROCALCITONIN (PCT): CPT

## 2023-12-27 PROCEDURE — 83615 LACTATE (LD) (LDH) ENZYME: CPT

## 2023-12-27 PROCEDURE — 87070 CULTURE OTHR SPECIMN AEROBIC: CPT

## 2023-12-27 PROCEDURE — 82947 ASSAY GLUCOSE BLOOD QUANT: CPT

## 2023-12-27 PROCEDURE — 86140 C-REACTIVE PROTEIN: CPT

## 2023-12-27 PROCEDURE — 94640 AIRWAY INHALATION TREATMENT: CPT

## 2023-12-27 PROCEDURE — 86431 RHEUMATOID FACTOR QUANT: CPT

## 2023-12-27 PROCEDURE — 83540 ASSAY OF IRON: CPT

## 2023-12-27 PROCEDURE — 86038 ANTINUCLEAR ANTIBODIES: CPT

## 2023-12-27 PROCEDURE — 83735 ASSAY OF MAGNESIUM: CPT

## 2023-12-27 PROCEDURE — 84100 ASSAY OF PHOSPHORUS: CPT

## 2023-12-27 PROCEDURE — 81219 CALR GENE COM VARIANTS: CPT

## 2023-12-27 PROCEDURE — 97530 THERAPEUTIC ACTIVITIES: CPT

## 2023-12-27 PROCEDURE — 81270 JAK2 GENE: CPT

## 2023-12-27 RX ADMIN — Medication 75 MILLIGRAM(S): at 12:24

## 2023-12-27 RX ADMIN — Medication 1200 MILLIGRAM(S): at 05:08

## 2023-12-27 RX ADMIN — TIOTROPIUM BROMIDE 2 PUFF(S): 18 CAPSULE ORAL; RESPIRATORY (INHALATION) at 12:25

## 2023-12-27 RX ADMIN — APIXABAN 5 MILLIGRAM(S): 2.5 TABLET, FILM COATED ORAL at 05:08

## 2023-12-27 RX ADMIN — LEVALBUTEROL 0.31 MILLIGRAM(S): 1.25 SOLUTION, CONCENTRATE RESPIRATORY (INHALATION) at 13:38

## 2023-12-27 RX ADMIN — AMLODIPINE BESYLATE 5 MILLIGRAM(S): 2.5 TABLET ORAL at 05:08

## 2023-12-27 RX ADMIN — PANTOPRAZOLE SODIUM 40 MILLIGRAM(S): 20 TABLET, DELAYED RELEASE ORAL at 05:07

## 2023-12-27 RX ADMIN — BUDESONIDE AND FORMOTEROL FUMARATE DIHYDRATE 2 PUFF(S): 160; 4.5 AEROSOL RESPIRATORY (INHALATION) at 05:08

## 2023-12-27 RX ADMIN — Medication 25 MILLIGRAM(S): at 05:08

## 2023-12-27 RX ADMIN — Medication 1 TABLET(S): at 12:24

## 2023-12-27 RX ADMIN — LEVALBUTEROL 0.31 MILLIGRAM(S): 1.25 SOLUTION, CONCENTRATE RESPIRATORY (INHALATION) at 05:07

## 2023-12-27 RX ADMIN — POLYETHYLENE GLYCOL 3350 17 GRAM(S): 17 POWDER, FOR SOLUTION ORAL at 12:23

## 2023-12-27 RX ADMIN — SODIUM CHLORIDE 4 MILLILITER(S): 9 INJECTION INTRAMUSCULAR; INTRAVENOUS; SUBCUTANEOUS at 12:23

## 2023-12-27 RX ADMIN — BUPRENORPHINE AND NALOXONE 2 TABLET(S): 2; .5 TABLET SUBLINGUAL at 10:53

## 2023-12-27 RX ADMIN — Medication 1 SPRAY(S): at 05:08

## 2023-12-27 RX ADMIN — Medication 20 MILLIGRAM(S): at 05:08

## 2023-12-27 RX ADMIN — SODIUM CHLORIDE 4 MILLILITER(S): 9 INJECTION INTRAMUSCULAR; INTRAVENOUS; SUBCUTANEOUS at 05:07

## 2023-12-27 NOTE — PROGRESS NOTE ADULT - PROBLEM SELECTOR PLAN 1
Now presenting with worsening ALTMAN and fatigue which appears to be 2/2 PNA vs. MAC as oppose to exacerbation. Home pulm regimen Advair 500-50 + Albuterol    Plan:  >Sating well on baseline O2 3L  - Lowered Prednisone to 20mg PO daily, will continue for 5 days, then a 5 day course of 10mg   - c/w xopenex q6 standing+ Hypersal 3% q6 + Mucinex + Flonase for airway clearance  - Hold ICS-LABA while in exacerbation  - c/w NC, wean as tolerated, continuous pulse ox  -No new findings on CXR   -Started Symbicort BID   -Pulmonology on board-appreciate further recommendations. Now presenting with worsening ALTMAN and fatigue which appears to be 2/2 PNA vs. MAC as oppose to exacerbation. Home pulm regimen Advair 500-50 + Albuterol    Plan:  >Sating well on baseline O2 3L  - Lowered Prednisone to 20mg PO daily, will continue for 5 days, then a 5 day course of 10mg   - c/w xopenex q6 standing+ Hypersal 3% q6 + Mucinex + Flonase for airway clearance  - Hold ICS-LABA while in exacerbation  - c/w NC, wean as tolerated, continuous pulse ox  -No new findings on CXR   -Started Symbicort BID   -Pulmonology on board-appreciate further recommendations.  -Will d/c home

## 2023-12-27 NOTE — PROGRESS NOTE ADULT - ASSESSMENT
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 p/w 2 months progressive worsening ALTMAN, fatigue, unintentional weight loss. Likely PNA vs COPD Exacerbation. Patient pending D/C home with Nebulizer treatments.  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 p/w 2 months progressive worsening ALTMAN, fatigue, unintentional weight loss. Likely PNA vs COPD Exacerbation. Will D/C patient home.

## 2023-12-27 NOTE — PROGRESS NOTE ADULT - ATTENDING SUPERVISION STATEMENT
Resident
Advancement-Rotation Flap Text: The defect edges were debeveled with a #15 scalpel blade.  Given the location of the defect, shape of the defect and the proximity to free margins an advancement-rotation flap was deemed most appropriate.  Using a sterile surgical marker, an appropriate flap was drawn incorporating the defect and placing the expected incisions within the relaxed skin tension lines where possible. The area thus outlined was incised deep to adipose tissue with a #15 scalpel blade.  The skin margins were undermined to an appropriate distance in all directions utilizing iris scissors.

## 2023-12-27 NOTE — PROGRESS NOTE ADULT - SUBJECTIVE AND OBJECTIVE BOX
DATE OF SERVICE: 12-27-23 @ 16:05    Patient is a 68y old  Female who presents with a chief complaint of Chronic progressively worsening dyspnea with minimal exertion + coughs (27 Dec 2023 07:25)      INTERVAL HISTORY: Feels ok.     REVIEW OF SYSTEMS:  CONSTITUTIONAL: No weakness  EYES/ENT: No visual changes;  No throat pain   NECK: No pain or stiffness  RESPIRATORY: No cough, wheezing; No shortness of breath  CARDIOVASCULAR: No chest pain or palpitations  GASTROINTESTINAL: No abdominal  pain. No nausea, vomiting, or hematemesis  GENITOURINARY: No dysuria, frequency or hematuria  NEUROLOGICAL: No stroke like symptoms  SKIN: No rashes    	  MEDICATIONS:  amLODIPine   Tablet 5 milliGRAM(s) Oral daily  metoprolol tartrate 25 milliGRAM(s) Oral two times a day        PHYSICAL EXAM:  T(C): 37.2 (12-27-23 @ 13:00), Max: 37.2 (12-27-23 @ 13:00)  HR: 120 (12-27-23 @ 13:00) (99 - 120)  BP: 127/80 (12-27-23 @ 13:00) (125/74 - 143/84)  RR: 17 (12-27-23 @ 13:00) (17 - 18)  SpO2: 95% (12-27-23 @ 13:00) (95% - 97%)  Wt(kg): --  I&O's Summary    26 Dec 2023 07:01  -  27 Dec 2023 07:00  --------------------------------------------------------  IN: 600 mL / OUT: 0 mL / NET: 600 mL          Appearance: In no distress	  HEENT:    PERRL, EOMI	  Cardiovascular:  S1 S2, No JVD  Respiratory: Lungs clear to auscultation	  Gastrointestinal:  Soft, Non-tender, + BS	  Vascularature:  No edema of LE  Psychiatric: Appropriate affect   Neuro: no acute focal deficits                               9.6    18.79 )-----------( 699      ( 26 Dec 2023 07:09 )             31.2     12-26    136  |  95<L>  |  31<H>  ----------------------------<  73  4.3   |  33<H>  |  0.61    Ca    9.5      26 Dec 2023 07:08  Phos  3.4     12-26  Mg     2.2     12-26          Labs personally reviewed      ASSESSMENT/PLAN: 	      68 yoF w/ PMHx of Protein S deficiency c/b LUE DVT/PE (2019) on Eliquis, COPD (baseline 3L O2, no chronic steroid/abx, no intubation, last admission 2016), HTN, anxiety/depression, remote back surgery on Suboxone. p/w 2 months progressive worsening ALTMAN, fatigue, unintentional weight loss. Likely PNA vs COPD Exacerbation. Patient still persistently tachycardic. Will discuss with Pulm about further treatment options.     1. Sinus Tachycardia  - ECG ST with no ischemic characteristics noted  - TTE with preserved EF, no WMA  - CT neg for PE  - Neg for LE DVT  - Likely 2/2 acute infection  - Cont with Metoprolol 25mg PO BID  - Repeat EKG 12/24 NSR     2. COPD Exacerbation   AFB smear (-) x 3, f/u cultures; follow up quantiferon gold (indeterminate)  - Pulm recs appreciated: - Continue Prednisone, Symbicort 160/4.5 BID- AFB smear (-) x 3, f/u cultures, Mucinex  - Supplemental oxygen to goal >88%, remains on 1-2L NC    3. Hx of DVT  - DVT negative  - CT neg for PE  - c/w Eliquis 5mg PO BID    4. Hypertension  - BP well controlled on Metoprolol and amlodipine 5mg PO daily       FILIBERTO Grimaldo-CAROLYNE Daniel DO [facility]  Cardiovascular Medicine  [address] [place] [address], [address]  Available through call or text on [employer] TE[employer]  Office: [phone]   DATE OF SERVICE: 12-27-23 @ 16:05    Patient is a 68y old  Female who presents with a chief complaint of Chronic progressively worsening dyspnea with minimal exertion + coughs (27 Dec 2023 07:25)      INTERVAL HISTORY: Feels ok.     REVIEW OF SYSTEMS:  CONSTITUTIONAL: No weakness  EYES/ENT: No visual changes;  No throat pain   NECK: No pain or stiffness  RESPIRATORY: No cough, wheezing; No shortness of breath  CARDIOVASCULAR: No chest pain or palpitations  GASTROINTESTINAL: No abdominal  pain. No nausea, vomiting, or hematemesis  GENITOURINARY: No dysuria, frequency or hematuria  NEUROLOGICAL: No stroke like symptoms  SKIN: No rashes    	  MEDICATIONS:  amLODIPine   Tablet 5 milliGRAM(s) Oral daily  metoprolol tartrate 25 milliGRAM(s) Oral two times a day        PHYSICAL EXAM:  T(C): 37.2 (12-27-23 @ 13:00), Max: 37.2 (12-27-23 @ 13:00)  HR: 120 (12-27-23 @ 13:00) (99 - 120)  BP: 127/80 (12-27-23 @ 13:00) (125/74 - 143/84)  RR: 17 (12-27-23 @ 13:00) (17 - 18)  SpO2: 95% (12-27-23 @ 13:00) (95% - 97%)  Wt(kg): --  I&O's Summary    26 Dec 2023 07:01  -  27 Dec 2023 07:00  --------------------------------------------------------  IN: 600 mL / OUT: 0 mL / NET: 600 mL          Appearance: In no distress	  HEENT:    PERRL, EOMI	  Cardiovascular:  S1 S2, No JVD  Respiratory: Lungs clear to auscultation	  Gastrointestinal:  Soft, Non-tender, + BS	  Vascularature:  No edema of LE  Psychiatric: Appropriate affect   Neuro: no acute focal deficits                               9.6    18.79 )-----------( 699      ( 26 Dec 2023 07:09 )             31.2     12-26    136  |  95<L>  |  31<H>  ----------------------------<  73  4.3   |  33<H>  |  0.61    Ca    9.5      26 Dec 2023 07:08  Phos  3.4     12-26  Mg     2.2     12-26          Labs personally reviewed      ASSESSMENT/PLAN: 	      68 yoF w/ PMHx of Protein S deficiency c/b LUE DVT/PE (2019) on Eliquis, COPD (baseline 3L O2, no chronic steroid/abx, no intubation, last admission 2016), HTN, anxiety/depression, remote back surgery on Suboxone. p/w 2 months progressive worsening ALTMAN, fatigue, unintentional weight loss. Likely PNA vs COPD Exacerbation. Patient still persistently tachycardic. Will discuss with Pulm about further treatment options.     1. Sinus Tachycardia  - ECG ST with no ischemic characteristics noted  - TTE with preserved EF, no WMA  - CT neg for PE  - Neg for LE DVT  - Likely 2/2 acute infection  - Cont with Metoprolol 25mg PO BID  - Repeat EKG 12/24 NSR     2. COPD Exacerbation   AFB smear (-) x 3, f/u cultures; follow up quantiferon gold (indeterminate)  - Pulm recs appreciated: - Continue Prednisone, Symbicort 160/4.5 BID- AFB smear (-) x 3, f/u cultures, Mucinex  - Supplemental oxygen to goal >88%, remains on 1-2L NC    3. Hx of DVT  - DVT negative  - CT neg for PE  - c/w Eliquis 5mg PO BID    4. Hypertension  - BP well controlled on Metoprolol and amlodipine 5mg PO daily       FILIBERTO Grimaldo-CAROLYNE Daniel DO [facility]  Cardiovascular Medicine  [address] [place] [address], [address]  Available through call or text on [employer] TEAM[employer]  Office: [phone]   DATE OF SERVICE: 12-27-23 @ 16:05    Patient is a 68y old  Female who presents with a chief complaint of Chronic progressively worsening dyspnea with minimal exertion + coughs (27 Dec 2023 07:25)      INTERVAL HISTORY: Feels ok.     REVIEW OF SYSTEMS:  CONSTITUTIONAL: No weakness  EYES/ENT: No visual changes;  No throat pain   NECK: No pain or stiffness  RESPIRATORY: No cough, wheezing; No shortness of breath  CARDIOVASCULAR: No chest pain or palpitations  GASTROINTESTINAL: No abdominal  pain. No nausea, vomiting, or hematemesis  GENITOURINARY: No dysuria, frequency or hematuria  NEUROLOGICAL: No stroke like symptoms  SKIN: No rashes    	  MEDICATIONS:  amLODIPine   Tablet 5 milliGRAM(s) Oral daily  metoprolol tartrate 25 milliGRAM(s) Oral two times a day        PHYSICAL EXAM:  T(C): 37.2 (12-27-23 @ 13:00), Max: 37.2 (12-27-23 @ 13:00)  HR: 120 (12-27-23 @ 13:00) (99 - 120)  BP: 127/80 (12-27-23 @ 13:00) (125/74 - 143/84)  RR: 17 (12-27-23 @ 13:00) (17 - 18)  SpO2: 95% (12-27-23 @ 13:00) (95% - 97%)  Wt(kg): --  I&O's Summary    26 Dec 2023 07:01  -  27 Dec 2023 07:00  --------------------------------------------------------  IN: 600 mL / OUT: 0 mL / NET: 600 mL          Appearance: In no distress	  HEENT:    PERRL, EOMI	  Cardiovascular:  S1 S2, No JVD  Respiratory: Lungs clear to auscultation	  Gastrointestinal:  Soft, Non-tender, + BS	  Vascularature:  No edema of LE  Psychiatric: Appropriate affect   Neuro: no acute focal deficits                               9.6    18.79 )-----------( 699      ( 26 Dec 2023 07:09 )             31.2     12-26    136  |  95<L>  |  31<H>  ----------------------------<  73  4.3   |  33<H>  |  0.61    Ca    9.5      26 Dec 2023 07:08  Phos  3.4     12-26  Mg     2.2     12-26          Labs personally reviewed      ASSESSMENT/PLAN: 	  68 yoF w/ PMHx of Protein S deficiency c/b LUE DVT/PE (2019) on Eliquis, COPD (baseline 3L O2, no chronic steroid/abx, no intubation, last admission 2016), HTN, anxiety/depression, remote back surgery on Suboxone. p/w 2 months progressive worsening ALTMAN, fatigue, unintentional weight loss. Likely PNA vs COPD Exacerbation. Patient still persistently tachycardic. Will discuss with Pulm about further treatment options.     1. Sinus Tachycardia  - ECG ST with no ischemic characteristics noted  - TTE with preserved EF, no WMA  - CT neg for PE  - Neg for LE DVT  - Likely 2/2 acute infection  - Cont with Metoprolol 25mg PO BID  - Repeat EKG 12/24 NSR     2. COPD Exacerbation   AFB smear (-) x 3, f/u cultures; follow up quantiferon gold (indeterminate)  - Pulm recs appreciated: - Continue Prednisone, Symbicort 160/4.5 BID- AFB smear (-) x 3, f/u cultures, Mucinex  - Supplemental oxygen to goal >88%, remains on 1-2L NC    3. Hx of DVT  - DVT negative  - CT neg for PE  - c/w Eliquis 5mg PO BID    4. Hypertension  - BP well controlled on Metoprolol and amlodipine 5mg PO daily       FILIBERTO Grimaldo-CAROLYNE Daniel DO [facility]  Cardiovascular Medicine  [address] [place] [address], [address]  Available through call or text on [employer] TE[employer]  Office: [phone]   DATE OF SERVICE: 12-27-23 @ 16:05    Patient is a 68y old  Female who presents with a chief complaint of Chronic progressively worsening dyspnea with minimal exertion + coughs (27 Dec 2023 07:25)      INTERVAL HISTORY: Feels ok.     REVIEW OF SYSTEMS:  CONSTITUTIONAL: No weakness  EYES/ENT: No visual changes;  No throat pain   NECK: No pain or stiffness  RESPIRATORY: No cough, wheezing; No shortness of breath  CARDIOVASCULAR: No chest pain or palpitations  GASTROINTESTINAL: No abdominal  pain. No nausea, vomiting, or hematemesis  GENITOURINARY: No dysuria, frequency or hematuria  NEUROLOGICAL: No stroke like symptoms  SKIN: No rashes    	  MEDICATIONS:  amLODIPine   Tablet 5 milliGRAM(s) Oral daily  metoprolol tartrate 25 milliGRAM(s) Oral two times a day        PHYSICAL EXAM:  T(C): 37.2 (12-27-23 @ 13:00), Max: 37.2 (12-27-23 @ 13:00)  HR: 120 (12-27-23 @ 13:00) (99 - 120)  BP: 127/80 (12-27-23 @ 13:00) (125/74 - 143/84)  RR: 17 (12-27-23 @ 13:00) (17 - 18)  SpO2: 95% (12-27-23 @ 13:00) (95% - 97%)  Wt(kg): --  I&O's Summary    26 Dec 2023 07:01  -  27 Dec 2023 07:00  --------------------------------------------------------  IN: 600 mL / OUT: 0 mL / NET: 600 mL          Appearance: In no distress	  HEENT:    PERRL, EOMI	  Cardiovascular:  S1 S2, No JVD  Respiratory: Lungs clear to auscultation	  Gastrointestinal:  Soft, Non-tender, + BS	  Vascularature:  No edema of LE  Psychiatric: Appropriate affect   Neuro: no acute focal deficits                               9.6    18.79 )-----------( 699      ( 26 Dec 2023 07:09 )             31.2     12-26    136  |  95<L>  |  31<H>  ----------------------------<  73  4.3   |  33<H>  |  0.61    Ca    9.5      26 Dec 2023 07:08  Phos  3.4     12-26  Mg     2.2     12-26          Labs personally reviewed      ASSESSMENT/PLAN: 	  68 yoF w/ PMHx of Protein S deficiency c/b LUE DVT/PE (2019) on Eliquis, COPD (baseline 3L O2, no chronic steroid/abx, no intubation, last admission 2016), HTN, anxiety/depression, remote back surgery on Suboxone. p/w 2 months progressive worsening ALTMAN, fatigue, unintentional weight loss. Likely PNA vs COPD Exacerbation. Patient still persistently tachycardic. Will discuss with Pulm about further treatment options.     1. Sinus Tachycardia  - ECG ST with no ischemic characteristics noted  - TTE with preserved EF, no WMA  - CT neg for PE  - Neg for LE DVT  - Likely 2/2 acute infection  - Cont with Metoprolol 25mg PO BID  - Repeat EKG 12/24 NSR     2. COPD Exacerbation   AFB smear (-) x 3, f/u cultures; follow up quantiferon gold (indeterminate)  - Pulm recs appreciated: - Continue Prednisone, Symbicort 160/4.5 BID- AFB smear (-) x 3, f/u cultures, Mucinex  - Supplemental oxygen to goal >88%, remains on 1-2L NC    3. Hx of DVT  - DVT negative  - CT neg for PE  - c/w Eliquis 5mg PO BID    4. Hypertension  - BP well controlled on Metoprolol and amlodipine 5mg PO daily       FILIBERTO Grimaldo-CAROLYNE Daniel DO [facility]  Cardiovascular Medicine  [address] [place] [address], [address]  Available through call or text on [employer] TEAM[employer]  Office: [phone]

## 2023-12-27 NOTE — PROGRESS NOTE ADULT - REASON FOR ADMISSION
Chronic progressively worsening dyspnea with minimal exertion + coughs

## 2023-12-27 NOTE — PROGRESS NOTE ADULT - PROVIDER SPECIALTY LIST ADULT
Pulmonology
Cardiology
Internal Medicine
Pulmonology
Cardiology
Internal Medicine
Cardiology
Internal Medicine
Cardiology
Cardiology
Pulmonology
Internal Medicine
Internal Medicine
Pulmonology
Pulmonology
Internal Medicine

## 2023-12-27 NOTE — PROGRESS NOTE ADULT - SUBJECTIVE AND OBJECTIVE BOX
PROGRESS NOTE:   Authored by Amadeo Garcia MD  Patient is a 68y old  Female who presents with a chief complaint of Chronic progressively worsening dyspnea with minimal exertion + coughs (26 Dec 2023 16:22)      SUBJECTIVE / OVERNIGHT EVENTS:  No acute events overnight.     ADDITIONAL REVIEW OF SYSTEMS:    MEDICATIONS  (STANDING):  amLODIPine   Tablet 5 milliGRAM(s) Oral daily  apixaban 5 milliGRAM(s) Oral every 12 hours  budesonide 160 MICROgram(s)/formoterol 4.5 MICROgram(s) Inhaler 2 Puff(s) Inhalation two times a day  buprenorphine 2 mG/naloxone 0.5 mG SL  Tablet 2 Tablet(s) SubLingual <User Schedule>  ferrous    sulfate 325 milliGRAM(s) Oral <User Schedule>  fluticasone propionate 50 MICROgram(s)/spray Nasal Spray 1 Spray(s) Both Nostrils two times a day  guaiFENesin ER 1200 milliGRAM(s) Oral every 12 hours  influenza  Vaccine (HIGH DOSE) 0.7 milliLiter(s) IntraMuscular once  levalbuterol Inhalation 0.31 milliGRAM(s) Inhalation every 6 hours  metoprolol tartrate 25 milliGRAM(s) Oral two times a day  multivitamin 1 Tablet(s) Oral daily  pantoprazole    Tablet 40 milliGRAM(s) Oral before breakfast  polyethylene glycol 3350 17 Gram(s) Oral daily  predniSONE   Tablet 20 milliGRAM(s) Oral daily  sodium chloride 3%  Inhalation 4 milliLiter(s) Inhalation every 6 hours  tiotropium 2.5 MICROgram(s) Inhaler 2 Puff(s) Inhalation daily  venlafaxine XR. 75 milliGRAM(s) Oral daily    MEDICATIONS  (PRN):  acetaminophen     Tablet .. 650 milliGRAM(s) Oral every 6 hours PRN Temp greater or equal to 38C (100.4F), Mild Pain (1 - 3), Moderate Pain (4 - 6), Severe Pain (7 - 10)  melatonin 5 milliGRAM(s) Oral at bedtime PRN Insomnia      CAPILLARY BLOOD GLUCOSE        I&O's Summary    26 Dec 2023 07:01  -  27 Dec 2023 07:00  --------------------------------------------------------  IN: 600 mL / OUT: 0 mL / NET: 600 mL        PHYSICAL EXAM:  Vital Signs Last 24 Hrs  T(C): 36.4 (27 Dec 2023 04:28), Max: 36.6 (26 Dec 2023 13:28)  T(F): 97.6 (27 Dec 2023 04:28), Max: 97.9 (26 Dec 2023 13:28)  HR: 99 (27 Dec 2023 04:28) (82 - 100)  BP: 135/78 (27 Dec 2023 04:28) (113/63 - 143/84)  BP(mean): --  RR: 17 (27 Dec 2023 04:28) (17 - 18)  SpO2: 96% (27 Dec 2023 04:28) (96% - 97%)    Parameters below as of 27 Dec 2023 04:28  Patient On (Oxygen Delivery Method): nasal cannula  O2 Flow (L/min): 2      GENERAL: No apparent distress.   HEAD:  Atraumatic, Normocephalic  EYES: EOMI, PERRLA, conjunctiva and sclera clear  NECK: Supple, no lymphadenopathy, no elevated JVP  CHEST/LUNG: Clear to auscultation bilateral and symmetric; No wheezes, rales, or rhonchi  HEART: S1 and S2 normal. Regular rate and rhythm; No murmurs, rubs, or gallops  ABDOMEN: Soft, non-tender, non-distended; normal bowel sounds  EXTREMITIES:  2+ peripheral pulses b/l, No clubbing, cyanosis, or edema  NEUROLOGY: A&O x 3, no focal deficits  SKIN: No rashes or lesions    LABS:                        9.6    18.79 )-----------( 699      ( 26 Dec 2023 07:09 )             31.2     12-26    136  |  95<L>  |  31<H>  ----------------------------<  73  4.3   |  33<H>  |  0.61    Ca    9.5      26 Dec 2023 07:08  Phos  3.4     12-26  Mg     2.2     12-26    TPro  7.1  /  Alb  4.0  /  TBili  0.2  /  DBili  x   /  AST  19  /  ALT  30  /  AlkPhos  90  12-25          Urinalysis Basic - ( 26 Dec 2023 07:08 )    Color: x / Appearance: x / SG: x / pH: x  Gluc: 73 mg/dL / Ketone: x  / Bili: x / Urobili: x   Blood: x / Protein: x / Nitrite: x   Leuk Esterase: x / RBC: x / WBC x   Sq Epi: x / Non Sq Epi: x / Bacteria: x          RADIOLOGY & ADDITIONAL TESTS:  Lab Results Reviewed   Imaging Reviewed  Electrocardiogram Reviewed   PROGRESS NOTE:   Authored by Amadeo Garcia MD  Patient is a 68y old  Female who presents with a chief complaint of Chronic progressively worsening dyspnea with minimal exertion + coughs (26 Dec 2023 16:22)      SUBJECTIVE / OVERNIGHT EVENTS:  No acute events overnight. Patient was seen and examined at bedside and did not appear to be in any acute distress. She states that her dyspnea and cough are stable. Denies fevers or chills.       MEDICATIONS  (STANDING):  amLODIPine   Tablet 5 milliGRAM(s) Oral daily  apixaban 5 milliGRAM(s) Oral every 12 hours  budesonide 160 MICROgram(s)/formoterol 4.5 MICROgram(s) Inhaler 2 Puff(s) Inhalation two times a day  buprenorphine 2 mG/naloxone 0.5 mG SL  Tablet 2 Tablet(s) SubLingual <User Schedule>  ferrous    sulfate 325 milliGRAM(s) Oral <User Schedule>  fluticasone propionate 50 MICROgram(s)/spray Nasal Spray 1 Spray(s) Both Nostrils two times a day  guaiFENesin ER 1200 milliGRAM(s) Oral every 12 hours  influenza  Vaccine (HIGH DOSE) 0.7 milliLiter(s) IntraMuscular once  levalbuterol Inhalation 0.31 milliGRAM(s) Inhalation every 6 hours  metoprolol tartrate 25 milliGRAM(s) Oral two times a day  multivitamin 1 Tablet(s) Oral daily  pantoprazole    Tablet 40 milliGRAM(s) Oral before breakfast  polyethylene glycol 3350 17 Gram(s) Oral daily  predniSONE   Tablet 20 milliGRAM(s) Oral daily  sodium chloride 3%  Inhalation 4 milliLiter(s) Inhalation every 6 hours  tiotropium 2.5 MICROgram(s) Inhaler 2 Puff(s) Inhalation daily  venlafaxine XR. 75 milliGRAM(s) Oral daily    MEDICATIONS  (PRN):  acetaminophen     Tablet .. 650 milliGRAM(s) Oral every 6 hours PRN Temp greater or equal to 38C (100.4F), Mild Pain (1 - 3), Moderate Pain (4 - 6), Severe Pain (7 - 10)  melatonin 5 milliGRAM(s) Oral at bedtime PRN Insomnia      CAPILLARY BLOOD GLUCOSE        I&O's Summary    26 Dec 2023 07:01  -  27 Dec 2023 07:00  --------------------------------------------------------  IN: 600 mL / OUT: 0 mL / NET: 600 mL        PHYSICAL EXAM:  Vital Signs Last 24 Hrs  T(C): 36.4 (27 Dec 2023 04:28), Max: 36.6 (26 Dec 2023 13:28)  T(F): 97.6 (27 Dec 2023 04:28), Max: 97.9 (26 Dec 2023 13:28)  HR: 99 (27 Dec 2023 04:28) (82 - 100)  BP: 135/78 (27 Dec 2023 04:28) (113/63 - 143/84)  BP(mean): --  RR: 17 (27 Dec 2023 04:28) (17 - 18)  SpO2: 96% (27 Dec 2023 04:28) (96% - 97%)    Parameters below as of 27 Dec 2023 04:28  Patient On (Oxygen Delivery Method): nasal cannula  O2 Flow (L/min): 2      GENERAL: No apparent distress.   HEAD:  Atraumatic, Normocephalic  EYES: EOMI, PERRLA, conjunctiva and sclera clear  NECK: Supple, no lymphadenopathy, no elevated JVP  CHEST/LUNG: B/L Expiratory wheeze in lower lobes  HEART: S1 and S2 normal. Regular rate and rhythm; No murmurs, rubs, or gallops  ABDOMEN: Soft, non-tender, non-distended; normal bowel sounds  EXTREMITIES:  2+ peripheral pulses b/l, No clubbing, cyanosis, or edema  NEUROLOGY: A&O x 3, no focal deficits  SKIN: No rashes or lesions    LABS:                        9.6    18.79 )-----------( 699      ( 26 Dec 2023 07:09 )             31.2     12-26    136  |  95<L>  |  31<H>  ----------------------------<  73  4.3   |  33<H>  |  0.61    Ca    9.5      26 Dec 2023 07:08  Phos  3.4     12-26  Mg     2.2     12-26    TPro  7.1  /  Alb  4.0  /  TBili  0.2  /  DBili  x   /  AST  19  /  ALT  30  /  AlkPhos  90  12-25          Urinalysis Basic - ( 26 Dec 2023 07:08 )    Color: x / Appearance: x / SG: x / pH: x  Gluc: 73 mg/dL / Ketone: x  / Bili: x / Urobili: x   Blood: x / Protein: x / Nitrite: x   Leuk Esterase: x / RBC: x / WBC x   Sq Epi: x / Non Sq Epi: x / Bacteria: x          RADIOLOGY & ADDITIONAL TESTS:  Lab Results Reviewed   Imaging Reviewed  Electrocardiogram Reviewed

## 2023-12-27 NOTE — PROGRESS NOTE ADULT - NUTRITIONAL ASSESSMENT
This patient has been assessed with a concern for Malnutrition and has been determined to have a diagnosis/diagnoses of Severe protein-calorie malnutrition and Underweight (BMI < 19).    This patient is being managed with:   Diet Regular-  Supplement Feeding Modality:  Oral  Ensure Plus High Protein Cans or Servings Per Day:  1       Frequency:  Three Times a day  Entered: Dec 16 2023 11:11AM  

## 2023-12-27 NOTE — PROGRESS NOTE ADULT - ATTENDING COMMENTS
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     - s/p 7 day course of levaquin  - steroids increased back to 40mg pred daily 12/22 --> decreased back to 20mg (12/24--) would continue steroids until seen by pulm as outpatient  - plan to dc with duonebs/hypersal, acapella and outpt pulm rehab--nebulizer delivered to bedside. meds ready at vivo   - outpt repeat CT Chest in 4-6 weeks for HUANG and RUL 9m nodule and then determine need for EBUS and navigational bronch at that time   - s/p AFB sputum x 3 all neg  - anemia workup - c/w HOLLY - start iron qod. needs outpatient GI follow-up for CRC screening if not done recently  - thrombocytosis noted, present in 2016, pt has Hematologist in [place] whom she hasn't seen in years, encouraged outpatient establishment with [facility]. JAK2, MPL, Calreticulin genetic tests sent and can be followed up as outpatient.   - persistent tachycardia, unclear etiology, ?driven by extensive pulmonary disease. not in distress, pain, or PE. EKG showed sinus tach. On lopressor 25 BID, TSH nml, echo with no obvious abnormalities. No PE on previous CTA. 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     - s/p 7 day course of levaquin  - steroids increased back to 40mg pred daily 12/22 --> decreased back to 20mg (12/24--) would continue steroids until seen by pulm as outpatient  - plan to dc with duonebs/hypersal, acapella and outpt pulm rehab--nebulizer delivered to bedside. meds ready at vivo   - outpt repeat CT Chest in 4-6 weeks for HUANG and RUL 9m nodule and then determine need for EBUS and navigational bronch at that time   - s/p AFB sputum x 3 all neg  - anemia workup - c/w HOLLY - start iron qod. needs outpatient GI follow-up for CRC screening if not done recently  - thrombocytosis noted, present in 2016, pt has Hematologist in [place] whom she hasn't seen in years, encouraged outpatient establishment with [facility]. JAK2, MPL, Calreticulin genetic tests sent and can be followed up as outpatient.   - persistent tachycardia, unclear etiology, ?driven by extensive pulmonary disease. not in distress, pain, or PE. EKG showed sinus tach. On lopressor 25 BID, TSH nml, echo with no obvious abnormalities. No PE on previous CTA. Cardiology f/u outpt    WBC stable, pt stable for d/c home on prednisone 20mg until outpt f/u with Dr Covington    d/w HS 2.total dc planning time 32 minutes

## 2023-12-28 ENCOUNTER — APPOINTMENT (OUTPATIENT)
Dept: PULMONOLOGY | Facility: CLINIC | Age: 68
End: 2023-12-28

## 2023-12-28 ENCOUNTER — NON-APPOINTMENT (OUTPATIENT)
Age: 68
End: 2023-12-28

## 2023-12-29 LAB
CALRETICULIN INTERPRETATION: SIGNIFICANT CHANGE UP
CALRETICULIN INTERPRETATION: SIGNIFICANT CHANGE UP

## 2024-01-01 NOTE — PROGRESS NOTE ADULT - SUBJECTIVE AND OBJECTIVE BOX
DATE OF SERVICE: 12-23-23 @ 15:42    Patient is a 68y old  Female who presents with a chief complaint of Chronic progressively worsening dyspnea with minimal exertion + coughs (23 Dec 2023 08:17)      INTERVAL HISTORY: no complaints     REVIEW OF SYSTEMS:  CONSTITUTIONAL: No weakness  EYES/ENT: No visual changes;  No throat pain   NECK: No pain or stiffness  RESPIRATORY: No cough, wheezing; No shortness of breath  CARDIOVASCULAR: No chest pain or palpitations  GASTROINTESTINAL: No abdominal  pain. No nausea, vomiting, or hematemesis  GENITOURINARY: No dysuria, frequency or hematuria  NEUROLOGICAL: No stroke like symptoms  SKIN: No rashes      	  MEDICATIONS:  amLODIPine   Tablet 5 milliGRAM(s) Oral daily  metoprolol tartrate 25 milliGRAM(s) Oral two times a day        PHYSICAL EXAM:  T(C): 37.1 (12-23-23 @ 14:25), Max: 37.1 (12-23-23 @ 14:25)  HR: 110 (12-23-23 @ 06:00) (86 - 110)  BP: 135/70 (12-23-23 @ 14:25) (131/77 - 136/77)  RR: 20 (12-23-23 @ 14:25) (20 - 20)  SpO2: 96% (12-23-23 @ 14:25) (96% - 98%)  Wt(kg): --  I&O's Summary        Appearance: In no distress	  HEENT:    PERRL, EOMI	  Cardiovascular:  S1 S2, No JVD  Respiratory: Lungs clear to auscultation	  Gastrointestinal:  Soft, Non-tender, + BS	  Vascularature:  No edema of LE  Psychiatric: Appropriate affect   Neuro: no acute focal deficits                               9.3    20.83 )-----------( 786      ( 23 Dec 2023 06:34 )             31.0     12-23    138  |  93<L>  |  26<H>  ----------------------------<  91  4.3   |  35<H>  |  0.59    Ca    9.8      23 Dec 2023 06:37  Phos  3.5     12-23  Mg     2.3     12-23    TPro  7.1  /  Alb  4.0  /  TBili  <0.1<L>  /  DBili  x   /  AST  13  /  ALT  21  /  AlkPhos  87  12-23        Labs personally reviewed      ASSESSMENT/PLAN: 	      68 yoF w/ PMHx of Protein S deficiency c/b LUE DVT/PE (2019) on Eliquis, COPD (baseline 3L O2, no chronic steroid/abx, no intubation, last admission 2016), HTN, anxiety/depression, remote back surgery on Suboxone. p/w 2 months progressive worsening ALTMAN, fatigue, unintentional weight loss. Likely PNA vs COPD Exacerbation. Patient still persistently tachycardic. Will discuss with Pulm about further treatment options.     1. Sinus Tachycardia  - ECG ST with no ischemic characteristics noted  - TTE with preserved EF, no WMA  - CT neg for PE  - Neg for LE DVT  - D-dimer pending  - Likely 2/2 acute infection  - Cont with Metoprolol 25mg PO BID  - Repeat EKG pending to r/o Atrial fib/Flutter    2. COPD Exacerbation   AFB smear (-) x 3, f/u cultures; follow up quantiferon gold  - Pulm recs appreciated: - Continue Prednisone, Symbicort 160/4.5 BID- AFB smear (-) x 3, f/u cultures, Mucinex  - Supplemental oxygen to goal >88%, remains on 1-2L NC    3. Hx of DVT  - DVT negative  - CT neg for PE  - Dimer pending  - c/w Eliquis 5mg PO BID    4. Hypertension  - BP well controlled on Metoprolol and amlodipine 5mg PO daily       STEPHANIE Feldman DO [facility]  Cardiovascular Medicine  [address] [place] [address], [address]  Office: [phone]  Available via call/text on Microsoft Teams  DATE OF SERVICE: 12-23-23 @ 15:42    Patient is a 68y old  Female who presents with a chief complaint of Chronic progressively worsening dyspnea with minimal exertion + coughs (23 Dec 2023 08:17)      INTERVAL HISTORY: no complaints     REVIEW OF SYSTEMS:  CONSTITUTIONAL: No weakness  EYES/ENT: No visual changes;  No throat pain   NECK: No pain or stiffness  RESPIRATORY: No cough, wheezing; No shortness of breath  CARDIOVASCULAR: No chest pain or palpitations  GASTROINTESTINAL: No abdominal  pain. No nausea, vomiting, or hematemesis  GENITOURINARY: No dysuria, frequency or hematuria  NEUROLOGICAL: No stroke like symptoms  SKIN: No rashes      	  MEDICATIONS:  amLODIPine   Tablet 5 milliGRAM(s) Oral daily  metoprolol tartrate 25 milliGRAM(s) Oral two times a day        PHYSICAL EXAM:  T(C): 37.1 (12-23-23 @ 14:25), Max: 37.1 (12-23-23 @ 14:25)  HR: 110 (12-23-23 @ 06:00) (86 - 110)  BP: 135/70 (12-23-23 @ 14:25) (131/77 - 136/77)  RR: 20 (12-23-23 @ 14:25) (20 - 20)  SpO2: 96% (12-23-23 @ 14:25) (96% - 98%)  Wt(kg): --  I&O's Summary        Appearance: In no distress	  HEENT:    PERRL, EOMI	  Cardiovascular:  S1 S2, No JVD  Respiratory: Lungs clear to auscultation	  Gastrointestinal:  Soft, Non-tender, + BS	  Vascularature:  No edema of LE  Psychiatric: Appropriate affect   Neuro: no acute focal deficits                               9.3    20.83 )-----------( 786      ( 23 Dec 2023 06:34 )             31.0     12-23    138  |  93<L>  |  26<H>  ----------------------------<  91  4.3   |  35<H>  |  0.59    Ca    9.8      23 Dec 2023 06:37  Phos  3.5     12-23  Mg     2.3     12-23    TPro  7.1  /  Alb  4.0  /  TBili  <0.1<L>  /  DBili  x   /  AST  13  /  ALT  21  /  AlkPhos  87  12-23        Labs personally reviewed      ASSESSMENT/PLAN: 	      68 yoF w/ PMHx of Protein S deficiency c/b LUE DVT/PE (2019) on Eliquis, COPD (baseline 3L O2, no chronic steroid/abx, no intubation, last admission 2016), HTN, anxiety/depression, remote back surgery on Suboxone. p/w 2 months progressive worsening ALTMAN, fatigue, unintentional weight loss. Likely PNA vs COPD Exacerbation. Patient still persistently tachycardic. Will discuss with Pulm about further treatment options.     1. Sinus Tachycardia  - ECG ST with no ischemic characteristics noted  - TTE with preserved EF, no WMA  - CT neg for PE  - Neg for LE DVT  - D-dimer pending  - Likely 2/2 acute infection  - Cont with Metoprolol 25mg PO BID  - Repeat EKG pending to r/o Atrial fib/Flutter    2. COPD Exacerbation   AFB smear (-) x 3, f/u cultures; follow up quantiferon gold  - Pulm recs appreciated: - Continue Prednisone, Symbicort 160/4.5 BID- AFB smear (-) x 3, f/u cultures, Mucinex  - Supplemental oxygen to goal >88%, remains on 1-2L NC    3. Hx of DVT  - DVT negative  - CT neg for PE  - Dimer pending  - c/w Eliquis 5mg PO BID    4. Hypertension  - BP well controlled on Metoprolol and amlodipine 5mg PO daily       STEPHANIE Feldman DO [facility]  Cardiovascular Medicine  [address] [place] [address], [address]  Office: [phone]  Available via call/text on Microsoft Teams  DATE OF SERVICE: 12-23-23 @ 15:42    Patient is a 68y old  Female who presents with a chief complaint of Chronic progressively worsening dyspnea with minimal exertion + coughs (23 Dec 2023 08:17)      INTERVAL HISTORY: no complaints     REVIEW OF SYSTEMS:  CONSTITUTIONAL: No weakness  EYES/ENT: No visual changes;  No throat pain   NECK: No pain or stiffness  RESPIRATORY: No cough, wheezing; No shortness of breath  CARDIOVASCULAR: No chest pain or palpitations  GASTROINTESTINAL: No abdominal  pain. No nausea, vomiting, or hematemesis  GENITOURINARY: No dysuria, frequency or hematuria  NEUROLOGICAL: No stroke like symptoms  SKIN: No rashes      	  MEDICATIONS:  amLODIPine   Tablet 5 milliGRAM(s) Oral daily  metoprolol tartrate 25 milliGRAM(s) Oral two times a day        PHYSICAL EXAM:  T(C): 37.1 (12-23-23 @ 14:25), Max: 37.1 (12-23-23 @ 14:25)  HR: 110 (12-23-23 @ 06:00) (86 - 110)  BP: 135/70 (12-23-23 @ 14:25) (131/77 - 136/77)  RR: 20 (12-23-23 @ 14:25) (20 - 20)  SpO2: 96% (12-23-23 @ 14:25) (96% - 98%)  Wt(kg): --  I&O's Summary        Appearance: In no distress	  HEENT:    PERRL, EOMI	  Cardiovascular:  S1 S2, No JVD  Respiratory: Lungs clear to auscultation	  Gastrointestinal:  Soft, Non-tender, + BS	  Vascularature:  No edema of LE  Psychiatric: Appropriate affect   Neuro: no acute focal deficits                               9.3    20.83 )-----------( 786      ( 23 Dec 2023 06:34 )             31.0     12-23    138  |  93<L>  |  26<H>  ----------------------------<  91  4.3   |  35<H>  |  0.59    Ca    9.8      23 Dec 2023 06:37  Phos  3.5     12-23  Mg     2.3     12-23    TPro  7.1  /  Alb  4.0  /  TBili  <0.1<L>  /  DBili  x   /  AST  13  /  ALT  21  /  AlkPhos  87  12-23        Labs personally reviewed      ASSESSMENT/PLAN: 	      68 yoF w/ PMHx of Protein S deficiency c/b LUE DVT/PE (2019) on Eliquis, COPD (baseline 3L O2, no chronic steroid/abx, no intubation, last admission 2016), HTN, anxiety/depression, remote back surgery on Suboxone. p/w 2 months progressive worsening ALTMAN, fatigue, unintentional weight loss. Likely PNA vs COPD Exacerbation. Patient still persistently tachycardic. Will discuss with Pulm about further treatment options.     1. Sinus Tachycardia  - ECG ST with no ischemic characteristics noted  - TTE with preserved EF, no WMA  - CT neg for PE  - Neg for LE DVT  - Likely 2/2 acute infection  - Cont with Metoprolol 25mg PO BID  - Repeat EKG pending to r/o Atrial fib/Flutter    2. COPD Exacerbation   AFB smear (-) x 3, f/u cultures; follow up quantiferon gold  - Pulm recs appreciated: - Continue Prednisone, Symbicort 160/4.5 BID- AFB smear (-) x 3, f/u cultures, Mucinex  - Supplemental oxygen to goal >88%, remains on 1-2L NC    3. Hx of DVT  - DVT negative  - CT neg for PE  - Dimer pending  - c/w Eliquis 5mg PO BID    4. Hypertension  - BP well controlled on Metoprolol and amlodipine 5mg PO daily       STEPHANIE Feldman DO [facility]  Cardiovascular Medicine  [address] [place] [address], [address]  Office: [phone]  Available via call/text on Microsoft Teams  DATE OF SERVICE: 12-23-23 @ 15:42    Patient is a 68y old  Female who presents with a chief complaint of Chronic progressively worsening dyspnea with minimal exertion + coughs (23 Dec 2023 08:17)      INTERVAL HISTORY: no complaints     REVIEW OF SYSTEMS:  CONSTITUTIONAL: No weakness  EYES/ENT: No visual changes;  No throat pain   NECK: No pain or stiffness  RESPIRATORY: No cough, wheezing; No shortness of breath  CARDIOVASCULAR: No chest pain or palpitations  GASTROINTESTINAL: No abdominal  pain. No nausea, vomiting, or hematemesis  GENITOURINARY: No dysuria, frequency or hematuria  NEUROLOGICAL: No stroke like symptoms  SKIN: No rashes      	  MEDICATIONS:  amLODIPine   Tablet 5 milliGRAM(s) Oral daily  metoprolol tartrate 25 milliGRAM(s) Oral two times a day        PHYSICAL EXAM:  T(C): 37.1 (12-23-23 @ 14:25), Max: 37.1 (12-23-23 @ 14:25)  HR: 110 (12-23-23 @ 06:00) (86 - 110)  BP: 135/70 (12-23-23 @ 14:25) (131/77 - 136/77)  RR: 20 (12-23-23 @ 14:25) (20 - 20)  SpO2: 96% (12-23-23 @ 14:25) (96% - 98%)  Wt(kg): --  I&O's Summary        Appearance: In no distress	  HEENT:    PERRL, EOMI	  Cardiovascular:  S1 S2, No JVD  Respiratory: Lungs clear to auscultation	  Gastrointestinal:  Soft, Non-tender, + BS	  Vascularature:  No edema of LE  Psychiatric: Appropriate affect   Neuro: no acute focal deficits                               9.3    20.83 )-----------( 786      ( 23 Dec 2023 06:34 )             31.0     12-23    138  |  93<L>  |  26<H>  ----------------------------<  91  4.3   |  35<H>  |  0.59    Ca    9.8      23 Dec 2023 06:37  Phos  3.5     12-23  Mg     2.3     12-23    TPro  7.1  /  Alb  4.0  /  TBili  <0.1<L>  /  DBili  x   /  AST  13  /  ALT  21  /  AlkPhos  87  12-23        Labs personally reviewed      ASSESSMENT/PLAN: 	      68 yoF w/ PMHx of Protein S deficiency c/b LUE DVT/PE (2019) on Eliquis, COPD (baseline 3L O2, no chronic steroid/abx, no intubation, last admission 2016), HTN, anxiety/depression, remote back surgery on Suboxone. p/w 2 months progressive worsening ALTMAN, fatigue, unintentional weight loss. Likely PNA vs COPD Exacerbation. Patient still persistently tachycardic. Will discuss with Pulm about further treatment options.     1. Sinus Tachycardia  - ECG ST with no ischemic characteristics noted  - TTE with preserved EF, no WMA  - CT neg for PE  - Neg for LE DVT  - Likely 2/2 acute infection  - Cont with Metoprolol 25mg PO BID  - Repeat EKG pending to r/o Atrial fib/Flutter    2. COPD Exacerbation   AFB smear (-) x 3, f/u cultures; follow up quantiferon gold  - Pulm recs appreciated: - Continue Prednisone, Symbicort 160/4.5 BID- AFB smear (-) x 3, f/u cultures, Mucinex  - Supplemental oxygen to goal >88%, remains on 1-2L NC    3. Hx of DVT  - DVT negative  - CT neg for PE  - Dimer pending  - c/w Eliquis 5mg PO BID    4. Hypertension  - BP well controlled on Metoprolol and amlodipine 5mg PO daily       STEPHANIE Feldman DO [facility]  Cardiovascular Medicine  [address] [place] [address], [address]  Office: [phone]  Available via call/text on Microsoft Teams  0.22

## 2024-01-02 LAB
JAK2 EXON 13 MUT ANL BLD/T: SIGNIFICANT CHANGE UP
JAK2 EXON 13 MUT ANL BLD/T: SIGNIFICANT CHANGE UP
MPL EXON 10 MUTATION: SIGNIFICANT CHANGE UP
MPL EXON 10 MUTATION: SIGNIFICANT CHANGE UP
REFLEX:: SIGNIFICANT CHANGE UP
REFLEX:: SIGNIFICANT CHANGE UP

## 2024-01-08 ENCOUNTER — APPOINTMENT (OUTPATIENT)
Dept: INTERNAL MEDICINE | Facility: CLINIC | Age: 69
End: 2024-01-08

## 2024-01-10 ENCOUNTER — APPOINTMENT (OUTPATIENT)
Dept: INTERNAL MEDICINE | Facility: CLINIC | Age: 69
End: 2024-01-10
Payer: MEDICARE

## 2024-01-10 VITALS
BODY MASS INDEX: 18.8 KG/M2 | DIASTOLIC BLOOD PRESSURE: 80 MMHG | OXYGEN SATURATION: 94 % | HEART RATE: 143 BPM | RESPIRATION RATE: 16 BRPM | SYSTOLIC BLOOD PRESSURE: 138 MMHG | HEIGHT: 58.5 IN | TEMPERATURE: 98 F | WEIGHT: 92 LBS

## 2024-01-10 DIAGNOSIS — J18.9 PNEUMONIA, UNSPECIFIED ORGANISM: ICD-10-CM

## 2024-01-10 PROCEDURE — G2211 COMPLEX E/M VISIT ADD ON: CPT

## 2024-01-10 PROCEDURE — 99214 OFFICE O/P EST MOD 30 MIN: CPT

## 2024-01-10 RX ORDER — PREDNISONE 20 MG/1
20 TABLET ORAL
Qty: 14 | Refills: 0 | Status: ACTIVE | COMMUNITY
Start: 2024-01-10 | End: 1900-01-01

## 2024-01-10 RX ORDER — UMECLIDINIUM 62.5 UG/1
62.5 AEROSOL, POWDER ORAL
Refills: 0 | Status: ACTIVE | COMMUNITY

## 2024-01-10 RX ORDER — FLUTICASONE PROPIONATE AND SALMETEROL 500; 50 UG/1; UG/1
500-50 POWDER RESPIRATORY (INHALATION)
Refills: 0 | Status: ACTIVE | COMMUNITY

## 2024-01-10 RX ORDER — BUDESONIDE AND FORMOTEROL FUMARATE DIHYDRATE 160; 4.5 UG/1; UG/1
160-4.5 AEROSOL RESPIRATORY (INHALATION)
Refills: 0 | Status: ACTIVE | COMMUNITY

## 2024-01-10 NOTE — REVIEW OF SYSTEMS
[Fever] : no fever [Chills] : no chills [Chest Pain] : no chest pain [Palpitations] : no palpitations [Lower Ext Edema] : no lower extremity edema [Abdominal Pain] : no abdominal pain [Nausea] : no nausea [Constipation] : no constipation [Diarrhea] : diarrhea [Vomiting] : no vomiting [Dysuria] : no dysuria

## 2024-01-10 NOTE — ASSESSMENT
[FreeTextEntry1] : PNA: Check labs. No signs of infection currently.  COPD: Discharge summary reviewed. Dr Lisker's note reviewed. Recommended she take symbicort 160-4.5mcg BID, incruse ellipta 62.5mcg daily and prednisone 20mg daily on day of discharge. Stop advair at the same time as symbicort as her HR is elevated. Referred to Dr Parks and she is unable to travel to see Dr Lisker.  Tachycardia: Continue metoprolol 25mg BID. F/u 2 weeks to recheck vitals while off advair.

## 2024-01-10 NOTE — HISTORY OF PRESENT ILLNESS
[de-identified] : 68F presents for follow-up after being admitted to the hospital for PNA. She was given abx and steroids. Course was complicated by tachycardia and she was started on metoprolol BID. She was sent home on advair, symbicort, incruse ellipta and prednisone. Since discharge, she reports dyspnea has improved. Denies fever, chills.

## 2024-01-10 NOTE — PHYSICAL EXAM
[No Acute Distress] : no acute distress [Normal Sclera/Conjunctiva] : normal sclera/conjunctiva [PERRL] : pupils equal round and reactive to light [EOMI] : extraocular movements intact [No Lymphadenopathy] : no lymphadenopathy [Supple] : supple [No Respiratory Distress] : no respiratory distress  [No Accessory Muscle Use] : no accessory muscle use [Normal Rate] : normal rate  [Regular Rhythm] : with a regular rhythm [Normal S1, S2] : normal S1 and S2 [Soft] : abdomen soft [Non Tender] : non-tender [Non-distended] : non-distended [Normal Bowel Sounds] : normal bowel sounds [de-identified] : diminished BS b/l, epiratory wheezes b/l

## 2024-01-11 DIAGNOSIS — E87.5 HYPERKALEMIA: ICD-10-CM

## 2024-01-11 LAB
ALBUMIN SERPL ELPH-MCNC: 4.6 G/DL
ALP BLD-CCNC: 149 U/L
ALT SERPL-CCNC: 22 U/L
ANION GAP SERPL CALC-SCNC: 22 MMOL/L
AST SERPL-CCNC: 16 U/L
BASOPHILS # BLD AUTO: 0.05 K/UL
BASOPHILS NFR BLD AUTO: 0.2 %
BILIRUB SERPL-MCNC: 0.2 MG/DL
BUN SERPL-MCNC: 15 MG/DL
CALCIUM SERPL-MCNC: 11.2 MG/DL
CHLORIDE SERPL-SCNC: 93 MMOL/L
CO2 SERPL-SCNC: 26 MMOL/L
CREAT SERPL-MCNC: 0.63 MG/DL
EGFR: 97 ML/MIN/1.73M2
EOSINOPHIL # BLD AUTO: 0.02 K/UL
EOSINOPHIL NFR BLD AUTO: 0.1 %
GLUCOSE SERPL-MCNC: 155 MG/DL
HCT VFR BLD CALC: 37.8 %
HGB BLD-MCNC: 10.6 G/DL
IMM GRANULOCYTES NFR BLD AUTO: 0.8 %
LYMPHOCYTES # BLD AUTO: 0.76 K/UL
LYMPHOCYTES NFR BLD AUTO: 3.6 %
MAN DIFF?: NORMAL
MCHC RBC-ENTMCNC: 24.5 PG
MCHC RBC-ENTMCNC: 28 GM/DL
MCV RBC AUTO: 87.3 FL
MONOCYTES # BLD AUTO: 0.36 K/UL
MONOCYTES NFR BLD AUTO: 1.7 %
NEUTROPHILS # BLD AUTO: 19.68 K/UL
NEUTROPHILS NFR BLD AUTO: 93.6 %
PLATELET # BLD AUTO: 662 K/UL
POTASSIUM SERPL-SCNC: 6.6 MMOL/L
PROT SERPL-MCNC: 8.1 G/DL
RBC # BLD: 4.33 M/UL
RBC # FLD: 18.5 %
SODIUM SERPL-SCNC: 140 MMOL/L
WBC # FLD AUTO: 21.03 K/UL

## 2024-01-12 LAB
ANION GAP SERPL CALC-SCNC: 12 MMOL/L
BUN SERPL-MCNC: 16 MG/DL
CALCIUM SERPL-MCNC: 9.6 MG/DL
CHLORIDE SERPL-SCNC: 95 MMOL/L
CO2 SERPL-SCNC: 31 MMOL/L
CREAT SERPL-MCNC: 0.64 MG/DL
EGFR: 97 ML/MIN/1.73M2
GLUCOSE SERPL-MCNC: 86 MG/DL
POTASSIUM SERPL-SCNC: 4.6 MMOL/L
POTASSIUM SERPL-SCNC: 4.7 MMOL/L
SODIUM SERPL-SCNC: 138 MMOL/L

## 2024-01-24 ENCOUNTER — APPOINTMENT (OUTPATIENT)
Dept: INTERNAL MEDICINE | Facility: CLINIC | Age: 69
End: 2024-01-24

## 2024-02-02 ENCOUNTER — APPOINTMENT (OUTPATIENT)
Dept: PULMONOLOGY | Facility: CLINIC | Age: 69
End: 2024-02-02

## 2024-02-02 ENCOUNTER — APPOINTMENT (OUTPATIENT)
Dept: PULMONOLOGY | Facility: CLINIC | Age: 69
End: 2024-02-02
Payer: MEDICARE

## 2024-02-02 VITALS
WEIGHT: 96 LBS | SYSTOLIC BLOOD PRESSURE: 119 MMHG | BODY MASS INDEX: 19.61 KG/M2 | HEIGHT: 58.5 IN | HEART RATE: 137 BPM | OXYGEN SATURATION: 97 % | DIASTOLIC BLOOD PRESSURE: 81 MMHG

## 2024-02-02 PROCEDURE — 94010 BREATHING CAPACITY TEST: CPT

## 2024-02-02 PROCEDURE — 99214 OFFICE O/P EST MOD 30 MIN: CPT | Mod: 25

## 2024-02-02 RX ORDER — BUDESONIDE AND FORMOTEROL FUMARATE DIHYDRATE 160; 4.5 UG/1; UG/1
160-4.5 AEROSOL RESPIRATORY (INHALATION) TWICE DAILY
Qty: 1 | Refills: 5 | Status: ACTIVE | COMMUNITY
Start: 2024-02-02 | End: 1900-01-01

## 2024-02-02 RX ORDER — UMECLIDINIUM 62.5 UG/1
62.5 AEROSOL, POWDER ORAL
Qty: 1 | Refills: 5 | Status: ACTIVE | COMMUNITY
Start: 2024-02-02 | End: 1900-01-01

## 2024-02-02 NOTE — HISTORY OF PRESENT ILLNESS
[Former] : former [TextBox_4] : 68F with COPD now on O2 post hospitalization for pna, sinus tachycardia, protein c def, hx of dvt on eliquis, HTN  hospitalized at [facility] from 12/14/23-12/27/23 for pneumonia tx with azithro/ceftriaxone/levaquin, prednisone, inhalers.  This was her first hospitalization for resp illness.  She had not seen a pulmonologist prior to the hospitalization.  She is doing ok at home now, using symbicort bid, incruse once daily and nebs Q6 hours, she has supplemental O2 at 2L.  She did need oxygen many years ago once after a resp illness.  Her heart rate has been noted to be very elevated, determined to be sinus tach in the hospital, given metoprolol which she has ran out of and requests refills, does not have cardiology follow up. [TextBox_13] : 30 [YearQuit] : 2013

## 2024-02-02 NOTE — PROCEDURE
[FreeTextEntry1] : roberto: severe obstruction.  Hospital records reviewed:  sputum cultures: no AFB, normal resp jose  ACC: [id number] EXAM: CT ANGIO CHEST PULM ART WAWIC ORDERED BY: MARTINEZ DRISCOLL  ACC: [id number] EXAM: CT ABDOMEN AND PELVIS IC ORDERED BY: MARTINEZ DRISCOLL  PROCEDURE DATE: 12/14/2023    INTERPRETATION: CLINICAL INFORMATION: Weight loss.  COMPARISON: Noncontrast CT of the thorax March 1, 2016, contrast-enhanced CT of the abdomen and pelvis January 14, 2011.  CONTRAST/COMPLICATIONS: IV Contrast: Omnipaque 350 90 cc administered 10 cc discarded Oral Contrast: NONE Complications: None reported at time of study completion  PROCEDURE: CT Angiography of the Chest was performed followed by portal venous phase imaging of the Abdomen and Pelvis. Sagittal and coronal reformats were performed as well as 3D (MIP) reconstructions.  FINDINGS: CHEST: LUNGS AND LARGE AIRWAYS: Patent central airways. There is mild varicoid bronchiectasis in the periphery of the right middle lobe and right lower lobe. Again is noted extensive airway thickening with tree-in-bud nodularity and peribronchovascular nodules, compatible with chronic endobronchial infection. There is a discrete 9 mm peripheral opacity in the right upper lobe laterally on image 31 of series 5 and mild patchy opacity in the left lower lobe laterally on image 74. PLEURA: No pleural effusion. Again is noted moderate biapical pleural thickening. VESSELS: There is no evidence of filling defect in the first, second, or third order branches of the pulmonary arteries. The pulmonary trunk and main pulmonary arteries are unremarkable. The thoracic aorta and great vessels are unremarkable. HEART: Heart size is normal. No pericardial effusion. MEDIASTINUM AND TRINIDAD: There are stable shotty mediastinal and bilateral hilar lymph nodes. CHEST WALL AND LOWER NECK: Within normal limits.  ABDOMEN AND PELVIS: LIVER: Within normal limits. BILE DUCTS: Normal caliber. GALLBLADDER: Within normal limits. SPLEEN: Within normal limits. PANCREAS: Within normal limits. ADRENALS: Within normal limits. KIDNEYS/URETERS: Within normal limits.  BLADDER: Herniation of the right posterolateral bladder into the obturator space. Otherwise, smooth in contour. REPRODUCTIVE ORGANS: Uterus and adnexa within normal limits.  BOWEL: There is short segment intussusception of the proximal small bowel in the left abdomen on image 44 of series 3 without evidence of lead point, bowel wall thickening, or adjacent stranding, likely related to peristalsis. Stool in the right colon. No bowel obstruction. Appendix is normal. There is no mesenteric adenopathy. PERITONEUM: No ascites. VESSELS: Within normal limits. RETROPERITONEUM/LYMPH NODES: No lymphadenopathy. ABDOMINAL WALL: Within normal limits. BONES: Within normal limits.  IMPRESSION: Extensive airway thickening and tree-in-bud nodularity, compatible with chronic endobronchial disease, including MAC. No CT evidence of malignancy in the thorax, abdomen, and pelvis.  --- End of Report ---      LASHAWN UPTON MD; Attending Radiologist This document has been electronically signed. Dec 14 2023 4:19PM   TRANSTHORACIC ECHOCARDIOGRAM REPORT ________________________________________________________________________________                                     _______    Pt. Name:       EMILY REYES Study Date:    12/20/2023 MRN:            [id number]           YOB: 1955 Accession #:    [id number]            Age:           68 years Account#:       [id number]         Gender:        F Heart Rate:     100 bpm              Height:        57.00 in (144.78 cm) Rhythm:         sinus tachycardia    Weight:        100.00 lb (45.36 kg) Blood Pressure: 128/74 mmHg          BSA/BMI:       1.34 m / 21.64 kg/m ________________________________________________________________________________________ Referring Physician:    [id number] Gloria Torres Interpreting Physician: Chidi Layne M.D. Primary Sonographer:    Yola Mccall  CPT:               MYOCARDIAL STRAIN IMAGING - 80312.m;ECHO TTE WO CON COMP W                    DOPP - 00810.m;3D RECONST W/O WKSTATION - 28819.m Indication(s):     Tachycardia, unspecified - R00.0 Procedure:         Transthoracic echocardiogram with 2-D, M-mode and complete                    spectral and color flow Doppler. Strain imaging performed for                    evaluation of regional and global myocardial shape and                    dimensions. Real time and full volume 3-dimensional imaging                    performed at the echo machine. Ordering Location: [facility] Admission Status:  Inpatient Study Information: Image quality for this study is good.  _______________________________________________________________________________________   CONCLUSIONS:    1. Left ventricular cavity is small. Left ventricular wall thickness is normal. Left ventricular systolic function is normal. There are no regional wall motion abnormalities seen.  2. Left ventricular global longitudinal strain is -17.1 % is borderline (range: -18 to -16%). Images were acquired on a Hartman ultrasound system and processed using [employer] strain analysis software with a heart rate of 100 bpm and a blood pressure of 128/74 mmHg.  3. Normal left ventricular diastolic function.  4. Normal right ventricular cavity size, wall thickness, and systolic function.  5. Normal left and right atrial size.  6. No significant valvular disease.  7. No pericardial effusion seen.  8. Estimated pulmonary artery systolic pressure is 12 mmHg.  9. No prior echocardiogram is available for comparison.  ________________________________________________________________________________________ FINDINGS:   Left Ventricle: The left ventricular cavity is small. Left ventricular wall thickness is normal. Left ventricular systolic function is normal with a calculated ejection fraction of 56 % by 3D56 % by 3D. There are no regional wall motion abnormalities seen. There is normal left ventricular diastolic function, with indeterminant filling pressure. Left ventricular global longitudinal strain is -17.1 % is borderline (range: -18 to -16%). Images were acquired on a Hartman ultrasound system and processed using [employer] strain analysis software with a heart rate of 100 bpm and a blood pressure of 128/74 mmHg.   Right Ventricle: The right ventricular cavity is normal in size, normal wall thickness and normal systolic function. Tricuspid annular plane systolic excursion (TAPSE) is 1.7 cm (normal >=1.7 cm).   Left Atrium: The left atrium is normal with an indexed volume of 18.51 ml/m.   Right Atrium: The right atrium is normal in size.   Interatrial Septum: The interatrial septum appears intact.   Aortic Valve: The aortic valve is tricuspid with normal leaflet excursion. There is no aortic valve stenosis. There is no evidence of aortic regurgitation.   Mitral Valve: Structurally normal mitral valve with normal leaflet excursion. There is mild leaflet calcification. There is no mitral valve stenosis. There is trace mitral regurgitation.   Tricuspid Valve: Structurally normal tricuspid valve with normal leaflet excursion. There is mild tricuspid regurgitation. Estimated pulmonary artery systolic pressure is 12 mmHg.   Pulmonic Valve: Structurally normal pulmonic valve with normal leaflet excursion. There is trace pulmonic regurgitation.   Aorta: The aortic annulus and aortic root appear normal in size. The aortic root at the sinuses of Valsalva is normal in size. The ascending aorta diameter is normal in size.   Pericardium: No pericardial effusion seen.   Systemic Veins: The inferior vena cava is normal in size (normal <2.1cm) with normal inspiratory collapse (normal >50%) consistent with normal right atrial pressure (~3, range 0-5mmHg). ____________________________________________________________________ QUANTITATIVE DATA: Left Ventricle Measurements: (Indexed to BSA)   IVSd (2D):   0.8 cm LVPWd (2D):  0.6 cm LVIDd (2D):  3.6 cm LVIDs (2D):  2.5 cm LV Mass:     65 g   48.2 g/m 3D LV EF%: 56 %   MV E Vmax:    0.80 m/s MV A Vmax:    0.88 m/s MV E/A:       0.91 e' lateral:   8.00 cm/s e' medial:    7.25 cm/s E/e' lateral: 10.01 E/e' medial:  11.05 E/e' Average: 10.50  Aorta Measurements: (normal range) (Indexed to BSA)   Sinuses of Valsalva: 2.70 cm (2.7 - 3.3 cm) Ao Asc prox:         2.90 cm    Left Atrium Measurements: (Indexed to BSA) LA Diam 2D:        2.40 cm LA Vol s, MOD A4C: 24.40 ml. LA Vol s, MOD A2C: 23.00 ml. LA Vol s, MOD BP:  24.80 ml  18.51 ml/m  Right Ventricle Measurements:   TAPSE:           1.7 cm RV Base (RVID1): 2.6 cm RV Mid (RVID2):  2.0 cm    LVOT / RVOT/ Qp/Qs Data: (Indexed to BSA) LVOT Diameter: 1.90 cm LVOT Vmax:     0.88 m/s LVOT VTI:      13.80 cm LVOT SV:       39.1 ml  29.21 ml/m  Mitral Valve Measurements:   MV E Vmax: 0.8 m/s MV A Vmax: 0.9 m/s MV E/A:    0.9    Tricuspid Valve Measurements:   TR Vmax:          1.5 m/s TR Peak Gradient: 8.6 mmHg RA Pressure:      3 mmHg PASP:             12 mmHg  ________________________________________________________________________________________ Electronically signed on 12/20/2023 at 12:50:14 PM by Chidi Layne M.D.   PROCEDURE DATE:  12/21/2023      INTERPRETATION:  CLINICAL INFORMATION: Tachycardia.  COMPARISON: None available.  TECHNIQUE: Duplex sonography of the BILATERAL LOWER extremity veins with  color and spectral Doppler, with and without compression.  FINDINGS:  RIGHT: Normal compressibility of the RIGHT common femoral, femoral and popliteal  veins. Doppler examination shows normal spontaneous and phasic flow. No RIGHT calf vein thrombosis is detected.  LEFT: Normal compressibility of the LEFT common femoral, femoral and popliteal  veins. Doppler examination shows normal spontaneous and phasic flow. No LEFT calf vein thrombosis is detected.  IMPRESSION: No evidence of deep venous thrombosis in either lower extremity.      --- End of Report ---      DINORAH MCWILLIAMS MD; Attending Radiologist This document has been electronically signed. Dec 21 2023  9:39AM

## 2024-02-02 NOTE — ASSESSMENT
[FreeTextEntry1] : cont symbicort, incruse, use albuterol only PRN dylon given elevated HR.  will refill metoprolol for now and refer her to cardiology  repeat chest CT at the end of the month and then follow up  cont supplemental O2 for now, will reassess needs at fu.  fu 1 mo  A total of 45 minutes was spent on this encounter including history taking, chart review, physical examination, testing interpretation and treatment plan.

## 2024-02-03 LAB
CULTURE RESULTS: SIGNIFICANT CHANGE UP
SPECIMEN SOURCE: SIGNIFICANT CHANGE UP

## 2024-02-17 ENCOUNTER — APPOINTMENT (OUTPATIENT)
Dept: CT IMAGING | Facility: CLINIC | Age: 69
End: 2024-02-17
Payer: MEDICARE

## 2024-02-17 ENCOUNTER — OUTPATIENT (OUTPATIENT)
Dept: OUTPATIENT SERVICES | Facility: HOSPITAL | Age: 69
LOS: 1 days | End: 2024-02-17
Payer: MEDICARE

## 2024-02-17 DIAGNOSIS — J44.9 CHRONIC OBSTRUCTIVE PULMONARY DISEASE, UNSPECIFIED: ICD-10-CM

## 2024-02-17 DIAGNOSIS — Z98.89 OTHER SPECIFIED POSTPROCEDURAL STATES: Chronic | ICD-10-CM

## 2024-02-17 PROCEDURE — 71250 CT THORAX DX C-: CPT

## 2024-02-17 PROCEDURE — 71250 CT THORAX DX C-: CPT | Mod: 26

## 2024-02-18 ENCOUNTER — RX RENEWAL (OUTPATIENT)
Age: 69
End: 2024-02-18

## 2024-02-26 ENCOUNTER — APPOINTMENT (OUTPATIENT)
Dept: PULMONOLOGY | Facility: CLINIC | Age: 69
End: 2024-02-26
Payer: MEDICARE

## 2024-02-26 VITALS
SYSTOLIC BLOOD PRESSURE: 136 MMHG | DIASTOLIC BLOOD PRESSURE: 73 MMHG | HEART RATE: 127 BPM | OXYGEN SATURATION: 95 % | BODY MASS INDEX: 19.61 KG/M2 | HEIGHT: 58.5 IN | WEIGHT: 96 LBS

## 2024-02-26 DIAGNOSIS — J44.9 CHRONIC OBSTRUCTIVE PULMONARY DISEASE, UNSPECIFIED: ICD-10-CM

## 2024-02-26 PROCEDURE — 99214 OFFICE O/P EST MOD 30 MIN: CPT | Mod: 25

## 2024-02-26 PROCEDURE — 94618 PULMONARY STRESS TESTING: CPT

## 2024-02-26 NOTE — ASSESSMENT
[FreeTextEntry1] : cont symbicort/incruse increase nebs to BID with acapella use for airway clearance obtain sputum culture  will order portable O2 concentrator  fu 3 mo  will call with official ct chest results

## 2024-02-26 NOTE — PROCEDURE
[FreeTextEntry1] : exercise oximetry: desat to 88% after 2 min of walking on room air, improves with addition of 2L to 94%  Prior exams reviewed:  roberto: severe obstruction.  Hospital records reviewed:  sputum cultures: no AFB, normal resp jose  ACC: [id number] EXAM: CT ANGIO CHEST PULM ART WAWIC ORDERED BY: MARTINEZ DRISCOLL  ACC: [id number] EXAM: CT ABDOMEN AND PELVIS IC ORDERED BY: MARTINEZ DRISCOLL  PROCEDURE DATE: 12/14/2023    INTERPRETATION: CLINICAL INFORMATION: Weight loss.  COMPARISON: Noncontrast CT of the thorax March 1, 2016, contrast-enhanced CT of the abdomen and pelvis January 14, 2011.  CONTRAST/COMPLICATIONS: IV Contrast: Omnipaque 350 90 cc administered 10 cc discarded Oral Contrast: NONE Complications: None reported at time of study completion  PROCEDURE: CT Angiography of the Chest was performed followed by portal venous phase imaging of the Abdomen and Pelvis. Sagittal and coronal reformats were performed as well as 3D (MIP) reconstructions.  FINDINGS: CHEST: LUNGS AND LARGE AIRWAYS: Patent central airways. There is mild varicoid bronchiectasis in the periphery of the right middle lobe and right lower lobe. Again is noted extensive airway thickening with tree-in-bud nodularity and peribronchovascular nodules, compatible with chronic endobronchial infection. There is a discrete 9 mm peripheral opacity in the right upper lobe laterally on image 31 of series 5 and mild patchy opacity in the left lower lobe laterally on image 74. PLEURA: No pleural effusion. Again is noted moderate biapical pleural thickening. VESSELS: There is no evidence of filling defect in the first, second, or third order branches of the pulmonary arteries. The pulmonary trunk and main pulmonary arteries are unremarkable. The thoracic aorta and great vessels are unremarkable. HEART: Heart size is normal. No pericardial effusion. MEDIASTINUM AND TRINIDAD: There are stable shotty mediastinal and bilateral hilar lymph nodes. CHEST WALL AND LOWER NECK: Within normal limits.  ABDOMEN AND PELVIS: LIVER: Within normal limits. BILE DUCTS: Normal caliber. GALLBLADDER: Within normal limits. SPLEEN: Within normal limits. PANCREAS: Within normal limits. ADRENALS: Within normal limits. KIDNEYS/URETERS: Within normal limits.  BLADDER: Herniation of the right posterolateral bladder into the obturator space. Otherwise, smooth in contour. REPRODUCTIVE ORGANS: Uterus and adnexa within normal limits.  BOWEL: There is short segment intussusception of the proximal small bowel in the left abdomen on image 44 of series 3 without evidence of lead point, bowel wall thickening, or adjacent stranding, likely related to peristalsis. Stool in the right colon. No bowel obstruction. Appendix is normal. There is no mesenteric adenopathy. PERITONEUM: No ascites. VESSELS: Within normal limits. RETROPERITONEUM/LYMPH NODES: No lymphadenopathy. ABDOMINAL WALL: Within normal limits. BONES: Within normal limits.  IMPRESSION: Extensive airway thickening and tree-in-bud nodularity, compatible with chronic endobronchial disease, including MAC. No CT evidence of malignancy in the thorax, abdomen, and pelvis.  --- End of Report ---      LASHAWN UPTON MD; Attending Radiologist This document has been electronically signed. Dec 14 2023 4:19PM   TRANSTHORACIC ECHOCARDIOGRAM REPORT ________________________________________________________________________________                                     _______    Pt. Name:       EMILY REYES Study Date:    12/20/2023 MRN:            [id number]           YOB: 1955 Accession #:    [id number]            Age:           68 years Account#:       [id number]         Gender:        F Heart Rate:     100 bpm              Height:        57.00 in (144.78 cm) Rhythm:         sinus tachycardia    Weight:        100.00 lb (45.36 kg) Blood Pressure: 128/74 mmHg          BSA/BMI:       1.34 m / 21.64 kg/m ________________________________________________________________________________________ Referring Physician:    [id number] Gloria Torres Interpreting Physician: Chidi Layne M.D. Primary Sonographer:    Yola Mccall  CPT:               MYOCARDIAL STRAIN IMAGING - 75179.m;ECHO TTE WO CON COMP W                    DOPP - 00771.m;3D RECONST W/O WKSTATION - 92022.m Indication(s):     Tachycardia, unspecified - R00.0 Procedure:         Transthoracic echocardiogram with 2-D, M-mode and complete                    spectral and color flow Doppler. Strain imaging performed for                    evaluation of regional and global myocardial shape and                    dimensions. Real time and full volume 3-dimensional imaging                    performed at the echo machine. Ordering Location: [facility] Admission Status:  Inpatient Study Information: Image quality for this study is good.  _______________________________________________________________________________________   CONCLUSIONS:    1. Left ventricular cavity is small. Left ventricular wall thickness is normal. Left ventricular systolic function is normal. There are no regional wall motion abnormalities seen.  2. Left ventricular global longitudinal strain is -17.1 % is borderline (range: -18 to -16%). Images were acquired on a Hartman ultrasound system and processed using [employer] strain analysis software with a heart rate of 100 bpm and a blood pressure of 128/74 mmHg.  3. Normal left ventricular diastolic function.  4. Normal right ventricular cavity size, wall thickness, and systolic function.  5. Normal left and right atrial size.  6. No significant valvular disease.  7. No pericardial effusion seen.  8. Estimated pulmonary artery systolic pressure is 12 mmHg.  9. No prior echocardiogram is available for comparison.  ________________________________________________________________________________________ FINDINGS:   Left Ventricle: The left ventricular cavity is small. Left ventricular wall thickness is normal. Left ventricular systolic function is normal with a calculated ejection fraction of 56 % by 3D56 % by 3D. There are no regional wall motion abnormalities seen. There is normal left ventricular diastolic function, with indeterminant filling pressure. Left ventricular global longitudinal strain is -17.1 % is borderline (range: -18 to -16%). Images were acquired on a Hartman ultrasound system and processed using [employer] strain analysis software with a heart rate of 100 bpm and a blood pressure of 128/74 mmHg.   Right Ventricle: The right ventricular cavity is normal in size, normal wall thickness and normal systolic function. Tricuspid annular plane systolic excursion (TAPSE) is 1.7 cm (normal >=1.7 cm).   Left Atrium: The left atrium is normal with an indexed volume of 18.51 ml/m.   Right Atrium: The right atrium is normal in size.   Interatrial Septum: The interatrial septum appears intact.   Aortic Valve: The aortic valve is tricuspid with normal leaflet excursion. There is no aortic valve stenosis. There is no evidence of aortic regurgitation.   Mitral Valve: Structurally normal mitral valve with normal leaflet excursion. There is mild leaflet calcification. There is no mitral valve stenosis. There is trace mitral regurgitation.   Tricuspid Valve: Structurally normal tricuspid valve with normal leaflet excursion. There is mild tricuspid regurgitation. Estimated pulmonary artery systolic pressure is 12 mmHg.   Pulmonic Valve: Structurally normal pulmonic valve with normal leaflet excursion. There is trace pulmonic regurgitation.   Aorta: The aortic annulus and aortic root appear normal in size. The aortic root at the sinuses of Valsalva is normal in size. The ascending aorta diameter is normal in size.   Pericardium: No pericardial effusion seen.   Systemic Veins: The inferior vena cava is normal in size (normal <2.1cm) with normal inspiratory collapse (normal >50%) consistent with normal right atrial pressure (~3, range 0-5mmHg). ____________________________________________________________________ QUANTITATIVE DATA: Left Ventricle Measurements: (Indexed to BSA)   IVSd (2D):   0.8 cm LVPWd (2D):  0.6 cm LVIDd (2D):  3.6 cm LVIDs (2D):  2.5 cm LV Mass:     65 g   48.2 g/m 3D LV EF%: 56 %   MV E Vmax:    0.80 m/s MV A Vmax:    0.88 m/s MV E/A:       0.91 e' lateral:   8.00 cm/s e' medial:    7.25 cm/s E/e' lateral: 10.01 E/e' medial:  11.05 E/e' Average: 10.50  Aorta Measurements: (normal range) (Indexed to BSA)   Sinuses of Valsalva: 2.70 cm (2.7 - 3.3 cm) Ao Asc prox:         2.90 cm    Left Atrium Measurements: (Indexed to BSA) LA Diam 2D:        2.40 cm LA Vol s, MOD A4C: 24.40 ml. LA Vol s, MOD A2C: 23.00 ml. LA Vol s, MOD BP:  24.80 ml  18.51 ml/m  Right Ventricle Measurements:   TAPSE:           1.7 cm RV Base (RVID1): 2.6 cm RV Mid (RVID2):  2.0 cm    LVOT / RVOT/ Qp/Qs Data: (Indexed to BSA) LVOT Diameter: 1.90 cm LVOT Vmax:     0.88 m/s LVOT VTI:      13.80 cm LVOT SV:       39.1 ml  29.21 ml/m  Mitral Valve Measurements:   MV E Vmax: 0.8 m/s MV A Vmax: 0.9 m/s MV E/A:    0.9    Tricuspid Valve Measurements:   TR Vmax:          1.5 m/s TR Peak Gradient: 8.6 mmHg RA Pressure:      3 mmHg PASP:             12 mmHg  ________________________________________________________________________________________ Electronically signed on 12/20/2023 at 12:50:14 PM by Chidi Layne M.D.   PROCEDURE DATE:  12/21/2023      INTERPRETATION:  CLINICAL INFORMATION: Tachycardia.  COMPARISON: None available.  TECHNIQUE: Duplex sonography of the BILATERAL LOWER extremity veins with  color and spectral Doppler, with and without compression.  FINDINGS:  RIGHT: Normal compressibility of the RIGHT common femoral, femoral and popliteal  veins. Doppler examination shows normal spontaneous and phasic flow. No RIGHT calf vein thrombosis is detected.  LEFT: Normal compressibility of the LEFT common femoral, femoral and popliteal  veins. Doppler examination shows normal spontaneous and phasic flow. No LEFT calf vein thrombosis is detected.  IMPRESSION: No evidence of deep venous thrombosis in either lower extremity.      --- End of Report ---      DINORAH MCWILLIAMS MD; Attending Radiologist This document has been electronically signed. Dec 21 2023  9:39AM

## 2024-02-26 NOTE — HISTORY OF PRESENT ILLNESS
[Former] : former [TextBox_4] : had repeat ct--report not yet ready  feels congested using O2 requests portable O2 concentrator

## 2024-02-26 NOTE — PHYSICAL EXAM
[No Resp Distress] : no resp distress [No Acute Distress] : no acute distress [Clear to Auscultation Bilaterally] : clear to auscultation bilaterally

## 2024-02-28 ENCOUNTER — APPOINTMENT (OUTPATIENT)
Dept: CARDIOLOGY | Facility: CLINIC | Age: 69
End: 2024-02-28
Payer: MEDICARE

## 2024-02-28 ENCOUNTER — NON-APPOINTMENT (OUTPATIENT)
Age: 69
End: 2024-02-28

## 2024-02-28 VITALS
OXYGEN SATURATION: 95 % | HEART RATE: 122 BPM | HEIGHT: 58.5 IN | SYSTOLIC BLOOD PRESSURE: 155 MMHG | DIASTOLIC BLOOD PRESSURE: 78 MMHG

## 2024-02-28 DIAGNOSIS — R00.0 TACHYCARDIA, UNSPECIFIED: ICD-10-CM

## 2024-02-28 DIAGNOSIS — R06.09 OTHER FORMS OF DYSPNEA: ICD-10-CM

## 2024-02-28 PROCEDURE — 93000 ELECTROCARDIOGRAM COMPLETE: CPT

## 2024-02-28 PROCEDURE — G2211 COMPLEX E/M VISIT ADD ON: CPT

## 2024-02-28 PROCEDURE — 99205 OFFICE O/P NEW HI 60 MIN: CPT

## 2024-02-28 RX ORDER — AMLODIPINE BESYLATE 5 MG/1
5 TABLET ORAL
Qty: 30 | Refills: 2 | Status: DISCONTINUED | COMMUNITY
Start: 1900-01-01 | End: 2024-02-28

## 2024-02-28 RX ORDER — METOPROLOL TARTRATE 25 MG/1
25 TABLET, FILM COATED ORAL
Refills: 0 | Status: DISCONTINUED | COMMUNITY
End: 2024-02-28

## 2024-02-28 RX ORDER — METOPROLOL TARTRATE 25 MG/1
25 TABLET, FILM COATED ORAL TWICE DAILY
Qty: 60 | Refills: 0 | Status: DISCONTINUED | COMMUNITY
Start: 2024-02-02 | End: 2024-02-28

## 2024-02-28 RX ORDER — DILTIAZEM HYDROCHLORIDE 240 MG/1
240 CAPSULE, EXTENDED RELEASE ORAL
Qty: 90 | Refills: 1 | Status: ACTIVE | COMMUNITY
Start: 2024-02-28 | End: 1900-01-01

## 2024-02-28 NOTE — CARDIOLOGY SUMMARY
[de-identified] : Sinus Tachycardia -occasional PAC  Low voltage in limb leads. -RSR(V1) -nondiagnostic. -Nonspecific T-abnormality. [de-identified] : 12/2023 normal LV and RV function.

## 2024-02-28 NOTE — HISTORY OF PRESENT ILLNESS
[FreeTextEntry1] : 68 yaer old woman with a history of COPD on O2, ex tob, DVT/PE, protein S deficiency on Eliquis, thrombobystosis presents for an initial cardiac evaluation.   She was admitted to [facility] in 12/2023 for a PNA. Her course was complicated by tachycardia and needed BB. I personally reviewed all of the hospital records available to me at this time, which included but are not limited to the discharge summary, labs and imaging reports.  She has alvarez with walking short distances even without O2 therapy. She was noted to have a HR in 130s with walking. She denies any palpitations. She notes mild pleuritic chest pressure that happens with her signficant dyspnea. She   denies any  PND, orthopnea, lower extremity edema, near syncope, syncope, strokelike symptoms.

## 2024-02-28 NOTE — DISCUSSION/SUMMARY
[EKG obtained to assist in diagnosis and management of assessed problem(s)] : EKG obtained to assist in diagnosis and management of assessed problem(s) [FreeTextEntry1] : 68 year woman with a history as listed presents for an initial cardiac evaluation.  Aggie had a recent admission for a PNA and was noted to b ei a sinus tachycardia. her echo was essentially unremarkable. She will need optimization of her medications. She will stop her Toprol and Norvasc and switch to Cardizem 240mg Qday.    Her tachycardia is likely related to profound deconditioning, anemia and  hypoxia. She will see heme. She will continue Eliquis.  She denies any anginal symptoms. Clinically she is euvolemic on exam. Her EKG did not reveal any significant ischemic changes. She will undergo a pharmacological nuclear stress test to assess for underlying obstructive CAD.  Exercise and diet counseling was performed in order to reduce her future cardiovascular risk.  She will followup with me in 3 months  or sooner if necessary.

## 2024-02-28 NOTE — PHYSICAL EXAM
[Well Developed] : well developed [Well Nourished] : well nourished [No Acute Distress] : no acute distress [Normal Conjunctiva] : normal conjunctiva [Normal Venous Pressure] : normal venous pressure [No Carotid Bruit] : no carotid bruit [Normal S1, S2] : normal S1, S2 [No Murmur] : no murmur [No Rub] : no rub [No Gallop] : no gallop [Soft] : abdomen soft [Non Tender] : non-tender [No Masses/organomegaly] : no masses/organomegaly [Normal Gait] : normal gait [Normal Bowel Sounds] : normal bowel sounds [No Clubbing] : no clubbing [No Edema] : no edema [No Cyanosis] : no cyanosis [No Varicosities] : no varicosities [No Rash] : no rash [No Skin Lesions] : no skin lesions [Normal Speech] : normal speech [No Focal Deficits] : no focal deficits [Moves all extremities] : moves all extremities [Alert and Oriented] : alert and oriented [Normal memory] : normal memory [de-identified] : tachycardia [de-identified] : decrease bs

## 2024-03-04 ENCOUNTER — NON-APPOINTMENT (OUTPATIENT)
Age: 69
End: 2024-03-04

## 2024-03-04 ENCOUNTER — RX RENEWAL (OUTPATIENT)
Age: 69
End: 2024-03-04

## 2024-03-06 ENCOUNTER — APPOINTMENT (OUTPATIENT)
Dept: CARDIOLOGY | Facility: CLINIC | Age: 69
End: 2024-03-06
Payer: MEDICARE

## 2024-03-06 PROCEDURE — 93015 CV STRESS TEST SUPVJ I&R: CPT

## 2024-03-06 PROCEDURE — A9500: CPT

## 2024-03-06 PROCEDURE — 78452 HT MUSCLE IMAGE SPECT MULT: CPT

## 2024-03-24 ENCOUNTER — RX RENEWAL (OUTPATIENT)
Age: 69
End: 2024-03-24

## 2024-03-27 ENCOUNTER — APPOINTMENT (OUTPATIENT)
Dept: INTERNAL MEDICINE | Facility: CLINIC | Age: 69
End: 2024-03-27

## 2024-05-23 ENCOUNTER — RX RENEWAL (OUTPATIENT)
Age: 69
End: 2024-05-23

## 2024-05-23 RX ORDER — APIXABAN 5 MG/1
5 TABLET, FILM COATED ORAL
Qty: 60 | Refills: 2 | Status: ACTIVE | COMMUNITY
Start: 2024-03-04 | End: 1900-01-01

## 2024-05-28 ENCOUNTER — APPOINTMENT (OUTPATIENT)
Dept: CARDIOLOGY | Facility: CLINIC | Age: 69
End: 2024-05-28

## 2024-06-03 ENCOUNTER — APPOINTMENT (OUTPATIENT)
Dept: PULMONOLOGY | Facility: CLINIC | Age: 69
End: 2024-06-03

## 2024-06-14 ENCOUNTER — APPOINTMENT (OUTPATIENT)
Dept: PULMONOLOGY | Facility: CLINIC | Age: 69
End: 2024-06-14
Payer: MEDICARE

## 2024-06-14 VITALS
HEIGHT: 58.5 IN | SYSTOLIC BLOOD PRESSURE: 149 MMHG | WEIGHT: 103 LBS | DIASTOLIC BLOOD PRESSURE: 75 MMHG | BODY MASS INDEX: 21.04 KG/M2 | OXYGEN SATURATION: 94 % | HEART RATE: 136 BPM

## 2024-06-14 DIAGNOSIS — J47.9 BRONCHIECTASIS, UNCOMPLICATED: ICD-10-CM

## 2024-06-14 PROCEDURE — 99214 OFFICE O/P EST MOD 30 MIN: CPT

## 2024-06-14 PROCEDURE — G2211 COMPLEX E/M VISIT ADD ON: CPT

## 2024-06-14 RX ORDER — ALBUTEROL SULFATE 2.5 MG/3ML
(2.5 MG/3ML) SOLUTION RESPIRATORY (INHALATION)
Qty: 180 | Refills: 2 | Status: ACTIVE | COMMUNITY
Start: 2024-02-02 | End: 1900-01-01

## 2024-06-14 NOTE — PROCEDURE
[FreeTextEntry1] :   	 EXAM: [id number] - CT CHEST  - ORDERED BY: EDMAR MALIK   PROCEDURE DATE:  02/17/2024    INTERPRETATION:  INDICATION: Follow-up lung nodules  TECHNIQUE: Helical acquisition images of the chest without intravenous contrast. Maximum intensity projection images were generated.  COMPARISON: CT chest 12/14/2023.  FINDINGS:  LUNGS/AIRWAYS/PLEURA: Patent airways through the segmental bronchi. Unchanged biapical scarring. Unchanged mild bronchiectasis and diffuse tree-in-bud in all lobes, preferential to the mid to lower lungs. Unchanged multiple mucous impacted dilated bronchi. Unchanged right upper lobe peripheral 0.9 cm solid nodule with indistinct margins (3-32). No pleural effusion.  LYMPH NODES/MEDIASTINUM: Unchanged mildly enlarged hilar and right paratracheal lymph nodes.  HEART/VASCULATURE: Normal heart size. No pericardial effusion. Coronary artery calcifications.  UPPER ABDOMEN: Unremarkable.  BONES/SOFT TISSUES: Unremarkable.   IMPRESSION:  Unchanged indeterminate 9 mm right upper lobe solid nodule. Recommend surveillance CT chest in 6 months.  Unchanged bronchiectasis and chronically mucoid impacted distal airways.  --- End of Report ---     exercise oximetry: desat to 88% after 2 min of walking on room air, improves with addition of 2L to 94%  Prior exams reviewed:  roberto: severe obstruction.  Hospital records reviewed:  sputum cultures: no AFB, normal resp jose  ACC: [id number] EXAM: CT ANGIO CHEST PULM ART WAWIC ORDERED BY: MARTINEZ DRISCOLL  ACC: [id number] EXAM: CT ABDOMEN AND PELVIS IC ORDERED BY: MARTINEZ DRISCOLL  PROCEDURE DATE: 12/14/2023    INTERPRETATION: CLINICAL INFORMATION: Weight loss.  COMPARISON: Noncontrast CT of the thorax March 1, 2016, contrast-enhanced CT of the abdomen and pelvis January 14, 2011.  CONTRAST/COMPLICATIONS: IV Contrast: Omnipaque 350 90 cc administered 10 cc discarded Oral Contrast: NONE Complications: None reported at time of study completion  PROCEDURE: CT Angiography of the Chest was performed followed by portal venous phase imaging of the Abdomen and Pelvis. Sagittal and coronal reformats were performed as well as 3D (MIP) reconstructions.  FINDINGS: CHEST: LUNGS AND LARGE AIRWAYS: Patent central airways. There is mild varicoid bronchiectasis in the periphery of the right middle lobe and right lower lobe. Again is noted extensive airway thickening with tree-in-bud nodularity and peribronchovascular nodules, compatible with chronic endobronchial infection. There is a discrete 9 mm peripheral opacity in the right upper lobe laterally on image 31 of series 5 and mild patchy opacity in the left lower lobe laterally on image 74. PLEURA: No pleural effusion. Again is noted moderate biapical pleural thickening. VESSELS: There is no evidence of filling defect in the first, second, or third order branches of the pulmonary arteries. The pulmonary trunk and main pulmonary arteries are unremarkable. The thoracic aorta and great vessels are unremarkable. HEART: Heart size is normal. No pericardial effusion. MEDIASTINUM AND TRINIDAD: There are stable shotty mediastinal and bilateral hilar lymph nodes. CHEST WALL AND LOWER NECK: Within normal limits.  ABDOMEN AND PELVIS: LIVER: Within normal limits. BILE DUCTS: Normal caliber. GALLBLADDER: Within normal limits. SPLEEN: Within normal limits. PANCREAS: Within normal limits. ADRENALS: Within normal limits. KIDNEYS/URETERS: Within normal limits.  BLADDER: Herniation of the right posterolateral bladder into the obturator space. Otherwise, smooth in contour. REPRODUCTIVE ORGANS: Uterus and adnexa within normal limits.  BOWEL: There is short segment intussusception of the proximal small bowel in the left abdomen on image 44 of series 3 without evidence of lead point, bowel wall thickening, or adjacent stranding, likely related to peristalsis. Stool in the right colon. No bowel obstruction. Appendix is normal. There is no mesenteric adenopathy. PERITONEUM: No ascites. VESSELS: Within normal limits. RETROPERITONEUM/LYMPH NODES: No lymphadenopathy. ABDOMINAL WALL: Within normal limits. BONES: Within normal limits.  IMPRESSION: Extensive airway thickening and tree-in-bud nodularity, compatible with chronic endobronchial disease, including MAC. No CT evidence of malignancy in the thorax, abdomen, and pelvis.  --- End of Report ---      LASHAWN UPTON MD; Attending Radiologist This document has been electronically signed. Dec 14 2023 4:19PM   TRANSTHORACIC ECHOCARDIOGRAM REPORT ________________________________________________________________________________                                     _______    Pt. Name:       EMILY REYES Study Date:    12/20/2023 MRN:            [id number]           YOB: 1955 Accession #:    [id number]            Age:           68 years Account#:       [id number]         Gender:        F Heart Rate:     100 bpm              Height:        57.00 in (144.78 cm) Rhythm:         sinus tachycardia    Weight:        100.00 lb (45.36 kg) Blood Pressure: 128/74 mmHg          BSA/BMI:       1.34 m / 21.64 kg/m ________________________________________________________________________________________ Referring Physician:    [id number] Gloria Torres Interpreting Physician: Chidi Layne M.D. Primary Sonographer:    Yola Mccall  CPT:               MYOCARDIAL STRAIN IMAGING - 80461.m;ECHO TTE WO CON COMP W                    DOPP - 55032.m;3D RECONST W/O WKSTATION - 28432.m Indication(s):     Tachycardia, unspecified - R00.0 Procedure:         Transthoracic echocardiogram with 2-D, M-mode and complete                    spectral and color flow Doppler. Strain imaging performed for                    evaluation of regional and global myocardial shape and                    dimensions. Real time and full volume 3-dimensional imaging                    performed at the echo machine. Ordering Location: [facility] Admission Status:  Inpatient Study Information: Image quality for this study is good.  _______________________________________________________________________________________   CONCLUSIONS:    1. Left ventricular cavity is small. Left ventricular wall thickness is normal. Left ventricular systolic function is normal. There are no regional wall motion abnormalities seen.  2. Left ventricular global longitudinal strain is -17.1 % is borderline (range: -18 to -16%). Images were acquired on a [employer] ultrasound system and processed using [employer] strain analysis software with a heart rate of 100 bpm and a blood pressure of 128/74 mmHg.  3. Normal left ventricular diastolic function.  4. Normal right ventricular cavity size, wall thickness, and systolic function.  5. Normal left and right atrial size.  6. No significant valvular disease.  7. No pericardial effusion seen.  8. Estimated pulmonary artery systolic pressure is 12 mmHg.  9. No prior echocardiogram is available for comparison.  ________________________________________________________________________________________ FINDINGS:   Left Ventricle: The left ventricular cavity is small. Left ventricular wall thickness is normal. Left ventricular systolic function is normal with a calculated ejection fraction of 56 % by 3D56 % by 3D. There are no regional wall motion abnormalities seen. There is normal left ventricular diastolic function, with indeterminant filling pressure. Left ventricular global longitudinal strain is -17.1 % is borderline (range: -18 to -16%). Images were acquired on a [employer] ultrasound system and processed using [employer] strain analysis software with a heart rate of 100 bpm and a blood pressure of 128/74 mmHg.   Right Ventricle: The right ventricular cavity is normal in size, normal wall thickness and normal systolic function. Tricuspid annular plane systolic excursion (TAPSE) is 1.7 cm (normal >=1.7 cm).   Left Atrium: The left atrium is normal with an indexed volume of 18.51 ml/m.   Right Atrium: The right atrium is normal in size.   Interatrial Septum: The interatrial septum appears intact.   Aortic Valve: The aortic valve is tricuspid with normal leaflet excursion. There is no aortic valve stenosis. There is no evidence of aortic regurgitation.   Mitral Valve: Structurally normal mitral valve with normal leaflet excursion. There is mild leaflet calcification. There is no mitral valve stenosis. There is trace mitral regurgitation.   Tricuspid Valve: Structurally normal tricuspid valve with normal leaflet excursion. There is mild tricuspid regurgitation. Estimated pulmonary artery systolic pressure is 12 mmHg.   Pulmonic Valve: Structurally normal pulmonic valve with normal leaflet excursion. There is trace pulmonic regurgitation.   Aorta: The aortic annulus and aortic root appear normal in size. The aortic root at the sinuses of Valsalva is normal in size. The ascending aorta diameter is normal in size.   Pericardium: No pericardial effusion seen.   Systemic Veins: The inferior vena cava is normal in size (normal <2.1cm) with normal inspiratory collapse (normal >50%) consistent with normal right atrial pressure (~3, range 0-5mmHg). ____________________________________________________________________ QUANTITATIVE DATA: Left Ventricle Measurements: (Indexed to BSA)   IVSd (2D):   0.8 cm LVPWd (2D):  0.6 cm LVIDd (2D):  3.6 cm LVIDs (2D):  2.5 cm LV Mass:     65 g   48.2 g/m 3D LV EF%: 56 %   MV E Vmax:    0.80 m/s MV A Vmax:    0.88 m/s MV E/A:       0.91 e' lateral:   8.00 cm/s e' medial:    7.25 cm/s E/e' lateral: 10.01 E/e' medial:  11.05 E/e' Average: 10.50  Aorta Measurements: (normal range) (Indexed to BSA)   Sinuses of Valsalva: 2.70 cm (2.7 - 3.3 cm) Ao Asc prox:         2.90 cm    Left Atrium Measurements: (Indexed to BSA) LA Diam 2D:        2.40 cm LA Vol s, MOD A4C: 24.40 ml. LA Vol s, MOD A2C: 23.00 ml. LA Vol s, MOD BP:  24.80 ml  18.51 ml/m  Right Ventricle Measurements:   TAPSE:           1.7 cm RV Base (RVID1): 2.6 cm RV Mid (RVID2):  2.0 cm    LVOT / RVOT/ Qp/Qs Data: (Indexed to BSA) LVOT Diameter: 1.90 cm LVOT Vmax:     0.88 m/s LVOT VTI:      13.80 cm LVOT SV:       39.1 ml  29.21 ml/m  Mitral Valve Measurements:   MV E Vmax: 0.8 m/s MV A Vmax: 0.9 m/s MV E/A:    0.9    Tricuspid Valve Measurements:   TR Vmax:          1.5 m/s TR Peak Gradient: 8.6 mmHg RA Pressure:      3 mmHg PASP:             12 mmHg  ________________________________________________________________________________________ Electronically signed on 12/20/2023 at 12:50:14 PM by Chidi Layne M.D.   PROCEDURE DATE:  12/21/2023      INTERPRETATION:  CLINICAL INFORMATION: Tachycardia.  COMPARISON: None available.  TECHNIQUE: Duplex sonography of the BILATERAL LOWER extremity veins with  color and spectral Doppler, with and without compression.  FINDINGS:  RIGHT: Normal compressibility of the RIGHT common femoral, femoral and popliteal  veins. Doppler examination shows normal spontaneous and phasic flow. No RIGHT calf vein thrombosis is detected.  LEFT: Normal compressibility of the LEFT common femoral, femoral and popliteal  veins. Doppler examination shows normal spontaneous and phasic flow. No LEFT calf vein thrombosis is detected.  IMPRESSION: No evidence of deep venous thrombosis in either lower extremity.      --- End of Report ---      DINORAH MCWILLIAMS MD; Attending Radiologist This document has been electronically signed. Dec 21 2023  9:39AM

## 2024-06-14 NOTE — HISTORY OF PRESENT ILLNESS
[Former] : former [TextBox_4] : on her inhalers and nebs hasn't provided sputum sample yet has portable O2 concentrator now no complaints

## 2024-07-18 ENCOUNTER — APPOINTMENT (OUTPATIENT)
Dept: CARDIOLOGY | Facility: CLINIC | Age: 69
End: 2024-07-18

## 2024-08-09 ENCOUNTER — RX RENEWAL (OUTPATIENT)
Age: 69
End: 2024-08-09

## 2024-08-19 ENCOUNTER — OUTPATIENT (OUTPATIENT)
Dept: OUTPATIENT SERVICES | Facility: HOSPITAL | Age: 69
LOS: 1 days | End: 2024-08-19
Payer: MEDICARE

## 2024-08-19 ENCOUNTER — APPOINTMENT (OUTPATIENT)
Dept: CT IMAGING | Facility: CLINIC | Age: 69
End: 2024-08-19

## 2024-08-19 DIAGNOSIS — J47.9 BRONCHIECTASIS, UNCOMPLICATED: ICD-10-CM

## 2024-08-19 DIAGNOSIS — Z98.89 OTHER SPECIFIED POSTPROCEDURAL STATES: Chronic | ICD-10-CM

## 2024-08-19 PROCEDURE — 71250 CT THORAX DX C-: CPT

## 2024-08-19 PROCEDURE — 71250 CT THORAX DX C-: CPT | Mod: 26

## 2024-08-30 ENCOUNTER — RX RENEWAL (OUTPATIENT)
Age: 69
End: 2024-08-30

## 2024-09-03 ENCOUNTER — APPOINTMENT (OUTPATIENT)
Dept: INTERNAL MEDICINE | Facility: CLINIC | Age: 69
End: 2024-09-03
Payer: MEDICARE

## 2024-09-03 ENCOUNTER — RX RENEWAL (OUTPATIENT)
Age: 69
End: 2024-09-03

## 2024-09-03 VITALS
HEART RATE: 141 BPM | WEIGHT: 104 LBS | HEIGHT: 58.5 IN | DIASTOLIC BLOOD PRESSURE: 70 MMHG | RESPIRATION RATE: 16 BRPM | SYSTOLIC BLOOD PRESSURE: 158 MMHG | OXYGEN SATURATION: 93 % | BODY MASS INDEX: 21.25 KG/M2

## 2024-09-03 DIAGNOSIS — F41.9 ANXIETY DISORDER, UNSPECIFIED: ICD-10-CM

## 2024-09-03 DIAGNOSIS — D68.59 OTHER PRIMARY THROMBOPHILIA: ICD-10-CM

## 2024-09-03 DIAGNOSIS — R00.0 TACHYCARDIA, UNSPECIFIED: ICD-10-CM

## 2024-09-03 DIAGNOSIS — F32.A ANXIETY DISORDER, UNSPECIFIED: ICD-10-CM

## 2024-09-03 PROCEDURE — G2211 COMPLEX E/M VISIT ADD ON: CPT

## 2024-09-03 PROCEDURE — 99214 OFFICE O/P EST MOD 30 MIN: CPT

## 2024-09-03 NOTE — HEALTH RISK ASSESSMENT
[Several Days (1)] : 2.) Feeling down, depressed or hopeless? Several days [Not at All (0)] : 9.) Thoughts that you would be off dead or of hurting yourself in some way? Not at all [PHQ-9 Negative - No further assessment needed] : PHQ-9 Negative - No further assessment needed [I have developed a follow-up plan documented below in the note.] : I have developed a follow-up plan documented below in the note. [[id number]] : 1

## 2024-09-03 NOTE — PHYSICAL EXAM
[No Acute Distress] : no acute distress [Normal Sclera/Conjunctiva] : normal sclera/conjunctiva [PERRL] : pupils equal round and reactive to light [EOMI] : extraocular movements intact [No Respiratory Distress] : no respiratory distress  [No Accessory Muscle Use] : no accessory muscle use [Normal Rate] : normal rate  [Regular Rhythm] : with a regular rhythm [Normal S1, S2] : normal S1 and S2 [Soft] : abdomen soft [Non Tender] : non-tender [Non-distended] : non-distended [Normal Bowel Sounds] : normal bowel sounds [de-identified] : on oxygen, coarse BS bilaterally

## 2024-09-03 NOTE — ASSESSMENT
[FreeTextEntry1] : Protein s deficiency: Urged follow-up with hematologist for leukocytosis and protein s deficiency. Continue eliquis 5mg BID.  Tachycardia; Continue diltiazem ER 240mg daily.  anxiety, depression: Discussed changing from effexor to help with BP/HR. Start venlafaxine 37.5mg daily. F/u 1 month. Discussed starting sertraline at that time.

## 2024-09-03 NOTE — HISTORY OF PRESENT ILLNESS
[de-identified] : 69F presents for follow-up of protein s deficiency, tachycardia, anxiety/depression. She has run out of diltiazem. Needs medication renewals and due for bloodwork.   Of note has repeat CT chest results pending for lung nodule.

## 2024-09-03 NOTE — REVIEW OF SYSTEMS
[Fever] : no fever [Chills] : no chills [Night Sweats] : no night sweats [Chest Pain] : no chest pain [Palpitations] : no palpitations [Lower Ext Edema] : no lower extremity edema [Wheezing] : no wheezing [Cough] : no cough [Abdominal Pain] : no abdominal pain [Nausea] : no nausea [Constipation] : no constipation [Diarrhea] : diarrhea [Vomiting] : no vomiting [Dysuria] : no dysuria

## 2024-09-04 LAB
ANION GAP SERPL CALC-SCNC: 15 MMOL/L
BASOPHILS # BLD AUTO: 0.13 K/UL
BASOPHILS NFR BLD AUTO: 0.9 %
BUN SERPL-MCNC: 11 MG/DL
CALCIUM SERPL-MCNC: 10.4 MG/DL
CHLORIDE SERPL-SCNC: 99 MMOL/L
CO2 SERPL-SCNC: 26 MMOL/L
CREAT SERPL-MCNC: 0.71 MG/DL
EGFR: 93 ML/MIN/1.73M2
EOSINOPHIL # BLD AUTO: 0.37 K/UL
EOSINOPHIL NFR BLD AUTO: 2.6 %
GLUCOSE SERPL-MCNC: 86 MG/DL
HCT VFR BLD CALC: 37.3 %
HGB BLD-MCNC: 11.4 G/DL
IMM GRANULOCYTES NFR BLD AUTO: 0.5 %
LYMPHOCYTES # BLD AUTO: 1.81 K/UL
LYMPHOCYTES NFR BLD AUTO: 12.9 %
MAN DIFF?: NORMAL
MCHC RBC-ENTMCNC: 25.8 PG
MCHC RBC-ENTMCNC: 30.6 GM/DL
MCV RBC AUTO: 84.4 FL
MONOCYTES # BLD AUTO: 1.09 K/UL
MONOCYTES NFR BLD AUTO: 7.8 %
NEUTROPHILS # BLD AUTO: 10.54 K/UL
NEUTROPHILS NFR BLD AUTO: 75.3 %
PLATELET # BLD AUTO: 634 K/UL
POTASSIUM SERPL-SCNC: 4.4 MMOL/L
RBC # BLD: 4.42 M/UL
RBC # FLD: 16.4 %
SODIUM SERPL-SCNC: 139 MMOL/L
WBC # FLD AUTO: 14.01 K/UL

## 2024-09-06 ENCOUNTER — APPOINTMENT (OUTPATIENT)
Dept: PULMONOLOGY | Facility: CLINIC | Age: 69
End: 2024-09-06

## 2024-09-13 ENCOUNTER — APPOINTMENT (OUTPATIENT)
Dept: PULMONOLOGY | Facility: CLINIC | Age: 69
End: 2024-09-13

## 2024-10-01 ENCOUNTER — APPOINTMENT (OUTPATIENT)
Dept: INTERNAL MEDICINE | Facility: CLINIC | Age: 69
End: 2024-10-01

## 2024-10-08 ENCOUNTER — RX RENEWAL (OUTPATIENT)
Age: 69
End: 2024-10-08

## 2024-10-17 ENCOUNTER — RX RENEWAL (OUTPATIENT)
Age: 69
End: 2024-10-17

## 2024-11-01 ENCOUNTER — APPOINTMENT (OUTPATIENT)
Dept: INTERNAL MEDICINE | Facility: CLINIC | Age: 69
End: 2024-11-01

## 2024-11-05 ENCOUNTER — APPOINTMENT (OUTPATIENT)
Dept: INTERNAL MEDICINE | Facility: CLINIC | Age: 69
End: 2024-11-05
Payer: MEDICARE

## 2024-11-05 ENCOUNTER — INPATIENT (INPATIENT)
Facility: HOSPITAL | Age: 69
LOS: 6 days | Discharge: HOME | DRG: 192 | End: 2024-11-12
Attending: FAMILY MEDICINE | Admitting: INTERNAL MEDICINE
Payer: MEDICARE

## 2024-11-05 VITALS
OXYGEN SATURATION: 90 % | RESPIRATION RATE: 22 BRPM | DIASTOLIC BLOOD PRESSURE: 77 MMHG | HEART RATE: 127 BPM | HEIGHT: 59 IN | WEIGHT: 104.06 LBS | SYSTOLIC BLOOD PRESSURE: 141 MMHG

## 2024-11-05 VITALS
TEMPERATURE: 98.6 F | HEIGHT: 58.5 IN | BODY MASS INDEX: 21.25 KG/M2 | WEIGHT: 104 LBS | DIASTOLIC BLOOD PRESSURE: 70 MMHG | SYSTOLIC BLOOD PRESSURE: 140 MMHG | OXYGEN SATURATION: 88 % | HEART RATE: 130 BPM | RESPIRATION RATE: 16 BRPM

## 2024-11-05 DIAGNOSIS — I10 ESSENTIAL (PRIMARY) HYPERTENSION: ICD-10-CM

## 2024-11-05 DIAGNOSIS — E78.5 HYPERLIPIDEMIA, UNSPECIFIED: ICD-10-CM

## 2024-11-05 DIAGNOSIS — Z98.89 OTHER SPECIFIED POSTPROCEDURAL STATES: Chronic | ICD-10-CM

## 2024-11-05 DIAGNOSIS — J44.1 CHRONIC OBSTRUCTIVE PULMONARY DISEASE WITH (ACUTE) EXACERBATION: ICD-10-CM

## 2024-11-05 DIAGNOSIS — Z29.9 ENCOUNTER FOR PROPHYLACTIC MEASURES, UNSPECIFIED: ICD-10-CM

## 2024-11-05 DIAGNOSIS — Z86.2 PERSONAL HISTORY OF DISEASES OF THE BLOOD AND BLOOD-FORMING ORGANS AND CERTAIN DISORDERS INVOLVING THE IMMUNE MECHANISM: ICD-10-CM

## 2024-11-05 LAB
ALBUMIN SERPL ELPH-MCNC: 3 G/DL — LOW (ref 3.3–5)
ALP SERPL-CCNC: 157 U/L — HIGH (ref 40–120)
ALT FLD-CCNC: 22 U/L — SIGNIFICANT CHANGE UP (ref 12–78)
ANION GAP SERPL CALC-SCNC: 9 MMOL/L — SIGNIFICANT CHANGE UP (ref 5–17)
APTT BLD: 44 SEC — HIGH (ref 24.5–35.6)
AST SERPL-CCNC: 17 U/L — SIGNIFICANT CHANGE UP (ref 15–37)
BASOPHILS # BLD AUTO: 0.07 K/UL — SIGNIFICANT CHANGE UP (ref 0–0.2)
BASOPHILS NFR BLD AUTO: 0.4 % — SIGNIFICANT CHANGE UP (ref 0–2)
BILIRUB SERPL-MCNC: 0.2 MG/DL — SIGNIFICANT CHANGE UP (ref 0.2–1.2)
BUN SERPL-MCNC: 12 MG/DL — SIGNIFICANT CHANGE UP (ref 7–23)
CALCIUM SERPL-MCNC: 10 MG/DL — SIGNIFICANT CHANGE UP (ref 8.5–10.1)
CHLORIDE SERPL-SCNC: 97 MMOL/L — SIGNIFICANT CHANGE UP (ref 96–108)
CK MB BLD-MCNC: 1.8 % — SIGNIFICANT CHANGE UP (ref 0–3.5)
CK MB CFR SERPL CALC: 2.1 NG/ML — SIGNIFICANT CHANGE UP (ref 0–3.6)
CK SERPL-CCNC: 120 U/L — SIGNIFICANT CHANGE UP (ref 26–192)
CO2 SERPL-SCNC: 28 MMOL/L — SIGNIFICANT CHANGE UP (ref 22–31)
CREAT SERPL-MCNC: 0.61 MG/DL — SIGNIFICANT CHANGE UP (ref 0.5–1.3)
EGFR: 97 ML/MIN/1.73M2 — SIGNIFICANT CHANGE UP
EOSINOPHIL # BLD AUTO: 0.2 K/UL — SIGNIFICANT CHANGE UP (ref 0–0.5)
EOSINOPHIL NFR BLD AUTO: 1.1 % — SIGNIFICANT CHANGE UP (ref 0–6)
FLUAV AG NPH QL: SIGNIFICANT CHANGE UP
FLUBV AG NPH QL: SIGNIFICANT CHANGE UP
GLUCOSE SERPL-MCNC: 132 MG/DL — HIGH (ref 70–99)
HCT VFR BLD CALC: 33.4 % — LOW (ref 34.5–45)
HGB BLD-MCNC: 10.7 G/DL — LOW (ref 11.5–15.5)
IMM GRANULOCYTES NFR BLD AUTO: 1.2 % — HIGH (ref 0–0.9)
INR BLD: 1.48 RATIO — HIGH (ref 0.85–1.16)
LACTATE SERPL-SCNC: 1.4 MMOL/L — SIGNIFICANT CHANGE UP (ref 0.7–2)
LYMPHOCYTES # BLD AUTO: 1.35 K/UL — SIGNIFICANT CHANGE UP (ref 1–3.3)
LYMPHOCYTES # BLD AUTO: 7.3 % — LOW (ref 13–44)
MCHC RBC-ENTMCNC: 26.2 PG — LOW (ref 27–34)
MCHC RBC-ENTMCNC: 32 G/DL — SIGNIFICANT CHANGE UP (ref 32–36)
MCV RBC AUTO: 81.9 FL — SIGNIFICANT CHANGE UP (ref 80–100)
MONOCYTES # BLD AUTO: 0.99 K/UL — HIGH (ref 0–0.9)
MONOCYTES NFR BLD AUTO: 5.3 % — SIGNIFICANT CHANGE UP (ref 2–14)
NEUTROPHILS # BLD AUTO: 15.7 K/UL — HIGH (ref 1.8–7.4)
NEUTROPHILS NFR BLD AUTO: 84.7 % — HIGH (ref 43–77)
NRBC # BLD: 0 /100 WBCS — SIGNIFICANT CHANGE UP (ref 0–0)
PLATELET # BLD AUTO: 831 K/UL — HIGH (ref 150–400)
POTASSIUM SERPL-MCNC: 4.2 MMOL/L — SIGNIFICANT CHANGE UP (ref 3.5–5.3)
POTASSIUM SERPL-SCNC: 4.2 MMOL/L — SIGNIFICANT CHANGE UP (ref 3.5–5.3)
PROCALCITONIN SERPL-MCNC: 0.06 NG/ML — SIGNIFICANT CHANGE UP
PROT SERPL-MCNC: 9 G/DL — HIGH (ref 6–8.3)
PROTHROM AB SERPL-ACNC: 17.3 SEC — HIGH (ref 9.9–13.4)
RAPID RVP RESULT: DETECTED
RBC # BLD: 4.08 M/UL — SIGNIFICANT CHANGE UP (ref 3.8–5.2)
RBC # FLD: 15.7 % — HIGH (ref 10.3–14.5)
RSV RNA NPH QL NAA+NON-PROBE: SIGNIFICANT CHANGE UP
RV+EV RNA SPEC QL NAA+PROBE: DETECTED
SARS-COV-2 RNA SPEC QL NAA+PROBE: SIGNIFICANT CHANGE UP
SARS-COV-2 RNA SPEC QL NAA+PROBE: SIGNIFICANT CHANGE UP
SODIUM SERPL-SCNC: 134 MMOL/L — LOW (ref 135–145)
TROPONIN I, HIGH SENSITIVITY RESULT: 3.7 NG/L — SIGNIFICANT CHANGE UP
WBC # BLD: 18.53 K/UL — HIGH (ref 3.8–10.5)
WBC # FLD AUTO: 18.53 K/UL — HIGH (ref 3.8–10.5)

## 2024-11-05 PROCEDURE — 71045 X-RAY EXAM CHEST 1 VIEW: CPT | Mod: 26

## 2024-11-05 PROCEDURE — 71275 CT ANGIOGRAPHY CHEST: CPT | Mod: 26

## 2024-11-05 PROCEDURE — 99223 1ST HOSP IP/OBS HIGH 75: CPT | Mod: GC

## 2024-11-05 PROCEDURE — 99215 OFFICE O/P EST HI 40 MIN: CPT

## 2024-11-05 PROCEDURE — 99285 EMERGENCY DEPT VISIT HI MDM: CPT

## 2024-11-05 PROCEDURE — 93010 ELECTROCARDIOGRAM REPORT: CPT

## 2024-11-05 PROCEDURE — G2211 COMPLEX E/M VISIT ADD ON: CPT

## 2024-11-05 RX ORDER — ALBUTEROL 90 MCG
2.5 AEROSOL (GRAM) INHALATION
Refills: 0 | Status: COMPLETED | OUTPATIENT
Start: 2024-11-05 | End: 2024-11-05

## 2024-11-05 RX ORDER — ACETAMINOPHEN 500 MG
650 TABLET ORAL EVERY 6 HOURS
Refills: 0 | Status: DISCONTINUED | OUTPATIENT
Start: 2024-11-05 | End: 2024-11-12

## 2024-11-05 RX ORDER — CHOLECALCIFEROL (VITAMIN D3) 625 MCG
1000 CAPSULE ORAL DAILY
Refills: 0 | Status: DISCONTINUED | OUTPATIENT
Start: 2024-11-05 | End: 2024-11-12

## 2024-11-05 RX ORDER — IPRATROPIUM BROMIDE AND ALBUTEROL SULFATE .5; 2.5 MG/3ML; MG/3ML
3 SOLUTION RESPIRATORY (INHALATION) EVERY 6 HOURS
Refills: 0 | Status: DISCONTINUED | OUTPATIENT
Start: 2024-11-05 | End: 2024-11-12

## 2024-11-05 RX ORDER — OMEPRAZOLE 10 MG
1 CAPSULE,DELAYED RELEASE (ENTERIC COATED) ORAL
Refills: 0 | DISCHARGE

## 2024-11-05 RX ORDER — DILTIAZEM HCL 5 MG/ML
240 VIAL (ML) INTRAVENOUS DAILY
Refills: 0 | Status: DISCONTINUED | OUTPATIENT
Start: 2024-11-05 | End: 2024-11-12

## 2024-11-05 RX ORDER — VENLAFAXINE HCL 150 MG
37.5 CAPSULE, EXT RELEASE 24 HR ORAL DAILY
Refills: 0 | Status: DISCONTINUED | OUTPATIENT
Start: 2024-11-05 | End: 2024-11-12

## 2024-11-05 RX ORDER — CHOLECALCIFEROL (VITAMIN D3) 625 MCG
1 CAPSULE ORAL
Refills: 0 | DISCHARGE

## 2024-11-05 RX ORDER — IPRATROPIUM BROMIDE AND ALBUTEROL SULFATE .5; 2.5 MG/3ML; MG/3ML
3 SOLUTION RESPIRATORY (INHALATION) ONCE
Refills: 0 | Status: COMPLETED | OUTPATIENT
Start: 2024-11-05 | End: 2024-11-05

## 2024-11-05 RX ORDER — BUPRENORPHINE HYDROCHLORIDE, NALOXONE HYDROCHLORIDE 12; 3 MG/1; MG/1
1 FILM, SOLUBLE BUCCAL; SUBLINGUAL DAILY
Refills: 0 | Status: DISCONTINUED | OUTPATIENT
Start: 2024-11-05 | End: 2024-11-05

## 2024-11-05 RX ORDER — UMECLIDINIUM 62.5 UG/1
1 AEROSOL, POWDER ORAL
Refills: 0 | DISCHARGE

## 2024-11-05 RX ORDER — APIXABAN 5 MG/1
1 TABLET, FILM COATED ORAL
Refills: 0 | DISCHARGE

## 2024-11-05 RX ORDER — METHYLPREDNISOLONE ACETATE 80 MG/ML
125 INJECTION, SUSPENSION INTRALESIONAL; INTRAMUSCULAR; INTRASYNOVIAL; SOFT TISSUE ONCE
Refills: 0 | Status: COMPLETED | OUTPATIENT
Start: 2024-11-05 | End: 2024-11-05

## 2024-11-05 RX ORDER — BUPRENORPHINE HYDROCHLORIDE, NALOXONE HYDROCHLORIDE 12; 3 MG/1; MG/1
2 FILM, SOLUBLE BUCCAL; SUBLINGUAL
Refills: 0 | Status: DISCONTINUED | OUTPATIENT
Start: 2024-11-05 | End: 2024-11-12

## 2024-11-05 RX ORDER — BUDESONIDE AND FORMOTEROL FUMARATE DIHYDRATE 80; 4.5 UG/1; UG/1
2 AEROSOL RESPIRATORY (INHALATION)
Refills: 0 | DISCHARGE

## 2024-11-05 RX ORDER — FLUTICASONE PROPIONATE AND SALMETEROL XINAFOATE 230; 21 UG/1; UG/1
1 AEROSOL, METERED RESPIRATORY (INHALATION)
Refills: 0 | Status: DISCONTINUED | OUTPATIENT
Start: 2024-11-05 | End: 2024-11-12

## 2024-11-05 RX ORDER — ONDANSETRON HYDROCHLORIDE 2 MG/ML
4 INJECTION, SOLUTION INTRAMUSCULAR; INTRAVENOUS EVERY 8 HOURS
Refills: 0 | Status: DISCONTINUED | OUTPATIENT
Start: 2024-11-05 | End: 2024-11-12

## 2024-11-05 RX ORDER — DILTIAZEM HCL 5 MG/ML
1 VIAL (ML) INTRAVENOUS
Refills: 0 | DISCHARGE

## 2024-11-05 RX ORDER — VENLAFAXINE HCL 150 MG
1 CAPSULE, EXT RELEASE 24 HR ORAL
Refills: 0 | DISCHARGE

## 2024-11-05 RX ORDER — APIXABAN 5 MG/1
5 TABLET, FILM COATED ORAL
Refills: 0 | Status: DISCONTINUED | OUTPATIENT
Start: 2024-11-05 | End: 2024-11-12

## 2024-11-05 RX ORDER — TIOTROPIUM BROMIDE INHALATION SPRAY 1.56 UG/1
2 SPRAY, METERED RESPIRATORY (INHALATION) DAILY
Refills: 0 | Status: DISCONTINUED | OUTPATIENT
Start: 2024-11-05 | End: 2024-11-12

## 2024-11-05 RX ORDER — PANTOPRAZOLE SODIUM 40 MG/1
40 TABLET, DELAYED RELEASE ORAL
Refills: 0 | Status: DISCONTINUED | OUTPATIENT
Start: 2024-11-05 | End: 2024-11-12

## 2024-11-05 RX ORDER — MAGNESIUM, ALUMINUM HYDROXIDE 200-200 MG
30 TABLET,CHEWABLE ORAL EVERY 4 HOURS
Refills: 0 | Status: DISCONTINUED | OUTPATIENT
Start: 2024-11-05 | End: 2024-11-12

## 2024-11-05 RX ORDER — ENOXAPARIN SODIUM 40MG/0.4ML
40 SYRINGE (ML) SUBCUTANEOUS EVERY 24 HOURS
Refills: 0 | Status: DISCONTINUED | OUTPATIENT
Start: 2024-11-05 | End: 2024-11-05

## 2024-11-05 RX ADMIN — BUPRENORPHINE HYDROCHLORIDE, NALOXONE HYDROCHLORIDE 2 FILM(S): 12; 3 FILM, SOLUBLE BUCCAL; SUBLINGUAL at 17:17

## 2024-11-05 RX ADMIN — Medication 1500 MILLILITER(S): at 11:10

## 2024-11-05 RX ADMIN — APIXABAN 5 MILLIGRAM(S): 5 TABLET, FILM COATED ORAL at 17:17

## 2024-11-05 RX ADMIN — Medication 37.5 MILLIGRAM(S): at 21:57

## 2024-11-05 RX ADMIN — Medication 2.5 MILLIGRAM(S): at 11:11

## 2024-11-05 RX ADMIN — FLUTICASONE PROPIONATE AND SALMETEROL XINAFOATE 1 DOSE(S): 230; 21 AEROSOL, METERED RESPIRATORY (INHALATION) at 18:34

## 2024-11-05 RX ADMIN — Medication 240 MILLIGRAM(S): at 21:57

## 2024-11-05 RX ADMIN — METHYLPREDNISOLONE ACETATE 125 MILLIGRAM(S): 80 INJECTION, SUSPENSION INTRALESIONAL; INTRAMUSCULAR; INTRASYNOVIAL; SOFT TISSUE at 11:10

## 2024-11-05 RX ADMIN — Medication 2.5 MILLIGRAM(S): at 12:14

## 2024-11-05 RX ADMIN — TIOTROPIUM BROMIDE INHALATION SPRAY 2 PUFF(S): 1.56 SPRAY, METERED RESPIRATORY (INHALATION) at 17:42

## 2024-11-05 RX ADMIN — IPRATROPIUM BROMIDE AND ALBUTEROL SULFATE 3 MILLILITER(S): .5; 2.5 SOLUTION RESPIRATORY (INHALATION) at 11:11

## 2024-11-05 RX ADMIN — IPRATROPIUM BROMIDE AND ALBUTEROL SULFATE 3 MILLILITER(S): .5; 2.5 SOLUTION RESPIRATORY (INHALATION) at 20:07

## 2024-11-05 RX ADMIN — Medication 2.5 MILLIGRAM(S): at 12:16

## 2024-11-05 NOTE — ED PROVIDER NOTE - OBJECTIVE STATEMENT
Patient is a 69-year-old white  female with history of COPD on home O2 with hypertension and hyperlipidemia presenting to the emergency department at [facility] complaining of yellow-green productive sputum shortness of breath as well as wheezing.  Patient states that she was in her baseline state of health up until approximately 6 7 days ago when she began to develop mild malaise fatigue and bodyaches this was immediately followed by a worsening wet productive yellow-green sputum with worsening shortness of breath and wheezing.  No vomiting no diarrhea no chest pain no abdominal pain.  No hemoptysis.

## 2024-11-05 NOTE — H&P ADULT - HISTORY OF PRESENT ILLNESS
Patient is a 69-year-old white  female with history of COPD on home O2 with hypertension and hyperlipidemia presenting to the emergency department at [facility] complaining of yellow-green productive sputum shortness of breath as well as wheezing.  Patient states that she was in her baseline state of health up until approximately 6 7 days ago when she began to develop mild malaise fatigue and body aches this was immediately followed by a worsening wet productive yellow-green sputum with worsening shortness of breath and wheezing.  No vomiting no diarrhea no chest pain no abdominal pain.  No hemoptysis.  yo F/M with PMH presents to the ED with     Denies fever, chills, chest pain, palpitations, SOB, cough, abdominal pain, nausea, vomiting, diarrhea, constipation, urinary frequency, urgency, or dysuria, headaches, changes in vision, dizziness, numbness, tingling.  Denies recent travel, recent antibiotic use, or sick contacts.    ED Course:   Vitals: BP: , HR: , Temp: , RR: , SpO2: % on   Labs:    ABG: pH: , PO2: , PCO2: , HCO3: , SpO2: %  UA:   CXR: as per personal read, official read pending   CT:  EKG:   Received in the ED    Patient is a 69-year-old white  female with history of COPD on home O2 with hypertension and hyperlipidemia presenting to the emergency department at [facility] complaining of yellow-green productive sputum shortness of breath as well as wheezing.  Patient states that she was in her baseline state of health up until approximately 6 7 days ago when she began to develop mild malaise fatigue and body aches this was immediately followed by a worsening wet productive yellow-green sputum with worsening shortness of breath and wheezing.  No vomiting no diarrhea no chest pain no abdominal pain.  No hemoptysis.  yo F/M with PMH presents to the ED with     Denies fever, chills, chest pain, palpitations, SOB, cough, abdominal pain, nausea, vomiting, diarrhea, constipation, urinary frequency, urgency, or dysuria, headaches, changes in vision, dizziness, numbness, tingling.  Denies recent travel, recent antibiotic use, or sick contacts.    ED Course:   Vitals: BP: 141/77, HR: 127, Temp: , RR: 22, SpO2: 90% on 3L NC  Labs: WBC: 18.53; H/H: 10.7/33.4; Plt: 831; aPTT: 44.0; PT: 17.3; INR: 1.48; Na: 134; Glucose: 132; Protein: 9.0; Albumin: 3.0; Alk phos: 157  CXR: hyperinflated lungs b/l as per personal read, official read pending   EKG: Sinus tachycardia Anterior infarct , age undetermined Abnormal ECG  Received in the ED: Levofloxacin 500 mg IVP; Solumedrol 125 mg IVP;  mL bolus; Albuterol 2.5 mg nebulizer, Duoneb Patient is a 69-year-old female with PMH of COPD on home O2 with hypertension presenting to the emergency department at with one week history of worsening SOB. Patient stated that she was in normal state of health up until 6-7 days ago when she began to develop generalized malaise, fatigue, body aches. During this time, she was having increasing SOB, wheezing, yellow-green sputum production, low-grade fevers, and chills. On presentation today, she continues to endorse generalized malaise, pleuritic chest pain on deep inspiration, productive cough, and low po intake. She denies current chills/fever. She states that most recently she has had an exacerbation similar to the current episode about one year ago for which she was admitted to [facility]. She denies alleviating factors and states that the dust in her home aggravates her COPD.    Denies abdominal pain, nausea, vomiting, hemoptysis, diarrhea, constipation, urinary frequency, urgency, or dysuria, headaches, changes in vision, dizziness, numbness, tingling.  Denies recent travel, recent antibiotic use, or sick contacts.    ED Course:   Vitals: BP: 141/77, HR: 127, Temp: , RR: 22, SpO2: 90% on 3L NC  Labs: WBC: 18.53; H/H: 10.7/33.4; Plt: 831; aPTT: 44.0; PT: 17.3; INR: 1.48; Na: 134; Glucose: 132; Protein: 9.0; Albumin: 3.0; Alk phos: 157  CXR: hyperinflated lungs b/l as per personal read, official read pending   EKG: Sinus tachycardia Anterior infarct , age undetermined Abnormal ECG  Received in the ED: Levofloxacin 500 mg IVP; Solumedrol 125 mg IVP;  mL bolus; Albuterol 2.5 mg nebulizer, Duoneb Patient is a 69-year-old female with PMH of COPD on home O2 with hypertension, protein S deficeincy on apixaban presenting to the emergency department at with one week history of worsening SOB. Patient stated that she was in normal state of health up until 6-7 days ago when she began to develop generalized malaise, fatigue, body aches. During this time, she was having increasing SOB, wheezing, yellow-green sputum production, low-grade fevers, and chills. On presentation today, she continues to endorse generalized malaise, pleuritic chest pain on deep inspiration, productive cough, and low po intake. She denies current chills/fever. She states that most recently she has had an exacerbation similar to the current episode about one year ago for which she was admitted to [facility]. She denies alleviating factors and states that the dust in her home aggravates her COPD.    Denies abdominal pain, nausea, vomiting, hemoptysis, diarrhea, constipation, urinary frequency, urgency, or dysuria, headaches, changes in vision, dizziness, numbness, tingling.  Denies recent travel, recent antibiotic use, or sick contacts.    ED Course:   Vitals: BP: 141/77, HR: 127, Temp: , RR: 22, SpO2: 90% on 3L NC  Labs: WBC: 18.53; H/H: 10.7/33.4; Plt: 831; aPTT: 44.0; PT: 17.3; INR: 1.48; Na: 134; Glucose: 132; Protein: 9.0; Albumin: 3.0; Alk phos: 157  CXR: hyperinflated lungs b/l as per personal read, official read pending   EKG: Sinus tachycardia Anterior infarct , age undetermined Abnormal ECG  Received in the ED: Levofloxacin 500 mg IVP; Solumedrol 125 mg IVP;  mL bolus; Albuterol 2.5 mg nebulizer, Duoneb

## 2024-11-05 NOTE — PHARMACOTHERAPY INTERVENTION NOTE - COMMENTS
[place] [facility]  Confidential Drug Utilization Report    Patient Demographic Information (PDI)       PDI	First Name	Last Name	Birth Date	Gender	Street Address	[facility] Code  KRISTIN Aceves	1955	Female	[address] [place]	[address]    Prescription Information      PDI Filter:    PDI	Current Rx	Drug Type	Rx Written	Rx Dispensed	Drug	Quantity	Days Supply	Prescriber Name	Prescriber CONNIE #	Payment Method	Dispenser  A	Y	O	10/31/2024	10/31/2024	buprenorphine-naloxone 4-1 mg sl film	14	7	Samantha, Aquiles UREÑA MD	[id number]	Medicare	[id number]  A	N	O	10/24/2024	10/26/2024	buprenorphine-naloxone 4-1 mg sl film	10	5	Samantha, Aquiles UREÑA MD	[id number]	Medicare	[id number]  A	N	O	09/19/2024	09/19/2024	buprenorphine-naloxone 4-1 mg sl film	60	30	Samantha, Aquiles UREÑA MD	[id number]	Medicare	[id number]  A	N	O	08/13/2024	08/13/2024	buprenorphine-naloxone 4-1 mg sl film	60	30	Samantha, Aquiles UREÑA MD	[id number]	Medicare	[id number]  A	N	O	08/01/2024	08/02/2024	buprenorphine-naloxone 4-1 mg sl film	10	5	Samantha, Aquiles UREÑA MD	[id number]	Medicare	[id number]  A	N	O	06/28/2024	06/29/2024	buprenorphine-naloxone 4-1 mg sl film	60	30	Samantha, Aquiles UREÑA MD	[id number]	Medicare	[id number]  A	N	O	05/23/2024	05/23/2024	buprenorphine-naloxone 4-1 mg sl film	60	30	Samantha, Aquiles UREÑA MD	[id number]	Medicare	[id number]  A	N	O	05/17/2024	05/18/2024	buprenorphine-naloxone 4-1 mg sl film	14	7	SamanthaAquiles MD	[id number]	Medicare	[id number]  A	N	O	04/05/2024	04/08/2024	buprenorphine-naloxone 4-1 mg sl film	60	30	Samantha, Aquiles UREÑA MD	[id number]	Medicare	[id number]  A	N	O	03/07/2024	03/09/2024	buprenorphine-naloxone 4-1 mg sl film	60	30	Samantha, Aquiles UREÑA MD	[id number]	Medicare	[id number]  A	N	O	02/07/2024	02/07/2024	buprenorphine-naloxone 4-1 mg sl film	60	30	SamanthaAquiles MD	[id number]	Medicare	[id number]  A	N	O	01/03/2024	01/08/2024	buprenorphine-naloxone 4-1 mg sl film	60	30	SamanthaAquiles MD	[id number]	Medicare	[id number]  A	N	O	01/02/2024	01/03/2024	buprenorphine-naloxone 4-1 mg sl film	14	7	SamanthaAquiles MD	[id number]	Medicare	[id number]  A	N	O	12/15/2023	12/18/2023	buprenorphine-naloxone 4-1 mg sl film	14	7	SamanthaAquiles MD	[id number]	Medicare	[id number]  A	N	O	11/17/2023	11/17/2023	buprenorphine-naloxone 4-1 mg sl film	60	30	SamanthaAquiles MD	[id number]	Medicare	[id number]

## 2024-11-05 NOTE — ED PROVIDER NOTE - CARE PLAN
Principal Discharge DX:	COPD exacerbation   1 Principal Discharge DX:	COPD exacerbation  Secondary Diagnosis:	Pneumonia

## 2024-11-05 NOTE — H&P ADULT - NSHPSOCIALHISTORY_GEN_ALL_CORE
Tobacco Usage:  never smoked      former smoker       current smoker   Alcohol Usage: social     daily use        denies  Substance Use:  never used    IVDU      Other:  Lives with:  ADLs: Tobacco Usage:   former smoker, quit  2013, 40 ppd/1.5 packs  Alcohol Usage: denies  Substance Use:  never used    Lives with:   ADLs: independent with ADLs, dependent with IADLs; ambulates independently without use of assitance

## 2024-11-05 NOTE — H&P ADULT - NSHPREVIEWOFSYSTEMS_GEN_ALL_CORE
CONSTITUTIONAL: denies fever, chills, fatigue, weakness  HEENT: denies blurred vision, sore throat  SKIN: denies new lesions, rash  CARDIOVASCULAR: denies chest pain, chest pressure, palpitations  RESPIRATORY: denies shortness of breath, cough, sputum production  GASTROINTESTINAL: denies nausea, vomiting, diarrhea, abdominal pain, melena or hematochezia  GENITOURINARY: denies dysuria, discharge  NEUROLOGICAL: denies numbness, headache, focal weakness  MUSCULOSKELETAL: denies new joint pain, muscle aches  HEMATOLOGIC: denies gross bleeding, bruising CONSTITUTIONAL: + fever, chills, fatigue, weakness  HEENT: denies blurred vision, sore throat  SKIN: denies new lesions, rash  CARDIOVASCULAR: denies chest pain, chest pressure, palpitations  RESPIRATORY: + shortness of breath, cough, sputum production, pleuritic chest pain with deep inspiration  GASTROINTESTINAL: denies nausea, vomiting, diarrhea, abdominal pain, melena or hematochezia  GENITOURINARY: denies dysuria, discharge  NEUROLOGICAL: denies numbness, headache, focal weakness  MUSCULOSKELETAL: denies new joint pain, muscle aches  HEMATOLOGIC: denies gross bleeding, bruising CONSTITUTIONAL: + fever, chills, fatigue, weakness  HEENT: denies blurred vision, sore throat  SKIN: denies new lesions, rash  CARDIOVASCULAR: see hpi  RESPIRATORY: + shortness of breath, cough, sputum production, pleuritic chest pain with deep inspiration  GASTROINTESTINAL: denies nausea, vomiting, diarrhea, abdominal pain, melena or hematochezia  GENITOURINARY: denies dysuria, discharge  NEUROLOGICAL: denies numbness, headache, focal weakness  MUSCULOSKELETAL: denies new joint pain, muscle aches  HEMATOLOGIC: denies gross bleeding, bruising

## 2024-11-05 NOTE — H&P ADULT - PROBLEM SELECTOR PROBLEM 3
HTN (hypertension) Encounter for deep vein thrombosis (DVT) prophylaxis History of protein S deficiency

## 2024-11-05 NOTE — H&P ADULT - PROBLEM SELECTOR PLAN 1
# COPD exacerbation (Acute on Chronic)  On home O2 XXXL  Patient with history of COPD who presents with wheezing, worsening cough and dyspnea, concerning for COPD exacerbation without concomitant pneumonia.  - Influenza, viral panel per clinical scenario  - CBC, BMP, ABG/VBG  - , SpO2 goal>88%  - Albuterol/Ipratroprium nebulizer q4hr, reassessment  - Prednisone 40mg QD x 5 days  - Consider CRP, Azithro or Doxy x 5 days # COPD exacerbation (Acute on Chronic)  On home O2 2L NC  Patient with history of COPD who presents with wheezing, worsening cough and dyspnea, concerning for COPD exacerbation without concomitant pneumonia.  - Influenza, viral panel negative  - f/u AM CBC, CMP, procalcitonin, CRP  - Continuous pulse ox, SpO2 goal>88%  - Albuterol/Ipratroprium nebulizer q6hr, reassessment  - Prednisone 40mg QD x 5 days  - Monitor off antibiotics now, will continue monitoring for clinical improvement # COPD exacerbation (Acute on Chronic)  On home O2 2L NC  Patient with history of COPD who presents with wheezing, worsening cough and dyspnea, concerning for COPD exacerbation without concomitant pneumonia.  - Influenza, viral panel negative  - f/u AM CBC, CMP, procalcitonin, CRP  - Continuous pulse ox, SpO2 goal>88%  - Albuterol/Ipratroprium nebulizer q6hr, reassessment  - Prednisone 40mg QD x 5 days  - s/p 1 dose levaquin, f/u CT chest to eval for PNA before ordering more abx. Check procalcitonin  -check RVP

## 2024-11-05 NOTE — H&P ADULT - NSHPPHYSICALEXAM_GEN_ALL_CORE
T(C): --  HR: 127 (11-05-24 @ 10:01) (127 - 127)  BP: 141/77 (11-05-24 @ 10:01) (141/77 - 141/77)  RR: 22 (11-05-24 @ 10:01) (22 - 22)  SpO2: 90% (11-05-24 @ 10:01) (90% - 90%)    GENERAL: patient appears well, no acute distress, appropriately interactive  EYES: sclera clear, no exudates  ENMT: oropharynx clear without erythema, no exudates, moist mucous membranes  NECK: supple, soft  LUNGS: good air entry bilaterally, clear to auscultation, symmetric breath sounds, no wheezing or rhonchi appreciated  HEART: soft S1/S2, regular rate and rhythm, no murmurs noted, no lower extremity edema  GASTROINTESTINAL: abdomen is soft, nontender, nondistended, normoactive bowel sounds  INTEGUMENT: good skin turgor, warm skin, appears well perfused  MUSCULOSKELETAL: no clubbing or cyanosis, no obvious deformity  NEUROLOGIC: awake, alert, oriented x3, good muscle tone in all 4 extremities  HEME/LYMPH: no obvious ecchymosis or petechiae T(C): --  HR: 127 (11-05-24 @ 10:01) (127 - 127)  BP: 141/77 (11-05-24 @ 10:01) (141/77 - 141/77)  RR: 22 (11-05-24 @ 10:01) (22 - 22)  SpO2: 90% (11-05-24 @ 10:01) (90% - 90%)    GENERAL: patient appears mildly short of breath, appropriately interactive  EYES: sclera clear, no exudates  ENMT: oropharynx clear without erythema, no exudates, moist mucous membranes  NECK: supple, soft  LUNGS: good air entry bilaterally, inspiratory wheezes appreciated in all four lung fields, symmetric breath sounds, no rhonchi appreciated  HEART: soft S1/S2, fast rate, no murmurs noted, no lower extremity edema  GASTROINTESTINAL: abdomen is soft, nontender, nondistended, normoactive bowel sounds  INTEGUMENT: good skin turgor, warm skin, appears well perfused  MUSCULOSKELETAL: no clubbing or cyanosis, no obvious deformity  NEUROLOGIC: awake, alert, oriented x3, good muscle tone in all 4 extremities  HEME/LYMPH: no obvious ecchymosis or petechiae

## 2024-11-05 NOTE — ED ADULT TRIAGE NOTE - CHIEF COMPLAINT QUOTE
69 yr old female arrives to ED c/o sob pt notes to be on chronic 2lpm oxygen, hx of COPD, Pt  pcp instructed to be seen in ED, pt notes to be flucutating between 87%-88% on 2lpm pt placed on 3lpm tolerating well. Pt notes to have a notable cough, Pt admits to chest discomfort when coughing, no fevers,

## 2024-11-05 NOTE — ED PROVIDER NOTE - PROGRESS NOTE DETAILS
Patient came in for draw of ordered fasting labs. Patient drawn out of left AC, x 1 attempt and tolerated well. Light green tube drawn. Patient feeling slightly better at this time but will admit for further care treatment and management.

## 2024-11-05 NOTE — H&P ADULT - NSICDXPASTMEDICALHX_GEN_ALL_CORE_FT
PAST MEDICAL HISTORY:  History of COPD     HLD (hyperlipidemia)     HTN (hypertension)      PAST MEDICAL HISTORY:  History of COPD     History of protein S deficiency     HLD (hyperlipidemia)     HTN (hypertension)

## 2024-11-05 NOTE — ED PROVIDER NOTE - PHYSICAL EXAMINATION
Examination reveals a thin well-developed well-nourished older white female who appears her stated age in no acute distress normocephalic atraumatic pupils are equal round reactive to light and accommodation neck is supple lungs decreased breath sounds diffusely with end expiratory wheezing.  Also diffusely.  Heart S1-S2 no murmurs rubs gallops abdomen is soft flat nontender positive bowel sounds extremities are free from any clubbing cyanosis or edema skin exam lower extremities bilaterally reveal dryness to both lower extremities beneath the knees bilaterally.

## 2024-11-05 NOTE — ED PROVIDER NOTE - CLINICAL SUMMARY MEDICAL DECISION MAKING FREE TEXT BOX
Worsening shortness of breath wheezing yellow-green productive sputum and this older woman with COPD on home O2 requiring thorough independently performed history and physical as well as labs such as CBC chemistries blood cultures EKG chest x-ray as well as administration of broad-spectrum antibiotics for possible exacerbation of COPD as result of pneumonia.

## 2024-11-05 NOTE — H&P ADULT - ATTENDING COMMENTS
Patient is a 69-year-old female with PMH of COPD on home O2 with hypertension presenting to the emergency department at with one week history of worsening SOB. Patient is being admitted for COPD exacerbation.    HPI as above.     T(C): 36.5 (11-05-24 @ 14:21), Max: 36.5 (11-05-24 @ 14:21)  HR: 97 (11-05-24 @ 14:21) (97 - 127)  BP: 133/68 (11-05-24 @ 14:21) (133/68 - 141/77)  RR: 16 (11-05-24 @ 14:21) (16 - 22)  SpO2: 99% (11-05-24 @ 14:21) (90% - 99%)  Wt(kg): --    Physical Exam:   GENERAL: well-groomed, wthin female, NAD  HEENT: head NC/AT; conjunctiva & sclera clear; hearing grossly intact, moist mucous membranes  NECK: supple, no JVD  RESPIRATORY:   CARDIOVASCULAR: S1&S2, RRR, no murmurs or gallops  ABDOMEN: soft, non-tender, non-distended, + Bowel sounds x4 quadrants, no guarding, rebound or rigidity  MUSCULOSKELETAL:  no clubbing, cyanosis or edema of all 4 extremities  LYMPH: no cervical lymphadenopathy  VASCULAR: Radial pulses 2+ bilaterally, no varicose veins   SKIN: warm and dry, color normal  NEUROLOGIC: AA&O X3,     Plan:   Treat for COPD exac  check CT chest  f/u pulm and cardio recs  monitor on tele  check procal  trend cardiac enzyme  duo neb and prednisone as ordered    continue eliquis for protein s deficiency Patient is a 69-year-old female with PMH of COPD on home O2 with hypertension presenting to the emergency department at with one week history of worsening SOB. Patient is being admitted for COPD exacerbation.    HPI as above.     T(C): 36.5 (11-05-24 @ 14:21), Max: 36.5 (11-05-24 @ 14:21)  HR: 97 (11-05-24 @ 14:21) (97 - 127)  BP: 133/68 (11-05-24 @ 14:21) (133/68 - 141/77)  RR: 16 (11-05-24 @ 14:21) (16 - 22)  SpO2: 99% (11-05-24 @ 14:21) (90% - 99%)  Wt(kg): --    Physical Exam:   GENERAL: well-groomed, wthin female, NAD  HEENT: head NC/AT; conjunctiva & sclera clear; hearing grossly intact, moist mucous membranes  NECK: supple, no JVD  RESPIRATORY:   CARDIOVASCULAR: S1&S2, RRR, no murmurs or gallops  ABDOMEN: soft, non-tender, non-distended, + Bowel sounds x4 quadrants, no guarding, rebound or rigidity  MUSCULOSKELETAL:  no clubbing, cyanosis or edema of all 4 extremities  LYMPH: no cervical lymphadenopathy  VASCULAR: Radial pulses 2+ bilaterally, no varicose veins   SKIN: warm and dry, color normal  NEUROLOGIC: AA&O X3,     Plan:   Treat for COPD exac  check CT chest  f/u pulm and cardio recs  monitor on tele  check procal  trend cardiac enzyme  duo neb and prednisone as ordered  substance use disorder: continue suboxone- Dr Lawrence consulted    continue eliquis for protein s deficiency

## 2024-11-05 NOTE — H&P ADULT - ASSESSMENT
Patient is a 69-year-old female with PMH of COPD on home O2 with hypertension presenting to the emergency department at with one week history of worsening SOB. Patient is being admitted for COPD exacerbation. Patient is a 69-year-old female with PMH of COPD on home O2, protein s deficiency with hypertension presenting to the emergency department at with one week history of worsening SOB. Patient is being admitted for COPD exacerbation.

## 2024-11-05 NOTE — H&P ADULT - PROBLEM SELECTOR PLAN 3
Chronic, baseline BP XXX/XX as per pt  -Continue home XXXX  -Monitor hemodynamics  -DASH/TLC diet DVT Prophylaxis: lovenox 40 mg qd Chronic, stable as per patient  - Patient is on anticoagulation (eliquis 5 mg BID) as per heme/onc doctor who she does not regularly follow up with  - Recommend patient follow up with heme onc doctor upon discharge

## 2024-11-05 NOTE — H&P ADULT - PROBLEM SELECTOR PLAN 2
-Continue home aspirin and statin  -Lipid panel ordered  -DASH/TLC diet Chronic  -Continue home diltiazem  -Monitor hemodynamics  -Pt with hx of tachycardia, stable as per pt and follows with cardiologist outpt, EKG findings consistent with diagnosis, pt to f/u with cardiologist outpt following d/c from hospital  -DASH/TLC diet

## 2024-11-05 NOTE — CONSULT NOTE ADULT - SUBJECTIVE AND OBJECTIVE BOX
Date/Time Patient Seen:  		  Referring MD:   Data Reviewed	       Patient is a 69y old  Female who presents with a chief complaint of COPD Exacerbation (05 Nov 2024 12:10)      Subjective/HPI    Patient Identity:  · Birth Sex	Female  · Patient's Preferred Pronoun	Her/She     History of Present Illness:  Reason for Admission: COPD Exacerbation  History of Present Illness:   Patient is a 69-year-old female with PMH of COPD on home O2 with hypertension, protein S deficeincy on apixaban presenting to the emergency department at with one week history of worsening SOB. Patient stated that she was in normal state of health up until 6-7 days ago when she began to develop generalized malaise, fatigue, body aches. During this time, she was having increasing SOB, wheezing, yellow-green sputum production, low-grade fevers, and chills. On presentation today, she continues to endorse generalized malaise, pleuritic chest pain on deep inspiration, productive cough, and low po intake. She denies current chills/fever. She states that most recently she has had an exacerbation similar to the current episode about one year ago for which she was admitted to [facility]. She denies alleviating factors and states that the dust in her home aggravates her COPD.  PAST MEDICAL & SURGICAL HISTORY:  Drug abuse  was addicted to pain killers    Protein S deficiency    Depression    COPD (chronic obstructive pulmonary disease)    DVT (deep venous thrombosis)    Pulmonary embolism    Afib    No pertinent past medical history    COPD exacerbation    History of COPD    HTN (hypertension)    HLD (hyperlipidemia)    History of protein S deficiency    History of back surgery    No significant past surgical history      ED Course:   Vitals: BP: 141/77, HR: 127, Temp: , RR: 22, SpO2: 90% on 3L NC  Labs: WBC: 18.53; H/H: 10.7/33.4; Plt: 831; aPTT: 44.0; PT: 17.3; INR: 1.48; Na: 134; Glucose: 132; Protein: 9.0; Albumin: 3.0; Alk phos: 157  CXR: hyperinflated lungs b/l as per personal read, official read pending   EKG: Sinus tachycardia Anterior infarct , age undetermined Abnormal ECG  Received in the ED: Levofloxacin 500 mg IVP; Solumedrol 125 mg IVP;  mL bolus; Albuterol 2.5 mg nebulizer, Duoneb       Review of Systems:  Review of Systems: CONSTITUTIONAL: + fever, chills, fatigue, weakness  HEENT: denies blurred vision, sore throat  SKIN: denies new lesions, rash  CARDIOVASCULAR: see hpi  RESPIRATORY: + shortness of breath, cough, sputum production, pleuritic chest pain with deep inspiration  GASTROINTESTINAL: denies nausea, vomiting, diarrhea, abdominal pain, melena or hematochezia  GENITOURINARY: denies dysuria, discharge  NEUROLOGICAL: denies numbness, headache, focal weakness  MUSCULOSKELETAL: denies new joint pain, muscle aches  HEMATOLOGIC: denies gross bleeding, bruising    Other Review of Systems: All other review of systems negative, except as noted in HPI      Allergies and Intolerances:        Allergies:  	No Known Allergies:     Home Medications:   * Patient Currently Takes Medications as of 05-Nov-2024 12:10 documented in Structured Notes  · 	DilTIAZem (Eqv-Cardizem CD) 240 mg/24 hours oral capsule, extended release: Last Dose Taken:  , 1 cap(s) orally once a day  · 	PriLOSEC OTC 20 mg oral delayed release tablet: Last Dose Taken:  , 1 tab(s) orally once a day  · 	cholecalciferol 25 mcg (1000 intl units) oral tablet: Last Dose Taken:  , 1 tab(s) orally once a day  · 	venlafaxine 37.5 mg oral capsule, extended release: Last Dose Taken:  , 1 cap(s) orally once a day  · 	budesonide-formoterol 160 mcg-4.5 mcg/inh inhalation aerosol: Last Dose Taken:  , 2 puff(s) inhaled 2 times a day  · 	buprenorphine-naloxone 4 mg-1 mg sublingual film: Last Dose Taken:  , 1 film(s) sublingually 2 times a day  · 	Eliquis 5 mg oral tablet: Last Dose Taken: 05-Nov-2024 AM, 1 tab(s) orally 2 times a day  · 	Incruse Ellipta 62.5 mcg/inh inhalation powder: Last Dose Taken:  , 1 puff(s) inhaled once a day    Patient History:    Past Medical, Past Surgical, and Family History:  PAST MEDICAL HISTORY:  History of COPD     History of protein S deficiency     HLD (hyperlipidemia)     HTN (hypertension).     PAST SURGICAL HISTORY:  No significant past surgical history.     FAMILY HISTORY:  No pertinent family history in first degree relatives. No pertinent family history of: thyroid disease.     Social History:  · Substance use	No  · Social History (marital status, living situation, occupation, and sexual history)	Tobacco Usage:   former smoker, quit  2013, 40 ppd/1.5 packs  Alcohol Usage: denies  Substance Use:  never used    Lives with:   ADLs: independent with ADLs, dependent with IADLs; ambulates independently without use of assitance     Tobacco Screening:  · Core Measure Site	Yes  · Has the patient used tobacco in the past 30 days?	No    Risk Assessment:    Present on Admission:  Deep Venous Thrombosis	no  Pulmonary Embolus	no     HIV Screening:  · In accordance with [place] State law, we offer every patient who comes to our ED an HIV test. Would you like to be tested today?	Opt out     Hepatitis C Test Questions:  · In accordance with [place] [email or web address] Law, we offer every patient a Hepatitis C test. Would you like to be tested today?	Opt out        Medication list         MEDICATIONS  (STANDING):  albuterol/ipratropium for Nebulization 3 milliLiter(s) Nebulizer every 6 hours  apixaban 5 milliGRAM(s) Oral two times a day  buprenorphine 2 mG/naloxone 0.5 mG SL Film 2 Film(s) SubLingual two times a day  cholecalciferol 1000 Unit(s) Oral daily  diltiazem    milliGRAM(s) Oral daily  fluticasone propionate/ salmeterol 250-50 MICROgram(s) Diskus 1 Dose(s) Inhalation two times a day  pantoprazole    Tablet 40 milliGRAM(s) Oral before breakfast  predniSONE   Tablet 40 milliGRAM(s) Oral daily  tiotropium 2.5 MICROgram(s) Inhaler 2 Puff(s) Inhalation daily  venlafaxine XR. 37.5 milliGRAM(s) Oral daily    MEDICATIONS  (PRN):  acetaminophen     Tablet .. 650 milliGRAM(s) Oral every 6 hours PRN Temp greater or equal to 38C (100.4F), Mild Pain (1 - 3)  aluminum hydroxide/magnesium hydroxide/simethicone Suspension 30 milliLiter(s) Oral every 4 hours PRN Dyspepsia  ondansetron Injectable 4 milliGRAM(s) IV Push every 8 hours PRN Nausea and/or Vomiting         Vitals log        ICU Vital Signs Last 24 Hrs  T(C): 36.8 (05 Nov 2024 15:26), Max: 36.8 (05 Nov 2024 15:26)  T(F): 98.2 (05 Nov 2024 15:26), Max: 98.2 (05 Nov 2024 15:26)  HR: 121 (05 Nov 2024 15:26) (97 - 127)  BP: 151/73 (05 Nov 2024 15:26) (133/68 - 151/73)  BP(mean): 94 (05 Nov 2024 14:21) (94 - 94)  ABP: --  ABP(mean): --  RR: 18 (05 Nov 2024 15:26) (16 - 22)  SpO2: 96% (05 Nov 2024 15:26) (90% - 99%)    O2 Parameters below as of 05 Nov 2024 15:26  Patient On (Oxygen Delivery Method): nasal cannula  O2 Flow (L/min): 3               Input and Output:  I&O's Detail      Lab Data                        10.7   18.53 )-----------( 831      ( 05 Nov 2024 10:51 )             33.4     11-05    134[L]  |  97  |  12  ----------------------------<  132[H]  4.2   |  28  |  0.61    Ca    10.0      05 Nov 2024 10:51    TPro  9.0[H]  /  Alb  3.0[L]  /  TBili  0.2  /  DBili  x   /  AST  17  /  ALT  22  /  AlkPhos  157[H]  11-05            Review of Systems	      Objective     Physical Examination        Pertinent Lab findings & Imaging      Dillon:  NO   Adequate UO     I&O's Detail           Discussed with:     Cultures:	        Radiology    EXAM: [id number] - CT CHEST  - ORDERED BY: EDMAR MALIK      PROCEDURE DATE:  08/19/2024           INTERPRETATION:  Indication::Follow-up for bronchiectasis    Technique: Non contrast CT scan of the thorax was performed from lung   apices to adrenal glands.  Sagittal and coronal reformatted images were generated.    Comparison: CT chest-February 27, 2024,      Findings:    Tubes/Lines/Devices : none  Mediastinum/hilum: .No adenopathy or mass.  Chest Wall/ Breasts: Unremarkable   Heart/Great Vessels:No pericardial effusions. Great vessels are normal   in caliber. Coronary artery calcifications  Abdomen: Hiatal hernia  Bones and soft tissues: Degenerative changes thoracic spine  Lungs, Pleura, and Airways: Patent airways. No consolidation, mass or   pleural effusions. Diffuse pan lobar bronchiectasis with areas of mucosal   impaction, most pronounced right middle and lower lobes, unchanged from   earlier study.    No new suspicious pulmonary parenchymal lesions.  Previous approximate 9 mm subpleural nodule right upper lobe remains   unchanged (3/34)    IMPRESSION:    Since  February 27, 2024    No new suspicious pulmonary parenchymal lesions.  Previous approximate 9 mm subpleural nodule right upper lobe remains   unchanged (3/34)    Diffuse pan lobar bronchiectasis with areas of mucosal impaction, most   pronounced right middle and lower lobes, unchanged from earlier study.    --- End of Report ---               FORREST LYNCH MD; Attending Radiologist   This document has been electronically signed. Sep  7 2024  3:56PM      ACC: [id number] EXAM:  XR CHEST PORTABLE IMMED 1V   ORDERED BY: JARED SULLIVAN     PROCEDURE DATE:  11/05/2024          INTERPRETATION:  An AP chest radiograph was performed for sepsis.    There are no prior studies for comparison.    The lungs are clear bilaterally, aside from minimal linear scarring at   the right lung base. There are no infiltrates on either side. There is no   pneumothorax. There are no pleural effusions. There is mild bilateral   apical pleural thickening, right greater than left. There is no hilar or   mediastinal widening. The cardiac silhouette is not enlarged. The bony   thorax shows no acute findings while noting multilevel old healed right   posterior rib fractures.    IMPRESSION:  1. Linear scarring at the right lung base with otherwise clear lungs and   no acute cardiopulmonary abnormalities.  2. Mild bilateral apical pleural thickening, right greater than left.  3. Old right-sided posterior fifth through seventh rib fractures. No   acute findings.    --- End of Report ---            DANGELO MYERS MD; Attending Radiologist  This document has been electronically signed. Nov 5 2024  2:39PM                       Date/Time Patient Seen:  		  Referring MD:   Data Reviewed	       Patient is a 69y old  Female who presents with a chief complaint of COPD Exacerbation (05 Nov 2024 12:10)      Subjective/HPI    Patient Identity:  · Birth Sex	Female  · Patient's Preferred Pronoun	Her/She     History of Present Illness:  Reason for Admission: COPD Exacerbation  History of Present Illness:   Patient is a 69-year-old female with PMH of COPD on home O2 with hypertension, protein S deficeincy on apixaban presenting to the emergency department at with one week history of worsening SOB. Patient stated that she was in normal state of health up until 6-7 days ago when she began to develop generalized malaise, fatigue, body aches. During this time, she was having increasing SOB, wheezing, yellow-green sputum production, low-grade fevers, and chills. On presentation today, she continues to endorse generalized malaise, pleuritic chest pain on deep inspiration, productive cough, and low po intake. She denies current chills/fever. She states that most recently she has had an exacerbation similar to the current episode about one year ago for which she was admitted to [facility]. She denies alleviating factors and states that the dust in her home aggravates her COPD.  PAST MEDICAL & SURGICAL HISTORY:  Drug abuse  was addicted to pain killers    Protein S deficiency    Depression    COPD (chronic obstructive pulmonary disease)    DVT (deep venous thrombosis)    Pulmonary embolism    Afib    No pertinent past medical history    COPD exacerbation    History of COPD    HTN (hypertension)    HLD (hyperlipidemia)    History of protein S deficiency    History of back surgery    No significant past surgical history      ED Course:   Vitals: BP: 141/77, HR: 127, Temp: , RR: 22, SpO2: 90% on 3L NC  Labs: WBC: 18.53; H/H: 10.7/33.4; Plt: 831; aPTT: 44.0; PT: 17.3; INR: 1.48; Na: 134; Glucose: 132; Protein: 9.0; Albumin: 3.0; Alk phos: 157  CXR: hyperinflated lungs b/l as per personal read, official read pending   EKG: Sinus tachycardia Anterior infarct , age undetermined Abnormal ECG  Received in the ED: Levofloxacin 500 mg IVP; Solumedrol 125 mg IVP;  mL bolus; Albuterol 2.5 mg nebulizer, Duoneb       Review of Systems:  Review of Systems: CONSTITUTIONAL: + fever, chills, fatigue, weakness  HEENT: denies blurred vision, sore throat  SKIN: denies new lesions, rash  CARDIOVASCULAR: see hpi  RESPIRATORY: + shortness of breath, cough, sputum production, pleuritic chest pain with deep inspiration  GASTROINTESTINAL: denies nausea, vomiting, diarrhea, abdominal pain, melena or hematochezia  GENITOURINARY: denies dysuria, discharge  NEUROLOGICAL: denies numbness, headache, focal weakness  MUSCULOSKELETAL: denies new joint pain, muscle aches  HEMATOLOGIC: denies gross bleeding, bruising    Other Review of Systems: All other review of systems negative, except as noted in HPI      Allergies and Intolerances:        Allergies:  	No Known Allergies:     Home Medications:   * Patient Currently Takes Medications as of 05-Nov-2024 12:10 documented in Structured Notes  · 	DilTIAZem (Eqv-Cardizem CD) 240 mg/24 hours oral capsule, extended release: Last Dose Taken:  , 1 cap(s) orally once a day  · 	PriLOSEC OTC 20 mg oral delayed release tablet: Last Dose Taken:  , 1 tab(s) orally once a day  · 	cholecalciferol 25 mcg (1000 intl units) oral tablet: Last Dose Taken:  , 1 tab(s) orally once a day  · 	venlafaxine 37.5 mg oral capsule, extended release: Last Dose Taken:  , 1 cap(s) orally once a day  · 	budesonide-formoterol 160 mcg-4.5 mcg/inh inhalation aerosol: Last Dose Taken:  , 2 puff(s) inhaled 2 times a day  · 	buprenorphine-naloxone 4 mg-1 mg sublingual film: Last Dose Taken:  , 1 film(s) sublingually 2 times a day  · 	Eliquis 5 mg oral tablet: Last Dose Taken: 05-Nov-2024 AM, 1 tab(s) orally 2 times a day  · 	Incruse Ellipta 62.5 mcg/inh inhalation powder: Last Dose Taken:  , 1 puff(s) inhaled once a day    Patient History:    Past Medical, Past Surgical, and Family History:  PAST MEDICAL HISTORY:  History of COPD     History of protein S deficiency     HLD (hyperlipidemia)     HTN (hypertension).     PAST SURGICAL HISTORY:  No significant past surgical history.     FAMILY HISTORY:  No pertinent family history in first degree relatives. No pertinent family history of: thyroid disease.     Social History:  · Substance use	No  · Social History (marital status, living situation, occupation, and sexual history)	Tobacco Usage:   former smoker, quit  2013, 40 ppd/1.5 packs  Alcohol Usage: denies  Substance Use:  never used    Lives with:   ADLs: independent with ADLs, dependent with IADLs; ambulates independently without use of assitance     Tobacco Screening:  · Core Measure Site	Yes  · Has the patient used tobacco in the past 30 days?	No    Risk Assessment:    Present on Admission:  Deep Venous Thrombosis	no  Pulmonary Embolus	no     HIV Screening:  · In accordance with [place] law, we offer every patient who comes to our ED an HIV test. Would you like to be tested today?	Opt out     Hepatitis C Test Questions:  · In accordance with [place] [facility] Law, we offer every patient a Hepatitis C test. Would you like to be tested today?	Opt out        Medication list         MEDICATIONS  (STANDING):  albuterol/ipratropium for Nebulization 3 milliLiter(s) Nebulizer every 6 hours  apixaban 5 milliGRAM(s) Oral two times a day  buprenorphine 2 mG/naloxone 0.5 mG SL Film 2 Film(s) SubLingual two times a day  cholecalciferol 1000 Unit(s) Oral daily  diltiazem    milliGRAM(s) Oral daily  fluticasone propionate/ salmeterol 250-50 MICROgram(s) Diskus 1 Dose(s) Inhalation two times a day  pantoprazole    Tablet 40 milliGRAM(s) Oral before breakfast  predniSONE   Tablet 40 milliGRAM(s) Oral daily  tiotropium 2.5 MICROgram(s) Inhaler 2 Puff(s) Inhalation daily  venlafaxine XR. 37.5 milliGRAM(s) Oral daily    MEDICATIONS  (PRN):  acetaminophen     Tablet .. 650 milliGRAM(s) Oral every 6 hours PRN Temp greater or equal to 38C (100.4F), Mild Pain (1 - 3)  aluminum hydroxide/magnesium hydroxide/simethicone Suspension 30 milliLiter(s) Oral every 4 hours PRN Dyspepsia  ondansetron Injectable 4 milliGRAM(s) IV Push every 8 hours PRN Nausea and/or Vomiting         Vitals log        ICU Vital Signs Last 24 Hrs  T(C): 36.8 (05 Nov 2024 15:26), Max: 36.8 (05 Nov 2024 15:26)  T(F): 98.2 (05 Nov 2024 15:26), Max: 98.2 (05 Nov 2024 15:26)  HR: 121 (05 Nov 2024 15:26) (97 - 127)  BP: 151/73 (05 Nov 2024 15:26) (133/68 - 151/73)  BP(mean): 94 (05 Nov 2024 14:21) (94 - 94)  ABP: --  ABP(mean): --  RR: 18 (05 Nov 2024 15:26) (16 - 22)  SpO2: 96% (05 Nov 2024 15:26) (90% - 99%)    O2 Parameters below as of 05 Nov 2024 15:26  Patient On (Oxygen Delivery Method): nasal cannula  O2 Flow (L/min): 3               Input and Output:  I&O's Detail      Lab Data                        10.7   18.53 )-----------( 831      ( 05 Nov 2024 10:51 )             33.4     11-05    134[L]  |  97  |  12  ----------------------------<  132[H]  4.2   |  28  |  0.61    Ca    10.0      05 Nov 2024 10:51    TPro  9.0[H]  /  Alb  3.0[L]  /  TBili  0.2  /  DBili  x   /  AST  17  /  ALT  22  /  AlkPhos  157[H]  11-05            Review of Systems	    weak  frail  o2 support  anxious  sob  alvarez  all systems reviewed    Objective     Physical Examination    heart s1s2  lung dec BS  head nc  head at  abd soft  cn grossly int  o2 support      Pertinent Lab findings & Imaging      Dillon:  NO   Adequate UO     I&O's Detail           Discussed with:     Cultures:	        Radiology    EXAM: [id number] - CT CHEST  - ORDERED BY: EDMAR MALIK      PROCEDURE DATE:  08/19/2024           INTERPRETATION:  Indication::Follow-up for bronchiectasis    Technique: Non contrast CT scan of the thorax was performed from lung   apices to adrenal glands.  Sagittal and coronal reformatted images were generated.    Comparison: CT chest-February 27, 2024,      Findings:    Tubes/Lines/Devices : none  Mediastinum/hilum: .No adenopathy or mass.  Chest Wall/ Breasts: Unremarkable   Heart/Great Vessels:No pericardial effusions. Great vessels are normal   in caliber. Coronary artery calcifications  Abdomen: Hiatal hernia  Bones and soft tissues: Degenerative changes thoracic spine  Lungs, Pleura, and Airways: Patent airways. No consolidation, mass or   pleural effusions. Diffuse pan lobar bronchiectasis with areas of mucosal   impaction, most pronounced right middle and lower lobes, unchanged from   earlier study.    No new suspicious pulmonary parenchymal lesions.  Previous approximate 9 mm subpleural nodule right upper lobe remains   unchanged (3/34)    IMPRESSION:    Since  February 27, 2024    No new suspicious pulmonary parenchymal lesions.  Previous approximate 9 mm subpleural nodule right upper lobe remains   unchanged (3/34)    Diffuse pan lobar bronchiectasis with areas of mucosal impaction, most   pronounced right middle and lower lobes, unchanged from earlier study.    --- End of Report ---               FORREST LYNCH MD; Attending Radiologist   This document has been electronically signed. Sep  7 2024  3:56PM      ACC: [id number] EXAM:  XR CHEST PORTABLE IMMED 1V   ORDERED BY: JARED SULLIVAN     PROCEDURE DATE:  11/05/2024          INTERPRETATION:  An AP chest radiograph was performed for sepsis.    There are no prior studies for comparison.    The lungs are clear bilaterally, aside from minimal linear scarring at   the right lung base. There are no infiltrates on either side. There is no   pneumothorax. There are no pleural effusions. There is mild bilateral   apical pleural thickening, right greater than left. There is no hilar or   mediastinal widening. The cardiac silhouette is not enlarged. The bony   thorax shows no acute findings while noting multilevel old healed right   posterior rib fractures.    IMPRESSION:  1. Linear scarring at the right lung base with otherwise clear lungs and   no acute cardiopulmonary abnormalities.  2. Mild bilateral apical pleural thickening, right greater than left.  3. Old right-sided posterior fifth through seventh rib fractures. No   acute findings.    --- End of Report ---            DANGELO MYERS MD; Attending Radiologist  This document has been electronically signed. Nov 5 2024  2:39PM

## 2024-11-05 NOTE — CONSULT NOTE ADULT - ASSESSMENT
69-year-old female with PMH of COPD on home O2 with hypertension, protein S deficiency on apixaban presenting to the emergency department at with one week history of worsening SOB    copd  emphysema  Bronchiectasis  HTN  Protein S Def hx  AFIB  HTN  HLD  Depression  hx of REYES    69-year-old female with PMH of COPD on home O2 with hypertension, protein S deficiency on apixaban presenting to the emergency department at with one week history of worsening SOB    copd  emphysema  Bronchiectasis  HTN  Protein S Def hx  AFIB  HTN  HLD  Depression  hx of REYES     bronchodilators  inhaler - nebs  systemic steroids  cough rx regimen  mucolytics  o2 support  pt has home  goal sat > 88 pct  follows with Dr Parks - [facility] office  I roberto  monitor VS and HD and Sat  cvs rx regimen  BP control  anxiolysis and emotional support  nutritional assessment and optimization  OLD records reviewed  URI sx management   on Suboxone - OUD

## 2024-11-05 NOTE — ED ADULT NURSE NOTE - NSFALLRISKINTERV_ED_ALL_ED

## 2024-11-05 NOTE — ED ADULT NURSE NOTE - OBJECTIVE STATEMENT
68yo Female co SOB and productive cough with yellow-green sputum x1 week. Pt endorsing CP when taking deep breaths, general weakness, mild malaise, fatigue, and body aches. Pt on chronic 2L NC for COPD Denies vomiting, diarrhea, abdominal pain , hemoptysis. Pt states son was sick when they visited the pt about 1.5-2 weeks ago. AOx4, ambulatory without assistance at baseline. Pt placed on monitor, EKG, completed, blood work sent to lab.

## 2024-11-05 NOTE — ED PROVIDER NOTE - DIFFERENTIAL DIAGNOSIS
Differential Diagnosis Differential diagnosis for this exacerbation of COPD include intrinsic worsening of underlying condition versus CHF doubt versus MI doubt versus pneumonia or bronchitis very possible versus pneumothorax also possible.

## 2024-11-06 LAB
A1C WITH ESTIMATED AVERAGE GLUCOSE RESULT: 6 % — HIGH (ref 4–5.6)
ALBUMIN SERPL ELPH-MCNC: 2.9 G/DL — LOW (ref 3.3–5)
ALP SERPL-CCNC: 143 U/L — HIGH (ref 40–120)
ALT FLD-CCNC: 24 U/L — SIGNIFICANT CHANGE UP (ref 12–78)
ANION GAP SERPL CALC-SCNC: 3 MMOL/L — LOW (ref 5–17)
AST SERPL-CCNC: 18 U/L — SIGNIFICANT CHANGE UP (ref 15–37)
BASOPHILS # BLD AUTO: 0.03 K/UL — SIGNIFICANT CHANGE UP (ref 0–0.2)
BASOPHILS NFR BLD AUTO: 0.1 % — SIGNIFICANT CHANGE UP (ref 0–2)
BILIRUB SERPL-MCNC: 0.1 MG/DL — LOW (ref 0.2–1.2)
BUN SERPL-MCNC: 17 MG/DL — SIGNIFICANT CHANGE UP (ref 7–23)
CALCIUM SERPL-MCNC: 10.6 MG/DL — HIGH (ref 8.5–10.1)
CHLORIDE SERPL-SCNC: 99 MMOL/L — SIGNIFICANT CHANGE UP (ref 96–108)
CHOLEST SERPL-MCNC: 176 MG/DL — SIGNIFICANT CHANGE UP
CO2 SERPL-SCNC: 32 MMOL/L — HIGH (ref 22–31)
CREAT SERPL-MCNC: 0.71 MG/DL — SIGNIFICANT CHANGE UP (ref 0.5–1.3)
CRP SERPL-MCNC: 132 MG/L — HIGH
EGFR: 93 ML/MIN/1.73M2 — SIGNIFICANT CHANGE UP
EOSINOPHIL # BLD AUTO: 0.01 K/UL — SIGNIFICANT CHANGE UP (ref 0–0.5)
EOSINOPHIL NFR BLD AUTO: 0 % — SIGNIFICANT CHANGE UP (ref 0–6)
ESTIMATED AVERAGE GLUCOSE: 126 MG/DL — HIGH (ref 68–114)
GLUCOSE SERPL-MCNC: 130 MG/DL — HIGH (ref 70–99)
HCT VFR BLD CALC: 28.5 % — LOW (ref 34.5–45)
HDLC SERPL-MCNC: 54 MG/DL — SIGNIFICANT CHANGE UP
HGB BLD-MCNC: 9.2 G/DL — LOW (ref 11.5–15.5)
IMM GRANULOCYTES NFR BLD AUTO: 1.4 % — HIGH (ref 0–0.9)
LIPID PNL WITH DIRECT LDL SERPL: 111 MG/DL — HIGH
LYMPHOCYTES # BLD AUTO: 0.95 K/UL — LOW (ref 1–3.3)
LYMPHOCYTES # BLD AUTO: 4.5 % — LOW (ref 13–44)
MCHC RBC-ENTMCNC: 26.7 PG — LOW (ref 27–34)
MCHC RBC-ENTMCNC: 32.3 G/DL — SIGNIFICANT CHANGE UP (ref 32–36)
MCV RBC AUTO: 82.6 FL — SIGNIFICANT CHANGE UP (ref 80–100)
MONOCYTES # BLD AUTO: 0.71 K/UL — SIGNIFICANT CHANGE UP (ref 0–0.9)
MONOCYTES NFR BLD AUTO: 3.4 % — SIGNIFICANT CHANGE UP (ref 2–14)
NEUTROPHILS # BLD AUTO: 19.12 K/UL — HIGH (ref 1.8–7.4)
NEUTROPHILS NFR BLD AUTO: 90.6 % — HIGH (ref 43–77)
NON HDL CHOLESTEROL: 122 MG/DL — SIGNIFICANT CHANGE UP
NRBC # BLD: 0 /100 WBCS — SIGNIFICANT CHANGE UP (ref 0–0)
PLATELET # BLD AUTO: 817 K/UL — HIGH (ref 150–400)
POTASSIUM SERPL-MCNC: 4.4 MMOL/L — SIGNIFICANT CHANGE UP (ref 3.5–5.3)
POTASSIUM SERPL-SCNC: 4.4 MMOL/L — SIGNIFICANT CHANGE UP (ref 3.5–5.3)
PROT SERPL-MCNC: 8.4 G/DL — HIGH (ref 6–8.3)
RBC # BLD: 3.45 M/UL — LOW (ref 3.8–5.2)
RBC # FLD: 16 % — HIGH (ref 10.3–14.5)
SODIUM SERPL-SCNC: 134 MMOL/L — LOW (ref 135–145)
TRIGL SERPL-MCNC: 60 MG/DL — SIGNIFICANT CHANGE UP
WBC # BLD: 21.12 K/UL — HIGH (ref 3.8–10.5)
WBC # FLD AUTO: 21.12 K/UL — HIGH (ref 3.8–10.5)

## 2024-11-06 PROCEDURE — 99233 SBSQ HOSP IP/OBS HIGH 50: CPT

## 2024-11-06 PROCEDURE — 99222 1ST HOSP IP/OBS MODERATE 55: CPT

## 2024-11-06 RX ADMIN — Medication 37.5 MILLIGRAM(S): at 11:10

## 2024-11-06 RX ADMIN — Medication 30 MILLILITER(S): at 09:26

## 2024-11-06 RX ADMIN — Medication 650 MILLIGRAM(S): at 12:00

## 2024-11-06 RX ADMIN — APIXABAN 5 MILLIGRAM(S): 5 TABLET, FILM COATED ORAL at 17:24

## 2024-11-06 RX ADMIN — Medication 30 MILLILITER(S): at 00:20

## 2024-11-06 RX ADMIN — FLUTICASONE PROPIONATE AND SALMETEROL XINAFOATE 1 DOSE(S): 230; 21 AEROSOL, METERED RESPIRATORY (INHALATION) at 18:48

## 2024-11-06 RX ADMIN — Medication 30 MILLILITER(S): at 21:47

## 2024-11-06 RX ADMIN — IPRATROPIUM BROMIDE AND ALBUTEROL SULFATE 3 MILLILITER(S): .5; 2.5 SOLUTION RESPIRATORY (INHALATION) at 07:48

## 2024-11-06 RX ADMIN — TIOTROPIUM BROMIDE INHALATION SPRAY 2 PUFF(S): 1.56 SPRAY, METERED RESPIRATORY (INHALATION) at 05:19

## 2024-11-06 RX ADMIN — Medication 240 MILLIGRAM(S): at 05:19

## 2024-11-06 RX ADMIN — BUPRENORPHINE HYDROCHLORIDE, NALOXONE HYDROCHLORIDE 2 FILM(S): 12; 3 FILM, SOLUBLE BUCCAL; SUBLINGUAL at 05:20

## 2024-11-06 RX ADMIN — IPRATROPIUM BROMIDE AND ALBUTEROL SULFATE 3 MILLILITER(S): .5; 2.5 SOLUTION RESPIRATORY (INHALATION) at 20:49

## 2024-11-06 RX ADMIN — Medication 650 MILLIGRAM(S): at 11:11

## 2024-11-06 RX ADMIN — IPRATROPIUM BROMIDE AND ALBUTEROL SULFATE 3 MILLILITER(S): .5; 2.5 SOLUTION RESPIRATORY (INHALATION) at 13:05

## 2024-11-06 RX ADMIN — PANTOPRAZOLE SODIUM 40 MILLIGRAM(S): 40 TABLET, DELAYED RELEASE ORAL at 05:19

## 2024-11-06 RX ADMIN — Medication 1000 UNIT(S): at 11:10

## 2024-11-06 RX ADMIN — APIXABAN 5 MILLIGRAM(S): 5 TABLET, FILM COATED ORAL at 05:19

## 2024-11-06 RX ADMIN — Medication 40 MILLIGRAM(S): at 05:20

## 2024-11-06 RX ADMIN — BUPRENORPHINE HYDROCHLORIDE, NALOXONE HYDROCHLORIDE 2 FILM(S): 12; 3 FILM, SOLUBLE BUCCAL; SUBLINGUAL at 17:25

## 2024-11-06 RX ADMIN — FLUTICASONE PROPIONATE AND SALMETEROL XINAFOATE 1 DOSE(S): 230; 21 AEROSOL, METERED RESPIRATORY (INHALATION) at 05:19

## 2024-11-06 NOTE — CONSULT NOTE ADULT - ASSESSMENT
The patient is a 69 year old female with a history of HTN, protein S deficiency, DVT/PE, COPD who presents with shortness of breath in the setting of viral URI, COPD exacerbation.    Plan:  - ECG with sinus rhythm and nonspecific findings  - Chest pain is atypical and pleuritic in nature  - ECG with sinus rhythm and no evidence of ischemia/infarction  - Given duration of symptoms, an acute MI is ruled out with one set of cardiac enzymes  - CTA chest negative for PE; noted is emphysema and lung nodule  - RVP positive for rhinovirus  - Continue diltiazem  mg daily  - Remain off amlodipine  - Continue apixaban 5 mg bid  - May need additional IV fluids  - Pulm eval

## 2024-11-06 NOTE — CARE COORDINATION ASSESSMENT. - NSCAREPROVIDERS_GEN_ALL_CORE_FT
CARE PROVIDERS:  Accepting Physician: Daniel Dugan  Access Services: Keisha Khan  Administration: Shaquille Lockwood  Administration: Farrukh Torres  Administration: Abel Briceño  Administration: Erich Hoffman  Admitting: Daniel Dugan  Attending: Daniel Dugan  Consultant: Quang Taylor  Consultant: Gilmar Lawrence  Covering Team: Tom Nguyen  ED Attending: Malcolm Ledezma  ED Nurse: Layton Mei  ED Nurse 2: Linda Baca  Nurse: Nan Joyner  Nurse: Moon Toth  Occupational Therapy: Kristen Houser  Ordered: ServiceAccount, SCMMLM  Override: Wendi Riley  Override: New Velasquez  PCA/Nursing Assistant: Dulce Coe  PCA/Nursing Assistant: Claudia Soliman  PCA/Nursing Assistant: Maryam Gonzalez  Physical Therapy: Samm Amaya  Primary Team: Salvatore Aragon  Primary Team: Bernadine Nettles  Registered Dietitian: Jess Mendez  Respiratory Therapy: Monico Sam  Respiratory Therapy: Farhad Cosme  Respiratory Therapy: Japsreet Massey  : Tiffanie Sam  Team: [facility], Team  UR// Supp. Assoc.: Sydney Mcneil

## 2024-11-06 NOTE — CONSULT NOTE ADULT - SUBJECTIVE AND OBJECTIVE BOX
[facility]  INFECTIOUS DISEASES   [address] [place] [address]  Tel: [phone]     Fax: [phone]  ========================================================  MD Matthieu Mayes Michelle, MD Shah, Kaushal, MD Sunjit, Jaspal, MD Sehrish Shahid, MD   ========================================================    MRN-[id number]  EMILY REYES     CC: Patient is a 69y old  Female who presents with a chief complaint of COPD Exacerbation (06 Nov 2024 05:03)    HPI:  Patient is a 69-year-old female with PMH of COPD on home O2 with hypertension, protein S deficeincy on apixaban presenting to the emergency department at with one week history of worsening SOB. Patient stated that she was in normal state of health up until 6-7 days ago when she began to develop generalized malaise, fatigue, body aches. During this time, she was having increasing SOB, wheezing, yellow-green sputum production, low-grade fevers, and chills. On presentation today, she continues to endorse generalized malaise, pleuritic chest pain on deep inspiration, productive cough, and low po intake. She denies current chills/fever. She states that most recently she has had an exacerbation similar to the current episode about one year ago for which she was admitted to [facility]. She denies alleviating factors and states that the dust in her home aggravates her COPD.    Denies abdominal pain, nausea, vomiting, hemoptysis, diarrhea, constipation, urinary frequency, urgency, or dysuria, headaches, changes in vision, dizziness, numbness, tingling.  Denies recent travel, recent antibiotic use, or sick contacts.    ED Course:   Vitals: BP: 141/77, HR: 127, Temp: , RR: 22, SpO2: 90% on 3L NC  Labs: WBC: 18.53; H/H: 10.7/33.4; Plt: 831; aPTT: 44.0; PT: 17.3; INR: 1.48; Na: 134; Glucose: 132; Protein: 9.0; Albumin: 3.0; Alk phos: 157  CXR: hyperinflated lungs b/l as per personal read, official read pending   EKG: Sinus tachycardia Anterior infarct , age undetermined Abnormal ECG  Received in the ED: Levofloxacin 500 mg IVP; Solumedrol 125 mg IVP;  mL bolus; Albuterol 2.5 mg nebulizer, Duoneb (05 Nov 2024 12:10)      PAST MEDICAL & SURGICAL HISTORY:  Drug abuse  was addicted to pain killers  Protein S deficiency  Depression  COPD (chronic obstructive pulmonary disease)  DVT (deep venous thrombosis)  Pulmonary embolism  Afib  History of COPD  HTN (hypertension)  HLD (hyperlipidemia)  History of protein S deficiency  History of back surgery  No significant past surgical history    Social Hx: No current smoking, EtOH or drugs     FAMILY HISTORY:  Family history of lung cancer    Family history of protein S deficiency (Sibling)    No pertinent family history in first degree relatives    Allergies  No Known Allergies    MEDICATIONS  (STANDING):  albuterol/ipratropium for Nebulization 3 milliLiter(s) Nebulizer every 6 hours  apixaban 5 milliGRAM(s) Oral two times a day  buprenorphine 2 mG/naloxone 0.5 mG SL Film 2 Film(s) SubLingual two times a day  cholecalciferol 1000 Unit(s) Oral daily  diltiazem    milliGRAM(s) Oral daily  fluticasone propionate/ salmeterol 250-50 MICROgram(s) Diskus 1 Dose(s) Inhalation two times a day  pantoprazole    Tablet 40 milliGRAM(s) Oral before breakfast  predniSONE   Tablet 40 milliGRAM(s) Oral daily  tiotropium 2.5 MICROgram(s) Inhaler 2 Puff(s) Inhalation daily  venlafaxine XR. 37.5 milliGRAM(s) Oral daily     REVIEW OF SYSTEMS:  CONSTITUTIONAL:  No Fever or chills  HEENT:  No diplopia or blurred vision.  No sore throat or runny nose.  CARDIOVASCULAR:  No chest pain   RESPIRATORY:  +cough, +shortness of breath  GASTROINTESTINAL:  No nausea, vomiting or diarrhea.  GENITOURINARY:  No dysuria, frequency or urgency. No Blood in urine  MUSCULOSKELETAL:  no joint aches, no muscle pain  SKIN:  No change in skin, hair or nails.    Physical Exam:  Vital Signs Last 24 Hrs  T(C): 37.1 (06 Nov 2024 11:44), Max: 37.1 (06 Nov 2024 11:44)  T(F): 98.7 (06 Nov 2024 11:44), Max: 98.7 (06 Nov 2024 11:44)  HR: 98 (06 Nov 2024 11:44) (86 - 121)  BP: 127/63 (06 Nov 2024 11:44) (127/63 - 151/73)  BP(mean): 94 (05 Nov 2024 14:21) (94 - 94)  RR: 18 (06 Nov 2024 11:44) (16 - 18)  SpO2: 95% (06 Nov 2024 11:44) (95% - 99%)  Parameters below as of 06 Nov 2024 11:44  Patient On (Oxygen Delivery Method): nasal cannula  O2 Flow (L/min): 3  GEN: NAD  HEENT: normocephalic and atraumatic. EOMI. PERRL.    NECK: Supple.  No lymphadenopathy   LUNGS: Crackles and rhonchi bilaterally more in bases   HEART: Irregular rate and rhythm   ABDOMEN: Soft, nontender, and nondistended.    EXTREMITIES: Without edema.  NEUROLOGIC: grossly intact.  PSYCHIATRIC: Appropriate affect .  SKIN: No rash     Labs:  11-06    134[L]  |  99  |  17  ----------------------------<  130[H]  4.4   |  32[H]  |  0.71    Ca    10.6[H]      06 Nov 2024 08:01    TPro  8.4[H]  /  Alb  2.9[L]  /  TBili  0.1[L]  /  DBili  x   /  AST  18  /  ALT  24  /  AlkPhos  143[H]  11-06                        9.2    21.12 )-----------( 817      ( 06 Nov 2024 08:01 )             28.5     PT/INR - ( 05 Nov 2024 10:51 )   PT: 17.3 sec;   INR: 1.48 ratio    PTT - ( 05 Nov 2024 10:51 )  PTT:44.0 sec  Urinalysis Basic - ( 06 Nov 2024 08:01 )    Color: x / Appearance: x / SG: x / pH: x  Gluc: 130 mg/dL / Ketone: x  / Bili: x / Urobili: x   Blood: x / Protein: x / Nitrite: x   Leuk Esterase: x / RBC: x / WBC x   Sq Epi: x / Non Sq Epi: x / Bacteria: x    LIVER FUNCTIONS - ( 06 Nov 2024 08:01 )  Alb: 2.9 g/dL / Pro: 8.4 g/dL / ALK PHOS: 143 U/L / ALT: 24 U/L / AST: 18 U/L / GGT: x           CARDIAC MARKERS ( 05 Nov 2024 16:10 )  x     / x     / x     / x     / 2.1 ng/mL    Procalcitonin: 0.06 ng/mL (11-05-24 @ 10:51)    C-Reactive Protein: 132 mg/L (11-05-24 @ 10:51)    SARS-CoV-2: NotDetec (11-05-24 @ 16:30)  SARS-CoV-2 Result: NotDetec (11-05-24 @ 10:51)    RECENT CULTURES:  11-05 @ 16:30      Detected    All imaging and other data have been reviewed.  < from: CT Angio Chest PE Protocol w/ IV Cont (11.05.24 @ 17:05) >  IMPRESSION:  No evidence of  pulmonary emboli or other acute change compared to   8/19/2024.  Emphysema with areas of bronchiectasis and small airway impaction similar   to prior.  Stable 9 mm subpleural right upper lobe nodule. Recommend three-month   follow-up CT    Assessment and Plan:   68yo woman with PMH of -year-old female with PMH of HT, Afib, COPD on 3L home O2, protein S deficiency presenting to the ED with one week history of worsening SOB.   She also had cough, malaise, wheezing, decreases appetite, yellow/green sputum and low grade fever.   In ED WBC was 18k, procalcitonin low but CRP was 132. RVP with enterovirus.     has been started on steroid so WBC went up today. On o2 3L NC about the same she uses at home. No fever. One dose of Levaquin given in ED.   Since she has a severe COPD on home O2 and CT with evidence of bronchiectasis with secretions in bout lungs, might benefit from ABx treatment.     # URI with enterovirus  # COPD exacerbation  # R/O Pneumonia     - Will follow cultures   - Monitor WBC, creat, Tmax  - Will start Levaquin 750mg daily total 5days (today day 2)  - Pulmonary follow up, managing steroid, inhalers and nebz      Thank you for courtesy of this consult.     Will follow.  Discussed with the primary team.     Rae Hein MD  Division of Infectious Diseases   Please call ID service at [phone] with any question.    75 minutes spent on total encounter assessing patient, examination, chart review, counseling and coordinating care by the attending physician/nurse/care manager.

## 2024-11-06 NOTE — PHYSICAL THERAPY INITIAL EVALUATION ADULT - PERTINENT HX OF CURRENT PROBLEM, REHAB EVAL
Patient is a 69-year-old female with PMH of COPD on home O2 with hypertension, protein S deficiency on apixaban presenting to the emergency department at with one week history of worsening SOB. Patient stated that she was in normal state of health up until 6-7 days ago when she began to develop generalized malaise, fatigue, body aches. During this time, she was having increasing SOB, wheezing, yellow-green sputum production, low-grade fevers, and chills. On presentation today, she continues to endorse generalized malaise, pleuritic chest pain on deep inspiration, productive cough, and low po intake. She denies current chills/fever. She states that most recently she has had an exacerbation similar to the current episode about one year ago for which she was admitted to [facility]. She denies alleviating factors and states that the dust in her home aggravates her COPD.

## 2024-11-06 NOTE — CONSULT NOTE ADULT - SUBJECTIVE AND OBJECTIVE BOX
History of Present Illness: The patient is a 69 year old female with a history of HTN, protein S deficiency, DVT/PE, COPD who presents with shortness of breath. She has had worsening shortness of breath, fatigue, malaise, and body aches. She notes left-sided pleuritic chest discomfort. No leg swelling.    Past Medical/Surgical History:  HTN, protein S deficiency, DVT/PE, COPD    Medications:  Home Medications:  Advair Diskus 500 mcg-50 mcg inhalation powder: 1 inhaled 2 times a day (15 Dec 2023 05:07)  amLODIPine 5 mg oral tablet: 1 tab(s) orally once a day (15 Dec 2023 05:10)  budesonide-formoterol 160 mcg-4.5 mcg/inh inhalation aerosol: 2 puff(s) inhaled 2 times a day (05 Nov 2024 15:15)  buprenorphine-naloxone 4 mg-1 mg sublingual film: 1 film(s) sublingually 2 times a day (05 Nov 2024 15:15)  cholecalciferol 25 mcg (1000 intl units) oral tablet: 1 tab(s) orally once a day (05 Nov 2024 15:15)  DilTIAZem (Eqv-Cardizem CD) 240 mg/24 hours oral capsule, extended release: 1 cap(s) orally once a day (05 Nov 2024 15:15)  Effexor XR 75 mg oral capsule, extended release: 1 cap(s) orally once a day (15 Dec 2023 12:08)  Eliquis 5 mg oral tablet: 1 tab(s) orally 2 times a day (05 Nov 2024 15:15)  Eliquis 5 mg oral tablet: 1 tab(s) orally 2 times a day (15 Dec 2023 05:09)  Incruse Ellipta 62.5 mcg/inh inhalation powder: 1 puff(s) inhaled once a day (05 Nov 2024 15:15)  Multiple Vitamins oral tablet: 1 tab(s) orally once a day (24 Dec 2023 10:16)  PriLOSEC OTC 20 mg oral delayed release tablet: 1 tab(s) orally once a day (05 Nov 2024 15:15)  Suboxone 4 mg-1 mg sublingual film: 1 film(s) sublingually 2 times a day (15 Dec 2023 05:03)  venlafaxine 37.5 mg oral capsule, extended release: 1 cap(s) orally once a day (05 Nov 2024 15:15)      Family History: Non-contributory family history of premature cardiovascular atherosclerotic disease    Social History: No tobacco, alcohol or drug use    Review of Systems:  General: No fevers, chills, weight gain  Skin: No rashes, color changes  Cardiovascular: +chest pain, orthopnea  Respiratory: +shortness of breath, cough  Gastrointestinal: No nausea, abdominal pain  Genitourinary: No incontinence, pain with urination  Musculoskeletal: No pain, swelling, decreased range of motion  Neurological: No headache, weakness  Psychiatric: No depression, anxiety  Endocrine: No weight gain, increased thirst  All other systems are comprehensively negative.    Physical Exam:  Vitals:        Vital Signs Last 24 Hrs  T(C): 36.9 (06 Nov 2024 04:54), Max: 36.9 (05 Nov 2024 20:33)  T(F): 98.4 (06 Nov 2024 04:54), Max: 98.4 (05 Nov 2024 20:33)  HR: 96 (06 Nov 2024 07:18) (86 - 121)  BP: 133/73 (06 Nov 2024 04:54) (131/67 - 151/73)  BP(mean): 94 (05 Nov 2024 14:21) (94 - 94)  RR: 18 (06 Nov 2024 07:18) (16 - 18)  SpO2: 97% (06 Nov 2024 07:18) (95% - 99%)  Parameters below as of 06 Nov 2024 07:18  Patient On (Oxygen Delivery Method): nasal cannula  O2 Flow (L/min): 3  General: NAD  HEENT: MMM  Neck: No JVD, no carotid bruit  Lungs: Wheezing  CV: RRR, nl S1/S2, no M/R/G  Abdomen: S/NT/ND, +BS  Extremities: No LE edema, no cyanosis  Neuro: AAOx3, non-focal  Skin: No rash    Labs:                        9.2    21.12 )-----------( 817      ( 06 Nov 2024 08:01 )             28.5     11-06    134[L]  |  99  |  17  ----------------------------<  130[H]  4.4   |  32[H]  |  0.71    Ca    10.6[H]      06 Nov 2024 08:01    TPro  8.4[H]  /  Alb  2.9[L]  /  TBili  0.1[L]  /  DBili  x   /  AST  18  /  ALT  24  /  AlkPhos  143[H]  11-06    CARDIAC MARKERS ( 05 Nov 2024 16:10 )  x     / x     / x     / x     / 2.1 ng/mL      PT/INR - ( 05 Nov 2024 10:51 )   PT: 17.3 sec;   INR: 1.48 ratio         PTT - ( 05 Nov 2024 10:51 )  PTT:44.0 sec    ECG/Telemetry: Sinus tachycardia, normal axis, nonspecific ST abnormality

## 2024-11-06 NOTE — OCCUPATIONAL THERAPY INITIAL EVALUATION ADULT - LIVES WITH, PROFILE
[General Appearance - Alert] : alert
[General Appearance - In No Acute Distress] : in no acute distress
[Sclera] : the sclera and conjunctiva were normal
spouse
[PERRL With Normal Accommodation] : pupils were equal in size, round, and reactive to light
[Outer Ear] : the ears and nose were normal in appearance
[Hearing Threshold Finger Rub Not [place]] : hearing was normal
[Neck Appearance] : the appearance of the neck was normal
[Respiration, Rhythm And Depth] : normal respiratory rhythm and effort
[Heart Sounds] : normal S1 and S2
[Bowel Sounds] : normal bowel sounds
[Abdomen Soft] : soft
[Abdomen Tenderness] : non-tender
[No CVA Tenderness] : no ~M costovertebral angle tenderness
[No Spinal Tenderness] : no spinal tenderness
8
[Abnormal Walk] : normal gait
[Musculoskeletal - Swelling] : no joint swelling seen
[Skin Color & Pigmentation] : normal skin color and pigmentation
[] : no rash
[No Focal Deficits] : no focal deficits
[Oriented To Time, Place, And Person] : oriented to person, place, and time
[Impaired Insight] : insight and judgment were intact
[Cervical Lymph Nodes Enlarged Posterior Bilaterally] : posterior cervical
[Cervical Lymph Nodes Enlarged Anterior Bilaterally] : anterior cervical

## 2024-11-06 NOTE — OCCUPATIONAL THERAPY INITIAL EVALUATION ADULT - ADDITIONAL COMMENTS
Pt lives in a house with 4 stairs with handrail to enter, no stairs to access living area. +stairs to basement where laundry room is located. Pt has a bathtub with doors and a shower chair. Pt reports that she is independent in ADLs and ambulation with no devices at home. Spouse assists patient with tub transfers. Pt owns rolling walker, transport chair, rollator and shower chair. Pt has home oxygen and reports that she uses 3 lpm O2. Pt performed sit to stand and ambulated 40' with no devices at a supervision level. Patient requires assistance with tub transfers and IADL's due to SOB/COPD, decreased strength, decreased endurance and impaired standing balance.

## 2024-11-06 NOTE — PHYSICAL THERAPY INITIAL EVALUATION ADULT - WEIGHT-BEARING RESTRICTIONS: SIT/STAND, REHAB EVAL
"Patient Name: Juany Carlin   YOB: 1949  Referring Primary Care Physician: Warren Pizarro MD  BMI: Body mass index is 25.11 kg/m².    Chief Complaint:    Chief Complaint   Patient presents with    Right Knee - Pain        HPI: Here for pain in right knee and has followed with [facility] and desires conservative treatment.     Juany Carlin is a 75 y.o. female who presents today for evaluation of   Chief Complaint   Patient presents with    Right Knee - Pain         Subjective   Medications:   Home Medications:  Current Outpatient Medications on File Prior to Visit   Medication Sig    acetaminophen (TYLENOL) 650 MG 8 hr tablet Take 1 tablet by mouth Every 8 (Eight) Hours As Needed for Mild Pain.    amLODIPine (NORVASC) 2.5 MG tablet Take 1 tablet by mouth Daily.    apixaban (Eliquis) 2.5 MG tablet tablet TAKE ONE TABLET BY MOUTH EVERY 12 HOURS    famotidine (PEPCID) 10 MG tablet Take 2 tablets by mouth Every Night.    Gemtesa 75 MG tablet     losartan (COZAAR) 100 MG tablet TAKE ONE TABLET BY MOUTH DAILY    Magnesium 250 MG tablet Take 2 tablets by mouth Every Night. \"FOR LEG CRAMPS\"    Multiple Vitamins-Minerals (MULTIVITAMIN PO) Take 1 tablet by mouth Every Night. PT HOLDING FOR SURGERY    Rimegepant Sulfate (NURTEC) 75 MG tablet dispersible tablet Take 1 tablet by mouth Daily As Needed (migraine headache). Take at the onset of headache.    traZODone (DESYREL) 50 MG tablet     promethazine-dextromethorphan (PROMETHAZINE-DM) 6.25-15 MG/5ML syrup Take 5 mL by mouth 4 (Four) Times a Day As Needed for Cough. (Patient not taking: Reported on 4/30/2024)     Current Facility-Administered Medications on File Prior to Visit   Medication    Chlorhexidine Gluconate Cloth 2 % pads 1 each     Current Medications:  Scheduled Meds:  Continuous Infusions:No current facility-administered medications for this visit.    PRN Meds:.    I have reviewed the patient's medical history in detail and updated the computerized " patient record.  Review and summarization of old records includes:    Past Medical History:   Diagnosis Date    Acute cystitis without hematuria 04/10/2020    Acute right ankle pain 2020    Adrenal nodule     Arthritis     BPPV (benign paroxysmal positional vertigo) 05/10/2016    Cancer     Basil cell    Cervical stenosis of spine     Clotting disorder 2016    Eliquis for DVT. PE.    Depression     NO MEDS    DVT, lower extremity     right    GERD without esophagitis 05/10/2016    H/O varicose vein stripping     Headache 1994    Aura headaches, in clusters    Hip pain     History of migraine     History of skin cancer     History of stress incontinence     Hyperplastic colon polyp 2013    Hypertension     Low back pain     PONV (postoperative nausea and vomiting)     Pregnancy     , miscarrige x1    Pulmonary emboli     bilateral extensive PE with saddle emboli.Negative hypercoagulable state w/u    Pulmonary embolism with acute cor pulmonale 2017    Scoliosis     Shoulder pain     Urge incontinence 05/10/2016    Vitamin D deficiency 05/10/2016        Past Surgical History:   Procedure Laterality Date    CARDIAC CATHETERIZATION N/A 11/10/2016    Procedure: Right Heart Cath;  Surgeon: Johnna Agrawal MD;  Location:  [facility];  Service:     CARDIAC CATHETERIZATION N/A 11/10/2016    Procedure: Thrombolytic Therapy;  Surgeon: Johnna Agrawal MD;  Location:  [facility];  Service:     CATARACT EXTRACTION Bilateral     COLONOSCOPY W/ BIOPSIES  2013    hyperplastic polyp, Dr.Russel Lewis    CYST REMOVAL      VAGINAL CYST    DILATATION AND CURETTAGE      EPIDURAL BLOCK      HEMORROIDECTOMY      INTERVENTIONAL RADIOLOGY PROCEDURE Bilateral 11/10/2016    Procedure: Sp Smiley Cath Place Pulmonary Art;  Surgeon: Johnna Agrawal MD;  Location:  [facility];  Service:     JOINT REPLACEMENT      Total hip right    SKIN CANCER EXCISION       TONSILLECTOMY      TOTAL HIP ARTHROPLASTY Right 10/25/2021    Procedure: Posterior RIGHT TOTAL HIP ARTHROPLASTY GAVIN NAVIGATION;  Surgeon: Rashawn Hernández MD;  Location: [facility] OR;  Service: Orthopedics;  Laterality: Right;    TOTAL LAPAROSCOPIC HYSTERECTOMY  2001    VEIN SURGERY Left [id number]    leg,     VENTRAL HERNIA REPAIR          Social History     Occupational History    Occupation:      Employer: RETIRED     Comment: [employer]   Tobacco Use    Smoking status: Former     Current packs/day: 0.00     Average packs/day: 0.5 packs/day for 10.0 years (5.0 ttl pk-yrs)     Types: Cigarettes     Start date: 1966     Quit date: 1976     Years since quittin.3    Smokeless tobacco: Never   Vaping Use    Vaping status: Never Used   Substance and Sexual Activity    Alcohol use: Not Currently     Comment: rarely    Drug use: No    Sexual activity: Yes     Partners: Male     Birth control/protection: Post-menopausal, None, Hysterectomy      Social History     Social History Narrative    LIVES WITH SPOUSE        Family History   Problem Relation Age of Onset    Macular degeneration Mother     Deep vein thrombosis Mother     Hypertension Mother     Skin cancer Mother     Arthritis Mother     Hearing loss Mother     Heart disease Mother     Hyperlipidemia Mother     Kidney disease Mother     Miscarriages / Stillbirths Mother         Mother one miscarriage    Vision loss Mother         Macular degeneration, columbar    Heart failure Father     Deep vein thrombosis Father     Cancer Father     Hypertension Father     Heart disease Father     Hyperlipidemia Father     Stroke Father         TIA    Chiari malformation Sister     Heart disease Maternal Grandfather     Heart disease Paternal Grandfather     Deep vein thrombosis Sister     Heart failure Sister     Pancreatic cancer Sister     Thyroid disease Daughter     Hypertension Brother     Other Brother         PE and DVT     "Malig Hyperthermia Neg Hx        ROS: 14 point review of systems was performed and all other systems were reviewed and are negative except for documented findings in HPI and today's encounter.     Allergies:   Allergies   Allergen Reactions    Cymbalta [Duloxetine Hcl] Other (See Comments)     Patient had severe range blood pressures.  Cautioned to avoid nor-epi anti-depressant    Ciprocinonide [Fluocinolone] Other (See Comments)     Achilles tendonitis    Suprax [Cefixime] Diarrhea    Contrast Dye (Echo Or Unknown Ct/Mr) Rash     Rash many years ago when shell-fish derived contrast dye was used    Nickel Rash     Possible nickel allergy     Constitutional:  Denies fever, shaking or chills   Eyes:  Denies change in visual acuity   HENT:  Denies nasal congestion or sore throat   Respiratory:  Denies cough or shortness of breath   Cardiovascular:  Denies chest pain or severe LE edema   GI:  Denies abdominal pain, nausea, vomiting, bloody stools or diarrhea   Musculoskeletal:  Numbness, tingling, pain, or loss of motor function only as noted above in history of present illness.  : Denies painful urination or hematuria  Integument:  Denies rash, lesion or ulceration   Neurologic:  Denies headache or focal weakness  Endocrine:  Denies lymphadenopathy  Psych:  Denies confusion or change in mental status   Hem:  Denies active bleeding    OBJECTIVE:  Physical Exam: 75 y.o. female  Wt Readings from Last 3 Encounters:   04/30/24 79.4 kg (175 lb)   04/29/24 79.4 kg (175 lb)   01/08/24 79.7 kg (175 lb 9.6 oz)     Ht Readings from Last 1 Encounters:   04/30/24 177.8 cm (70\")     Body mass index is 25.11 kg/m².  Vitals:    04/30/24 0948   Temp: 98 °F (36.7 °C)     Vital signs reviewed.     General Appearance:    Alert, cooperative, in no acute distress                  Eyes: conjunctiva clear  ENT: external ears and nose atraumatic  CV: no peripheral edema  Resp: normal respiratory effort  Skin: no rashes or wounds; normal " turgor  Psych: mood and affect appropriate  Lymph: no nodes appreciated  Neuro: gross sensation intact  Vascular:  Palpable peripheral pulse in noted extremity  Musculoskeletal Extremities: skin warm, dry and intact, calf soft nttp, knee ttp medial aspect, hip nttp hips nttp     Radiology:   Right knee 3 views done for pain with comparison views tricompartmental DJD     Assessment:     ICD-10-CM ICD-9-CM   1. Pain  R52 780.96   2. Primary osteoarthritis of right knee  M17.11 715.16   3. Primary osteoarthritis of both knees  M17.0 715.16        Large Joint Arthrocentesis: R knee  Date/Time: 5/1/2024 10:28 AM  Consent given by: patient  Site marked: site marked  Timeout: Immediately prior to procedure a time out was called to verify the correct patient, procedure, equipment, support staff and site/side marked as required   Supporting Documentation  Indications: pain and joint swelling   Procedure Details  Location: knee - R knee  Preparation: Patient was prepped and draped in the usual sterile fashion  Needle gauge: 21 G.  Approach: anterolateral  Medications administered: 2 mL lidocaine (cardiac); 80 mg methylPREDNISolone acetate 80 MG/ML  Patient tolerance: patient tolerated the procedure well with no immediate complications          MDM/Plan:   The diagnosis(es), natural history, pathophysiology and treatment for diagnosis(es) were discussed. Opportunity given and questions answered.  Biomechanics of pertinent body areas discussed.  When appropriate, the use of ambulatory aids discussed.  EXERCISES:  Advice on benefits of, and types of regular/moderate exercise pertaining to orthopedic diagnosis(es).  MEDICATIONS:  The risks, benefits, warnings,side effects and alternatives of medications discussed.  Inflammation/pain control; with cold, heat, elevation and/or liniments discussed as appropriate  Cortisone Injection. See procedure note.  HOME EXERCISE/PT program encouraged  MEDICAL RECORDS reviewed from other  provider(s) for past and current medical history pertinent to this complaint.      5/1/2024    Much of this encounter note is an electronic transcription/translation of spoken language to printed text. The electronic translation of spoken language may permit erroneous, or at times, nonsensical words or phrases to be inadvertently transcribed; Although I have reviewed the note for such errors, some may still exist     weight-bearing as tolerated

## 2024-11-06 NOTE — PROGRESS NOTE ADULT - ASSESSMENT
69-year-old female with PMH of COPD on home O2 with hypertension, protein S deficiency on apixaban presenting to the emergency department at with one week history of worsening SOB    copd  emphysema  Bronchiectasis  HTN  Protein S Def hx  AFIB  HTN  HLD  Depression  hx of REYES  69-year-old female with PMH of COPD on home O2 with hypertension, protein S deficiency on apixaban presenting to the emergency department at with one week history of worsening SOB    copd  emphysema  Bronchiectasis  HTN  Protein S Def hx  AFIB  HTN  HLD  Depression  hx of REYES     bronchodilators  inhaler - nebs  systemic steroids  cough rx regimen  mucolytics  o2 support  pt has home  goal sat > 88 pct  follows with Dr Parks - [facility] office  I roberto  monitor VS and HD and Sat  cvs rx regimen  BP control  anxiolysis and emotional support  nutritional assessment and optimization  OLD records reviewed  URI sx management   on Suboxone - OUD

## 2024-11-06 NOTE — PROGRESS NOTE ADULT - SUBJECTIVE AND OBJECTIVE BOX
Date/Time Patient Seen:  		  Referring MD:   Data Reviewed	       Patient is a 69y old  Female who presents with a chief complaint of COPD Exacerbation (05 Nov 2024 16:17)      Subjective/HPI     PAST MEDICAL & SURGICAL HISTORY:  Drug abuse  was addicted to pain killers    Protein S deficiency    Depression    COPD (chronic obstructive pulmonary disease)    DVT (deep venous thrombosis)    Pulmonary embolism    Afib    No pertinent past medical history    COPD exacerbation    History of COPD    HTN (hypertension)    HLD (hyperlipidemia)    History of protein S deficiency    History of back surgery    No significant past surgical history          Medication list         MEDICATIONS  (STANDING):  albuterol/ipratropium for Nebulization 3 milliLiter(s) Nebulizer every 6 hours  apixaban 5 milliGRAM(s) Oral two times a day  buprenorphine 2 mG/naloxone 0.5 mG SL Film 2 Film(s) SubLingual two times a day  cholecalciferol 1000 Unit(s) Oral daily  diltiazem    milliGRAM(s) Oral daily  fluticasone propionate/ salmeterol 250-50 MICROgram(s) Diskus 1 Dose(s) Inhalation two times a day  pantoprazole    Tablet 40 milliGRAM(s) Oral before breakfast  predniSONE   Tablet 40 milliGRAM(s) Oral daily  tiotropium 2.5 MICROgram(s) Inhaler 2 Puff(s) Inhalation daily  venlafaxine XR. 37.5 milliGRAM(s) Oral daily    MEDICATIONS  (PRN):  acetaminophen     Tablet .. 650 milliGRAM(s) Oral every 6 hours PRN Temp greater or equal to 38C (100.4F), Mild Pain (1 - 3)  aluminum hydroxide/magnesium hydroxide/simethicone Suspension 30 milliLiter(s) Oral every 4 hours PRN Dyspepsia  ondansetron Injectable 4 milliGRAM(s) IV Push every 8 hours PRN Nausea and/or Vomiting         Vitals log        ICU Vital Signs Last 24 Hrs  T(C): 36.9 (06 Nov 2024 04:54), Max: 36.9 (05 Nov 2024 20:33)  T(F): 98.4 (06 Nov 2024 04:54), Max: 98.4 (05 Nov 2024 20:33)  HR: 86 (06 Nov 2024 04:54) (86 - 127)  BP: 133/73 (06 Nov 2024 04:54) (131/67 - 151/73)  BP(mean): 94 (05 Nov 2024 14:21) (94 - 94)  ABP: --  ABP(mean): --  RR: 18 (06 Nov 2024 04:54) (16 - 22)  SpO2: 97% (06 Nov 2024 04:54) (90% - 99%)    O2 Parameters below as of 06 Nov 2024 04:54  Patient On (Oxygen Delivery Method): nasal cannula  O2 Flow (L/min): 3               Input and Output:  I&O's Detail      Lab Data                        10.7   18.53 )-----------( 831      ( 05 Nov 2024 10:51 )             33.4     11-05    134[L]  |  97  |  12  ----------------------------<  132[H]  4.2   |  28  |  0.61    Ca    10.0      05 Nov 2024 10:51    TPro  9.0[H]  /  Alb  3.0[L]  /  TBili  0.2  /  DBili  x   /  AST  17  /  ALT  22  /  AlkPhos  157[H]  11-05      CARDIAC MARKERS ( 05 Nov 2024 16:10 )  x     / x     / x     / x     / 2.1 ng/mL        Review of Systems	      Objective     Physical Examination    heart s1s2  lung dc BS  head nc  head at      Pertinent Lab findings & Imaging      [place]:  NO   Adequate UO     I&O's Detail           Discussed with:     Cultures:	        Radiology

## 2024-11-06 NOTE — CARE COORDINATION ASSESSMENT. - NSPASTMEDSURGHISTORY_GEN_ALL_CORE_FT
PAST MEDICAL & SURGICAL HISTORY:  COPD (chronic obstructive pulmonary disease)      Depression      Protein S deficiency      Drug abuse  was addicted to pain killers      History of back surgery      Afib      Pulmonary embolism      DVT (deep venous thrombosis)      History of protein S deficiency      HLD (hyperlipidemia)      HTN (hypertension)      History of COPD      No significant past surgical history

## 2024-11-06 NOTE — OCCUPATIONAL THERAPY INITIAL EVALUATION ADULT - GENERAL OBSERVATIONS, REHAB EVAL
Patient found supine in bed with supplemental 02. Patient chart reviewed, events noted. Patient cleared to be seen for therapy by RN prior to session.

## 2024-11-06 NOTE — PROGRESS NOTE ADULT - SUBJECTIVE AND OBJECTIVE BOX
Patient is a 69y old  Female who presents with a chief complaint of COPD Exacerbation (06 Nov 2024 13:39)      Subjective:  INTERVAL HPI/OVERNIGHT EVENTS: Patient seen and examined at bedside.  Patient still has a lot of productive cough   MEDICATIONS  (STANDING):  albuterol/ipratropium for Nebulization 3 milliLiter(s) Nebulizer every 6 hours  apixaban 5 milliGRAM(s) Oral two times a day  buprenorphine 2 mG/naloxone 0.5 mG SL Film 2 Film(s) SubLingual two times a day  cholecalciferol 1000 Unit(s) Oral daily  diltiazem    milliGRAM(s) Oral daily  fluticasone propionate/ salmeterol 250-50 MICROgram(s) Diskus 1 Dose(s) Inhalation two times a day  pantoprazole    Tablet 40 milliGRAM(s) Oral before breakfast  predniSONE   Tablet 40 milliGRAM(s) Oral daily  tiotropium 2.5 MICROgram(s) Inhaler 2 Puff(s) Inhalation daily  venlafaxine XR. 37.5 milliGRAM(s) Oral daily    MEDICATIONS  (PRN):  acetaminophen     Tablet .. 650 milliGRAM(s) Oral every 6 hours PRN Temp greater or equal to 38C (100.4F), Mild Pain (1 - 3)  aluminum hydroxide/magnesium hydroxide/simethicone Suspension 30 milliLiter(s) Oral every 4 hours PRN Dyspepsia  ondansetron Injectable 4 milliGRAM(s) IV Push every 8 hours PRN Nausea and/or Vomiting      Allergies    No Known Allergies    Intolerances        REVIEW OF SYSTEMS:  CONSTITUTIONAL: No fever or chills  HEENT:  No headache, no sore throat  RESPIRATORY: +  cough or shortness of breath  CARDIOVASCULAR: No chest pain or palpitations  GASTROINTESTINAL: No abd pain, nausea, vomiting, or diarrhea      Objective:  Vital Signs Last 24 Hrs  T(C): 37.1 (06 Nov 2024 11:44), Max: 37.1 (06 Nov 2024 11:44)  T(F): 98.7 (06 Nov 2024 11:44), Max: 98.7 (06 Nov 2024 11:44)  HR: 98 (06 Nov 2024 11:44) (86 - 119)  BP: 127/63 (06 Nov 2024 11:44) (127/63 - 133/73)  BP(mean): --  RR: 18 (06 Nov 2024 11:44) (18 - 18)  SpO2: 95% (06 Nov 2024 11:44) (95% - 97%)    Parameters below as of 06 Nov 2024 11:44  Patient On (Oxygen Delivery Method): nasal cannula  O2 Flow (L/min): 3      GENERAL: NAD, lying in bed comfortably  HEAD:  Normocephalic  EYES:  conjunctiva and sclera clear  ENT: Moist mucous membranes  NECK: Supple  CHEST/LUNG: b/l  rales or rhonchi;  wheezing. Unlabored respirations  HEART: Regular rate and rhythm; S1S2+  ABDOMEN: Bowel sounds present; Soft, Nontender, Nondistended.   EXTREMITIES:  + distal Peripheral Pulses;  No cyanosis, or edema  NERVOUS SYSTEM:  Alert & Oriented X3;  No gross focal deficits   MSK: moves all extremities  SKIN: No rashes     LABS:                        9.2    21.12 )-----------( 817      ( 06 Nov 2024 08:01 )             28.5     06 Nov 2024 08:01    134    |  99     |  17     ----------------------------<  130    4.4     |  32     |  0.71     Ca    10.6       06 Nov 2024 08:01    TPro  8.4    /  Alb  2.9    /  TBili  0.1    /  DBili  x      /  AST  18     /  ALT  24     /  AlkPhos  143    06 Nov 2024 08:01    PT/INR - ( 05 Nov 2024 10:51 )   PT: 17.3 sec;   INR: 1.48 ratio         PTT - ( 05 Nov 2024 10:51 )  PTT:44.0 sec  Urinalysis Basic - ( 06 Nov 2024 08:01 )    Color: x / Appearance: x / SG: x / pH: x  Gluc: 130 mg/dL / Ketone: x  / Bili: x / Urobili: x   Blood: x / Protein: x / Nitrite: x   Leuk Esterase: x / RBC: x / WBC x   Sq Epi: x / Non Sq Epi: x / Bacteria: x      CAPILLARY BLOOD GLUCOSE              RADIOLOGY & ADDITIONAL TESTS:    Personally reviewed.     Consultant(s) Notes Reviewed:  [x] YES  [ ] NO    Plan of care discussed with patient; all questions answered

## 2024-11-06 NOTE — PROGRESS NOTE ADULT - PROBLEM SELECTOR PLAN 1
# COPD exacerbation (Acute on Chronic)  On home O2 2L NC  Patient with history of COPD who presents with wheezing, worsening cough and dyspnea, concerning for COPD exacerbation without concomitant pneumonia.  - Influenza, viral panel negative  - Continuous pulse ox, SpO2 goal>88%, remain on 3L   - Albuterol/Ipratroprium nebulizer q6hr, reassessment  - Prednisone 40mg QD x 5 days   procalcitonin WNL , RVP + Rhinovirus   CT chest: No evidence of  pulmonary emboli or other acute change compared to   8/19/2024.Emphysema with areas of bronchiectasis and small airway impaction similar   to prior.Stable 9 mm subpleural right upper lobe nodule. Recommend three-month   follow-up CT  ID eval noted : to complete levaquin course

## 2024-11-06 NOTE — OCCUPATIONAL THERAPY INITIAL EVALUATION ADULT - PERTINENT HX OF CURRENT PROBLEM, REHAB EVAL
Patient is a 69-year-old female with PMH of COPD on home O2 with hypertension, protein S deficiency on apixaban presenting to the emergency department at with one week history of worsening SOB. Patient stated that she was in normal state of health up until 6-7 days ago when she began to develop generalized malaise, fatigue, body aches. During this time, she was having increasing SOB, wheezing, yellow-green sputum production, low-grade fevers, and chills. On presentation today, she continues to endorse generalized malaise, pleuritic chest pain on deep inspiration, productive cough, and low po intake. She denies current chills/fever. She states that most recently she has had an exacerbation similar to the current episode about one year ago for which she was admitted to [facility]. She denies alleviating factors and states that the dust in her home aggravates her COPD. Patient admitted 11/5/24 with COPD exacerbation.

## 2024-11-06 NOTE — PROGRESS NOTE ADULT - ASSESSMENT
Patient is a 69-year-old female with PMH of COPD on home O2, protein s deficiency with hypertension presenting to the emergency department at with one week history of worsening SOB. Patient is being admitted for COPD exacerbation.

## 2024-11-06 NOTE — CARE COORDINATION ASSESSMENT. - OTHER PERTINENT REFERRAL INFORMATION
CM met with patient  at bedside to explain role and transitions of care. Patient lives with  in a private house with 4 steps to get in and 12 to the basement (does not go).  Patient was independent prior to admission.  No caregiver identified (self). No home care prior to admission. Nebulizer machine, POC and Stationary at home 3L.   will transport patient home when ready to be discharge. COPD admission and willing to accept visiting nurse. Patient choose [facility] as she had them last year. CM explained  home care expectation, process, insurance provision and home safety with good understanding. CM to make referral. Patient  verbalized understanding of plans after discharge and is in agreement.  Resource folder left at bedside.  All questions answered to the best of my abilities.  CM remains available throughout the hospital stay.

## 2024-11-07 LAB
ALBUMIN SERPL ELPH-MCNC: 2.6 G/DL — LOW (ref 3.3–5)
ALP SERPL-CCNC: 125 U/L — HIGH (ref 40–120)
ALT FLD-CCNC: 32 U/L — SIGNIFICANT CHANGE UP (ref 12–78)
ANION GAP SERPL CALC-SCNC: 4 MMOL/L — LOW (ref 5–17)
AST SERPL-CCNC: 31 U/L — SIGNIFICANT CHANGE UP (ref 15–37)
BASOPHILS # BLD AUTO: 0.02 K/UL — SIGNIFICANT CHANGE UP (ref 0–0.2)
BASOPHILS NFR BLD AUTO: 0.1 % — SIGNIFICANT CHANGE UP (ref 0–2)
BILIRUB SERPL-MCNC: 0.1 MG/DL — LOW (ref 0.2–1.2)
BUN SERPL-MCNC: 22 MG/DL — SIGNIFICANT CHANGE UP (ref 7–23)
CALCIUM SERPL-MCNC: 10.1 MG/DL — SIGNIFICANT CHANGE UP (ref 8.5–10.1)
CHLORIDE SERPL-SCNC: 101 MMOL/L — SIGNIFICANT CHANGE UP (ref 96–108)
CO2 SERPL-SCNC: 34 MMOL/L — HIGH (ref 22–31)
CREAT SERPL-MCNC: 0.73 MG/DL — SIGNIFICANT CHANGE UP (ref 0.5–1.3)
EGFR: 90 ML/MIN/1.73M2 — SIGNIFICANT CHANGE UP
EOSINOPHIL # BLD AUTO: 0.02 K/UL — SIGNIFICANT CHANGE UP (ref 0–0.5)
EOSINOPHIL NFR BLD AUTO: 0.1 % — SIGNIFICANT CHANGE UP (ref 0–6)
GLUCOSE SERPL-MCNC: 105 MG/DL — HIGH (ref 70–99)
HCT VFR BLD CALC: 28.4 % — LOW (ref 34.5–45)
HGB BLD-MCNC: 8.8 G/DL — LOW (ref 11.5–15.5)
IMM GRANULOCYTES NFR BLD AUTO: 1.2 % — HIGH (ref 0–0.9)
LYMPHOCYTES # BLD AUTO: 1.91 K/UL — SIGNIFICANT CHANGE UP (ref 1–3.3)
LYMPHOCYTES # BLD AUTO: 8.9 % — LOW (ref 13–44)
MCHC RBC-ENTMCNC: 26.6 PG — LOW (ref 27–34)
MCHC RBC-ENTMCNC: 31 G/DL — LOW (ref 32–36)
MCV RBC AUTO: 85.8 FL — SIGNIFICANT CHANGE UP (ref 80–100)
MONOCYTES # BLD AUTO: 1.62 K/UL — HIGH (ref 0–0.9)
MONOCYTES NFR BLD AUTO: 7.5 % — SIGNIFICANT CHANGE UP (ref 2–14)
NEUTROPHILS # BLD AUTO: 17.74 K/UL — HIGH (ref 1.8–7.4)
NEUTROPHILS NFR BLD AUTO: 82.2 % — HIGH (ref 43–77)
NRBC # BLD: 0 /100 WBCS — SIGNIFICANT CHANGE UP (ref 0–0)
PLATELET # BLD AUTO: 755 K/UL — HIGH (ref 150–400)
POTASSIUM SERPL-MCNC: 4.7 MMOL/L — SIGNIFICANT CHANGE UP (ref 3.5–5.3)
POTASSIUM SERPL-SCNC: 4.7 MMOL/L — SIGNIFICANT CHANGE UP (ref 3.5–5.3)
PROT SERPL-MCNC: 7.5 G/DL — SIGNIFICANT CHANGE UP (ref 6–8.3)
RBC # BLD: 3.31 M/UL — LOW (ref 3.8–5.2)
RBC # FLD: 16.2 % — HIGH (ref 10.3–14.5)
SODIUM SERPL-SCNC: 139 MMOL/L — SIGNIFICANT CHANGE UP (ref 135–145)
WBC # BLD: 21.56 K/UL — HIGH (ref 3.8–10.5)
WBC # FLD AUTO: 21.56 K/UL — HIGH (ref 3.8–10.5)

## 2024-11-07 PROCEDURE — 99232 SBSQ HOSP IP/OBS MODERATE 35: CPT

## 2024-11-07 PROCEDURE — 99233 SBSQ HOSP IP/OBS HIGH 50: CPT

## 2024-11-07 RX ORDER — GUAIFENESIN 100 MG/5ML
200 LIQUID ORAL EVERY 8 HOURS
Refills: 0 | Status: COMPLETED | OUTPATIENT
Start: 2024-11-07 | End: 2024-11-12

## 2024-11-07 RX ADMIN — PANTOPRAZOLE SODIUM 40 MILLIGRAM(S): 40 TABLET, DELAYED RELEASE ORAL at 07:22

## 2024-11-07 RX ADMIN — IPRATROPIUM BROMIDE AND ALBUTEROL SULFATE 3 MILLILITER(S): .5; 2.5 SOLUTION RESPIRATORY (INHALATION) at 14:05

## 2024-11-07 RX ADMIN — Medication 40 MILLIGRAM(S): at 05:26

## 2024-11-07 RX ADMIN — GUAIFENESIN 200 MILLIGRAM(S): 100 LIQUID ORAL at 21:56

## 2024-11-07 RX ADMIN — BUPRENORPHINE HYDROCHLORIDE, NALOXONE HYDROCHLORIDE 2 FILM(S): 12; 3 FILM, SOLUBLE BUCCAL; SUBLINGUAL at 05:26

## 2024-11-07 RX ADMIN — GUAIFENESIN 200 MILLIGRAM(S): 100 LIQUID ORAL at 13:51

## 2024-11-07 RX ADMIN — APIXABAN 5 MILLIGRAM(S): 5 TABLET, FILM COATED ORAL at 17:52

## 2024-11-07 RX ADMIN — FLUTICASONE PROPIONATE AND SALMETEROL XINAFOATE 1 DOSE(S): 230; 21 AEROSOL, METERED RESPIRATORY (INHALATION) at 18:31

## 2024-11-07 RX ADMIN — TIOTROPIUM BROMIDE INHALATION SPRAY 2 PUFF(S): 1.56 SPRAY, METERED RESPIRATORY (INHALATION) at 07:34

## 2024-11-07 RX ADMIN — Medication 100 MILLIGRAM(S): at 13:51

## 2024-11-07 RX ADMIN — APIXABAN 5 MILLIGRAM(S): 5 TABLET, FILM COATED ORAL at 05:26

## 2024-11-07 RX ADMIN — Medication 100 MILLIGRAM(S): at 21:56

## 2024-11-07 RX ADMIN — Medication 1000 UNIT(S): at 12:08

## 2024-11-07 RX ADMIN — IPRATROPIUM BROMIDE AND ALBUTEROL SULFATE 3 MILLILITER(S): .5; 2.5 SOLUTION RESPIRATORY (INHALATION) at 19:45

## 2024-11-07 RX ADMIN — BUPRENORPHINE HYDROCHLORIDE, NALOXONE HYDROCHLORIDE 2 FILM(S): 12; 3 FILM, SOLUBLE BUCCAL; SUBLINGUAL at 17:52

## 2024-11-07 RX ADMIN — FLUTICASONE PROPIONATE AND SALMETEROL XINAFOATE 1 DOSE(S): 230; 21 AEROSOL, METERED RESPIRATORY (INHALATION) at 07:33

## 2024-11-07 RX ADMIN — IPRATROPIUM BROMIDE AND ALBUTEROL SULFATE 3 MILLILITER(S): .5; 2.5 SOLUTION RESPIRATORY (INHALATION) at 07:37

## 2024-11-07 NOTE — PROGRESS NOTE ADULT - ASSESSMENT
The patient is a 69 year old female with a history of HTN, protein S deficiency, DVT/PE, COPD who presents with shortness of breath in the setting of viral URI, COPD exacerbation.    Plan:  - ECG with sinus rhythm and nonspecific findings  - Chest pain is atypical and pleuritic in nature  - ECG with sinus rhythm and no evidence of ischemia/infarction  - Given duration of symptoms, an acute MI is ruled out with one set of cardiac enzymes  - CTA chest negative for PE; noted is emphysema and lung nodule  - RVP positive for rhinovirus  - Continue diltiazem  mg daily  - Remain off amlodipine  - Continue apixaban 5 mg bid  - Pulm follow-up

## 2024-11-07 NOTE — PROGRESS NOTE ADULT - SUBJECTIVE AND OBJECTIVE BOX
[facility]  INFECTIOUS DISEASES   [address] [place] [address]  Tel: [phone]     Fax: [phone]  ========================================================  MD Matthieu Mayes Michelle, MD Shah, Kaushal, MD Sunjit, Jaspal, MD Sehrish Shahid, MD   ========================================================    [id number]  EMILY REYES     Follow up: Cough and SOB have improved. Still on o2 3L but more comfortable.   No other complaint or overnight event.     PAST MEDICAL & SURGICAL HISTORY:  Drug abuse  was addicted to pain killers  Protein S deficiency  Depression  COPD (chronic obstructive pulmonary disease)  DVT (deep venous thrombosis)  Pulmonary embolism  Afib  History of COPD  HTN (hypertension)  HLD (hyperlipidemia)  History of protein S deficiency  History of back surgery  No significant past surgical history    Social Hx: No current smoking, EtOH or drugs     FAMILY HISTORY:  Family history of lung cancer    Family history of protein S deficiency (Sibling)    No pertinent family history in first degree relatives    Allergies  No Known Allergies    MEDICATIONS  (STANDING):  albuterol/ipratropium for Nebulization 3 milliLiter(s) Nebulizer every 6 hours  apixaban 5 milliGRAM(s) Oral two times a day  buprenorphine 2 mG/naloxone 0.5 mG SL Film 2 Film(s) SubLingual two times a day  cholecalciferol 1000 Unit(s) Oral daily  diltiazem    milliGRAM(s) Oral daily  fluticasone propionate/ salmeterol 250-50 MICROgram(s) Diskus 1 Dose(s) Inhalation two times a day  pantoprazole    Tablet 40 milliGRAM(s) Oral before breakfast  predniSONE   Tablet 40 milliGRAM(s) Oral daily  tiotropium 2.5 MICROgram(s) Inhaler 2 Puff(s) Inhalation daily  venlafaxine XR. 37.5 milliGRAM(s) Oral daily     REVIEW OF SYSTEMS:  CONSTITUTIONAL:  No Fever or chills  HEENT:  No diplopia or blurred vision.  No sore throat or runny nose.  CARDIOVASCULAR:  No chest pain   RESPIRATORY:  +cough, +shortness of breath  GASTROINTESTINAL:  No nausea, vomiting or diarrhea.  GENITOURINARY:  No dysuria, frequency or urgency. No Blood in urine  MUSCULOSKELETAL:  no joint aches, no muscle pain  SKIN:  No change in skin, hair or nails.    Physical Exam:  Vital Signs Last 24 Hrs  T(C): 36.7 (07 Nov 2024 11:53), Max: 36.7 (06 Nov 2024 19:30)  T(F): 98.1 (07 Nov 2024 11:53), Max: 98.1 (07 Nov 2024 11:53)  HR: 91 (07 Nov 2024 11:53) (66 - 92)  BP: 115/61 (07 Nov 2024 11:53) (106/61 - 115/61)  BP(mean): --  RR: 16 (07 Nov 2024 11:53) (16 - 18)  SpO2: 91% (07 Nov 2024 11:53) (90% - 96%)  Parameters below as of 07 Nov 2024 11:53  Patient On (Oxygen Delivery Method): nasal cannula  O2 Flow (L/min): 3  GEN: NAD  HEENT: normocephalic and atraumatic. EOMI. PERRL.    NECK: Supple.  No lymphadenopathy   LUNGS: Crackles and rhonchi bilaterally more in bases   HEART: Irregular rate and rhythm   ABDOMEN: Soft, nontender, and nondistended.    EXTREMITIES: Without edema.  NEUROLOGIC: grossly intact.  PSYCHIATRIC: Appropriate affect .  SKIN: No rash       Labs:                        8.8    21.56 )-----------( 755      ( 07 Nov 2024 06:55 )             28.4     11-07    139  |  101  |  22  ----------------------------<  105[H]  4.7   |  34[H]  |  0.73    Ca    10.1      07 Nov 2024 06:55    TPro  7.5  /  Alb  2.6[L]  /  TBili  0.1[L]  /  DBili  x   /  AST  31  /  ALT  32  /  AlkPhos  125[H]  11-07    Culture - Blood (collected 11-05-24 @ 10:40)  Source: .Blood BLOOD  Preliminary Report (11-06-24 @ 19:01):    No growth at 24 hours    Culture - Blood (collected 11-05-24 @ 10:35)  Source: .Blood BLOOD  Preliminary Report (11-06-24 @ 19:01):    No growth at 24 hours    WBC Count: 21.56 K/uL (11-07-24 @ 06:55)  WBC Count: 21.12 K/uL (11-06-24 @ 08:01)  WBC Count: 18.53 K/uL (11-05-24 @ 10:51)    Creatinine: 0.73 mg/dL (11-07-24 @ 06:55)  Creatinine: 0.71 mg/dL (11-06-24 @ 08:01)  Creatinine: 0.61 mg/dL (11-05-24 @ 10:51)    C-Reactive Protein: 132 mg/L (11-05-24 @ 10:51)    Procalcitonin: 0.06 ng/mL (11-05-24 @ 10:51)     SARS-CoV-2: NotDetec (11-05-24 @ 16:30)  SARS-CoV-2 Result: NotDetec (11-05-24 @ 10:51)      All imaging and other data have been reviewed.  < from: CT Angio Chest PE Protocol w/ IV Cont (11.05.24 @ 17:05) >  IMPRESSION:  No evidence of  pulmonary emboli or other acute change compared to   8/19/2024.  Emphysema with areas of bronchiectasis and small airway impaction similar   to prior.  Stable 9 mm subpleural right upper lobe nodule. Recommend three-month   follow-up CT    Assessment and Plan:   68yo woman with PMH of -year-old female with PMH of HT, Afib, COPD on 3L home O2, protein S deficiency presenting to the ED with one week history of worsening SOB.   She also had cough, malaise, wheezing, decreases appetite, yellow/green sputum and low grade fever.   In ED WBC was 18k, procalcitonin low but CRP was 132. RVP with enterovirus.     has been started on steroid so WBC is high as expected. On o2 3L NC at home uses 2L. No fever.   Since she has a severe COPD on home O2 and CT with evidence of bronchiectasis with secretions in bout lungs, might benefit from ABx treatment.     # URI with enterovirus  # COPD exacerbation  # R/O Pneumonia     - Blood cultures NGTD  - WBC high due to steroid   - Continue Levaquin 750mg daily total 5days (today day 3)  - Pulmonary follow up, managing steroid, inhalers and nebz      Will follow.    Rae Hein MD  Division of Infectious Diseases   Please call ID service at [phone] with any question.    50 minutes spent on total encounter assessing patient, examination, chart review, counseling and coordinating care by the attending physician/nurse/care manager.

## 2024-11-07 NOTE — PROGRESS NOTE ADULT - SUBJECTIVE AND OBJECTIVE BOX
Patient is a 69y old  Female who presents with a chief complaint of COPD Exacerbation (07 Nov 2024 05:21)      Subjective:  INTERVAL HPI/OVERNIGHT EVENTS: Patient seen and examined at bedside.  Patient states she is feeling better but still winded when she need to go to bathroom   MEDICATIONS  (STANDING):  albuterol/ipratropium for Nebulization 3 milliLiter(s) Nebulizer every 6 hours  apixaban 5 milliGRAM(s) Oral two times a day  benzonatate 100 milliGRAM(s) Oral every 8 hours  buprenorphine 2 mG/naloxone 0.5 mG SL Film 2 Film(s) SubLingual two times a day  cholecalciferol 1000 Unit(s) Oral daily  diltiazem    milliGRAM(s) Oral daily  fluticasone propionate/ salmeterol 250-50 MICROgram(s) Diskus 1 Dose(s) Inhalation two times a day  guaiFENesin Oral Liquid (Sugar-Free) 200 milliGRAM(s) Oral every 8 hours  levoFLOXacin  Tablet 750 milliGRAM(s) Oral every 24 hours  pantoprazole    Tablet 40 milliGRAM(s) Oral before breakfast  predniSONE   Tablet 40 milliGRAM(s) Oral daily  tiotropium 2.5 MICROgram(s) Inhaler 2 Puff(s) Inhalation daily  venlafaxine XR. 37.5 milliGRAM(s) Oral daily    MEDICATIONS  (PRN):  acetaminophen     Tablet .. 650 milliGRAM(s) Oral every 6 hours PRN Temp greater or equal to 38C (100.4F), Mild Pain (1 - 3)  aluminum hydroxide/magnesium hydroxide/simethicone Suspension 30 milliLiter(s) Oral every 4 hours PRN Dyspepsia  ondansetron Injectable 4 milliGRAM(s) IV Push every 8 hours PRN Nausea and/or Vomiting      Allergies    No Known Allergies    Intolerances        REVIEW OF SYSTEMS:  CONSTITUTIONAL: No fever or chills  HEENT:  No headache, no sore throat  RESPIRATORY: + cough or shortness of breath  CARDIOVASCULAR: No chest pain or palpitations  GASTROINTESTINAL: No abd pain, nausea, vomiting, or diarrhea      Objective:  Vital Signs Last 24 Hrs  T(C): 36.7 (07 Nov 2024 11:53), Max: 36.7 (06 Nov 2024 19:30)  T(F): 98.1 (07 Nov 2024 11:53), Max: 98.1 (07 Nov 2024 11:53)  HR: 91 (07 Nov 2024 11:53) (66 - 92)  BP: 115/61 (07 Nov 2024 11:53) (106/61 - 115/61)  BP(mean): --  RR: 16 (07 Nov 2024 11:53) (16 - 18)  SpO2: 91% (07 Nov 2024 11:53) (90% - 96%)    Parameters below as of 07 Nov 2024 11:53  Patient On (Oxygen Delivery Method): nasal cannula  O2 Flow (L/min): 3      GENERAL: NAD, lying in bed comfortably  HEAD:  Normocephalic  EYES:  conjunctiva and sclera clear  ENT: Moist mucous membranes  NECK: Supple  CHEST/LUNG: b/l  wheezing. Unlabored respirations  HEART: Regular rate and rhythm; S1S2+  ABDOMEN: Bowel sounds present; Soft, Nontender, Nondistended.   EXTREMITIES:  + distal Peripheral Pulses;  No cyanosis, or edema  NERVOUS SYSTEM:  Alert & Oriented X3;  No gross focal deficits   MSK: moves all extremities  SKIN: No rashes     LABS:                        8.8    21.56 )-----------( 755      ( 07 Nov 2024 06:55 )             28.4     07 Nov 2024 06:55    139    |  101    |  22     ----------------------------<  105    4.7     |  34     |  0.73     Ca    10.1       07 Nov 2024 06:55    TPro  7.5    /  Alb  2.6    /  TBili  0.1    /  DBili  x      /  AST  31     /  ALT  32     /  AlkPhos  125    07 Nov 2024 06:55      Urinalysis Basic - ( 07 Nov 2024 06:55 )    Color: x / Appearance: x / SG: x / pH: x  Gluc: 105 mg/dL / Ketone: x  / Bili: x / Urobili: x   Blood: x / Protein: x / Nitrite: x   Leuk Esterase: x / RBC: x / WBC x   Sq Epi: x / Non Sq Epi: x / Bacteria: x      CAPILLARY BLOOD GLUCOSE            Culture - Blood (collected 11-05-24 @ 10:40)  Source: .Blood BLOOD  Preliminary Report (11-06-24 @ 19:01):    No growth at 24 hours    Culture - Blood (collected 11-05-24 @ 10:35)  Source: .Blood BLOOD  Preliminary Report (11-06-24 @ 19:01):    No growth at 24 hours        RADIOLOGY & ADDITIONAL TESTS:    Personally reviewed.     Consultant(s) Notes Reviewed:  [x] YES  [ ] NO    Plan of care discussed with patient ; all questions answered

## 2024-11-07 NOTE — PROGRESS NOTE ADULT - SUBJECTIVE AND OBJECTIVE BOX
Date/Time Patient Seen:  		  Referring MD:   Data Reviewed	       Patient is a 69y old  Female who presents with a chief complaint of COPD Exacerbation (06 Nov 2024 16:14)      Subjective/HPI     PAST MEDICAL & SURGICAL HISTORY:  Drug abuse  was addicted to pain killers    Protein S deficiency    Depression    COPD (chronic obstructive pulmonary disease)    DVT (deep venous thrombosis)    Pulmonary embolism    Afib    No pertinent past medical history    COPD exacerbation    History of COPD    HTN (hypertension)    HLD (hyperlipidemia)    History of protein S deficiency    History of back surgery    No significant past surgical history          Medication list         MEDICATIONS  (STANDING):  albuterol/ipratropium for Nebulization 3 milliLiter(s) Nebulizer every 6 hours  apixaban 5 milliGRAM(s) Oral two times a day  buprenorphine 2 mG/naloxone 0.5 mG SL Film 2 Film(s) SubLingual two times a day  cholecalciferol 1000 Unit(s) Oral daily  diltiazem    milliGRAM(s) Oral daily  fluticasone propionate/ salmeterol 250-50 MICROgram(s) Diskus 1 Dose(s) Inhalation two times a day  levoFLOXacin  Tablet 750 milliGRAM(s) Oral every 24 hours  pantoprazole    Tablet 40 milliGRAM(s) Oral before breakfast  predniSONE   Tablet 40 milliGRAM(s) Oral daily  tiotropium 2.5 MICROgram(s) Inhaler 2 Puff(s) Inhalation daily  venlafaxine XR. 37.5 milliGRAM(s) Oral daily    MEDICATIONS  (PRN):  acetaminophen     Tablet .. 650 milliGRAM(s) Oral every 6 hours PRN Temp greater or equal to 38C (100.4F), Mild Pain (1 - 3)  aluminum hydroxide/magnesium hydroxide/simethicone Suspension 30 milliLiter(s) Oral every 4 hours PRN Dyspepsia  ondansetron Injectable 4 milliGRAM(s) IV Push every 8 hours PRN Nausea and/or Vomiting         Vitals log        ICU Vital Signs Last 24 Hrs  T(C): 36.6 (07 Nov 2024 05:13), Max: 37.1 (06 Nov 2024 11:44)  T(F): 97.8 (07 Nov 2024 05:13), Max: 98.7 (06 Nov 2024 11:44)  HR: 83 (07 Nov 2024 05:13) (83 - 105)  BP: 106/61 (07 Nov 2024 05:13) (106/61 - 127/63)  BP(mean): --  ABP: --  ABP(mean): --  RR: 18 (07 Nov 2024 05:13) (18 - 18)  SpO2: 96% (07 Nov 2024 05:13) (94% - 97%)    O2 Parameters below as of 07 Nov 2024 05:13  Patient On (Oxygen Delivery Method): room air  O2 Flow (L/min): 3               Input and Output:  I&O's Detail      Lab Data                        9.2    21.12 )-----------( 817      ( 06 Nov 2024 08:01 )             28.5     11-06    134[L]  |  99  |  17  ----------------------------<  130[H]  4.4   |  32[H]  |  0.71    Ca    10.6[H]      06 Nov 2024 08:01    TPro  8.4[H]  /  Alb  2.9[L]  /  TBili  0.1[L]  /  DBili  x   /  AST  18  /  ALT  24  /  AlkPhos  143[H]  11-06      CARDIAC MARKERS ( 05 Nov 2024 16:10 )  x     / x     / x     / x     / 2.1 ng/mL        Review of Systems	      Objective     Physical Examination    heart s1s2  lung dc bS  head nc  head at  verbal  o2 support      Pertinent Lab findings & Imaging      [place]:  NO   Adequate UO     I&O's Detail           Discussed with:     Cultures:	        Radiology

## 2024-11-07 NOTE — PROGRESS NOTE ADULT - PROBLEM SELECTOR PLAN 1
# COPD exacerbation (Acute on Chronic)  On home O2 2L NC  Patient with history of COPD who presents with wheezing, worsening cough and dyspnea, concerning for COPD exacerbation without concomitant pneumonia.  - Influenza, viral panel negative  - Continuous pulse ox, SpO2 goal>88%, remain on 3L   - Albuterol/Ipratroprium nebulizer q6hr, reassessment  - Prednisone 40mg QD x 5 days   procalcitonin WNL , RVP + Rhinovirus   CT chest: No evidence of  pulmonary emboli or other acute change compared to   8/19/2024.Emphysema with areas of bronchiectasis and small airway impaction similar   to prior.Stable 9 mm subpleural right upper lobe nodule. Recommend three-month   follow-up CT  ID eval noted : to complete levaquin course  add cough medication ATC   pt-> no skilled PT needs

## 2024-11-07 NOTE — PROGRESS NOTE ADULT - ASSESSMENT
69-year-old female with PMH of COPD on home O2 with hypertension, protein S deficiency on apixaban presenting to the emergency department at with one week history of worsening SOB    copd  emphysema  Bronchiectasis  HTN  Protein S Def hx  AFIB  HTN  HLD  Depression  hx of REYES     on ABX  ID and Cardio eval noted  vs noted  o2 support    bronchodilators  inhaler - nebs  systemic steroids  cough rx regimen  mucolytics  o2 support  pt has home  goal sat > 88 pct  [facility]  I roberto  monitor VS and HD and Sat  cvs rx regimen  BP control  anxiolysis and emotional support  nutritional assessment and optimization  OLD records reviewed  URI sx management   on Suboxone - OUD

## 2024-11-07 NOTE — PROGRESS NOTE ADULT - SUBJECTIVE AND OBJECTIVE BOX
Chief Complaint: Shortness of breath    Interval Events: No events overnight.    Review of Systems:  General: No fevers, chills, weight gain  Skin: No rashes, color changes  Cardiovascular: No chest pain, orthopnea  Respiratory: No shortness of breath, cough  Gastrointestinal: No nausea, abdominal pain  Genitourinary: No incontinence, pain with urination  Musculoskeletal: No pain, swelling, decreased range of motion  Neurological: No headache, weakness  Psychiatric: No depression, anxiety  Endocrine: No weight gain, increased thirst  All other systems are comprehensively negative.    Physical Exam:  Vitals:        Vital Signs Last 24 Hrs  T(C): 36.6 (07 Nov 2024 05:13), Max: 37.1 (06 Nov 2024 11:44)  T(F): 97.8 (07 Nov 2024 05:13), Max: 98.7 (06 Nov 2024 11:44)  HR: 91 (07 Nov 2024 07:37) (66 - 98)  BP: 106/61 (07 Nov 2024 05:13) (106/61 - 127/63)  BP(mean): --  RR: 18 (07 Nov 2024 07:24) (18 - 18)  SpO2: 94% (07 Nov 2024 07:37) (90% - 96%)  Parameters below as of 07 Nov 2024 07:37  Patient On (Oxygen Delivery Method): nasal cannula, 3L  General: NAD  HEENT: MMM  Neck: No JVD, no carotid bruit  Lungs: CTAB  CV: RRR, nl S1/S2, no M/R/G  Abdomen: S/NT/ND, +BS  Extremities: No LE edema, no cyanosis  Neuro: AAOx3, non-focal  Skin: No rash    Labs:                        8.8    21.56 )-----------( 755      ( 07 Nov 2024 06:55 )             28.4     11-07    139  |  101  |  22  ----------------------------<  105[H]  4.7   |  34[H]  |  0.73    Ca    10.1      07 Nov 2024 06:55    TPro  7.5  /  Alb  2.6[L]  /  TBili  0.1[L]  /  DBili  x   /  AST  31  /  ALT  32  /  AlkPhos  125[H]  11-07    CARDIAC MARKERS ( 05 Nov 2024 16:10 )  x     / x     / x     / x     / 2.1 ng/mL          ECG/Telemetry: Sinus rhythm

## 2024-11-08 ENCOUNTER — TRANSCRIPTION ENCOUNTER (OUTPATIENT)
Age: 69
End: 2024-11-08

## 2024-11-08 PROCEDURE — 99233 SBSQ HOSP IP/OBS HIGH 50: CPT

## 2024-11-08 PROCEDURE — 99232 SBSQ HOSP IP/OBS MODERATE 35: CPT

## 2024-11-08 RX ORDER — SODIUM CHLORIDE 9 MG/ML
4 INJECTION, SOLUTION INTRAMUSCULAR; INTRAVENOUS; SUBCUTANEOUS EVERY 12 HOURS
Refills: 0 | Status: DISCONTINUED | OUTPATIENT
Start: 2024-11-08 | End: 2024-11-12

## 2024-11-08 RX ADMIN — IPRATROPIUM BROMIDE AND ALBUTEROL SULFATE 3 MILLILITER(S): .5; 2.5 SOLUTION RESPIRATORY (INHALATION) at 07:40

## 2024-11-08 RX ADMIN — Medication 240 MILLIGRAM(S): at 05:27

## 2024-11-08 RX ADMIN — Medication 40 MILLIGRAM(S): at 05:27

## 2024-11-08 RX ADMIN — PANTOPRAZOLE SODIUM 40 MILLIGRAM(S): 40 TABLET, DELAYED RELEASE ORAL at 05:28

## 2024-11-08 RX ADMIN — BUPRENORPHINE HYDROCHLORIDE, NALOXONE HYDROCHLORIDE 2 FILM(S): 12; 3 FILM, SOLUBLE BUCCAL; SUBLINGUAL at 05:27

## 2024-11-08 RX ADMIN — IPRATROPIUM BROMIDE AND ALBUTEROL SULFATE 3 MILLILITER(S): .5; 2.5 SOLUTION RESPIRATORY (INHALATION) at 13:43

## 2024-11-08 RX ADMIN — GUAIFENESIN 200 MILLIGRAM(S): 100 LIQUID ORAL at 13:59

## 2024-11-08 RX ADMIN — FLUTICASONE PROPIONATE AND SALMETEROL XINAFOATE 1 DOSE(S): 230; 21 AEROSOL, METERED RESPIRATORY (INHALATION) at 19:03

## 2024-11-08 RX ADMIN — SODIUM CHLORIDE 4 MILLILITER(S): 9 INJECTION, SOLUTION INTRAMUSCULAR; INTRAVENOUS; SUBCUTANEOUS at 20:32

## 2024-11-08 RX ADMIN — BUPRENORPHINE HYDROCHLORIDE, NALOXONE HYDROCHLORIDE 2 FILM(S): 12; 3 FILM, SOLUBLE BUCCAL; SUBLINGUAL at 17:11

## 2024-11-08 RX ADMIN — Medication 100 MILLIGRAM(S): at 13:59

## 2024-11-08 RX ADMIN — APIXABAN 5 MILLIGRAM(S): 5 TABLET, FILM COATED ORAL at 17:10

## 2024-11-08 RX ADMIN — FLUTICASONE PROPIONATE AND SALMETEROL XINAFOATE 1 DOSE(S): 230; 21 AEROSOL, METERED RESPIRATORY (INHALATION) at 07:27

## 2024-11-08 RX ADMIN — Medication 100 MILLIGRAM(S): at 21:56

## 2024-11-08 RX ADMIN — Medication 37.5 MILLIGRAM(S): at 11:58

## 2024-11-08 RX ADMIN — IPRATROPIUM BROMIDE AND ALBUTEROL SULFATE 3 MILLILITER(S): .5; 2.5 SOLUTION RESPIRATORY (INHALATION) at 20:31

## 2024-11-08 RX ADMIN — Medication 100 MILLIGRAM(S): at 05:27

## 2024-11-08 RX ADMIN — APIXABAN 5 MILLIGRAM(S): 5 TABLET, FILM COATED ORAL at 05:27

## 2024-11-08 RX ADMIN — Medication 1000 UNIT(S): at 11:58

## 2024-11-08 RX ADMIN — TIOTROPIUM BROMIDE INHALATION SPRAY 2 PUFF(S): 1.56 SPRAY, METERED RESPIRATORY (INHALATION) at 07:27

## 2024-11-08 RX ADMIN — GUAIFENESIN 200 MILLIGRAM(S): 100 LIQUID ORAL at 05:27

## 2024-11-08 RX ADMIN — GUAIFENESIN 200 MILLIGRAM(S): 100 LIQUID ORAL at 21:56

## 2024-11-08 NOTE — DISCHARGE NOTE NURSING/CASE MANAGEMENT/SOCIAL WORK - NSSCCONTNUM_GEN_ALL_CORE
[facility] agency [phone] or [phone] will reach out to you within 24-72 hours of your discharge to schedule home care visit/eval appointment with you. Please call agency for any queries regarding home care services

## 2024-11-08 NOTE — PROGRESS NOTE ADULT - SUBJECTIVE AND OBJECTIVE BOX
Date/Time Patient Seen:  		  Referring MD:   Data Reviewed	       Patient is a 69y old  Female who presents with a chief complaint of COPD Exacerbation (07 Nov 2024 13:48)      Subjective/HPI     PAST MEDICAL & SURGICAL HISTORY:  Drug abuse  was addicted to pain killers    Protein S deficiency    Depression    COPD (chronic obstructive pulmonary disease)    DVT (deep venous thrombosis)    Pulmonary embolism    Afib    No pertinent past medical history    COPD exacerbation    History of COPD    HTN (hypertension)    HLD (hyperlipidemia)    History of protein S deficiency    History of back surgery    No significant past surgical history          Medication list         MEDICATIONS  (STANDING):  albuterol/ipratropium for Nebulization 3 milliLiter(s) Nebulizer every 6 hours  apixaban 5 milliGRAM(s) Oral two times a day  benzonatate 100 milliGRAM(s) Oral every 8 hours  buprenorphine 2 mG/naloxone 0.5 mG SL Film 2 Film(s) SubLingual two times a day  cholecalciferol 1000 Unit(s) Oral daily  diltiazem    milliGRAM(s) Oral daily  fluticasone propionate/ salmeterol 250-50 MICROgram(s) Diskus 1 Dose(s) Inhalation two times a day  guaiFENesin Oral Liquid (Sugar-Free) 200 milliGRAM(s) Oral every 8 hours  levoFLOXacin  Tablet 750 milliGRAM(s) Oral every 24 hours  pantoprazole    Tablet 40 milliGRAM(s) Oral before breakfast  predniSONE   Tablet 40 milliGRAM(s) Oral daily  tiotropium 2.5 MICROgram(s) Inhaler 2 Puff(s) Inhalation daily  venlafaxine XR. 37.5 milliGRAM(s) Oral daily    MEDICATIONS  (PRN):  acetaminophen     Tablet .. 650 milliGRAM(s) Oral every 6 hours PRN Temp greater or equal to 38C (100.4F), Mild Pain (1 - 3)  aluminum hydroxide/magnesium hydroxide/simethicone Suspension 30 milliLiter(s) Oral every 4 hours PRN Dyspepsia  ondansetron Injectable 4 milliGRAM(s) IV Push every 8 hours PRN Nausea and/or Vomiting         Vitals log        ICU Vital Signs Last 24 Hrs  T(C): 36.6 (08 Nov 2024 06:27), Max: 36.8 (07 Nov 2024 19:48)  T(F): 97.8 (08 Nov 2024 06:27), Max: 98.2 (07 Nov 2024 19:48)  HR: 92 (08 Nov 2024 07:00) (66 - 96)  BP: 137/73 (08 Nov 2024 06:27) (115/61 - 139/75)  BP(mean): --  ABP: --  ABP(mean): --  RR: 18 (08 Nov 2024 07:00) (16 - 18)  SpO2: 98% (08 Nov 2024 07:00) (90% - 98%)    O2 Parameters below as of 08 Nov 2024 07:00  Patient On (Oxygen Delivery Method): nasal cannula  O2 Flow (L/min): 3               Input and Output:  I&O's Detail      Lab Data                        8.8    21.56 )-----------( 755      ( 07 Nov 2024 06:55 )             28.4     11-07    139  |  101  |  22  ----------------------------<  105[H]  4.7   |  34[H]  |  0.73    Ca    10.1      07 Nov 2024 06:55    TPro  7.5  /  Alb  2.6[L]  /  TBili  0.1[L]  /  DBili  x   /  AST  31  /  ALT  32  /  AlkPhos  125[H]  11-07            Review of Systems	      Objective     Physical Examination    heart s1s2  lung dc bS  head nc  head at      Pertinent Lab findings & Imaging      [place]:  NO   Adequate UO     I&O's Detail           Discussed with:     Cultures:	        Radiology

## 2024-11-08 NOTE — DISCHARGE NOTE PROVIDER - NSDCCPCAREPLAN_GEN_ALL_CORE_FT
PRINCIPAL DISCHARGE DIAGNOSIS  Diagnosis: COPD exacerbation  Assessment and Plan of Treatment:       SECONDARY DISCHARGE DIAGNOSES  Diagnosis: Pneumonia  Assessment and Plan of Treatment:     Diagnosis: HTN (hypertension)  Assessment and Plan of Treatment:      PRINCIPAL DISCHARGE DIAGNOSIS  Diagnosis: COPD exacerbation  Assessment and Plan of Treatment: completed steroid and antibiotic treatment, follow up PCP and pulmonary out patient inhalers and nebulizers as rescribed      SECONDARY DISCHARGE DIAGNOSES  Diagnosis: Acute on chronic respiratory failure with hypoxia and hypercapnia  Assessment and Plan of Treatment: due to COPD and pneumonia, continue treatment for COPD and Home oxygen nasal canular 2-3 L.    Diagnosis: Pneumonia  Assessment and Plan of Treatment: completed 5 days levaquin    Diagnosis: HTN (hypertension)  Assessment and Plan of Treatment: continue cardizem , follow up with your cardiologist

## 2024-11-08 NOTE — DISCHARGE NOTE PROVIDER - NSDCMRMEDTOKEN_GEN_ALL_CORE_FT
Advair Diskus 500 mcg-50 mcg inhalation powder: 1 inhaled 2 times a day  amLODIPine 5 mg oral tablet: 1 tab(s) orally once a day  budesonide-formoterol 160 mcg-4.5 mcg/inh inhalation aerosol: 2 puff(s) inhaled 2 times a day  buprenorphine-naloxone 4 mg-1 mg sublingual film: 1 film(s) sublingually 2 times a day  cholecalciferol 25 mcg (1000 intl units) oral tablet: 1 tab(s) orally once a day  DilTIAZem (Eqv-Cardizem CD) 240 mg/24 hours oral capsule, extended release: 1 cap(s) orally once a day  Effexor XR 75 mg oral capsule, extended release: 1 cap(s) orally once a day  Eliquis 5 mg oral tablet: 1 tab(s) orally 2 times a day  Eliquis 5 mg oral tablet: 1 tab(s) orally 2 times a day  FeroSul 325 mg (65 mg elemental iron) oral tablet: 1 tab(s) orally every other day  fluticasone 50 mcg/inh nasal spray: 1 spray(s) nasal 2 times a day  Incruse Ellipta 62.5 mcg/inh inhalation powder: 1 puff(s) inhaled once a day  Incruse Ellipta 62.5 mcg/inh inhalation powder: 1 inhaler(s) inhaled once a day  levalbuterol 0.31 mg/3 mL inhalation solution: 3 milliliter(s) inhaled every 6 hours as needed for  shortness of breath and/or wheezing  metoprolol tartrate 25 mg oral tablet: 1 tab(s) orally 2 times a day  Mucinex Max Strength 1200 mg oral tablet, extended release: 1 tab(s) orally every 12 hours  Multiple Vitamins oral tablet: 1 tab(s) orally once a day  NebuSal 3% inhalation solution: 4 milliliter(s) inhaled 2 times a day  NebuSal 3% inhalation solution: 4 milliliter(s) inhaled 2 times a day  pantoprazole 40 mg oral delayed release tablet: 1 tab(s) orally once a day (before a meal)  predniSONE 20 mg oral tablet: 1 tab(s) orally once a day  PriLOSEC OTC 20 mg oral delayed release tablet: 1 tab(s) orally once a day  Pulmonary Rehabilitation: Please evaluate and treat. ICD10 :J47.9, J44.9  Suboxone 4 mg-1 mg sublingual film: 1 film(s) sublingually 2 times a day  Symbicort 160 mcg-4.5 mcg/inh inhalation aerosol: 2 puff(s) inhaled 2 times a day Inhale 2 puffs BID. Dispense as written.  venlafaxine 37.5 mg oral capsule, extended release: 1 cap(s) orally once a day   budesonide-formoterol 160 mcg-4.5 mcg/inh inhalation aerosol: 2 puff(s) inhaled 2 times a day  cholecalciferol 25 mcg (1000 intl units) oral tablet: 1 tab(s) orally once a day  DilTIAZem (Eqv-Cardizem CD) 240 mg/24 hours oral capsule, extended release: 1 cap(s) orally once a day  Eliquis 5 mg oral tablet: 1 tab(s) orally 2 times a day  FeroSul 325 mg (65 mg elemental iron) oral tablet: 1 tab(s) orally every other day  fluticasone 50 mcg/inh nasal spray: 1 spray(s) nasal 2 times a day  Incruse Ellipta 62.5 mcg/inh inhalation powder: 1 puff(s) inhaled once a day  levalbuterol 0.31 mg/3 mL inhalation solution: 3 milliliter(s) inhaled every 6 hours as needed for  shortness of breath and/or wheezing  Mucinex Max Strength 1200 mg oral tablet, extended release: 1 tab(s) orally 2 times a day as needed for cough  Multiple Vitamins oral tablet: 1 tab(s) orally once a day  pantoprazole 40 mg oral delayed release tablet: 1 tab(s) orally once a day (before a meal)  PriLOSEC OTC 20 mg oral delayed release tablet: 1 tab(s) orally once a day  Suboxone 4 mg-1 mg sublingual film: 1 film(s) sublingually 2 times a day MDD: 2 films  venlafaxine 37.5 mg oral capsule, extended release: 1 cap(s) orally once a day

## 2024-11-08 NOTE — DISCHARGE NOTE PROVIDER - PROVIDER TOKENS
PROVIDER:[TOKEN:[[id number]],FOLLOWUP:[1 week]],PROVIDER:[TOKEN:[[id number]],FOLLOWUP:[1 week]] PROVIDER:[TOKEN:[[id number]],FOLLOWUP:[1 week]],PROVIDER:[TOKEN:[[id number]],FOLLOWUP:[1 week]],PROVIDER:[TOKEN:[[id number]],FOLLOWUP:[2 weeks]],PROVIDER:[TOKEN:[[id number]],FOLLOWUP:[2 weeks]]

## 2024-11-08 NOTE — DISCHARGE NOTE NURSING/CASE MANAGEMENT/SOCIAL WORK - NSDCVIVACCINE_GEN_ALL_CORE_FT
influenza, injectable, quadrivalent, preservative free; 12-Mar-2016 13:59; Rebekah Maldonado (VICKI); [employer]; [id number]; IntraMuscular; Dorsogluteal Left.; 0.5 milliLiter(s); VIS (VIS Published: 07-Aug-2015, VIS Presented: 12-Mar-2016);   pneumococcal polysaccharide PPV23; 12-Mar-2016 14:00; Rebekah Maldonado (VICKI); [employer] [id number]; IntraMuscular; Deltoid Left.; 0.5 milliLiter(s); VIS (VIS Published: 06-Oct-2009, VIS Presented: 12-Mar-2016);

## 2024-11-08 NOTE — PROGRESS NOTE ADULT - ASSESSMENT
69-year-old female with PMH of COPD on home O2 with hypertension, protein S deficiency on apixaban presenting to the emergency department at with one week history of worsening SOB    copd  emphysema  Bronchiectasis  HTN  Protein S Def hx  AFIB  HTN  HLD  Depression  hx of REYES     on ABX  ID and Cardio eval noted  vs noted  o2 support    bronchodilators  inhaler - nebs  systemic steroids  cough rx regimen  mucolytics  o2 support  pt has home  goal sat > 88 pct  [facility]  I roberto  monitor VS and HD and Sat  cvs rx regimen  BP control  anxiolysis and emotional support  nutritional assessment and optimization  OLD records reviewed  URI sx management   on Suboxone - OUD

## 2024-11-08 NOTE — CASE MANAGEMENT PROGRESS NOTE - NSCMPROGRESSNOTE_GEN_ALL_CORE
Patient discussed in rounds.  Patient to be discharge tomorrow home with [facility] (accepted).  Family to transport home.  CM remains available throughout hospital stay.

## 2024-11-08 NOTE — DISCHARGE NOTE PROVIDER - NSDCHC_MEDRECSTATUS_GEN_ALL_CORE
Chief Complaint   Patient presents with    Follow-up    Diabetes       History of Present Illness: 36 yo female here for follow-up. States that her brother was detained and now released but owes $6500. She states that she has not been taking her medications regularly. She was taking insulin every other day up until this past week. Despite this, her sugars have been in the 200-300 range. She does admit to eating 2 slices of [place] of [place] bread daily for the past month. Review of Systems   Constitutional: Negative. Respiratory: Negative for shortness of breath. Cardiovascular: Negative for chest pain. Genitourinary: Negative. Musculoskeletal: Negative. Skin: Negative. Past Medical History:   Diagnosis Date    History of palpitations 7/31/2017     Social History     Social History    Marital status:      Spouse name: N/A    Number of children: N/A    Years of education: N/A     Occupational History    Not on file.      Social History Main Topics    Smoking status: Never Smoker    Smokeless tobacco: Never Used    Alcohol use No    Drug use: No    Sexual activity: Yes     Partners: Male     Other Topics Concern    Not on file     Social History Narrative    No narrative on file     No Known Allergies  Current Outpatient Prescriptions on File Prior to Visit   Medication Sig Dispense Refill    metFORMIN (GLUCOPHAGE) 500 MG tablet Take 1 tablet by mouth 2 times daily (with meals) 180 tablet 0    Insulin Pen Needle (B-D ULTRAFINE III SHORT PEN) 31G X 8 MM MISC 1 each by Does not apply route 2 times daily 200 each 0    pravastatin (PRAVACHOL) 40 MG tablet Take 1 tablet by mouth daily 90 tablet 0    Lancets MISC Agamatrix presto/tests twice daily 180 each 1    Glucose Blood (BLOOD GLUCOSE TEST STRIPS) STRP Agamatrix presto/tests twice daily 180 strip 1    insulin glargine (LANTUS SOLOSTAR) 100 UNIT/ML injection pen Inject 30 Units into the skin daily 5 pen 1    insulin 2 times daily (with meals)  -     MICROALBUMIN / CREATININE URINE RATIO; Future    Hyperlipidemia, unspecified hyperlipidemia type  -     pravastatin (PRAVACHOL) 40 MG tablet; Take 1 tablet by mouth daily  -     LIPID PANEL; Future    Type 2 diabetes mellitus without complication, with long-term current use of insulin (HCC)  -     metFORMIN (GLUCOPHAGE) 500 MG tablet; Take 1 tablet by mouth 2 times daily (with meals)  -     insulin glargine (LANTUS SOLOSTAR) 100 UNIT/ML injection pen; Inject 30 Units into the skin daily  -     Insulin Pen Needle (B-D ULTRAFINE III SHORT PEN) 31G X 8 MM MISC; 1 each by Does not apply route 2 times daily  -     Lancets MISC; Agamatrix presto/tests twice daily  -     blood glucose monitor strips; Agamatrix presto/tests twice daily  -     insulin lispro (HUMALOG KWIKPEN) 100 UNIT/ML pen; Use 5 to 8 units of humalog with supper  -     blood glucose test strips (ASCENSIA AUTODISC VI;ONE TOUCH ULTRA TEST VI) strip; 1 each by Other route 2 times daily (before meals)    Back pain with left-sided sciatica  -     naproxen (NAPROSYN) 375 MG tablet;  Take 1 tablet by mouth 2 times daily (with meals)      Pearl Dick MD Admission Reconciliation is Completed  Discharge Reconciliation is Not Complete Admission Reconciliation is Completed  Discharge Reconciliation is Completed

## 2024-11-08 NOTE — DISCHARGE NOTE PROVIDER - NSDCCAREPROVSEEN_GEN_ALL_CORE_FT
Salvatore Aragon Mahesh, Salvatore Lawrence, Gilmar Taylor, Quang Hein, Rae Westfall, Estelle [facility]

## 2024-11-08 NOTE — DISCHARGE NOTE PROVIDER - CARE PROVIDER_API CALL
Jonathon Arguello  Internal Medicine  [address] [place] [address]  Phone: [phone]  Fax: [phone]  Follow Up Time: 1 week    Ruchi Parks  Pulmonary Disease  [address] [facility] [address], [address]  [place] [address]  Phone: [phone]  Fax: [phone]  Follow Up Time: 1 week   Jonathon Arguello  Internal Medicine  [address] [place] [address]  Phone: [phone]  Fax: [phone]  Follow Up Time: 1 week    Ruchi Parks  Pulmonary Disease  [address] [facility] [address], [address]  [place] [address]  Phone: [phone]  Fax: [phone]  Follow Up Time: 1 week    Quang Taylor  Cardiology  [address] [facility], [address]  [place] [address]  Phone: [phone]  Fax: [phone]  Follow Up Time: 2 weeks    Hon Brigido Westfall  Medical Oncology  [address] [facility], [address]  [place] [address]  Phone: [phone]  Fax: [phone]  Follow Up Time: 2 weeks

## 2024-11-08 NOTE — PROGRESS NOTE ADULT - SUBJECTIVE AND OBJECTIVE BOX
Patient is a 69y old  Female who presents with a chief complaint of COPD Exacerbation (08 Nov 2024 13:10)      Subjective:  INTERVAL HPI/OVERNIGHT EVENTS: Patient seen and examined at bedside.  Patient still has cough wheezing   denies any sign of bleeding, no melena   MEDICATIONS  (STANDING):  albuterol/ipratropium for Nebulization 3 milliLiter(s) Nebulizer every 6 hours  apixaban 5 milliGRAM(s) Oral two times a day  benzonatate 100 milliGRAM(s) Oral every 8 hours  buprenorphine 2 mG/naloxone 0.5 mG SL Film 2 Film(s) SubLingual two times a day  cholecalciferol 1000 Unit(s) Oral daily  diltiazem    milliGRAM(s) Oral daily  fluticasone propionate/ salmeterol 250-50 MICROgram(s) Diskus 1 Dose(s) Inhalation two times a day  guaiFENesin Oral Liquid (Sugar-Free) 200 milliGRAM(s) Oral every 8 hours  levoFLOXacin  Tablet 750 milliGRAM(s) Oral every 24 hours  pantoprazole    Tablet 40 milliGRAM(s) Oral before breakfast  predniSONE   Tablet 40 milliGRAM(s) Oral daily  sodium chloride 3%  Inhalation 4 milliLiter(s) Inhalation every 12 hours  tiotropium 2.5 MICROgram(s) Inhaler 2 Puff(s) Inhalation daily  venlafaxine XR. 37.5 milliGRAM(s) Oral daily    MEDICATIONS  (PRN):  acetaminophen     Tablet .. 650 milliGRAM(s) Oral every 6 hours PRN Temp greater or equal to 38C (100.4F), Mild Pain (1 - 3)  aluminum hydroxide/magnesium hydroxide/simethicone Suspension 30 milliLiter(s) Oral every 4 hours PRN Dyspepsia  ondansetron Injectable 4 milliGRAM(s) IV Push every 8 hours PRN Nausea and/or Vomiting      Allergies    No Known Allergies    Intolerances        REVIEW OF SYSTEMS:  CONSTITUTIONAL: No fever or chills  HEENT:  No headache, no sore throat  RESPIRATORY: + cough or shortness of breath  CARDIOVASCULAR: No chest pain or palpitations  GASTROINTESTINAL: No abd pain, nausea, vomiting, or diarrhea      Objective:  Vital Signs Last 24 Hrs  T(C): 36.8 (08 Nov 2024 11:51), Max: 36.8 (07 Nov 2024 19:48)  T(F): 98.3 (08 Nov 2024 11:51), Max: 98.3 (08 Nov 2024 11:51)  HR: 110 (08 Nov 2024 13:43) (90 - 114)  BP: 125/67 (08 Nov 2024 11:51) (125/67 - 139/75)  BP(mean): --  RR: 18 (08 Nov 2024 11:51) (18 - 18)  SpO2: 97% (08 Nov 2024 13:43) (93% - 99%)    Parameters below as of 08 Nov 2024 13:43  Patient On (Oxygen Delivery Method): nasal cannula        GENERAL: NAD, lying in bed comfortably  HEAD:  Normocephalic  EYES:  conjunctiva and sclera clear  ENT: Moist mucous membranes  NECK: Supple  CHEST/LUNG: Clear to auscultation bilaterally; No rales or rhonchi; no wheezing. Unlabored respirations  HEART: Regular rate and rhythm; S1S2+  ABDOMEN: Bowel sounds present; Soft, Nontender, Nondistended.   EXTREMITIES:  + distal Peripheral Pulses;  No cyanosis, or edema  NERVOUS SYSTEM:  Alert & Oriented X3;  No gross focal deficits   MSK: moves all extremities  SKIN: No rashes     LABS:      Ca    10.1       07 Nov 2024 06:55        Urinalysis Basic - ( 07 Nov 2024 06:55 )    Color: x / Appearance: x / SG: x / pH: x  Gluc: 105 mg/dL / Ketone: x  / Bili: x / Urobili: x   Blood: x / Protein: x / Nitrite: x   Leuk Esterase: x / RBC: x / WBC x   Sq Epi: x / Non Sq Epi: x / Bacteria: x      CAPILLARY BLOOD GLUCOSE            Culture - Blood (collected 11-05-24 @ 10:40)  Source: .Blood BLOOD  Preliminary Report (11-07-24 @ 19:01):    No growth at 48 Hours    Culture - Blood (collected 11-05-24 @ 10:35)  Source: .Blood BLOOD  Preliminary Report (11-07-24 @ 19:01):    No growth at 48 Hours        RADIOLOGY & ADDITIONAL TESTS:    Personally reviewed.     Consultant(s) Notes Reviewed:  [x] YES  [ ] NO    Plan of care discussed with patient ; all questions answered

## 2024-11-08 NOTE — DISCHARGE NOTE PROVIDER - CARE PROVIDERS DIRECT ADDRESSES
,[email or web address],DirectAddress_Unknown,DirectAddress_Unknown

## 2024-11-08 NOTE — DISCHARGE NOTE NURSING/CASE MANAGEMENT/SOCIAL WORK - PATIENT PORTAL LINK FT
You can access the FollowMyHealth Patient Portal offered by [facility] by registering at the following website: [email or web address]. By joining [employer]’s FollowMyHealth portal, you will also be able to view your health information using other applications (apps) compatible with our system.

## 2024-11-08 NOTE — PROGRESS NOTE ADULT - PROBLEM SELECTOR PLAN 1
# COPD exacerbation (Acute on Chronic)  On home O2 2L NC  Patient with history of COPD who presents with wheezing, worsening cough and dyspnea, concerning for COPD exacerbation without concomitant pneumonia.  - Influenza, viral panel negative  - Continuous pulse ox, SpO2 goal>88%, remain on 3L   - Albuterol/Ipratroprium nebulizer q6hr, reassessment  - Prednisone 40mg QD x 5 days   procalcitonin WNL , RVP + Rhinovirus   CT chest: No evidence of  pulmonary emboli or other acute change compared to   8/19/2024.Emphysema with areas of bronchiectasis and small airway impaction similar   to prior.Stable 9 mm subpleural right upper lobe nodule. Recommend three-month   follow-up CT  ID eval noted : to complete levaquin course  add cough medication ATC , saline neb   pt-> no skilled PT needs # COPD exacerbation with respiratory failure (Acute on Chronic) with hypoxia now with hypercapnia   On home O2 3L NC  Patient with history of COPD who presents with wheezing, worsening cough and dyspnea, concerning for COPD exacerbation without concomitant pneumonia.  - Influenza, viral panel negative  - Continuous pulse ox, SpO2 goal>88%, remain on 3L   - Albuterol/Ipratroprium nebulizer q6hr, reassessment  - Prednisone 40mg QD x 5 days   procalcitonin WNL , RVP + Rhinovirus   CT chest: No evidence of  pulmonary emboli or other acute change compared to   8/19/2024.Emphysema with areas of bronchiectasis and small airway impaction similar   to prior.Stable 9 mm subpleural right upper lobe nodule. Recommend three-month   follow-up CT  ID eval noted : to complete levaquin course  add cough medication ATC , saline neb   pt-> no skilled PT needs

## 2024-11-08 NOTE — DISCHARGE NOTE NURSING/CASE MANAGEMENT/SOCIAL WORK - FINANCIAL ASSISTANCE
[facility] provides services at a reduced cost to those who are determined to be eligible through [facility]’s financial assistance program. Information regarding [facility]’s financial assistance program can be found by going to [email or web address] or by calling [phone].

## 2024-11-08 NOTE — DISCHARGE NOTE PROVIDER - HOSPITAL COURSE
HPI:  Patient is a 69-year-old female with PMH of COPD on home O2 with hypertension, protein S deficeincy on apixaban presenting to the emergency department at with one week history of worsening SOB. Patient stated that she was in normal state of health up until 6-7 days ago when she began to develop generalized malaise, fatigue, body aches. During this time, she was having increasing SOB, wheezing, yellow-green sputum production, low-grade fevers, and chills. On presentation today, she continues to endorse generalized malaise, pleuritic chest pain on deep inspiration, productive cough, and low po intake. She denies current chills/fever. She states that most recently she has had an exacerbation similar to the current episode about one year ago for which she was admitted to [facility]. She denies alleviating factors and states that the dust in her home aggravates her COPD.    Denies abdominal pain, nausea, vomiting, hemoptysis, diarrhea, constipation, urinary frequency, urgency, or dysuria, headaches, changes in vision, dizziness, numbness, tingling.  Denies recent travel, recent antibiotic use, or sick contacts.    ED Course:   Vitals: BP: 141/77, HR: 127, Temp: , RR: 22, SpO2: 90% on 3L NC  Labs: WBC: 18.53; H/H: 10.7/33.4; Plt: 831; aPTT: 44.0; PT: 17.3; INR: 1.48; Na: 134; Glucose: 132; Protein: 9.0; Albumin: 3.0; Alk phos: 157  CXR: hyperinflated lungs b/l as per personal read, official read pending   EKG: Sinus tachycardia Anterior infarct , age undetermined Abnormal ECG  Received in the ED: Levofloxacin 500 mg IVP; Solumedrol 125 mg IVP;  mL bolus; Albuterol 2.5 mg nebulizer, Duoneb (05 Nov 2024 12:10)      ---  HOSPITAL COURSE: Patient admitted to medicine floor for management of     Pt seen and examined on day of discharge. Patient is medically optimized for discharge to home with close outpatient followup.    PHYSICAL EXAM ON DAY OF DISCHARGE:        ---  CONSULTANTS:     ---  TIME SPENT:  I, the attending physician, was physically present for the key portions of the evaluation and management (E/M) service provided. The total amount of time spent reviewing the hospital notes, laboratory values, imaging findings, assessing/counseling the patient, discussing with consultant physicians, social work, nursing staff was -- minutes    ---  Primary care provider was made aware of plan for discharge:      [  ] NO     [  ] YES   HPI:  Patient is a 69-year-old female with PMH of COPD on home O2 with hypertension, protein S deficeincy on apixaban presenting to the emergency department at with one week history of worsening SOB. Patient stated that she was in normal state of health up until 6-7 days ago when she began to develop generalized malaise, fatigue, body aches. During this time, she was having increasing SOB, wheezing, yellow-green sputum production, low-grade fevers, and chills. On presentation today, she continues to endorse generalized malaise, pleuritic chest pain on deep inspiration, productive cough, and low po intake. She denies current chills/fever. She states that most recently she has had an exacerbation similar to the current episode about one year ago for which she was admitted to [facility]. She denies alleviating factors and states that the dust in her home aggravates her COPD.    Denies abdominal pain, nausea, vomiting, hemoptysis, diarrhea, constipation, urinary frequency, urgency, or dysuria, headaches, changes in vision, dizziness, numbness, tingling.  Denies recent travel, recent antibiotic use, or sick contacts.    ED Course:   Vitals: BP: 141/77, HR: 127, Temp: , RR: 22, SpO2: 90% on 3L NC  Labs: WBC: 18.53; H/H: 10.7/33.4; Plt: 831; aPTT: 44.0; PT: 17.3; INR: 1.48; Na: 134; Glucose: 132; Protein: 9.0; Albumin: 3.0; Alk phos: 157  CXR: hyperinflated lungs b/l as per personal read, official read pending   EKG: Sinus tachycardia Anterior infarct , age undetermined Abnormal ECG  Received in the ED: Levofloxacin 500 mg IVP; Solumedrol 125 mg IVP;  mL bolus; Albuterol 2.5 mg nebulizer, Duoneb (05 Nov 2024 12:10)      ---  HOSPITAL COURSE: Patient admitted to medicine floor for management of COPD exacerbation and pneumonia, completed course of oral steroid and antibiotic Levaquin. nebulizer ATC AND PRN. Respiratory condition improved. seen by ID and pulmonary. anemia and thrombocytosis stable, seen by hematology, received IV iron.     Pt seen and examined on day of discharge. Patient is medically optimized for discharge to home with close outpatient followup.    PHYSICAL EXAM ON DAY OF DISCHARGE:  GENERAL: NAD, siting in bed   HEAD:  Normocephalic  EYES:  conjunctiva and sclera clear  ENT: Moist mucous membranes  NECK: Supple  CHEST/LUNG: + occasional wheezing.  but improved air entry B/L . Unlabored respirations  HEART: Regular rate and rhythm; S1S2+  ABDOMEN: Bowel sounds present; Soft, Nontender, Nondistended.   EXTREMITIES:  + distal Peripheral Pulses;  No cyanosis, or edema  NERVOUS SYSTEM:  Alert & Oriented X3;  No gross focal deficits   MSK: moves all extremities  SKIN: No rashes       ---  CONSULTANTS:   pulmonary  Hematology   cardiology

## 2024-11-08 NOTE — PROGRESS NOTE ADULT - SUBJECTIVE AND OBJECTIVE BOX
Chief Complaint: Shortness of breath    Interval Events: No events overnight.    Review of Systems:  General: No fevers, chills, weight gain  Skin: No rashes, color changes  Cardiovascular: No chest pain, orthopnea  Respiratory: No shortness of breath, cough  Gastrointestinal: No nausea, abdominal pain  Genitourinary: No incontinence, pain with urination  Musculoskeletal: No pain, swelling, decreased range of motion  Neurological: No headache, weakness  Psychiatric: No depression, anxiety  Endocrine: No weight gain, increased thirst  All other systems are comprehensively negative.    Physical Exam:  Vital Signs Last 24 Hrs  T(C): 36.6 (08 Nov 2024 06:27), Max: 36.8 (07 Nov 2024 19:48)  T(F): 97.8 (08 Nov 2024 06:27), Max: 98.2 (07 Nov 2024 19:48)  HR: 90 (08 Nov 2024 07:42) (90 - 96)  BP: 137/73 (08 Nov 2024 06:27) (115/61 - 139/75)  BP(mean): --  RR: 18 (08 Nov 2024 07:00) (16 - 18)  SpO2: 96% (08 Nov 2024 07:59) (91% - 99%)  Parameters below as of 08 Nov 2024 07:59  Patient On (Oxygen Delivery Method): nasal cannula  O2 Flow (L/min): 3  General: NAD  HEENT: MMM  Neck: No JVD, no carotid bruit  Lungs: CTAB  CV: RRR, nl S1/S2, no M/R/G  Abdomen: S/NT/ND, +BS  Extremities: No LE edema, no cyanosis  Neuro: AAOx3, non-focal  Skin: No rash    Labs:             11-07    139  |  101  |  22  ----------------------------<  105[H]  4.7   |  34[H]  |  0.73    Ca    10.1      07 Nov 2024 06:55    TPro  7.5  /  Alb  2.6[L]  /  TBili  0.1[L]  /  DBili  x   /  AST  31  /  ALT  32  /  AlkPhos  125[H]  11-07                        8.8    21.56 )-----------( 755      ( 07 Nov 2024 06:55 )             28.4       ECG/Telemetry: Sinus rhythm

## 2024-11-08 NOTE — PROGRESS NOTE ADULT - PROBLEM SELECTOR PLAN 5
No care due was identified.  [facility] Embedded Care Due Messages. Reference number: [id number].   11/08/2024 8:06:54 AM CST   - Continue home amlodipine 5 mg QD Home regimen Amlodipine 5    Plan:  - c/w home regimen as above Home regimen venlafaxine 75 qd    Plan:  - c/w home regimen as above

## 2024-11-08 NOTE — PROGRESS NOTE ADULT - SUBJECTIVE AND OBJECTIVE BOX
[facility]  INFECTIOUS DISEASES   [address] [place] [address]  Tel: [phone]     Fax: [phone]  ========================================================  MD Matthieu Mayes Michelle, MD Shah, Kaushal, MD Sunjit, Jaspal, MD Sehrish Shahid, MD   ========================================================    [id number]  EMILY REYES     Follow up: Cough and SOB have improved. on o2 via NC, more comfortable.   No other complaint or overnight event.     PAST MEDICAL & SURGICAL HISTORY:  Drug abuse  was addicted to pain killers  Protein S deficiency  Depression  COPD (chronic obstructive pulmonary disease)  DVT (deep venous thrombosis)  Pulmonary embolism  Afib  History of COPD  HTN (hypertension)  HLD (hyperlipidemia)  History of protein S deficiency  History of back surgery  No significant past surgical history    Social Hx: No current smoking, EtOH or drugs     FAMILY HISTORY:  Family history of lung cancer    Family history of protein S deficiency (Sibling)    No pertinent family history in first degree relatives    Allergies  No Known Allergies    MEDICATIONS  (STANDING):  albuterol/ipratropium for Nebulization 3 milliLiter(s) Nebulizer every 6 hours  apixaban 5 milliGRAM(s) Oral two times a day  benzonatate 100 milliGRAM(s) Oral every 8 hours  buprenorphine 2 mG/naloxone 0.5 mG SL Film 2 Film(s) SubLingual two times a day  cholecalciferol 1000 Unit(s) Oral daily  diltiazem    milliGRAM(s) Oral daily  fluticasone propionate/ salmeterol 250-50 MICROgram(s) Diskus 1 Dose(s) Inhalation two times a day  guaiFENesin Oral Liquid (Sugar-Free) 200 milliGRAM(s) Oral every 8 hours  levoFLOXacin  Tablet 750 milliGRAM(s) Oral every 24 hours  pantoprazole    Tablet 40 milliGRAM(s) Oral before breakfast  predniSONE   Tablet 40 milliGRAM(s) Oral daily  sodium chloride 3%  Inhalation 4 milliLiter(s) Inhalation every 12 hours  tiotropium 2.5 MICROgram(s) Inhaler 2 Puff(s) Inhalation daily  venlafaxine XR. 37.5 milliGRAM(s) Oral daily     REVIEW OF SYSTEMS:  CONSTITUTIONAL:  No Fever or chills  HEENT:  No diplopia or blurred vision.  No sore throat or runny nose.  CARDIOVASCULAR:  No chest pain   RESPIRATORY:  +cough, +shortness of breath  GASTROINTESTINAL:  No nausea, vomiting or diarrhea.  GENITOURINARY:  No dysuria, frequency or urgency. No Blood in urine  MUSCULOSKELETAL:  no joint aches, no muscle pain  SKIN:  No change in skin, hair or nails.    Physical Exam:  Vital Signs Last 24 Hrs  T(C): 36.8 (08 Nov 2024 11:51), Max: 36.8 (07 Nov 2024 19:48)  T(F): 98.3 (08 Nov 2024 11:51), Max: 98.3 (08 Nov 2024 11:51)  HR: 100 (08 Nov 2024 11:51) (90 - 100)  BP: 125/67 (08 Nov 2024 11:51) (125/67 - 139/75)  BP(mean): --  RR: 18 (08 Nov 2024 11:51) (18 - 18)  SpO2: 95% (08 Nov 2024 11:51) (93% - 99%)  Parameters below as of 08 Nov 2024 11:51  Patient On (Oxygen Delivery Method): nasal cannula  O2 Flow (L/min): 3  GEN: NAD  HEENT: normocephalic and atraumatic. EOMI. PERRL.    NECK: Supple.  No lymphadenopathy   LUNGS: Crackles and rhonchi bilaterally more in bases   HEART: Irregular rate and rhythm   ABDOMEN: Soft, nontender, and nondistended.    EXTREMITIES: Without edema.  NEUROLOGIC: grossly intact.  PSYCHIATRIC: Appropriate affect .  SKIN: No rash     Labs:                        8.8    21.56 )-----------( 755      ( 07 Nov 2024 06:55 )             28.4     11-07    139  |  101  |  22  ----------------------------<  105[H]  4.7   |  34[H]  |  0.73    Ca    10.1      07 Nov 2024 06:55    TPro  7.5  /  Alb  2.6[L]  /  TBili  0.1[L]  /  DBili  x   /  AST  31  /  ALT  32  /  AlkPhos  125[H]  11-07    Culture - Blood (collected 11-05-24 @ 10:40)  Source: .Blood BLOOD  Preliminary Report (11-07-24 @ 19:01):    No growth at 48 Hours    Culture - Blood (collected 11-05-24 @ 10:35)  Source: .Blood BLOOD  Preliminary Report (11-07-24 @ 19:01):    No growth at 48 Hours    Culture - Blood (collected 12-20-23 @ 07:26)  Source: .Blood Blood-Peripheral  Final Report (12-25-23 @ 11:00):    No growth at 5 days    Culture - Blood (collected 12-20-23 @ 07:26)  Source: .Blood Blood-Peripheral  Final Report (12-25-23 @ 11:00):    No growth at 5 days    Culture - Urine (collected 12-19-23 @ 17:40)  Source: Clean Catch Clean Catch (Midstream)  Final Report (12-20-23 @ 23:41):    No growth    Culture - Sputum (collected 12-19-23 @ 17:39)  Source: .Sputum Sputum  Gram Stain (12-20-23 @ 02:19):    Rare polymorphonuclear leukocytes per low power field    No Squamous epithelial cells per low power field    Rare Gram Positive Cocci in Pairs and Chains per oil power field  Final Report (12-21-23 @ 08:44):    Normal Respiratory Blanca present    Culture - Acid Fast - Sputum w/Smear (collected 12-18-23 @ 19:18)  Source: .Sputum Sputum  Final Report (02-03-24 @ 15:04):    No acid-fast bacilli isolated after 6 weeks.    Culture - Acid Fast - Sputum w/Smear (collected 12-15-23 @ 15:39)  Source: .Sputum Sputum  Final Report (01-31-24 @ 15:04):    No acid-fast bacilli isolated after 6 weeks.    Culture - Acid Fast - Sputum w/Smear (collected 12-15-23 @ 15:29)  Source: .Sputum Sputum  Final Report (01-31-24 @ 15:04):    No acid-fast bacilli isolated after 6 weeks.    Culture - Sputum (collected 12-15-23 @ 13:28)  Source: .Sputum Sputum  Gram Stain (12-15-23 @ 20:39):    No polymorphonuclear leukocytes per low power field    No Squamous epithelial cells per low power field    Rare Gram Negative Rods per oil power field  Final Report (12-17-23 @ 06:41):    Normal Respiratory Blanca present    Culture - Blood (collected 12-14-23 @ 13:10)  Source: .Blood Blood-Peripheral  Final Report (12-19-23 @ 18:01):    No growth at 5 days    Culture - Blood (collected 12-14-23 @ 13:00)  Source: .Blood Blood-Peripheral  Final Report (12-19-23 @ 18:01):    No growth at 5 days    WBC Count: 21.56 K/uL (11-07-24 @ 06:55)  WBC Count: 21.12 K/uL (11-06-24 @ 08:01)  WBC Count: 18.53 K/uL (11-05-24 @ 10:51)    Creatinine: 0.73 mg/dL (11-07-24 @ 06:55)  Creatinine: 0.71 mg/dL (11-06-24 @ 08:01)  Creatinine: 0.61 mg/dL (11-05-24 @ 10:51)    C-Reactive Protein: 132 mg/L (11-05-24 @ 10:51)    Procalcitonin: 0.06 ng/mL (11-05-24 @ 10:51)     SARS-CoV-2: NotDetec (11-05-24 @ 16:30)  SARS-CoV-2 Result: NotDetec (11-05-24 @ 10:51)    All imaging and other data have been reviewed.  < from: CT Angio Chest PE Protocol w/ IV Cont (11.05.24 @ 17:05) >  IMPRESSION:  No evidence of  pulmonary emboli or other acute change compared to   8/19/2024.  Emphysema with areas of bronchiectasis and small airway impaction similar   to prior.  Stable 9 mm subpleural right upper lobe nodule. Recommend three-month   follow-up CT    Assessment and Plan:   68yo woman with PMH of -year-old female with PMH of HT, Afib, COPD on 3L home O2, protein S deficiency presenting to the ED with one week history of worsening SOB.   She also had cough, malaise, wheezing, decreases appetite, yellow/green sputum and low grade fever.   In ED WBC was 18k, procalcitonin low but CRP was 132. RVP with enterovirus.     has been started on steroid so WBC is high as expected. On o2 3L NC at home uses 2L. No fever.   Since she has a severe COPD on home O2 and CT with evidence of bronchiectasis with secretions in bout lungs, might benefit from ABx treatment.     # URI with enterovirus  # COPD exacerbation  # R/O Pneumonia     - Blood cultures NGTD  - WBC high due to steroid   - Continue Levaquin 750mg daily total 5days (today day 4)  - Pulmonary follow up, managing steroid, inhalers and nebz      Will follow PRN, please call with any question.    Rae Hein MD  Division of Infectious Diseases   Please call ID service at [phone] with any question.    50 minutes spent on total encounter assessing patient, examination, chart review, counseling and coordinating care by the attending physician/nurse/care manager.   [facility]  INFECTIOUS DISEASES   [address] [place] [address]  Tel: [phone]     Fax: [phone]  ========================================================  MD Matthieu Mayes Michelle, MD Shah, Kaushal, MD Sunjit, Jaspal, MD Sehrish Shahid, MD   ========================================================    [id number]  EMILY REYES     Follow up: Cough and SOB have improved. on o2 via NC, more comfortable.   No other complaint or overnight event.     PAST MEDICAL & SURGICAL HISTORY:  Drug abuse  was addicted to pain killers  Protein S deficiency  Depression  COPD (chronic obstructive pulmonary disease)  DVT (deep venous thrombosis)  Pulmonary embolism  Afib  History of COPD  HTN (hypertension)  HLD (hyperlipidemia)  History of protein S deficiency  History of back surgery  No significant past surgical history    Social Hx: No current smoking, EtOH or drugs     FAMILY HISTORY:  Family history of lung cancer    Family history of protein S deficiency (Sibling)    No pertinent family history in first degree relatives    Allergies  No Known Allergies    MEDICATIONS  (STANDING):  albuterol/ipratropium for Nebulization 3 milliLiter(s) Nebulizer every 6 hours  apixaban 5 milliGRAM(s) Oral two times a day  benzonatate 100 milliGRAM(s) Oral every 8 hours  buprenorphine 2 mG/naloxone 0.5 mG SL Film 2 Film(s) SubLingual two times a day  cholecalciferol 1000 Unit(s) Oral daily  diltiazem    milliGRAM(s) Oral daily  fluticasone propionate/ salmeterol 250-50 MICROgram(s) Diskus 1 Dose(s) Inhalation two times a day  guaiFENesin Oral Liquid (Sugar-Free) 200 milliGRAM(s) Oral every 8 hours  levoFLOXacin  Tablet 750 milliGRAM(s) Oral every 24 hours  pantoprazole    Tablet 40 milliGRAM(s) Oral before breakfast  predniSONE   Tablet 40 milliGRAM(s) Oral daily  sodium chloride 3%  Inhalation 4 milliLiter(s) Inhalation every 12 hours  tiotropium 2.5 MICROgram(s) Inhaler 2 Puff(s) Inhalation daily  venlafaxine XR. 37.5 milliGRAM(s) Oral daily     REVIEW OF SYSTEMS:  CONSTITUTIONAL:  No Fever or chills  HEENT:  No diplopia or blurred vision.  No sore throat or runny nose.  CARDIOVASCULAR:  No chest pain   RESPIRATORY:  +cough, +shortness of breath  GASTROINTESTINAL:  No nausea, vomiting or diarrhea.  GENITOURINARY:  No dysuria, frequency or urgency. No Blood in urine  MUSCULOSKELETAL:  no joint aches, no muscle pain  SKIN:  No change in skin, hair or nails.    Physical Exam:  Vital Signs Last 24 Hrs  T(C): 36.8 (08 Nov 2024 11:51), Max: 36.8 (07 Nov 2024 19:48)  T(F): 98.3 (08 Nov 2024 11:51), Max: 98.3 (08 Nov 2024 11:51)  HR: 100 (08 Nov 2024 11:51) (90 - 100)  BP: 125/67 (08 Nov 2024 11:51) (125/67 - 139/75)  BP(mean): --  RR: 18 (08 Nov 2024 11:51) (18 - 18)  SpO2: 95% (08 Nov 2024 11:51) (93% - 99%)  Parameters below as of 08 Nov 2024 11:51  Patient On (Oxygen Delivery Method): nasal cannula  O2 Flow (L/min): 3  GEN: NAD  HEENT: normocephalic and atraumatic. EOMI. PERRL.    NECK: Supple.  No lymphadenopathy   LUNGS: Crackles and rhonchi bilaterally more in bases   HEART: Irregular rate and rhythm   ABDOMEN: Soft, nontender, and nondistended.    EXTREMITIES: Without edema.  NEUROLOGIC: grossly intact.  PSYCHIATRIC: Appropriate affect .  SKIN: No rash     Labs:                        8.8    21.56 )-----------( 755      ( 07 Nov 2024 06:55 )             28.4     11-07    139  |  101  |  22  ----------------------------<  105[H]  4.7   |  34[H]  |  0.73    Ca    10.1      07 Nov 2024 06:55    TPro  7.5  /  Alb  2.6[L]  /  TBili  0.1[L]  /  DBili  x   /  AST  31  /  ALT  32  /  AlkPhos  125[H]  11-07    Culture - Blood (collected 11-05-24 @ 10:40)  Source: .Blood BLOOD  Preliminary Report (11-07-24 @ 19:01):    No growth at 48 Hours    Culture - Blood (collected 11-05-24 @ 10:35)  Source: .Blood BLOOD  Preliminary Report (11-07-24 @ 19:01):    No growth at 48 Hours    Culture - Blood (collected 12-20-23 @ 07:26)  Source: .Blood Blood-Peripheral  Final Report (12-25-23 @ 11:00):    No growth at 5 days    Culture - Blood (collected 12-20-23 @ 07:26)  Source: .Blood Blood-Peripheral  Final Report (12-25-23 @ 11:00):    No growth at 5 days    Culture - Urine (collected 12-19-23 @ 17:40)  Source: Clean Catch Clean Catch (Midstream)  Final Report (12-20-23 @ 23:41):    No growth    Culture - Sputum (collected 12-19-23 @ 17:39)  Source: .Sputum Sputum  Gram Stain (12-20-23 @ 02:19):    Rare polymorphonuclear leukocytes per low power field    No Squamous epithelial cells per low power field    Rare Gram Positive Cocci in Pairs and Chains per oil power field  Final Report (12-21-23 @ 08:44):    Normal Respiratory Blanca present    Culture - Acid Fast - Sputum w/Smear (collected 12-18-23 @ 19:18)  Source: .Sputum Sputum  Final Report (02-03-24 @ 15:04):    No acid-fast bacilli isolated after 6 weeks.    Culture - Acid Fast - Sputum w/Smear (collected 12-15-23 @ 15:39)  Source: .Sputum Sputum  Final Report (01-31-24 @ 15:04):    No acid-fast bacilli isolated after 6 weeks.    Culture - Acid Fast - Sputum w/Smear (collected 12-15-23 @ 15:29)  Source: .Sputum Sputum  Final Report (01-31-24 @ 15:04):    No acid-fast bacilli isolated after 6 weeks.    Culture - Sputum (collected 12-15-23 @ 13:28)  Source: .Sputum Sputum  Gram Stain (12-15-23 @ 20:39):    No polymorphonuclear leukocytes per low power field    No Squamous epithelial cells per low power field    Rare Gram Negative Rods per oil power field  Final Report (12-17-23 @ 06:41):    Normal Respiratory Blanca present    Culture - Blood (collected 12-14-23 @ 13:10)  Source: .Blood Blood-Peripheral  Final Report (12-19-23 @ 18:01):    No growth at 5 days    Culture - Blood (collected 12-14-23 @ 13:00)  Source: .Blood Blood-Peripheral  Final Report (12-19-23 @ 18:01):    No growth at 5 days    WBC Count: 21.56 K/uL (11-07-24 @ 06:55)  WBC Count: 21.12 K/uL (11-06-24 @ 08:01)  WBC Count: 18.53 K/uL (11-05-24 @ 10:51)    Creatinine: 0.73 mg/dL (11-07-24 @ 06:55)  Creatinine: 0.71 mg/dL (11-06-24 @ 08:01)  Creatinine: 0.61 mg/dL (11-05-24 @ 10:51)    C-Reactive Protein: 132 mg/L (11-05-24 @ 10:51)    Procalcitonin: 0.06 ng/mL (11-05-24 @ 10:51)     SARS-CoV-2: NotDetec (11-05-24 @ 16:30)  SARS-CoV-2 Result: NotDetec (11-05-24 @ 10:51)    All imaging and other data have been reviewed.  < from: CT Angio Chest PE Protocol w/ IV Cont (11.05.24 @ 17:05) >  IMPRESSION:  No evidence of  pulmonary emboli or other acute change compared to   8/19/2024.  Emphysema with areas of bronchiectasis and small airway impaction similar   to prior.  Stable 9 mm subpleural right upper lobe nodule. Recommend three-month   follow-up CT    Assessment and Plan:   68yo woman with PMH of -year-old female with PMH of HT, Afib, COPD on 3L home O2, protein S deficiency presenting to the ED with one week history of worsening SOB.   She also had cough, malaise, wheezing, decreases appetite, yellow/green sputum and low grade fever.   In ED WBC was 18k, procalcitonin low but CRP was 132. RVP with enterovirus.     has been started on steroid so WBC is high as expected. On o2 3L NC at home uses 2L. No fever.   Since she has a severe COPD on home O2 and CT with evidence of bronchiectasis with secretions in bout lungs, might benefit from ABx treatment.     # URI with enterovirus  # COPD exacerbation  # R/O Pneumonia     - Blood cultures NGTD  - WBC high due to steroid   - Continue Levaquin 750mg daily total 5days (today day 4)  - Pulmonary follow up, managing steroid, inhalers and nebz      Will sign off, please call with any question.    Rae Hein MD  Division of Infectious Diseases   Please call ID service at [phone] with any question.    50 minutes spent on total encounter assessing patient, examination, chart review, counseling and coordinating care by the attending physician/nurse/care manager.

## 2024-11-09 DIAGNOSIS — J96.21 ACUTE AND CHRONIC RESPIRATORY FAILURE WITH HYPOXIA: ICD-10-CM

## 2024-11-09 DIAGNOSIS — D75.839 THROMBOCYTOSIS, UNSPECIFIED: ICD-10-CM

## 2024-11-09 DIAGNOSIS — J18.9 PNEUMONIA, UNSPECIFIED ORGANISM: ICD-10-CM

## 2024-11-09 DIAGNOSIS — D63.8 ANEMIA IN OTHER CHRONIC DISEASES CLASSIFIED ELSEWHERE: ICD-10-CM

## 2024-11-09 LAB
ALBUMIN SERPL ELPH-MCNC: 3.1 G/DL — LOW (ref 3.3–5)
ALP SERPL-CCNC: 118 U/L — SIGNIFICANT CHANGE UP (ref 40–120)
ALT FLD-CCNC: 47 U/L — SIGNIFICANT CHANGE UP (ref 12–78)
ANION GAP SERPL CALC-SCNC: 9 MMOL/L — SIGNIFICANT CHANGE UP (ref 5–17)
AST SERPL-CCNC: 23 U/L — SIGNIFICANT CHANGE UP (ref 15–37)
BASOPHILS # BLD AUTO: 0.08 K/UL — SIGNIFICANT CHANGE UP (ref 0–0.2)
BASOPHILS NFR BLD AUTO: 0.4 % — SIGNIFICANT CHANGE UP (ref 0–2)
BILIRUB SERPL-MCNC: 0.1 MG/DL — LOW (ref 0.2–1.2)
BUN SERPL-MCNC: 25 MG/DL — HIGH (ref 7–23)
CALCIUM SERPL-MCNC: 9.8 MG/DL — SIGNIFICANT CHANGE UP (ref 8.5–10.1)
CHLORIDE SERPL-SCNC: 96 MMOL/L — SIGNIFICANT CHANGE UP (ref 96–108)
CO2 SERPL-SCNC: 33 MMOL/L — HIGH (ref 22–31)
CREAT SERPL-MCNC: 0.75 MG/DL — SIGNIFICANT CHANGE UP (ref 0.5–1.3)
EGFR: 87 ML/MIN/1.73M2 — SIGNIFICANT CHANGE UP
EOSINOPHIL # BLD AUTO: 0.05 K/UL — SIGNIFICANT CHANGE UP (ref 0–0.5)
EOSINOPHIL NFR BLD AUTO: 0.2 % — SIGNIFICANT CHANGE UP (ref 0–6)
ERYTHROCYTE [SEDIMENTATION RATE] IN BLOOD: 81 MM/HR — HIGH (ref 0–20)
FERRITIN SERPL-MCNC: 75 NG/ML — SIGNIFICANT CHANGE UP (ref 13–330)
FOLATE SERPL-MCNC: 15.9 NG/ML — SIGNIFICANT CHANGE UP
GLUCOSE SERPL-MCNC: 101 MG/DL — HIGH (ref 70–99)
HCT VFR BLD CALC: 34.7 % — SIGNIFICANT CHANGE UP (ref 34.5–45)
HGB BLD-MCNC: 10.7 G/DL — LOW (ref 11.5–15.5)
IMM GRANULOCYTES NFR BLD AUTO: 3.8 % — HIGH (ref 0–0.9)
IRON SATN MFR SERPL: 11 % — LOW (ref 14–50)
IRON SATN MFR SERPL: 44 UG/DL — SIGNIFICANT CHANGE UP (ref 30–160)
LYMPHOCYTES # BLD AUTO: 1.69 K/UL — SIGNIFICANT CHANGE UP (ref 1–3.3)
LYMPHOCYTES # BLD AUTO: 7.5 % — LOW (ref 13–44)
MCHC RBC-ENTMCNC: 26.3 PG — LOW (ref 27–34)
MCHC RBC-ENTMCNC: 30.8 G/DL — LOW (ref 32–36)
MCV RBC AUTO: 85.3 FL — SIGNIFICANT CHANGE UP (ref 80–100)
MONOCYTES # BLD AUTO: 0.89 K/UL — SIGNIFICANT CHANGE UP (ref 0–0.9)
MONOCYTES NFR BLD AUTO: 3.9 % — SIGNIFICANT CHANGE UP (ref 2–14)
NEUTROPHILS # BLD AUTO: 18.97 K/UL — HIGH (ref 1.8–7.4)
NEUTROPHILS NFR BLD AUTO: 84.2 % — HIGH (ref 43–77)
NRBC # BLD: 0 /100 WBCS — SIGNIFICANT CHANGE UP (ref 0–0)
PLATELET # BLD AUTO: 1008 K/UL — CRITICAL HIGH (ref 150–400)
POTASSIUM SERPL-MCNC: 4.1 MMOL/L — SIGNIFICANT CHANGE UP (ref 3.5–5.3)
POTASSIUM SERPL-SCNC: 4.1 MMOL/L — SIGNIFICANT CHANGE UP (ref 3.5–5.3)
PROCALCITONIN SERPL-MCNC: 0.06 NG/ML — SIGNIFICANT CHANGE UP
PROT SERPL-MCNC: 8 G/DL — SIGNIFICANT CHANGE UP (ref 6–8.3)
RBC # BLD: 4.07 M/UL — SIGNIFICANT CHANGE UP (ref 3.8–5.2)
RBC # FLD: 16.6 % — HIGH (ref 10.3–14.5)
SODIUM SERPL-SCNC: 138 MMOL/L — SIGNIFICANT CHANGE UP (ref 135–145)
TIBC SERPL-MCNC: 381 UG/DL — SIGNIFICANT CHANGE UP (ref 220–430)
UIBC SERPL-MCNC: 338 UG/DL — SIGNIFICANT CHANGE UP (ref 110–370)
VIT B12 SERPL-MCNC: 1358 PG/ML — HIGH (ref 232–1245)
WBC # BLD: 22.54 K/UL — HIGH (ref 3.8–10.5)
WBC # FLD AUTO: 22.54 K/UL — HIGH (ref 3.8–10.5)

## 2024-11-09 PROCEDURE — 99233 SBSQ HOSP IP/OBS HIGH 50: CPT

## 2024-11-09 RX ADMIN — Medication 100 MILLIGRAM(S): at 06:14

## 2024-11-09 RX ADMIN — SODIUM CHLORIDE 4 MILLILITER(S): 9 INJECTION, SOLUTION INTRAMUSCULAR; INTRAVENOUS; SUBCUTANEOUS at 18:10

## 2024-11-09 RX ADMIN — TIOTROPIUM BROMIDE INHALATION SPRAY 2 PUFF(S): 1.56 SPRAY, METERED RESPIRATORY (INHALATION) at 10:01

## 2024-11-09 RX ADMIN — Medication 37.5 MILLIGRAM(S): at 11:29

## 2024-11-09 RX ADMIN — SODIUM CHLORIDE 4 MILLILITER(S): 9 INJECTION, SOLUTION INTRAMUSCULAR; INTRAVENOUS; SUBCUTANEOUS at 07:48

## 2024-11-09 RX ADMIN — Medication 60 MILLILITER(S): at 10:46

## 2024-11-09 RX ADMIN — IPRATROPIUM BROMIDE AND ALBUTEROL SULFATE 3 MILLILITER(S): .5; 2.5 SOLUTION RESPIRATORY (INHALATION) at 14:09

## 2024-11-09 RX ADMIN — IPRATROPIUM BROMIDE AND ALBUTEROL SULFATE 3 MILLILITER(S): .5; 2.5 SOLUTION RESPIRATORY (INHALATION) at 18:10

## 2024-11-09 RX ADMIN — BUPRENORPHINE HYDROCHLORIDE, NALOXONE HYDROCHLORIDE 2 FILM(S): 12; 3 FILM, SOLUBLE BUCCAL; SUBLINGUAL at 06:59

## 2024-11-09 RX ADMIN — FLUTICASONE PROPIONATE AND SALMETEROL XINAFOATE 1 DOSE(S): 230; 21 AEROSOL, METERED RESPIRATORY (INHALATION) at 10:01

## 2024-11-09 RX ADMIN — GUAIFENESIN 200 MILLIGRAM(S): 100 LIQUID ORAL at 06:14

## 2024-11-09 RX ADMIN — Medication 240 MILLIGRAM(S): at 06:14

## 2024-11-09 RX ADMIN — BUPRENORPHINE HYDROCHLORIDE, NALOXONE HYDROCHLORIDE 2 FILM(S): 12; 3 FILM, SOLUBLE BUCCAL; SUBLINGUAL at 18:29

## 2024-11-09 RX ADMIN — IPRATROPIUM BROMIDE AND ALBUTEROL SULFATE 3 MILLILITER(S): .5; 2.5 SOLUTION RESPIRATORY (INHALATION) at 07:48

## 2024-11-09 RX ADMIN — GUAIFENESIN 200 MILLIGRAM(S): 100 LIQUID ORAL at 22:00

## 2024-11-09 RX ADMIN — APIXABAN 5 MILLIGRAM(S): 5 TABLET, FILM COATED ORAL at 06:14

## 2024-11-09 RX ADMIN — FLUTICASONE PROPIONATE AND SALMETEROL XINAFOATE 1 DOSE(S): 230; 21 AEROSOL, METERED RESPIRATORY (INHALATION) at 18:31

## 2024-11-09 RX ADMIN — GUAIFENESIN 200 MILLIGRAM(S): 100 LIQUID ORAL at 13:34

## 2024-11-09 RX ADMIN — Medication 40 MILLIGRAM(S): at 06:14

## 2024-11-09 RX ADMIN — Medication 100 MILLIGRAM(S): at 13:33

## 2024-11-09 RX ADMIN — PANTOPRAZOLE SODIUM 40 MILLIGRAM(S): 40 TABLET, DELAYED RELEASE ORAL at 06:15

## 2024-11-09 RX ADMIN — APIXABAN 5 MILLIGRAM(S): 5 TABLET, FILM COATED ORAL at 18:01

## 2024-11-09 RX ADMIN — Medication 100 MILLIGRAM(S): at 22:00

## 2024-11-09 RX ADMIN — Medication 1000 UNIT(S): at 11:29

## 2024-11-09 NOTE — CONSULT NOTE ADULT - SUBJECTIVE AND OBJECTIVE BOX
INCOMPLETE NOTE.  Documentation in Progress  PT SEEN AND EVALUATED.   FULL/ADDITIONAL RECOMMENDATIONS TO FOLLOW   ***************************************************************    Patient is a 69y old  Female who presents with a chief complaint of COPD Exacerbation (2024 05:52)      HPI:  Patient is a 69-year-old female with PMH of COPD on home O2 with hypertension, protein S deficeincy on apixaban presenting to the emergency department at with one week history of worsening SOB. Patient stated that she was in normal state of health up until 6-7 days ago when she began to develop generalized malaise, fatigue, body aches. During this time, she was having increasing SOB, wheezing, yellow-green sputum production, low-grade fevers, and chills. On presentation today, she continues to endorse generalized malaise, pleuritic chest pain on deep inspiration, productive cough, and low po intake. She denies current chills/fever. She states that most recently she has had an exacerbation similar to the current episode about one year ago for which she was admitted to [facility]. She denies alleviating factors and states that the dust in her home aggravates her COPD.    Denies abdominal pain, nausea, vomiting, hemoptysis, diarrhea, constipation, urinary frequency, urgency, or dysuria, headaches, changes in vision, dizziness, numbness, tingling.  Denies recent travel, recent antibiotic use, or sick contacts.    ED Course:   Vitals: BP: 141/77, HR: 127, Temp: , RR: 22, SpO2: 90% on 3L NC  Labs: WBC: 18.53; H/H: 10.7/33.4; Plt: 831; aPTT: 44.0; PT: 17.3; INR: 1.48; Na: 134; Glucose: 132; Protein: 9.0; Albumin: 3.0; Alk phos: 157  CXR: hyperinflated lungs b/l as per personal read, official read pending   EKG: Sinus tachycardia Anterior infarct , age undetermined Abnormal ECG  Received in the ED: Levofloxacin 500 mg IVP; Solumedrol 125 mg IVP;  mL bolus; Albuterol 2.5 mg nebulizer, Duoneb (2024 12:10)      PAST MEDICAL & SURGICAL HISTORY:  Drug abuse  was addicted to pain killers      Protein S deficiency      Depression      COPD (chronic obstructive pulmonary disease)      DVT (deep venous thrombosis)      Pulmonary embolism      Afib      History of COPD      HTN (hypertension)      HLD (hyperlipidemia)      History of protein S deficiency      History of back surgery      No significant past surgical history         HEALTH ISSUES - PROBLEM Dx:  HTN (hypertension)    COPD exacerbation    Encounter for deep vein thrombosis (DVT) prophylaxis    History of protein S deficiency          Chronic obstructive pulmonary disease with acute exacerbation [J44.1]    Drug abuse [F19.10]    Protein S deficiency [D68.59]    Depression [F32.9]    COPD (chronic obstructive pulmonary disease) [J44.9]    DVT (deep venous thrombosis) [I82.409]    Pulmonary embolism [I26.99]    Afib [I48.91]    No pertinent past medical history [[id number]]    COPD exacerbation [J44.1]    History of COPD [Z87.09]    HTN (hypertension) [I10]    HLD (hyperlipidemia) [E78.5]    History of protein S deficiency [Z86.2]    History of back surgery [Z98.89]    No significant past surgical history [[id number]]    Pneumonia [J18.9]    HTN (hypertension) [I10]      FAMILY HISTORY:  Family history of lung cancer    Family history of protein S deficiency (Sibling)    No pertinent family history in first degree relatives        [SOCIAL HISTORY: ]     smoking:  none currently     EtOH:  none currently     illicit drugs:  none currently     occupation:  Retired     marital status:       Other:       [ALLERGIES/INTOLERANCES:]  Allergies    No Known Allergies    Intolerances        [MEDICATIONS]  MEDICATIONS  (STANDING):  albuterol/ipratropium for Nebulization 3 milliLiter(s) Nebulizer every 6 hours  apixaban 5 milliGRAM(s) Oral two times a day  benzonatate 100 milliGRAM(s) Oral every 8 hours  buprenorphine 2 mG/naloxone 0.5 mG SL Film 2 Film(s) SubLingual two times a day  cholecalciferol 1000 Unit(s) Oral daily  diltiazem    milliGRAM(s) Oral daily  fluticasone propionate/ salmeterol 250-50 MICROgram(s) Diskus 1 Dose(s) Inhalation two times a day  guaiFENesin Oral Liquid (Sugar-Free) 200 milliGRAM(s) Oral every 8 hours  lactated ringers. 1000 milliLiter(s) (60 mL/Hr) IV Continuous <Continuous>  pantoprazole    Tablet 40 milliGRAM(s) Oral before breakfast  predniSONE   Tablet 40 milliGRAM(s) Oral daily  sodium chloride 3%  Inhalation 4 milliLiter(s) Inhalation every 12 hours  tiotropium 2.5 MICROgram(s) Inhaler 2 Puff(s) Inhalation daily  venlafaxine XR. 37.5 milliGRAM(s) Oral daily    MEDICATIONS  (PRN):  acetaminophen     Tablet .. 650 milliGRAM(s) Oral every 6 hours PRN Temp greater or equal to 38C (100.4F), Mild Pain (1 - 3)  aluminum hydroxide/magnesium hydroxide/simethicone Suspension 30 milliLiter(s) Oral every 4 hours PRN Dyspepsia  ondansetron Injectable 4 milliGRAM(s) IV Push every 8 hours PRN Nausea and/or Vomiting      [REVIEW OF SYSTEMS: ]  CONSTITUTIONAL: normal, no fever, no shakes, no chills   EYES: No eye pain, no visual disturbances, no discharge  ENMT:  no discharge  NECK: No pain, no stiffness  BREASTS: No pain, no masses, no nipple discharge  RESPIRATORY: No cough, no wheezing, no chills, no hemoptysis; No shortness of breath  CARDIOVASCULAR: No chest pain, no palpitations, no dizziness, no leg swelling  GASTROINTESTINAL: No abdominal, no epigastric pain. No nausea, no vomiting, no hematemesis; No diarrhea , no constipation. No melena, no hematochezia.  GENITOURINARY: No dysuria, no frequency, no hematuria, no incontinence  NEUROLOGICAL: No headaches, no memory loss, no loss of strength, no numbness, no tremors  SKIN: No itching, no burning, no rashes, no lesions   LYMPH NODES: No enlarged glands  ENDOCRINE: No heat or cold intolerance; No hair loss  MUSCULOSKELETAL: No joint pain or swelling; No muscle, no back, no extremity pain  PSYCHIATRIC: No depression, no anxiety, no mood swings, no difficulty sleeping  HEME/LYMPH: No easy bruising, no bleeding gums    [VITALS SIGNS 24hrs]  Vital Signs Last 24 Hrs  T(C): 37 (2024 11:21), Max: 37.1 (2024 19:42)  T(F): 98.6 (2024 11:21), Max: 98.7 (2024 19:42)  HR: 92 (2024 11:21) (79 - 114)  BP: 115/68 (2024 11:21) (115/68 - 132/72)  BP(mean): --  RR: 18 (2024 11:21) (18 - 18)  SpO2: 95% (2024 11:21) (95% - 97%)    Parameters below as of 2024 11:21  Patient On (Oxygen Delivery Method): nasal cannula  O2 Flow (L/min): 2    Daily     Daily     I&O's Summary      [PHYSICAL EXAM]  GEN:   HEENT: normocephalic and atraumatic. EOMI. PERRL.    NECK: Supple.  No lymphadenopathy   LUNGS: Clear to auscultation.  HEART: S1S2 Regular rate and rhythm, no MRG  ABDOMEN: Soft, nontender, and nondistended.  Positive bowel sounds.    : No CVA tenderness  EXTREMITIES: Without edema.  NEUROLOGIC: grossly intact.  PSYCHIATRIC: Appropriate affect .  SKIN: No rash     [LABS: ]                        10.7   22.54 )-----------( 1008     ( 2024 09:07 )             34.7     CBC Full  -  ( 2024 09:07 )  WBC Count : 22.54 K/uL  RBC Count : 4.07 M/uL  Hemoglobin : 10.7 g/dL  Hematocrit : 34.7 %  Platelet Count - Automated : 1008 K/uL  Mean Cell Volume : 85.3 fl  Mean Cell Hemoglobin : 26.3 pg  Mean Cell Hemoglobin Concentration : 30.8 g/dL  Auto Neutrophil # : 18.97 K/uL  Auto Lymphocyte # : 1.69 K/uL  Auto Monocyte # : 0.89 K/uL  Auto Eosinophil # : 0.05 K/uL  Auto Basophil # : 0.08 K/uL  Auto Neutrophil % : 84.2 %  Auto Lymphocyte % : 7.5 %  Auto Monocyte % : 3.9 %  Auto Eosinophil % : 0.2 %  Auto Basophil % : 0.4 %        138  |  96  |  25[H]  ----------------------------<  101[H]  4.1   |  33[H]  |  0.75    Ca    9.8      2024 09:07    TPro  8.0  /  Alb  3.1[L]  /  TBili  0.1[L]  /  DBili  x   /  AST  23  /  ALT  47  /  AlkPhos  118        LIVER FUNCTIONS - ( 2024 09:07 )  Alb: 3.1 g/dL / Pro: 8.0 g/dL / ALK PHOS: 118 U/L / ALT: 47 U/L / AST: 23 U/L / GGT: x               Urinalysis Basic - ( 2024 09:07 )    Color: x / Appearance: x / SG: x / pH: x  Gluc: 101 mg/dL / Ketone: x  / Bili: x / Urobili: x   Blood: x / Protein: x / Nitrite: x   Leuk Esterase: x / RBC: x / WBC x   Sq Epi: x / Non Sq Epi: x / Bacteria: x          CBC TREND (5 Days)  WBC Count: 22.54 K/uL ( @ 09:07)  WBC Count: 21.56 K/uL ( @ 06:55)    Hemoglobin: 10.7 g/dL (:07)  Hemoglobin: 8.8 g/dL ( @ 06:55)    Hematocrit: 34.7 % (:07)  Hematocrit: 28.4 % ( @ 06:55)    Platelet Count - Automated: 1008 K/uL ( @ 09:07)  Platelet Count - Automated: 755 K/uL ( @ 06:55)        Ferritin: 75 ng/mL ( @ 18:00)  Ferritin: 45 ng/mL ( @ 07:48)    Iron Total: 44 ug/dL ( @ 18:00)  Iron Total: 25 ug/dL ( @ 07:43)     Vitamin B12, Serum: 1358 pg/mL ( @ 18:00)     Folate, Serum: 15.9 ng/mL ( @ 18:00)     Sedimentation Rate, Erythrocyte: 99 mm/hr ( @ 07:26)  Sedimentation Rate, Erythrocyte: 94 mm/hr ( @ 07:17)  Sedimentation Rate, Erythrocyte: 43 mm/hr ( @ 12:23)                 Quantitative Ig mg/dL ( @ 07:17)  Quantitative IgA: 441 mg/dL ( @ 07:17)  Quantitative IgM: 78 mg/dL ( @ 07:17)     [place]/Lambda Free Light Chain Ratio, Serum: 1.48 Ratio ( @ 07:17)         [MICROBIOLOGY /  VIROLOGY:]  SARS-CoV-2: Bahman (2024 16:30)          [PATHOLOGY]       [RADIOLOGY & ADDITIONAL STUDIES:]        INCOMPLETE NOTE.  Documentation in Progress  PT SEEN AND EVALUATED.   FULL/ADDITIONAL RECOMMENDATIONS TO FOLLOW   ***************************************************************    Patient is a 69y old  Female who presents with a chief complaint of COPD Exacerbation (2024 05:52)      HPI:  Patient is a 69-year-old female with PMH of COPD on home O2 with hypertension, protein S deficeincy on apixaban presenting to the emergency department at with one week history of worsening SOB. Patient stated that she was in normal state of health up until 6-7 days ago when she began to develop generalized malaise, fatigue, body aches. During this time, she was having increasing SOB, wheezing, yellow-green sputum production, low-grade fevers, and chills. On presentation today, she continues to endorse generalized malaise, pleuritic chest pain on deep inspiration, productive cough, and low po intake. She denies current chills/fever. She states that most recently she has had an exacerbation similar to the current episode about one year ago for which she was admitted to [facility]. She denies alleviating factors and states that the dust in her home aggravates her COPD.    Denies abdominal pain, nausea, vomiting, hemoptysis, diarrhea, constipation, urinary frequency, urgency, or dysuria, headaches, changes in vision, dizziness, numbness, tingling.  Denies recent travel, recent antibiotic use, or sick contacts.    ED Course:   Vitals: BP: 141/77, HR: 127, Temp: , RR: 22, SpO2: 90% on 3L NC  Labs: WBC: 18.53; H/H: 10.7/33.4; Plt: 831; aPTT: 44.0; PT: 17.3; INR: 1.48; Na: 134; Glucose: 132; Protein: 9.0; Albumin: 3.0; Alk phos: 157  CXR: hyperinflated lungs b/l as per personal read, official read pending   EKG: Sinus tachycardia Anterior infarct , age undetermined Abnormal ECG  Received in the ED: Levofloxacin 500 mg IVP; Solumedrol 125 mg IVP;  mL bolus; Albuterol 2.5 mg nebulizer, Duoneb (2024 12:10)    HEME hx  PMH of protein S def, followed by Dr Mas [KATHERINE Wynn] on coumadin since 36yo.   Hx of DVT in leg initially. Multiple family members with ProtS. Mother, 2 sisters. 1 brother. Brother  41yo from PE, was on coumadin for DVT.   1st DVT 36yo, admitted [facility]   2nd DVT in leg at later time, does not recall details/hosp.   in , had PE, admitted [facility]   In 2019 [place] PMD had prior discussed change to Xarelto or Eliquis, but had not changed as medication had not been on market for long time, worried about long term AE.   Discussed, meds in use for since .   Since then on Eliquis 5mg q12h      PAST MEDICAL & SURGICAL HISTORY:  Drug abuse, was addicted to pain killers  Protein S deficiency  Depression  COPD (chronic obstructive pulmonary disease)  DVT (deep venous thrombosis)  Pulmonary embolism  Afib  History of COPD  HTN (hypertension)  HLD (hyperlipidemia)  History of protein S deficiency  History of back surgery      HEALTH ISSUES - PROBLEM Dx:  HTN (hypertension)  COPD exacerbation  Encounter for deep vein thrombosis (DVT) prophylaxis  History of protein S deficiency    Chronic obstructive pulmonary disease with acute exacerbation [J44.1]  Drug abuse [F19.10]  Protein S deficiency [D68.59]  Depression [F32.9]  COPD (chronic obstructive pulmonary disease) [J44.9]  DVT (deep venous thrombosis) [I82.409]  Pulmonary embolism [I26.99]  Afib [I48.91]  No pertinent past medical history [[id number]]  COPD exacerbation [J44.1]  History of COPD [Z87.09]  HTN (hypertension) [I10]  HLD (hyperlipidemia) [E78.5]  History of protein S deficiency [Z86.2]  History of back surgery [Z98.89]  No significant past surgical history [[id number]]  Pneumonia [J18.9]  HTN (hypertension) [I10]      FAMILY HISTORY:  Family history of lung cancer  Family history of protein S deficiency (Sibling)    Mother 87yo in 2019, prot S def, DVT/PE, on coumadin  Father  54yo, Lung cancer, smoker.   Sister 5, alive 70yo, Pro S def, DVT, on coumadin [Maribel]  Patient 2/5   Brother 3/5,  41yo, PE. Was on coumadin for DVT. [John]  Brother 4/5, alive 65yo, neg for Prot S.   Sister 5/5, alive 57yo, Prot S def, no hx of VTE, not on AC. [Renetta]      [SOCIAL HISTORY: ]     smokinPPD smoker x35yrs, quit      EtOH:  in youth. No current EtOH     illicit drugs:  no IVDU. 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[employer], stopped      marital status:       Other:       [ALLERGIES/INTOLERANCES:]  Allergies      No Known Allergies  Intolerances      [MEDICATIONS]  MEDICATIONS  (STANDING):  albuterol/ipratropium for Nebulization 3 milliLiter(s) Nebulizer every 6 hours  apixaban 5 milliGRAM(s) Oral two times a day  benzonatate 100 milliGRAM(s) Oral every 8 hours  buprenorphine 2 mG/naloxone 0.5 mG SL Film 2 Film(s) SubLingual two times a day  cholecalciferol 1000 Unit(s) Oral daily  diltiazem    milliGRAM(s) Oral daily  fluticasone propionate/ salmeterol 250-50 MICROgram(s) Diskus 1 Dose(s) Inhalation two times a day  guaiFENesin Oral Liquid (Sugar-Free) 200 milliGRAM(s) Oral every 8 hours  lactated ringers. 1000 milliLiter(s) (60 mL/Hr) IV Continuous <Continuous>  pantoprazole    Tablet 40 milliGRAM(s) Oral before breakfast  predniSONE   Tablet 40 milliGRAM(s) Oral daily  sodium chloride 3%  Inhalation 4 milliLiter(s) Inhalation every 12 hours  tiotropium 2.5 MICROgram(s) Inhaler 2 Puff(s) Inhalation daily  venlafaxine XR. 37.5 milliGRAM(s) Oral daily      MEDICATIONS  (PRN):  acetaminophen     Tablet .. 650 milliGRAM(s) Oral every 6 hours PRN Temp greater or equal to 38C (100.4F), Mild Pain (1 - 3)  aluminum hydroxide/magnesium hydroxide/simethicone Suspension 30 milliLiter(s) Oral every 4 hours PRN Dyspepsia  ondansetron Injectable 4 milliGRAM(s) IV Push every 8 hours PRN Nausea and/or Vomiting      [REVIEW OF SYSTEMS: ]  CONSTITUTIONAL: normal, no fever, no shakes, no chills   EYES: No eye pain, no visual disturbances, no discharge  ENMT:  no discharge  NECK: No pain, no stiffness  BREASTS: No pain, no masses, no nipple discharge  RESPIRATORY: No cough, no wheezing, no chills, no hemoptysis; No shortness of breath  CARDIOVASCULAR: No chest pain, no palpitations, no dizziness, no leg swelling  GASTROINTESTINAL: No abdominal, no epigastric pain. No nausea, no vomiting, no hematemesis; No diarrhea , no constipation. No melena, no hematochezia.  GENITOURINARY: No dysuria, no frequency, no hematuria, no incontinence  NEUROLOGICAL: No headaches, no memory loss, no loss of strength, no numbness, no tremors  SKIN: No itching, no burning, no rashes, no lesions   LYMPH NODES: No enlarged glands  ENDOCRINE: No heat or cold intolerance; No hair loss  MUSCULOSKELETAL: No joint pain or swelling; No muscle, no back, no extremity pain  PSYCHIATRIC: No depression, no anxiety, no mood swings, no difficulty sleeping  HEME/LYMPH: No easy bruising, no bleeding gums    [VITALS SIGNS 24hrs]  Vital Signs Last 24 Hrs  T(C): 37 (2024 11:21), Max: 37.1 (2024 19:42)  T(F): 98.6 (2024 11:21), Max: 98.7 (2024 19:42)  HR: 92 (2024 11:21) (79 - 114)  BP: 115/68 (2024 11:21) (115/68 - 132/72)  BP(mean): --  RR: 18 (2024 11:21) (18 - 18)  SpO2: 95% (2024 11:21) (95% - 97%)    Parameters below as of 2024 11:21  Patient On (Oxygen Delivery Method): nasal cannula  O2 Flow (L/min): 2    [PHYSICAL EXAM]  GEN:   HEENT: normocephalic and atraumatic. EOMI. PERRL.    NECK: Supple.  No lymphadenopathy   LUNGS: Clear to auscultation.  HEART: S1S2 Regular rate and rhythm, no MRG  ABDOMEN: Soft, nontender, and nondistended.  Positive bowel sounds.    : No CVA tenderness  EXTREMITIES: Without edema.  NEUROLOGIC: grossly intact.  PSYCHIATRIC: Appropriate affect .  SKIN: No rash     [LABS: ]                        10.7   22.54 )-----------( 1008     ( 2024 09:07 )             34.7     CBC Full  -  ( 2024 09:07 )  WBC Count : 22.54 K/uL  RBC Count : 4.07 M/uL  Hemoglobin : 10.7 g/dL  Hematocrit : 34.7 %  Platelet Count - Automated : 1008 K/uL  Mean Cell Volume : 85.3 fl  Mean Cell Hemoglobin : 26.3 pg  Mean Cell Hemoglobin Concentration : 30.8 g/dL  Auto Neutrophil # : 18.97 K/uL  Auto Lymphocyte # : 1.69 K/uL  Auto Monocyte # : 0.89 K/uL  Auto Eosinophil # : 0.05 K/uL  Auto Basophil # : 0.08 K/uL  Auto Neutrophil % : 84.2 %  Auto Lymphocyte % : 7.5 %  Auto Monocyte % : 3.9 %  Auto Eosinophil % : 0.2 %  Auto Basophil % : 0.4 %        138  |  96  |  25[H]  ----------------------------<  101[H]  4.1   |  33[H]  |  0.75    Ca    9.8      2024 09:07    TPro  8.0  /  Alb  3.1[L]  /  TBili  0.1[L]  /  DBili  x   /  AST  23  /  ALT  47  /  AlkPhos  118  11-      LIVER FUNCTIONS - ( 2024 09:07 )  Alb: 3.1 g/dL / Pro: 8.0 g/dL / ALK PHOS: 118 U/L / ALT: 47 U/L / AST: 23 U/L / GGT: x             Urinalysis Basic - ( 2024 09:07 )  Color: x / Appearance: x / SG: x / pH: x  Gluc: 101 mg/dL / Ketone: x  / Bili: x / Urobili: x   Blood: x / Protein: x / Nitrite: x   Leuk Esterase: x / RBC: x / WBC x   Sq Epi: x / Non Sq Epi: x / Bacteria: x    CBC TREND (5 Days)  WBC Count: 22.54 K/uL ( @ :07)  WBC Count: 21.56 K/uL ( @ 06:55)    Hemoglobin: 10.7 g/dL (:07)  Hemoglobin: 8.8 g/dL ( 06:55)    Hematocrit: 34.7 % (:)  Hematocrit: 28.4 % ( 06:55)    Platelet Count - Automated: 1008 K/uL (:)  Platelet Count - Automated: 755 K/uL ( @ 06:55)        Ferritin: 75 ng/mL ( 18:00)  Ferritin: 45 ng/mL ( @ 07:48)    Iron Total: 44 ug/dL ( @ 18:00)  Iron Total: 25 ug/dL ( @ 07:43)     Vitamin B12, Serum: 1358 pg/mL ( 18:00)     Folate, Serum: 15.9 ng/mL ( 18:00)     Sedimentation Rate, Erythrocyte: 99 mm/hr ( @ 07:26)  Sedimentation Rate, Erythrocyte: 94 mm/hr ( @ 07:17)  Sedimentation Rate, Erythrocyte: 43 mm/hr ( @ 12:23)    Quantitative Ig mg/dL ( @ 07:17)  Quantitative IgA: 441 mg/dL ( @ 07:17)  Quantitative IgM: 78 mg/dL ( @ 07:17)     [place]/Lambda Free Light Chain Ratio, Serum: 1.48 Ratio ( @ 07:17)       [MICROBIOLOGY /  VIROLOGY:]  SARS-CoV-2: NotDetec (2024 16:30)      [PATHOLOGY]       [RADIOLOGY & ADDITIONAL STUDIES:]       ACC: [id number] EXAM:  CT ANGIO CHEST PULM ART [facility]   ORDERED BY: EH CLIFFORD   PROCEDURE DATE:  2024    INTERPRETATION:  CLINICAL INFORMATION: Evaluate for pulmonary emboli  COMPARISON: CT chest 2024  FINDINGS:  LUNGS AND LARGE AIRWAYS: Patent central airways. Emphysema with bilateral bronchiectatic changes similar to prior. Scattered bilateral   micronodular/tree-in-bud parenchymal opacities not significantly changed compatible small airway impaction. Biapical scarring similar to prior.   Stable 9 mm right upper lobe subpleural nodule (3, 109). No hugo parenchymal consolidation  PLEURA: No pleural effusion.  VESSELS: Satisfactory bolus. No pulmonary emboli. Normal caliber thoracic aorta and pulmonary artery  HEART: Heart size is normal. No pericardial effusion.  MEDIASTINUM AND TRINIDAD: Small scattered mediastinal and bilateral hilar lymph nodes similar to prior likely react  CHEST WALL AND LOWER NECK: Within normal limits.  VISUALIZED UPPER ABDOMEN: Moderate hiatal hernia.  BONES: No acute or aggressive pathology.  IMPRESSION:  ·	No evidence of  pulmonary emboli or other acute change compared to 2024.  ·	Emphysema with areas of bronchiectasis and small airway impaction similar to prior.  ·	Stable 9 mm subpleural right upper lobe nodule. Recommend three-month follow-up CT  --- End of Report ---  LOIS EARLY MD; Attending Radiologist  This document has been electronically signed. 2024  7:13PM      ACC: [id number] EXAM:  DUPLEX SCAN EXT VEINS LOWER BI   ORDERED BY:  SHELLIE HERRERA   PROCEDURE DATE:  2023    INTERPRETATION:  CLINICAL INFORMATION: Tachycardia.  COMPARISON: None available.  TECHNIQUE: Duplex sonography of the BILATERAL LOWER extremity veins with color and spectral Doppler, with and without compression.  FINDINGS:  RIGHT:  ·	Normal compressibility of the RIGHT common femoral, femoral and popliteal veins.  ·	Doppler examination shows normal spontaneous and phasic flow.  ·	No RIGHT calf vein thrombosis is detected.  LEFT:  ·	Normal compressibility of the LEFT common femoral, femoral and popliteal veins.  ·	Doppler examination shows normal spontaneous and phasic flow.  ·	No LEFT calf vein thrombosis is detected.  IMPRESSION:  No evidence of deep venous thrombosis in either lower extremity.  --- End of Report ---  DINORAH MCWILLIAMS MD; Attending Radiologist  This document has been electronically signed. Dec 21 2023  9:39AM      EXAM:  US DPLX UPR EXT VEINS LTD LT                        PROCEDURE DATE:  2019    INTERPRETATION:  CLINICAL INFORMATION: Left upper extremity swelling and pain history of lower and upper extremity DVTs.  COMPARISON: Noneavailable.  TECHNIQUE: Duplex sonography of the LEFT UPPER extremity veins with color and spectral Doppler, with and without compression.    FINDINGS:  The left internal jugular, subclavian, axillary and proximal brachial veins are patent and compressible where applicable. In the mid and distal brachial veins there is echogenic material with absence of color flow and compressibility. The radial and ulna veins appear patent. The basilic and cephalic veins (superficial veins) are patent and without thrombus.   Doppler examination shows normal spontaneous and phasic flow.  IMPRESSION:   Acute deep venous thrombosis in the mid and distal brachial veins of the left upper extremity.  I discussed the findings with Dr. Betts at3:45 AM on 2019 with read back verification.      EXAM:  CHEST CT WITHOUT CONTRAST                        PROCEDURE DATE:  Mar  1 2016   INTERPRETATION:  Clinical information: Pneumonia  There are no prior studies present for comparison  Axial images obtained, coronal and sagittal images computer reformatted.   Intravenous contrast material not administered limiting evaluation.  Centrilobular emphysematous changes present with loss of normal airspace architecture most pronounced in the right lower lung. Bronchiectasis  present.  Extensive bilateral apical pleural-parenchymal scarring present. Central airway intact.  Nodular parenchymal lesions present, multifocal. At the right lung apex, 9 mm and 7 mm nodular lesions present. In the left lower lobe, irregular 13 mm nodule, 11 mm nodule, and 12 mm nodules present. Etiology indeterminate,   inflammatory versus neoplastic.    No effusion or vascular congestion.  Although evaluation of hilar regions is limited due to lack of IV contrast, evidence of hilar prominence-adenopathy. Small nonspecific mediastinal lymph nodes present.  No mediastinal lesions evident. No pericardial effusion evident. Normal appearance of adrenal glands. No acute osseous abnormalities evident. A hypodensity in the right hepatic lobe measuring 7 mm, series 4 image 124 likely represents a cyst.  IMPRESSION: Emphysematous changes with bronchiectasis. Pulmonary parenchymal nodular lesions, advise follow-up evaluation, see above report.      Procedure was performed at the [facility]  EXAM:  FOOT MIN 3 VIEWS LEFT    PROCEDURE DATE:  2015  INTERPRETATION:  3 VIEWS OF THE LEFT FOOT.  CLINICAL INDICATION: Left foot trauma with pain.  IMPRESSION: There is a minimally displaced intra-articular fracture at the tibial side base of the first digit proximal phalanx. There is a tiny avulsion fracture at the second digit proximal phalanx also at the tibial side of the bone. Mild interphalangeal joint space narrowing is present.          EXAM:  US TTE W DOPPLER COMPLETE                        PROCEDURE DATE:  Mar  1 2016   INTERPRETATION:  Ordering Physician: JACINTO HERNANDEZ  Indication: Evaluate for endocarditis.  Technician: Idania Alvarez  Study Quality: Fair   A complete echocardiographic study was performed utilizing standard protocol including spectral and color Doppler in all echocardiographic windows.    Height: 5 foot 1 inch  Weight: 102 pounds  BSA: 1.42 sq m  Blood Pressure: 130/70    MEASUREMENTS  IVS: 0.9 cm  PWT: 0.7 cm  LA: 2.9 cm  AO: 2.6 cm  LVIDd: 4.2 cm  LVIDs: 3.0 cm    LVEF: 63%  RVSP: 17 mmHg  RA Pressure: 3 mmHg  IVC: Normal in size with normal respiratory variation    FINDINGS  Left Ventricle: The left ventricle is normal in size and wall thickness.   Left ventricular systolic function is normal with ejection fraction calculated at 63%  Right Ventricle: Right ventricle is normal in size and systolic function  Left Atrium: Left atrium is normal  Right Atrium: Right atrium is normal  Mitral Valve: Mitral valve is mildly thickened without stenosis. Mild mitral regurgitation is present  Aortic Valve: Aortic valve is mildly thickened without stenosis. There is no aortic insufficiency  Tricuspid Valve: Tricuspid valve opens normally. Mild tricuspid regurgitation is present  Pulmonic Valve: Pulmonic valve is not well visualized  Pericardium: No pericardial effusion    CONCLUSIONS:  1. Normal cardiac chamber sizes.  2. Normal left and right ventricular systolic function.  3. Mildly thickened mitral valve without stenosis. Mild mitral regurgitation.  4. Mild tricuspid regurgitation. Pulmonic valve is inadequately visualized.   5. Mildly thickened aortic valve without stenosis or regurgitation.   6  No obvious valvular vegetations noted. Consider FRANCINE for further evaluation if clinically indicated.  JENNA MADRIGAL M.D., ATTENDING CARDIOLOGIST  This document has been electronically signed. Mar  2 2016  7:22A            EXAM:  CT BRAIN                        PROCEDURE DATE:  2017    INTERPRETATION:  Clinical information: Head trauma, patient taking Coumadin  No prior studies present for comparison  Axial images obtained, coronal and sagittal images computer-generated.    Images demonstrate a normal appearance of the ventricles basal cisterns and subarachnoid sulci. There is no sign of mass-effect midline shift epidural or subdural collection. No unusual calcifications are noted.     There is no sign of acute or recent hemorrhage. Prominent CSF collection in the sella turcica empty sella / partial empty sella.  The calvarium appears intact. Paranasal sinuses clear.    IMPRESSION: No acute intracranial pathology.            EXAM:  CT CERVICAL SPINE                        PROCEDURE DATE:  2017    INTERPRETATION:  Clinical information: Neck trauma, patient taking Coumadin, swelling left side of neck.  Axial images obtained, coronal and sagittal images computer reformatted.    No acute fracture or destructive bone lesions.  . Grade 1 retrolisthesis C4 under C3. Grade 1 retrolisthesis C5 over C6. Disc space narrowing present, C4-5, C5-6.   C1-C2 relationship unremarkable. Neural canal patent.    Dense pleural -parenchymal scarring at the lung apices.    There is asymmetrical thickening of the left sternocleidomastoid muscle as compared to the right. Subcutaneous fat infiltrated in the region  lateral to the left sternocleidomastoid muscle. No visible intramuscular hematoma. No adenopathy evident (limited due to lack of IV contrast).   Mastoid air cells normally pneumatized.    IMPRESSION: No acute fracture. Degenerative changes and disc degenerative disease.  Asymmetric enlargement of left sternocleidomastoid mastoid muscle as compared to the right. Clinical follow-up advised. See above report.              EXAM:  RIBS LEFT W CHEST (3 VIEWS)                        PROCEDURE DATE:  2017    INTERPRETATION:  Clinical information: Fall, left rib pain.  Frontal view of the chest, 5 views of the left ribs.    No evidence of left rib fracture or bone destruction.    Frontal chest study is compared to an exam dated 3/3/2016. No evidence of pneumothorax. No sign of infiltrate effusion or congestive failure. Heart size within normal limits. Old right rib fractures evident.   Calcifications aortic knob.    If symptoms persist, recommend short interval follow-up exam, rib fractures may not appear at time of injury.    IMPRESSION:  See above report.         Patient is a 69y old  Female who presents with a chief complaint of COPD Exacerbation (2024 05:52)    HPI:  Patient is a 69-year-old female with PMH of COPD on home O2 with hypertension, protein S deficeincy on apixaban presenting to the emergency department at with one week history of worsening SOB. Patient stated that she was in normal state of health up until 6-7 days ago when she began to develop generalized malaise, fatigue, body aches. During this time, she was having increasing SOB, wheezing, yellow-green sputum production, low-grade fevers, and chills. On presentation today, she continues to endorse generalized malaise, pleuritic chest pain on deep inspiration, productive cough, and low po intake. She denies current chills/fever. She states that most recently she has had an exacerbation similar to the current episode about one year ago for which she was admitted to [facility]. She denies alleviating factors and states that the dust in her home aggravates her COPD.    Denies abdominal pain, nausea, vomiting, hemoptysis, diarrhea, constipation, urinary frequency, urgency, or dysuria, headaches, changes in vision, dizziness, numbness, tingling.  Denies recent travel, recent antibiotic use, or sick contacts.    ED Course:   Vitals: BP: 141/77, HR: 127, Temp: , RR: 22, SpO2: 90% on 3L NC  Labs: WBC: 18.53; H/H: 10.7/33.4; Plt: 831; aPTT: 44.0; PT: 17.3; INR: 1.48; Na: 134; Glucose: 132; Protein: 9.0; Albumin: 3.0; Alk phos: 157  CXR: hyperinflated lungs b/l as per personal read, official read pending   EKG: Sinus tachycardia Anterior infarct , age undetermined Abnormal ECG  Received in the ED: Levofloxacin 500 mg IVP; Solumedrol 125 mg IVP;  mL bolus; Albuterol 2.5 mg nebulizer, Duoneb (2024 12:10)    HEME hx  PMH of protein S def, followed by Dr Mas [KATHERINE Wynn] on coumadin since 34yo.   Hx of DVT in leg initially. Multiple family members with ProtS. Mother, 2 sisters. 1 brother. Brother  41yo from PE, was on coumadin for DVT.   1st DVT 34yo, admitted [facility]   2nd DVT in leg at later time, does not recall details/hosp.   in 2011, had PE, admitted [facility].   In 2019 [place] PMD had prior discussed change to Xarelto or Eliquis, but had not changed as medication had not been on market for long time, worried about long term AE.   Discussed, meds in use for since .   Since then on Eliquis 5mg q12h      PAST MEDICAL & SURGICAL HISTORY:  Drug abuse, was addicted to pain killers  Protein S deficiency  Depression  COPD (chronic obstructive pulmonary disease)  DVT (deep venous thrombosis)  Pulmonary embolism  Afib  History of COPD  HTN (hypertension)  HLD (hyperlipidemia)  History of protein S deficiency  History of back surgery      HEALTH ISSUES - PROBLEM Dx:  HTN (hypertension)  COPD exacerbation  Encounter for deep vein thrombosis (DVT) prophylaxis  History of protein S deficiency    Chronic obstructive pulmonary disease with acute exacerbation [J44.1]  Drug abuse [F19.10]  Protein S deficiency [D68.59]  Depression [F32.9]  COPD (chronic obstructive pulmonary disease) [J44.9]  DVT (deep venous thrombosis) [I82.409]  Pulmonary embolism [I26.99]  Afib [I48.91]  No pertinent past medical history [477823630]  COPD exacerbation [J44.1]  History of COPD [Z87.09]  HTN (hypertension) [I10]  HLD (hyperlipidemia) [E78.5]  History of protein S deficiency [Z86.2]  History of back surgery [Z98.89]  No significant past surgical history [269660351]  Pneumonia [J18.9]  HTN (hypertension) [I10]      FAMILY HISTORY:  Family history of lung cancer  Family history of protein S deficiency (Sibling)    Mother 85yo in 2019, prot S def, DVT/PE, on coumadin  Father  54yo, Lung cancer, smoker.   Sister 1/5, alive 70yo, Pro S def, DVT, on coumadin [Maribel]  Patient 2/5   Brother 3/5,  41yo, PE. Was on coumadin for DVT. [John]  Brother 4/5, alive 63yo, neg for Prot S.   Sister 5/5, alive 57yo, Prot S def, no hx of VTE, not on AC. [Renetta]      [SOCIAL HISTORY: ]     smokinPPD smoker x35yrs, quit      EtOH:  in youth. No current EtOH     illicit drugs:  no IVDU. 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      marital status:       Other:       [ALLERGIES/INTOLERANCES:]  Allergies      No Known Allergies  Intolerances      [MEDICATIONS]  MEDICATIONS  (STANDING):  albuterol/ipratropium for Nebulization 3 milliLiter(s) Nebulizer every 6 hours  apixaban 5 milliGRAM(s) Oral two times a day  benzonatate 100 milliGRAM(s) Oral every 8 hours  buprenorphine 2 mG/naloxone 0.5 mG SL Film 2 Film(s) SubLingual two times a day  cholecalciferol 1000 Unit(s) Oral daily  diltiazem    milliGRAM(s) Oral daily  fluticasone propionate/ salmeterol 250-50 MICROgram(s) Diskus 1 Dose(s) Inhalation two times a day  guaiFENesin Oral Liquid (Sugar-Free) 200 milliGRAM(s) Oral every 8 hours  lactated ringers. 1000 milliLiter(s) (60 mL/Hr) IV Continuous <Continuous>  pantoprazole    Tablet 40 milliGRAM(s) Oral before breakfast  predniSONE   Tablet 40 milliGRAM(s) Oral daily  sodium chloride 3%  Inhalation 4 milliLiter(s) Inhalation every 12 hours  tiotropium 2.5 MICROgram(s) Inhaler 2 Puff(s) Inhalation daily  venlafaxine XR. 37.5 milliGRAM(s) Oral daily      MEDICATIONS  (PRN):  acetaminophen     Tablet .. 650 milliGRAM(s) Oral every 6 hours PRN Temp greater or equal to 38C (100.4F), Mild Pain (1 - 3)  aluminum hydroxide/magnesium hydroxide/simethicone Suspension 30 milliLiter(s) Oral every 4 hours PRN Dyspepsia  ondansetron Injectable 4 milliGRAM(s) IV Push every 8 hours PRN Nausea and/or Vomiting      [REVIEW OF SYSTEMS: ]  CONSTITUTIONAL: normal, no fever, no shakes, no chills   EYES: No eye pain, no visual disturbances, no discharge  ENMT:  no discharge  NECK: No pain, no stiffness  BREASTS: No pain, no masses, no nipple discharge  RESPIRATORY: No cough, no wheezing, no chills, no hemoptysis; No shortness of breath  CARDIOVASCULAR: No chest pain, no palpitations, no dizziness, no leg swelling  GASTROINTESTINAL: No abdominal, no epigastric pain. No nausea, no vomiting, no hematemesis; No diarrhea , no constipation. No melena, no hematochezia.  GENITOURINARY: No dysuria, no frequency, no hematuria, no incontinence  NEUROLOGICAL: No headaches, no memory loss, no loss of strength, no numbness, no tremors  SKIN: No itching, no burning, no rashes, no lesions   LYMPH NODES: No enlarged glands  ENDOCRINE: No heat or cold intolerance; No hair loss  MUSCULOSKELETAL: No joint pain or swelling; No muscle, no back, no extremity pain  PSYCHIATRIC: No depression, no anxiety, no mood swings, no difficulty sleeping  HEME/LYMPH: No easy bruising, no bleeding gums    [VITALS SIGNS 24hrs]  Vital Signs Last 24 Hrs  T(C): 37 (2024 11:21), Max: 37.1 (2024 19:42)  T(F): 98.6 (2024 11:21), Max: 98.7 (2024 19:42)  HR: 92 (2024 11:21) (79 - 114)  BP: 115/68 (2024 11:21) (115/68 - 132/72)  BP(mean): --  RR: 18 (2024 11:21) (18 - 18)  SpO2: 95% (2024 11:21) (95% - 97%)    Parameters below as of 2024 11:21  Patient On (Oxygen Delivery Method): nasal cannula  O2 Flow (L/min): 2    [PHYSICAL EXAM]  GEN:   HEENT: normocephalic and atraumatic. EOMI. PERRL.    NECK: Supple.  No lymphadenopathy   LUNGS: Clear to auscultation.  HEART: S1S2 Regular rate and rhythm, no MRG  ABDOMEN: Soft, nontender, and nondistended.  Positive bowel sounds.    : No CVA tenderness  EXTREMITIES: Without edema.  NEUROLOGIC: grossly intact.  PSYCHIATRIC: Appropriate affect .  SKIN: No rash     [LABS: ]                        10.7   22.54 )-----------( 1008     ( 2024 09:07 )             34.7     CBC Full  -  ( 2024 09:07 )  WBC Count : 22.54 K/uL  RBC Count : 4.07 M/uL  Hemoglobin : 10.7 g/dL  Hematocrit : 34.7 %  Platelet Count - Automated : 1008 K/uL  Mean Cell Volume : 85.3 fl  Mean Cell Hemoglobin : 26.3 pg  Mean Cell Hemoglobin Concentration : 30.8 g/dL  Auto Neutrophil # : 18.97 K/uL  Auto Lymphocyte # : 1.69 K/uL  Auto Monocyte # : 0.89 K/uL  Auto Eosinophil # : 0.05 K/uL  Auto Basophil # : 0.08 K/uL  Auto Neutrophil % : 84.2 %  Auto Lymphocyte % : 7.5 %  Auto Monocyte % : 3.9 %  Auto Eosinophil % : 0.2 %  Auto Basophil % : 0.4 %      138  |  96  |  25[H]  ----------------------------<  101[H]  4.1   |  33[H]  |  0.75  Ca    9.8      2024 09:07  TPro  8.0  /  Alb  3.1[L]  /  TBili  0.1[L]  /  DBili  x   /  AST  23  /  ALT  47  /  AlkPhos  118      LIVER FUNCTIONS - ( 2024 09:07 )  Alb: 3.1 g/dL / Pro: 8.0 g/dL / ALK PHOS: 118 U/L / ALT: 47 U/L / AST: 23 U/L / GGT: x           Urinalysis Basic - ( 2024 09:07 )  Color: x / Appearance: x / SG: x / pH: x  Gluc: 101 mg/dL / Ketone: x  / Bili: x / Urobili: x   Blood: x / Protein: x / Nitrite: x   Leuk Esterase: x / RBC: x / WBC x   Sq Epi: x / Non Sq Epi: x / Bacteria: x    CBC TREND (5 Days)  WBC Count: 22.54 K/uL ( @ 09:07)  WBC Count: 21.56 K/uL ( @ 06:55)  Hemoglobin: 10.7 g/dL ( @ 09:07)  Hemoglobin: 8.8 g/dL ( @ 06:55)  Hematocrit: 34.7 % (:07)  Hematocrit: 28.4 % ( @ 06:55)  Platelet Count - Automated: 1008 K/uL (:07)  Platelet Count - Automated: 755 K/uL ( 06:55)    Ferritin: 75 ng/mL ( @ 18:00)  Ferritin: 45 ng/mL ( @ 07:48)    Iron Total: 44 ug/dL ( @ 18:00)  Iron Total: 25 ug/dL ( @ 07:43)     Vitamin B12, Serum: 1358 pg/mL ( 18:00)  Folate, Serum: 15.9 ng/mL ( 18:00)     Sedimentation Rate, Erythrocyte: 99 mm/hr ( @ 07:26)  Sedimentation Rate, Erythrocyte: 94 mm/hr ( @ 07:17)  Sedimentation Rate, Erythrocyte: 43 mm/hr ( @ 12:23)    Quantitative Ig mg/dL ( @ 07:17)  Quantitative IgA: 441 mg/dL ( @ 07:17)  Quantitative IgM: 78 mg/dL ( @ 07:17)     McKay/Lambda Free Light Chain Ratio, Serum: 1.48 Ratio ( 07:17)      [MICROBIOLOGY /  VIROLOGY:]  SARS-CoV-2: NotDetec (2024 16:30)      [PATHOLOGY]       [RADIOLOGY & ADDITIONAL STUDIES:]     ACC: [id number] EXAM:  [facility]   ORDERED BY: EH CLIFFORD   PROCEDURE DATE:  2024    INTERPRETATION:  CLINICAL INFORMATION: Evaluate for pulmonary emboli  COMPARISON: CT chest 2024  FINDINGS:  LUNGS AND LARGE AIRWAYS: Patent central airways. Emphysema with bilateral bronchiectatic changes similar to prior. Scattered bilateral   micronodular/tree-in-bud parenchymal opacities not significantly changed compatible small airway impaction. Biapical scarring similar to prior.   Stable 9 mm right upper lobe subpleural nodule (3, 109). No hugo parenchymal consolidation  PLEURA: No pleural effusion.  VESSELS: Satisfactory bolus. No pulmonary emboli. Normal caliber thoracic aorta and pulmonary artery  HEART: Heart size is normal. No pericardial effusion.  MEDIASTINUM AND TRINIDAD: Small scattered mediastinal and bilateral hilar lymph nodes similar to prior likely react  CHEST WALL AND LOWER NECK: Within normal limits.  VISUALIZED UPPER ABDOMEN: Moderate hiatal hernia.  BONES: No acute or aggressive pathology.  IMPRESSION:  ·	No evidence of  pulmonary emboli or other acute change compared to 2024.  ·	Emphysema with areas of bronchiectasis and small airway impaction similar to prior.  ·	Stable 9 mm subpleural right upper lobe nodule. Recommend three-month follow-up CT  --- End of Report ---  LOIS EARLY MD; Attending Radiologist  This document has been electronically signed. 2024  7:13PM      ACC: [id number] EXAM:  DUPLEX SCAN EXT VEINS LOWER BI   ORDERED BY:  SHELLIE HERRERA   PROCEDURE DATE:  2023    INTERPRETATION:  CLINICAL INFORMATION: Tachycardia.  COMPARISON: None available.  TECHNIQUE: Duplex sonography of the BILATERAL LOWER extremity veins with color and spectral Doppler, with and without compression.  FINDINGS:  RIGHT:  ·	Normal compressibility of the RIGHT common femoral, femoral and popliteal veins.  ·	Doppler examination shows normal spontaneous and phasic flow.  ·	No RIGHT calf vein thrombosis is detected.  LEFT:  ·	Normal compressibility of the LEFT common femoral, femoral and popliteal veins.  ·	Doppler examination shows normal spontaneous and phasic flow.  ·	No LEFT calf vein thrombosis is detected.  IMPRESSION:  No evidence of deep venous thrombosis in either lower extremity.  --- End of Report ---  DINORAH MCWILLIAMS MD; Attending Radiologist  This document has been electronically signed. Dec 21 2023  9:39AM      EXAM:  US DPLX UPR EXT VEINS LTD LT                        PROCEDURE DATE:  2019    INTERPRETATION:  CLINICAL INFORMATION: Left upper extremity swelling and pain history of lower and upper extremity DVTs.  COMPARISON: Noneavailable.  TECHNIQUE: Duplex sonography of the LEFT UPPER extremity veins with color and spectral Doppler, with and without compression.    FINDINGS:  ·	The left internal jugular, subclavian, axillary and proximal brachial veins are patent and compressible where applicable. In the mid and distal brachial veins there is echogenic material with absence of color flow and compressibility. The radial and ulna veins appear patent. The basilic and cephalic veins (superficial veins) are patent and without thrombus.   ·	Doppler examination shows normal spontaneous and phasic flow.  IMPRESSION:   ·	Acute deep venous thrombosis in the mid and distal brachial veins of the left upper extremity.  I discussed the findings with Dr. Betts at3:45 AM on 2019 with read back verification.      EXAM:  CHEST CT WITHOUT CONTRAST                        PROCEDURE DATE:  Mar  1 2016   INTERPRETATION:  Clinical information: Pneumonia  ·	There are no prior studies present for comparison  ·	Axial images obtained, coronal and sagittal images computer reformatted.   ·	Intravenous contrast material not administered limiting evaluation.  ·	Centrilobular emphysematous changes present with loss of normal airspace architecture most pronounced in the right lower lung. Bronchiectasis  present.  ·	Extensive bilateral apical pleural-parenchymal scarring present. Central airway intact.  ·	Nodular parenchymal lesions present, multifocal. At the right lung apex, 9 mm and 7 mm nodular lesions present. In the left lower lobe, irregular 13 mm nodule, 11 mm nodule, and 12 mm nodules present. Etiology indeterminate,   inflammatory versus neoplastic.    ·	No effusion or vascular congestion.  ·	Although evaluation of hilar regions is limited due to lack of IV contrast, evidence of hilar prominence-adenopathy. Small nonspecific mediastinal lymph nodes present.  ·	No mediastinal lesions evident. No pericardial effusion evident. Normal appearance of adrenal glands. No acute osseous abnormalities evident. A hypodensity in the right hepatic lobe measuring 7 mm, series 4 image 124 likely represents a cyst.  IMPRESSION: Emphysematous changes with bronchiectasis. Pulmonary parenchymal nodular lesions, advise follow-up evaluation, see above report.      Procedure was performed at the [facility]  EXAM:  FOOT MIN 3 VIEWS LEFT    PROCEDURE DATE:  2015  INTERPRETATION:  3 VIEWS OF THE LEFT FOOT.  CLINICAL INDICATION: Left foot trauma with pain.  IMPRESSION: There is a minimally displaced intra-articular fracture at the tibial side base of the first digit proximal phalanx. There is a tiny avulsion fracture at the second digit proximal phalanx also at the tibial side of the bone. Mild interphalangeal joint space narrowing is present.      EXAM:  US TTE W DOPPLER COMPLETE                        PROCEDURE DATE:  Mar  1 2016   INTERPRETATION:  Ordering Physician: JACINTO HERNANDEZ  Indication: Evaluate for endocarditis.  Technician: Idania Alvarez  Study Quality: Fair   A complete echocardiographic study was performed utilizing standard protocol including spectral and color Doppler in all echocardiographic windows.  ·	Height: 5 foot 1 inch  ·	Weight: 102 pounds  ·	BSA: 1.42 sq m  ·	Blood Pressure: 130/70  MEASUREMENTS  ·	IVS: 0.9 cm  ·	PWT: 0.7 cm  ·	LA: 2.9 cm  ·	AO: 2.6 cm  ·	LVIDd: 4.2 cm  ·	LVIDs: 3.0 cm  ·	LVEF: 63%  ·	RVSP: 17 mmHg  ·	RA Pressure: 3 mmHg  ·	IVC: Normal in size with normal respiratory variation  FINDINGS  ·	Left Ventricle: The left ventricle is normal in size and wall thickness.   ·	Left ventricular systolic function is normal with ejection fraction calculated at 63%  ·	Right Ventricle: Right ventricle is normal in size and systolic function  ·	Left Atrium: Left atrium is normal  ·	Right Atrium: Right atrium is normal  ·	Mitral Valve: Mitral valve is mildly thickened without stenosis. Mild mitral regurgitation is present  ·	Aortic Valve: Aortic valve is mildly thickened without stenosis. There is no aortic insufficiency  ·	Tricuspid Valve: Tricuspid valve opens normally. Mild tricuspid regurgitation is present  ·	Pulmonic Valve: Pulmonic valve is not well visualized  ·	Pericardium: No pericardial effusion  CONCLUSIONS:  ·	1. Normal cardiac chamber sizes.  ·	2. Normal left and right ventricular systolic function.  ·	3. Mildly thickened mitral valve without stenosis. Mild mitral regurgitation.  ·	4. Mild tricuspid regurgitation. Pulmonic valve is inadequately visualized.   ·	5. Mildly thickened aortic valve without stenosis or regurgitation.   ·	6  No obvious valvular vegetations noted. Consider FRANCINE for further evaluation if clinically indicated.  JENNA MADRIGAL M.D., ATTENDING CARDIOLOGIST  This document has been electronically signed. Mar  2 2016  7:22A      EXAM:  CT BRAIN                        PROCEDURE DATE:  2017    INTERPRETATION:  Clinical information: Head trauma, patient taking Coumadin  No prior studies present for comparison  Axial images obtained, coronal and sagittal images computer-generated.  ·	Images demonstrate a normal appearance of the ventricles basal cisterns and subarachnoid sulci. There is no sign of mass-effect midline shift epidural or subdural collection. No unusual calcifications are noted.   ·	There is no sign of acute or recent hemorrhage. Prominent CSF collection in the sella turcica empty sella / partial empty sella.  ·	The calvarium appears intact. Paranasal sinuses clear.  IMPRESSION: No acute intracranial pathology.      EXAM:  CT CERVICAL SPINE                        PROCEDURE DATE:  2017    INTERPRETATION:  Clinical information: Neck trauma, patient taking Coumadin, swelling left side of neck.  Axial images obtained, coronal and sagittal images computer reformatted.  ·	No acute fracture or destructive bone lesions.  . Grade 1 retrolisthesis C4 under C3. Grade 1 retrolisthesis C5 over C6. Disc space narrowing present, C4-5, C5-6.   C1-C2 relationship unremarkable. Neural canal patent.  ·	Dense pleural -parenchymal scarring at the lung apices.  ·	There is asymmetrical thickening of the left sternocleidomastoid muscle as compared to the right. Subcutaneous fat infiltrated in the region  lateral to the left sternocleidomastoid muscle. No visible intramuscular hematoma. No adenopathy evident (limited due to lack of IV contrast).   ·	Mastoid air cells normally pneumatized.  IMPRESSION: No acute fracture. Degenerative changes and disc degenerative disease.  Asymmetric enlargement of left sternocleidomastoid mastoid muscle as compared to the right. Clinical follow-up advised. See above report.      EXAM:  RIBS LEFT W CHEST (3 VIEWS)                        PROCEDURE DATE:  2017    INTERPRETATION:  Clinical information: Fall, left rib pain.  ·	Frontal view of the chest, 5 views of the left ribs.  ·	No evidence of left rib fracture or bone destruction.  ·	Frontal chest study is compared to an exam dated 3/3/2016. No evidence of pneumothorax. No sign of infiltrate effusion or congestive failure. Heart size within normal limits. Old right rib fractures evident.   ·	Calcifications aortic knob.  ·	If symptoms persist, recommend short interval follow-up exam, rib fractures may not appear at time of injury.  IMPRESSION:  See above report.         Patient is a 69y old  Female who presents with a chief complaint of COPD Exacerbation (2024 05:52)    HPI:  Patient is a 69-year-old female with PMH of COPD on home O2 with hypertension, protein S deficeincy on apixaban presenting to the emergency department at with one week history of worsening SOB. Patient stated that she was in normal state of health up until 6-7 days ago when she began to develop generalized malaise, fatigue, body aches. During this time, she was having increasing SOB, wheezing, yellow-green sputum production, low-grade fevers, and chills. On presentation today, she continues to endorse generalized malaise, pleuritic chest pain on deep inspiration, productive cough, and low po intake. She denies current chills/fever. She states that most recently she has had an exacerbation similar to the current episode about one year ago for which she was admitted to [facility]. She denies alleviating factors and states that the dust in her home aggravates her COPD.    Denies abdominal pain, nausea, vomiting, hemoptysis, diarrhea, constipation, urinary frequency, urgency, or dysuria, headaches, changes in vision, dizziness, numbness, tingling.  Denies recent travel, recent antibiotic use, or sick contacts.    ED Course:   Vitals: BP: 141/77, HR: 127, Temp: , RR: 22, SpO2: 90% on 3L NC  Labs: WBC: 18.53; H/H: 10.7/33.4; Plt: 831; aPTT: 44.0; PT: 17.3; INR: 1.48; Na: 134; Glucose: 132; Protein: 9.0; Albumin: 3.0; Alk phos: 157  CXR: hyperinflated lungs b/l as per personal read, official read pending   EKG: Sinus tachycardia Anterior infarct , age undetermined Abnormal ECG  Received in the ED: Levofloxacin 500 mg IVP; Solumedrol 125 mg IVP;  mL bolus; Albuterol 2.5 mg nebulizer, Duoneb (2024 12:10)    HEME hx  PMH of protein S def, followed by Dr Mas [KATHERINE Wynn] on coumadin since 36yo.   Hx of DVT in leg initially. Multiple family members with ProtS. Mother, 2 sisters. 1 brother. Brother  41yo from PE, was on coumadin for DVT.   1st DVT 36yo, admitted [facility]   2nd DVT in leg at later time, does not recall details/hosp.   in 2011, had PE, admitted [facility].   In 2019 [place] PMD had prior discussed change to Xarelto or Eliquis, but had not changed as medication had not been on market for long time, worried about long term AE.   Discussed, meds in use for since .   Since then on Eliquis 5mg q12h      PAST MEDICAL & SURGICAL HISTORY:  Drug abuse, was addicted to pain killers  Protein S deficiency  Depression  COPD (chronic obstructive pulmonary disease)  DVT (deep venous thrombosis)  Pulmonary embolism  Afib  History of COPD  HTN (hypertension)  HLD (hyperlipidemia)  History of protein S deficiency  History of back surgery      HEALTH ISSUES - PROBLEM Dx:  HTN (hypertension)  COPD exacerbation  Encounter for deep vein thrombosis (DVT) prophylaxis  History of protein S deficiency    Chronic obstructive pulmonary disease with acute exacerbation [J44.1]  Drug abuse [F19.10]  Protein S deficiency [D68.59]  Depression [F32.9]  COPD (chronic obstructive pulmonary disease) [J44.9]  DVT (deep venous thrombosis) [I82.409]  Pulmonary embolism [I26.99]  Afib [I48.91]  No pertinent past medical history [522295586]  COPD exacerbation [J44.1]  History of COPD [Z87.09]  HTN (hypertension) [I10]  HLD (hyperlipidemia) [E78.5]  History of protein S deficiency [Z86.2]  History of back surgery [Z98.89]  No significant past surgical history [492037397]  Pneumonia [J18.9]  HTN (hypertension) [I10]      FAMILY HISTORY:  Family history of lung cancer  Family history of protein S deficiency (Sibling)    Mother 85yo in 2019, prot S def, DVT/PE, on coumadin  Father  54yo, Lung cancer, smoker.   Sister 1/5, alive 68yo, Pro S def, DVT, on coumadin [Maribel]  Patient 2/5   Brother 3/5,  41yo, PE. Was on coumadin for DVT. [John]  Brother 4/5, alive 65yo, neg for Prot S.   Sister 5/5, alive 57yo, Prot S def, no hx of VTE, not on AC. [Renetta]      [SOCIAL HISTORY: ]     smokinPPD smoker x35yrs, quit      EtOH:  in youth. No current EtOH     illicit drugs:  no IVDU. 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[employer], stopped      marital status:       Other:       [ALLERGIES/INTOLERANCES:]  Allergies      No Known Allergies  Intolerances      [MEDICATIONS]  MEDICATIONS  (STANDING):  albuterol/ipratropium for Nebulization 3 milliLiter(s) Nebulizer every 6 hours  apixaban 5 milliGRAM(s) Oral two times a day  benzonatate 100 milliGRAM(s) Oral every 8 hours  buprenorphine 2 mG/naloxone 0.5 mG SL Film 2 Film(s) SubLingual two times a day  cholecalciferol 1000 Unit(s) Oral daily  diltiazem    milliGRAM(s) Oral daily  fluticasone propionate/ salmeterol 250-50 MICROgram(s) Diskus 1 Dose(s) Inhalation two times a day  guaiFENesin Oral Liquid (Sugar-Free) 200 milliGRAM(s) Oral every 8 hours  lactated ringers. 1000 milliLiter(s) (60 mL/Hr) IV Continuous <Continuous>  pantoprazole    Tablet 40 milliGRAM(s) Oral before breakfast  predniSONE   Tablet 40 milliGRAM(s) Oral daily  sodium chloride 3%  Inhalation 4 milliLiter(s) Inhalation every 12 hours  tiotropium 2.5 MICROgram(s) Inhaler 2 Puff(s) Inhalation daily  venlafaxine XR. 37.5 milliGRAM(s) Oral daily      MEDICATIONS  (PRN):  acetaminophen     Tablet .. 650 milliGRAM(s) Oral every 6 hours PRN Temp greater or equal to 38C (100.4F), Mild Pain (1 - 3)  aluminum hydroxide/magnesium hydroxide/simethicone Suspension 30 milliLiter(s) Oral every 4 hours PRN Dyspepsia  ondansetron Injectable 4 milliGRAM(s) IV Push every 8 hours PRN Nausea and/or Vomiting      [REVIEW OF SYSTEMS: ]  CONSTITUTIONAL: normal, no fever, no shakes, no chills   EYES: No eye pain, no visual disturbances, no discharge  ENMT:  no discharge  NECK: No pain, no stiffness  BREASTS: No pain, no masses, no nipple discharge  RESPIRATORY: +cough, +wheezing, no chills, no hemoptysis; +shortness of breath  CARDIOVASCULAR: No chest pain, no palpitations, no dizziness, no leg swelling  GASTROINTESTINAL: No abdominal, no epigastric pain. No nausea, no vomiting, no hematemesis; No diarrhea , no constipation. No melena, no hematochezia.  GENITOURINARY: No dysuria, no frequency, no hematuria, no incontinence  NEUROLOGICAL: No headaches, no memory loss, no loss of strength, no numbness, no tremors  SKIN: No itching, no burning, no rashes, no lesions   LYMPH NODES: No enlarged glands  ENDOCRINE: No heat or cold intolerance; No hair loss  MUSCULOSKELETAL: No joint pain or swelling; No muscle, no back, no extremity pain  PSYCHIATRIC: No depression, no anxiety, no mood swings, no difficulty sleeping  HEME/LYMPH: No easy bruising, no bleeding gums    [VITALS SIGNS 24hrs]  Vital Signs Last 24 Hrs  T(C): 37 (2024 11:21), Max: 37.1 (2024 19:42)  T(F): 98.6 (2024 11:21), Max: 98.7 (2024 19:42)  HR: 92 (2024 11:21) (79 - 114)  BP: 115/68 (2024 11:21) (115/68 - 132/72)  BP(mean): --  RR: 18 (2024 11:21) (18 - 18)  SpO2: 95% (2024 11:21) (95% - 97%)    Parameters below as of 2024 11:21  Patient On (Oxygen Delivery Method): nasal cannula  O2 Flow (L/min): 2    [PHYSICAL EXAM]  GEN:   HEENT: normocephalic and atraumatic. EOMI. PERRL.    NECK: Supple.  No lymphadenopathy   LUNGS: BL wheese and crackles  HEART: S1S2 Regular rate and rhythm, no MRG  ABDOMEN: Soft, nontender, and nondistended.  Positive bowel sounds.    : No CVA tenderness  EXTREMITIES: Without edema.  NEUROLOGIC: grossly intact.  PSYCHIATRIC: Appropriate affect .  SKIN: No rash     [LABS: ]                        10.7   22.54 )-----------( 1008     ( 2024 09:07 )             34.7     CBC Full  -  ( 2024 09:07 )  WBC Count : 22.54 K/uL  RBC Count : 4.07 M/uL  Hemoglobin : 10.7 g/dL  Hematocrit : 34.7 %  Platelet Count - Automated : 1008 K/uL  Mean Cell Volume : 85.3 fl  Mean Cell Hemoglobin : 26.3 pg  Mean Cell Hemoglobin Concentration : 30.8 g/dL  Auto Neutrophil # : 18.97 K/uL  Auto Lymphocyte # : 1.69 K/uL  Auto Monocyte # : 0.89 K/uL  Auto Eosinophil # : 0.05 K/uL  Auto Basophil # : 0.08 K/uL  Auto Neutrophil % : 84.2 %  Auto Lymphocyte % : 7.5 %  Auto Monocyte % : 3.9 %  Auto Eosinophil % : 0.2 %  Auto Basophil % : 0.4 %      138  |  96  |  25[H]  ----------------------------<  101[H]  4.1   |  33[H]  |  0.75  Ca    9.8      2024 09:07  TPro  8.0  /  Alb  3.1[L]  /  TBili  0.1[L]  /  DBili  x   /  AST  23  /  ALT  47  /  AlkPhos  118      LIVER FUNCTIONS - ( 2024 09:07 )  Alb: 3.1 g/dL / Pro: 8.0 g/dL / ALK PHOS: 118 U/L / ALT: 47 U/L / AST: 23 U/L / GGT: x           Urinalysis Basic - ( 2024 09:07 )  Color: x / Appearance: x / SG: x / pH: x  Gluc: 101 mg/dL / Ketone: x  / Bili: x / Urobili: x   Blood: x / Protein: x / Nitrite: x   Leuk Esterase: x / RBC: x / WBC x   Sq Epi: x / Non Sq Epi: x / Bacteria: x    CBC TREND (5 Days)  WBC Count: 22.54 K/uL ( @ 09:07)  WBC Count: 21.56 K/uL ( @ 06:55)  Hemoglobin: 10.7 g/dL ( @ 09:07)  Hemoglobin: 8.8 g/dL ( @ 06:55)  Hematocrit: 34.7 % (:)  Hematocrit: 28.4 % ( @ 06:55)  Platelet Count - Automated: 1008 K/uL (:07)  Platelet Count - Automated: 755 K/uL ( 06:55)    Ferritin: 75 ng/mL ( @ 18:00)  Ferritin: 45 ng/mL ( @ 07:48)    Iron Total: 44 ug/dL ( @ 18:00)  Iron Total: 25 ug/dL ( @ 07:43)     Vitamin B12, Serum: 1358 pg/mL ( 18:00)  Folate, Serum: 15.9 ng/mL ( 18:00)     Sedimentation Rate, Erythrocyte: 99 mm/hr ( @ 07:26)  Sedimentation Rate, Erythrocyte: 94 mm/hr ( @ 07:17)  Sedimentation Rate, Erythrocyte: 43 mm/hr ( @ 12:23)    Quantitative Ig mg/dL ( @ 07:17)  Quantitative IgA: 441 mg/dL ( @ 07:17)  Quantitative IgM: 78 mg/dL ( @ 07:17)     [place]/Lambda Free Light Chain Ratio, Serum: 1.48 Ratio ( 07:17)      [MICROBIOLOGY /  VIROLOGY:]  SARS-CoV-2: NotDetec (2024 16:30)      [PATHOLOGY]       [RADIOLOGY & ADDITIONAL STUDIES:]     ACC: [id number] [facility]   ORDERED BY: EH CLIFFORD   PROCEDURE DATE:  2024    INTERPRETATION:  CLINICAL INFORMATION: Evaluate for pulmonary emboli  COMPARISON: CT chest 2024  FINDINGS:  LUNGS AND LARGE AIRWAYS: Patent central airways. Emphysema with bilateral bronchiectatic changes similar to prior. Scattered bilateral   micronodular/tree-in-bud parenchymal opacities not significantly changed compatible small airway impaction. Biapical scarring similar to prior.   Stable 9 mm right upper lobe subpleural nodule (3, 109). No hugo parenchymal consolidation  PLEURA: No pleural effusion.  VESSELS: Satisfactory bolus. No pulmonary emboli. Normal caliber thoracic aorta and pulmonary artery  HEART: Heart size is normal. No pericardial effusion.  MEDIASTINUM AND TRINIDAD: Small scattered mediastinal and bilateral hilar lymph nodes similar to prior likely react  CHEST WALL AND LOWER NECK: Within normal limits.  VISUALIZED UPPER ABDOMEN: Moderate hiatal hernia.  BONES: No acute or aggressive pathology.  IMPRESSION:  ·	No evidence of  pulmonary emboli or other acute change compared to 2024.  ·	Emphysema with areas of bronchiectasis and small airway impaction similar to prior.  ·	Stable 9 mm subpleural right upper lobe nodule. Recommend three-month follow-up CT  --- End of Report ---  LOIS EARLY MD; Attending Radiologist  This document has been electronically signed. 2024  7:13PM      ACC: [id number] EXAM:  DUPLEX SCAN EXT VEINS LOWER BI   ORDERED BY:  SHELLIE HERRERA   PROCEDURE DATE:  2023    INTERPRETATION:  CLINICAL INFORMATION: Tachycardia.  COMPARISON: None available.  TECHNIQUE: Duplex sonography of the BILATERAL LOWER extremity veins with color and spectral Doppler, with and without compression.  FINDINGS:  RIGHT:  ·	Normal compressibility of the RIGHT common femoral, femoral and popliteal veins.  ·	Doppler examination shows normal spontaneous and phasic flow.  ·	No RIGHT calf vein thrombosis is detected.  LEFT:  ·	Normal compressibility of the LEFT common femoral, femoral and popliteal veins.  ·	Doppler examination shows normal spontaneous and phasic flow.  ·	No LEFT calf vein thrombosis is detected.  IMPRESSION:  No evidence of deep venous thrombosis in either lower extremity.  --- End of Report ---  DINORAH MCWILLIAMS MD; Attending Radiologist  This document has been electronically signed. Dec 21 2023  9:39AM      EXAM:  US DPLX UPR EXT VEINS LTD LT                        PROCEDURE DATE:  2019    INTERPRETATION:  CLINICAL INFORMATION: Left upper extremity swelling and pain history of lower and upper extremity DVTs.  COMPARISON: Noneavailable.  TECHNIQUE: Duplex sonography of the LEFT UPPER extremity veins with color and spectral Doppler, with and without compression.    FINDINGS:  ·	The left internal jugular, subclavian, axillary and proximal brachial veins are patent and compressible where applicable. In the mid and distal brachial veins there is echogenic material with absence of color flow and compressibility. The radial and ulna veins appear patent. The basilic and cephalic veins (superficial veins) are patent and without thrombus.   ·	Doppler examination shows normal spontaneous and phasic flow.  IMPRESSION:   ·	Acute deep venous thrombosis in the mid and distal brachial veins of the left upper extremity.  I discussed the findings with Dr. Betts at3:45 AM on 2019 with read back verification.      EXAM:  CHEST CT WITHOUT CONTRAST                        PROCEDURE DATE:  Mar  1 2016   INTERPRETATION:  Clinical information: Pneumonia  ·	There are no prior studies present for comparison  ·	Axial images obtained, coronal and sagittal images computer reformatted.   ·	Intravenous contrast material not administered limiting evaluation.  ·	Centrilobular emphysematous changes present with loss of normal airspace architecture most pronounced in the right lower lung. Bronchiectasis  present.  ·	Extensive bilateral apical pleural-parenchymal scarring present. Central airway intact.  ·	Nodular parenchymal lesions present, multifocal. At the right lung apex, 9 mm and 7 mm nodular lesions present. In the left lower lobe, irregular 13 mm nodule, 11 mm nodule, and 12 mm nodules present. Etiology indeterminate,   inflammatory versus neoplastic.    ·	No effusion or vascular congestion.  ·	Although evaluation of hilar regions is limited due to lack of IV contrast, evidence of hilar prominence-adenopathy. Small nonspecific mediastinal lymph nodes present.  ·	No mediastinal lesions evident. No pericardial effusion evident. Normal appearance of adrenal glands. No acute osseous abnormalities evident. A hypodensity in the right hepatic lobe measuring 7 mm, series 4 image 124 likely represents a cyst.  IMPRESSION: Emphysematous changes with bronchiectasis. Pulmonary parenchymal nodular lesions, advise follow-up evaluation, see above report.      Procedure was performed at the [facility]  EXAM:  FOOT MIN 3 VIEWS LEFT    PROCEDURE DATE:  2015  INTERPRETATION:  3 VIEWS OF THE LEFT FOOT.  CLINICAL INDICATION: Left foot trauma with pain.  IMPRESSION: There is a minimally displaced intra-articular fracture at the tibial side base of the first digit proximal phalanx. There is a tiny avulsion fracture at the second digit proximal phalanx also at the tibial side of the bone. Mild interphalangeal joint space narrowing is present.      EXAM:  US TTE W DOPPLER COMPLETE                        PROCEDURE DATE:  Mar  1 2016   INTERPRETATION:  Ordering Physician: JACINTO HERNANDEZ  Indication: Evaluate for endocarditis.  Technician: Idania Alvarez  Study Quality: Fair   A complete echocardiographic study was performed utilizing standard protocol including spectral and color Doppler in all echocardiographic windows.  ·	Height: 5 foot 1 inch  ·	Weight: 102 pounds  ·	BSA: 1.42 sq m  ·	Blood Pressure: 130/70  MEASUREMENTS  ·	IVS: 0.9 cm  ·	PWT: 0.7 cm  ·	LA: 2.9 cm  ·	AO: 2.6 cm  ·	LVIDd: 4.2 cm  ·	LVIDs: 3.0 cm  ·	LVEF: 63%  ·	RVSP: 17 mmHg  ·	RA Pressure: 3 mmHg  ·	IVC: Normal in size with normal respiratory variation  FINDINGS  ·	Left Ventricle: The left ventricle is normal in size and wall thickness.   ·	Left ventricular systolic function is normal with ejection fraction calculated at 63%  ·	Right Ventricle: Right ventricle is normal in size and systolic function  ·	Left Atrium: Left atrium is normal  ·	Right Atrium: Right atrium is normal  ·	Mitral Valve: Mitral valve is mildly thickened without stenosis. Mild mitral regurgitation is present  ·	Aortic Valve: Aortic valve is mildly thickened without stenosis. There is no aortic insufficiency  ·	Tricuspid Valve: Tricuspid valve opens normally. Mild tricuspid regurgitation is present  ·	Pulmonic Valve: Pulmonic valve is not well visualized  ·	Pericardium: No pericardial effusion  CONCLUSIONS:  ·	1. Normal cardiac chamber sizes.  ·	2. Normal left and right ventricular systolic function.  ·	3. Mildly thickened mitral valve without stenosis. Mild mitral regurgitation.  ·	4. Mild tricuspid regurgitation. Pulmonic valve is inadequately visualized.   ·	5. Mildly thickened aortic valve without stenosis or regurgitation.   ·	6  No obvious valvular vegetations noted. Consider FRANCINE for further evaluation if clinically indicated.  JENNA MADRIGAL M.D., ATTENDING CARDIOLOGIST  This document has been electronically signed. Mar  2 2016  7:22A      EXAM:  CT BRAIN                        PROCEDURE DATE:  2017    INTERPRETATION:  Clinical information: Head trauma, patient taking Coumadin  No prior studies present for comparison  Axial images obtained, coronal and sagittal images computer-generated.  ·	Images demonstrate a normal appearance of the ventricles basal cisterns and subarachnoid sulci. There is no sign of mass-effect midline shift epidural or subdural collection. No unusual calcifications are noted.   ·	There is no sign of acute or recent hemorrhage. Prominent CSF collection in the sella turcica empty sella / partial empty sella.  ·	The calvarium appears intact. Paranasal sinuses clear.  IMPRESSION: No acute intracranial pathology.      EXAM:  CT CERVICAL SPINE                        PROCEDURE DATE:  2017    INTERPRETATION:  Clinical information: Neck trauma, patient taking Coumadin, swelling left side of neck.  Axial images obtained, coronal and sagittal images computer reformatted.  ·	No acute fracture or destructive bone lesions.  . Grade 1 retrolisthesis C4 under C3. Grade 1 retrolisthesis C5 over C6. Disc space narrowing present, C4-5, C5-6.   C1-C2 relationship unremarkable. Neural canal patent.  ·	Dense pleural -parenchymal scarring at the lung apices.  ·	There is asymmetrical thickening of the left sternocleidomastoid muscle as compared to the right. Subcutaneous fat infiltrated in the region  lateral to the left sternocleidomastoid muscle. No visible intramuscular hematoma. No adenopathy evident (limited due to lack of IV contrast).   ·	Mastoid air cells normally pneumatized.  IMPRESSION: No acute fracture. Degenerative changes and disc degenerative disease.  Asymmetric enlargement of left sternocleidomastoid mastoid muscle as compared to the right. Clinical follow-up advised. See above report.      EXAM:  RIBS LEFT W CHEST (3 VIEWS)                        PROCEDURE DATE:  2017    INTERPRETATION:  Clinical information: Fall, left rib pain.  ·	Frontal view of the chest, 5 views of the left ribs.  ·	No evidence of left rib fracture or bone destruction.  ·	Frontal chest study is compared to an exam dated 3/3/2016. No evidence of pneumothorax. No sign of infiltrate effusion or congestive failure. Heart size within normal limits. Old right rib fractures evident.   ·	Calcifications aortic knob.  ·	If symptoms persist, recommend short interval follow-up exam, rib fractures may not appear at time of injury.  IMPRESSION:  See above report.

## 2024-11-09 NOTE — PROGRESS NOTE ADULT - ASSESSMENT
Patient is a 69-year-old female with PMH of COPD on home O2, protein s deficiency with hypertension presenting to the emergency department at with one week history of worsening SOB. Patient is being admitted for COPD exacerbation. see consult note

## 2024-11-09 NOTE — PROGRESS NOTE ADULT - ASSESSMENT
The patient is a 69 year old female with a history of HTN, protein S deficiency, DVT/PE, COPD who presents with shortness of breath in the setting of viral URI, COPD exacerbation.    11/9/24  Seen at [facility] telemetry  Sitting up, eating lunch  Feeling better    Plan:  - ECG with sinus rhythm and nonspecific findings  - Chest pain is atypical and pleuritic in nature  - ECG with sinus rhythm and no evidence of ischemia/infarction  - Given duration of symptoms, an acute MI is ruled out with one set of cardiac enzymes  - CTA chest negative for PE; noted is emphysema and lung nodule  - RVP positive for rhinovirus  - Continue diltiazem  mg daily  - Remain off amlodipine  - Continue apixaban 5 mg bid  - Pulm follow-up

## 2024-11-09 NOTE — PROGRESS NOTE ADULT - SUBJECTIVE AND OBJECTIVE BOX
INCOMPLETE NOTE.  Documentation in Progress  PT SEEN AND EVALUATED.   FULL/ADDITIONAL RECOMMENDATIONS TO FOLLOW   ***************************************************************    Patient is a 69y old  Female who presents with a chief complaint of COPD Exacerbation (2024 05:52)      HPI:  Patient is a 69-year-old female with PMH of COPD on home O2 with hypertension, protein S deficeincy on apixaban presenting to the emergency department at with one week history of worsening SOB. Patient stated that she was in normal state of health up until 6-7 days ago when she began to develop generalized malaise, fatigue, body aches. During this time, she was having increasing SOB, wheezing, yellow-green sputum production, low-grade fevers, and chills. On presentation today, she continues to endorse generalized malaise, pleuritic chest pain on deep inspiration, productive cough, and low po intake. She denies current chills/fever. She states that most recently she has had an exacerbation similar to the current episode about one year ago for which she was admitted to [facility]. She denies alleviating factors and states that the dust in her home aggravates her COPD.    Denies abdominal pain, nausea, vomiting, hemoptysis, diarrhea, constipation, urinary frequency, urgency, or dysuria, headaches, changes in vision, dizziness, numbness, tingling.  Denies recent travel, recent antibiotic use, or sick contacts.    ED Course:   Vitals: BP: 141/77, HR: 127, Temp: , RR: 22, SpO2: 90% on 3L NC  Labs: WBC: 18.53; H/H: 10.7/33.4; Plt: 831; aPTT: 44.0; PT: 17.3; INR: 1.48; Na: 134; Glucose: 132; Protein: 9.0; Albumin: 3.0; Alk phos: 157  CXR: hyperinflated lungs b/l as per personal read, official read pending   EKG: Sinus tachycardia Anterior infarct , age undetermined Abnormal ECG  Received in the ED: Levofloxacin 500 mg IVP; Solumedrol 125 mg IVP;  mL bolus; Albuterol 2.5 mg nebulizer, Duoneb (2024 12:10)      PAST MEDICAL & SURGICAL HISTORY:  Drug abuse  was addicted to pain killers      Protein S deficiency      Depression      COPD (chronic obstructive pulmonary disease)      DVT (deep venous thrombosis)      Pulmonary embolism      Afib      History of COPD      HTN (hypertension)      HLD (hyperlipidemia)      History of protein S deficiency      History of back surgery      No significant past surgical history         HEALTH ISSUES - PROBLEM Dx:  HTN (hypertension)    COPD exacerbation    Encounter for deep vein thrombosis (DVT) prophylaxis    History of protein S deficiency          Chronic obstructive pulmonary disease with acute exacerbation [J44.1]    Drug abuse [F19.10]    Protein S deficiency [D68.59]    Depression [F32.9]    COPD (chronic obstructive pulmonary disease) [J44.9]    DVT (deep venous thrombosis) [I82.409]    Pulmonary embolism [I26.99]    Afib [I48.91]    No pertinent past medical history [561715412]    COPD exacerbation [J44.1]    History of COPD [Z87.09]    HTN (hypertension) [I10]    HLD (hyperlipidemia) [E78.5]    History of protein S deficiency [Z86.2]    History of back surgery [Z98.89]    No significant past surgical history [164413585]    Pneumonia [J18.9]    HTN (hypertension) [I10]      FAMILY HISTORY:  Family history of lung cancer    Family history of protein S deficiency (Sibling)    No pertinent family history in first degree relatives        [SOCIAL HISTORY: ]     smoking:  none currently     EtOH:  none currently     illicit drugs:  none currently     occupation:  Retired     marital status:       Other:       [ALLERGIES/INTOLERANCES:]  Allergies    No Known Allergies    Intolerances        [MEDICATIONS]  MEDICATIONS  (STANDING):  albuterol/ipratropium for Nebulization 3 milliLiter(s) Nebulizer every 6 hours  apixaban 5 milliGRAM(s) Oral two times a day  benzonatate 100 milliGRAM(s) Oral every 8 hours  buprenorphine 2 mG/naloxone 0.5 mG SL Film 2 Film(s) SubLingual two times a day  cholecalciferol 1000 Unit(s) Oral daily  diltiazem    milliGRAM(s) Oral daily  fluticasone propionate/ salmeterol 250-50 MICROgram(s) Diskus 1 Dose(s) Inhalation two times a day  guaiFENesin Oral Liquid (Sugar-Free) 200 milliGRAM(s) Oral every 8 hours  lactated ringers. 1000 milliLiter(s) (60 mL/Hr) IV Continuous <Continuous>  pantoprazole    Tablet 40 milliGRAM(s) Oral before breakfast  predniSONE   Tablet 40 milliGRAM(s) Oral daily  sodium chloride 3%  Inhalation 4 milliLiter(s) Inhalation every 12 hours  tiotropium 2.5 MICROgram(s) Inhaler 2 Puff(s) Inhalation daily  venlafaxine XR. 37.5 milliGRAM(s) Oral daily    MEDICATIONS  (PRN):  acetaminophen     Tablet .. 650 milliGRAM(s) Oral every 6 hours PRN Temp greater or equal to 38C (100.4F), Mild Pain (1 - 3)  aluminum hydroxide/magnesium hydroxide/simethicone Suspension 30 milliLiter(s) Oral every 4 hours PRN Dyspepsia  ondansetron Injectable 4 milliGRAM(s) IV Push every 8 hours PRN Nausea and/or Vomiting      [REVIEW OF SYSTEMS: ]  CONSTITUTIONAL: normal, no fever, no shakes, no chills   EYES: No eye pain, no visual disturbances, no discharge  ENMT:  no discharge  NECK: No pain, no stiffness  BREASTS: No pain, no masses, no nipple discharge  RESPIRATORY: No cough, no wheezing, no chills, no hemoptysis; No shortness of breath  CARDIOVASCULAR: No chest pain, no palpitations, no dizziness, no leg swelling  GASTROINTESTINAL: No abdominal, no epigastric pain. No nausea, no vomiting, no hematemesis; No diarrhea , no constipation. No melena, no hematochezia.  GENITOURINARY: No dysuria, no frequency, no hematuria, no incontinence  NEUROLOGICAL: No headaches, no memory loss, no loss of strength, no numbness, no tremors  SKIN: No itching, no burning, no rashes, no lesions   LYMPH NODES: No enlarged glands  ENDOCRINE: No heat or cold intolerance; No hair loss  MUSCULOSKELETAL: No joint pain or swelling; No muscle, no back, no extremity pain  PSYCHIATRIC: No depression, no anxiety, no mood swings, no difficulty sleeping  HEME/LYMPH: No easy bruising, no bleeding gums    [VITALS SIGNS 24hrs]  Vital Signs Last 24 Hrs  T(C): 37 (2024 11:21), Max: 37.1 (2024 19:42)  T(F): 98.6 (2024 11:21), Max: 98.7 (2024 19:42)  HR: 92 (2024 11:21) (79 - 114)  BP: 115/68 (2024 11:21) (115/68 - 132/72)  BP(mean): --  RR: 18 (2024 11:21) (18 - 18)  SpO2: 95% (2024 11:21) (95% - 97%)    Parameters below as of 2024 11:21  Patient On (Oxygen Delivery Method): nasal cannula  O2 Flow (L/min): 2    Daily     Daily     I&O's Summary      [PHYSICAL EXAM]  GEN:   HEENT: normocephalic and atraumatic. EOMI. PERRL.    NECK: Supple.  No lymphadenopathy   LUNGS: Clear to auscultation.  HEART: S1S2 Regular rate and rhythm, no MRG  ABDOMEN: Soft, nontender, and nondistended.  Positive bowel sounds.    : No CVA tenderness  EXTREMITIES: Without edema.  NEUROLOGIC: grossly intact.  PSYCHIATRIC: Appropriate affect .  SKIN: No rash     [LABS: ]                        10.7   22.54 )-----------( 1008     ( 2024 09:07 )             34.7     CBC Full  -  ( 2024 09:07 )  WBC Count : 22.54 K/uL  RBC Count : 4.07 M/uL  Hemoglobin : 10.7 g/dL  Hematocrit : 34.7 %  Platelet Count - Automated : 1008 K/uL  Mean Cell Volume : 85.3 fl  Mean Cell Hemoglobin : 26.3 pg  Mean Cell Hemoglobin Concentration : 30.8 g/dL  Auto Neutrophil # : 18.97 K/uL  Auto Lymphocyte # : 1.69 K/uL  Auto Monocyte # : 0.89 K/uL  Auto Eosinophil # : 0.05 K/uL  Auto Basophil # : 0.08 K/uL  Auto Neutrophil % : 84.2 %  Auto Lymphocyte % : 7.5 %  Auto Monocyte % : 3.9 %  Auto Eosinophil % : 0.2 %  Auto Basophil % : 0.4 %        138  |  96  |  25[H]  ----------------------------<  101[H]  4.1   |  33[H]  |  0.75    Ca    9.8      2024 09:07    TPro  8.0  /  Alb  3.1[L]  /  TBili  0.1[L]  /  DBili  x   /  AST  23  /  ALT  47  /  AlkPhos  118        LIVER FUNCTIONS - ( 2024 09:07 )  Alb: 3.1 g/dL / Pro: 8.0 g/dL / ALK PHOS: 118 U/L / ALT: 47 U/L / AST: 23 U/L / GGT: x               Urinalysis Basic - ( 2024 09:07 )    Color: x / Appearance: x / SG: x / pH: x  Gluc: 101 mg/dL / Ketone: x  / Bili: x / Urobili: x   Blood: x / Protein: x / Nitrite: x   Leuk Esterase: x / RBC: x / WBC x   Sq Epi: x / Non Sq Epi: x / Bacteria: x          CBC TREND (5 Days)  WBC Count: 22.54 K/uL ( @ 09:07)  WBC Count: 21.56 K/uL ( @ 06:55)    Hemoglobin: 10.7 g/dL (:07)  Hemoglobin: 8.8 g/dL ( @ 06:55)    Hematocrit: 34.7 % (:07)  Hematocrit: 28.4 % ( @ 06:55)    Platelet Count - Automated: 1008 K/uL ( @ 09:07)  Platelet Count - Automated: 755 K/uL ( @ 06:55)        Ferritin: 75 ng/mL ( @ 18:00)  Ferritin: 45 ng/mL ( @ 07:48)    Iron Total: 44 ug/dL ( @ 18:00)  Iron Total: 25 ug/dL ( @ 07:43)     Vitamin B12, Serum: 1358 pg/mL ( @ 18:00)     Folate, Serum: 15.9 ng/mL ( @ 18:00)     Sedimentation Rate, Erythrocyte: 99 mm/hr ( @ 07:26)  Sedimentation Rate, Erythrocyte: 94 mm/hr ( @ 07:17)  Sedimentation Rate, Erythrocyte: 43 mm/hr ( @ 12:23)                 Quantitative Ig mg/dL ( @ 07:17)  Quantitative IgA: 441 mg/dL ( @ 07:17)  Quantitative IgM: 78 mg/dL ( @ 07:17)     [place]/Lambda Free Light Chain Ratio, Serum: 1.48 Ratio ( @ 07:17)         [MICROBIOLOGY /  VIROLOGY:]  SARS-CoV-2: Bahman (2024 16:30)          [PATHOLOGY]       [RADIOLOGY & ADDITIONAL STUDIES:]        see consult note

## 2024-11-09 NOTE — PROGRESS NOTE ADULT - SUBJECTIVE AND OBJECTIVE BOX
Date/Time Patient Seen:  		  Referring MD:   Data Reviewed	       Patient is a 69y old  Female who presents with a chief complaint of COPD Exacerbation (08 Nov 2024 14:12)      Subjective/HPI     PAST MEDICAL & SURGICAL HISTORY:  Drug abuse  was addicted to pain killers    Protein S deficiency    Depression    COPD (chronic obstructive pulmonary disease)    DVT (deep venous thrombosis)    Pulmonary embolism    Afib    No pertinent past medical history    COPD exacerbation    History of COPD    HTN (hypertension)    HLD (hyperlipidemia)    History of protein S deficiency    History of back surgery    No significant past surgical history          Medication list         MEDICATIONS  (STANDING):  albuterol/ipratropium for Nebulization 3 milliLiter(s) Nebulizer every 6 hours  apixaban 5 milliGRAM(s) Oral two times a day  benzonatate 100 milliGRAM(s) Oral every 8 hours  buprenorphine 2 mG/naloxone 0.5 mG SL Film 2 Film(s) SubLingual two times a day  cholecalciferol 1000 Unit(s) Oral daily  diltiazem    milliGRAM(s) Oral daily  fluticasone propionate/ salmeterol 250-50 MICROgram(s) Diskus 1 Dose(s) Inhalation two times a day  guaiFENesin Oral Liquid (Sugar-Free) 200 milliGRAM(s) Oral every 8 hours  levoFLOXacin  Tablet 750 milliGRAM(s) Oral every 24 hours  pantoprazole    Tablet 40 milliGRAM(s) Oral before breakfast  predniSONE   Tablet 40 milliGRAM(s) Oral daily  sodium chloride 3%  Inhalation 4 milliLiter(s) Inhalation every 12 hours  tiotropium 2.5 MICROgram(s) Inhaler 2 Puff(s) Inhalation daily  venlafaxine XR. 37.5 milliGRAM(s) Oral daily    MEDICATIONS  (PRN):  acetaminophen     Tablet .. 650 milliGRAM(s) Oral every 6 hours PRN Temp greater or equal to 38C (100.4F), Mild Pain (1 - 3)  aluminum hydroxide/magnesium hydroxide/simethicone Suspension 30 milliLiter(s) Oral every 4 hours PRN Dyspepsia  ondansetron Injectable 4 milliGRAM(s) IV Push every 8 hours PRN Nausea and/or Vomiting         Vitals log        ICU Vital Signs Last 24 Hrs  T(C): 36.6 (09 Nov 2024 04:42), Max: 37.1 (08 Nov 2024 19:42)  T(F): 97.9 (09 Nov 2024 04:42), Max: 98.7 (08 Nov 2024 19:42)  HR: 79 (09 Nov 2024 04:42) (79 - 114)  BP: 122/70 (09 Nov 2024 04:42) (122/70 - 137/73)  BP(mean): --  ABP: --  ABP(mean): --  RR: 18 (09 Nov 2024 04:42) (18 - 18)  SpO2: 97% (09 Nov 2024 04:42) (94% - 99%)    O2 Parameters below as of 09 Nov 2024 04:42  Patient On (Oxygen Delivery Method): nasal cannula  O2 Flow (L/min): 2               Input and Output:  I&O's Detail      Lab Data                        8.8    21.56 )-----------( 755      ( 07 Nov 2024 06:55 )             28.4     11-07    139  |  101  |  22  ----------------------------<  105[H]  4.7   |  34[H]  |  0.73    Ca    10.1      07 Nov 2024 06:55    TPro  7.5  /  Alb  2.6[L]  /  TBili  0.1[L]  /  DBili  x   /  AST  31  /  ALT  32  /  AlkPhos  125[H]  11-07            Review of Systems	      Objective     Physical Examination    heart s1s2  lung dc bS  head nc  head at      Pertinent Lab findings & Imaging      [place]:  NO   Adequate UO     I&O's Detail           Discussed with:     Cultures:	        Radiology

## 2024-11-09 NOTE — PROGRESS NOTE ADULT - PROBLEM SELECTOR PLAN 1
due to COPD exacerbation, viral infection and secondary bacterial pneumonia   On home O2 2L NC, was increased to 3L, now try to wean   - RVP + Rhinovirus   - Continuous pulse ox, SpO2 goal>88%,  wean to 2L today   - Albuterol/Ipratroprium nebulizer q6hr  - Prednisone 40mg QD x 5 days  CT chest: No evidence of  pulmonary emboli or other acute change compared to   8/19/2024.Emphysema with areas of bronchiectasis and small airway impaction similar   to prior. Stable 9 mm subpleural right upper lobe nodule. Recommend three-month follow-up CT  ID eval noted : to complete levaquin course  add cough medication ATC , saline neb   PT -> no skilled PT needs

## 2024-11-09 NOTE — CONSULT NOTE ADULT - CONSULT REASON
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   J47.0       Bronchiectasis with acute lower respiratory infection  R70.0      elevated ESR

## 2024-11-09 NOTE — CONSULT NOTE ADULT - ASSESSMENT
Patient is a 69-year-old female with PMH of COPD on home O2, protein s deficiency with hypertension presenting to the emergency department at with one week history of worsening SOB. Patient is being admitted for COPD exacerbation. 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   J47.0       Bronchiectasis with acute lower respiratory infection      69-year-old female with PMH of COPD on home O2, protein s deficiency with hypertension presenting to the emergency department at with one week history of worsening SOB. Patient is being admitted for COPD exacerbation.    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 At time had event despite therapeutic INR then. Placed on Eliquis 10mg loading and then 5mg BID.   Family hx of Prot S def, along with more severe thrombosis, with PE in 3 family members, [mother, pt, brother] with brother having  from PE.   2019 VTE episode apparently unprovoked.   Only possible cause with COPD/bronchiectasis exacerbation, but sx mild.   non-compliant with pulm followup. Counseled and pt agrees to make appts.   hx of substance abuse, on tx.     Fe def hx  in 2019 Fe def anemia. at time noted ferritin 11, ferritin 20.   Anemia of chronic disease.   Last colonoscopy .   Mammo 2018  Pap >10yrs    Reactive thrombocytosis,   due to lung dz or Fe def.     Reactive leukocytosis,   no fever. Likely due to lung dz, other caueses    Bronchiectasis with acute lower respiratory infection      RECOMMENDATIONS    Recurrent Thrombosis  Cont Eliquis 5mg q12h.   Monitor for bleeding on tx.       COPD/bronchiectasis.   To be placed on inhalers, corticosteroids briefly.     Anemia  Follow CBC  Transfuse PRBC as clinically indicated.   Transfuse PRBC if Hgb <7.0 or if symptomatic.   Check Anemia studies.      Ferritin, Iron studies     B12, Folate     ESR, CRP    DVT Prophylaxis  SQ Lovenox or SQ heparin    Non-compliance  Given contact information for office prior for heme.    pt states aware of location.   pt now sees Dr Jonathon Arguello, Dr Mas since retired  Agrees to make followup to sort out rest of lab abn.     Cancer Screening  Recommended pt resume pap screening and      last menses 54yo.   followup with GI for re-eval as had hx of Fe def.     Thank you for consulting us.     Discussed plan of care with patient .   Pt expressed understanding of the treatment plan.   Risks, benefits and alternatives discussed in detail.   Opportunity given for questions and discussion.   Questions or concerns all addressed and answered to their satisfaction, and in lay terms.     Discussed with Dr Aragon    > 68 minutes spent in direct patient care, examining and counseling patient,  reviewing  the notes, lab data/ imaging , discussion with multidisciplinary team.      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   J47.0       Bronchiectasis with acute lower respiratory infection  R70.0      elevated ESR      69-year-old female with PMH of COPD on home O2, protein s deficiency with hypertension presenting to the emergency department at with one week history of worsening SOB. Patient is being admitted for COPD exacerbation.    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 At time had event despite therapeutic INR then. Placed on Eliquis 10mg loading and then 5mg BID.   Family hx of Prot S def, along with more severe thrombosis, with PE in 3 family members, [mother, pt, brother] with brother having  from PE.   2019 VTE episode apparently unprovoked.   Only possible cause with COPD/bronchiectasis exacerbation, but sx mild.   non-compliant with pulm followup. Counseled and pt agrees to make appts.   hx of substance abuse, on tx.     Fe def hx  in 2019 Fe def anemia. at time noted ferritin 11, ferritin 20.   Anemia of chronic disease.   2024 Anemia studies.      Ferritin 75, Iron studies 44, TIBC 381, Sat 11%     B12Vit 1358 adequate, Folate 15.9 adequate     ESR 81, check CRP    Last colonoscopy .   Mammo 2018  Pap >10yrs    Reactive thrombocytosis,   due to lung dz or Fe def.     2023 JAK2 V617F neg, Exon 12-13 neg  2023 CALR neg  2023 MPL mutation negative  2023 Abd CT scan: normal spleen and liver size    Reactive leukocytosis,   no fever. Likely due to lung dz, other caueses    Bronchiectasis with acute lower respiratory infection      RECOMMENDATIONS    Recurrent Thrombosis  Cont Eliquis 5mg q12h.   Monitor for bleeding on tx.     COPD/bronchiectasis.   To be placed on inhalers, corticosteroids briefly.     leukocytosis  Check BCR-ABL  Follow WBC  Follow ESr, CRp    Anemia  Follow CBC  Transfuse PRBC as clinically indicated.   Transfuse PRBC if Hgb <7.0 or if symptomatic.   Check FOBT  Anemia studies.      Ferritin 75, Iron studies 44, TIBC 381, Sat 11%     B12Vit 1358 adequate, Folate 15.9 adequate     ESR 81, check CRP    DVT Prophylaxis  SQ Lovenox or SQ heparin    Non-compliance  Given contact information for office prior for heme.    pt states aware of location.   pt now sees Dr Jonathon Arguello, Dr Mas since retired  Agrees to make followup to sort out rest of lab abn.     Cancer Screening  Recommended pt resume pap screening and      last menses 54yo.   followup with GI for re-eval as had hx of Fe def.     Thank you for consulting us.     Discussed plan of care with patient .   Pt expressed understanding of the treatment plan.   Risks, benefits and alternatives discussed in detail.   Opportunity given for questions and discussion.   Questions or concerns all addressed and answered to their satisfaction, and in lay terms.     Discussed with Dr Aragon    > 73 minutes spent in direct patient care, examining and counseling patient,  reviewing  the notes, lab data/ imaging , discussion with multidisciplinary team.

## 2024-11-09 NOTE — CASE MANAGEMENT PROGRESS NOTE - NSCMPROGRESSNOTE_GEN_ALL_CORE
Possible discharge home today.  Discharge disposition is home with [facility] 11/10/24. Family to transport patient home. CM remains available throughout hospital stay.

## 2024-11-09 NOTE — PROGRESS NOTE ADULT - SUBJECTIVE AND OBJECTIVE BOX
Patient is a 69y old  Female who presents with a chief complaint of COPD Exacerbation (09 Nov 2024 12:08)      Subjective:  INTERVAL HPI/OVERNIGHT EVENTS: Patient seen and examined at bedside.  Patient still c/o SOB, productive cough, mild hands tremor   MEDICATIONS  (STANDING):  albuterol/ipratropium for Nebulization 3 milliLiter(s) Nebulizer every 6 hours  apixaban 5 milliGRAM(s) Oral two times a day  benzonatate 100 milliGRAM(s) Oral every 8 hours  buprenorphine 2 mG/naloxone 0.5 mG SL Film 2 Film(s) SubLingual two times a day  cholecalciferol 1000 Unit(s) Oral daily  diltiazem    milliGRAM(s) Oral daily  fluticasone propionate/ salmeterol 250-50 MICROgram(s) Diskus 1 Dose(s) Inhalation two times a day  guaiFENesin Oral Liquid (Sugar-Free) 200 milliGRAM(s) Oral every 8 hours  lactated ringers. 1000 milliLiter(s) (60 mL/Hr) IV Continuous <Continuous>  pantoprazole    Tablet 40 milliGRAM(s) Oral before breakfast  predniSONE   Tablet 40 milliGRAM(s) Oral daily  sodium chloride 3%  Inhalation 4 milliLiter(s) Inhalation every 12 hours  tiotropium 2.5 MICROgram(s) Inhaler 2 Puff(s) Inhalation daily  venlafaxine XR. 37.5 milliGRAM(s) Oral daily    MEDICATIONS  (PRN):  acetaminophen     Tablet .. 650 milliGRAM(s) Oral every 6 hours PRN Temp greater or equal to 38C (100.4F), Mild Pain (1 - 3)  aluminum hydroxide/magnesium hydroxide/simethicone Suspension 30 milliLiter(s) Oral every 4 hours PRN Dyspepsia  ondansetron Injectable 4 milliGRAM(s) IV Push every 8 hours PRN Nausea and/or Vomiting      Allergies    No Known Allergies    Intolerances        REVIEW OF SYSTEMS:  CONSTITUTIONAL: No fever or chills  HEENT:  No headache, no sore throat  RESPIRATORY: + cough or shortness of breath  CARDIOVASCULAR: No chest pain or palpitations  GASTROINTESTINAL: No abd pain, nausea, vomiting, or diarrhea      Objective:  Vital Signs Last 24 Hrs  T(C): 37 (09 Nov 2024 11:21), Max: 37.1 (08 Nov 2024 19:42)  T(F): 98.6 (09 Nov 2024 11:21), Max: 98.7 (08 Nov 2024 19:42)  HR: 92 (09 Nov 2024 11:21) (79 - 114)  BP: 115/68 (09 Nov 2024 11:21) (115/68 - 132/72)  BP(mean): --  RR: 18 (09 Nov 2024 11:21) (18 - 18)  SpO2: 95% (09 Nov 2024 11:21) (95% - 97%)    Parameters below as of 09 Nov 2024 11:21  Patient On (Oxygen Delivery Method): nasal cannula  O2 Flow (L/min): 2      GENERAL: NAD, siting in bed   HEAD:  Normocephalic  EYES:  conjunctiva and sclera clear  ENT: Moist mucous membranes  NECK: Supple  CHEST/LUNG: b/l  wheezing. Unlabored respirations  HEART: Regular rate and rhythm; S1S2+  ABDOMEN: Bowel sounds present; Soft, Nontender, Nondistended.   EXTREMITIES:  + distal Peripheral Pulses;  No cyanosis, or edema, + mild hands tremor   NERVOUS SYSTEM:  Alert & Oriented X3;  No gross focal deficits   MSK: moves all extremities  SKIN: No rashes     LABS:                        10.7   22.54 )-----------( 1008     ( 09 Nov 2024 09:07 )             34.7     09 Nov 2024 09:07    138    |  96     |  25     ----------------------------<  101    4.1     |  33     |  0.75     Ca    9.8        09 Nov 2024 09:07    TPro  8.0    /  Alb  3.1    /  TBili  0.1    /  DBili  x      /  AST  23     /  ALT  47     /  AlkPhos  118    09 Nov 2024 09:07      Urinalysis Basic - ( 09 Nov 2024 09:07 )    Color: x / Appearance: x / SG: x / pH: x  Gluc: 101 mg/dL / Ketone: x  / Bili: x / Urobili: x   Blood: x / Protein: x / Nitrite: x   Leuk Esterase: x / RBC: x / WBC x   Sq Epi: x / Non Sq Epi: x / Bacteria: x      CAPILLARY BLOOD GLUCOSE            Culture - Blood (collected 11-05-24 @ 10:40)  Source: .Blood BLOOD  Preliminary Report (11-08-24 @ 19:01):    No growth at 72 Hours    Culture - Blood (collected 11-05-24 @ 10:35)  Source: .Blood BLOOD  Preliminary Report (11-08-24 @ 19:01):    No growth at 72 Hours        RADIOLOGY & ADDITIONAL TESTS:    Personally reviewed.     Consultant(s) Notes Reviewed:  [x] YES  [ ] NO    Plan of care discussed with patient ; all questions answered

## 2024-11-09 NOTE — PROGRESS NOTE ADULT - ASSESSMENT
69-year-old female with PMH of COPD on home O2 with hypertension, protein S deficiency on apixaban presenting to the emergency department at with one week history of worsening SOB    copd  emphysema  Bronchiectasis  HTN  Protein S Def hx  AFIB  HTN  HLD  Depression  hx of REYES     po abx  dc planned  f/u with PULM med    bronchodilators  inhaler - nebs  systemic steroids  cough rx regimen  mucolytics  o2 support  pt has home  goal sat > 88 pct  follows with Dr Parks - [facility] office  I roberto  monitor VS and HD and Sat  cvs rx regimen  BP control  anxiolysis and emotional support  nutritional assessment and optimization  OLD records reviewed  URI sx management   on Suboxone - OUD

## 2024-11-09 NOTE — PROGRESS NOTE ADULT - SUBJECTIVE AND OBJECTIVE BOX
Patient is a 69y Female with a known history of :  HTN (hypertension) [I10]    HLD (hyperlipidemia) [E78.5]    COPD exacerbation [J44.1]    Encounter for deep vein thrombosis (DVT) prophylaxis [Z29.9]    History of protein S deficiency [Z86.2]    Acute on chronic respiratory failure with hypoxia and hypercapnia [J96.21]    Thrombocytosis [D75.839]    Anemia of chronic disease [D63.8]    Pneumonia [J18.9]      HPI:  Patient is a 69-year-old female with PMH of COPD on home O2 with hypertension, protein S deficeincy on apixaban presenting to the emergency department at with one week history of worsening SOB. Patient stated that she was in normal state of health up until 6-7 days ago when she began to develop generalized malaise, fatigue, body aches. During this time, she was having increasing SOB, wheezing, yellow-green sputum production, low-grade fevers, and chills. On presentation today, she continues to endorse generalized malaise, pleuritic chest pain on deep inspiration, productive cough, and low po intake. She denies current chills/fever. She states that most recently she has had an exacerbation similar to the current episode about one year ago for which she was admitted to [facility]. She denies alleviating factors and states that the dust in her home aggravates her COPD.    Denies abdominal pain, nausea, vomiting, hemoptysis, diarrhea, constipation, urinary frequency, urgency, or dysuria, headaches, changes in vision, dizziness, numbness, tingling.  Denies recent travel, recent antibiotic use, or sick contacts.    ED Course:   Vitals: BP: 141/77, HR: 127, Temp: , RR: 22, SpO2: 90% on 3L NC  Labs: WBC: 18.53; H/H: 10.7/33.4; Plt: 831; aPTT: 44.0; PT: 17.3; INR: 1.48; Na: 134; Glucose: 132; Protein: 9.0; Albumin: 3.0; Alk phos: 157  CXR: hyperinflated lungs b/l as per personal read, official read pending   EKG: Sinus tachycardia Anterior infarct , age undetermined Abnormal ECG  Received in the ED: Levofloxacin 500 mg IVP; Solumedrol 125 mg IVP;  mL bolus; Albuterol 2.5 mg nebulizer, Duoneb (05 Nov 2024 12:10)      REVIEW OF SYSTEMS:    CONSTITUTIONAL: No fever, weight loss, or fatigue  EYES: No eye pain, visual disturbances, or discharge  ENMT:  No difficulty hearing, tinnitus, vertigo; No sinus or throat pain  NECK: No pain or stiffness  BREASTS: No pain, masses, or nipple discharge  RESPIRATORY: No cough, wheezing, chills or hemoptysis; No shortness of breath  CARDIOVASCULAR: No chest pain, palpitations, dizziness, or leg swelling  GASTROINTESTINAL: No abdominal or epigastric pain. No nausea, vomiting, or hematemesis; No diarrhea or constipation. No melena or hematochezia.  GENITOURINARY: No dysuria, frequency, hematuria, or incontinence  NEUROLOGICAL: No headaches, memory loss, loss of strength, numbness, or tremors  SKIN: No itching, burning, rashes, or lesions   LYMPH NODES: No enlarged glands  ENDOCRINE: No heat or cold intolerance; No hair loss  MUSCULOSKELETAL: No joint pain or swelling; No muscle, back, or extremity pain  PSYCHIATRIC: No depression, anxiety, mood swings, or difficulty sleeping  HEME/LYMPH: No easy bruising, or bleeding gums  ALLERGY AND IMMUNOLOGIC: No hives or eczema    MEDICATIONS  (STANDING):  albuterol/ipratropium for Nebulization 3 milliLiter(s) Nebulizer every 6 hours  apixaban 5 milliGRAM(s) Oral two times a day  benzonatate 100 milliGRAM(s) Oral every 8 hours  buprenorphine 2 mG/naloxone 0.5 mG SL Film 2 Film(s) SubLingual two times a day  cholecalciferol 1000 Unit(s) Oral daily  diltiazem    milliGRAM(s) Oral daily  fluticasone propionate/ salmeterol 250-50 MICROgram(s) Diskus 1 Dose(s) Inhalation two times a day  guaiFENesin Oral Liquid (Sugar-Free) 200 milliGRAM(s) Oral every 8 hours  lactated ringers. 1000 milliLiter(s) (60 mL/Hr) IV Continuous <Continuous>  pantoprazole    Tablet 40 milliGRAM(s) Oral before breakfast  predniSONE   Tablet 40 milliGRAM(s) Oral daily  sodium chloride 3%  Inhalation 4 milliLiter(s) Inhalation every 12 hours  tiotropium 2.5 MICROgram(s) Inhaler 2 Puff(s) Inhalation daily  venlafaxine XR. 37.5 milliGRAM(s) Oral daily    MEDICATIONS  (PRN):  acetaminophen     Tablet .. 650 milliGRAM(s) Oral every 6 hours PRN Temp greater or equal to 38C (100.4F), Mild Pain (1 - 3)  aluminum hydroxide/magnesium hydroxide/simethicone Suspension 30 milliLiter(s) Oral every 4 hours PRN Dyspepsia  ondansetron Injectable 4 milliGRAM(s) IV Push every 8 hours PRN Nausea and/or Vomiting      ALLERGIES: No Known Allergies      FAMILY HISTORY:  Family history of lung cancer    Family history of protein S deficiency (Sibling)    No pertinent family history in first degree relatives        Social history:  Alochol:   Smoking:   Drug Use:   Marital Status:     PHYSICAL EXAMINATION:  -----------------------------  T(C): 37 (11-09-24 @ 11:21), Max: 37.1 (11-08-24 @ 19:42)  HR: 92 (11-09-24 @ 11:21) (79 - 100)  BP: 115/68 (11-09-24 @ 11:21) (115/68 - 132/72)  RR: 18 (11-09-24 @ 11:21) (18 - 18)  SpO2: 95% (11-09-24 @ 11:21) (95% - 97%)  Wt(kg): --      Weight (kg): 47.8 (11-09 @ 04:42)    Constitutional: well developed, normal appearance, well groomed, well nourished, no deformities and no acute distress.   Eyes: the conjunctiva exhibited no abnormalities and the eyelids demonstrated no xanthelasmas.   HEENT: normal oral mucosa, no oral pallor and no oral cyanosis.   Neck: normal jugular venous A waves present, normal jugular venous V waves present and no jugular venous acosta A waves.   Pulmonary: no respiratory distress, normal respiratory rhythm and effort, no accessory muscle use and lungs were clear to auscultation bilaterally.   Cardiovascular: heart rate and rhythm were normal, normal S1 and S2 and no murmur, gallop, rub, heave or thrill are present.   Musculoskeletal: the gait could not be assessed.   Extremities: no clubbing of the fingernails, no localized cyanosis, no petechial hemorrhages and no ischemic changes.   Skin: normal skin color and pigmentation, no rash, no venous stasis, no skin lesions, no skin ulcer and no xanthoma was observed.   Psychiatric: oriented to person, place, and time, the affect was normal, the mood was normal and not feeling anxious.     LABS:   --------  11-09    138  |  96  |  25[H]  ----------------------------<  101[H]  4.1   |  33[H]  |  0.75    Ca    9.8      09 Nov 2024 09:07    TPro  8.0  /  Alb  3.1[L]  /  TBili  0.1[L]  /  DBili  x   /  AST  23  /  ALT  47  /  AlkPhos  118  11-09                         10.7   22.54 )-----------( 1008     ( 09 Nov 2024 09:07 )             34.7                   Radiology:    < from: TTE W or WO Ultrasound Enhancing Agent (12.20.23 @ 07:37) >    TRANSTHORACIC ECHOCARDIOGRAM REPORT  ________________________________________________________________________________                                      _______       Pt. Name:       EMILY REYES Study Date:    12/20/2023  MRN:[id number]           YOB: 1955  Accession #:    [id number]            Age:           68 years  Account#:       [id number]         Gender:        F  Heart Rate:     100 bpm              Height:        57.00 in (144.78 cm)  Rhythm:   sinus tachycardia    Weight:        100.00 lb (45.36 kg)  Blood Pressure: 128/74 mmHg          BSA/BMI:       1.34 m² / 21.64 kg/m²  ________________________________________________________________________________________  Referring Physician:    [id number] Gloria Torres  Interpreting Physician: Chidi Layne M.D.  Primary Sonographer:    Yola Mccall    CPT:               MYOCARDIAL STRAIN IMAGING - 79697.m;ECHO TTE WO CON COMP W                     DOPP - 88516.m;3D RECONST W/O WKSTATION - 92289.m  Indication(s):     Tachycardia, unspecified - R00.0  Procedure:         Transthoracic echocardiogram with 2-D, M-mode and complete                     spectral and color flow Doppler. Strain imaging performed for                     evaluation of regional and global myocardial shape and                     dimensions. Real time and full volume 3-dimensional imaging                     performed at the echo machine.  Ordering Location: [facility]  Admission Status:  Inpatient  Study Information: Image quality for this study is good.    _______________________________________________________________________________________     CONCLUSIONS:      1. Left ventricular cavity is small. Left ventricular wall thickness is normal. Left ventricular systolic function is normal. There are no regional wall motion abnormalities seen.   2. Left ventricular global longitudinal strain is -17.1 % is borderline (range: -18 to -16%). Images were acquired on a Hartman ultrasound system and processed using [employer] analysis software with a heart rate of 100 bpm and a blood pressure of 128/74 mmHg.   3. Normal left ventricular diastolic function.   4. Normal right ventricular cavity size, wall thickness, and systolic function.   5. Normal left and right atrial size.   6. No significant valvular disease.   7. No pericardial effusion seen.   8. Estimated pulmonary artery systolic pressure is 12 mmHg.   9. No prior echocardiogram is available for comparison.    ________________________________________________________________________________________  FINDINGS:     Left Ventricle:  The left ventricular cavity is small. Left ventricular wall thickness is normal. Left ventricular systolic function is normal with a calculated ejection fraction of 56 % by 3D56 % by 3D. There are no regional wall motion abnormalities seen. There is normal left ventricular diastolic function, with indeterminant filling pressure. Left ventricular global longitudinal strain is -17.1 % is borderline (range: -18 to -16%). Images were acquired on a Hartman ultrasound system and processed using [employer] strain analysis software with a heart rate of 100 bpm and a blood pressure of 128/74 mmHg.     Right Ventricle:  The right ventricular cavity is normal in size, normal wall thickness and normal systolic function. Tricuspid annular plane systolic excursion (TAPSE) is 1.7 cm (normal >=1.7 cm).     Left Atrium:  The left atrium is normal with an indexed volume of 18.51 ml/m².     Right Atrium:  The right atrium is normal insize.     Interatrial Septum:  The interatrial septum appears intact.     Aortic Valve:  The aortic valve is tricuspid with normal leaflet excursion. There is no aortic valve stenosis. There is no evidence of aortic regurgitation.     Mitral Valve:  Structurally normal mitral valve with normal leaflet excursion. There is mild leaflet calcification. There is no mitral valve stenosis. There is trace mitral regurgitation.     Tricuspid Valve:  Structurally normal tricuspid valve with normal leaflet excursion. There is mild tricuspid regurgitation. Estimated pulmonary artery systolic pressure is 12 mmHg.     Pulmonic Valve:  Structurally normal pulmonic valve with normal leaflet excursion. There is trace pulmonic regurgitation.     Aorta:  The aortic annulus and aortic root appear normal in size. The aortic root at the sinuses of Valsalva is normal in size. The ascending aorta diameter is normal in size.     Pericardium:  No pericardial effusion seen.     Systemic Veins:  The inferior vena cava is normal in size (normal <2.1cm) with normal inspiratory collapse (normal >50%) consistent with normal right atrial pressure (~3, range 0-5mmHg).  ____________________________________________________________________  QUANTITATIVE DATA:  Left Ventricle Measurements: (Indexed to BSA)     IVSd (2D):   0.8 cm  LVPWd (2D):  0.6 cm  LVIDd (2D):  3.6 cm  LVIDs (2D):  2.5 cm  LV Mass:     65 g   48.2 g/m²  3D LV EF%: 56 %     MV E Vmax:    0.80 m/s  MV A Vmax:    0.88 m/s  MV E/A:       0.91  e' lateral:   8.00 cm/s  e' medial:    7.25 cm/s  E/e' lateral: 10.01  E/e' medial:  11.05  E/e' Average: 10.50    Aorta Measurements: (normal range) (Indexed to BSA)     Sinuses of Valsalva: 2.70 cm (2.7 - 3.3 cm)  Ao Asc prox:         2.90 cm       Left Atrium Measurements: (Indexed to BSA)  LA Diam 2D:        2.40 cm  LA Vol s, MOD A4C: 24.40 ml.  LA Vol s, MOD A2C: 23.00 ml.  LA Vol s, MOD BP:  24.80 ml  18.51 ml/m²    Right Ventricle Measurements:     TAPSE:           1.7 cm  RV Base (RVID1): 2.6 cm  RV Mid (RVID2):  2.0 cm       LVOT / RVOT/ Qp/Qs Data: (Indexed to BSA)  LVOT Diameter: 1.90 cm  LVOT Vmax:     0.88 m/s  LVOT VTI:      13.80 cm  LVOT SV:       39.1 ml  29.21 ml/m²    Mitral Valve Measurements:     MV E Vmax: 0.8 m/s  MV A Vmax: 0.9 m/s  MV E/A:    0.9       Tricuspid Valve Measurements:     TR Vmax:          1.5 m/s  TR Peak Gradient: 8.6 mmHg  RA Pressure:      3 mmHg  PASP:             12 mmHg    ________________________________________________________________________________________  Electronically signed on 12/20/2023 at 12:50:14 PM by Chidi Layne M.D.         *** Final ***    < end of copied text >

## 2024-11-10 LAB
ALBUMIN SERPL ELPH-MCNC: 2.9 G/DL — LOW (ref 3.3–5)
ALP SERPL-CCNC: 103 U/L — SIGNIFICANT CHANGE UP (ref 40–120)
ALT FLD-CCNC: 41 U/L — SIGNIFICANT CHANGE UP (ref 12–78)
ANION GAP SERPL CALC-SCNC: 10 MMOL/L — SIGNIFICANT CHANGE UP (ref 5–17)
AST SERPL-CCNC: 21 U/L — SIGNIFICANT CHANGE UP (ref 15–37)
BASOPHILS # BLD AUTO: 0.07 K/UL — SIGNIFICANT CHANGE UP (ref 0–0.2)
BASOPHILS NFR BLD AUTO: 0.3 % — SIGNIFICANT CHANGE UP (ref 0–2)
BILIRUB SERPL-MCNC: 0.1 MG/DL — LOW (ref 0.2–1.2)
BUN SERPL-MCNC: 23 MG/DL — SIGNIFICANT CHANGE UP (ref 7–23)
CALCIUM SERPL-MCNC: 9.3 MG/DL — SIGNIFICANT CHANGE UP (ref 8.5–10.1)
CHLORIDE SERPL-SCNC: 97 MMOL/L — SIGNIFICANT CHANGE UP (ref 96–108)
CO2 SERPL-SCNC: 32 MMOL/L — HIGH (ref 22–31)
CREAT SERPL-MCNC: 0.7 MG/DL — SIGNIFICANT CHANGE UP (ref 0.5–1.3)
CRP SERPL-MCNC: 6 MG/L — HIGH
CRP SERPL-MCNC: 9 MG/L — HIGH
CULTURE RESULTS: SIGNIFICANT CHANGE UP
CULTURE RESULTS: SIGNIFICANT CHANGE UP
EGFR: 94 ML/MIN/1.73M2 — SIGNIFICANT CHANGE UP
EOSINOPHIL # BLD AUTO: 0.15 K/UL — SIGNIFICANT CHANGE UP (ref 0–0.5)
EOSINOPHIL NFR BLD AUTO: 0.7 % — SIGNIFICANT CHANGE UP (ref 0–6)
GLUCOSE SERPL-MCNC: 86 MG/DL — SIGNIFICANT CHANGE UP (ref 70–99)
HCT VFR BLD CALC: 32 % — LOW (ref 34.5–45)
HGB BLD-MCNC: 10 G/DL — LOW (ref 11.5–15.5)
IMM GRANULOCYTES NFR BLD AUTO: 4.1 % — HIGH (ref 0–0.9)
LYMPHOCYTES # BLD AUTO: 10.3 % — LOW (ref 13–44)
LYMPHOCYTES # BLD AUTO: 2.25 K/UL — SIGNIFICANT CHANGE UP (ref 1–3.3)
MCHC RBC-ENTMCNC: 26.5 PG — LOW (ref 27–34)
MCHC RBC-ENTMCNC: 31.3 G/DL — LOW (ref 32–36)
MCV RBC AUTO: 84.9 FL — SIGNIFICANT CHANGE UP (ref 80–100)
MONOCYTES # BLD AUTO: 1.23 K/UL — HIGH (ref 0–0.9)
MONOCYTES NFR BLD AUTO: 5.7 % — SIGNIFICANT CHANGE UP (ref 2–14)
NEUTROPHILS # BLD AUTO: 17.16 K/UL — HIGH (ref 1.8–7.4)
NEUTROPHILS NFR BLD AUTO: 78.9 % — HIGH (ref 43–77)
NRBC # BLD: 0 /100 WBCS — SIGNIFICANT CHANGE UP (ref 0–0)
PLATELET # BLD AUTO: 959 K/UL — HIGH (ref 150–400)
POTASSIUM SERPL-MCNC: 3.7 MMOL/L — SIGNIFICANT CHANGE UP (ref 3.5–5.3)
POTASSIUM SERPL-SCNC: 3.7 MMOL/L — SIGNIFICANT CHANGE UP (ref 3.5–5.3)
PROT SERPL-MCNC: 7.4 G/DL — SIGNIFICANT CHANGE UP (ref 6–8.3)
RBC # BLD: 3.64 M/UL — LOW (ref 3.8–5.2)
RBC # BLD: 3.77 M/UL — LOW (ref 3.8–5.2)
RBC # FLD: 16.9 % — HIGH (ref 10.3–14.5)
RETICS #: 79.4 K/UL — SIGNIFICANT CHANGE UP (ref 25–125)
RETICS/RBC NFR: 2.2 % — SIGNIFICANT CHANGE UP (ref 0.5–2.5)
SODIUM SERPL-SCNC: 139 MMOL/L — SIGNIFICANT CHANGE UP (ref 135–145)
SPECIMEN SOURCE: SIGNIFICANT CHANGE UP
SPECIMEN SOURCE: SIGNIFICANT CHANGE UP
TSH SERPL-MCNC: 0.46 UIU/ML — SIGNIFICANT CHANGE UP (ref 0.36–3.74)
WBC # BLD: 21.76 K/UL — HIGH (ref 3.8–10.5)
WBC # FLD AUTO: 21.76 K/UL — HIGH (ref 3.8–10.5)

## 2024-11-10 PROCEDURE — 99233 SBSQ HOSP IP/OBS HIGH 50: CPT

## 2024-11-10 RX ORDER — IRON SUCROSE 20 MG/ML
100 INJECTION, SOLUTION INTRAVENOUS EVERY 24 HOURS
Refills: 0 | Status: COMPLETED | OUTPATIENT
Start: 2024-11-10 | End: 2024-11-12

## 2024-11-10 RX ORDER — POLYETHYLENE GLYCOL 3350 17 G/17G
17 POWDER, FOR SOLUTION ORAL ONCE
Refills: 0 | Status: COMPLETED | OUTPATIENT
Start: 2024-11-10 | End: 2024-11-10

## 2024-11-10 RX ADMIN — GUAIFENESIN 200 MILLIGRAM(S): 100 LIQUID ORAL at 13:30

## 2024-11-10 RX ADMIN — GUAIFENESIN 200 MILLIGRAM(S): 100 LIQUID ORAL at 21:21

## 2024-11-10 RX ADMIN — Medication 100 MILLIGRAM(S): at 05:57

## 2024-11-10 RX ADMIN — SODIUM CHLORIDE 4 MILLILITER(S): 9 INJECTION, SOLUTION INTRAMUSCULAR; INTRAVENOUS; SUBCUTANEOUS at 07:47

## 2024-11-10 RX ADMIN — FLUTICASONE PROPIONATE AND SALMETEROL XINAFOATE 1 DOSE(S): 230; 21 AEROSOL, METERED RESPIRATORY (INHALATION) at 07:22

## 2024-11-10 RX ADMIN — Medication 37.5 MILLIGRAM(S): at 12:23

## 2024-11-10 RX ADMIN — BUPRENORPHINE HYDROCHLORIDE, NALOXONE HYDROCHLORIDE 2 FILM(S): 12; 3 FILM, SOLUBLE BUCCAL; SUBLINGUAL at 18:48

## 2024-11-10 RX ADMIN — BUPRENORPHINE HYDROCHLORIDE, NALOXONE HYDROCHLORIDE 2 FILM(S): 12; 3 FILM, SOLUBLE BUCCAL; SUBLINGUAL at 05:57

## 2024-11-10 RX ADMIN — Medication 100 MILLIGRAM(S): at 21:20

## 2024-11-10 RX ADMIN — PANTOPRAZOLE SODIUM 40 MILLIGRAM(S): 40 TABLET, DELAYED RELEASE ORAL at 05:56

## 2024-11-10 RX ADMIN — IRON SUCROSE 210 MILLIGRAM(S): 20 INJECTION, SOLUTION INTRAVENOUS at 15:40

## 2024-11-10 RX ADMIN — APIXABAN 5 MILLIGRAM(S): 5 TABLET, FILM COATED ORAL at 05:56

## 2024-11-10 RX ADMIN — Medication 1000 UNIT(S): at 12:23

## 2024-11-10 RX ADMIN — Medication 60 MILLILITER(S): at 18:52

## 2024-11-10 RX ADMIN — IPRATROPIUM BROMIDE AND ALBUTEROL SULFATE 3 MILLILITER(S): .5; 2.5 SOLUTION RESPIRATORY (INHALATION) at 07:47

## 2024-11-10 RX ADMIN — Medication 40 MILLIGRAM(S): at 05:57

## 2024-11-10 RX ADMIN — GUAIFENESIN 200 MILLIGRAM(S): 100 LIQUID ORAL at 05:56

## 2024-11-10 RX ADMIN — Medication 30 MILLILITER(S): at 13:31

## 2024-11-10 RX ADMIN — APIXABAN 5 MILLIGRAM(S): 5 TABLET, FILM COATED ORAL at 18:48

## 2024-11-10 RX ADMIN — POLYETHYLENE GLYCOL 3350 17 GRAM(S): 17 POWDER, FOR SOLUTION ORAL at 23:03

## 2024-11-10 RX ADMIN — Medication 100 MILLIGRAM(S): at 13:31

## 2024-11-10 RX ADMIN — FLUTICASONE PROPIONATE AND SALMETEROL XINAFOATE 1 DOSE(S): 230; 21 AEROSOL, METERED RESPIRATORY (INHALATION) at 18:48

## 2024-11-10 RX ADMIN — IPRATROPIUM BROMIDE AND ALBUTEROL SULFATE 3 MILLILITER(S): .5; 2.5 SOLUTION RESPIRATORY (INHALATION) at 19:24

## 2024-11-10 RX ADMIN — Medication 240 MILLIGRAM(S): at 05:56

## 2024-11-10 RX ADMIN — SODIUM CHLORIDE 4 MILLILITER(S): 9 INJECTION, SOLUTION INTRAMUSCULAR; INTRAVENOUS; SUBCUTANEOUS at 19:24

## 2024-11-10 RX ADMIN — TIOTROPIUM BROMIDE INHALATION SPRAY 2 PUFF(S): 1.56 SPRAY, METERED RESPIRATORY (INHALATION) at 07:22

## 2024-11-10 NOTE — PROGRESS NOTE ADULT - ASSESSMENT
The patient is a 69 year old female with a history of HTN, protein S deficiency, DVT/PE, COPD who presents with shortness of breath in the setting of viral URI, COPD exacerbation.    11/10/24  Seen at [facility] telemetry  Sitting up  Breathing continues to improve    Plan:  - ECG with sinus rhythm and nonspecific findings  - Chest pain is atypical and pleuritic in nature  - ECG with sinus rhythm and no evidence of ischemia/infarction  - Given duration of symptoms, an acute MI is ruled out with one set of cardiac enzymes  - CTA chest negative for PE; noted is emphysema and lung nodule  - RVP positive for rhinovirus  - Continue diltiazem  mg daily  - Remain off amlodipine  - Continue apixaban 5 mg bid  - Pulm follow-up

## 2024-11-10 NOTE — PROGRESS NOTE ADULT - SUBJECTIVE AND OBJECTIVE BOX
[INTERVAL HX: ]  Patient seen and examined;  Chart reviewed and events noted;     no CP  breathing better  +cough      [MEDICATIONS]  MEDICATIONS  (STANDING):  albuterol/ipratropium for Nebulization 3 milliLiter(s) Nebulizer every 6 hours  apixaban 5 milliGRAM(s) Oral two times a day  benzonatate 100 milliGRAM(s) Oral every 8 hours  buprenorphine 2 mG/naloxone 0.5 mG SL Film 2 Film(s) SubLingual two times a day  cholecalciferol 1000 Unit(s) Oral daily  diltiazem    milliGRAM(s) Oral daily  fluticasone propionate/ salmeterol 250-50 MICROgram(s) Diskus 1 Dose(s) Inhalation two times a day  guaiFENesin Oral Liquid (Sugar-Free) 200 milliGRAM(s) Oral every 8 hours  iron sucrose IVPB 100 milliGRAM(s) IV Intermittent every 24 hours  lactated ringers. 1000 milliLiter(s) (60 mL/Hr) IV Continuous <Continuous>  pantoprazole    Tablet 40 milliGRAM(s) Oral before breakfast  sodium chloride 3%  Inhalation 4 milliLiter(s) Inhalation every 12 hours  tiotropium 2.5 MICROgram(s) Inhaler 2 Puff(s) Inhalation daily  venlafaxine XR. 37.5 milliGRAM(s) Oral daily    MEDICATIONS  (PRN):  acetaminophen     Tablet .. 650 milliGRAM(s) Oral every 6 hours PRN Temp greater or equal to 38C (100.4F), Mild Pain (1 - 3)  aluminum hydroxide/magnesium hydroxide/simethicone Suspension 30 milliLiter(s) Oral every 4 hours PRN Dyspepsia  ondansetron Injectable 4 milliGRAM(s) IV Push every 8 hours PRN Nausea and/or Vomiting      [VITALS]  Vital Signs Last 24 Hrs  T(C): 36.8 (10 Nov 2024 12:14), Max: 36.9 (09 Nov 2024 20:08)  T(F): 98.3 (10 Nov 2024 12:14), Max: 98.4 (09 Nov 2024 20:08)  HR: 111 (10 Nov 2024 12:14) (80 - 111)  BP: 128/74 (10 Nov 2024 12:14) (128/74 - 135/70)  BP(mean): --  RR: 18 (10 Nov 2024 12:14) (18 - 18)  SpO2: 94% (10 Nov 2024 12:14) (94% - 98%)    Parameters below as of 10 Nov 2024 12:14  Patient On (Oxygen Delivery Method): nasal cannula  O2 Flow (L/min): 2    [WT/HT]  Daily     Daily   [VENT]      [PHYSICAL EXAM]  GEN: NAD  HEENT: normocephalic and atraumatic. EOMI. .    NECK: Supple.  No lymphadenopathy   LUNGS: BL wheeze and squeaks, better vs yest  HEART: Regular rate and rhythm,  no MRG  ABDOMEN: Soft, nontender, and nondistended.  Positive bowel sounds.    : No CVA tenderness  EXTREMITIES: Without edema.  NEUROLOGIC: grossly intact.  PSYCHIATRIC: Appropriate affect .  SKIN: No rash     [LABS:]                        10.0   21.76 )-----------( 959      ( 10 Nov 2024 08:20 )             32.0     11-10    139  |  97  |  23  ----------------------------<  86  3.7   |  32[H]  |  0.70    Ca    9.3      10 Nov 2024 08:20    TPro  7.4  /  Alb  2.9[L]  /  TBili  0.1[L]  /  DBili  x   /  AST  21  /  ALT  41  /  AlkPhos  103  11-10          Reticulocyte Count (11-10-24 @ 14:30)  Reticulocyte Percent: 2.2 % [0.5 - 2.5]  Absolute Reticulocytes: 79.4 K/uL [25.0 - 125.0]    Sedimentation Rate, Erythrocyte: 81 mm/hr *H* [0 - 20] (11-09-24 @ 12:10)    Iron - Total Binding Capacity.: 381 ug/dL [220 - 430] (11-08-24 @ 18:00)    Ferritin: 75 ng/mL [13 - 330] (11-08-24 @ 18:00)    Vitamin B12, Serum: 1358 pg/mL *H* [232 - 1245] (11-08-24 @ 18:00)    Folate, Serum: 15.9 ng/mL (11-08-24 @ 18:00)    Sedimentation Rate, Erythrocyte: 99 mm/hr *H* [0 - 20] (12-20-23 @ 07:26)    Sedimentation Rate, Erythrocyte: 94 mm/hr *H* [0 - 20] (12-17-23 @ 07:17)    Sedimentation Rate, Erythrocyte: 43 mm/hr *H* [0 - 20] (12-16-23 @ 12:23)    Ferritin: 45 ng/mL [13 - 330] (12-16-23 @ 07:48)    Iron - Total Binding Capacity.: 287 ug/dL [220 - 430] (12-16-23 @ 07:43)      Urinalysis Basic - ( 10 Nov 2024 08:20 )    Color: x / Appearance: x / SG: x / pH: x  Gluc: 86 mg/dL / Ketone: x  / Bili: x / Urobili: x   Blood: x / Protein: x / Nitrite: x   Leuk Esterase: x / RBC: x / WBC x   Sq Epi: x / Non Sq Epi: x / Bacteria: x        SARS-CoV-2: NotDetec (05 Nov 2024 16:30)          [RADIOLOGY STUDIES:]

## 2024-11-10 NOTE — PROGRESS NOTE ADULT - ASSESSMENT
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   J47.0       Bronchiectasis with acute lower respiratory infection  R70.0      elevated ESR  B34.1      Enterovirus/Rhinovirus infection      69-year-old female with PMH of COPD on home O2, protein s deficiency with hypertension presenting to the emergency department at with one week history of worsening SOB.   Patient is being admitted for COPD exacerbation.  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 At time had event despite therapeutic INR then. Placed on Eliquis 10mg loading and then 5mg BID.   Family hx of Prot S def, along with more severe thrombosis, with PE in 3 family members, [mother, pt, brother] with brother having  from PE.   2019 VTE episode apparently unprovoked. At time  possible cause with COPD/bronchiectasis exacerbation, but sx mild.   non-compliant with pulm followup. Counseled and pt agrees to make appts.   hx of substance abuse, on tx.     Fe def hx  in 2019 Fe def anemia. at time noted ferritin 11, ferritin 20.   Anemia of chronic disease.   2024 Anemia studies.      Ferritin 75, Iron studies 44, TIBC 381, Sat 11%     B12Vit 1358 adequate, Folate 15.9 adequate     ESR 81, check CRP    Last colonoscopy .   Mammo 2018  Pap >10yrs    Reactive thrombocytosis,   due to lung dz or Fe def.     2023 JAK2 V617F neg, Exon 12-13 neg  2023 CALR neg  2023 MPL mutation negative  2023 Abd CT scan: normal spleen and liver size    Reactive leukocytosis,   no fever. Likely due to lung dz, other caueses    Bronchiectasis with acute lower respiratory infection      RECOMMENDATIONS    Recurrent Thrombosis  Cont Eliquis 5mg q12h.   Monitor for bleeding on tx.     COPD/bronchiectasis.   To be placed on inhalers, corticosteroids briefly.     leukocytosis  Check BCR-ABL. Not set yest, reordered  Follow WBC  Follow ESr, CRp    Anemia  Follow CBC  Transfuse PRBC as clinically indicated.   Transfuse PRBC if Hgb <7.0 or if symptomatic.   Check FOBT  Anemia studies.      Ferritin 75, Iron studies 44, TIBC 381, Sat 11%     B12Vit 1358 adequate, Folate 15.9 adequate     ESR 81, check CRP    Start Venofer 100mg daily x3    DVT Prophylaxis  SQ Lovenox or SQ heparin    Non-compliance  Given contact information for office prior for heme.    pt states aware of location.   pt now sees Dr Jonathon Arguello, Dr Mas since retired  Agrees to make followup to sort out rest of lab abn.     Cancer Screening  Recommended pt resume pap screening and      last menses 54yo.   followup with GI for re-eval as had hx of Fe def.     Thank you for consulting us.     Discussed plan of care with patient .   Pt expressed understanding of the treatment plan.   Risks, benefits and alternatives discussed in detail.   Opportunity given for questions and discussion.   Questions or concerns all addressed and answered to their satisfaction, and in lay terms.     Discussed with Dr Aragon    > 73 minutes spent in direct patient care, examining and counseling patient,  reviewing  the notes, lab data/ imaging , discussion with multidisciplinary team.

## 2024-11-10 NOTE — CASE MANAGEMENT PROGRESS NOTE - NSCMPROGRESSNOTE_GEN_ALL_CORE
CM called DR. Aragon stated because patient WBC count was 22.54 11/9/24 and Platelet Count was 1008, and stated patient finished Levaquin, Heme Consulted and wanted labs on patient. CM made [facility] aware. Will continue to follow.

## 2024-11-10 NOTE — PROGRESS NOTE ADULT - SUBJECTIVE AND OBJECTIVE BOX
Patient is a 69y old  Female who presents with a chief complaint of COPD Exacerbation (10 Nov 2024 09:13)      Subjective:  INTERVAL HPI/OVERNIGHT EVENTS: Patient seen and examined at bedside.  Patient still has cough and wheezing but somewhat better today   MEDICATIONS  (STANDING):  albuterol/ipratropium for Nebulization 3 milliLiter(s) Nebulizer every 6 hours  apixaban 5 milliGRAM(s) Oral two times a day  benzonatate 100 milliGRAM(s) Oral every 8 hours  buprenorphine 2 mG/naloxone 0.5 mG SL Film 2 Film(s) SubLingual two times a day  cholecalciferol 1000 Unit(s) Oral daily  diltiazem    milliGRAM(s) Oral daily  fluticasone propionate/ salmeterol 250-50 MICROgram(s) Diskus 1 Dose(s) Inhalation two times a day  guaiFENesin Oral Liquid (Sugar-Free) 200 milliGRAM(s) Oral every 8 hours  lactated ringers. 1000 milliLiter(s) (60 mL/Hr) IV Continuous <Continuous>  pantoprazole    Tablet 40 milliGRAM(s) Oral before breakfast  sodium chloride 3%  Inhalation 4 milliLiter(s) Inhalation every 12 hours  tiotropium 2.5 MICROgram(s) Inhaler 2 Puff(s) Inhalation daily  venlafaxine XR. 37.5 milliGRAM(s) Oral daily    MEDICATIONS  (PRN):  acetaminophen     Tablet .. 650 milliGRAM(s) Oral every 6 hours PRN Temp greater or equal to 38C (100.4F), Mild Pain (1 - 3)  aluminum hydroxide/magnesium hydroxide/simethicone Suspension 30 milliLiter(s) Oral every 4 hours PRN Dyspepsia  ondansetron Injectable 4 milliGRAM(s) IV Push every 8 hours PRN Nausea and/or Vomiting      Allergies    No Known Allergies    Intolerances        REVIEW OF SYSTEMS:  CONSTITUTIONAL: No fever or chills  HEENT:  No headache, no sore throat  RESPIRATORY:+cough or shortness of breath  CARDIOVASCULAR: No chest pain or palpitations  GASTROINTESTINAL: No abd pain, nausea, vomiting, or diarrhea      Objective:  Vital Signs Last 24 Hrs  T(C): 36.6 (10 Nov 2024 04:38), Max: 36.9 (09 Nov 2024 20:08)  T(F): 97.8 (10 Nov 2024 04:38), Max: 98.4 (09 Nov 2024 20:08)  HR: 84 (10 Nov 2024 07:49) (78 - 99)  BP: 132/74 (10 Nov 2024 04:38) (132/74 - 135/70)  BP(mean): --  RR: 18 (10 Nov 2024 04:38) (18 - 18)  SpO2: 97% (10 Nov 2024 07:49) (95% - 98%)    Parameters below as of 10 Nov 2024 07:49  Patient On (Oxygen Delivery Method): nasal cannula        GENERAL: NAD, siting in bed   HEAD:  Normocephalic  EYES:  conjunctiva and sclera clear  ENT: Moist mucous membranes  NECK: Supple  CHEST/LUNG: + B/L  wheezing. Unlabored respirations  HEART: Regular rate and rhythm; S1S2+  ABDOMEN: Bowel sounds present; Soft, Nontender, Nondistended.   EXTREMITIES:  + distal Peripheral Pulses;  No cyanosis, or edema  NERVOUS SYSTEM:  Alert & Oriented X3;  No gross focal deficits   MSK: moves all extremities  SKIN: No rashes     LABS:                        10.0   21.76 )-----------( 959      ( 10 Nov 2024 08:20 )             32.0     10 Nov 2024 08:20    139    |  97     |  23     ----------------------------<  86     3.7     |  32     |  0.70     Ca    9.3        10 Nov 2024 08:20    TPro  7.4    /  Alb  2.9    /  TBili  0.1    /  DBili  x      /  AST  21     /  ALT  41     /  AlkPhos  103    10 Nov 2024 08:20      Urinalysis Basic - ( 10 Nov 2024 08:20 )    Color: x / Appearance: x / SG: x / pH: x  Gluc: 86 mg/dL / Ketone: x  / Bili: x / Urobili: x   Blood: x / Protein: x / Nitrite: x   Leuk Esterase: x / RBC: x / WBC x   Sq Epi: x / Non Sq Epi: x / Bacteria: x      CAPILLARY BLOOD GLUCOSE            Culture - Blood (collected 11-05-24 @ 10:40)  Source: .Blood BLOOD  Preliminary Report (11-09-24 @ 19:01):    No growth at 4 days    Culture - Blood (collected 11-05-24 @ 10:35)  Source: .Blood BLOOD  Preliminary Report (11-09-24 @ 19:01):    No growth at 4 days        RADIOLOGY & ADDITIONAL TESTS:    Personally reviewed.     Consultant(s) Notes Reviewed:  [x] YES  [ ] NO    Plan of care discussed with patient ; all questions answered

## 2024-11-10 NOTE — PROGRESS NOTE ADULT - PROBLEM SELECTOR PLAN 1
due to COPD exacerbation, viral infection and secondary bacterial pneumonia   On home O2 2L NC, was increased to 3L   - RVP + Rhinovirus   - Continuous pulse ox, SpO2 goal>88%,  still need 3L   - Albuterol/Ipratroprium nebulizer q6hr  - Prednisone 40mg QD x 7 days  CT chest: No evidence of  pulmonary emboli or other acute change compared to   8/19/2024.Emphysema with areas of bronchiectasis and small airway impaction similar   to prior. Stable 9 mm subpleural right upper lobe nodule. Recommend three-month follow-up CT  ID eval noted : completed levaquin course  add cough medication ATC , saline neb   PT -> no skilled PT needs

## 2024-11-10 NOTE — PROGRESS NOTE ADULT - SUBJECTIVE AND OBJECTIVE BOX
Patient is a 69y Female with a known history of :  HTN (hypertension) [I10]    HLD (hyperlipidemia) [E78.5]    COPD exacerbation [J44.1]    Encounter for deep vein thrombosis (DVT) prophylaxis [Z29.9]    History of protein S deficiency [Z86.2]    Acute on chronic respiratory failure with hypoxia and hypercapnia [J96.21]    Thrombocytosis [D75.839]    Anemia of chronic disease [D63.8]    Pneumonia [J18.9]      HPI:  Patient is a 69-year-old female with PMH of COPD on home O2 with hypertension, protein S deficeincy on apixaban presenting to the emergency department at with one week history of worsening SOB. Patient stated that she was in normal state of health up until 6-7 days ago when she began to develop generalized malaise, fatigue, body aches. During this time, she was having increasing SOB, wheezing, yellow-green sputum production, low-grade fevers, and chills. On presentation today, she continues to endorse generalized malaise, pleuritic chest pain on deep inspiration, productive cough, and low po intake. She denies current chills/fever. She states that most recently she has had an exacerbation similar to the current episode about one year ago for which she was admitted to [facility]. She denies alleviating factors and states that the dust in her home aggravates her COPD.    Denies abdominal pain, nausea, vomiting, hemoptysis, diarrhea, constipation, urinary frequency, urgency, or dysuria, headaches, changes in vision, dizziness, numbness, tingling.  Denies recent travel, recent antibiotic use, or sick contacts.    ED Course:   Vitals: BP: 141/77, HR: 127, Temp: , RR: 22, SpO2: 90% on 3L NC  Labs: WBC: 18.53; H/H: 10.7/33.4; Plt: 831; aPTT: 44.0; PT: 17.3; INR: 1.48; Na: 134; Glucose: 132; Protein: 9.0; Albumin: 3.0; Alk phos: 157  CXR: hyperinflated lungs b/l as per personal read, official read pending   EKG: Sinus tachycardia Anterior infarct , age undetermined Abnormal ECG  Received in the ED: Levofloxacin 500 mg IVP; Solumedrol 125 mg IVP;  mL bolus; Albuterol 2.5 mg nebulizer, Duoneb (05 Nov 2024 12:10)      REVIEW OF SYSTEMS:    CONSTITUTIONAL: No fever, weight loss, or fatigue  EYES: No eye pain, visual disturbances, or discharge  ENMT:  No difficulty hearing, tinnitus, vertigo; No sinus or throat pain  NECK: No pain or stiffness  BREASTS: No pain, masses, or nipple discharge  RESPIRATORY: No cough, wheezing, chills or hemoptysis; No shortness of breath  CARDIOVASCULAR: No chest pain, palpitations, dizziness, or leg swelling  GASTROINTESTINAL: No abdominal or epigastric pain. No nausea, vomiting, or hematemesis; No diarrhea or constipation. No melena or hematochezia.  GENITOURINARY: No dysuria, frequency, hematuria, or incontinence  NEUROLOGICAL: No headaches, memory loss, loss of strength, numbness, or tremors  SKIN: No itching, burning, rashes, or lesions   LYMPH NODES: No enlarged glands  ENDOCRINE: No heat or cold intolerance; No hair loss  MUSCULOSKELETAL: No joint pain or swelling; No muscle, back, or extremity pain  PSYCHIATRIC: No depression, anxiety, mood swings, or difficulty sleeping  HEME/LYMPH: No easy bruising, or bleeding gums  ALLERGY AND IMMUNOLOGIC: No hives or eczema    MEDICATIONS  (STANDING):  albuterol/ipratropium for Nebulization 3 milliLiter(s) Nebulizer every 6 hours  apixaban 5 milliGRAM(s) Oral two times a day  benzonatate 100 milliGRAM(s) Oral every 8 hours  buprenorphine 2 mG/naloxone 0.5 mG SL Film 2 Film(s) SubLingual two times a day  cholecalciferol 1000 Unit(s) Oral daily  diltiazem    milliGRAM(s) Oral daily  fluticasone propionate/ salmeterol 250-50 MICROgram(s) Diskus 1 Dose(s) Inhalation two times a day  guaiFENesin Oral Liquid (Sugar-Free) 200 milliGRAM(s) Oral every 8 hours  lactated ringers. 1000 milliLiter(s) (60 mL/Hr) IV Continuous <Continuous>  pantoprazole    Tablet 40 milliGRAM(s) Oral before breakfast  sodium chloride 3%  Inhalation 4 milliLiter(s) Inhalation every 12 hours  tiotropium 2.5 MICROgram(s) Inhaler 2 Puff(s) Inhalation daily  venlafaxine XR. 37.5 milliGRAM(s) Oral daily    MEDICATIONS  (PRN):  acetaminophen     Tablet .. 650 milliGRAM(s) Oral every 6 hours PRN Temp greater or equal to 38C (100.4F), Mild Pain (1 - 3)  aluminum hydroxide/magnesium hydroxide/simethicone Suspension 30 milliLiter(s) Oral every 4 hours PRN Dyspepsia  ondansetron Injectable 4 milliGRAM(s) IV Push every 8 hours PRN Nausea and/or Vomiting      ALLERGIES: No Known Allergies      FAMILY HISTORY:  Family history of lung cancer    Family history of protein S deficiency (Sibling)    No pertinent family history in first degree relatives        Social history:  Alochol:   Smoking:   Drug Use:   Marital Status:     PHYSICAL EXAMINATION:  -----------------------------  T(C): 36.6 (11-10-24 @ 04:38), Max: 37 (11-09-24 @ 11:21)  HR: 84 (11-10-24 @ 07:49) (78 - 99)  BP: 132/74 (11-10-24 @ 04:38) (115/68 - 135/70)  RR: 18 (11-10-24 @ 04:38) (18 - 18)  SpO2: 97% (11-10-24 @ 07:49) (95% - 98%)  Wt(kg): --      Weight (kg): 47.9 (11-10 @ 04:38)    Constitutional: well developed, normal appearance, well groomed, well nourished, no deformities and no acute distress.   Eyes: the conjunctiva exhibited no abnormalities and the eyelids demonstrated no xanthelasmas.   HEENT: normal oral mucosa, no oral pallor and no oral cyanosis.   Neck: normal jugular venous A waves present, normal jugular venous V waves present and no jugular venous acosta A waves.   Pulmonary: no respiratory distress, normal respiratory rhythm and effort, no accessory muscle use and lungs were clear to auscultation bilaterally.   Cardiovascular: heart rate and rhythm were normal, normal S1 and S2 and no murmur, gallop, rub, heave or thrill are present.   Musculoskeletal: the gait could not be assessed.   Extremities: no clubbing of the fingernails, no localized cyanosis, no petechial hemorrhages and no ischemic changes.   Skin: normal skin color and pigmentation, no rash, no venous stasis, no skin lesions, no skin ulcer and no xanthoma was observed.   Psychiatric: oriented to person, place, and time, the affect was normal, the mood was normal and not feeling anxious.     LABS:   --------  11-09    138  |  96  |  25[H]  ----------------------------<  101[H]  4.1   |  33[H]  |  0.75    Ca    9.8      09 Nov 2024 09:07    TPro  8.0  /  Alb  3.1[L]  /  TBili  0.1[L]  /  DBili  x   /  AST  23  /  ALT  47  /  AlkPhos  118  11-09                         10.7   22.54 )-----------( 1008     ( 09 Nov 2024 09:07 )             34.7                   Radiology:

## 2024-11-10 NOTE — PROGRESS NOTE ADULT - ASSESSMENT
69-year-old female with PMH of COPD on home O2 with hypertension, protein S deficiency on apixaban presenting to the emergency department at with one week history of worsening SOB    copd  emphysema  Bronchiectasis  HTN  Protein S Def hx  AFIB  HTN  HLD  Depression  hx of REYES     dc planned  crp and procal noted  vs noted  cm f/u noted  pt has home o2  follows with Dr Charly Lay [facility] office    bronchodilators  inhaler - nebs  systemic steroids  cough rx regimen  mucolytics  o2 support  pt has home  goal sat > 88 pct  follows with Dr Charly Lay [facility] office  I roberto  monitor VS and HD and Sat  cvs rx regimen  BP control  anxiolysis and emotional support  nutritional assessment and optimization  OLD records reviewed  URI sx management   on Suboxone - OUD

## 2024-11-10 NOTE — PROGRESS NOTE ADULT - SUBJECTIVE AND OBJECTIVE BOX
Date/Time Patient Seen:  		  Referring MD:   Data Reviewed	       Patient is a 69y old  Female who presents with a chief complaint of COPD Exacerbation (09 Nov 2024 13:44)      Subjective/HPI     PAST MEDICAL & SURGICAL HISTORY:  Drug abuse  was addicted to pain killers    Protein S deficiency    Depression    COPD (chronic obstructive pulmonary disease)    DVT (deep venous thrombosis)    Pulmonary embolism    Afib    No pertinent past medical history    COPD exacerbation    History of COPD    HTN (hypertension)    HLD (hyperlipidemia)    History of protein S deficiency    History of back surgery    No significant past surgical history          Medication list         MEDICATIONS  (STANDING):  albuterol/ipratropium for Nebulization 3 milliLiter(s) Nebulizer every 6 hours  apixaban 5 milliGRAM(s) Oral two times a day  benzonatate 100 milliGRAM(s) Oral every 8 hours  buprenorphine 2 mG/naloxone 0.5 mG SL Film 2 Film(s) SubLingual two times a day  cholecalciferol 1000 Unit(s) Oral daily  diltiazem    milliGRAM(s) Oral daily  fluticasone propionate/ salmeterol 250-50 MICROgram(s) Diskus 1 Dose(s) Inhalation two times a day  guaiFENesin Oral Liquid (Sugar-Free) 200 milliGRAM(s) Oral every 8 hours  lactated ringers. 1000 milliLiter(s) (60 mL/Hr) IV Continuous <Continuous>  pantoprazole    Tablet 40 milliGRAM(s) Oral before breakfast  sodium chloride 3%  Inhalation 4 milliLiter(s) Inhalation every 12 hours  tiotropium 2.5 MICROgram(s) Inhaler 2 Puff(s) Inhalation daily  venlafaxine XR. 37.5 milliGRAM(s) Oral daily    MEDICATIONS  (PRN):  acetaminophen     Tablet .. 650 milliGRAM(s) Oral every 6 hours PRN Temp greater or equal to 38C (100.4F), Mild Pain (1 - 3)  aluminum hydroxide/magnesium hydroxide/simethicone Suspension 30 milliLiter(s) Oral every 4 hours PRN Dyspepsia  ondansetron Injectable 4 milliGRAM(s) IV Push every 8 hours PRN Nausea and/or Vomiting         Vitals log        ICU Vital Signs Last 24 Hrs  T(C): 36.6 (10 Nov 2024 04:38), Max: 37 (09 Nov 2024 11:21)  T(F): 97.8 (10 Nov 2024 04:38), Max: 98.6 (09 Nov 2024 11:21)  HR: 83 (10 Nov 2024 04:38) (78 - 99)  BP: 132/74 (10 Nov 2024 04:38) (115/68 - 135/70)  BP(mean): --  ABP: --  ABP(mean): --  RR: 18 (10 Nov 2024 04:38) (18 - 18)  SpO2: 98% (10 Nov 2024 04:38) (95% - 98%)    O2 Parameters below as of 10 Nov 2024 04:38  Patient On (Oxygen Delivery Method): nasal cannula                 Input and Output:  I&O's Detail      Lab Data                        10.7   22.54 )-----------( 1008     ( 09 Nov 2024 09:07 )             34.7     11-09    138  |  96  |  25[H]  ----------------------------<  101[H]  4.1   |  33[H]  |  0.75    Ca    9.8      09 Nov 2024 09:07    TPro  8.0  /  Alb  3.1[L]  /  TBili  0.1[L]  /  DBili  x   /  AST  23  /  ALT  47  /  AlkPhos  118  11-09            Review of Systems	      Objective     Physical Examination    heart s1s2  lung dc BS  head nc  head at      Pertinent Lab findings & Imaging      [place]:  NO   Adequate UO     I&O's Detail           Discussed with:     Cultures:	        Radiology

## 2024-11-11 LAB
ANION GAP SERPL CALC-SCNC: 8 MMOL/L — SIGNIFICANT CHANGE UP (ref 5–17)
BASOPHILS # BLD AUTO: 0.05 K/UL — SIGNIFICANT CHANGE UP (ref 0–0.2)
BASOPHILS NFR BLD AUTO: 0.3 % — SIGNIFICANT CHANGE UP (ref 0–2)
BUN SERPL-MCNC: 17 MG/DL — SIGNIFICANT CHANGE UP (ref 7–23)
CALCIUM SERPL-MCNC: 9.2 MG/DL — SIGNIFICANT CHANGE UP (ref 8.5–10.1)
CHLORIDE SERPL-SCNC: 98 MMOL/L — SIGNIFICANT CHANGE UP (ref 96–108)
CO2 SERPL-SCNC: 35 MMOL/L — HIGH (ref 22–31)
CREAT SERPL-MCNC: 0.54 MG/DL — SIGNIFICANT CHANGE UP (ref 0.5–1.3)
EGFR: 100 ML/MIN/1.73M2 — SIGNIFICANT CHANGE UP
EOSINOPHIL # BLD AUTO: 0.14 K/UL — SIGNIFICANT CHANGE UP (ref 0–0.5)
EOSINOPHIL NFR BLD AUTO: 0.8 % — SIGNIFICANT CHANGE UP (ref 0–6)
GLUCOSE SERPL-MCNC: 95 MG/DL — SIGNIFICANT CHANGE UP (ref 70–99)
HCT VFR BLD CALC: 31.4 % — LOW (ref 34.5–45)
HGB BLD-MCNC: 9.7 G/DL — LOW (ref 11.5–15.5)
IMM GRANULOCYTES NFR BLD AUTO: 2.7 % — HIGH (ref 0–0.9)
LYMPHOCYTES # BLD AUTO: 1.87 K/UL — SIGNIFICANT CHANGE UP (ref 1–3.3)
LYMPHOCYTES # BLD AUTO: 11.3 % — LOW (ref 13–44)
MCHC RBC-ENTMCNC: 26.3 PG — LOW (ref 27–34)
MCHC RBC-ENTMCNC: 30.9 G/DL — LOW (ref 32–36)
MCV RBC AUTO: 85.1 FL — SIGNIFICANT CHANGE UP (ref 80–100)
MONOCYTES # BLD AUTO: 0.59 K/UL — SIGNIFICANT CHANGE UP (ref 0–0.9)
MONOCYTES NFR BLD AUTO: 3.6 % — SIGNIFICANT CHANGE UP (ref 2–14)
NEUTROPHILS # BLD AUTO: 13.39 K/UL — HIGH (ref 1.8–7.4)
NEUTROPHILS NFR BLD AUTO: 81.3 % — HIGH (ref 43–77)
NRBC # BLD: 0 /100 WBCS — SIGNIFICANT CHANGE UP (ref 0–0)
PLATELET # BLD AUTO: 951 K/UL — HIGH (ref 150–400)
POTASSIUM SERPL-MCNC: 3.6 MMOL/L — SIGNIFICANT CHANGE UP (ref 3.5–5.3)
POTASSIUM SERPL-SCNC: 3.6 MMOL/L — SIGNIFICANT CHANGE UP (ref 3.5–5.3)
RBC # BLD: 3.69 M/UL — LOW (ref 3.8–5.2)
RBC # FLD: 17 % — HIGH (ref 10.3–14.5)
SODIUM SERPL-SCNC: 141 MMOL/L — SIGNIFICANT CHANGE UP (ref 135–145)
WBC # BLD: 16.49 K/UL — HIGH (ref 3.8–10.5)
WBC # FLD AUTO: 16.49 K/UL — HIGH (ref 3.8–10.5)

## 2024-11-11 PROCEDURE — G0452: CPT | Mod: 26

## 2024-11-11 PROCEDURE — 99233 SBSQ HOSP IP/OBS HIGH 50: CPT

## 2024-11-11 RX ORDER — POTASSIUM CHLORIDE 10 MEQ
40 TABLET, EXTENDED RELEASE ORAL ONCE
Refills: 0 | Status: COMPLETED | OUTPATIENT
Start: 2024-11-11 | End: 2024-11-11

## 2024-11-11 RX ADMIN — FLUTICASONE PROPIONATE AND SALMETEROL XINAFOATE 1 DOSE(S): 230; 21 AEROSOL, METERED RESPIRATORY (INHALATION) at 07:51

## 2024-11-11 RX ADMIN — PANTOPRAZOLE SODIUM 40 MILLIGRAM(S): 40 TABLET, DELAYED RELEASE ORAL at 05:34

## 2024-11-11 RX ADMIN — IRON SUCROSE 210 MILLIGRAM(S): 20 INJECTION, SOLUTION INTRAVENOUS at 13:37

## 2024-11-11 RX ADMIN — Medication 40 MILLIEQUIVALENT(S): at 18:48

## 2024-11-11 RX ADMIN — Medication 40 MILLIGRAM(S): at 05:33

## 2024-11-11 RX ADMIN — Medication 1000 UNIT(S): at 11:43

## 2024-11-11 RX ADMIN — Medication 240 MILLIGRAM(S): at 05:34

## 2024-11-11 RX ADMIN — BUPRENORPHINE HYDROCHLORIDE, NALOXONE HYDROCHLORIDE 2 FILM(S): 12; 3 FILM, SOLUBLE BUCCAL; SUBLINGUAL at 18:48

## 2024-11-11 RX ADMIN — Medication 37.5 MILLIGRAM(S): at 11:43

## 2024-11-11 RX ADMIN — GUAIFENESIN 200 MILLIGRAM(S): 100 LIQUID ORAL at 13:41

## 2024-11-11 RX ADMIN — BUPRENORPHINE HYDROCHLORIDE, NALOXONE HYDROCHLORIDE 2 FILM(S): 12; 3 FILM, SOLUBLE BUCCAL; SUBLINGUAL at 05:35

## 2024-11-11 RX ADMIN — APIXABAN 5 MILLIGRAM(S): 5 TABLET, FILM COATED ORAL at 18:47

## 2024-11-11 RX ADMIN — APIXABAN 5 MILLIGRAM(S): 5 TABLET, FILM COATED ORAL at 05:34

## 2024-11-11 RX ADMIN — IPRATROPIUM BROMIDE AND ALBUTEROL SULFATE 3 MILLILITER(S): .5; 2.5 SOLUTION RESPIRATORY (INHALATION) at 08:04

## 2024-11-11 RX ADMIN — TIOTROPIUM BROMIDE INHALATION SPRAY 2 PUFF(S): 1.56 SPRAY, METERED RESPIRATORY (INHALATION) at 07:52

## 2024-11-11 RX ADMIN — SODIUM CHLORIDE 4 MILLILITER(S): 9 INJECTION, SOLUTION INTRAMUSCULAR; INTRAVENOUS; SUBCUTANEOUS at 20:29

## 2024-11-11 RX ADMIN — Medication 100 MILLIGRAM(S): at 13:41

## 2024-11-11 RX ADMIN — FLUTICASONE PROPIONATE AND SALMETEROL XINAFOATE 1 DOSE(S): 230; 21 AEROSOL, METERED RESPIRATORY (INHALATION) at 18:51

## 2024-11-11 RX ADMIN — Medication 100 MILLIGRAM(S): at 21:18

## 2024-11-11 RX ADMIN — IPRATROPIUM BROMIDE AND ALBUTEROL SULFATE 3 MILLILITER(S): .5; 2.5 SOLUTION RESPIRATORY (INHALATION) at 20:29

## 2024-11-11 RX ADMIN — Medication 60 MILLILITER(S): at 10:00

## 2024-11-11 RX ADMIN — SODIUM CHLORIDE 4 MILLILITER(S): 9 INJECTION, SOLUTION INTRAMUSCULAR; INTRAVENOUS; SUBCUTANEOUS at 08:04

## 2024-11-11 RX ADMIN — IPRATROPIUM BROMIDE AND ALBUTEROL SULFATE 3 MILLILITER(S): .5; 2.5 SOLUTION RESPIRATORY (INHALATION) at 13:37

## 2024-11-11 RX ADMIN — GUAIFENESIN 200 MILLIGRAM(S): 100 LIQUID ORAL at 21:18

## 2024-11-11 RX ADMIN — GUAIFENESIN 200 MILLIGRAM(S): 100 LIQUID ORAL at 05:34

## 2024-11-11 RX ADMIN — Medication 100 MILLIGRAM(S): at 05:33

## 2024-11-11 NOTE — PROGRESS NOTE ADULT - ASSESSMENT
69-year-old female with PMH of COPD on home O2 with hypertension, protein S deficiency on apixaban presenting to the emergency department at with one week history of worsening SOB    copd  emphysema  Bronchiectasis  HTN  Protein S Def hx  AFIB  HTN  HLD  Depression  hx of REYES     cm f/u noted  am labs pending  Prednisone on order -   follows with Dr Charly Lay [facility] office    bronchodilators  inhaler - nebs  systemic steroids  cough rx regimen  mucolytics  o2 support  pt has home  goal sat > 88 pct  follows with Dr Charly Lay [facility] office  I roberto  monitor VS and HD and Sat  cvs rx regimen  BP control  anxiolysis and emotional support  nutritional assessment and optimization  OLD records reviewed  URI sx management   on Suboxone - OUD

## 2024-11-11 NOTE — PROGRESS NOTE ADULT - PROBLEM SELECTOR PLAN 3
CT chest: No evidence of  pulmonary emboli or other acute change compared to   8/19/2024.Emphysema with areas of bronchiectasis and small airway impaction similar   to prior.Stable 9 mm subpleural right upper lobe nodule. Recommend three-month follow-up CT.   however, some pachy opacities seen , concern for pneumonia.   complete levaquin as per ID recommendation
Chronic, stable as per patient  - Patient is on anticoagulation (eliquis 5 mg BID) as per heme/onc doctor who she does not regularly follow up with  - Recommend patient follow up with heme onc doctor upon discharge
CT chest: No evidence of  pulmonary emboli or other acute change compared to   8/19/2024.Emphysema with areas of bronchiectasis and small airway impaction similar   to prior.Stable 9 mm subpleural right upper lobe nodule. Recommend three-month follow-up CT.   however, some pachy opacities seen , concern for pneumonia.   complete levaquin as per ID recommendation
CT chest: No evidence of  pulmonary emboli or other acute change compared to   8/19/2024.Emphysema with areas of bronchiectasis and small airway impaction similar   to prior.Stable 9 mm subpleural right upper lobe nodule. Recommend three-month follow-up CT.   however, some pachy opacities seen , concern for pneumonia.   complete levaquin as per ID recommendation

## 2024-11-11 NOTE — PROGRESS NOTE ADULT - ASSESSMENT
The patient is a 69 year old female with a history of HTN, protein S deficiency, DVT/PE, COPD who presents with shortness of breath in the setting of viral URI, COPD exacerbation.    11/11/24  Seen at [facility] telemetry  Lying flat, sleeping comfortably    Plan:  - ECG with sinus rhythm and nonspecific findings  - Chest pain is atypical and pleuritic in nature  - ECG with sinus rhythm and no evidence of ischemia/infarction  - Given duration of symptoms, an acute MI is ruled out with one set of cardiac enzymes  - CTA chest negative for PE; noted is emphysema and lung nodule  - RVP positive for rhinovirus  - Continue diltiazem  mg daily  - Remain off amlodipine  - Continue apixaban 5 mg bid  - Pulm follow-up

## 2024-11-11 NOTE — PROGRESS NOTE ADULT - PROBLEM SELECTOR PLAN 1
due to COPD exacerbation, viral infection and secondary bacterial pneumonia   On home O2 2L NC, was increased to 3L   - RVP + Rhinovirus   - Continuous pulse ox, SpO2 goal>88%,  still need 3L   - Albuterol/Ipratroprium nebulizer q6hr  - Prednisone 40mg QD x 7 days  CT chest: No evidence of  pulmonary emboli or other acute change compared to   8/19/2024.Emphysema with areas of bronchiectasis and small airway impaction similar   to prior. Stable 9 mm subpleural right upper lobe nodule. Recommend three-month follow-up CT  ID eval noted : completed levaquin course  add cough medication ATC , saline neb   PT -> no skilled PT needs  improved, d/c plan

## 2024-11-11 NOTE — PROGRESS NOTE ADULT - SUBJECTIVE AND OBJECTIVE BOX
Patient is a 69y old  Female who presents with a chief complaint of COPD Exacerbation (11 Nov 2024 09:28)      Subjective:  INTERVAL HPI/OVERNIGHT EVENTS: Patient seen and examined at bedside.  Patient feels better today   MEDICATIONS  (STANDING):  albuterol/ipratropium for Nebulization 3 milliLiter(s) Nebulizer every 6 hours  apixaban 5 milliGRAM(s) Oral two times a day  benzonatate 100 milliGRAM(s) Oral every 8 hours  buprenorphine 2 mG/naloxone 0.5 mG SL Film 2 Film(s) SubLingual two times a day  cholecalciferol 1000 Unit(s) Oral daily  diltiazem    milliGRAM(s) Oral daily  fluticasone propionate/ salmeterol 250-50 MICROgram(s) Diskus 1 Dose(s) Inhalation two times a day  guaiFENesin Oral Liquid (Sugar-Free) 200 milliGRAM(s) Oral every 8 hours  iron sucrose IVPB 100 milliGRAM(s) IV Intermittent every 24 hours  lactated ringers. 1000 milliLiter(s) (60 mL/Hr) IV Continuous <Continuous>  pantoprazole    Tablet 40 milliGRAM(s) Oral before breakfast  potassium chloride    Tablet ER 40 milliEquivalent(s) Oral once  predniSONE   Tablet 40 milliGRAM(s) Oral daily  sodium chloride 3%  Inhalation 4 milliLiter(s) Inhalation every 12 hours  tiotropium 2.5 MICROgram(s) Inhaler 2 Puff(s) Inhalation daily  venlafaxine XR. 37.5 milliGRAM(s) Oral daily    MEDICATIONS  (PRN):  acetaminophen     Tablet .. 650 milliGRAM(s) Oral every 6 hours PRN Temp greater or equal to 38C (100.4F), Mild Pain (1 - 3)  aluminum hydroxide/magnesium hydroxide/simethicone Suspension 30 milliLiter(s) Oral every 4 hours PRN Dyspepsia  ondansetron Injectable 4 milliGRAM(s) IV Push every 8 hours PRN Nausea and/or Vomiting      Allergies    No Known Allergies    Intolerances        REVIEW OF SYSTEMS:  CONSTITUTIONAL: No fever or chills  HEENT:  No headache, no sore throat  RESPIRATORY: No cough or shortness of breath  CARDIOVASCULAR: No chest pain or palpitations  GASTROINTESTINAL: No abd pain, nausea, vomiting, or diarrhea      Objective:  Vital Signs Last 24 Hrs  T(C): 36.7 (11 Nov 2024 12:29), Max: 37.1 (10 Nov 2024 20:26)  T(F): 98 (11 Nov 2024 12:29), Max: 98.7 (10 Nov 2024 20:26)  HR: 95 (11 Nov 2024 13:37) (77 - 100)  BP: 110/80 (11 Nov 2024 12:29) (110/80 - 133/74)  BP(mean): --  RR: 18 (11 Nov 2024 12:29) (18 - 18)  SpO2: 95% (11 Nov 2024 13:37) (95% - 99%)    Parameters below as of 11 Nov 2024 12:29  Patient On (Oxygen Delivery Method): nasal cannula  O2 Flow (L/min): 2      GENERAL: NAD, lying in bed comfortably  HEAD:  Normocephalic  EYES:  conjunctiva and sclera clear  ENT: Moist mucous membranes  NECK: Supple  CHEST/LUNG: + wheezing.  but improved air entry B/L . Unlabored respirations  HEART: Regular rate and rhythm; S1S2+  ABDOMEN: Bowel sounds present; Soft, Nontender, Nondistended.   EXTREMITIES:  + distal Peripheral Pulses;  No cyanosis, or edema  NERVOUS SYSTEM:  Alert & Oriented X3;  No gross focal deficits   MSK: moves all extremities  SKIN: No rashes     LABS:                        9.7    16.49 )-----------( 951      ( 11 Nov 2024 08:33 )             31.4     11 Nov 2024 08:33    141    |  98     |  17     ----------------------------<  95     3.6     |  35     |  0.54     Ca    9.2        11 Nov 2024 08:33        Urinalysis Basic - ( 11 Nov 2024 08:33 )    Color: x / Appearance: x / SG: x / pH: x  Gluc: 95 mg/dL / Ketone: x  / Bili: x / Urobili: x   Blood: x / Protein: x / Nitrite: x   Leuk Esterase: x / RBC: x / WBC x   Sq Epi: x / Non Sq Epi: x / Bacteria: x      CAPILLARY BLOOD GLUCOSE            Culture - Blood (collected 11-05-24 @ 10:40)  Source: .Blood BLOOD  Final Report (11-10-24 @ 19:00):    No growth at 5 days    Culture - Blood (collected 11-05-24 @ 10:35)  Source: .Blood BLOOD  Final Report (11-10-24 @ 19:00):    No growth at 5 days        RADIOLOGY & ADDITIONAL TESTS:    Personally reviewed.     Consultant(s) Notes Reviewed:  [x] YES  [ ] NO    Plan of care discussed with patient; all questions answered

## 2024-11-11 NOTE — PROGRESS NOTE ADULT - PROBLEM SELECTOR PLAN 5
Chronic  -Continue home diltiazem  -Monitor hemodynamics  -Pt with hx of tachycardia, stable as per pt and follows with cardiologist outpt, tele monitor show sinus tachy, range 90s to 140s,  discussed with cardio, will c/w current management   -DASH/TLC diet

## 2024-11-11 NOTE — PROGRESS NOTE ADULT - SUBJECTIVE AND OBJECTIVE BOX
[INTERVAL HX: ]  Patient seen and examined;  Chart reviewed and events noted;     no CP, no SOB  breathing better        [MEDICATIONS]  MEDICATIONS  (STANDING):  albuterol/ipratropium for Nebulization 3 milliLiter(s) Nebulizer every 6 hours  apixaban 5 milliGRAM(s) Oral two times a day  benzonatate 100 milliGRAM(s) Oral every 8 hours  buprenorphine 2 mG/naloxone 0.5 mG SL Film 2 Film(s) SubLingual two times a day  cholecalciferol 1000 Unit(s) Oral daily  diltiazem    milliGRAM(s) Oral daily  fluticasone propionate/ salmeterol 250-50 MICROgram(s) Diskus 1 Dose(s) Inhalation two times a day  guaiFENesin Oral Liquid (Sugar-Free) 200 milliGRAM(s) Oral every 8 hours  iron sucrose IVPB 100 milliGRAM(s) IV Intermittent every 24 hours  lactated ringers. 1000 milliLiter(s) (60 mL/Hr) IV Continuous <Continuous>  pantoprazole    Tablet 40 milliGRAM(s) Oral before breakfast  potassium chloride    Tablet ER 40 milliEquivalent(s) Oral once  predniSONE   Tablet 40 milliGRAM(s) Oral daily  sodium chloride 3%  Inhalation 4 milliLiter(s) Inhalation every 12 hours  tiotropium 2.5 MICROgram(s) Inhaler 2 Puff(s) Inhalation daily  venlafaxine XR. 37.5 milliGRAM(s) Oral daily    MEDICATIONS  (PRN):  acetaminophen     Tablet .. 650 milliGRAM(s) Oral every 6 hours PRN Temp greater or equal to 38C (100.4F), Mild Pain (1 - 3)  aluminum hydroxide/magnesium hydroxide/simethicone Suspension 30 milliLiter(s) Oral every 4 hours PRN Dyspepsia  ondansetron Injectable 4 milliGRAM(s) IV Push every 8 hours PRN Nausea and/or Vomiting      [VITALS]  Vital Signs Last 24 Hrs  T(C): 36.7 (2024 12:29), Max: 37.1 (10 Nov 2024 20:26)  T(F): 98 (2024 12:29), Max: 98.7 (10 Nov 2024 20:26)  HR: 95 (2024 13:37) (77 - 100)  BP: 110/80 (2024 12:29) (110/80 - 133/74)  BP(mean): --  RR: 18 (2024 12:29) (18 - 18)  SpO2: 95% (2024 13:37) (95% - 99%)    Parameters below as of 2024 12:29  Patient On (Oxygen Delivery Method): nasal cannula  O2 Flow (L/min): 2    [WT/HT]  Daily     Daily Weight in k (2024 05:05)  [VENT]      [PHYSICAL EXAM]  GEN: NAD  HEENT: normocephalic and atraumatic. EOMI. PERRL.    NECK: Supple.  No lymphadenopathy   LUNGS: BL wheeze, no squeaks  HEART: Regular rate and rhythm,  no MRG  ABDOMEN: Soft, nontender, and nondistended.  Positive bowel sounds.    : No CVA tenderness  EXTREMITIES: Without edema.  NEUROLOGIC: grossly intact.  PSYCHIATRIC: Appropriate affect .  SKIN: No rash     [LABS:]                        9.7    16.49 )-----------( 951      ( 2024 08:33 )             31.4         141  |  98  |  17  ----------------------------<  95  3.6   |  35[H]  |  0.54    Ca    9.2      2024 08:33    TPro  7.4  /  Alb  2.9[L]  /  TBili  0.1[L]  /  DBili  x   /  AST  21  /  ALT  41  /  AlkPhos  103  11-10          Reticulocyte Count (11-10-24 @ 14:30)  Reticulocyte Percent: 2.2 % [0.5 - 2.5]  Absolute Reticulocytes: 79.4 K/uL [25.0 - 125.0]    Sedimentation Rate, Erythrocyte: 81 mm/hr *H* [0 - 20] (24 @ 12:10)    Iron - Total Binding Capacity.: 381 ug/dL [220 - 430] (24 @ 18:00)    Ferritin: 75 ng/mL [13 - 330] (24 @ 18:00)    Vitamin B12, Serum: 1358 pg/mL *H* [232 - 1245] (24 @ 18:00)    Folate, Serum: 15.9 ng/mL (24 @ 18:00)    Sedimentation Rate, Erythrocyte: 99 mm/hr *H* [0 - 20] (23 @ 07:26)    Sedimentation Rate, Erythrocyte: 94 mm/hr *H* [0 - 20] (23 @ 07:17)    Sedimentation Rate, Erythrocyte: 43 mm/hr *H* [0 - 20] (23 @ 12:23)    Ferritin: 45 ng/mL [13 - 330] (23 @ 07:48)    Iron - Total Binding Capacity.: 287 ug/dL [220 - 430] (23 @ 07:43)      Urinalysis Basic - ( 2024 08:33 )    Color: x / Appearance: x / SG: x / pH: x  Gluc: 95 mg/dL / Ketone: x  / Bili: x / Urobili: x   Blood: x / Protein: x / Nitrite: x   Leuk Esterase: x / RBC: x / WBC x   Sq Epi: x / Non Sq Epi: x / Bacteria: x        SARS-CoV-2: NotDetec (2024 16:30)          [RADIOLOGY STUDIES:]

## 2024-11-11 NOTE — PROGRESS NOTE ADULT - PROBLEM SELECTOR PLAN 4
DVT Prophylaxis: eliquis 5 mg BID
DVT Prophylaxis: eliquis 5 mg BID
DVT Prophylaxis: eliquis 5 mg BID    Anemia: no sign of active bleeding but decreased since admission, will check Iron /folate/b12 level
likely reactive, trending down   hematology eval noted, MPL, BCR/ABL test  ordered   start IVF: concern oral fluid intake not adequate
likely reactive, trending down   hematology eval noted, MPL, BCR/ABL test  ordered   start IVF: concern oral fluid intake not adequate
likely reactive but trending up   will have hematology eval  start IVF: concern oral fluid intake not adequate

## 2024-11-11 NOTE — PROGRESS NOTE ADULT - PROBLEM SELECTOR PLAN 7
DVT Prophylaxis: eliquis 5 mg BID

## 2024-11-11 NOTE — PROGRESS NOTE ADULT - ASSESSMENT
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   J47.0       Bronchiectasis with acute lower respiratory infection  R70.0      elevated ESR  B34.1      Enterovirus/Rhinovirus infection      69-year-old female with PMH of COPD on home O2, protein s deficiency with hypertension presenting to the emergency department at with one week history of worsening SOB.   Patient is being admitted for COPD exacerbation.  Enterovirus/rhinovirus infection  Since breathing slowly better  ESR and CRP elevated    Recurrent  VTE  in 2019 on Coumadin for recurrent VTE. At time had event despite therapeutic INR then. Placed on Eliquis 10mg loading and then 5mg BID.   Family hx of Prot S def, along with more severe thrombosis, with PE in 3 family members, [mother, pt, brother] with brother having  from PE.   2019 VTE episode apparently unprovoked. At time  possible cause with COPD/bronchiectasis exacerbation, but sx mild.   non-compliant with pulm followup. Counseled and pt agrees to make appts.   hx of substance abuse, on tx.     Fe def hx  in 2019 Fe def anemia. at time noted ferritin 11, ferritin 20.   Anemia of chronic disease.   2024 Anemia studies.      Ferritin 75, Iron studies 44, TIBC 381, Sat 11%     B12Vit 1358 adequate, Folate 15.9 adequate     ESR 81, elevated CRP 6    Last colonoscopy .   Mammo 2018  Pap >10yrs    Reactive thrombocytosis,   due to lung dz or Fe def.     2023 JAK2 V617F neg, Exon 12-13 neg  2023 CALR neg  2023 MPL mutation negative  2023 Abd CT scan: normal spleen and liver size    Reactive leukocytosis,   no fever. Likely due to lung dz, other caueses    Bronchiectasis with acute lower respiratory infection      RECOMMENDATIONS    Recurrent Thrombosis  Cont Eliquis 5mg q12h.   Monitor for bleeding on tx.     COPD/bronchiectasis.   To be placed on inhalers, corticosteroids briefly.     leukocytosis  Check BCR-ABL. Not set yest, reordered  Follow WBC  Follow ESr, CRp    Anemia  Follow CBC  Transfuse PRBC as clinically indicated.   Transfuse PRBC if Hgb <7.0 or if symptomatic.   Check FOBT  Anemia studies.      Ferritin 75, Iron studies 44, TIBC 381, Sat 11%     B12Vit 1358 adequate, Folate 15.9 adequate     ESR 81, check CRP    Continue Venofer 100mg daily x3    DVT Prophylaxis  SQ Lovenox or SQ heparin    Non-compliance  Given contact information for office prior for heme.    pt states aware of location.   pt now sees Dr Jonathon Arguello [place] heme office. Dr Mas since retired    Cancer Screening  Recommended pt resume pap screening and      last menses 52yo.   followup with GI for re-eval as had hx of Fe def.     Thank you for consulting us.     Discussed plan of care with patient .   Pt expressed understanding of the treatment plan.   Risks, benefits and alternatives discussed in detail.   Opportunity given for questions and discussion.   Questions or concerns all addressed and answered to their satisfaction, and in lay terms.

## 2024-11-11 NOTE — CASE MANAGEMENT PROGRESS NOTE - NSCMPROGRESSNOTE_GEN_ALL_CORE
Pt for possible DC home with [facility] on Tuesday 11/12/24. Pt on 3l via NC and has home oxygen. Pt is for IV Venofer today. No PT/OT needs.

## 2024-11-11 NOTE — PROGRESS NOTE ADULT - TIME BILLING
direct patient care including but not limited to reviewing chart, medications ,laboratory data, imaging reports, discussion of plan of care with consultants on the case, coordination of care with multidisciplinary team involved in the case and discussion of plan with patient.  Patient and family agreeable to plan of care and verbalized understanding the anticipated hospital course and treatment plan.

## 2024-11-11 NOTE — PROGRESS NOTE ADULT - SUBJECTIVE AND OBJECTIVE BOX
Patient is a 69y Female with a known history of :  HTN (hypertension) [I10]    HLD (hyperlipidemia) [E78.5]    COPD exacerbation [J44.1]    Encounter for deep vein thrombosis (DVT) prophylaxis [Z29.9]    History of protein S deficiency [Z86.2]    Acute on chronic respiratory failure with hypoxia and hypercapnia [J96.21]    Thrombocytosis [D75.839]    Anemia of chronic disease [D63.8]    Pneumonia [J18.9]      HPI:  Patient is a 69-year-old female with PMH of COPD on home O2 with hypertension, protein S deficeincy on apixaban presenting to the emergency department at with one week history of worsening SOB. Patient stated that she was in normal state of health up until 6-7 days ago when she began to develop generalized malaise, fatigue, body aches. During this time, she was having increasing SOB, wheezing, yellow-green sputum production, low-grade fevers, and chills. On presentation today, she continues to endorse generalized malaise, pleuritic chest pain on deep inspiration, productive cough, and low po intake. She denies current chills/fever. She states that most recently she has had an exacerbation similar to the current episode about one year ago for which she was admitted to [facility]. She denies alleviating factors and states that the dust in her home aggravates her COPD.    Denies abdominal pain, nausea, vomiting, hemoptysis, diarrhea, constipation, urinary frequency, urgency, or dysuria, headaches, changes in vision, dizziness, numbness, tingling.  Denies recent travel, recent antibiotic use, or sick contacts.    ED Course:   Vitals: BP: 141/77, HR: 127, Temp: , RR: 22, SpO2: 90% on 3L NC  Labs: WBC: 18.53; H/H: 10.7/33.4; Plt: 831; aPTT: 44.0; PT: 17.3; INR: 1.48; Na: 134; Glucose: 132; Protein: 9.0; Albumin: 3.0; Alk phos: 157  CXR: hyperinflated lungs b/l as per personal read, official read pending   EKG: Sinus tachycardia Anterior infarct , age undetermined Abnormal ECG  Received in the ED: Levofloxacin 500 mg IVP; Solumedrol 125 mg IVP;  mL bolus; Albuterol 2.5 mg nebulizer, Duoneb (05 Nov 2024 12:10)      REVIEW OF SYSTEMS:    CONSTITUTIONAL: No fever, weight loss, or fatigue  EYES: No eye pain, visual disturbances, or discharge  ENMT:  No difficulty hearing, tinnitus, vertigo; No sinus or throat pain  NECK: No pain or stiffness  BREASTS: No pain, masses, or nipple discharge  RESPIRATORY: No cough, wheezing, chills or hemoptysis; No shortness of breath  CARDIOVASCULAR: No chest pain, palpitations, dizziness, or leg swelling  GASTROINTESTINAL: No abdominal or epigastric pain. No nausea, vomiting, or hematemesis; No diarrhea or constipation. No melena or hematochezia.  GENITOURINARY: No dysuria, frequency, hematuria, or incontinence  NEUROLOGICAL: No headaches, memory loss, loss of strength, numbness, or tremors  SKIN: No itching, burning, rashes, or lesions   LYMPH NODES: No enlarged glands  ENDOCRINE: No heat or cold intolerance; No hair loss  MUSCULOSKELETAL: No joint pain or swelling; No muscle, back, or extremity pain  PSYCHIATRIC: No depression, anxiety, mood swings, or difficulty sleeping  HEME/LYMPH: No easy bruising, or bleeding gums  ALLERGY AND IMMUNOLOGIC: No hives or eczema    MEDICATIONS  (STANDING):  albuterol/ipratropium for Nebulization 3 milliLiter(s) Nebulizer every 6 hours  apixaban 5 milliGRAM(s) Oral two times a day  benzonatate 100 milliGRAM(s) Oral every 8 hours  buprenorphine 2 mG/naloxone 0.5 mG SL Film 2 Film(s) SubLingual two times a day  cholecalciferol 1000 Unit(s) Oral daily  diltiazem    milliGRAM(s) Oral daily  fluticasone propionate/ salmeterol 250-50 MICROgram(s) Diskus 1 Dose(s) Inhalation two times a day  guaiFENesin Oral Liquid (Sugar-Free) 200 milliGRAM(s) Oral every 8 hours  iron sucrose IVPB 100 milliGRAM(s) IV Intermittent every 24 hours  lactated ringers. 1000 milliLiter(s) (60 mL/Hr) IV Continuous <Continuous>  pantoprazole    Tablet 40 milliGRAM(s) Oral before breakfast  predniSONE   Tablet 40 milliGRAM(s) Oral daily  sodium chloride 3%  Inhalation 4 milliLiter(s) Inhalation every 12 hours  tiotropium 2.5 MICROgram(s) Inhaler 2 Puff(s) Inhalation daily  venlafaxine XR. 37.5 milliGRAM(s) Oral daily    MEDICATIONS  (PRN):  acetaminophen     Tablet .. 650 milliGRAM(s) Oral every 6 hours PRN Temp greater or equal to 38C (100.4F), Mild Pain (1 - 3)  aluminum hydroxide/magnesium hydroxide/simethicone Suspension 30 milliLiter(s) Oral every 4 hours PRN Dyspepsia  ondansetron Injectable 4 milliGRAM(s) IV Push every 8 hours PRN Nausea and/or Vomiting      ALLERGIES: No Known Allergies      FAMILY HISTORY:  Family history of lung cancer    Family history of protein S deficiency (Sibling)    No pertinent family history in first degree relatives        Social history:  Alochol:   Smoking:   Drug Use:   Marital Status:     PHYSICAL EXAMINATION:  -----------------------------  T(C): 36.5 (11-11-24 @ 05:05), Max: 37.1 (11-10-24 @ 20:26)  HR: 91 (11-11-24 @ 08:04) (77 - 111)  BP: 133/74 (11-11-24 @ 05:05) (128/74 - 133/74)  RR: 18 (11-11-24 @ 05:05) (18 - 18)  SpO2: 96% (11-11-24 @ 08:04) (94% - 99%)  Wt(kg): --        Constitutional: well developed, normal appearance, well groomed, well nourished, no deformities and no acute distress.   Eyes: the conjunctiva exhibited no abnormalities and the eyelids demonstrated no xanthelasmas.   HEENT: normal oral mucosa, no oral pallor and no oral cyanosis.   Neck: normal jugular venous A waves present, normal jugular venous V waves present and no jugular venous acosta A waves.   Pulmonary: no respiratory distress, normal respiratory rhythm and effort, no accessory muscle use and lungs were clear to auscultation bilaterally. Anteriorly  Cardiovascular: heart rate and rhythm were normal, normal S1 and S2 and no murmur, gallop, rub, heave or thrill are present.   Musculoskeletal: the gait could not be assessed.   Extremities: no clubbing of the fingernails, no localized cyanosis, no petechial hemorrhages and no ischemic changes.   Skin: normal skin color and pigmentation, no rash, no venous stasis, no skin lesions, no skin ulcer and no xanthoma was observed.     LABS:   --------  11-10    139  |  97  |  23  ----------------------------<  86  3.7   |  32[H]  |  0.70    Ca    9.3      10 Nov 2024 08:20    TPro  7.4  /  Alb  2.9[L]  /  TBili  0.1[L]  /  DBili  x   /  AST  21  /  ALT  41  /  AlkPhos  103  11-10                         10.0   21.76 )-----------( 959      ( 10 Nov 2024 08:20 )             32.0                   Radiology:

## 2024-11-11 NOTE — PROGRESS NOTE ADULT - PROBLEM SELECTOR PLAN 8
Anemia: no sign of active bleeding , Iron /folate/b12 level indicate anemia of chronic disease.   will monitor H/H
Anemia: no sign of active bleeding , Iron /folate/b12 level indicate anemia of chronic disease.   will monitor H/H, FOBT  started venofer as per Hematology recommendation
Anemia: no sign of active bleeding , Iron /folate/b12 level indicate anemia of chronic disease.   will monitor H/H, FOBT

## 2024-11-11 NOTE — PROGRESS NOTE ADULT - SUBJECTIVE AND OBJECTIVE BOX
Date/Time Patient Seen:  		  Referring MD:   Data Reviewed	       Patient is a 69y old  Female who presents with a chief complaint of COPD Exacerbation (10 Nov 2024 15:03)      Subjective/HPI     PAST MEDICAL & SURGICAL HISTORY:  Drug abuse  was addicted to pain killers    Protein S deficiency    Depression    COPD (chronic obstructive pulmonary disease)    DVT (deep venous thrombosis)    Pulmonary embolism    Afib    No pertinent past medical history    COPD exacerbation    History of COPD    HTN (hypertension)    HLD (hyperlipidemia)    History of protein S deficiency    History of back surgery    No significant past surgical history          Medication list         MEDICATIONS  (STANDING):  albuterol/ipratropium for Nebulization 3 milliLiter(s) Nebulizer every 6 hours  apixaban 5 milliGRAM(s) Oral two times a day  benzonatate 100 milliGRAM(s) Oral every 8 hours  buprenorphine 2 mG/naloxone 0.5 mG SL Film 2 Film(s) SubLingual two times a day  cholecalciferol 1000 Unit(s) Oral daily  diltiazem    milliGRAM(s) Oral daily  fluticasone propionate/ salmeterol 250-50 MICROgram(s) Diskus 1 Dose(s) Inhalation two times a day  guaiFENesin Oral Liquid (Sugar-Free) 200 milliGRAM(s) Oral every 8 hours  iron sucrose IVPB 100 milliGRAM(s) IV Intermittent every 24 hours  lactated ringers. 1000 milliLiter(s) (60 mL/Hr) IV Continuous <Continuous>  pantoprazole    Tablet 40 milliGRAM(s) Oral before breakfast  predniSONE   Tablet 40 milliGRAM(s) Oral daily  sodium chloride 3%  Inhalation 4 milliLiter(s) Inhalation every 12 hours  tiotropium 2.5 MICROgram(s) Inhaler 2 Puff(s) Inhalation daily  venlafaxine XR. 37.5 milliGRAM(s) Oral daily    MEDICATIONS  (PRN):  acetaminophen     Tablet .. 650 milliGRAM(s) Oral every 6 hours PRN Temp greater or equal to 38C (100.4F), Mild Pain (1 - 3)  aluminum hydroxide/magnesium hydroxide/simethicone Suspension 30 milliLiter(s) Oral every 4 hours PRN Dyspepsia  ondansetron Injectable 4 milliGRAM(s) IV Push every 8 hours PRN Nausea and/or Vomiting         Vitals log        ICU Vital Signs Last 24 Hrs  T(C): 36.5 (11 Nov 2024 05:05), Max: 37.1 (10 Nov 2024 20:26)  T(F): 97.7 (11 Nov 2024 05:05), Max: 98.7 (10 Nov 2024 20:26)  HR: 77 (11 Nov 2024 05:05) (77 - 111)  BP: 133/74 (11 Nov 2024 05:05) (128/74 - 133/74)  BP(mean): --  ABP: --  ABP(mean): --  RR: 18 (11 Nov 2024 05:05) (18 - 18)  SpO2: 99% (11 Nov 2024 05:05) (94% - 99%)    O2 Parameters below as of 11 Nov 2024 05:05  Patient On (Oxygen Delivery Method): nasal cannula  O2 Flow (L/min): 2               Input and Output:  I&O's Detail      Lab Data                        10.0   21.76 )-----------( 959      ( 10 Nov 2024 08:20 )             32.0     11-10    139  |  97  |  23  ----------------------------<  86  3.7   |  32[H]  |  0.70    Ca    9.3      10 Nov 2024 08:20    TPro  7.4  /  Alb  2.9[L]  /  TBili  0.1[L]  /  DBili  x   /  AST  21  /  ALT  41  /  AlkPhos  103  11-10            Review of Systems	      Objective     Physical Examination    heart s1s2  lung dc bS  head nc  head at      Pertinent Lab findings & Imaging      [place]:  NO   Adequate UO     I&O's Detail           Discussed with:     Cultures:	        Radiology

## 2024-11-11 NOTE — PROGRESS NOTE ADULT - PROBLEM SELECTOR PLAN 6
Chronic, stable as per patient  - Patient is on anticoagulation (eliquis 5 mg BID) as per heme/onc doctor who she does not regularly follow up with  - Recommend patient follow up with heme onc doctor upon discharge

## 2024-11-11 NOTE — PATIENT PROFILE ADULT - FALL HARM RISK - HARM RISK INTERVENTIONS

## 2024-11-11 NOTE — PROGRESS NOTE ADULT - PROBLEM SELECTOR PLAN 2
Chronic  -Continue home diltiazem  -Monitor hemodynamics  -Pt with hx of tachycardia, stable as per pt and follows with cardiologist outpt, tele monitor show sinus tachy , discussed with cardio, will c/w current management   -DASH/TLC diet
Chronic  -Continue home diltiazem  -Monitor hemodynamics  -Pt with hx of tachycardia, stable as per pt and follows with cardiologist outpt, EKG findings consistent with diagnosis, pt to f/u with cardiologist outpt following d/c from hospital  -DASH/TLC diet
Chronic  -Continue home diltiazem  -Monitor hemodynamics  -Pt with hx of tachycardia, stable as per pt and follows with cardiologist outpt, EKG findings consistent with diagnosis, pt to f/u with cardiologist outpt following d/c from hospital  -DASH/TLC diet
- Continuous pulse ox, SpO2 goal>88%   - Albuterol/Ipratroprium nebulizer q6hr, reassessment  - Prednisone 40mg QD x 5 days   procalcitonin WNL , RVP + Rhinovirus   CT chest: No evidence of  pulmonary emboli or other acute change compared to   8/19/2024.Emphysema with areas of bronchiectasis and small airway impaction similar   to prior.Stable 9 mm subpleural right upper lobe nodule. Recommend three-month follow-up CT.   however, some pachy opacities seen , concern for pneumonia.   ID eval noted :  completed levaquin course  add cough medication ATC , saline neb   pulmonary eval noted
- Continuous pulse ox, SpO2 goal>88%   - Albuterol/Ipratroprium nebulizer q6hr, reassessment  - Prednisone 40mg QD x 5 days   procalcitonin WNL , RVP + Rhinovirus   CT chest: No evidence of  pulmonary emboli or other acute change compared to   8/19/2024.Emphysema with areas of bronchiectasis and small airway impaction similar   to prior.Stable 9 mm subpleural right upper lobe nodule. Recommend three-month follow-up CT.   however, some pachy opacities seen , concern for pneumonia.   ID eval noted :  completed levaquin course  add cough medication ATC , saline neb   pulmonary eval noted
- Continuous pulse ox, SpO2 goal>88%   - Albuterol/Ipratroprium nebulizer q6hr, reassessment  - Prednisone 40mg QD x 5 days   procalcitonin WNL , RVP + Rhinovirus   CT chest: No evidence of  pulmonary emboli or other acute change compared to   8/19/2024.Emphysema with areas of bronchiectasis and small airway impaction similar   to prior.Stable 9 mm subpleural right upper lobe nodule. Recommend three-month follow-up CT.   however, some pachy opacities seen , concern for pneumonia.   ID eval noted : to complete levaquin course  add cough medication ATC , saline neb   pulmonary eval noted

## 2024-11-12 ENCOUNTER — NON-APPOINTMENT (OUTPATIENT)
Age: 69
End: 2024-11-12

## 2024-11-12 VITALS — OXYGEN SATURATION: 95 %

## 2024-11-12 LAB
ALBUMIN SERPL ELPH-MCNC: 2.8 G/DL — LOW (ref 3.3–5)
ALP SERPL-CCNC: 92 U/L — SIGNIFICANT CHANGE UP (ref 40–120)
ALT FLD-CCNC: 38 U/L — SIGNIFICANT CHANGE UP (ref 12–78)
ANION GAP SERPL CALC-SCNC: 2 MMOL/L — LOW (ref 5–17)
AST SERPL-CCNC: 16 U/L — SIGNIFICANT CHANGE UP (ref 15–37)
BASOPHILS # BLD AUTO: 0.07 K/UL — SIGNIFICANT CHANGE UP (ref 0–0.2)
BASOPHILS NFR BLD AUTO: 0.4 % — SIGNIFICANT CHANGE UP (ref 0–2)
BILIRUB SERPL-MCNC: 0.2 MG/DL — SIGNIFICANT CHANGE UP (ref 0.2–1.2)
BUN SERPL-MCNC: 18 MG/DL — SIGNIFICANT CHANGE UP (ref 7–23)
CALCIUM SERPL-MCNC: 9.7 MG/DL — SIGNIFICANT CHANGE UP (ref 8.5–10.1)
CHLORIDE SERPL-SCNC: 98 MMOL/L — SIGNIFICANT CHANGE UP (ref 96–108)
CO2 SERPL-SCNC: 38 MMOL/L — HIGH (ref 22–31)
CREAT SERPL-MCNC: 0.72 MG/DL — SIGNIFICANT CHANGE UP (ref 0.5–1.3)
EGFR: 91 ML/MIN/1.73M2 — SIGNIFICANT CHANGE UP
EOSINOPHIL # BLD AUTO: 0.33 K/UL — SIGNIFICANT CHANGE UP (ref 0–0.5)
EOSINOPHIL NFR BLD AUTO: 1.7 % — SIGNIFICANT CHANGE UP (ref 0–6)
GLUCOSE SERPL-MCNC: 93 MG/DL — SIGNIFICANT CHANGE UP (ref 70–99)
HCT VFR BLD CALC: 30.3 % — LOW (ref 34.5–45)
HGB BLD-MCNC: 9.4 G/DL — LOW (ref 11.5–15.5)
IMM GRANULOCYTES NFR BLD AUTO: 4.8 % — HIGH (ref 0–0.9)
LYMPHOCYTES # BLD AUTO: 19.9 % — SIGNIFICANT CHANGE UP (ref 13–44)
LYMPHOCYTES # BLD AUTO: 3.84 K/UL — HIGH (ref 1–3.3)
MCHC RBC-ENTMCNC: 26.3 PG — LOW (ref 27–34)
MCHC RBC-ENTMCNC: 31 G/DL — LOW (ref 32–36)
MCV RBC AUTO: 84.9 FL — SIGNIFICANT CHANGE UP (ref 80–100)
MONOCYTES # BLD AUTO: 1.43 K/UL — HIGH (ref 0–0.9)
MONOCYTES NFR BLD AUTO: 7.4 % — SIGNIFICANT CHANGE UP (ref 2–14)
NEUTROPHILS # BLD AUTO: 12.68 K/UL — HIGH (ref 1.8–7.4)
NEUTROPHILS NFR BLD AUTO: 65.8 % — SIGNIFICANT CHANGE UP (ref 43–77)
NRBC # BLD: 0 /100 WBCS — SIGNIFICANT CHANGE UP (ref 0–0)
PLATELET # BLD AUTO: 878 K/UL — HIGH (ref 150–400)
POTASSIUM SERPL-MCNC: 4.5 MMOL/L — SIGNIFICANT CHANGE UP (ref 3.5–5.3)
POTASSIUM SERPL-SCNC: 4.5 MMOL/L — SIGNIFICANT CHANGE UP (ref 3.5–5.3)
PROT SERPL-MCNC: 6.7 G/DL — SIGNIFICANT CHANGE UP (ref 6–8.3)
RBC # BLD: 3.57 M/UL — LOW (ref 3.8–5.2)
RBC # FLD: 17.3 % — HIGH (ref 10.3–14.5)
SODIUM SERPL-SCNC: 138 MMOL/L — SIGNIFICANT CHANGE UP (ref 135–145)
WBC # BLD: 19.28 K/UL — HIGH (ref 3.8–10.5)
WBC # FLD AUTO: 19.28 K/UL — HIGH (ref 3.8–10.5)

## 2024-11-12 PROCEDURE — 99239 HOSP IP/OBS DSCHRG MGMT >30: CPT

## 2024-11-12 RX ORDER — BUPRENORPHINE HYDROCHLORIDE, NALOXONE HYDROCHLORIDE 12; 3 MG/1; MG/1
1 FILM, SOLUBLE BUCCAL; SUBLINGUAL
Refills: 0 | DISCHARGE

## 2024-11-12 RX ORDER — GUAIFENESIN 100 MG/5ML
1 LIQUID ORAL
Qty: 14 | Refills: 0
Start: 2024-11-12 | End: 2024-11-18

## 2024-11-12 RX ORDER — BUPRENORPHINE HYDROCHLORIDE, NALOXONE HYDROCHLORIDE 12; 3 MG/1; MG/1
1 FILM, SOLUBLE BUCCAL; SUBLINGUAL
Qty: 1 | Refills: 0
Start: 2024-11-12 | End: 2024-11-25

## 2024-11-12 RX ORDER — LEVALBUTEROL HYDROCHLORIDE 0.63 MG/3ML
3 SOLUTION RESPIRATORY (INHALATION)
Qty: 1 | Refills: 2
Start: 2024-11-12 | End: 2024-12-23

## 2024-11-12 RX ADMIN — Medication 37.5 MILLIGRAM(S): at 11:14

## 2024-11-12 RX ADMIN — APIXABAN 5 MILLIGRAM(S): 5 TABLET, FILM COATED ORAL at 06:03

## 2024-11-12 RX ADMIN — Medication 240 MILLIGRAM(S): at 06:03

## 2024-11-12 RX ADMIN — BUPRENORPHINE HYDROCHLORIDE, NALOXONE HYDROCHLORIDE 2 FILM(S): 12; 3 FILM, SOLUBLE BUCCAL; SUBLINGUAL at 06:04

## 2024-11-12 RX ADMIN — IRON SUCROSE 210 MILLIGRAM(S): 20 INJECTION, SOLUTION INTRAVENOUS at 13:08

## 2024-11-12 RX ADMIN — IPRATROPIUM BROMIDE AND ALBUTEROL SULFATE 3 MILLILITER(S): .5; 2.5 SOLUTION RESPIRATORY (INHALATION) at 08:15

## 2024-11-12 RX ADMIN — SODIUM CHLORIDE 4 MILLILITER(S): 9 INJECTION, SOLUTION INTRAMUSCULAR; INTRAVENOUS; SUBCUTANEOUS at 08:15

## 2024-11-12 RX ADMIN — GUAIFENESIN 200 MILLIGRAM(S): 100 LIQUID ORAL at 06:04

## 2024-11-12 RX ADMIN — IPRATROPIUM BROMIDE AND ALBUTEROL SULFATE 3 MILLILITER(S): .5; 2.5 SOLUTION RESPIRATORY (INHALATION) at 14:18

## 2024-11-12 RX ADMIN — PANTOPRAZOLE SODIUM 40 MILLIGRAM(S): 40 TABLET, DELAYED RELEASE ORAL at 06:05

## 2024-11-12 RX ADMIN — Medication 1000 UNIT(S): at 11:14

## 2024-11-12 RX ADMIN — Medication 60 MILLILITER(S): at 04:10

## 2024-11-12 RX ADMIN — Medication 100 MILLIGRAM(S): at 06:03

## 2024-11-12 RX ADMIN — APIXABAN 5 MILLIGRAM(S): 5 TABLET, FILM COATED ORAL at 17:16

## 2024-11-12 RX ADMIN — BUPRENORPHINE HYDROCHLORIDE, NALOXONE HYDROCHLORIDE 2 FILM(S): 12; 3 FILM, SOLUBLE BUCCAL; SUBLINGUAL at 17:16

## 2024-11-12 RX ADMIN — Medication 40 MILLIGRAM(S): at 06:04

## 2024-11-12 NOTE — PROGRESS NOTE ADULT - ASSESSMENT
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   J47.0       Bronchiectasis with acute lower respiratory infection  R70.0      elevated ESR  B34.1      Enterovirus/Rhinovirus infection      69-year-old female with PMH of COPD on home O2, protein s deficiency with hypertension presenting to the emergency department at with one week history of worsening SOB.   Patient is being admitted for COPD exacerbation.  Enterovirus/rhinovirus infection  Since dyspnea slowly better  ESR and CRP elevated    Recurrent  VTE  in 2019 on Coumadin for recurrent VTE. At time had event despite therapeutic INR then. Placed on Eliquis 10mg loading and then 5mg BID.   Family hx of Prot S def, along with more severe thrombosis, with PE in 3 family members, [mother, pt, brother] with brother having  from PE.   2019 VTE episode apparently unprovoked. At time  possible cause with COPD/bronchiectasis exacerbation, but sx mild.   non-compliant with pulm followup. Counseled and pt agrees to make appts.   hx of substance abuse, on tx.     Fe def hx  in 2019 Fe def anemia. at time noted ferritin 11, ferritin 20.   Anemia of chronic disease.   2024 Anemia studies.      Ferritin 75, Iron studies 44, TIBC 381, Sat 11%     B12Vit 1358 adequate, Folate 15.9 adequate     ESR 81, elevated CRP 6    Last colonoscopy .   Mammo 2018  Pap >10yrs    Reactive thrombocytosis,   due to lung dz or Fe def.     2023 JAK2 V617F neg, Exon 12-13 neg  2023 CALR neg  2023 MPL mutation negative  2023 Abd CT scan: normal spleen and liver size    Reactive leukocytosis,   no fever. Likely due to lung dz, other causes    Bronchiectasis with acute lower respiratory infection      RECOMMENDATIONS    Recurrent Thrombosis  Cont Eliquis 5mg q12h.   Monitor for bleeding on tx.     COPD/bronchiectasis.   To be placed on inhalers, corticosteroids briefly.     leukocytosis  Check BCR-ABL. Not collected by lab despite multiple calls.   Follow WBC      Anemia  Follow CBC  Transfuse PRBC as clinically indicated.   Transfuse PRBC if Hgb <7.0 or if symptomatic.   Check FOBT  Anemia studies.      Ferritin 75, Iron studies 44, TIBC 381, Sat 11%     B12Vit 1358 adequate, Folate 15.9 adequate     ESR 81, check CRP    Continue Venofer 100mg daily x3    DVT Prophylaxis  SQ Lovenox or SQ heparin    Non-compliance  Given contact information for office prior for heme.    pt states aware of location.   pt now sees Dr Jonathon Arguello [place] heme office. Dr Mas since retired    Cancer Screening  Recommended pt resume pap screening and      last menses 52yo.   followup with GI for re-eval as had hx of Fe def.     Thank you for consulting us.       Will sign off on case for now, as needed testing BCR, ABL,  not sent  Please call when result available.   Please call, or re-consult if needed.

## 2024-11-12 NOTE — PROGRESS NOTE ADULT - REASON FOR ADMISSION
COPD Exacerbation

## 2024-11-12 NOTE — DIETITIAN INITIAL EVALUATION ADULT - PROBLEM SELECTOR PLAN 1
# COPD exacerbation (Acute on Chronic)  On home O2 2L NC  Patient with history of COPD who presents with wheezing, worsening cough and dyspnea, concerning for COPD exacerbation without concomitant pneumonia.  - Influenza, viral panel negative  - f/u AM CBC, CMP, procalcitonin, CRP  - Continuous pulse ox, SpO2 goal>88%  - Albuterol/Ipratroprium nebulizer q6hr, reassessment  - Prednisone 40mg QD x 5 days  - s/p 1 dose levaquin, f/u CT chest to eval for PNA before ordering more abx. Check procalcitonin  -check RVP

## 2024-11-12 NOTE — PROGRESS NOTE ADULT - PROBLEM SELECTOR PROBLEM 3
Pneumonia
History of protein S deficiency
Pneumonia
History of protein S deficiency
History of protein S deficiency
Pneumonia
History of protein S deficiency
History of protein S deficiency

## 2024-11-12 NOTE — PROGRESS NOTE ADULT - PROBLEM SELECTOR PROBLEM 5
Other elevated white blood cell (WBC) count
HTN (hypertension)

## 2024-11-12 NOTE — PROGRESS NOTE ADULT - ASSESSMENT
The patient is a 69 year old female with a history of HTN, protein S deficiency, DVT/PE, COPD who presents with shortness of breath in the setting of viral URI, COPD exacerbation.    Plan:  - ECG with sinus rhythm and nonspecific findings  - Chest pain is atypical and pleuritic in nature  - ECG with sinus rhythm and no evidence of ischemia/infarction  - Given duration of symptoms, an acute MI is ruled out with one set of cardiac enzymes  - CTA chest negative for PE; noted is emphysema and lung nodule  - RVP positive for rhinovirus  - Intermittent sinus tachycardia - continue diltiazem  mg daily  - Remain off amlodipine  - Continue apixaban 5 mg bid  - Pulm follow-up

## 2024-11-12 NOTE — DIETITIAN INITIAL EVALUATION ADULT - OTHER INFO
Patient is a 69-year-old female with PMH of COPD on home O2, protein s deficiency with hypertension presenting to the emergency department at with one week history of worsening SOB. Patient is being admitted for COPD exacerbation.    EMR reviewed by this RD reveals pt was dx with chronic severe maln, was down to 88# back in dec 2023, Pt reports she regained 16# and is now >/=104# . She reports her appetite is fair currently, dentures don't fit well so often does not wear them for eating , eats soft food, does not want diet change at this time . She had a normal BM as recently as this morning. She appears adeq nourished

## 2024-11-12 NOTE — DIETITIAN INITIAL EVALUATION ADULT - PROBLEM SELECTOR PLAN 2
Chronic  -Continue home diltiazem  -Monitor hemodynamics  -Pt with hx of tachycardia, stable as per pt and follows with cardiologist outpt, EKG findings consistent with diagnosis, pt to f/u with cardiologist outpt following d/c from hospital  -DASH/TLC diet

## 2024-11-12 NOTE — PROGRESS NOTE ADULT - PROBLEM SELECTOR PROBLEM 6
History of protein S deficiency
Simple chronic bronchitis
History of protein S deficiency
History of protein S deficiency

## 2024-11-12 NOTE — PROGRESS NOTE ADULT - SUBJECTIVE AND OBJECTIVE BOX
Chief Complaint: Shortness of breath    Interval Events: No events overnight.    Review of Systems:  General: No fevers, chills, weight gain  Skin: No rashes, color changes  Cardiovascular: No chest pain, orthopnea  Respiratory: No shortness of breath, cough  Gastrointestinal: No nausea, abdominal pain  Genitourinary: No incontinence, pain with urination  Musculoskeletal: No pain, swelling, decreased range of motion  Neurological: No headache, weakness  Psychiatric: No depression, anxiety  Endocrine: No weight gain, increased thirst  All other systems are comprehensively negative.    Physical Exam:  Vital Signs Last 24 Hrs  T(C): 36.4 (12 Nov 2024 05:02), Max: 36.8 (11 Nov 2024 20:53)  T(F): 97.5 (12 Nov 2024 05:02), Max: 98.2 (11 Nov 2024 20:53)  HR: 88 (12 Nov 2024 08:24) (77 - 101)  BP: 137/77 (12 Nov 2024 05:02) (110/80 - 137/77)  BP(mean): --  RR: 18 (12 Nov 2024 05:02) (18 - 18)  SpO2: 97% (12 Nov 2024 08:24) (95% - 99%)  Parameters below as of 12 Nov 2024 08:24  Patient On (Oxygen Delivery Method): nasal cannula, 2LPM  General: NAD  HEENT: MMM  Neck: No JVD, no carotid bruit  Lungs: CTAB  CV: RRR, nl S1/S2, no M/R/G  Abdomen: S/NT/ND, +BS  Extremities: No LE edema, no cyanosis  Neuro: AAOx3, non-focal  Skin: No rash    Labs:             11-12    138  |  98  |  18  ----------------------------<  93  4.5   |  38[H]  |  0.72    Ca    9.7      12 Nov 2024 07:05    TPro  6.7  /  Alb  2.8[L]  /  TBili  0.2  /  DBili  x   /  AST  16  /  ALT  38  /  AlkPhos  92  11-12                        9.4    19.28 )-----------( 878      ( 12 Nov 2024 07:05 )             30.3       ECG/Telemetry: Sinus rhythm

## 2024-11-12 NOTE — DIETITIAN INITIAL EVALUATION ADULT - PERTINENT LABORATORY DATA
11-12    138  |  98  |  18  ----------------------------<  93  4.5   |  38[H]  |  0.72    Ca    9.7      12 Nov 2024 07:05    TPro  6.7  /  Alb  2.8[L]  /  TBili  0.2  /  DBili  x   /  AST  16  /  ALT  38  /  AlkPhos  92  11-12  A1C with Estimated Average Glucose Result: 6.0 % (11-06-24 @ 08:01)

## 2024-11-12 NOTE — PROGRESS NOTE ADULT - SUBJECTIVE AND OBJECTIVE BOX
Date/Time Patient Seen:  		  Referring MD:   Data Reviewed	       Patient is a 69y old  Female who presents with a chief complaint of COPD Exacerbation (11 Nov 2024 17:40)      Subjective/HPI     PAST MEDICAL & SURGICAL HISTORY:  Drug abuse  was addicted to pain killers    Protein S deficiency    Depression    COPD (chronic obstructive pulmonary disease)    DVT (deep venous thrombosis)    Pulmonary embolism    Afib    No pertinent past medical history    COPD exacerbation    History of COPD    HTN (hypertension)    HLD (hyperlipidemia)    History of protein S deficiency    History of back surgery    No significant past surgical history          Medication list         MEDICATIONS  (STANDING):  albuterol/ipratropium for Nebulization 3 milliLiter(s) Nebulizer every 6 hours  apixaban 5 milliGRAM(s) Oral two times a day  benzonatate 100 milliGRAM(s) Oral every 8 hours  buprenorphine 2 mG/naloxone 0.5 mG SL Film 2 Film(s) SubLingual two times a day  cholecalciferol 1000 Unit(s) Oral daily  diltiazem    milliGRAM(s) Oral daily  fluticasone propionate/ salmeterol 250-50 MICROgram(s) Diskus 1 Dose(s) Inhalation two times a day  guaiFENesin Oral Liquid (Sugar-Free) 200 milliGRAM(s) Oral every 8 hours  iron sucrose IVPB 100 milliGRAM(s) IV Intermittent every 24 hours  lactated ringers. 1000 milliLiter(s) (60 mL/Hr) IV Continuous <Continuous>  pantoprazole    Tablet 40 milliGRAM(s) Oral before breakfast  predniSONE   Tablet 40 milliGRAM(s) Oral daily  sodium chloride 3%  Inhalation 4 milliLiter(s) Inhalation every 12 hours  tiotropium 2.5 MICROgram(s) Inhaler 2 Puff(s) Inhalation daily  venlafaxine XR. 37.5 milliGRAM(s) Oral daily    MEDICATIONS  (PRN):  acetaminophen     Tablet .. 650 milliGRAM(s) Oral every 6 hours PRN Temp greater or equal to 38C (100.4F), Mild Pain (1 - 3)  aluminum hydroxide/magnesium hydroxide/simethicone Suspension 30 milliLiter(s) Oral every 4 hours PRN Dyspepsia  ondansetron Injectable 4 milliGRAM(s) IV Push every 8 hours PRN Nausea and/or Vomiting         Vitals log        ICU Vital Signs Last 24 Hrs  T(C): 36.4 (12 Nov 2024 05:02), Max: 36.8 (11 Nov 2024 20:53)  T(F): 97.5 (12 Nov 2024 05:02), Max: 98.2 (11 Nov 2024 20:53)  HR: 87 (12 Nov 2024 05:02) (77 - 101)  BP: 137/77 (12 Nov 2024 05:02) (110/80 - 137/77)  BP(mean): --  ABP: --  ABP(mean): --  RR: 18 (12 Nov 2024 05:02) (18 - 18)  SpO2: 98% (12 Nov 2024 05:02) (95% - 99%)    O2 Parameters below as of 12 Nov 2024 05:02  Patient On (Oxygen Delivery Method): nasal cannula  O2 Flow (L/min): 2               Input and Output:  I&O's Detail    11 Nov 2024 07:01  -  12 Nov 2024 05:49  --------------------------------------------------------  IN:    Lactated Ringers: 720 mL  Total IN: 720 mL    OUT:  Total OUT: 0 mL    Total NET: 720 mL          Lab Data                        9.7    16.49 )-----------( 951      ( 11 Nov 2024 08:33 )             31.4     11-11    141  |  98  |  17  ----------------------------<  95  3.6   |  35[H]  |  0.54    Ca    9.2      11 Nov 2024 08:33    TPro  7.4  /  Alb  2.9[L]  /  TBili  0.1[L]  /  DBili  x   /  AST  21  /  ALT  41  /  AlkPhos  103  11-10            Review of Systems	      Objective     Physical Examination    heart s1s2  lung dc bS  head nc  head at      Pertinent Lab findings & Imaging      [place]:  NO   Adequate UO     I&O's Detail    11 Nov 2024 07:01  -  12 Nov 2024 05:49  --------------------------------------------------------  IN:    Lactated Ringers: 720 mL  Total IN: 720 mL    OUT:  Total OUT: 0 mL    Total NET: 720 mL               Discussed with:     Cultures:	        Radiology

## 2024-11-12 NOTE — PROGRESS NOTE ADULT - PROVIDER SPECIALTY LIST ADULT
Cardiology
Pulmonology
Cardiology
Heme/Onc
Hospitalist
Infectious Disease
Pulmonology
Cardiology
Infectious Disease
Pulmonology
Pulmonology
Cardiology
Pulmonology
Heme/Onc
Hospitalist
Heme/Onc
Heme/Onc
Hospitalist

## 2024-11-12 NOTE — CASE MANAGEMENT PROGRESS NOTE - NSCMPROGRESSNOTE_GEN_ALL_CORE
Per MD, patient is medically cleared for discharge home today after Venofer.  CM met with patient at bedside to discussed discharge disposition is home with [facility]. Discharge notice signed and copy given to patient.  Javier to transport patient home later in the afternoon. Patient verbalized understanding of the transition plan and is in agreement. CM answered all questions to the best of my abilities. CM remains available throughout hospital stay.

## 2024-11-12 NOTE — DIETITIAN INITIAL EVALUATION ADULT - NSFNSGIIOFT_GEN_A_CORE
other: The above wt is reported by pt  documented ht is 4'11"  last admission ht 4'10"  The above wt is documented wt

## 2024-11-12 NOTE — DIETITIAN INITIAL EVALUATION ADULT - PERTINENT MEDS FT
MEDICATIONS  (STANDING):  albuterol/ipratropium for Nebulization 3 milliLiter(s) Nebulizer every 6 hours  apixaban 5 milliGRAM(s) Oral two times a day  buprenorphine 2 mG/naloxone 0.5 mG SL Film 2 Film(s) SubLingual two times a day  cholecalciferol 1000 Unit(s) Oral daily  diltiazem    milliGRAM(s) Oral daily  fluticasone propionate/ salmeterol 250-50 MICROgram(s) Diskus 1 Dose(s) Inhalation two times a day  iron sucrose IVPB 100 milliGRAM(s) IV Intermittent every 24 hours  lactated ringers. 1000 milliLiter(s) (60 mL/Hr) IV Continuous <Continuous>  pantoprazole    Tablet 40 milliGRAM(s) Oral before breakfast  sodium chloride 3%  Inhalation 4 milliLiter(s) Inhalation every 12 hours  tiotropium 2.5 MICROgram(s) Inhaler 2 Puff(s) Inhalation daily  venlafaxine XR. 37.5 milliGRAM(s) Oral daily    MEDICATIONS  (PRN):  acetaminophen     Tablet .. 650 milliGRAM(s) Oral every 6 hours PRN Temp greater or equal to 38C (100.4F), Mild Pain (1 - 3)  aluminum hydroxide/magnesium hydroxide/simethicone Suspension 30 milliLiter(s) Oral every 4 hours PRN Dyspepsia  ondansetron Injectable 4 milliGRAM(s) IV Push every 8 hours PRN Nausea and/or Vomiting

## 2024-11-12 NOTE — PROGRESS NOTE ADULT - SUBJECTIVE AND OBJECTIVE BOX
[INTERVAL HX: ]  Patient seen and examined;  Chart reviewed and events noted;     still wheezing  no CP,     [MEDICATIONS]  MEDICATIONS  (STANDING):  albuterol/ipratropium for Nebulization 3 milliLiter(s) Nebulizer every 6 hours  apixaban 5 milliGRAM(s) Oral two times a day  cholecalciferol 1000 Unit(s) Oral daily  diltiazem    milliGRAM(s) Oral daily  fluticasone propionate/ salmeterol 250-50 MICROgram(s) Diskus 1 Dose(s) Inhalation two times a day  pantoprazole    Tablet 40 milliGRAM(s) Oral before breakfast  sodium chloride 3%  Inhalation 4 milliLiter(s) Inhalation every 12 hours  tiotropium 2.5 MICROgram(s) Inhaler 2 Puff(s) Inhalation daily  venlafaxine XR. 37.5 milliGRAM(s) Oral daily    MEDICATIONS  (PRN):  acetaminophen     Tablet .. 650 milliGRAM(s) Oral every 6 hours PRN Temp greater or equal to 38C (100.4F), Mild Pain (1 - 3)  aluminum hydroxide/magnesium hydroxide/simethicone Suspension 30 milliLiter(s) Oral every 4 hours PRN Dyspepsia  ondansetron Injectable 4 milliGRAM(s) IV Push every 8 hours PRN Nausea and/or Vomiting      [VITALS]  Vital Signs Last 24 Hrs  T(C): 36.8 (2024 11:26), Max: 36.8 (2024 20:53)  T(F): 98.3 (2024 11:26), Max: 98.3 (2024 11:26)  HR: 115 (2024 14:24) (77 - 118)  BP: 128/71 (2024 11:26) (127/62 - 137/77)  BP(mean): --  RR: 18 (2024 11:26) (18 - 18)  SpO2: 95% (2024 14:24) (95% - 98%)    Parameters below as of 2024 14:24  Patient On (Oxygen Delivery Method): nasal cannula, 2LPM      [WT/HT]  Daily     Daily Weight in k.5 (2024 05:02)  [VENT]      [PHYSICAL EXAM]  GEN: NAD  HEENT: normocephalic and atraumatic. EOMI. PERRL.    NECK: Supple.  No lymphadenopathy   LUNGS: Clear to auscultation.  HEART: Regular rate and rhythm,  no MRG  ABDOMEN: Soft, nontender, and nondistended.  Positive bowel sounds.    : No CVA tenderness  EXTREMITIES: Without edema.  NEUROLOGIC: grossly intact.  PSYCHIATRIC: Appropriate affect .  SKIN: No rash     [LABS:]                        9.4    19.28 )-----------( 878      ( 2024 07:05 )             30.3         138  |  98  |  18  ----------------------------<  93  4.5   |  38[H]  |  0.72    Ca    9.7      2024 07:05    TPro  6.7  /  Alb  2.8[L]  /  TBili  0.2  /  DBili  x   /  AST  16  /  ALT  38  /  AlkPhos  92            Reticulocyte Count (11-10-24 @ 14:30)  Reticulocyte Percent: 2.2 % [0.5 - 2.5]  Absolute Reticulocytes: 79.4 K/uL [25.0 - 125.0]    Sedimentation Rate, Erythrocyte: 81 mm/hr *H* [0 - 20] (24 @ 12:10)    Iron - Total Binding Capacity.: 381 ug/dL [220 - 430] (24 @ 18:00)    Ferritin: 75 ng/mL [13 - 330] (24 @ 18:00)    Vitamin B12, Serum: 1358 pg/mL *H* [232 - 1245] (24 @ 18:00)    Folate, Serum: 15.9 ng/mL (24 @ 18:00)    Sedimentation Rate, Erythrocyte: 99 mm/hr *H* [0 - 20] (23 @ 07:26)    Sedimentation Rate, Erythrocyte: 94 mm/hr *H* [0 - 20] (23 @ 07:17)    Sedimentation Rate, Erythrocyte: 43 mm/hr *H* [0 - 20] (23 @ 12:23)    Ferritin: 45 ng/mL [13 - 330] (23 @ 07:48)    Iron - Total Binding Capacity.: 287 ug/dL [220 - 430] (23 @ 07:43)      Urinalysis Basic - ( 2024 07:05 )    Color: x / Appearance: x / SG: x / pH: x  Gluc: 93 mg/dL / Ketone: x  / Bili: x / Urobili: x   Blood: x / Protein: x / Nitrite: x   Leuk Esterase: x / RBC: x / WBC x   Sq Epi: x / Non Sq Epi: x / Bacteria: x        SARS-CoV-2: NotDetec (2024 16:30)          [RADIOLOGY STUDIES:]

## 2024-11-12 NOTE — PROGRESS NOTE ADULT - PROBLEM SELECTOR PROBLEM 7
Bronchiectasis without complication
Encounter for deep vein thrombosis (DVT) prophylaxis
Bronchiectasis without complication
Bronchiectasis without complication
Encounter for deep vein thrombosis (DVT) prophylaxis
Encounter for deep vein thrombosis (DVT) prophylaxis

## 2024-11-12 NOTE — PROGRESS NOTE ADULT - ASSESSMENT
69-year-old female with PMH of COPD on home O2 with hypertension, protein S deficiency on apixaban presenting to the emergency department at with one week history of worsening SOB    copd  emphysema  Bronchiectasis  HTN  Protein S Def hx  AFIB  HTN  HLD  Depression  hx of REYSE     cm f/u noted   follows with Dr Charly Lay [facility] office  poss dc today    bronchodilators  inhaler - nebs  systemic steroids  cough rx regimen  mucolytics  o2 support  pt has home  goal sat > 88 pct  follows with Dr Charly Lay [facility] office  I roberto  monitor VS and HD and Sat  cvs rx regimen  BP control  anxiolysis and emotional support  nutritional assessment and optimization  OLD records reviewed  URI sx management   on Suboxone - OUD

## 2024-11-12 NOTE — PROGRESS NOTE ADULT - PROBLEM SELECTOR PROBLEM 1
COPD exacerbation
Acute on chronic respiratory failure with hypoxia and hypercapnia

## 2024-11-12 NOTE — PROGRESS NOTE ADULT - PROBLEM SELECTOR PROBLEM 2
COPD exacerbation
HTN (hypertension)
COPD exacerbation
HTN (hypertension)
COPD exacerbation

## 2024-11-12 NOTE — PROGRESS NOTE ADULT - PROBLEM SELECTOR PROBLEM 8
Drug abuse
Anemia of chronic disease

## 2024-11-12 NOTE — PROGRESS NOTE ADULT - PROBLEM SELECTOR PROBLEM 4
Encounter for deep vein thrombosis (DVT) prophylaxis
Thrombocytosis
Thrombocytosis
Encounter for deep vein thrombosis (DVT) prophylaxis
Thrombocytosis
Encounter for deep vein thrombosis (DVT) prophylaxis

## 2024-11-14 ENCOUNTER — NON-APPOINTMENT (OUTPATIENT)
Age: 69
End: 2024-11-14

## 2024-11-14 LAB — BCR/ABL BY RT - PCR QUANTITATIVE: SIGNIFICANT CHANGE UP

## 2024-11-26 PROCEDURE — 71045 X-RAY EXAM CHEST 1 VIEW: CPT

## 2024-11-26 PROCEDURE — 84145 PROCALCITONIN (PCT): CPT

## 2024-11-26 PROCEDURE — 85025 COMPLETE CBC W/AUTO DIFF WBC: CPT

## 2024-11-26 PROCEDURE — 80048 BASIC METABOLIC PNL TOTAL CA: CPT

## 2024-11-26 PROCEDURE — 94640 AIRWAY INHALATION TREATMENT: CPT

## 2024-11-26 PROCEDURE — 97530 THERAPEUTIC ACTIVITIES: CPT

## 2024-11-26 PROCEDURE — 84443 ASSAY THYROID STIM HORMONE: CPT

## 2024-11-26 PROCEDURE — 83540 ASSAY OF IRON: CPT

## 2024-11-26 PROCEDURE — 85730 THROMBOPLASTIN TIME PARTIAL: CPT

## 2024-11-26 PROCEDURE — 97166 OT EVAL MOD COMPLEX 45 MIN: CPT

## 2024-11-26 PROCEDURE — 81207 BCR/ABL1 GENE MINOR BP: CPT

## 2024-11-26 PROCEDURE — 81206 BCR/ABL1 GENE MAJOR BP: CPT

## 2024-11-26 PROCEDURE — 96374 THER/PROPH/DIAG INJ IV PUSH: CPT

## 2024-11-26 PROCEDURE — 0225U NFCT DS DNA&RNA 21 SARSCOV2: CPT

## 2024-11-26 PROCEDURE — 87637 SARSCOV2&INF A&B&RSV AMP PRB: CPT

## 2024-11-26 PROCEDURE — 36415 COLL VENOUS BLD VENIPUNCTURE: CPT

## 2024-11-26 PROCEDURE — 96375 TX/PRO/DX INJ NEW DRUG ADDON: CPT

## 2024-11-26 PROCEDURE — 83036 HEMOGLOBIN GLYCOSYLATED A1C: CPT

## 2024-11-26 PROCEDURE — 97112 NEUROMUSCULAR REEDUCATION: CPT

## 2024-11-26 PROCEDURE — 86140 C-REACTIVE PROTEIN: CPT

## 2024-11-26 PROCEDURE — 80053 COMPREHEN METABOLIC PANEL: CPT

## 2024-11-26 PROCEDURE — 93005 ELECTROCARDIOGRAM TRACING: CPT

## 2024-11-26 PROCEDURE — 83550 IRON BINDING TEST: CPT

## 2024-11-26 PROCEDURE — 97116 GAIT TRAINING THERAPY: CPT

## 2024-11-26 PROCEDURE — 80061 LIPID PANEL: CPT

## 2024-11-26 PROCEDURE — 82746 ASSAY OF FOLIC ACID SERUM: CPT

## 2024-11-26 PROCEDURE — 87040 BLOOD CULTURE FOR BACTERIA: CPT

## 2024-11-26 PROCEDURE — 85610 PROTHROMBIN TIME: CPT

## 2024-11-26 PROCEDURE — 82728 ASSAY OF FERRITIN: CPT

## 2024-11-26 PROCEDURE — 82550 ASSAY OF CK (CPK): CPT

## 2024-11-26 PROCEDURE — 84484 ASSAY OF TROPONIN QUANT: CPT

## 2024-11-26 PROCEDURE — 99285 EMERGENCY DEPT VISIT HI MDM: CPT | Mod: 25

## 2024-11-26 PROCEDURE — 97110 THERAPEUTIC EXERCISES: CPT

## 2024-11-26 PROCEDURE — 94760 N-INVAS EAR/PLS OXIMETRY 1: CPT

## 2024-11-26 PROCEDURE — 83605 ASSAY OF LACTIC ACID: CPT

## 2024-11-26 PROCEDURE — 85045 AUTOMATED RETICULOCYTE COUNT: CPT

## 2024-11-26 PROCEDURE — 97162 PT EVAL MOD COMPLEX 30 MIN: CPT

## 2024-11-26 PROCEDURE — 71275 CT ANGIOGRAPHY CHEST: CPT | Mod: MC

## 2024-11-26 PROCEDURE — 82607 VITAMIN B-12: CPT

## 2024-11-26 PROCEDURE — 82553 CREATINE MB FRACTION: CPT

## 2024-11-26 PROCEDURE — 85652 RBC SED RATE AUTOMATED: CPT

## 2024-12-09 NOTE — ED PROVIDER NOTE - SEVERE SEPSIS CRITERIA MET YN (MLM)
Addended by: SEE CRUZ on: 12/9/2024 12:26 PM     Modules accepted: Orders     Sepsis Criteria were met:

## 2025-01-07 ENCOUNTER — RX RENEWAL (OUTPATIENT)
Age: 70
End: 2025-01-07

## 2025-03-11 NOTE — REASON FOR VISIT
Quality 226: Preventive Care And Screening: Tobacco Use: Screening And Cessation Intervention: Patient screened for tobacco use and is an ex/non-smoker [Initial] : an initial visit Quality 431: Preventive Care And Screening: Unhealthy Alcohol Use - Screening: Patient not identified as an unhealthy alcohol user when screened for unhealthy alcohol use using a systematic screening method [Abnormal CXR/ Chest CT] : an abnormal CXR/ chest CT Detail Level: Detailed [COPD] : COPD

## 2025-03-28 ENCOUNTER — RX RENEWAL (OUTPATIENT)
Age: 70
End: 2025-03-28

## 2025-05-07 ENCOUNTER — INPATIENT (INPATIENT)
Facility: HOSPITAL | Age: 70
LOS: 7 days | Discharge: HOME | DRG: 189 | End: 2025-05-15
Attending: FAMILY MEDICINE | Admitting: INTERNAL MEDICINE
Payer: SELF-PAY

## 2025-05-07 VITALS
HEART RATE: 109 BPM | WEIGHT: 108.25 LBS | HEIGHT: 59 IN | OXYGEN SATURATION: 90 % | DIASTOLIC BLOOD PRESSURE: 80 MMHG | SYSTOLIC BLOOD PRESSURE: 149 MMHG | RESPIRATION RATE: 18 BRPM | TEMPERATURE: 98 F

## 2025-05-07 DIAGNOSIS — J96.11 CHRONIC RESPIRATORY FAILURE WITH HYPOXIA: ICD-10-CM

## 2025-05-07 DIAGNOSIS — Z98.89 OTHER SPECIFIED POSTPROCEDURAL STATES: Chronic | ICD-10-CM

## 2025-05-07 LAB
ALBUMIN SERPL ELPH-MCNC: 3.4 G/DL — SIGNIFICANT CHANGE UP (ref 3.3–5)
ALP SERPL-CCNC: 145 U/L — HIGH (ref 30–120)
ALT FLD-CCNC: 25 U/L — SIGNIFICANT CHANGE UP (ref 10–60)
ANION GAP SERPL CALC-SCNC: 5 MMOL/L — SIGNIFICANT CHANGE UP (ref 5–17)
APTT BLD: 29.3 SEC — SIGNIFICANT CHANGE UP (ref 26.1–36.8)
AST SERPL-CCNC: 19 U/L — SIGNIFICANT CHANGE UP (ref 10–40)
BASOPHILS # BLD AUTO: 0.15 K/UL — SIGNIFICANT CHANGE UP (ref 0–0.2)
BASOPHILS NFR BLD AUTO: 0.9 % — SIGNIFICANT CHANGE UP (ref 0–2)
BILIRUB SERPL-MCNC: 0.3 MG/DL — SIGNIFICANT CHANGE UP (ref 0.2–1.2)
BUN SERPL-MCNC: 7 MG/DL — SIGNIFICANT CHANGE UP (ref 7–23)
CALCIUM SERPL-MCNC: 10.1 MG/DL — SIGNIFICANT CHANGE UP (ref 8.4–10.5)
CHLORIDE SERPL-SCNC: 98 MMOL/L — SIGNIFICANT CHANGE UP (ref 96–108)
CO2 SERPL-SCNC: 34 MMOL/L — HIGH (ref 22–31)
CREAT SERPL-MCNC: 0.71 MG/DL — SIGNIFICANT CHANGE UP (ref 0.5–1.3)
EGFR: 93 ML/MIN/1.73M2 — SIGNIFICANT CHANGE UP
EGFR: 93 ML/MIN/1.73M2 — SIGNIFICANT CHANGE UP
EOSINOPHIL # BLD AUTO: 0.48 K/UL — SIGNIFICANT CHANGE UP (ref 0–0.5)
EOSINOPHIL NFR BLD AUTO: 3 % — SIGNIFICANT CHANGE UP (ref 0–6)
FLUAV AG NPH QL: SIGNIFICANT CHANGE UP
FLUBV AG NPH QL: SIGNIFICANT CHANGE UP
GLUCOSE SERPL-MCNC: 109 MG/DL — HIGH (ref 70–99)
HCT VFR BLD CALC: 36.5 % — SIGNIFICANT CHANGE UP (ref 34.5–45)
HGB BLD-MCNC: 11.5 G/DL — SIGNIFICANT CHANGE UP (ref 11.5–15.5)
IMM GRANULOCYTES NFR BLD AUTO: 0.4 % — SIGNIFICANT CHANGE UP (ref 0–0.9)
INR BLD: 0.97 RATIO — SIGNIFICANT CHANGE UP (ref 0.85–1.16)
LYMPHOCYTES # BLD AUTO: 1.86 K/UL — SIGNIFICANT CHANGE UP (ref 1–3.3)
LYMPHOCYTES # BLD AUTO: 11.7 % — LOW (ref 13–44)
MCHC RBC-ENTMCNC: 27.4 PG — SIGNIFICANT CHANGE UP (ref 27–34)
MCHC RBC-ENTMCNC: 31.5 G/DL — LOW (ref 32–36)
MCV RBC AUTO: 87.1 FL — SIGNIFICANT CHANGE UP (ref 80–100)
MONOCYTES # BLD AUTO: 1.16 K/UL — HIGH (ref 0–0.9)
MONOCYTES NFR BLD AUTO: 7.3 % — SIGNIFICANT CHANGE UP (ref 2–14)
NEUTROPHILS # BLD AUTO: 12.18 K/UL — HIGH (ref 1.8–7.4)
NEUTROPHILS NFR BLD AUTO: 76.7 % — SIGNIFICANT CHANGE UP (ref 43–77)
NRBC BLD AUTO-RTO: 0 /100 WBCS — SIGNIFICANT CHANGE UP (ref 0–0)
NT-PROBNP SERPL-SCNC: 79 PG/ML — SIGNIFICANT CHANGE UP (ref 0–125)
PLATELET # BLD AUTO: 705 K/UL — HIGH (ref 150–400)
POTASSIUM SERPL-MCNC: 3.8 MMOL/L — SIGNIFICANT CHANGE UP (ref 3.5–5.3)
POTASSIUM SERPL-SCNC: 3.8 MMOL/L — SIGNIFICANT CHANGE UP (ref 3.5–5.3)
PROT SERPL-MCNC: 8.1 G/DL — SIGNIFICANT CHANGE UP (ref 6–8.3)
PROTHROM AB SERPL-ACNC: 11.5 SEC — SIGNIFICANT CHANGE UP (ref 9.9–13.4)
RBC # BLD: 4.19 M/UL — SIGNIFICANT CHANGE UP (ref 3.8–5.2)
RBC # FLD: 13.9 % — SIGNIFICANT CHANGE UP (ref 10.3–14.5)
RSV RNA NPH QL NAA+NON-PROBE: SIGNIFICANT CHANGE UP
SARS-COV-2 RNA SPEC QL NAA+PROBE: SIGNIFICANT CHANGE UP
SODIUM SERPL-SCNC: 137 MMOL/L — SIGNIFICANT CHANGE UP (ref 135–145)
SOURCE RESPIRATORY: SIGNIFICANT CHANGE UP
TROPONIN I, HIGH SENSITIVITY RESULT: 5 NG/L — SIGNIFICANT CHANGE UP
WBC # BLD: 15.89 K/UL — HIGH (ref 3.8–10.5)
WBC # FLD AUTO: 15.89 K/UL — HIGH (ref 3.8–10.5)

## 2025-05-07 PROCEDURE — 93010 ELECTROCARDIOGRAM REPORT: CPT

## 2025-05-07 PROCEDURE — 99285 EMERGENCY DEPT VISIT HI MDM: CPT

## 2025-05-07 PROCEDURE — 71045 X-RAY EXAM CHEST 1 VIEW: CPT | Mod: 26

## 2025-05-07 RX ORDER — APIXABAN 2.5 MG/1
5 TABLET, FILM COATED ORAL ONCE
Refills: 0 | Status: COMPLETED | OUTPATIENT
Start: 2025-05-07 | End: 2025-05-07

## 2025-05-07 RX ORDER — IPRATROPIUM BROMIDE AND ALBUTEROL SULFATE .5; 2.5 MG/3ML; MG/3ML
3 SOLUTION RESPIRATORY (INHALATION) ONCE
Refills: 0 | Status: COMPLETED | OUTPATIENT
Start: 2025-05-07 | End: 2025-05-07

## 2025-05-07 RX ADMIN — IPRATROPIUM BROMIDE AND ALBUTEROL SULFATE 3 MILLILITER(S): .5; 2.5 SOLUTION RESPIRATORY (INHALATION) at 22:26

## 2025-05-07 NOTE — ED PROVIDER NOTE - NSICDXPASTMEDICALHX_GEN_ALL_CORE_FT
PAST MEDICAL HISTORY:  Afib     COPD (chronic obstructive pulmonary disease)     Depression     Drug abuse was addicted to pain killers    DVT (deep venous thrombosis)     History of COPD     History of protein S deficiency     HLD (hyperlipidemia)     HTN (hypertension)     Protein S deficiency     Pulmonary embolism

## 2025-05-07 NOTE — ED ADULT TRIAGE NOTE - RESPIRATORY RATE (BREATHS/MIN)
You will be called with your COVID-19 results today.    If You Test Positive for COVID-19 (Isolate)  Everyone, regardless of vaccination status.  Stay home for 5 days.  If you have no symptoms or your symptoms are resolving after 5 days, you can leave your house.  Continue to wear a mask around others for 5 additional days.  If you have a fever, continue to stay home until your fever resolves.      If You Were Exposed to Someone with COVID-19 (Quarantine)  If you: Completed the primary series of Pfizer or Moderna vaccine over 6 months ago and are not boosted OR Completed the primary series of J&J over 2 months ago and are not boosted OR  Are unvaccinated    Stay home for 5 days.  After that continue to wear a mask around others for 5 days.  If you can't quarantine you must wear a mask for 10 days.   Test on day 5 if possible  If you develop symptoms get a test and stay home     18

## 2025-05-07 NOTE — ED ADULT NURSE NOTE - NSICDXFAMILYHX_GEN_ALL_CORE_FT
FAMILY HISTORY:  Family history of lung cancer  No pertinent family history in first degree relatives    Sibling  Still living? Unknown  Family history of protein S deficiency, Age at diagnosis: Age Unknown

## 2025-05-07 NOTE — ED ADULT NURSE NOTE - NSFALLUNIVINTERV_ED_ALL_ED
Bed/Stretcher in lowest position, wheels locked, appropriate side rails in place/Call bell, personal items and telephone in reach/Instruct patient to call for assistance before getting out of bed/chair/stretcher/Non-slip footwear applied when patient is off stretcher/Merna to call system/Physically safe environment - no spills, clutter or unnecessary equipment/Purposeful proactive rounding/Room/bathroom lighting operational, light cord in reach

## 2025-05-07 NOTE — ED ADULT TRIAGE NOTE - CHIEF COMPLAINT QUOTE
"I haven't been able to take my medications for over a month" pt uses oxygen concentrator; c/o shortness of breath; c/o bilateral leg pain

## 2025-05-07 NOTE — ED PROVIDER NOTE - CLINICAL SUMMARY MEDICAL DECISION MAKING FREE TEXT BOX
69y f presenting hypoxic/sob, has copd and h/o PE, has been out of meds including eliquis for 2 months, sat now at rest on NC is 90s, will give nebulizer treatment, restart blood thinner, will need cta r/o PE given presentation and history, will require admission as pt unable to obtain her medications and will need SW consult to help pt navigate back to having insurance and to be able to fill critical medications

## 2025-05-07 NOTE — ED ADULT NURSE NOTE - SKIN TEMPERATURE
pt rec'd supine in bed on 2SW, LUE w/ splint/ACE, sling. pt initially refusing PT due to anticipation of pain, CP applied L shld and PT returned later and able to encourage pt to attempt OOBTC
Pre and post session: supine on stretcher. IV and monitor in place. O2 via nc. L UE cast/sling. R knee flexed due to pain(chronic-stated had recent cortizone shot) L UE Pain in L UE 9/10. Nsg aware. Pt given morphine. Will be transferred to 2SW. Refused OOB due to pain. Will assess next session.
warm

## 2025-05-07 NOTE — ED PROVIDER NOTE - OBJECTIVE STATEMENT
69y F with h/o COPD, protein s deficiency, h/o DVT/PE, presents for hypoxia/sob, pt had Medicaid/Medicare, but states about 2 months ago was dropped from Medicaid as she didn't know she needed to reapply and then due to losing Medicaid, her type of Medicare dropped her as well, she has run out of all her meds including her inhaler/neb solution and eliquis, she didn't speak with her PCP (Dr. Lara) as she didn't have insurance and didn't think she could talk to them, today at home noted her O2 sat was in the mid 80s, has an oxygen concentrator she was using at 2lpm, but wasn't enough, spoke to a family member who is a  and told her to come to the ED

## 2025-05-07 NOTE — ED PROVIDER NOTE - RESPIRATORY, MLM
equal bilaterally, mildly diminished, no audible wheeze, speaking in full sentences, not using accessory muscles

## 2025-05-08 DIAGNOSIS — D75.839 THROMBOCYTOSIS, UNSPECIFIED: ICD-10-CM

## 2025-05-08 DIAGNOSIS — R09.89 OTHER SPECIFIED SYMPTOMS AND SIGNS INVOLVING THE CIRCULATORY AND RESPIRATORY SYSTEMS: ICD-10-CM

## 2025-05-08 DIAGNOSIS — D72.829 ELEVATED WHITE BLOOD CELL COUNT, UNSPECIFIED: ICD-10-CM

## 2025-05-08 DIAGNOSIS — Z29.9 ENCOUNTER FOR PROPHYLACTIC MEASURES, UNSPECIFIED: ICD-10-CM

## 2025-05-08 DIAGNOSIS — J96.21 ACUTE AND CHRONIC RESPIRATORY FAILURE WITH HYPOXIA: ICD-10-CM

## 2025-05-08 DIAGNOSIS — I48.20 CHRONIC ATRIAL FIBRILLATION, UNSPECIFIED: ICD-10-CM

## 2025-05-08 PROBLEM — I10 ESSENTIAL (PRIMARY) HYPERTENSION: Chronic | Status: ACTIVE | Noted: 2024-11-05

## 2025-05-08 PROBLEM — Z86.2 PERSONAL HISTORY OF DISEASES OF THE BLOOD AND BLOOD-FORMING ORGANS AND CERTAIN DISORDERS INVOLVING THE IMMUNE MECHANISM: Chronic | Status: ACTIVE | Noted: 2024-11-05

## 2025-05-08 PROBLEM — E78.5 HYPERLIPIDEMIA, UNSPECIFIED: Chronic | Status: ACTIVE | Noted: 2024-11-05

## 2025-05-08 LAB
ANION GAP SERPL CALC-SCNC: 4 MMOL/L — LOW (ref 5–17)
BASE EXCESS BLDV CALC-SCNC: 8 MMOL/L — HIGH (ref -2–3)
BASOPHILS # BLD AUTO: 0.13 K/UL — SIGNIFICANT CHANGE UP (ref 0–0.2)
BASOPHILS NFR BLD AUTO: 0.9 % — SIGNIFICANT CHANGE UP (ref 0–2)
BUN SERPL-MCNC: 6 MG/DL — LOW (ref 7–23)
CALCIUM SERPL-MCNC: 10 MG/DL — SIGNIFICANT CHANGE UP (ref 8.4–10.5)
CHLORIDE SERPL-SCNC: 100 MMOL/L — SIGNIFICANT CHANGE UP (ref 96–108)
CO2 SERPL-SCNC: 36 MMOL/L — HIGH (ref 22–31)
CREAT SERPL-MCNC: 0.77 MG/DL — SIGNIFICANT CHANGE UP (ref 0.5–1.3)
EGFR: 84 ML/MIN/1.73M2 — SIGNIFICANT CHANGE UP
EGFR: 84 ML/MIN/1.73M2 — SIGNIFICANT CHANGE UP
EOSINOPHIL # BLD AUTO: 0.58 K/UL — HIGH (ref 0–0.5)
EOSINOPHIL NFR BLD AUTO: 3.9 % — SIGNIFICANT CHANGE UP (ref 0–6)
GAS PNL BLDV: SIGNIFICANT CHANGE UP
GLUCOSE SERPL-MCNC: 95 MG/DL — SIGNIFICANT CHANGE UP (ref 70–99)
HCO3 BLDV-SCNC: 33 MMOL/L — HIGH (ref 22–29)
HCT VFR BLD CALC: 34 % — LOW (ref 34.5–45)
HGB BLD-MCNC: 10.8 G/DL — LOW (ref 11.5–15.5)
HOROWITZ INDEX BLDV+IHG-RTO: 21 — SIGNIFICANT CHANGE UP
IMM GRANULOCYTES NFR BLD AUTO: 0.3 % — SIGNIFICANT CHANGE UP (ref 0–0.9)
LYMPHOCYTES # BLD AUTO: 1.57 K/UL — SIGNIFICANT CHANGE UP (ref 1–3.3)
LYMPHOCYTES # BLD AUTO: 10.6 % — LOW (ref 13–44)
MCHC RBC-ENTMCNC: 27.8 PG — SIGNIFICANT CHANGE UP (ref 27–34)
MCHC RBC-ENTMCNC: 31.8 G/DL — LOW (ref 32–36)
MCV RBC AUTO: 87.6 FL — SIGNIFICANT CHANGE UP (ref 80–100)
MONOCYTES # BLD AUTO: 1.03 K/UL — HIGH (ref 0–0.9)
MONOCYTES NFR BLD AUTO: 7 % — SIGNIFICANT CHANGE UP (ref 2–14)
NEUTROPHILS # BLD AUTO: 11.47 K/UL — HIGH (ref 1.8–7.4)
NEUTROPHILS NFR BLD AUTO: 77.3 % — HIGH (ref 43–77)
NRBC BLD AUTO-RTO: 0 /100 WBCS — SIGNIFICANT CHANGE UP (ref 0–0)
PCO2 BLDV: 56 MMHG — HIGH (ref 39–42)
PH BLDV: 7.38 — SIGNIFICANT CHANGE UP (ref 7.32–7.43)
PLATELET # BLD AUTO: 644 K/UL — HIGH (ref 150–400)
PO2 BLDV: 42 MMHG — SIGNIFICANT CHANGE UP (ref 25–45)
POTASSIUM SERPL-MCNC: 4.5 MMOL/L — SIGNIFICANT CHANGE UP (ref 3.5–5.3)
POTASSIUM SERPL-SCNC: 4.5 MMOL/L — SIGNIFICANT CHANGE UP (ref 3.5–5.3)
RBC # BLD: 3.88 M/UL — SIGNIFICANT CHANGE UP (ref 3.8–5.2)
RBC # FLD: 14.1 % — SIGNIFICANT CHANGE UP (ref 10.3–14.5)
SAO2 % BLDV: 53.9 % — LOW (ref 67–88)
SODIUM SERPL-SCNC: 140 MMOL/L — SIGNIFICANT CHANGE UP (ref 135–145)
WBC # BLD: 14.82 K/UL — HIGH (ref 3.8–10.5)
WBC # FLD AUTO: 14.82 K/UL — HIGH (ref 3.8–10.5)

## 2025-05-08 PROCEDURE — 99223 1ST HOSP IP/OBS HIGH 75: CPT

## 2025-05-08 PROCEDURE — 71275 CT ANGIOGRAPHY CHEST: CPT | Mod: 26

## 2025-05-08 RX ORDER — APIXABAN 2.5 MG/1
5 TABLET, FILM COATED ORAL
Refills: 0 | Status: DISCONTINUED | OUTPATIENT
Start: 2025-05-08 | End: 2025-05-15

## 2025-05-08 RX ORDER — MELATONIN 5 MG
3 TABLET ORAL ONCE
Refills: 0 | Status: COMPLETED | OUTPATIENT
Start: 2025-05-08 | End: 2025-05-08

## 2025-05-08 RX ORDER — DILTIAZEM HYDROCHLORIDE 120 MG/1
240 CAPSULE, EXTENDED RELEASE ORAL DAILY
Refills: 0 | Status: DISCONTINUED | OUTPATIENT
Start: 2025-05-08 | End: 2025-05-15

## 2025-05-08 RX ORDER — BUPRENORPHINE HYDROCHLORIDE, NALOXONE HYDROCHLORIDE 4; 1 MG/1; MG/1
2 FILM, SOLUBLE BUCCAL; SUBLINGUAL
Refills: 0 | Status: DISCONTINUED | OUTPATIENT
Start: 2025-05-08 | End: 2025-05-15

## 2025-05-08 RX ORDER — IPRATROPIUM BROMIDE AND ALBUTEROL SULFATE .5; 2.5 MG/3ML; MG/3ML
3 SOLUTION RESPIRATORY (INHALATION) EVERY 6 HOURS
Refills: 0 | Status: DISCONTINUED | OUTPATIENT
Start: 2025-05-08 | End: 2025-05-15

## 2025-05-08 RX ORDER — DEXTROMETHORPHAN HBR, GUAIFENESIN 20; 200 MG/10ML; MG/10ML
10 SOLUTION ORAL EVERY 4 HOURS
Refills: 0 | Status: DISCONTINUED | OUTPATIENT
Start: 2025-05-08 | End: 2025-05-15

## 2025-05-08 RX ORDER — VENLAFAXINE HYDROCHLORIDE 37.5 MG/1
37.5 CAPSULE, EXTENDED RELEASE ORAL DAILY
Refills: 0 | Status: DISCONTINUED | OUTPATIENT
Start: 2025-05-08 | End: 2025-05-14

## 2025-05-08 RX ORDER — TIOTROPIUM BROMIDE INHALATION SPRAY 3.12 UG/1
2 SPRAY, METERED RESPIRATORY (INHALATION) DAILY
Refills: 0 | Status: DISCONTINUED | OUTPATIENT
Start: 2025-05-08 | End: 2025-05-15

## 2025-05-08 RX ORDER — ACETAMINOPHEN 500 MG/5ML
650 LIQUID (ML) ORAL EVERY 6 HOURS
Refills: 0 | Status: DISCONTINUED | OUTPATIENT
Start: 2025-05-08 | End: 2025-05-15

## 2025-05-08 RX ADMIN — Medication 650 MILLIGRAM(S): at 15:50

## 2025-05-08 RX ADMIN — DILTIAZEM HYDROCHLORIDE 240 MILLIGRAM(S): 120 CAPSULE, EXTENDED RELEASE ORAL at 05:52

## 2025-05-08 RX ADMIN — APIXABAN 5 MILLIGRAM(S): 2.5 TABLET, FILM COATED ORAL at 00:08

## 2025-05-08 RX ADMIN — DEXTROMETHORPHAN HBR, GUAIFENESIN 10 MILLILITER(S): 20; 200 SOLUTION ORAL at 14:53

## 2025-05-08 RX ADMIN — Medication 1 DOSE(S): at 22:04

## 2025-05-08 RX ADMIN — VENLAFAXINE HYDROCHLORIDE 37.5 MILLIGRAM(S): 37.5 CAPSULE, EXTENDED RELEASE ORAL at 09:20

## 2025-05-08 RX ADMIN — Medication 650 MILLIGRAM(S): at 14:53

## 2025-05-08 RX ADMIN — Medication 1 DOSE(S): at 05:53

## 2025-05-08 RX ADMIN — APIXABAN 5 MILLIGRAM(S): 2.5 TABLET, FILM COATED ORAL at 23:16

## 2025-05-08 RX ADMIN — BUPRENORPHINE HYDROCHLORIDE, NALOXONE HYDROCHLORIDE 2 TABLET(S): 4; 1 FILM, SOLUBLE BUCCAL; SUBLINGUAL at 21:26

## 2025-05-08 RX ADMIN — APIXABAN 5 MILLIGRAM(S): 2.5 TABLET, FILM COATED ORAL at 11:22

## 2025-05-08 RX ADMIN — Medication 40 MILLIGRAM(S): at 05:52

## 2025-05-08 RX ADMIN — Medication 3 MILLIGRAM(S): at 22:21

## 2025-05-08 RX ADMIN — BUPRENORPHINE HYDROCHLORIDE, NALOXONE HYDROCHLORIDE 2 TABLET(S): 4; 1 FILM, SOLUBLE BUCCAL; SUBLINGUAL at 09:19

## 2025-05-08 NOTE — H&P ADULT - PROBLEM SELECTOR PLAN 3
again chronic & stable as per records review, never worked up as per her, consider hematology consult in am.

## 2025-05-08 NOTE — H&P ADULT - PROBLEM SELECTOR PLAN 5
very high risk of VTE, restarted her on Eliquis as stated above, no need for further DVT prophylaxis. Case management & SW consult in am to avoid further interruption of her DOAC given the high risk.

## 2025-05-08 NOTE — H&P ADULT - PROBLEM SELECTOR PLAN 4
currently in SR, resume her Eliquis given also her protein S deficiency history, and previous DVT & PE, also resumed her Diltiazem CD with hold parameters.

## 2025-05-08 NOTE — PATIENT PROFILE ADULT - FALL HARM RISK - HARM RISK INTERVENTIONS

## 2025-05-08 NOTE — CAREGIVER ENGAGEMENT NOTE - CAREGIVER EDUCATION NOTES - FREE TEXT
Mrs. Aggie Aceves is a 70y/o , domiciled white female, w/ PMHx & PPHx as indicated in H&P, who presents to [facility] secondary to hypoxia.  Armando and this  met w/ patient @ bedside on [address] [facility] for completion of care coordination assessment, @ which time patient appeared alert & oriented x4 and fully able to engage in discussion, though she did report long-standing anxiety and became intermittently tearful throughout interview as she was worried she was unable to provide answers to all questions asked.    Patient identified that she resides in a private home w/ her  Javier where there are 4 steps w/ railing to enter the home and zero steps inside that she must navigate. She reported that she is typically able to attend to her activities of daily living (ADLs) independently w/o the use of any assistive devices or handheld assist, but she does use continuous O2 which was dispensed to her by the durable medical equipment agency [employer] many years ago. Patient identified that she typically has [employer] Medicare/Medicaid dual plan, but she thinks her Medicaid may have lapsed last year b/c she was unaware she had to recertify yearly, and she thinks her Medicare lapsed more recently. Mrs. Aceves identified that her only source of income is her 's SSD, she does not receive any SSD or other benefits herself. Additionally, patient reported that her sister-in-law, Blossom Sinha, is her emergency contact as she helps patient answer questions when needed since patient gets anxious about being able to answer hospital staff's questions to the best of her ability -- per request of patient,  left voicemail for sister-IL @ [phone]. She identified that her primary care physician is Dr. Jonathon Arguello MD, of [facility], located @ [address], [address], [place] [address], phone [phone]. Her pharmacy in the community is [facility] @ [address] [place] [address], phone [phone].     sent secure email to hospital's financial counseling dept for further assistance.  also made referral to [facility] program to see if patient meets eligibility criteria for [place] Medicaid care coordination program given self-reported psychiatric diagnoses of anxiety & depression, as well as due to chronic medical condition of COPD w/ use of continuous O2 DME.

## 2025-05-08 NOTE — PROGRESS NOTE ADULT - PROBLEM SELECTOR PLAN 6
Chronic, stable as per patient  - Patient is on anticoagulation (eliquis 5 mg BID) as per heme/onc doctor who she does not regularly follow up with  - Recommend patient follow up with heme onc doctor upon discharge Chronic, stable as per patient  Patient is on anticoagulation (eliquis 5 mg BID) as per heme/onc doctor who she does not regularly follow up with  Recommend patient follow up with heme onc doctor upon discharge

## 2025-05-08 NOTE — CARE COORDINATION ASSESSMENT. - NSCAREPROVIDERS_GEN_ALL_CORE_FT
CARE PROVIDERS:  Accepting Physician: Ambrose Smith  Admitting: Ambrose Smith  Attending: Eulalio Hernandez  Consultant: Gilmar Lawrence  Covering Team: BEVERLEY Pino  ED Attending: Dorothy Moreno  ED Nurse: Po Castrejon  Infection Control: Mireya Ferrera  Nurse: Nataliya Goss  Nurse: Yesy Coelho  Nurse: Stacy Gardner  Nurse: Armando Welch  Outpatient Provider: Quang Taylor  Override: Yesy Coelho  PCA/Nursing Assistant: Leticia Bal  Primary Team: Ambrose Smith  Registered Dietitian: Lala Urias  Respiratory Therapy: Mxa Sousa  : Corazon Cage  Team: IVORY  Hospitalists, Team  UR// Supp. Assoc.: Funmilayo Suh

## 2025-05-08 NOTE — CASE MANAGEMENT PROGRESS NOTE - NSCMPROGRESSNOTE_GEN_ALL_CORE
CM addressed consult for home O2 and medications with  who will be following for lapsed insurance coverage. A CM remains available throughout stay.

## 2025-05-08 NOTE — PROGRESS NOTE ADULT - PROBLEM SELECTOR PLAN 5
Chronic  -Continue home diltiazem  -Monitor hemodynamics  -Pt with hx of tachycardia, stable as per pt and follows with cardiologist outpt, tele monitor show sinus tachy, range 90s to 140s,  discussed with cardio, will c/w current management   -DASH/TLC diet Chronic  Continue home diltiazem  Hx of tachycardia, stable as per pt and follows with cardiologist outpt, tele monitor show sinus tachy, range 90s to 140s,  discussed with cardio, will c/w current management   DASH/TLC diet

## 2025-05-08 NOTE — PROGRESS NOTE ADULT - PROBLEM SELECTOR PLAN 2
- Continuous pulse ox, SpO2 goal>88%   - Albuterol/Ipratroprium nebulizer q6hr, reassessment  - Prednisone 40mg QD x 5 days   procalcitonin WNL , RVP + Rhinovirus   CT chest: No evidence of  pulmonary emboli or other acute change compared to   8/19/2024.Emphysema with areas of bronchiectasis and small airway impaction similar   to prior.Stable 9 mm subpleural right upper lobe nodule. Recommend three-month follow-up CT.   however, some pachy opacities seen , concern for pneumonia.   ID eval noted :  completed levaquin course  add cough medication ATC , saline neb   pulmonary eval noted

## 2025-05-08 NOTE — H&P ADULT - HISTORY OF PRESENT ILLNESS
This is a 68 y/o F with PMH of HTN, Dyslipidemia, A. Fib on Eliquis, DVT/PE, ILD, COPD, Chronic Respiratory Failure on home oxygent, Protein S Deficiency, GERD, Drug Abuse, Anxiety, and Depression who presented with worsening SOB with dropping of her SPO2. Patient stooped all meds including her Eliquis over the past month because she lost her health insurance, also she doesn't have access to her PCP anymore for the same reason. Yesterday she was feeling more SOB, found that her SPO2 is down in the 80's so she came to the hospital. No reported fever or chills. At the ED she tested negative for influenza A & B, RSV, and COVID-19 PCR, and her CTA didn't show evidence of PE.

## 2025-05-08 NOTE — CONSULT NOTE ADULT - SUBJECTIVE AND OBJECTIVE BOX
Date/Time Patient Seen:  		  Referring MD:   Data Reviewed	       Patient is a 69y old  Female who presents with a chief complaint of SOB. (08 May 2025 15:11)      Subjective/HPI    Illness:  Reason for Admission: SOB.  History of Present Illness:   This is a 70 y/o F with PMH of HTN, Dyslipidemia, A. Fib on Eliquis, DVT/PE, ILD, COPD, Chronic Respiratory Failure on home oxygent, Protein S Deficiency, GERD, Drug Abuse, Anxiety, and Depression who presented with worsening SOB with dropping of her SPO2. Patient stooped all meds including her Eliquis over the past month because she lost her health insurance, also she doesn't have access to her PCP anymore for the same reason. Yesterday she was feeling more SOB, found that her SPO2 is down in the 80's so she came to the hospital. No reported fever or chills. At the ED she tested negative for influenza A & B, RSV, and COVID-19 PCR, and her CTA didn't show evidence of PE.        Review of Systems:  Review of Systems: -    Denied fever, chills, and chest pain.      Allergies and Intolerances:        Allergies:  	No Known Allergies:     Home Medications:   * Patient Currently Takes Medications as of 07-May-2025 23:09 documented in Structured Notes  · 	PriLOSEC OTC 20 mg oral delayed release tablet: Last Dose Taken:  , 1 tab(s) orally once a day  · 	DilTIAZem (Eqv-Cardizem CD) 240 mg/24 hours oral capsule, extended release: Last Dose Taken:  , 1 cap(s) orally once a day  · 	buprenorphine 2 mg sublingual tablet: Last Dose Taken:  , 2 tab(s) sublingually 2 times a day  · 	albuterol 2.5 mg/0.5 mL (0.5%) inhalation solution: Last Dose Taken:  , by nebulizer  · 	venlafaxine 37.5 mg oral capsule, extended release: Last Dose Taken:  , 1 cap(s) orally once a day  · 	Symbicort 160 mcg-4.5 mcg/inh inhalation aerosol: Last Dose Taken:  , 2 inhaled  · 	Eliquis 5 mg oral tablet: Last Dose Taken:  , 1 tab(s) orally 2 times a day  · 	Incruse Ellipta 62.5 mcg/inh inhalation powder: Last Dose Taken:  , 1 puff(s) inhaled once a day    .    Patient History:    Past Medical, Past Surgical, and Family History:  PAST MEDICAL HISTORY:  Afib     COPD (chronic obstructive pulmonary disease)     Depression     Drug abuse was addicted to pain killers    DVT (deep venous thrombosis)     History of COPD     History of protein S deficiency     HLD (hyperlipidemia)     HTN (hypertension)     Protein S deficiency     Pulmonary embolism.     PAST SURGICAL HISTORY:  History of back surgery     No significant past surgical history.     Social History:  · Substance use	No  · Social History (marital status, living situation, occupation, and sexual history)	-    , lives with family, former smoker, social ETOH, former drug abuse.     Tobacco Screening:  · Core Measure Site	No  · Has the patient used tobacco in the past 30 days?	No    Risk Assessment:    Present on Admission:  Deep Venous Thrombosis	suspected  Pulmonary Embolus	suspected  Urinary Catheter	no  Central Venous Catheter/PICC Line	no  Surgical Site Incision	no  Pressure Ulcer(s)	no     HIV Screening:  · In accordance with [facility] law, we offer every patient who comes to our ED an HIV test. Would you like to be tested today?	Opt out     Hepatitis C Test Questions:  · In accordance with [facility] Law, we offer every patient a Hepatitis C test. Would you like to be tested today?	Opt out    PAST MEDICAL & SURGICAL HISTORY:  Drug abuse  was addicted to pain killers    Protein S deficiency    Depression    COPD (chronic obstructive pulmonary disease)    DVT (deep venous thrombosis)    Pulmonary embolism    Afib    No pertinent past medical history    COPD exacerbation    History of COPD    HTN (hypertension)    HLD (hyperlipidemia)    History of protein S deficiency    History of back surgery    No significant past surgical history          Medication list         MEDICATIONS  (STANDING):  apixaban 5 milliGRAM(s) Oral two times a day  buprenorphine 2 mG/naloxone 0.5 mG SL  Tablet 2 Tablet(s) SubLingual two times a day  diltiazem    milliGRAM(s) Oral daily  fluticasone propionate/ salmeterol 250-50 MICROgram(s) Diskus 1 Dose(s) Inhalation two times a day  pantoprazole    Tablet 40 milliGRAM(s) Oral before breakfast  venlafaxine XR. 37.5 milliGRAM(s) Oral daily    MEDICATIONS  (PRN):  acetaminophen     Tablet .. 650 milliGRAM(s) Oral every 6 hours PRN Mild Pain (1 - 3)  albuterol/ipratropium for Nebulization 3 milliLiter(s) Nebulizer every 6 hours PRN Shortness of Breath and/or Wheezing  guaifenesin/dextromethorphan Oral Liquid 10 milliLiter(s) Oral every 4 hours PRN Cough         Vitals log        ICU Vital Signs Last 24 Hrs  T(C): 36.8 (08 May 2025 13:56), Max: 36.8 (07 May 2025 21:37)  T(F): 98.2 (08 May 2025 13:56), Max: 98.3 (08 May 2025 06:18)  HR: 81 (08 May 2025 13:56) (65 - 112)  BP: 106/54 (08 May 2025 13:56) (102/58 - 149/80)  BP(mean): --  ABP: --  ABP(mean): --  RR: 18 (08 May 2025 13:56) (16 - 30)  SpO2: 92% (08 May 2025 13:56) (90% - 99%)    O2 Parameters below as of 08 May 2025 13:56  Patient On (Oxygen Delivery Method): nasal cannula  O2 Flow (L/min): 2               Input and Output:  I&O's Detail      Lab Data                        10.8   14.82 )-----------( 644      ( 08 May 2025 06:00 )             34.0     05-08    140  |  100  |  6[L]  ----------------------------<  95  4.5   |  36[H]  |  0.77    Ca    10.0      08 May 2025 06:00    TPro  8.1  /  Alb  3.4  /  TBili  0.3  /  DBili  x   /  AST  19  /  ALT  25  /  AlkPhos  145[H]  05-07            Review of Systems	  sob  alvarez  weakness  anxious    Objective     Physical Examination      heart s1s2  lung dc bs  head nc  head at  abd soft    Pertinent Lab findings & Imaging      Dillon:  NO   Adequate UO     I&O's Detail           Discussed with:     Cultures:	        Radiology        ACC: [id number] EXAM:  CT ANGIO CHEST PULM ART [facility]   ORDERED BY: JAD HERRERA     PROCEDURE DATE:  05/08/2025          INTERPRETATION:  CLINICAL INFORMATION: Pulmonary embolism suspected.    COMPARISON: 11/4/2024    CONTRAST/COMPLICATIONS:  IV Contrast: Omnipaque 350  60 cc administered   40 cc discarded  Oral Contrast: NONE  .    PROCEDURE:  CT Angiography of the Chest.  Sagittal and coronal reformats were performed as well as 3D (MIP)   reconstructions.    FINDINGS:    LUNGS AND LARGE AIRWAYS: Patent central airways. Innumerable tree-in-bud   nodular densities which predominant in the lower lobes are consistent   with ongoing infectious/pulmonary process. His evidence of grossly stable   diffuse bronchiolectasis. Diffuse bronchial wall thickening, also   predominant in the lower lobes. Chronic interstitial lung disease, most   likely related to some type of lung fibrosis, and moderate-to-severe   emphysema. No evidence of consolidative pneumonia. No pleural effusions   or pneumothorax.  PLEURA: No pleural effusion.  VESSELS: Aortic calcifications. Coronary artery calcifications. No   pulmonary embolus.  HEART: Heart size is normal. No pericardial effusion.  MEDIASTINUM AND TRINIDAD: Enlarged distal lymph nodes with the largest in the   subcarinal region, measuring up to 1.2 cm, in the short axis. Enlarged   lateral hilar lymph nodes with the largest on the right measuring up to   1.2 cm in the short axis.  CHEST WALL AND LOWER NECK: Within normal limits.  VISUALIZED UPPER ABDOMEN: Large hiatal hernia.  BONES: Degenerative changes.    IMPRESSION:  1.  No pulmonary embolus.  2.  Ongoing chronic inflammatory/infectious process with innumerable   tree-in-bud nodular densities and mediastinal/hilar lymphadenopathy.          --- End of Report ---             ROBERTO REYNOLDS MD; Attending Radiologist  This document has been electronically signed. May  8 2025 12:57AM

## 2025-05-08 NOTE — PROGRESS NOTE ADULT - PROBLEM SELECTOR PLAN 1
due to COPD exacerbation, viral infection and secondary bacterial pneumonia   On home O2 2L NC, was increased to 3L   - RVP + Rhinovirus   - Continuous pulse ox, SpO2 goal>88%,  still need 3L   - Albuterol/Ipratroprium nebulizer q6hr  - Prednisone 40mg QD x 7 days  CT chest: No evidence of  pulmonary emboli or other acute change compared to   8/19/2024.Emphysema with areas of bronchiectasis and small airway impaction similar   to prior. Stable 9 mm subpleural right upper lobe nodule. Recommend three-month follow-up CT  ID eval noted : completed levaquin course  add cough medication ATC , saline neb   PT -> no skilled PT needs  improved, d/c plan due to COPD exacerbation, viral infection and secondary bacterial pneumonia   CT chest shows emphysema and areas of bronchiectasis and small airway impaction similar to prior  On home O2 2L NC, was increased to 3L this admission  - RVP + Rhinovirus   - Continuous pulse ox, SpO2 goal>88%,  still need 3L   - Albuterol/Ipratroprium nebulizer q6hr  - Prednisone 40mg QD x 7 days  CT chest: Stable 9 mm subpleural right upper lobe nodule. Recommend three-month follow-up CT  ID eval noted : completed levaquin course  add cough medication ATC , saline neb   PT -> no skilled PT needs  improved, d/c planning

## 2025-05-08 NOTE — CONSULT NOTE ADULT - ASSESSMENT
70 y/o F with PMH of HTN, Dyslipidemia, A. Fib on Eliquis, DVT/PE, ILD, COPD, Chronic Respiratory Failure on home oxygent, Protein S Deficiency, GERD, Drug Abuse, Anxiety, and Depression who presented with worsening SOB with dropping of her SPO2. Patient stooped all meds including her Eliquis over the past month because she lost her health insurance, also she doesn't have access to her PCP anymore    copd  bronchiectasis  OP  OA  VTE  Dyspnea  eval LRTI - PNA  HLD  HTN  GERD  Protein S Def  Anxiety  Depression    will benefit from ABX  cx - biomarkers -   ct neg for PE - c/w poss LRTI and COPD ex  NEBS prn - Inhaler rx regimen - consideration for Systemic Steroids  oral hygiene  skin care  [facility] rx regimen  BP control  on Eliquis - hx of VTE  on Suboxone - OUD hx  emotional support  goal sat > 88 pct

## 2025-05-08 NOTE — H&P ADULT - PROBLEM SELECTOR PLAN 2
Detail Level: Detailed
Add 57754 Cpt? (Important Note: In 2017 The Use Of 72176 Is Being Tracked By Cms To Determine Future Global Period Reimbursement For Global Periods): yes
chronic, stable, patient unaware of it but only knows about her thrombocytosis, never had w/u, consider hematology consult in am.

## 2025-05-08 NOTE — PROGRESS NOTE ADULT - PROBLEM SELECTOR PLAN 3
CT chest: No evidence of  pulmonary emboli or other acute change compared to   8/19/2024.Emphysema with areas of bronchiectasis and small airway impaction similar   to prior.Stable 9 mm subpleural right upper lobe nodule. Recommend three-month follow-up CT.   however, some pachy opacities seen , concern for pneumonia.   complete levaquin as per ID recommendation

## 2025-05-08 NOTE — H&P ADULT - PROBLEM SELECTOR PLAN 1
ML 2ry to medication non compliance, admitted to telemetry with continuous SPO2 monitoring, restart her on Advair (formulary for Symbicort) in addition to Albuterol/ Ipratropium neb Q 6h PRN, supplemental oxygen via NC, pulmonary consult with Dr. Lawrence was called, also SW & case management consult as she lost her health insurance.

## 2025-05-08 NOTE — H&P ADULT - ASSESSMENT
68 y/o F with PMH of HTN, Dyslipidemia, A. Fib on Eliquis, DVT/PE, ILD, COPD, Chronic Respiratory Failure on home oxygent, Protein S Deficiency, GERD, Drug Abuse, Anxiety, and Depression presented with worsening SOB.

## 2025-05-08 NOTE — PATIENT CHOICE NOTE. - NSPTCHOICESTATE_GEN_ALL_CORE

## 2025-05-08 NOTE — PROGRESS NOTE ADULT - PROBLEM SELECTOR PLAN 4
likely reactive, trending down   hematology eval noted, MPL, BCR/ABL test  ordered   start IVF: concern oral fluid intake not adequate

## 2025-05-08 NOTE — PROGRESS NOTE ADULT - PROBLEM SELECTOR PLAN 8
Anemia: no sign of active bleeding , Iron /folate/b12 level indicate anemia of chronic disease.   will monitor H/H, FOBT  started venofer as per Hematology recommendation

## 2025-05-08 NOTE — H&P ADULT - NSHPPHYSICALEXAM_GEN_ALL_CORE
-    Vital Signs Last 24 Hrs  T(C): 36.7 (08 May 2025 05:21), Max: 36.8 (07 May 2025 21:37)  T(F): 98 (08 May 2025 05:21), Max: 98.2 (07 May 2025 21:37)  HR: 80 (08 May 2025 05:21) (65 - 112)  BP: 114/96 (08 May 2025 05:21) (112/64 - 149/80)  BP(mean): --  RR: 20 (08 May 2025 05:21) (16 - 30)  SpO2: 97% (08 May 2025 05:21) (90% - 99%)    Parameters below as of 08 May 2025 05:21    O2 Flow (L/min): 4        PHYSICAL EXAM:  		  GENERAL: NAD, well-groomed, well-developed.  HEAD:  Atraumatic, Norm cephalic.  EYES: PERRLA, conjunctiva clear.  ENMT: no nasal discharge, MMM.   NECK: Supple, No JVD.  NERVOUS SYSTEM:  Alert & oriented X3, neurologically intact grossly.  CHEST/LUNG: Good air entry B/L, B/L lower lung zone rales, no rhonchi, or wheezing.  HEART: Normal S1 & acc S2, no murmurs, or extra sounds.  ABDOMEN: Soft, non-tender, non-distended; bowel sounds present, no palpable masses or organomegaly.  EXTREMITIES:  No clubbing, cyanosis, or edema.  VASCULAR: 2+ radial, DPA / PTA pulses B/L.  SKIN: No rashes or lesions.  PSYCH: normal affect & behavior.

## 2025-05-08 NOTE — CARE COORDINATION ASSESSMENT. - NSADDITIONAL INFORMATION_FT
Mrs. Aggie Aceves is a 68y/o , domiciled white female, w/ PMHx & PPHx as indicated in H&P, who presents to [facility] secondary to hypoxia.  Armando and this  met w/ patient @ bedside on [address] for completion of care coordination assessment, @ which time patient appeared alert & oriented x4 and fully able to engage in discussion, though she did report long-standing anxiety and became intermittently tearful throughout interview as she was worried she was unable to provide answers to all questions asked.    Patient identified that she resides in a private home w/ her  Javier where there are 4 steps w/ railing to enter the home and zero steps inside that she must navigate. She reported that she is typically able to attend to her activities of daily living (ADLs) independently w/o the use of any assistive devices or handheld assist, but she does use continuous O2 which was dispensed to her by the durable medical equipment agency [employer] many years ago. Patient identified that she typically has [employer] Medicare/Medicaid dual plan, but she thinks her Medicaid may have lapsed last year b/c she was unaware she had to recertify yearly, and she thinks her Medicare lapsed more recently. Mrs. Aceves identified that her only source of income is her 's SSD, she does not receive any SSD or other benefits herself. Additionally, patient reported that her sister-in-law, Blossom Sinha, is her emergency contact as she helps patient answer questions when needed since patient gets anxious about being able to answer hospital staff's questions to the best of her ability. She identified that her primary care physician is Dr. Jonathon Arguello MD, of [facility] [place], located @ [address], [address], [place] [address], phone [phone]. Her pharmacy in the community is [facility] @ [address] [place] [address], phone [phone].     sent secure email to hospital's financial counseling dept for further assistance.  also made referral to [facility] program to see if patient meets eligibility criteria for [place] Medicaid care coordination program given self-reported psychiatric diagnoses of anxiety & depression, as well as due to chronic medical condition of COPD w/ use of continuous O2 DME. Mrs. Aggie Aceves is a 70y/o , domiciled white female, w/ PMHx & PPHx as indicated in H&P, who presents to [facility] secondary to hypoxia.  Armando and this  met w/ patient @ bedside on [address] for completion of care coordination assessment, @ which time patient appeared alert & oriented x4 and fully able to engage in discussion, though she did report long-standing anxiety and became intermittently tearful throughout interview as she was worried she was unable to provide answers to all questions asked.    Patient identified that she resides in a private home w/ her  Javier where there are 4 steps w/ railing to enter the home and zero steps inside that she must navigate. She reported that she is typically able to attend to her activities of daily living (ADLs) independently w/o the use of any assistive devices or handheld assist, but she does use continuous O2 which was dispensed to her by the durable medical equipment agency [employer] many years ago. Patient identified that she typically has [employer] Medicare/Medicaid dual plan, but she thinks her Medicaid may have lapsed last year b/c she was unaware she had to recertify yearly, and she thinks her Medicare lapsed more recently. Mrs. Aceves identified that her only source of income is her 's SSD, she does not receive any SSD or other benefits herself. Additionally, patient reported that her sister-in-law, Blossom Sinha, is her emergency contact as she helps patient answer questions when needed since patient gets anxious about being able to answer hospital staff's questions to the best of her ability -- per request of patient,  left voicemail for sister-[place] @ [phone]. She identified that her primary care physician is Dr. Jonathon Arguello MD, of [facility] at [place], located @ [address], [address], [place] [address], phone [phone]. [facility] in the community is [facility] @ [address] [place] [id number], phone [phone].     sent secure email to hospital's financial counseling dept for further assistance.  also made referral to [facility] program to see if patient meets eligibility criteria for [place] Medicaid care coordination program given self-reported psychiatric diagnoses of anxiety & depression, as well as due to chronic medical condition of COPD w/ use of continuous O2 DME.

## 2025-05-08 NOTE — PROGRESS NOTE ADULT - SUBJECTIVE AND OBJECTIVE BOX
Patient is a 69y old  Female who presents with a chief complaint of SOB. (08 May 2025 04:51)      INTERVAL HPI/OVERNIGHT EVENTS:        REVIEW OF SYSTEMS:  CONSTITUTIONAL: No fever, weight loss, or fatigue  EYES: No eye pain, visual disturbances, or discharge  ENMT:  No difficulty hearing, tinnitus, vertigo; No sinus or throat pain  NECK: No pain or stiffness  RESPIRATORY: No cough, wheezing, chills or hemoptysis; No shortness of breath  CARDIOVASCULAR: No chest pain, palpitations, lightheadedness, or leg swelling  GASTROINTESTINAL: No abdominal or epigastric pain. No nausea, vomiting, or hematemesis; No diarrhea or constipation. No melena or hematochezia.  GENITOURINARY: No dysuria, frequency, hematuria, or incontinence  NEUROLOGICAL: No headaches, memory loss, vertigo, loss of strength, numbness, or tremors  SKIN: No itching, burning, rashes, or lesions   LYMPH NODES: No enlarged glands  ENDOCRINE: No heat or cold intolerance; No hair loss; No polydipsia or polyuria  MUSCULOSKELETAL: No joint pain or swelling; No muscle, back, or extremity pain  PSYCHIATRIC: No depression, anxiety, or mood swings  HEME/LYMPH: No easy bruising, or bleeding gums  ALLERGY AND IMMUNOLOGIC: No hives or eczema    PHYSICAL EXAM:  GENERAL: Adult Female, NAD, well-groomed, well-developed  HEAD:  Atraumatic, Normocephalic  EYES: EOMI, PERRLA, conjunctiva and sclera clear  ENMT: Moist mucous membranes, Good dentition, No lesions  NECK: Supple, No JVD appreciated  NERVOUS SYSTEM:  Alert & Oriented X3, Good concentration; All 4 extremities mobile, no gross sensory deficits.   CHEST/LUNG: Clear to auscultation bilaterally; No rales, rhonchi, wheezing, or rubs appreciated  HEART: Regular rate and rhythm; No murmurs, rubs, or gallops  ABDOMEN: Soft, Nontender, Nondistended  EXTREMITIES:  No clubbing, cyanosis, or edema appreciated  LYMPH: No lymphadenopathy noted  SKIN: No rashes or lesions appreciated    MEDICATIONS  (STANDING):  apixaban 5 milliGRAM(s) Oral two times a day  buprenorphine 2 mG/naloxone 0.5 mG SL  Tablet 2 Tablet(s) SubLingual two times a day  diltiazem    milliGRAM(s) Oral daily  fluticasone propionate/ salmeterol 250-50 MICROgram(s) Diskus 1 Dose(s) Inhalation two times a day  pantoprazole    Tablet 40 milliGRAM(s) Oral before breakfast  venlafaxine XR. 37.5 milliGRAM(s) Oral daily    MEDICATIONS  (PRN):  acetaminophen     Tablet .. 650 milliGRAM(s) Oral every 6 hours PRN Mild Pain (1 - 3)  albuterol/ipratropium for Nebulization 3 milliLiter(s) Nebulizer every 6 hours PRN Shortness of Breath and/or Wheezing  guaifenesin/dextromethorphan Oral Liquid 10 milliLiter(s) Oral every 4 hours PRN Cough      Allergies    No Known Allergies    Intolerances        Vital Signs Last 24 Hrs  T(C): 36.8 (08 May 2025 13:56), Max: 36.8 (07 May 2025 21:37)  T(F): 98.2 (08 May 2025 13:56), Max: 98.3 (08 May 2025 06:18)  HR: 81 (08 May 2025 13:56) (65 - 112)  BP: 106/54 (08 May 2025 13:56) (102/58 - 149/80)  BP(mean): --  RR: 18 (08 May 2025 13:56) (16 - 30)  SpO2: 92% (08 May 2025 13:56) (90% - 99%)    Parameters below as of 08 May 2025 13:56  Patient On (Oxygen Delivery Method): nasal cannula  O2 Flow (L/min): 2      LABS:                        10.8   14.82 )-----------( 644      ( 08 May 2025 06:00 )             34.0     08 May 2025 06:00    140    |  100    |  6      ----------------------------<  95     4.5     |  36     |  0.77     Ca    10.0       08 May 2025 06:00    TPro  8.1    /  Alb  3.4    /  TBili  0.3    /  DBili  x      /  AST  19     /  ALT  25     /  AlkPhos  145    07 May 2025 22:22    PT/INR - ( 07 May 2025 23:07 )   PT: 11.5 sec;   INR: 0.97 ratio         PTT - ( 07 May 2025 23:07 )  PTT:29.3 sec  Urinalysis Basic - ( 08 May 2025 06:00 )    Color: x / Appearance: x / SG: x / pH: x  Gluc: 95 mg/dL / Ketone: x  / Bili: x / Urobili: x   Blood: x / Protein: x / Nitrite: x   Leuk Esterase: x / RBC: x / WBC x   Sq Epi: x / Non Sq Epi: x / Bacteria: x      CAPILLARY BLOOD GLUCOSE     Patient is a 69y old  Female who presents with a chief complaint of SOB. (08 May 2025 04:51)      INTERVAL HPI/OVERNIGHT EVENTS:  Patient seen awake and laying in bed. She reports productive cough, reports breathing on oxygen today, denies chest pain. No reported overnight events.       REVIEW OF SYSTEMS:  CONSTITUTIONAL: No fever, weight loss, or fatigue  EYES: No eye pain, visual disturbances, or discharge  ENMT:  No difficulty hearing, tinnitus, vertigo; No sinus or throat pain  NECK: No pain or stiffness  RESPIRATORY: Productive cough, wheezing, no chills or hemoptysis; No shortness of breath  CARDIOVASCULAR: No chest pain, palpitations, lightheadedness, or leg swelling  GASTROINTESTINAL: No abdominal or epigastric pain. No nausea, vomiting, or hematemesis; No diarrhea or constipation. No melena or hematochezia.  GENITOURINARY: No dysuria, frequency, hematuria, or incontinence  NEUROLOGICAL: No headaches, memory loss, vertigo, loss of strength, numbness, or tremors  SKIN: No itching, burning, rashes, or lesions   LYMPH NODES: No enlarged glands  MUSCULOSKELETAL: No joint pain or swelling; No muscle, back, or extremity pain      PHYSICAL EXAM:  GENERAL: Adult Female, NAD, well-groomed, well-developed  HEAD:  Atraumatic, Normocephalic  EYES: EOMI, PERRLA, conjunctiva and sclera clear  ENMT: Moist mucous membranes, Good dentition, No lesions  NECK: Supple, No JVD appreciated  NERVOUS SYSTEM:  Alert & Oriented X3, Good concentration; All 4 extremities mobile, no gross sensory deficits.   CHEST/LUNG: Diminished to auscultation bilaterally; Scattered wheezes, No rales, rhonchi, or rubs appreciated  HEART: Regular rate and rhythm; No murmurs, rubs, or gallops  ABDOMEN: Soft, Nontender, Nondistended  EXTREMITIES:  No clubbing, cyanosis, or edema appreciated  LYMPH: No lymphadenopathy noted  SKIN: No rashes or lesions appreciated    MEDICATIONS  (STANDING):  apixaban 5 milliGRAM(s) Oral two times a day  buprenorphine 2 mG/naloxone 0.5 mG SL  Tablet 2 Tablet(s) SubLingual two times a day  diltiazem    milliGRAM(s) Oral daily  fluticasone propionate/ salmeterol 250-50 MICROgram(s) Diskus 1 Dose(s) Inhalation two times a day  pantoprazole    Tablet 40 milliGRAM(s) Oral before breakfast  venlafaxine XR. 37.5 milliGRAM(s) Oral daily    MEDICATIONS  (PRN):  acetaminophen     Tablet .. 650 milliGRAM(s) Oral every 6 hours PRN Mild Pain (1 - 3)  albuterol/ipratropium for Nebulization 3 milliLiter(s) Nebulizer every 6 hours PRN Shortness of Breath and/or Wheezing  guaifenesin/dextromethorphan Oral Liquid 10 milliLiter(s) Oral every 4 hours PRN Cough      Allergies    No Known Allergies    Intolerances        Vital Signs Last 24 Hrs  T(C): 36.8 (08 May 2025 13:56), Max: 36.8 (07 May 2025 21:37)  T(F): 98.2 (08 May 2025 13:56), Max: 98.3 (08 May 2025 06:18)  HR: 81 (08 May 2025 13:56) (65 - 112)  BP: 106/54 (08 May 2025 13:56) (102/58 - 149/80)  BP(mean): --  RR: 18 (08 May 2025 13:56) (16 - 30)  SpO2: 92% (08 May 2025 13:56) (90% - 99%)    Parameters below as of 08 May 2025 13:56  Patient On (Oxygen Delivery Method): nasal cannula  O2 Flow (L/min): 2      LABS:                        10.8   14.82 )-----------( 644      ( 08 May 2025 06:00 )             34.0     08 May 2025 06:00    140    |  100    |  6      ----------------------------<  95     4.5     |  36     |  0.77     Ca    10.0       08 May 2025 06:00    TPro  8.1    /  Alb  3.4    /  TBili  0.3    /  DBili  x      /  AST  19     /  ALT  25     /  AlkPhos  145    07 May 2025 22:22    PT/INR - ( 07 May 2025 23:07 )   PT: 11.5 sec;   INR: 0.97 ratio         PTT - ( 07 May 2025 23:07 )  PTT:29.3 sec  Urinalysis Basic - ( 08 May 2025 06:00 )    Color: x / Appearance: x / SG: x / pH: x  Gluc: 95 mg/dL / Ketone: x  / Bili: x / Urobili: x   Blood: x / Protein: x / Nitrite: x   Leuk Esterase: x / RBC: x / WBC x   Sq Epi: x / Non Sq Epi: x / Bacteria: x      CAPILLARY BLOOD GLUCOSE

## 2025-05-08 NOTE — H&P ADULT - NSHPLABSRESULTS_GEN_ALL_CORE
-                          11.5   15.89 )-----------( 705      ( 07 May 2025 22:22 )             36.5       07 May 2025 22:22    137    |  98     |  7      ----------------------------<  109    3.8     |  34     |  0.71     Ca    10.1       07 May 2025 22:22    TPro  8.1    /  Alb  3.4    /  TBili  0.3    /  DBili  x      /  AST  19     /  ALT  25     /  AlkPhos  145    07 May 2025 22:22    LIVER FUNCTIONS - ( 07 May 2025 22:22 )  Alb: 3.4 g/dL / Pro: 8.1 g/dL / ALK PHOS: 145 U/L / ALT: 25 U/L / AST: 19 U/L / GGT: x           PT/INR - ( 07 May 2025 23:07 )   PT: 11.5 sec;   INR: 0.97 ratio    PTT - ( 07 May 2025 23:07 )  PTT:29.3 sec      Urinalysis Basic - ( 07 May 2025 22:22 )  Color: x / Appearance: x / SG: x / pH: x  Gluc: 109 mg/dL / Ketone: x  / Bili: x / Urobili: x   Blood: x / Protein: x / Nitrite: x   Leuk Esterase: x / RBC: x / WBC x   Sq Epi: x / Non Sq Epi: x / Bacteria: x -                          11.5   15.89 )-----------( 705      ( 07 May 2025 22:22 )             36.5       07 May 2025 22:22    137    |  98     |  7      ----------------------------<  109    3.8     |  34     |  0.71     Ca    10.1       07 May 2025 22:22    TPro  8.1    /  Alb  3.4    /  TBili  0.3    /  DBili  x      /  AST  19     /  ALT  25     /  AlkPhos  145    07 May 2025 22:22    LIVER FUNCTIONS - ( 07 May 2025 22:22 )  Alb: 3.4 g/dL / Pro: 8.1 g/dL / ALK PHOS: 145 U/L / ALT: 25 U/L / AST: 19 U/L / GGT: x           PT/INR - ( 07 May 2025 23:07 )   PT: 11.5 sec;   INR: 0.97 ratio    PTT - ( 07 May 2025 23:07 )  PTT:29.3 sec    Urinalysis Basic - ( 07 May 2025 22:22 )  Color: x / Appearance: x / SG: x / pH: x  Gluc: 109 mg/dL / Ketone: x  / Bili: x / Urobili: x   Blood: x / Protein: x / Nitrite: x   Leuk Esterase: x / RBC: x / WBC x   Sq Epi: x / Non Sq Epi: x / Bacteria: x    Troponin I, High Sensitivity (05.07.25 @ 22:22)   Troponin I, High Sensitivity Result: 5.0  Pro-Brain Natriuretic Peptide (05.07.25 @ 22:22)   Pro-Brain Natriuretic Peptide: 79 pg/mL      FluA/FluB/RSV/COVID-19 PCR (05.07.25 @ 22:22)   SARS-CoV-2 Result: Not Detected.  Influenza A Result: Not Detected.  Influenza B Result: Not Detected.  Resp Syn Virus Result: Not Detected.  Source Respiratory: Nasopharyngeal         CT ANGIO CHEST PULM ART WAWIC     PROCEDURE DATE:  05/08/2025    INTERPRETATION:  CLINICAL INFORMATION: Pulmonary embolism suspected.  COMPARISON: 11/4/2024  CONTRAST/COMPLICATIONS:  IV Contrast: Omnipaque 350  60 cc administered   40 cc discarded  Oral Contrast: NONE  PROCEDURE:  CT Angiography of the Chest.  Sagittal and coronal reformats were performed as well as 3D (MIP) reconstructions.  FINDINGS:  LUNGS AND LARGE AIRWAYS: Patent central airways. Innumerable tree-in-bud nodular densities which predominant in the lower lobes are consistent with ongoing infectious/pulmonary process. is evidence of grossly stable diffuse bronchiolectasis. Diffuse bronchial wall thickening,   Predominant in the lower lobes. Chronic interstitial lung disease, most likely related to some type of lung fibrosis, and moderate-to-severe emphysema. No evidence of consolidative pneumonia. No pleural effusions or pneumothorax.  PLEURA: No pleural effusion.  VESSELS: Aortic calcifications. Coronary artery calcifications. No pulmonary embolus.  HEART: Heart size is normal. No pericardial effusion.  MEDIASTINUM AND TRINIDAD: Enlarged distal lymph nodes with the largest in the subcarinal region, measuring up to 1.2 cm, in the short axis. Enlarged lateral hilar lymph nodes with the largest on the right measuring up to 1.2 cm in the short axis.  CHEST WALL AND LOWER NECK: Within normal limits.  VISUALIZED UPPER ABDOMEN: Large hiatal hernia.  BONES: Degenerative changes.  IMPRESSION:  1.  No pulmonary embolus.  2.  Ongoing chronic inflammatory/infectious process with innumerable tree-in-bud nodular densities and mediastinal/hilar lymphadenopathy.        CXR:    As per my review shows emphysematous chest, normal cardiac shadow size, clear lung fields B/L, no pulmonary infiltrates, pleural effusion, or pneumothorax. Pending official report.         EKG:    As per my review shows SR at 95/min, p pulmonale, normal IL & QTc intervals, normal QRS voltage, duration, and axis (+45), with normal transition, no ST-T abnormality.    - -                          11.5   15.89 )-----------( 705      ( 07 May 2025 22:22 )             36.5       07 May 2025 22:22    137    |  98     |  7      ----------------------------<  109    3.8     |  34     |  0.71     Ca    10.1       07 May 2025 22:22    TPro  8.1    /  Alb  3.4    /  TBili  0.3    /  DBili  x      /  AST  19     /  ALT  25     /  AlkPhos  145    07 May 2025 22:22    LIVER FUNCTIONS - ( 07 May 2025 22:22 )  Alb: 3.4 g/dL / Pro: 8.1 g/dL / ALK PHOS: 145 U/L / ALT: 25 U/L / AST: 19 U/L / GGT: x           PT/INR - ( 07 May 2025 23:07 )   PT: 11.5 sec;   INR: 0.97 ratio    PTT - ( 07 May 2025 23:07 )  PTT:29.3 sec    Urinalysis Basic - ( 07 May 2025 22:22 )  Color: x / Appearance: x / SG: x / pH: x  Gluc: 109 mg/dL / Ketone: x  / Bili: x / Urobili: x   Blood: x / Protein: x / Nitrite: x   Leuk Esterase: x / RBC: x / WBC x   Sq Epi: x / Non Sq Epi: x / Bacteria: x    Troponin I, High Sensitivity (05.07.25 @ 22:22)   Troponin I, High Sensitivity Result: 5.0  Pro-Brain Natriuretic Peptide (05.07.25 @ 22:22)   Pro-Brain Natriuretic Peptide: 79 pg/mL      FluA/FluB/RSV/COVID-19 PCR (05.07.25 @ 22:22)   SARS-CoV-2 Result: Not Detected.  Influenza A Result: Not Detected.  Influenza B Result: Not Detected.  Resp Syn Virus Result: Not Detected.  Source Respiratory: Nasopharyngeal         CT ANGIO CHEST PULM ART [facility]IC     PROCEDURE DATE:  05/08/2025    INTERPRETATION:  CLINICAL INFORMATION: Pulmonary embolism suspected.  COMPARISON: 11/4/2024  CONTRAST/COMPLICATIONS:  IV Contrast: Omnipaque 350  60 cc administered   40 cc discarded  Oral Contrast: NONE  PROCEDURE:  CT Angiography of the Chest.  Sagittal and coronal reformats were performed as well as 3D (MIP) reconstructions.  FINDINGS:  LUNGS AND LARGE AIRWAYS: Patent central airways. Innumerable tree-in-bud nodular densities which predominant in the lower lobes are consistent with ongoing infectious/pulmonary process. is evidence of grossly stable diffuse bronchiolectasis. Diffuse bronchial wall thickening,   Predominant in the lower lobes. Chronic interstitial lung disease, most likely related to some type of lung fibrosis, and moderate-to-severe emphysema. No evidence of consolidative pneumonia. No pleural effusions or pneumothorax.  PLEURA: No pleural effusion.  VESSELS: Aortic calcifications. Coronary artery calcifications. No pulmonary embolus.  HEART: Heart size is normal. No pericardial effusion.  MEDIASTINUM AND TRINIDAD: Enlarged distal lymph nodes with the largest in the subcarinal region, measuring up to 1.2 cm, in the short axis. Enlarged lateral hilar lymph nodes with the largest on the right measuring up to 1.2 cm in the short axis.  CHEST WALL AND LOWER NECK: Within normal limits.  VISUALIZED UPPER ABDOMEN: Large hiatal hernia.  BONES: Degenerative changes.  IMPRESSION:  1.  No pulmonary embolus.  2.  Ongoing chronic inflammatory/infectious process with innumerable tree-in-bud nodular densities and mediastinal/hilar lymphadenopathy.        CXR:    As per my review shows emphysematous chest, normal cardiac shadow size, clear lung fields B/L, no pulmonary infiltrates, pleural effusion, or pneumothorax. Pending official report.         EKG:    As per my review shows SR at 95/min, P-pulmonale, normal MS & QTc intervals, normal QRS voltage, duration, and axis (+45), with normal transition, no ST-T abnormality.    -

## 2025-05-09 DIAGNOSIS — Z29.9 ENCOUNTER FOR PROPHYLACTIC MEASURES, UNSPECIFIED: ICD-10-CM

## 2025-05-09 DIAGNOSIS — I10 ESSENTIAL (PRIMARY) HYPERTENSION: ICD-10-CM

## 2025-05-09 DIAGNOSIS — J44.1 CHRONIC OBSTRUCTIVE PULMONARY DISEASE WITH (ACUTE) EXACERBATION: ICD-10-CM

## 2025-05-09 DIAGNOSIS — J18.9 PNEUMONIA, UNSPECIFIED ORGANISM: ICD-10-CM

## 2025-05-09 DIAGNOSIS — Z86.2 PERSONAL HISTORY OF DISEASES OF THE BLOOD AND BLOOD-FORMING ORGANS AND CERTAIN DISORDERS INVOLVING THE IMMUNE MECHANISM: ICD-10-CM

## 2025-05-09 LAB
ANION GAP SERPL CALC-SCNC: 6 MMOL/L — SIGNIFICANT CHANGE UP (ref 5–17)
BUN SERPL-MCNC: 9 MG/DL — SIGNIFICANT CHANGE UP (ref 7–23)
CALCIUM SERPL-MCNC: 9.8 MG/DL — SIGNIFICANT CHANGE UP (ref 8.4–10.5)
CHLORIDE SERPL-SCNC: 100 MMOL/L — SIGNIFICANT CHANGE UP (ref 96–108)
CO2 SERPL-SCNC: 34 MMOL/L — HIGH (ref 22–31)
CREAT SERPL-MCNC: 0.74 MG/DL — SIGNIFICANT CHANGE UP (ref 0.5–1.3)
CRP SERPL-MCNC: 61 MG/L — HIGH
EGFR: 88 ML/MIN/1.73M2 — SIGNIFICANT CHANGE UP
EGFR: 88 ML/MIN/1.73M2 — SIGNIFICANT CHANGE UP
GLUCOSE SERPL-MCNC: 96 MG/DL — SIGNIFICANT CHANGE UP (ref 70–99)
HCT VFR BLD CALC: 34.1 % — LOW (ref 34.5–45)
HGB BLD-MCNC: 10.9 G/DL — LOW (ref 11.5–15.5)
MAGNESIUM SERPL-MCNC: 1.9 MG/DL — SIGNIFICANT CHANGE UP (ref 1.6–2.6)
MCHC RBC-ENTMCNC: 27.9 PG — SIGNIFICANT CHANGE UP (ref 27–34)
MCHC RBC-ENTMCNC: 32 G/DL — SIGNIFICANT CHANGE UP (ref 32–36)
MCV RBC AUTO: 87.4 FL — SIGNIFICANT CHANGE UP (ref 80–100)
NRBC BLD AUTO-RTO: 0 /100 WBCS — SIGNIFICANT CHANGE UP (ref 0–0)
PLATELET # BLD AUTO: 624 K/UL — HIGH (ref 150–400)
POTASSIUM SERPL-MCNC: 3.7 MMOL/L — SIGNIFICANT CHANGE UP (ref 3.5–5.3)
POTASSIUM SERPL-SCNC: 3.7 MMOL/L — SIGNIFICANT CHANGE UP (ref 3.5–5.3)
PROCALCITONIN SERPL-MCNC: 0.07 NG/ML — SIGNIFICANT CHANGE UP (ref 0.02–0.1)
RBC # BLD: 3.9 M/UL — SIGNIFICANT CHANGE UP (ref 3.8–5.2)
RBC # FLD: 14 % — SIGNIFICANT CHANGE UP (ref 10.3–14.5)
SODIUM SERPL-SCNC: 140 MMOL/L — SIGNIFICANT CHANGE UP (ref 135–145)
TSH SERPL-MCNC: 1 UIU/ML — SIGNIFICANT CHANGE UP (ref 0.27–4.2)
WBC # BLD: 10.22 K/UL — SIGNIFICANT CHANGE UP (ref 3.8–10.5)
WBC # FLD AUTO: 10.22 K/UL — SIGNIFICANT CHANGE UP (ref 3.8–10.5)

## 2025-05-09 PROCEDURE — 99232 SBSQ HOSP IP/OBS MODERATE 35: CPT

## 2025-05-09 RX ORDER — MELATONIN 5 MG
3 TABLET ORAL AT BEDTIME
Refills: 0 | Status: DISCONTINUED | OUTPATIENT
Start: 2025-05-09 | End: 2025-05-13

## 2025-05-09 RX ORDER — BENZONATATE 100 MG
100 CAPSULE ORAL THREE TIMES A DAY
Refills: 0 | Status: DISCONTINUED | OUTPATIENT
Start: 2025-05-09 | End: 2025-05-15

## 2025-05-09 RX ADMIN — BUPRENORPHINE HYDROCHLORIDE, NALOXONE HYDROCHLORIDE 2 TABLET(S): 4; 1 FILM, SOLUBLE BUCCAL; SUBLINGUAL at 21:37

## 2025-05-09 RX ADMIN — Medication 1 DOSE(S): at 06:44

## 2025-05-09 RX ADMIN — APIXABAN 5 MILLIGRAM(S): 2.5 TABLET, FILM COATED ORAL at 23:09

## 2025-05-09 RX ADMIN — APIXABAN 5 MILLIGRAM(S): 2.5 TABLET, FILM COATED ORAL at 10:51

## 2025-05-09 RX ADMIN — Medication 3 MILLIGRAM(S): at 21:37

## 2025-05-09 RX ADMIN — DILTIAZEM HYDROCHLORIDE 240 MILLIGRAM(S): 120 CAPSULE, EXTENDED RELEASE ORAL at 09:18

## 2025-05-09 RX ADMIN — Medication 100 MILLIGRAM(S): at 21:37

## 2025-05-09 RX ADMIN — VENLAFAXINE HYDROCHLORIDE 37.5 MILLIGRAM(S): 37.5 CAPSULE, EXTENDED RELEASE ORAL at 10:51

## 2025-05-09 RX ADMIN — TIOTROPIUM BROMIDE INHALATION SPRAY 2 PUFF(S): 3.12 SPRAY, METERED RESPIRATORY (INHALATION) at 06:44

## 2025-05-09 RX ADMIN — BUPRENORPHINE HYDROCHLORIDE, NALOXONE HYDROCHLORIDE 2 TABLET(S): 4; 1 FILM, SOLUBLE BUCCAL; SUBLINGUAL at 09:18

## 2025-05-09 RX ADMIN — Medication 40 MILLIGRAM(S): at 06:44

## 2025-05-09 RX ADMIN — Medication 1 DOSE(S): at 18:57

## 2025-05-09 NOTE — PROGRESS NOTE ADULT - SUBJECTIVE AND OBJECTIVE BOX
Patient is a 69y old  Female who presents with a chief complaint of SOB. (09 May 2025 05:33)      INTERVAL HPI/OVERNIGHT EVENTS:        REVIEW OF SYSTEMS:  CONSTITUTIONAL: No fever, weight loss, or fatigue  EYES: No eye pain, visual disturbances, or discharge  ENMT:  No difficulty hearing, tinnitus, vertigo; No sinus or throat pain  NECK: No pain or stiffness  RESPIRATORY: No cough, wheezing, chills or hemoptysis; No shortness of breath  CARDIOVASCULAR: No chest pain, palpitations, lightheadedness, or leg swelling  GASTROINTESTINAL: No abdominal or epigastric pain. No nausea, vomiting, or hematemesis; No diarrhea or constipation. No melena or hematochezia.  GENITOURINARY: No dysuria, frequency, hematuria, or incontinence  NEUROLOGICAL: No headaches, memory loss, vertigo, loss of strength, numbness, or tremors  SKIN: No itching, burning, rashes, or lesions   LYMPH NODES: No enlarged glands  ENDOCRINE: No heat or cold intolerance; No hair loss; No polydipsia or polyuria  MUSCULOSKELETAL: No joint pain or swelling; No muscle, back, or extremity pain  PSYCHIATRIC: No depression, anxiety, or mood swings  HEME/LYMPH: No easy bruising, or bleeding gums  ALLERGY AND IMMUNOLOGIC: No hives or eczema    PHYSICAL EXAM:  GENERAL: Adult Female, NAD, well-groomed, well-developed  HEAD:  Atraumatic, Normocephalic  EYES: EOMI, PERRLA, conjunctiva and sclera clear  ENMT: Moist mucous membranes, Good dentition, No lesions  NECK: Supple, No JVD appreciated  NERVOUS SYSTEM:  Alert & Oriented X3, Good concentration; All 4 extremities mobile, no gross sensory deficits.   CHEST/LUNG: Clear to auscultation bilaterally; No rales, rhonchi, wheezing, or rubs appreciated  HEART: Regular rate and rhythm; No murmurs, rubs, or gallops  ABDOMEN: Soft, Nontender, Nondistended  EXTREMITIES:  No clubbing, cyanosis, or edema appreciated  LYMPH: No lymphadenopathy noted  SKIN: No rashes or lesions appreciated    MEDICATIONS  (STANDING):  apixaban 5 milliGRAM(s) Oral two times a day  buprenorphine 2 mG/naloxone 0.5 mG SL  Tablet 2 Tablet(s) SubLingual two times a day  diltiazem    milliGRAM(s) Oral daily  fluticasone propionate/ salmeterol 250-50 MICROgram(s) Diskus 1 Dose(s) Inhalation two times a day  pantoprazole    Tablet 40 milliGRAM(s) Oral before breakfast  tiotropium 2.5 MICROgram(s) Inhaler 2 Puff(s) Inhalation daily  venlafaxine XR. 37.5 milliGRAM(s) Oral daily    MEDICATIONS  (PRN):  acetaminophen     Tablet .. 650 milliGRAM(s) Oral every 6 hours PRN Mild Pain (1 - 3)  albuterol/ipratropium for Nebulization 3 milliLiter(s) Nebulizer every 6 hours PRN Shortness of Breath and/or Wheezing  guaifenesin/dextromethorphan Oral Liquid 10 milliLiter(s) Oral every 4 hours PRN Cough      Allergies    No Known Allergies    Intolerances        Vital Signs Last 24 Hrs  T(C): 36.7 (09 May 2025 09:15), Max: 36.8 (08 May 2025 13:56)  T(F): 98.1 (09 May 2025 09:15), Max: 98.2 (08 May 2025 13:56)  HR: 85 (09 May 2025 09:15) (66 - 123)  BP: 118/63 (09 May 2025 09:15) (103/56 - 121/67)  BP(mean): --  RR: 18 (09 May 2025 09:15) (16 - 22)  SpO2: 92% (09 May 2025 09:15) (81% - 94%)    Parameters below as of 09 May 2025 09:15  Patient On (Oxygen Delivery Method): nasal cannula  O2 Flow (L/min): 2      LABS:                        10.9   10.22 )-----------( 624      ( 09 May 2025 08:02 )             34.1     09 May 2025 08:02    140    |  100    |  9      ----------------------------<  96     3.7     |  34     |  0.74     Ca    9.8        09 May 2025 08:02  Mg     1.9       09 May 2025 08:02      PT/INR - ( 07 May 2025 23:07 )   PT: 11.5 sec;   INR: 0.97 ratio         PTT - ( 07 May 2025 23:07 )  PTT:29.3 sec  Urinalysis Basic - ( 09 May 2025 08:02 )    Color: x / Appearance: x / SG: x / pH: x  Gluc: 96 mg/dL / Ketone: x  / Bili: x / Urobili: x   Blood: x / Protein: x / Nitrite: x   Leuk Esterase: x / RBC: x / WBC x   Sq Epi: x / Non Sq Epi: x / Bacteria: x      CAPILLARY BLOOD GLUCOSE     Patient is a 69y old  Female who presents with a chief complaint of SOB. (09 May 2025 05:33)      INTERVAL HPI/OVERNIGHT EVENTS:  Patient seen awake and laying in bed. She reports productive cough, reports breathing has improved on oxygen today, denies chest pain. Nurse reports patient had a desaturation episode while on room air ambulating to bathroom.  reports patient has pending medicaid renewal.       REVIEW OF SYSTEMS:  CONSTITUTIONAL: No fever, weight loss, or fatigue  EYES: No eye pain, visual disturbances, or discharge  ENMT:  No difficulty hearing, tinnitus, vertigo; No sinus or throat pain  NECK: No pain or stiffness  RESPIRATORY: Productive cough, no wheezing, no chills or hemoptysis; No shortness of breath  CARDIOVASCULAR: No chest pain, palpitations, lightheadedness, or leg swelling  GASTROINTESTINAL: No abdominal or epigastric pain. No nausea, vomiting, or hematemesis; No diarrhea or constipation. No melena or hematochezia.  GENITOURINARY: No dysuria, frequency, hematuria, or incontinence  NEUROLOGICAL: No headaches, memory loss, vertigo, loss of strength, numbness, or tremors  SKIN: No itching, burning, rashes, or lesions   LYMPH NODES: No enlarged glands  MUSCULOSKELETAL: No joint pain or swelling; No muscle, back, or extremity pain      PHYSICAL EXAM:  GENERAL: Adult Female, NAD, well-groomed, well-developed  HEAD:  Atraumatic, Normocephalic  EYES: EOMI, PERRLA, conjunctiva and sclera clear  ENMT: Moist mucous membranes, Good dentition, No lesions  NECK: Supple, No JVD appreciated  NERVOUS SYSTEM:  Alert & Oriented X3, Good concentration; All 4 extremities mobile, no gross sensory deficits.   CHEST/LUNG: Diminished to auscultation bilaterally; Scattered wheezes, No rales, rhonchi, or rubs appreciated  HEART: Regular rate and rhythm; No murmurs, rubs, or gallops  ABDOMEN: Soft, Nontender, Nondistended  EXTREMITIES:  No clubbing, cyanosis, or edema appreciated  LYMPH: No lymphadenopathy noted  SKIN: No rashes or lesions appreciated        MEDICATIONS  (STANDING):  apixaban 5 milliGRAM(s) Oral two times a day  buprenorphine 2 mG/naloxone 0.5 mG SL  Tablet 2 Tablet(s) SubLingual two times a day  diltiazem    milliGRAM(s) Oral daily  fluticasone propionate/ salmeterol 250-50 MICROgram(s) Diskus 1 Dose(s) Inhalation two times a day  pantoprazole    Tablet 40 milliGRAM(s) Oral before breakfast  tiotropium 2.5 MICROgram(s) Inhaler 2 Puff(s) Inhalation daily  venlafaxine XR. 37.5 milliGRAM(s) Oral daily    MEDICATIONS  (PRN):  acetaminophen     Tablet .. 650 milliGRAM(s) Oral every 6 hours PRN Mild Pain (1 - 3)  albuterol/ipratropium for Nebulization 3 milliLiter(s) Nebulizer every 6 hours PRN Shortness of Breath and/or Wheezing  guaifenesin/dextromethorphan Oral Liquid 10 milliLiter(s) Oral every 4 hours PRN Cough      Allergies    No Known Allergies    Intolerances        Vital Signs Last 24 Hrs  T(C): 36.7 (09 May 2025 09:15), Max: 36.8 (08 May 2025 13:56)  T(F): 98.1 (09 May 2025 09:15), Max: 98.2 (08 May 2025 13:56)  HR: 85 (09 May 2025 09:15) (66 - 123)  BP: 118/63 (09 May 2025 09:15) (103/56 - 121/67)  BP(mean): --  RR: 18 (09 May 2025 09:15) (16 - 22)  SpO2: 92% (09 May 2025 09:15) (81% - 94%)    Parameters below as of 09 May 2025 09:15  Patient On (Oxygen Delivery Method): nasal cannula  O2 Flow (L/min): 2      LABS:                        10.9   10.22 )-----------( 624      ( 09 May 2025 08:02 )             34.1     09 May 2025 08:02    140    |  100    |  9      ----------------------------<  96     3.7     |  34     |  0.74     Ca    9.8        09 May 2025 08:02  Mg     1.9       09 May 2025 08:02      PT/INR - ( 07 May 2025 23:07 )   PT: 11.5 sec;   INR: 0.97 ratio         PTT - ( 07 May 2025 23:07 )  PTT:29.3 sec  Urinalysis Basic - ( 09 May 2025 08:02 )    Color: x / Appearance: x / SG: x / pH: x  Gluc: 96 mg/dL / Ketone: x  / Bili: x / Urobili: x   Blood: x / Protein: x / Nitrite: x   Leuk Esterase: x / RBC: x / WBC x   Sq Epi: x / Non Sq Epi: x / Bacteria: x      CAPILLARY BLOOD GLUCOSE

## 2025-05-09 NOTE — PROGRESS NOTE ADULT - PROBLEM SELECTOR PROBLEM 1
Acute on chronic respiratory failure with hypoxia and hypercapnia Acute on chronic respiratory failure with hypoxia

## 2025-05-09 NOTE — PROGRESS NOTE ADULT - PROBLEM SELECTOR PLAN 3
CT chest: No evidence of  pulmonary emboli or other acute change compared to   8/19/2024.Emphysema with areas of bronchiectasis and small airway impaction similar   to prior.Stable 9 mm subpleural right upper lobe nodule. Recommend three-month follow-up CT.   however, some pachy opacities seen , concern for pneumonia.   complete levaquin as per ID recommendation CT chest: No evidence of  pulmonary emboli or other acute change compared to   8/19/2024.Emphysema with areas of bronchiectasis and small airway impaction similar   to prior.Stable 9 mm subpleural right upper lobe nodule. Recommend three-month follow-up CT.   however, some pachy opacities seen , concern for pneumonia.   complete levaquin as per ID recommendation.

## 2025-05-09 NOTE — PROGRESS NOTE ADULT - PROBLEM SELECTOR PLAN 5
Chronic  Continue home diltiazem  Hx of tachycardia, stable as per pt and follows with cardiologist outpt, tele monitor show sinus tachy, range 90s to 140s,  discussed with cardio, will c/w current management   DASH/TLC diet Chronic  Continue home diltiazem  Hx of tachycardia, stable as per pt and follows with cardiologist outpt, tele monitor show sinus tachy, range 90s to 140s,  discussed with cardio, will c/w current management   DASH/TLC diet.

## 2025-05-09 NOTE — PROGRESS NOTE ADULT - PROBLEM SELECTOR PLAN 4
likely reactive, trending down   hematology eval noted, MPL, BCR/ABL test  ordered   start IVF: concern oral fluid intake not adequate likely reactive, trending down   hematology eval noted, MPL, BCR/ABL test  ordered   s/p IVF

## 2025-05-09 NOTE — PROGRESS NOTE ADULT - PROBLEM SELECTOR PLAN 8
Anemia: no sign of active bleeding , Iron /folate/b12 level indicate anemia of chronic disease.   will monitor H/H, FOBT  started venofer as per Hematology recommendation Anemia: no sign of active bleeding , Iron /folate/b12 level indicate anemia of chronic disease.   will monitor H/H, FOBT

## 2025-05-09 NOTE — PROGRESS NOTE ADULT - PROBLEM SELECTOR PLAN 6
Chronic, stable as per patient  Patient is on anticoagulation (eliquis 5 mg BID) as per heme/onc doctor who she does not regularly follow up with  Recommend patient follow up with heme onc doctor upon discharge Chronic, stable as per patient  Patient is on anticoagulation (eliquis 5 mg BID) as per heme/onc doctor who she does not regularly follow up with  Recommend patient follow up with heme onc doctor upon discharge.

## 2025-05-09 NOTE — CASE MANAGEMENT PROGRESS NOTE - NSCMPROGRESSNOTE_GEN_ALL_CORE
Update Communication Note: AS per morning rounds, 70 y/o F with PMH of HTN, Dyslipidemia, A. Fib on Eliquis, DVT/PE, ILD, COPD, Chronic Respiratory Failure on home oxygent, Protein S Deficiency, GERD, Drug Abuse, Anxiety, and Depression presented with worsening SOB. Patient destat  81% on room air while ambulating to bathroom this morning.  Patient can not pay for her medications and [employer] oxygen stated take her home oxygen back because not paying for it. ESSENCE called Lilia extension [phone] about financial status. ESSENCE called extension [phone] for financial status and update: and then instructor to call phone number [phone]. CM and & MSW Team on the case. NETTIE Chandra sent the email, the financial team following up with family. Collaborative Team of approach will continue to follow patient.

## 2025-05-09 NOTE — PROGRESS NOTE ADULT - SUBJECTIVE AND OBJECTIVE BOX
Date/Time Patient Seen:  		  Referring MD:   Data Reviewed	       Patient is a 69y old  Female who presents with a chief complaint of SOB. (08 May 2025 15:41)      Subjective/HPI     PAST MEDICAL & SURGICAL HISTORY:  Drug abuse  was addicted to pain killers    Protein S deficiency    Depression    COPD (chronic obstructive pulmonary disease)    DVT (deep venous thrombosis)    Pulmonary embolism    Afib    No pertinent past medical history    COPD exacerbation    History of COPD    HTN (hypertension)    HLD (hyperlipidemia)    History of protein S deficiency    History of back surgery    No significant past surgical history          Medication list         MEDICATIONS  (STANDING):  apixaban 5 milliGRAM(s) Oral two times a day  buprenorphine 2 mG/naloxone 0.5 mG SL  Tablet 2 Tablet(s) SubLingual two times a day  diltiazem    milliGRAM(s) Oral daily  fluticasone propionate/ salmeterol 250-50 MICROgram(s) Diskus 1 Dose(s) Inhalation two times a day  pantoprazole    Tablet 40 milliGRAM(s) Oral before breakfast  tiotropium 2.5 MICROgram(s) Inhaler 2 Puff(s) Inhalation daily  venlafaxine XR. 37.5 milliGRAM(s) Oral daily    MEDICATIONS  (PRN):  acetaminophen     Tablet .. 650 milliGRAM(s) Oral every 6 hours PRN Mild Pain (1 - 3)  albuterol/ipratropium for Nebulization 3 milliLiter(s) Nebulizer every 6 hours PRN Shortness of Breath and/or Wheezing  guaifenesin/dextromethorphan Oral Liquid 10 milliLiter(s) Oral every 4 hours PRN Cough         Vitals log        ICU Vital Signs Last 24 Hrs  T(C): 36.4 (09 May 2025 05:22), Max: 36.8 (08 May 2025 06:18)  T(F): 97.5 (09 May 2025 05:22), Max: 98.3 (08 May 2025 06:18)  HR: 69 (09 May 2025 05:22) (66 - 90)  BP: 106/60 (09 May 2025 05:22) (102/58 - 121/67)  BP(mean): --  ABP: --  ABP(mean): --  RR: 18 (09 May 2025 05:22) (16 - 20)  SpO2: 93% (09 May 2025 05:22) (90% - 98%)    O2 Parameters below as of 09 May 2025 05:22  Patient On (Oxygen Delivery Method): nasal cannula                 Input and Output:  I&O's Detail      Lab Data                        10.8   14.82 )-----------( 644      ( 08 May 2025 06:00 )             34.0     05-08    140  |  100  |  6[L]  ----------------------------<  95  4.5   |  36[H]  |  0.77    Ca    10.0      08 May 2025 06:00    TPro  8.1  /  Alb  3.4  /  TBili  0.3  /  DBili  x   /  AST  19  /  ALT  25  /  AlkPhos  145[H]  05-07            Review of Systems	      Objective     Physical Examination    heart s1s2  lung dc bs  head nc  head at      Pertinent Lab findings & Imaging      [place]:  NO   Adequate UO     I&O's Detail           Discussed with:     Cultures:	        Radiology

## 2025-05-09 NOTE — PROGRESS NOTE ADULT - PROBLEM SELECTOR PLAN 2
- Continuous pulse ox, SpO2 goal>88%   - Albuterol/Ipratroprium nebulizer q6hr, reassessment  - Prednisone 40mg QD x 5 days   procalcitonin WNL , RVP + Rhinovirus   CT chest: No evidence of  pulmonary emboli or other acute change compared to   8/19/2024.Emphysema with areas of bronchiectasis and small airway impaction similar   to prior.Stable 9 mm subpleural right upper lobe nodule. Recommend three-month follow-up CT.   however, some pachy opacities seen , concern for pneumonia.   ID eval noted :  completed levaquin course  add cough medication ATC , saline neb   pulmonary eval noted - Continuous pulse ox, SpO2 goal>88%   - continue spiriva QD, Advair BID  - Albuterol/Ipratroprium nebulizer q6h PRN   procalcitonin WNL , RVP + Rhinovirus   CT chest: No evidence of  pulmonary emboli or other acute change compared to   8/19/2024.Emphysema with areas of bronchiectasis and small airway impaction similar   to prior.Stable 9 mm subpleural right upper lobe nodule. Recommend three-month follow-up CT.   however, some pachy opacities seen , concern for pneumonia.   ID eval noted :  completed levaquin course  guaifenesin-DM PRN cough, add tessalon perles TID scheduled  pulmonary eval noted - Continuous pulse ox, SpO2 goal>88%   - continue spiriva QD, Advair BID  - Albuterol/Ipratroprium nebulizer q6h PRN   procalcitonin WNL , RVP + Rhinovirus   CT chest: No evidence of  pulmonary emboli or other acute change compared to   8/19/2024.Emphysema with areas of bronchiectasis and small airway impaction similar   to prior.Stable 9 mm subpleural right upper lobe nodule. Recommend three-month follow-up CT.   however, some pachy opacities seen , concern for pneumonia.   ID eval noted :  completed levaquin course  guaifenesin-DM PRN cough, add tessalon perles TID scheduled  pulmonary eval noted.

## 2025-05-09 NOTE — PROGRESS NOTE ADULT - PROBLEM SELECTOR PLAN 1
due to COPD exacerbation, viral infection and secondary bacterial pneumonia   CT chest shows emphysema and areas of bronchiectasis and small airway impaction similar to prior  On home O2 2L NC, was increased to 3L this admission  - RVP + Rhinovirus   - Continuous pulse ox, SpO2 goal>88%,  still need 3L   - Albuterol/Ipratroprium nebulizer q6hr  - Prednisone 40mg QD x 7 days  CT chest: Stable 9 mm subpleural right upper lobe nodule. Recommend three-month follow-up CT  ID eval noted : completed levaquin course  add cough medication ATC , saline neb   PT -> no skilled PT needs  improved, d/c planning due to COPD exacerbation, viral infection and secondary bacterial pneumonia   CT chest shows emphysema and areas of bronchiectasis and small airway impaction similar to prior  On home O2 2L NC, was increased to 3L this admission  - RVP + Rhinovirus   - Continuous pulse ox, SpO2 goal>88%,  still need 3L   - continue spiriva QD, Advair BID  - Albuterol/Ipratroprium nebulizer q6h PRN  CT chest: Stable 9 mm subpleural right upper lobe nodule. Recommend three-month follow-up CT  ID marie noted : completed levaquin course  guaifenesin-DM PRN cough, add tessalon perles TID scheduled  improved, d/c planning. Patient will need home oxygen on discharge, discharge delayed until she can be safely discharged on oxygen due to COPD exacerbation, viral infection and secondary bacterial pneumonia   treating as below  CT chest shows emphysema and areas of bronchiectasis and small airway impaction similar to prior  On home O2 2L NC, was increased to 3L this admission  - RVP + Rhinovirus   - Continuous pulse ox, SpO2 goal>88%,  still need 3L   - continue spiriva QD, Advair BID  - Albuterol/Ipratroprium nebulizer q6h PRN  CT chest: Stable 9 mm subpleural right upper lobe nodule. Recommend three-month follow-up CT  ID eval noted : completed levaquin course  guaifenesin-DM PRN cough, add tessalon perles TID scheduled  improved, d/c planning. Patient will need home oxygen on discharge, discharge delayed until she can be safely discharged on oxygen.

## 2025-05-10 LAB
APPEARANCE UR: ABNORMAL
BACTERIA # UR AUTO: ABNORMAL /HPF
BILIRUB UR-MCNC: NEGATIVE — SIGNIFICANT CHANGE UP
COLOR SPEC: YELLOW — SIGNIFICANT CHANGE UP
DIFF PNL FLD: ABNORMAL
EPI CELLS # UR: PRESENT
GLUCOSE UR QL: NEGATIVE MG/DL — SIGNIFICANT CHANGE UP
KETONES UR-MCNC: NEGATIVE MG/DL — SIGNIFICANT CHANGE UP
LEUKOCYTE ESTERASE UR-ACNC: ABNORMAL
NITRITE UR-MCNC: NEGATIVE — SIGNIFICANT CHANGE UP
PH UR: 6.5 — SIGNIFICANT CHANGE UP (ref 5–8)
PROT UR-MCNC: NEGATIVE MG/DL — SIGNIFICANT CHANGE UP
RBC CASTS # UR COMP ASSIST: 2 /HPF — SIGNIFICANT CHANGE UP (ref 0–4)
SP GR SPEC: 1.01 — SIGNIFICANT CHANGE UP (ref 1–1.03)
UROBILINOGEN FLD QL: 0.2 MG/DL — SIGNIFICANT CHANGE UP (ref 0.2–1)
WBC UR QL: 10 /HPF — HIGH (ref 0–5)

## 2025-05-10 PROCEDURE — 99232 SBSQ HOSP IP/OBS MODERATE 35: CPT

## 2025-05-10 RX ORDER — PHENAZOPYRIDINE HCL 100 MG
100 TABLET ORAL EVERY 8 HOURS
Refills: 0 | Status: DISCONTINUED | OUTPATIENT
Start: 2025-05-10 | End: 2025-05-12

## 2025-05-10 RX ADMIN — BUPRENORPHINE HYDROCHLORIDE, NALOXONE HYDROCHLORIDE 2 TABLET(S): 4; 1 FILM, SOLUBLE BUCCAL; SUBLINGUAL at 20:38

## 2025-05-10 RX ADMIN — Medication 1 DOSE(S): at 08:53

## 2025-05-10 RX ADMIN — Medication 100 MILLIGRAM(S): at 13:31

## 2025-05-10 RX ADMIN — Medication 100 MILLIGRAM(S): at 20:38

## 2025-05-10 RX ADMIN — VENLAFAXINE HYDROCHLORIDE 37.5 MILLIGRAM(S): 37.5 CAPSULE, EXTENDED RELEASE ORAL at 11:11

## 2025-05-10 RX ADMIN — APIXABAN 5 MILLIGRAM(S): 2.5 TABLET, FILM COATED ORAL at 11:11

## 2025-05-10 RX ADMIN — Medication 100 MILLIGRAM(S): at 20:39

## 2025-05-10 RX ADMIN — DEXTROMETHORPHAN HBR, GUAIFENESIN 10 MILLILITER(S): 20; 200 SOLUTION ORAL at 13:31

## 2025-05-10 RX ADMIN — Medication 1 DOSE(S): at 20:39

## 2025-05-10 RX ADMIN — Medication 100 MILLIGRAM(S): at 06:41

## 2025-05-10 RX ADMIN — TIOTROPIUM BROMIDE INHALATION SPRAY 2 PUFF(S): 3.12 SPRAY, METERED RESPIRATORY (INHALATION) at 08:53

## 2025-05-10 RX ADMIN — DILTIAZEM HYDROCHLORIDE 240 MILLIGRAM(S): 120 CAPSULE, EXTENDED RELEASE ORAL at 06:41

## 2025-05-10 RX ADMIN — APIXABAN 5 MILLIGRAM(S): 2.5 TABLET, FILM COATED ORAL at 22:44

## 2025-05-10 RX ADMIN — Medication 40 MILLIGRAM(S): at 06:41

## 2025-05-10 RX ADMIN — Medication 3 MILLIGRAM(S): at 22:44

## 2025-05-10 RX ADMIN — BUPRENORPHINE HYDROCHLORIDE, NALOXONE HYDROCHLORIDE 2 TABLET(S): 4; 1 FILM, SOLUBLE BUCCAL; SUBLINGUAL at 11:11

## 2025-05-10 NOTE — PROGRESS NOTE ADULT - SUBJECTIVE AND OBJECTIVE BOX
Date/Time Patient Seen:  		  Referring MD:   Data Reviewed	       Patient is a 69y old  Female who presents with a chief complaint of SOB. (09 May 2025 13:02)      Subjective/HPI     PAST MEDICAL & SURGICAL HISTORY:  Drug abuse  was addicted to pain killers    Protein S deficiency    Depression    COPD (chronic obstructive pulmonary disease)    DVT (deep venous thrombosis)    Pulmonary embolism    Afib    No pertinent past medical history    COPD exacerbation    History of COPD    HTN (hypertension)    HLD (hyperlipidemia)    History of protein S deficiency    History of back surgery    No significant past surgical history          Medication list         MEDICATIONS  (STANDING):  apixaban 5 milliGRAM(s) Oral two times a day  benzonatate 100 milliGRAM(s) Oral three times a day  buprenorphine 2 mG/naloxone 0.5 mG SL  Tablet 2 Tablet(s) SubLingual two times a day  diltiazem    milliGRAM(s) Oral daily  fluticasone propionate/ salmeterol 250-50 MICROgram(s) Diskus 1 Dose(s) Inhalation two times a day  pantoprazole    Tablet 40 milliGRAM(s) Oral before breakfast  tiotropium 2.5 MICROgram(s) Inhaler 2 Puff(s) Inhalation daily  venlafaxine XR. 37.5 milliGRAM(s) Oral daily    MEDICATIONS  (PRN):  acetaminophen     Tablet .. 650 milliGRAM(s) Oral every 6 hours PRN Mild Pain (1 - 3)  albuterol/ipratropium for Nebulization 3 milliLiter(s) Nebulizer every 6 hours PRN Shortness of Breath and/or Wheezing  guaifenesin/dextromethorphan Oral Liquid 10 milliLiter(s) Oral every 4 hours PRN Cough  melatonin 3 milliGRAM(s) Oral at bedtime PRN Insomnia         Vitals log        ICU Vital Signs Last 24 Hrs  T(C): 36.7 (10 May 2025 06:35), Max: 37.4 (09 May 2025 14:12)  T(F): 98 (10 May 2025 06:35), Max: 99.3 (09 May 2025 14:12)  HR: 70 (10 May 2025 06:35) (70 - 140)  BP: 113/65 (10 May 2025 06:35) (105/60 - 129/68)  BP(mean): --  ABP: --  ABP(mean): --  RR: 18 (10 May 2025 06:35) (17 - 22)  SpO2: 95% (10 May 2025 06:35) (89% - 100%)    O2 Parameters below as of 10 May 2025 06:35  Patient On (Oxygen Delivery Method): nasal cannula  O2 Flow (L/min): 2               Input and Output:  I&O's Detail      Lab Data                        10.9   10.22 )-----------( 624      ( 09 May 2025 08:02 )             34.1     05-09    140  |  100  |  9   ----------------------------<  96  3.7   |  34[H]  |  0.74    Ca    9.8      09 May 2025 08:02  Mg     1.9     05-09              Review of Systems	      Objective     Physical Examination    heart s1s2  lung dc bs  head nc  head at      Pertinent Lab findings & Imaging      [place]:  NO   Adequate UO     I&O's Detail           Discussed with:     Cultures:	        Radiology

## 2025-05-10 NOTE — PROGRESS NOTE ADULT - PROBLEM SELECTOR PLAN 2
- Continuous pulse ox, SpO2 goal>88%   - continue spiriva QD, Advair BID  - Albuterol/Ipratroprium nebulizer q6h PRN   procalcitonin WNL , RVP + Rhinovirus   CT chest: No evidence of  pulmonary emboli or other acute change compared to   8/19/2024.Emphysema with areas of bronchiectasis and small airway impaction similar   to prior.Stable 9 mm subpleural right upper lobe nodule. Recommend three-month follow-up CT.   however, some pachy opacities seen , concern for pneumonia.   ID eval noted :  completed levaquin course  guaifenesin-DM PRN cough, add tessalon perles TID scheduled  pulmonary eval noted.

## 2025-05-10 NOTE — PROGRESS NOTE ADULT - PROBLEM SELECTOR PLAN 5
Chronic  Continue home diltiazem  Hx of tachycardia, stable as per pt and follows with cardiologist outpt, tele monitor show sinus tachy, range 90s to 140s,  discussed with cardio, will c/w current management   DASH/TLC diet.

## 2025-05-10 NOTE — PROGRESS NOTE ADULT - PROBLEM SELECTOR PLAN 1
due to COPD exacerbation, viral infection and secondary bacterial pneumonia   treating as below  CT chest shows emphysema and areas of bronchiectasis and small airway impaction similar to prior  On home O2 2L NC, was increased to 3L this admission  - RVP + Rhinovirus   - Continuous pulse ox, SpO2 goal>88%,  still need 3L   - continue spiriva QD, Advair BID  - Albuterol/Ipratroprium nebulizer q6h PRN  CT chest: Stable 9 mm subpleural right upper lobe nodule. Recommend three-month follow-up CT  ID eval noted : completed levaquin course  guaifenesin-DM PRN cough, add tessalon perles TID scheduled  improved, d/c planning. Patient will need home oxygen on discharge, discharge delayed until she can be safely discharged on oxygen.

## 2025-05-10 NOTE — PROGRESS NOTE ADULT - PROBLEM SELECTOR PLAN 6
Chronic, stable as per patient  Patient is on anticoagulation (eliquis 5 mg BID) as per heme/onc doctor who she does not regularly follow up with  Recommend patient follow up with heme onc doctor upon discharge.

## 2025-05-10 NOTE — PROGRESS NOTE ADULT - SUBJECTIVE AND OBJECTIVE BOX
Patient is a 69y old  Female who presents with a chief complaint of SOB. (10 May 2025 07:46)      INTERVAL HPI/OVERNIGHT EVENTS:        REVIEW OF SYSTEMS:  CONSTITUTIONAL: No fever, weight loss, or fatigue  EYES: No eye pain, visual disturbances, or discharge  ENMT:  No difficulty hearing, tinnitus, vertigo; No sinus or throat pain  NECK: No pain or stiffness  RESPIRATORY: No cough, wheezing, chills or hemoptysis; No shortness of breath  CARDIOVASCULAR: No chest pain, palpitations, lightheadedness, or leg swelling  GASTROINTESTINAL: No abdominal or epigastric pain. No nausea, vomiting, or hematemesis; No diarrhea or constipation. No melena or hematochezia.  GENITOURINARY: No dysuria, frequency, hematuria, or incontinence  NEUROLOGICAL: No headaches, memory loss, vertigo, loss of strength, numbness, or tremors  SKIN: No itching, burning, rashes, or lesions   LYMPH NODES: No enlarged glands  ENDOCRINE: No heat or cold intolerance; No hair loss; No polydipsia or polyuria  MUSCULOSKELETAL: No joint pain or swelling; No muscle, back, or extremity pain  PSYCHIATRIC: No depression, anxiety, or mood swings  HEME/LYMPH: No easy bruising, or bleeding gums  ALLERGY AND IMMUNOLOGIC: No hives or eczema    PHYSICAL EXAM:  GENERAL: Adult Female, NAD, well-groomed, well-developed  HEAD:  Atraumatic, Normocephalic  EYES: EOMI, PERRLA, conjunctiva and sclera clear  ENMT: Moist mucous membranes, Good dentition, No lesions  NECK: Supple, No JVD appreciated  NERVOUS SYSTEM:  Alert & Oriented X3, Good concentration; All 4 extremities mobile, no gross sensory deficits.   CHEST/LUNG: Clear to auscultation bilaterally; No rales, rhonchi, wheezing, or rubs appreciated  HEART: Regular rate and rhythm; No murmurs, rubs, or gallops  ABDOMEN: Soft, Nontender, Nondistended  EXTREMITIES:  No clubbing, cyanosis, or edema appreciated  LYMPH: No lymphadenopathy noted  SKIN: No rashes or lesions appreciated    MEDICATIONS  (STANDING):  apixaban 5 milliGRAM(s) Oral two times a day  benzonatate 100 milliGRAM(s) Oral three times a day  buprenorphine 2 mG/naloxone 0.5 mG SL  Tablet 2 Tablet(s) SubLingual two times a day  diltiazem    milliGRAM(s) Oral daily  fluticasone propionate/ salmeterol 250-50 MICROgram(s) Diskus 1 Dose(s) Inhalation two times a day  pantoprazole    Tablet 40 milliGRAM(s) Oral before breakfast  tiotropium 2.5 MICROgram(s) Inhaler 2 Puff(s) Inhalation daily  venlafaxine XR. 37.5 milliGRAM(s) Oral daily    MEDICATIONS  (PRN):  acetaminophen     Tablet .. 650 milliGRAM(s) Oral every 6 hours PRN Mild Pain (1 - 3)  albuterol/ipratropium for Nebulization 3 milliLiter(s) Nebulizer every 6 hours PRN Shortness of Breath and/or Wheezing  guaifenesin/dextromethorphan Oral Liquid 10 milliLiter(s) Oral every 4 hours PRN Cough  melatonin 3 milliGRAM(s) Oral at bedtime PRN Insomnia      Allergies    No Known Allergies    Intolerances        Vital Signs Last 24 Hrs  T(C): 36.7 (10 May 2025 10:00), Max: 37.4 (09 May 2025 14:12)  T(F): 98 (10 May 2025 10:00), Max: 99.3 (09 May 2025 14:12)  HR: 68 (10 May 2025 10:00) (68 - 140)  BP: 117/65 (10 May 2025 10:00) (105/60 - 129/68)  BP(mean): --  RR: 18 (10 May 2025 10:00) (17 - 22)  SpO2: 96% (10 May 2025 10:00) (89% - 100%)    Parameters below as of 10 May 2025 10:00  Patient On (Oxygen Delivery Method): nasal cannula  O2 Flow (L/min): 2      LABS:      Ca    9.8        09 May 2025 08:02        Urinalysis Basic - ( 09 May 2025 08:02 )    Color: x / Appearance: x / SG: x / pH: x  Gluc: 96 mg/dL / Ketone: x  / Bili: x / Urobili: x   Blood: x / Protein: x / Nitrite: x   Leuk Esterase: x / RBC: x / WBC x   Sq Epi: x / Non Sq Epi: x / Bacteria: x      CAPILLARY BLOOD GLUCOSE          RADIOLOGY & ADDITIONAL TESTS:    Imaging Personally Reviewed:  [ ] YES     Consultant(s) Notes Reviewed:      Care Discussed with Consultants/Other Providers:    Advanced Directives: [ ] DNR  [ ] No feeding tube  [ ] MOLST in chart  [ ] MOLST completed today  [ ] Unknown   Patient is a 69y old  Female who presents with a chief complaint of SOB. (10 May 2025 07:46)      INTERVAL HPI/OVERNIGHT EVENTS:  Patient seen awake and laying in bed. She reports productive cough, reports breathing has improved on oxygen today, denies chest pain. Nurse reports patient had a desaturation episode while on room air ambulating to bathroom.  reports patient has pending medicaid renewal.       REVIEW OF SYSTEMS:  CONSTITUTIONAL: No fever, weight loss, or fatigue  EYES: No eye pain, visual disturbances, or discharge  ENMT:  No difficulty hearing, tinnitus, vertigo; No sinus or throat pain  NECK: No pain or stiffness  RESPIRATORY: Productive cough, no wheezing, no chills or hemoptysis; No shortness of breath  CARDIOVASCULAR: No chest pain, palpitations, lightheadedness, or leg swelling  GASTROINTESTINAL: No abdominal or epigastric pain. No nausea, vomiting, or hematemesis; No diarrhea or constipation. No melena or hematochezia.  GENITOURINARY: No dysuria, frequency, hematuria, or incontinence  NEUROLOGICAL: No headaches, memory loss, vertigo, loss of strength, numbness, or tremors  SKIN: No itching, burning, rashes, or lesions   LYMPH NODES: No enlarged glands  MUSCULOSKELETAL: No joint pain or swelling; No muscle, back, or extremity pain      PHYSICAL EXAM:  GENERAL: Adult Female, NAD, well-groomed, well-developed  HEAD:  Atraumatic, Normocephalic  EYES: EOMI, PERRLA, conjunctiva and sclera clear  ENMT: Moist mucous membranes, Good dentition, No lesions  NECK: Supple, No JVD appreciated  NERVOUS SYSTEM:  Alert & Oriented X3, Good concentration; All 4 extremities mobile, no gross sensory deficits.   CHEST/LUNG: Diminished to auscultation bilaterally; Scattered wheezes, No rales, rhonchi, or rubs appreciated  HEART: Regular rate and rhythm; No murmurs, rubs, or gallops  ABDOMEN: Soft, Nontender, Nondistended  EXTREMITIES:  No clubbing, cyanosis, or edema appreciated  LYMPH: No lymphadenopathy noted  SKIN: No rashes or lesions appreciated          MEDICATIONS  (STANDING):  apixaban 5 milliGRAM(s) Oral two times a day  benzonatate 100 milliGRAM(s) Oral three times a day  buprenorphine 2 mG/naloxone 0.5 mG SL  Tablet 2 Tablet(s) SubLingual two times a day  diltiazem    milliGRAM(s) Oral daily  fluticasone propionate/ salmeterol 250-50 MICROgram(s) Diskus 1 Dose(s) Inhalation two times a day  pantoprazole    Tablet 40 milliGRAM(s) Oral before breakfast  tiotropium 2.5 MICROgram(s) Inhaler 2 Puff(s) Inhalation daily  venlafaxine XR. 37.5 milliGRAM(s) Oral daily    MEDICATIONS  (PRN):  acetaminophen     Tablet .. 650 milliGRAM(s) Oral every 6 hours PRN Mild Pain (1 - 3)  albuterol/ipratropium for Nebulization 3 milliLiter(s) Nebulizer every 6 hours PRN Shortness of Breath and/or Wheezing  guaifenesin/dextromethorphan Oral Liquid 10 milliLiter(s) Oral every 4 hours PRN Cough  melatonin 3 milliGRAM(s) Oral at bedtime PRN Insomnia      Allergies    No Known Allergies    Intolerances        Vital Signs Last 24 Hrs  T(C): 36.7 (10 May 2025 10:00), Max: 37.4 (09 May 2025 14:12)  T(F): 98 (10 May 2025 10:00), Max: 99.3 (09 May 2025 14:12)  HR: 68 (10 May 2025 10:00) (68 - 140)  BP: 117/65 (10 May 2025 10:00) (105/60 - 129/68)  BP(mean): --  RR: 18 (10 May 2025 10:00) (17 - 22)  SpO2: 96% (10 May 2025 10:00) (89% - 100%)    Parameters below as of 10 May 2025 10:00  Patient On (Oxygen Delivery Method): nasal cannula  O2 Flow (L/min): 2      LABS:      Ca    9.8        09 May 2025 08:02        Urinalysis Basic - ( 09 May 2025 08:02 )    Color: x / Appearance: x / SG: x / pH: x  Gluc: 96 mg/dL / Ketone: x  / Bili: x / Urobili: x   Blood: x / Protein: x / Nitrite: x   Leuk Esterase: x / RBC: x / WBC x   Sq Epi: x / Non Sq Epi: x / Bacteria: x      CAPILLARY BLOOD GLUCOSE         Patient is a 69y old  Female who presents with a chief complaint of SOB. (10 May 2025 07:46)      INTERVAL HPI/OVERNIGHT EVENTS:  Patient seen awake and laying in bed. She reports productive cough, reports breathing has improved on oxygen today, reports some R sided chest pain which she attributes to coughing. Nurse reports patient had a desaturation episode while on room air ambulating to bathroom.  reports patient has pending medicaid renewal.       REVIEW OF SYSTEMS:  CONSTITUTIONAL: No fever, weight loss, or fatigue  EYES: No eye pain, visual disturbances, or discharge  ENMT:  No difficulty hearing, tinnitus, vertigo; No sinus or throat pain  NECK: No pain or stiffness  RESPIRATORY: Productive cough, no wheezing, no chills or hemoptysis; No shortness of breath  CARDIOVASCULAR: R sided chest pain, no palpitations, lightheadedness, or leg swelling  GASTROINTESTINAL: No abdominal or epigastric pain. No nausea, vomiting, or hematemesis; No diarrhea or constipation. No melena or hematochezia.  GENITOURINARY: No dysuria, frequency, hematuria, or incontinence  NEUROLOGICAL: No headaches, memory loss, vertigo, loss of strength, numbness, or tremors  SKIN: No itching, burning, rashes, or lesions   LYMPH NODES: No enlarged glands  MUSCULOSKELETAL: No joint pain or swelling; No muscle, back, or extremity pain      PHYSICAL EXAM:  GENERAL: Adult Female, NAD, well-groomed, well-developed  HEAD:  Atraumatic, Normocephalic  EYES: EOMI, PERRLA, conjunctiva and sclera clear  ENMT: Moist mucous membranes, Good dentition, No lesions  NECK: Supple, No JVD appreciated  NERVOUS SYSTEM:  Alert & Oriented X3, Good concentration; All 4 extremities mobile, no gross sensory deficits.   CHEST/LUNG: Diminished to auscultation bilaterally; Scattered wheezes, No rales, rhonchi, or rubs appreciated  HEART: Reproducible chest pain and point tenderness on R anterior thorax. Regular rate and rhythm; No murmurs, rubs, or gallops  ABDOMEN: Soft, Nontender, Nondistended  EXTREMITIES:  No clubbing, cyanosis, or edema appreciated  LYMPH: No lymphadenopathy noted  SKIN: No rashes or lesions appreciated          MEDICATIONS  (STANDING):  apixaban 5 milliGRAM(s) Oral two times a day  benzonatate 100 milliGRAM(s) Oral three times a day  buprenorphine 2 mG/naloxone 0.5 mG SL  Tablet 2 Tablet(s) SubLingual two times a day  diltiazem    milliGRAM(s) Oral daily  fluticasone propionate/ salmeterol 250-50 MICROgram(s) Diskus 1 Dose(s) Inhalation two times a day  pantoprazole    Tablet 40 milliGRAM(s) Oral before breakfast  tiotropium 2.5 MICROgram(s) Inhaler 2 Puff(s) Inhalation daily  venlafaxine XR. 37.5 milliGRAM(s) Oral daily    MEDICATIONS  (PRN):  acetaminophen     Tablet .. 650 milliGRAM(s) Oral every 6 hours PRN Mild Pain (1 - 3)  albuterol/ipratropium for Nebulization 3 milliLiter(s) Nebulizer every 6 hours PRN Shortness of Breath and/or Wheezing  guaifenesin/dextromethorphan Oral Liquid 10 milliLiter(s) Oral every 4 hours PRN Cough  melatonin 3 milliGRAM(s) Oral at bedtime PRN Insomnia      Allergies    No Known Allergies    Intolerances        Vital Signs Last 24 Hrs  T(C): 36.7 (10 May 2025 10:00), Max: 37.4 (09 May 2025 14:12)  T(F): 98 (10 May 2025 10:00), Max: 99.3 (09 May 2025 14:12)  HR: 68 (10 May 2025 10:00) (68 - 140)  BP: 117/65 (10 May 2025 10:00) (105/60 - 129/68)  BP(mean): --  RR: 18 (10 May 2025 10:00) (17 - 22)  SpO2: 96% (10 May 2025 10:00) (89% - 100%)    Parameters below as of 10 May 2025 10:00  Patient On (Oxygen Delivery Method): nasal cannula  O2 Flow (L/min): 2      LABS:      Ca    9.8        09 May 2025 08:02        Urinalysis Basic - ( 09 May 2025 08:02 )    Color: x / Appearance: x / SG: x / pH: x  Gluc: 96 mg/dL / Ketone: x  / Bili: x / Urobili: x   Blood: x / Protein: x / Nitrite: x   Leuk Esterase: x / RBC: x / WBC x   Sq Epi: x / Non Sq Epi: x / Bacteria: x      CAPILLARY BLOOD GLUCOSE

## 2025-05-10 NOTE — PROGRESS NOTE ADULT - PROBLEM SELECTOR PLAN 3
CT chest: No evidence of  pulmonary emboli or other acute change compared to   8/19/2024.Emphysema with areas of bronchiectasis and small airway impaction similar   to prior.Stable 9 mm subpleural right upper lobe nodule. Recommend three-month follow-up CT.   however, some pachy opacities seen , concern for pneumonia.   complete levaquin as per ID recommendation.

## 2025-05-11 DIAGNOSIS — M62.838 OTHER MUSCLE SPASM: ICD-10-CM

## 2025-05-11 LAB
CULTURE RESULTS: SIGNIFICANT CHANGE UP
SPECIMEN SOURCE: SIGNIFICANT CHANGE UP

## 2025-05-11 PROCEDURE — 99232 SBSQ HOSP IP/OBS MODERATE 35: CPT

## 2025-05-11 RX ORDER — CYCLOBENZAPRINE HYDROCHLORIDE 15 MG/1
5 CAPSULE, EXTENDED RELEASE ORAL THREE TIMES A DAY
Refills: 0 | Status: DISCONTINUED | OUTPATIENT
Start: 2025-05-11 | End: 2025-05-15

## 2025-05-11 RX ORDER — POLYETHYLENE GLYCOL 3350 17 G/17G
17 POWDER, FOR SOLUTION ORAL DAILY
Refills: 0 | Status: DISCONTINUED | OUTPATIENT
Start: 2025-05-11 | End: 2025-05-15

## 2025-05-11 RX ADMIN — CYCLOBENZAPRINE HYDROCHLORIDE 5 MILLIGRAM(S): 15 CAPSULE, EXTENDED RELEASE ORAL at 12:49

## 2025-05-11 RX ADMIN — TIOTROPIUM BROMIDE INHALATION SPRAY 2 PUFF(S): 3.12 SPRAY, METERED RESPIRATORY (INHALATION) at 08:19

## 2025-05-11 RX ADMIN — Medication 100 MILLIGRAM(S): at 05:18

## 2025-05-11 RX ADMIN — DILTIAZEM HYDROCHLORIDE 240 MILLIGRAM(S): 120 CAPSULE, EXTENDED RELEASE ORAL at 05:17

## 2025-05-11 RX ADMIN — Medication 40 MILLIGRAM(S): at 05:18

## 2025-05-11 RX ADMIN — Medication 100 MILLIGRAM(S): at 13:57

## 2025-05-11 RX ADMIN — Medication 1 DOSE(S): at 22:00

## 2025-05-11 RX ADMIN — Medication 1 DOSE(S): at 08:18

## 2025-05-11 RX ADMIN — Medication 3 MILLIGRAM(S): at 22:00

## 2025-05-11 RX ADMIN — Medication 100 MILLIGRAM(S): at 22:00

## 2025-05-11 RX ADMIN — Medication 100 MILLIGRAM(S): at 05:17

## 2025-05-11 RX ADMIN — DEXTROMETHORPHAN HBR, GUAIFENESIN 10 MILLILITER(S): 20; 200 SOLUTION ORAL at 17:31

## 2025-05-11 RX ADMIN — APIXABAN 5 MILLIGRAM(S): 2.5 TABLET, FILM COATED ORAL at 22:00

## 2025-05-11 RX ADMIN — POLYETHYLENE GLYCOL 3350 17 GRAM(S): 17 POWDER, FOR SOLUTION ORAL at 17:31

## 2025-05-11 RX ADMIN — BUPRENORPHINE HYDROCHLORIDE, NALOXONE HYDROCHLORIDE 2 TABLET(S): 4; 1 FILM, SOLUBLE BUCCAL; SUBLINGUAL at 11:05

## 2025-05-11 RX ADMIN — APIXABAN 5 MILLIGRAM(S): 2.5 TABLET, FILM COATED ORAL at 11:05

## 2025-05-11 RX ADMIN — BUPRENORPHINE HYDROCHLORIDE, NALOXONE HYDROCHLORIDE 2 TABLET(S): 4; 1 FILM, SOLUBLE BUCCAL; SUBLINGUAL at 22:00

## 2025-05-11 RX ADMIN — VENLAFAXINE HYDROCHLORIDE 37.5 MILLIGRAM(S): 37.5 CAPSULE, EXTENDED RELEASE ORAL at 11:05

## 2025-05-11 NOTE — PROGRESS NOTE ADULT - PROBLEM SELECTOR PLAN 2
- Continuous pulse ox, SpO2 goal>88%   - continue spiriva QD, Advair BID  - Albuterol/Ipratroprium nebulizer q6h PRN   procalcitonin WNL , RVP + Rhinovirus   CT chest: No evidence of  pulmonary emboli or other acute change compared to   8/19/2024.Emphysema with areas of bronchiectasis and small airway impaction similar   to prior.Stable 9 mm subpleural right upper lobe nodule. Recommend three-month follow-up CT.   however, some pachy opacities seen , concern for pneumonia.   ID eval noted :  completed levaquin course  guaifenesin-DM PRN cough, add tessalon perles TID scheduled  pulmonary eval noted. continue spiriva QD, Advair BID  Albuterol/Ipratroprium nebulizer q6h PRN  Continuous pulse ox, SpO2 goal>88%   CT chest: No evidence of  pulmonary emboli or other acute change compared to 8/19/2024.Emphysema with areas of bronchiectasis and small airway impaction similar to prior. However, some pachy opacities seen , concern for pneumonia.   ID eval noted:  completed levaquin course  guaifenesin-DM PRN cough, add tessalon perles TID scheduled  pulmonary eval noted.

## 2025-05-11 NOTE — PROGRESS NOTE ADULT - PROBLEM SELECTOR PLAN 1
due to COPD exacerbation, viral infection and secondary bacterial pneumonia   treating as below  CT chest shows emphysema and areas of bronchiectasis and small airway impaction similar to prior  On home O2 2L NC, was increased to 3L this admission  - RVP + Rhinovirus   - Continuous pulse ox, SpO2 goal>88%,  still need 3L   - continue spiriva QD, Advair BID  - Albuterol/Ipratroprium nebulizer q6h PRN  CT chest: Stable 9 mm subpleural right upper lobe nodule. Recommend three-month follow-up CT  ID eval noted : completed levaquin course  guaifenesin-DM PRN cough, add tessalon perles TID scheduled  improved, d/c planning. Patient will need home oxygen on discharge, discharge delayed until she can be safely discharged on oxygen. due to COPD exacerbation, viral infection and secondary bacterial pneumonia   treating as below  CT chest shows emphysema and areas of bronchiectasis and small airway impaction similar to prior  RVP negative  On home O2 2L NC, was increased to 3L this admission  Continuous pulse ox, SpO2 goal>88%,  still need 3L   continue spiriva QD, Advair BID  Albuterol/Ipratroprium nebulizer q6h PRN  CT chest: Stable 9 mm subpleural right upper lobe nodule. Recommend three-month follow-up CT  ID marie noted : completed levaquin course  guaifenesin-DM PRN cough, add tessalon perles TID scheduled  improved, d/c planning. Patient will need home oxygen on discharge, discharge delayed until she can be safely discharged on oxygen.

## 2025-05-11 NOTE — PROGRESS NOTE ADULT - PROBLEM SELECTOR PLAN 3
CT chest: No evidence of  pulmonary emboli or other acute change compared to   8/19/2024.Emphysema with areas of bronchiectasis and small airway impaction similar   to prior.Stable 9 mm subpleural right upper lobe nodule. Recommend three-month follow-up CT.   however, some pachy opacities seen , concern for pneumonia.   complete levaquin as per ID recommendation. CT chest: No evidence of  pulmonary emboli or other acute change compared to 8/19/2024. Emphysema with areas of bronchiectasis and small airway impaction similar to prior. Some pachy opacities seen , concern for pneumonia.   completed levaquin as per ID recommendation.

## 2025-05-11 NOTE — PROGRESS NOTE ADULT - SUBJECTIVE AND OBJECTIVE BOX
Patient is a 69y old  Female who presents with a chief complaint of SOB. (11 May 2025 07:24)      INTERVAL HPI/OVERNIGHT EVENTS:        REVIEW OF SYSTEMS:  CONSTITUTIONAL: No fever, weight loss, or fatigue  EYES: No eye pain, visual disturbances, or discharge  ENMT:  No difficulty hearing, tinnitus, vertigo; No sinus or throat pain  NECK: No pain or stiffness  RESPIRATORY: No cough, wheezing, chills or hemoptysis; No shortness of breath  CARDIOVASCULAR: No chest pain, palpitations, lightheadedness, or leg swelling  GASTROINTESTINAL: No abdominal or epigastric pain. No nausea, vomiting, or hematemesis; No diarrhea or constipation. No melena or hematochezia.  GENITOURINARY: No dysuria, frequency, hematuria, or incontinence  NEUROLOGICAL: No headaches, memory loss, vertigo, loss of strength, numbness, or tremors  SKIN: No itching, burning, rashes, or lesions   LYMPH NODES: No enlarged glands  ENDOCRINE: No heat or cold intolerance; No hair loss; No polydipsia or polyuria  MUSCULOSKELETAL: No joint pain or swelling; No muscle, back, or extremity pain  PSYCHIATRIC: No depression, anxiety, or mood swings  HEME/LYMPH: No easy bruising, or bleeding gums  ALLERGY AND IMMUNOLOGIC: No hives or eczema    PHYSICAL EXAM:  GENERAL: Adult Female, NAD, well-groomed, well-developed  HEAD:  Atraumatic, Normocephalic  EYES: EOMI, PERRLA, conjunctiva and sclera clear  ENMT: Moist mucous membranes, Good dentition, No lesions  NECK: Supple, No JVD appreciated  NERVOUS SYSTEM:  Alert & Oriented X3, Good concentration; All 4 extremities mobile, no gross sensory deficits.   CHEST/LUNG: Clear to auscultation bilaterally; No rales, rhonchi, wheezing, or rubs appreciated  HEART: Regular rate and rhythm; No murmurs, rubs, or gallops  ABDOMEN: Soft, Nontender, Nondistended  EXTREMITIES:  No clubbing, cyanosis, or edema appreciated  LYMPH: No lymphadenopathy noted  SKIN: No rashes or lesions appreciated    MEDICATIONS  (STANDING):  apixaban 5 milliGRAM(s) Oral two times a day  benzonatate 100 milliGRAM(s) Oral three times a day  buprenorphine 2 mG/naloxone 0.5 mG SL  Tablet 2 Tablet(s) SubLingual two times a day  diltiazem    milliGRAM(s) Oral daily  fluticasone propionate/ salmeterol 250-50 MICROgram(s) Diskus 1 Dose(s) Inhalation two times a day  pantoprazole    Tablet 40 milliGRAM(s) Oral before breakfast  phenazopyridine 100 milliGRAM(s) Oral every 8 hours  tiotropium 2.5 MICROgram(s) Inhaler 2 Puff(s) Inhalation daily  venlafaxine XR. 37.5 milliGRAM(s) Oral daily    MEDICATIONS  (PRN):  acetaminophen     Tablet .. 650 milliGRAM(s) Oral every 6 hours PRN Mild Pain (1 - 3)  albuterol/ipratropium for Nebulization 3 milliLiter(s) Nebulizer every 6 hours PRN Shortness of Breath and/or Wheezing  cyclobenzaprine 5 milliGRAM(s) Oral three times a day PRN chest pain  guaifenesin/dextromethorphan Oral Liquid 10 milliLiter(s) Oral every 4 hours PRN Cough  melatonin 3 milliGRAM(s) Oral at bedtime PRN Insomnia      Allergies    No Known Allergies    Intolerances        Vital Signs Last 24 Hrs  T(C): 36.5 (11 May 2025 10:47), Max: 36.9 (10 May 2025 20:27)  T(F): 97.7 (11 May 2025 10:47), Max: 98.4 (10 May 2025 20:27)  HR: 69 (11 May 2025 10:47) (69 - 89)  BP: 111/66 (11 May 2025 10:47) (107/63 - 133/70)  BP(mean): --  RR: 16 (11 May 2025 10:47) (16 - 18)  SpO2: 98% (11 May 2025 10:47) (92% - 98%)    Parameters below as of 11 May 2025 10:47  Patient On (Oxygen Delivery Method): room air        LABS:            Urinalysis Basic - ( 10 May 2025 20:30 )    Color: Yellow / Appearance: Turbid / S.006 / pH: x  Gluc: x / Ketone: Negative mg/dL  / Bili: Negative / Urobili: 0.2 mg/dL   Blood: x / Protein: Negative mg/dL / Nitrite: Negative   Leuk Esterase: Large / RBC: 2 /HPF / WBC 10 /HPF   Sq Epi: x / Non Sq Epi: x / Bacteria: Few /HPF      CAPILLARY BLOOD GLUCOSE         Patient is a 69y old  Female who presents with a chief complaint of SOB. (11 May 2025 07:24)      INTERVAL HPI/OVERNIGHT EVENTS:  Patient seen awake and laying in bed. She reports productive cough, reports breathing has improved on oxygen today, reports R sided chest pain improved.  reports patient has pending medicaid renewal to order home oxygen.       REVIEW OF SYSTEMS:  CONSTITUTIONAL: No fever, weight loss, or fatigue  EYES: No eye pain, visual disturbances, or discharge  ENMT:  No difficulty hearing, tinnitus, vertigo; No sinus or throat pain  NECK: No pain or stiffness  RESPIRATORY: Productive cough, no wheezing, no chills or hemoptysis; No shortness of breath  CARDIOVASCULAR: R sided chest pain, no palpitations, lightheadedness, or leg swelling  GASTROINTESTINAL: No abdominal or epigastric pain. No nausea, vomiting, or hematemesis; No diarrhea or constipation. No melena or hematochezia.  GENITOURINARY: No dysuria, frequency, hematuria, or incontinence  NEUROLOGICAL: No headaches, memory loss, vertigo, loss of strength, numbness, or tremors  SKIN: No itching, burning, rashes, or lesions   LYMPH NODES: No enlarged glands  MUSCULOSKELETAL: No joint pain or swelling; No muscle, back, or extremity pain      PHYSICAL EXAM:  GENERAL: Adult Female, NAD, well-groomed, well-developed  HEAD:  Atraumatic, Normocephalic  EYES: EOMI, PERRLA, conjunctiva and sclera clear  ENMT: Moist mucous membranes, Good dentition, No lesions  NECK: Supple, No JVD appreciated  NERVOUS SYSTEM:  Alert & Oriented X3, Good concentration; All 4 extremities mobile, no gross sensory deficits.   CHEST/LUNG: Diminished to auscultation bilaterally; Scattered wheezes, No rales, rhonchi, or rubs appreciated  HEART: Reproducible chest pain and point tenderness on R anterior thorax. Regular rate and rhythm; No murmurs, rubs, or gallops  ABDOMEN: Soft, Nontender, Nondistended  EXTREMITIES:  No clubbing, cyanosis, or edema appreciated  LYMPH: No lymphadenopathy noted  SKIN: No rashes or lesions appreciated        MEDICATIONS  (STANDING):  apixaban 5 milliGRAM(s) Oral two times a day  benzonatate 100 milliGRAM(s) Oral three times a day  buprenorphine 2 mG/naloxone 0.5 mG SL  Tablet 2 Tablet(s) SubLingual two times a day  diltiazem    milliGRAM(s) Oral daily  fluticasone propionate/ salmeterol 250-50 MICROgram(s) Diskus 1 Dose(s) Inhalation two times a day  pantoprazole    Tablet 40 milliGRAM(s) Oral before breakfast  phenazopyridine 100 milliGRAM(s) Oral every 8 hours  tiotropium 2.5 MICROgram(s) Inhaler 2 Puff(s) Inhalation daily  venlafaxine XR. 37.5 milliGRAM(s) Oral daily    MEDICATIONS  (PRN):  acetaminophen     Tablet .. 650 milliGRAM(s) Oral every 6 hours PRN Mild Pain (1 - 3)  albuterol/ipratropium for Nebulization 3 milliLiter(s) Nebulizer every 6 hours PRN Shortness of Breath and/or Wheezing  cyclobenzaprine 5 milliGRAM(s) Oral three times a day PRN chest pain  guaifenesin/dextromethorphan Oral Liquid 10 milliLiter(s) Oral every 4 hours PRN Cough  melatonin 3 milliGRAM(s) Oral at bedtime PRN Insomnia      Allergies    No Known Allergies    Intolerances        Vital Signs Last 24 Hrs  T(C): 36.5 (11 May 2025 10:47), Max: 36.9 (10 May 2025 20:27)  T(F): 97.7 (11 May 2025 10:47), Max: 98.4 (10 May 2025 20:27)  HR: 69 (11 May 2025 10:47) (69 - 89)  BP: 111/66 (11 May 2025 10:47) (107/63 - 133/70)  BP(mean): --  RR: 16 (11 May 2025 10:47) (16 - 18)  SpO2: 98% (11 May 2025 10:47) (92% - 98%)    Parameters below as of 11 May 2025 10:47  Patient On (Oxygen Delivery Method): room air        LABS:            Urinalysis Basic - ( 10 May 2025 20:30 )    Color: Yellow / Appearance: Turbid / S.006 / pH: x  Gluc: x / Ketone: Negative mg/dL  / Bili: Negative / Urobili: 0.2 mg/dL   Blood: x / Protein: Negative mg/dL / Nitrite: Negative   Leuk Esterase: Large / RBC: 2 /HPF / WBC 10 /HPF   Sq Epi: x / Non Sq Epi: x / Bacteria: Few /HPF      CAPILLARY BLOOD GLUCOSE

## 2025-05-11 NOTE — PROGRESS NOTE ADULT - SUBJECTIVE AND OBJECTIVE BOX
Date/Time Patient Seen:  		  Referring MD:   Data Reviewed	       Patient is a 69y old  Female who presents with a chief complaint of SOB. (10 May 2025 12:47)      Subjective/HPI     PAST MEDICAL & SURGICAL HISTORY:  Drug abuse  was addicted to pain killers    Protein S deficiency    Depression    COPD (chronic obstructive pulmonary disease)    DVT (deep venous thrombosis)    Pulmonary embolism    Afib    No pertinent past medical history    COPD exacerbation    History of COPD    HTN (hypertension)    HLD (hyperlipidemia)    History of protein S deficiency    History of back surgery    No significant past surgical history          Medication list         MEDICATIONS  (STANDING):  apixaban 5 milliGRAM(s) Oral two times a day  benzonatate 100 milliGRAM(s) Oral three times a day  buprenorphine 2 mG/naloxone 0.5 mG SL  Tablet 2 Tablet(s) SubLingual two times a day  diltiazem    milliGRAM(s) Oral daily  fluticasone propionate/ salmeterol 250-50 MICROgram(s) Diskus 1 Dose(s) Inhalation two times a day  pantoprazole    Tablet 40 milliGRAM(s) Oral before breakfast  phenazopyridine 100 milliGRAM(s) Oral every 8 hours  tiotropium 2.5 MICROgram(s) Inhaler 2 Puff(s) Inhalation daily  venlafaxine XR. 37.5 milliGRAM(s) Oral daily    MEDICATIONS  (PRN):  acetaminophen     Tablet .. 650 milliGRAM(s) Oral every 6 hours PRN Mild Pain (1 - 3)  albuterol/ipratropium for Nebulization 3 milliLiter(s) Nebulizer every 6 hours PRN Shortness of Breath and/or Wheezing  cyclobenzaprine 5 milliGRAM(s) Oral three times a day PRN chest pain  guaifenesin/dextromethorphan Oral Liquid 10 milliLiter(s) Oral every 4 hours PRN Cough  melatonin 3 milliGRAM(s) Oral at bedtime PRN Insomnia         Vitals log        ICU Vital Signs Last 24 Hrs  T(C): 36.6 (11 May 2025 04:30), Max: 36.9 (10 May 2025 20:27)  T(F): 97.9 (11 May 2025 04:30), Max: 98.4 (10 May 2025 20:27)  HR: 70 (11 May 2025 04:30) (68 - 89)  BP: 115/70 (11 May 2025 04:30) (107/63 - 133/70)  BP(mean): --  ABP: --  ABP(mean): --  RR: 18 (11 May 2025 04:30) (18 - 18)  SpO2: 95% (11 May 2025 04:30) (92% - 97%)    O2 Parameters below as of 11 May 2025 04:30  Patient On (Oxygen Delivery Method): nasal cannula                 Input and Output:  I&O's Detail      Lab Data                        10.9   10.22 )-----------( 624      ( 09 May 2025 08:02 )             34.1     05-09    140  |  100  |  9   ----------------------------<  96  3.7   |  34[H]  |  0.74    Ca    9.8      09 May 2025 08:02  Mg     1.9     05-09              Review of Systems	      Objective     Physical Examination    heart s1s2  lung dc bs  head nc  head at  abd soft      Pertinent Lab findings & Imaging      [place]:  NO   Adequate UO     I&O's Detail           Discussed with:     Cultures:	        Radiology

## 2025-05-11 NOTE — PROGRESS NOTE ADULT - PROBLEM SELECTOR PLAN 5
Chronic  Continue home diltiazem  Hx of tachycardia, stable as per pt and follows with cardiologist outpt, tele monitor show sinus tachy, range 90s to 140s,  discussed with cardio, will c/w current management   DASH/TLC diet. Continue home diltiazem  Hx of tachycardia, stable as per pt and follows with cardiologist outpt, tele monitor show sinus tachy, range 90s to 140s,  discussed with cardio, will c/w current management   DASH/TLC diet.

## 2025-05-12 DIAGNOSIS — R30.0 DYSURIA: ICD-10-CM

## 2025-05-12 PROCEDURE — 99232 SBSQ HOSP IP/OBS MODERATE 35: CPT

## 2025-05-12 RX ORDER — PHENAZOPYRIDINE HCL 100 MG
200 TABLET ORAL EVERY 8 HOURS
Refills: 0 | Status: COMPLETED | OUTPATIENT
Start: 2025-05-12 | End: 2025-05-14

## 2025-05-12 RX ORDER — SULFAMETHOXAZOLE/TRIMETHOPRIM 800-160 MG
1 TABLET ORAL
Refills: 0 | Status: DISCONTINUED | OUTPATIENT
Start: 2025-05-12 | End: 2025-05-12

## 2025-05-12 RX ADMIN — APIXABAN 5 MILLIGRAM(S): 2.5 TABLET, FILM COATED ORAL at 11:02

## 2025-05-12 RX ADMIN — Medication 1 DOSE(S): at 21:20

## 2025-05-12 RX ADMIN — BUPRENORPHINE HYDROCHLORIDE, NALOXONE HYDROCHLORIDE 2 TABLET(S): 4; 1 FILM, SOLUBLE BUCCAL; SUBLINGUAL at 21:21

## 2025-05-12 RX ADMIN — Medication 100 MILLIGRAM(S): at 05:50

## 2025-05-12 RX ADMIN — TIOTROPIUM BROMIDE INHALATION SPRAY 2 PUFF(S): 3.12 SPRAY, METERED RESPIRATORY (INHALATION) at 05:51

## 2025-05-12 RX ADMIN — Medication 1 DOSE(S): at 05:51

## 2025-05-12 RX ADMIN — VENLAFAXINE HYDROCHLORIDE 37.5 MILLIGRAM(S): 37.5 CAPSULE, EXTENDED RELEASE ORAL at 11:02

## 2025-05-12 RX ADMIN — Medication 3 MILLIGRAM(S): at 21:54

## 2025-05-12 RX ADMIN — Medication 40 MILLIGRAM(S): at 05:50

## 2025-05-12 RX ADMIN — Medication 100 MILLIGRAM(S): at 14:35

## 2025-05-12 RX ADMIN — DILTIAZEM HYDROCHLORIDE 240 MILLIGRAM(S): 120 CAPSULE, EXTENDED RELEASE ORAL at 05:51

## 2025-05-12 RX ADMIN — Medication 100 MILLIGRAM(S): at 05:51

## 2025-05-12 RX ADMIN — BUPRENORPHINE HYDROCHLORIDE, NALOXONE HYDROCHLORIDE 2 TABLET(S): 4; 1 FILM, SOLUBLE BUCCAL; SUBLINGUAL at 11:02

## 2025-05-12 RX ADMIN — Medication 100 MILLIGRAM(S): at 21:20

## 2025-05-12 RX ADMIN — Medication 1 TABLET(S): at 17:26

## 2025-05-12 RX ADMIN — POLYETHYLENE GLYCOL 3350 17 GRAM(S): 17 POWDER, FOR SOLUTION ORAL at 11:02

## 2025-05-12 RX ADMIN — Medication 200 MILLIGRAM(S): at 21:21

## 2025-05-12 NOTE — PROGRESS NOTE ADULT - PROBLEM SELECTOR PLAN 3
CT chest: No evidence of  pulmonary emboli or other acute change compared to 8/19/2024. Emphysema with areas of bronchiectasis and small airway impaction similar to prior. Some pachy opacities seen , concern for pneumonia.   completed levaquin as per ID recommendation. UA shows large LE, negative nitrites, some bacteria  on phenazopyridine q8   will treat empirically with Bactrim DS BID x5 days

## 2025-05-12 NOTE — DIETITIAN INITIAL EVALUATION ADULT - ETIOLOGY
related to dentures making chewing/swallowing difficulty related to increased physiological demand for nutrients 2/2 increased labor of breathing

## 2025-05-12 NOTE — PROGRESS NOTE ADULT - SUBJECTIVE AND OBJECTIVE BOX
Patient is a 69y old  Female who presents with a chief complaint of SOB. (12 May 2025 05:43)      INTERVAL HPI/OVERNIGHT EVENTS:        REVIEW OF SYSTEMS:  CONSTITUTIONAL: No fever, weight loss, or fatigue  EYES: No eye pain, visual disturbances, or discharge  ENMT:  No difficulty hearing, tinnitus, vertigo; No sinus or throat pain  NECK: No pain or stiffness  RESPIRATORY: No cough, wheezing, chills or hemoptysis; No shortness of breath  CARDIOVASCULAR: No chest pain, palpitations, lightheadedness, or leg swelling  GASTROINTESTINAL: No abdominal or epigastric pain. No nausea, vomiting, or hematemesis; No diarrhea or constipation. No melena or hematochezia.  GENITOURINARY: No dysuria, frequency, hematuria, or incontinence  NEUROLOGICAL: No headaches, memory loss, vertigo, loss of strength, numbness, or tremors  SKIN: No itching, burning, rashes, or lesions   LYMPH NODES: No enlarged glands  ENDOCRINE: No heat or cold intolerance; No hair loss; No polydipsia or polyuria  MUSCULOSKELETAL: No joint pain or swelling; No muscle, back, or extremity pain  PSYCHIATRIC: No depression, anxiety, or mood swings  HEME/LYMPH: No easy bruising, or bleeding gums  ALLERGY AND IMMUNOLOGIC: No hives or eczema    PHYSICAL EXAM:  GENERAL: Adult Female, NAD, well-groomed, well-developed  HEAD:  Atraumatic, Normocephalic  EYES: EOMI, PERRLA, conjunctiva and sclera clear  ENMT: Moist mucous membranes, Good dentition, No lesions  NECK: Supple, No JVD appreciated  NERVOUS SYSTEM:  Alert & Oriented X3, Good concentration; All 4 extremities mobile, no gross sensory deficits.   CHEST/LUNG: Clear to auscultation bilaterally; No rales, rhonchi, wheezing, or rubs appreciated  HEART: Regular rate and rhythm; No murmurs, rubs, or gallops  ABDOMEN: Soft, Nontender, Nondistended  EXTREMITIES:  No clubbing, cyanosis, or edema appreciated  LYMPH: No lymphadenopathy noted  SKIN: No rashes or lesions appreciated    MEDICATIONS  (STANDING):  apixaban 5 milliGRAM(s) Oral two times a day  benzonatate 100 milliGRAM(s) Oral three times a day  buprenorphine 2 mG/naloxone 0.5 mG SL  Tablet 2 Tablet(s) SubLingual two times a day  diltiazem    milliGRAM(s) Oral daily  fluticasone propionate/ salmeterol 250-50 MICROgram(s) Diskus 1 Dose(s) Inhalation two times a day  pantoprazole    Tablet 40 milliGRAM(s) Oral before breakfast  phenazopyridine 100 milliGRAM(s) Oral every 8 hours  polyethylene glycol 3350 17 Gram(s) Oral daily  tiotropium 2.5 MICROgram(s) Inhaler 2 Puff(s) Inhalation daily  venlafaxine XR. 37.5 milliGRAM(s) Oral daily    MEDICATIONS  (PRN):  acetaminophen     Tablet .. 650 milliGRAM(s) Oral every 6 hours PRN Mild Pain (1 - 3)  albuterol/ipratropium for Nebulization 3 milliLiter(s) Nebulizer every 6 hours PRN Shortness of Breath and/or Wheezing  cyclobenzaprine 5 milliGRAM(s) Oral three times a day PRN chest pain  guaifenesin/dextromethorphan Oral Liquid 10 milliLiter(s) Oral every 4 hours PRN Cough  melatonin 3 milliGRAM(s) Oral at bedtime PRN Insomnia      Allergies    No Known Allergies    Intolerances        Vital Signs Last 24 Hrs  T(C): 36.8 (12 May 2025 13:10), Max: 37 (11 May 2025 16:25)  T(F): 98.2 (12 May 2025 13:10), Max: 98.6 (11 May 2025 16:25)  HR: 75 (12 May 2025 13:10) (64 - 75)  BP: 110/61 (12 May 2025 13:10) (101/62 - 116/64)  BP(mean): --  RR: 18 (12 May 2025 13:10) (18 - 18)  SpO2: 95% (12 May 2025 13:10) (94% - 96%)    Parameters below as of 12 May 2025 06:18  Patient On (Oxygen Delivery Method): nasal cannula  O2 Flow (L/min): 2      LABS:            Urinalysis Basic - ( 10 May 2025 20:30 )    Color: Yellow / Appearance: Turbid / S.006 / pH: x  Gluc: x / Ketone: Negative mg/dL  / Bili: Negative / Urobili: 0.2 mg/dL   Blood: x / Protein: Negative mg/dL / Nitrite: Negative   Leuk Esterase: Large / RBC: 2 /HPF / WBC 10 /HPF   Sq Epi: x / Non Sq Epi: x / Bacteria: Few /HPF      CAPILLARY BLOOD GLUCOSE          RADIOLOGY & ADDITIONAL TESTS:    Imaging Personally Reviewed:  [ ] YES     Consultant(s) Notes Reviewed:      Care Discussed with Consultants/Other Providers:    Advanced Directives: [ ] DNR  [ ] No feeding tube  [ ] MOLST in chart  [ ] MOLST completed today  [ ] Unknown   Patient is a 69y old  Female who presents with a chief complaint of SOB. (12 May 2025 05:43)      INTERVAL HPI/OVERNIGHT EVENTS:  Patient seen awake and laying in bed. She reports breathing has improved on oxygen today, reports burning pelvic pain that worsens after urination.  reports patient has pending medicaid renewal to order home oxygen.       REVIEW OF SYSTEMS:  CONSTITUTIONAL: No fever, weight loss, or fatigue  EYES: No eye pain, visual disturbances, or discharge  ENMT:  No difficulty hearing, tinnitus, vertigo; No sinus or throat pain  NECK: No pain or stiffness  RESPIRATORY: Productive cough, no wheezing, no chills or hemoptysis; No shortness of breath  CARDIOVASCULAR: R sided chest pain, no palpitations, lightheadedness, or leg swelling  GASTROINTESTINAL: No abdominal or epigastric pain. No nausea, vomiting, or hematemesis; No diarrhea or constipation. No melena or hematochezia.  GENITOURINARY: Dysuria, no frequency, hematuria, or incontinence  NEUROLOGICAL: No headaches, memory loss, vertigo, loss of strength, numbness, or tremors  SKIN: No itching, burning, rashes, or lesions   LYMPH NODES: No enlarged glands  MUSCULOSKELETAL: No joint pain or swelling; No muscle, back, or extremity pain      PHYSICAL EXAM:  GENERAL: Adult Female, NAD, well-groomed, well-developed  HEAD:  Atraumatic, Normocephalic  EYES: EOMI, PERRLA, conjunctiva and sclera clear  ENMT: Moist mucous membranes, Good dentition, No lesions  NECK: Supple, No JVD appreciated  NERVOUS SYSTEM:  Alert & Oriented X3, Good concentration; All 4 extremities mobile, no gross sensory deficits.   CHEST/LUNG: Diminished to auscultation bilaterally; Scattered wheezes, No rales, rhonchi, or rubs appreciated  HEART: Reproducible chest pain and point tenderness on R anterior thorax. Regular rate and rhythm; No murmurs, rubs, or gallops  ABDOMEN: Soft, Nondistended, tender to palpation of suprapubic region at midline, no guarding  EXTREMITIES:  No clubbing, cyanosis, or edema appreciated  LYMPH: No lymphadenopathy noted  SKIN: No rashes or lesions appreciated    MEDICATIONS  (STANDING):  apixaban 5 milliGRAM(s) Oral two times a day  benzonatate 100 milliGRAM(s) Oral three times a day  buprenorphine 2 mG/naloxone 0.5 mG SL  Tablet 2 Tablet(s) SubLingual two times a day  diltiazem    milliGRAM(s) Oral daily  fluticasone propionate/ salmeterol 250-50 MICROgram(s) Diskus 1 Dose(s) Inhalation two times a day  pantoprazole    Tablet 40 milliGRAM(s) Oral before breakfast  phenazopyridine 100 milliGRAM(s) Oral every 8 hours  polyethylene glycol 3350 17 Gram(s) Oral daily  tiotropium 2.5 MICROgram(s) Inhaler 2 Puff(s) Inhalation daily  venlafaxine XR. 37.5 milliGRAM(s) Oral daily    MEDICATIONS  (PRN):  acetaminophen     Tablet .. 650 milliGRAM(s) Oral every 6 hours PRN Mild Pain (1 - 3)  albuterol/ipratropium for Nebulization 3 milliLiter(s) Nebulizer every 6 hours PRN Shortness of Breath and/or Wheezing  cyclobenzaprine 5 milliGRAM(s) Oral three times a day PRN chest pain  guaifenesin/dextromethorphan Oral Liquid 10 milliLiter(s) Oral every 4 hours PRN Cough  melatonin 3 milliGRAM(s) Oral at bedtime PRN Insomnia      Allergies    No Known Allergies    Intolerances        Vital Signs Last 24 Hrs  T(C): 36.8 (12 May 2025 13:10), Max: 37 (11 May 2025 16:25)  T(F): 98.2 (12 May 2025 13:10), Max: 98.6 (11 May 2025 16:25)  HR: 75 (12 May 2025 13:10) (64 - 75)  BP: 110/61 (12 May 2025 13:10) (101/62 - 116/64)  BP(mean): --  RR: 18 (12 May 2025 13:10) (18 - 18)  SpO2: 95% (12 May 2025 13:10) (94% - 96%)    Parameters below as of 12 May 2025 06:18  Patient On (Oxygen Delivery Method): nasal cannula  O2 Flow (L/min): 2      LABS:            Urinalysis Basic - ( 10 May 2025 20:30 )    Color: Yellow / Appearance: Turbid / S.006 / pH: x  Gluc: x / Ketone: Negative mg/dL  / Bili: Negative / Urobili: 0.2 mg/dL   Blood: x / Protein: Negative mg/dL / Nitrite: Negative   Leuk Esterase: Large / RBC: 2 /HPF / WBC 10 /HPF   Sq Epi: x / Non Sq Epi: x / Bacteria: Few /HPF      CAPILLARY BLOOD GLUCOSE

## 2025-05-12 NOTE — DIETITIAN INITIAL EVALUATION ADULT - PERTINENT MEDS FT
Claritin 1 teaspoon daily as needed for congestion  Honey 1-2  teaspoons as needed for cough   Decrease Miralax to 1/2 scoop daily  Tylenol 2 tspn or 2 Jr tablets every 6 hours as needed for pain or fever  Call or return to clinic as needed if these symptoms worsen, fail to improve as anticipated, or if new symptoms develop.     MEDICATIONS  (STANDING):  apixaban 5 milliGRAM(s) Oral two times a day  benzonatate 100 milliGRAM(s) Oral three times a day  buprenorphine 2 mG/naloxone 0.5 mG SL  Tablet 2 Tablet(s) SubLingual two times a day  diltiazem    milliGRAM(s) Oral daily  fluticasone propionate/ salmeterol 250-50 MICROgram(s) Diskus 1 Dose(s) Inhalation two times a day  pantoprazole    Tablet 40 milliGRAM(s) Oral before breakfast  phenazopyridine 100 milliGRAM(s) Oral every 8 hours  polyethylene glycol 3350 17 Gram(s) Oral daily  tiotropium 2.5 MICROgram(s) Inhaler 2 Puff(s) Inhalation daily  venlafaxine XR. 37.5 milliGRAM(s) Oral daily    MEDICATIONS  (PRN):  acetaminophen     Tablet .. 650 milliGRAM(s) Oral every 6 hours PRN Mild Pain (1 - 3)  albuterol/ipratropium for Nebulization 3 milliLiter(s) Nebulizer every 6 hours PRN Shortness of Breath and/or Wheezing  cyclobenzaprine 5 milliGRAM(s) Oral three times a day PRN chest pain  guaifenesin/dextromethorphan Oral Liquid 10 milliLiter(s) Oral every 4 hours PRN Cough  melatonin 3 milliGRAM(s) Oral at bedtime PRN Insomnia

## 2025-05-12 NOTE — PROGRESS NOTE ADULT - PROBLEM SELECTOR PLAN 4
likely reactive, trending down   hematology eval noted, MPL, BCR/ABL test  ordered   s/p IVF CT chest: No evidence of  pulmonary emboli or other acute change compared to 8/19/2024. Emphysema with areas of bronchiectasis and small airway impaction similar to prior. Some pachy opacities seen , concern for pneumonia.   completed levaquin as per ID recommendation.

## 2025-05-12 NOTE — DIETITIAN INITIAL EVALUATION ADULT - PROBLEM SELECTOR PLAN 2
chronic, stable, patient unaware of it but only knows about her thrombocytosis, never had w/u, consider hematology consult in am.

## 2025-05-12 NOTE — PROGRESS NOTE ADULT - SUBJECTIVE AND OBJECTIVE BOX
Date/Time Patient Seen:  		  Referring MD:   Data Reviewed	       Patient is a 69y old  Female who presents with a chief complaint of SOB. (11 May 2025 12:55)      Subjective/HPI     PAST MEDICAL & SURGICAL HISTORY:  Drug abuse  was addicted to pain killers    Protein S deficiency    Depression    COPD (chronic obstructive pulmonary disease)    DVT (deep venous thrombosis)    Pulmonary embolism    Afib    No pertinent past medical history    COPD exacerbation    History of COPD    HTN (hypertension)    HLD (hyperlipidemia)    History of protein S deficiency    History of back surgery    No significant past surgical history          Medication list         MEDICATIONS  (STANDING):  apixaban 5 milliGRAM(s) Oral two times a day  benzonatate 100 milliGRAM(s) Oral three times a day  buprenorphine 2 mG/naloxone 0.5 mG SL  Tablet 2 Tablet(s) SubLingual two times a day  diltiazem    milliGRAM(s) Oral daily  fluticasone propionate/ salmeterol 250-50 MICROgram(s) Diskus 1 Dose(s) Inhalation two times a day  pantoprazole    Tablet 40 milliGRAM(s) Oral before breakfast  phenazopyridine 100 milliGRAM(s) Oral every 8 hours  polyethylene glycol 3350 17 Gram(s) Oral daily  tiotropium 2.5 MICROgram(s) Inhaler 2 Puff(s) Inhalation daily  venlafaxine XR. 37.5 milliGRAM(s) Oral daily    MEDICATIONS  (PRN):  acetaminophen     Tablet .. 650 milliGRAM(s) Oral every 6 hours PRN Mild Pain (1 - 3)  albuterol/ipratropium for Nebulization 3 milliLiter(s) Nebulizer every 6 hours PRN Shortness of Breath and/or Wheezing  cyclobenzaprine 5 milliGRAM(s) Oral three times a day PRN chest pain  guaifenesin/dextromethorphan Oral Liquid 10 milliLiter(s) Oral every 4 hours PRN Cough  melatonin 3 milliGRAM(s) Oral at bedtime PRN Insomnia         Vitals log        ICU Vital Signs Last 24 Hrs  T(C): 36.5 (12 May 2025 01:39), Max: 37 (11 May 2025 16:25)  T(F): 97.7 (12 May 2025 01:39), Max: 98.6 (11 May 2025 16:25)  HR: 64 (12 May 2025 01:39) (64 - 82)  BP: 101/62 (12 May 2025 01:39) (101/62 - 125/73)  BP(mean): --  ABP: --  ABP(mean): --  RR: 18 (12 May 2025 01:39) (16 - 18)  SpO2: 96% (12 May 2025 01:39) (94% - 98%)    O2 Parameters below as of 12 May 2025 01:39  Patient On (Oxygen Delivery Method): nasal cannula                 Input and Output:  I&O's Detail      Lab Data                  Review of Systems	      Objective     Physical Examination    heart s1s2  lung dc bs  head nc  head at      Pertinent Lab findings & Imaging      [place]:  NO   Adequate UO     I&O's Detail           Discussed with:     Cultures:	        Radiology

## 2025-05-12 NOTE — DIETITIAN INITIAL EVALUATION ADULT - ADD RECOMMEND
1) Recommend continue DASH diet, soft foods to be provided by kitchen, pt declines ONS at this time  2) Continue to monitor PO intake, tolerance to diet prescription, weights, labs, GI tolerance, and skin integrity. MD notified via teams regarding any change/changes to diet order.

## 2025-05-12 NOTE — DIETITIAN INITIAL EVALUATION ADULT - ORAL INTAKE PTA/DIET HISTORY
Pt states fair appetite at home, doesn't have energy to cook so  will get easy to prepare/frozen foods such as beef and broccoli or fish sticks. She usually eats 1 big meal per day in the afternoon. She drinks water and tea throughout the day. She also relies on ensure supplements when her appetite is not great and will drink between 1-3 per day. She reports no wt loss recently, wt has been stable. UBW 47.8kg/105lb for approx 6 months

## 2025-05-12 NOTE — PROGRESS NOTE ADULT - PROBLEM SELECTOR PLAN 2
continue spiriva QD, Advair BID  Albuterol/Ipratroprium nebulizer q6h PRN  Continuous pulse ox, SpO2 goal>88%   CT chest: No evidence of  pulmonary emboli or other acute change compared to 8/19/2024.Emphysema with areas of bronchiectasis and small airway impaction similar to prior. However, some pachy opacities seen , concern for pneumonia.   ID eval noted:  completed levaquin course  guaifenesin-DM PRN cough, add tessalon perles TID scheduled  pulmonary eval noted. continue spiriva QD, Advair BID  Albuterol/Ipratroprium nebulizer q6h PRN  Continuous pulse ox, SpO2 goal>88%   CT chest: No evidence of  pulmonary emboli or other acute change compared to 8/19/2024.Emphysema with areas of bronchiectasis and small airway impaction similar to prior. However, some pachy opacities seen , concern for pneumonia.   ID marie noted: recently completed levaquin course  guaifenesin-DM PRN cough, add tessalon perles TID scheduled  pulmonology following

## 2025-05-12 NOTE — DIETITIAN INITIAL EVALUATION ADULT - REASON FOR ADMISSION
SOB.    Per HPI: This is a 70 y/o F with PMH of HTN, Dyslipidemia, A. Fib on Eliquis, DVT/PE, ILD, COPD, Chronic Respiratory Failure on home oxygent, Protein S Deficiency, GERD, Drug Abuse, Anxiety, and Depression who presented with worsening SOB with dropping of her SPO2. Patient stooped all meds including her Eliquis over the past month because she lost her health insurance, also she doesn't have access to her PCP anymore for the same reason. Yesterday she was feeling more SOB, found that her SPO2 is down in the 80's so she came to the hospital. No reported fever or chills. At the ED she tested negative for influenza A & B, RSV, and COVID-19 PCR, and her CTA didn't show evidence of PE.  (08 May 2025 04:51)

## 2025-05-12 NOTE — PROGRESS NOTE ADULT - PROBLEM SELECTOR PLAN 6
Chronic, stable as per patient  Patient is on anticoagulation (eliquis 5 mg BID) as per heme/onc doctor who she does not regularly follow up with  Recommend patient follow up with heme onc doctor upon discharge. likely reactive, trending down   hematology eval noted, MPL, BCR/ABL test  ordered   s/p IVF

## 2025-05-12 NOTE — PROGRESS NOTE ADULT - PROBLEM SELECTOR PLAN 5
Continue home diltiazem  Hx of tachycardia, stable as per pt and follows with cardiologist outpt, tele monitor show sinus tachy, range 90s to 140s,  discussed with cardio, will c/w current management   DASH/TLC diet. Likely secondary to coughing  ordered cyclobenzaprine 5mg PO TID PRN muscle spasms  will monitor

## 2025-05-12 NOTE — PROGRESS NOTE ADULT - PROBLEM SELECTOR PLAN 7
DVT Prophylaxis: eliquis 5 mg BID. Continue home diltiazem  Hx of tachycardia, stable as per pt and follows with cardiologist outpt, tele monitor show sinus tachy, range 90s to 140s,  discussed with cardio, will c/w current management   DASH/TLC diet.

## 2025-05-12 NOTE — PROGRESS NOTE ADULT - PROBLEM SELECTOR PLAN 1
due to COPD exacerbation, viral infection and secondary bacterial pneumonia   treating as below  CT chest shows emphysema and areas of bronchiectasis and small airway impaction similar to prior  RVP negative  On home O2 2L NC, was increased to 3L this admission  Continuous pulse ox, SpO2 goal>88%,  still need 3L   continue spiriva QD, Advair BID  Albuterol/Ipratroprium nebulizer q6h PRN  CT chest: Stable 9 mm subpleural right upper lobe nodule. Recommend three-month follow-up CT  ID marie noted : completed levaquin course  guaifenesin-DM PRN cough, add tessalon perles TID scheduled  improved, d/c planning. Patient will need home oxygen on discharge, discharge delayed until she can be safely discharged on oxygen. due to COPD exacerbation, viral infection and secondary bacterial pneumonia   CT chest shows emphysema and areas of bronchiectasis and small airway impaction similar to prior  RVP negative  On home O2 2L NC, was increased to 3L this admission  Continuous pulse ox, SpO2 goal>88%,  still need 3L   continue spiriva QD, Advair BID  Albuterol/Ipratroprium nebulizer q6h PRN  CT chest: Stable 9 mm subpleural right upper lobe nodule. Recommend three-month follow-up CT  ID marie noted : recently completed levaquin course  guaifenesin-DM PRN cough, add tessalon perles TID scheduled  improved, d/c planning. Patient will need home oxygen on discharge, discharge delayed until she can be safely discharged on oxygen.

## 2025-05-12 NOTE — DIETITIAN INITIAL EVALUATION ADULT - PERTINENT LABORATORY DATA
Patient with one or more new problems requiring additional work-up/treatment.
    A1C with Estimated Average Glucose Result: 6.0 % (11-06-24 @ 08:01)

## 2025-05-12 NOTE — PROGRESS NOTE ADULT - PROBLEM SELECTOR PLAN 8
Likely secondary to coughing  ordered cyclobenzaprine 5mg PO TID PRN muscle spasms  will monitor Chronic, stable as per patient  Patient is on anticoagulation (eliquis 5 mg BID) as per heme/onc doctor who she does not regularly follow up with  Recommend patient follow up with heme onc doctor upon discharge.

## 2025-05-13 ENCOUNTER — TRANSCRIPTION ENCOUNTER (OUTPATIENT)
Age: 70
End: 2025-05-13

## 2025-05-13 PROCEDURE — 99232 SBSQ HOSP IP/OBS MODERATE 35: CPT

## 2025-05-13 RX ORDER — ALBUTEROL SULFATE 2.5 MG/3ML
0.5 VIAL, NEBULIZER (ML) INHALATION
Refills: 0 | DISCHARGE

## 2025-05-13 RX ORDER — UMECLIDINIUM 62.5 UG/1
1 AEROSOL, POWDER ORAL
Qty: 30 | Refills: 0
Start: 2025-05-13 | End: 2025-06-11

## 2025-05-13 RX ORDER — UMECLIDINIUM 62.5 UG/1
1 AEROSOL, POWDER ORAL
Qty: 1 | Refills: 0
Start: 2025-05-13 | End: 2025-06-11

## 2025-05-13 RX ORDER — BUPRENORPHINE 7.5 UG/H
2 PATCH, EXTENDED RELEASE TRANSDERMAL
Qty: 120 | Refills: 0
Start: 2025-05-13 | End: 2025-06-11

## 2025-05-13 RX ORDER — BUDESONIDE AND FORMOTEROL FUMARATE DIHYDRATE 80; 4.5 UG/1; UG/1
2 AEROSOL RESPIRATORY (INHALATION)
Qty: 1 | Refills: 0
Start: 2025-05-13 | End: 2025-06-11

## 2025-05-13 RX ORDER — OMEPRAZOLE 20 MG/1
1 CAPSULE, DELAYED RELEASE ORAL
Qty: 30 | Refills: 0
Start: 2025-05-13 | End: 2025-06-11

## 2025-05-13 RX ORDER — APIXABAN 2.5 MG/1
1 TABLET, FILM COATED ORAL
Qty: 60 | Refills: 0
Start: 2025-05-13 | End: 2025-06-11

## 2025-05-13 RX ORDER — TRAZODONE HCL 100 MG
50 TABLET ORAL AT BEDTIME
Refills: 0 | Status: DISCONTINUED | OUTPATIENT
Start: 2025-05-13 | End: 2025-05-14

## 2025-05-13 RX ORDER — BUPRENORPHINE 7.5 UG/H
2 PATCH, EXTENDED RELEASE TRANSDERMAL
Refills: 0 | DISCHARGE

## 2025-05-13 RX ORDER — VENLAFAXINE HYDROCHLORIDE 37.5 MG/1
1 CAPSULE, EXTENDED RELEASE ORAL
Qty: 30 | Refills: 0
Start: 2025-05-13 | End: 2025-06-11

## 2025-05-13 RX ORDER — DILTIAZEM HYDROCHLORIDE 120 MG/1
1 CAPSULE, EXTENDED RELEASE ORAL
Qty: 30 | Refills: 0
Start: 2025-05-13 | End: 2025-06-11

## 2025-05-13 RX ORDER — ALBUTEROL SULFATE 2.5 MG/3ML
2 VIAL, NEBULIZER (ML) INHALATION
Qty: 1 | Refills: 0
Start: 2025-05-13 | End: 2025-06-11

## 2025-05-13 RX ORDER — BUPRENORPHINE 7.5 UG/H
2 PATCH, EXTENDED RELEASE TRANSDERMAL
Refills: 0
Start: 2025-05-13

## 2025-05-13 RX ORDER — BENZONATATE 100 MG
1 CAPSULE ORAL
Qty: 90 | Refills: 0
Start: 2025-05-13 | End: 2025-06-11

## 2025-05-13 RX ORDER — BUDESONIDE AND FORMOTEROL FUMARATE DIHYDRATE 80; 4.5 UG/1; UG/1
2 AEROSOL RESPIRATORY (INHALATION)
Refills: 0 | DISCHARGE

## 2025-05-13 RX ADMIN — Medication 200 MILLIGRAM(S): at 21:24

## 2025-05-13 RX ADMIN — Medication 200 MILLIGRAM(S): at 05:12

## 2025-05-13 RX ADMIN — TIOTROPIUM BROMIDE INHALATION SPRAY 2 PUFF(S): 3.12 SPRAY, METERED RESPIRATORY (INHALATION) at 06:35

## 2025-05-13 RX ADMIN — APIXABAN 5 MILLIGRAM(S): 2.5 TABLET, FILM COATED ORAL at 13:21

## 2025-05-13 RX ADMIN — POLYETHYLENE GLYCOL 3350 17 GRAM(S): 17 POWDER, FOR SOLUTION ORAL at 13:20

## 2025-05-13 RX ADMIN — Medication 1 DOSE(S): at 06:35

## 2025-05-13 RX ADMIN — BUPRENORPHINE HYDROCHLORIDE, NALOXONE HYDROCHLORIDE 2 TABLET(S): 4; 1 FILM, SOLUBLE BUCCAL; SUBLINGUAL at 09:28

## 2025-05-13 RX ADMIN — VENLAFAXINE HYDROCHLORIDE 37.5 MILLIGRAM(S): 37.5 CAPSULE, EXTENDED RELEASE ORAL at 13:21

## 2025-05-13 RX ADMIN — BUPRENORPHINE HYDROCHLORIDE, NALOXONE HYDROCHLORIDE 2 TABLET(S): 4; 1 FILM, SOLUBLE BUCCAL; SUBLINGUAL at 21:23

## 2025-05-13 RX ADMIN — Medication 100 MILLIGRAM(S): at 21:24

## 2025-05-13 RX ADMIN — Medication 200 MILLIGRAM(S): at 13:25

## 2025-05-13 RX ADMIN — DILTIAZEM HYDROCHLORIDE 240 MILLIGRAM(S): 120 CAPSULE, EXTENDED RELEASE ORAL at 05:13

## 2025-05-13 RX ADMIN — Medication 100 MILLIGRAM(S): at 05:13

## 2025-05-13 RX ADMIN — Medication 40 MILLIGRAM(S): at 05:13

## 2025-05-13 RX ADMIN — APIXABAN 5 MILLIGRAM(S): 2.5 TABLET, FILM COATED ORAL at 23:58

## 2025-05-13 RX ADMIN — Medication 1 DOSE(S): at 20:55

## 2025-05-13 RX ADMIN — Medication 100 MILLIGRAM(S): at 13:24

## 2025-05-13 RX ADMIN — APIXABAN 5 MILLIGRAM(S): 2.5 TABLET, FILM COATED ORAL at 00:03

## 2025-05-13 RX ADMIN — Medication 50 MILLIGRAM(S): at 21:24

## 2025-05-13 NOTE — GOALS OF CARE CONVERSATION - ADVANCED CARE PLANNING - CONVERSATION DETAILS
[facility]-Palliative care SW met with patient at bedside. Inquired about advanced directives. Patient states she does not have any in place. Writer recommended completion of a HCP. Reviewed HCP form and its purpose. Patient identified her sister-in-law Blossom as the person who would help make medical decisions for her. Patient was willing to accept a blank HCP form and instructions to review. She is aware hospital staff can assist with completion should she chose to complete prior to discharge.    SW also inquired about patient's wishes regarding extent of medical care to be provided including thoughts regarding cardiopulmonary resuscitation and mechanical ventilation/intubation. Patient states her wishes are for CPR.    All questions answered. Psychosocial support provided.

## 2025-05-13 NOTE — DISCHARGE NOTE PROVIDER - CARE PROVIDER_API CALL
Jonathon Arguello  Internal Medicine  [address] [place] [id number]  Phone: [phone]  Fax: [phone]  Follow Up Time:

## 2025-05-13 NOTE — PROGRESS NOTE ADULT - SUBJECTIVE AND OBJECTIVE BOX
Patient is a 69y old  Female who presents with a chief complaint of SOB. (13 May 2025 13:28)      INTERVAL HPI/OVERNIGHT EVENTS:  Patient seen awake and laying in bed. She reports sleeping poorly overnight, reports anxiety and became tearful saying she is not ready to go home as she doesn't feel safe leaving. She reports burning pelvic pain has improved.  reports patient has home oxygen concentrator and will get meds to beds on discharge.       REVIEW OF SYSTEMS:  CONSTITUTIONAL: No fever, weight loss, or fatigue  EYES: No eye pain, visual disturbances, or discharge  ENMT:  No difficulty hearing, tinnitus, vertigo; No sinus or throat pain  NECK: No pain or stiffness  RESPIRATORY: Productive cough, no wheezing, no chills or hemoptysis; No shortness of breath  CARDIOVASCULAR: R sided chest pain, no palpitations, lightheadedness, or leg swelling  GASTROINTESTINAL: No abdominal or epigastric pain. No nausea, vomiting, or hematemesis; No diarrhea or constipation. No melena or hematochezia.  GENITOURINARY: Dysuria, no frequency, hematuria, or incontinence  NEUROLOGICAL: No headaches, memory loss, vertigo, loss of strength, numbness, or tremors  SKIN: No itching, burning, rashes, or lesions   LYMPH NODES: No enlarged glands  MUSCULOSKELETAL: No joint pain or swelling; No muscle, back, or extremity pain      PHYSICAL EXAM:  GENERAL: Adult Female, NAD, well-groomed, well-developed  HEAD:  Atraumatic, Normocephalic  EYES: EOMI, PERRLA, conjunctiva and sclera clear  ENMT: Moist mucous membranes, Good dentition, No lesions  NECK: Supple, No JVD appreciated  NERVOUS SYSTEM:  Alert & Oriented X3, Good concentration; All 4 extremities mobile, no gross sensory deficits.   CHEST/LUNG: Diminished to auscultation bilaterally; Scattered wheezes, No rales, rhonchi, or rubs appreciated  HEART: Reproducible chest pain and point tenderness on R anterior thorax. Regular rate and rhythm; No murmurs, rubs, or gallops  ABDOMEN: Soft, Nondistended, tender to palpation of suprapubic region at midline, no guarding  EXTREMITIES:  No clubbing, cyanosis, or edema appreciated  LYMPH: No lymphadenopathy noted  SKIN: No rashes or lesions appreciated      MEDICATIONS  (STANDING):  apixaban 5 milliGRAM(s) Oral two times a day  benzonatate 100 milliGRAM(s) Oral three times a day  buprenorphine 2 mG/naloxone 0.5 mG SL  Tablet 2 Tablet(s) SubLingual two times a day  diltiazem    milliGRAM(s) Oral daily  fluticasone propionate/ salmeterol 250-50 MICROgram(s) Diskus 1 Dose(s) Inhalation two times a day  pantoprazole    Tablet 40 milliGRAM(s) Oral before breakfast  phenazopyridine 200 milliGRAM(s) Oral every 8 hours  polyethylene glycol 3350 17 Gram(s) Oral daily  tiotropium 2.5 MICROgram(s) Inhaler 2 Puff(s) Inhalation daily  venlafaxine XR. 37.5 milliGRAM(s) Oral daily    MEDICATIONS  (PRN):  acetaminophen     Tablet .. 650 milliGRAM(s) Oral every 6 hours PRN Mild Pain (1 - 3)  albuterol/ipratropium for Nebulization 3 milliLiter(s) Nebulizer every 6 hours PRN Shortness of Breath and/or Wheezing  cyclobenzaprine 5 milliGRAM(s) Oral three times a day PRN chest pain  guaifenesin/dextromethorphan Oral Liquid 10 milliLiter(s) Oral every 4 hours PRN Cough  traZODone 50 milliGRAM(s) Oral at bedtime PRN insomnia      Allergies    No Known Allergies    Intolerances        Vital Signs Last 24 Hrs  T(C): 37.3 (13 May 2025 16:34), Max: 37.3 (13 May 2025 16:34)  T(F): 99.2 (13 May 2025 16:34), Max: 99.2 (13 May 2025 16:34)  HR: 79 (13 May 2025 16:34) (66 - 98)  BP: 120/69 (13 May 2025 16:34) (99/61 - 132/72)  BP(mean): --  RR: 18 (13 May 2025 16:34) (18 - 18)  SpO2: 92% (13 May 2025 16:34) (92% - 97%)    Parameters below as of 13 May 2025 16:34  Patient On (Oxygen Delivery Method): nasal cannula  O2 Flow (L/min): 2      LABS:              CAPILLARY BLOOD GLUCOSE          RADIOLOGY & ADDITIONAL TESTS:    Imaging Personally Reviewed:  [ ] YES     Consultant(s) Notes Reviewed:      Care Discussed with Consultants/Other Providers:    Advanced Directives: [ ] DNR  [ ] No feeding tube  [ ] MOLST in chart  [ ] MOLST completed today  [ ] Unknown   Patient is a 69y old  Female who presents with a chief complaint of SOB. (13 May 2025 13:28)      INTERVAL HPI/OVERNIGHT EVENTS:  Patient seen awake and laying in bed. She reports sleeping poorly overnight, reports anxiety and became tearful saying she is not ready to go home as she doesn't feel safe leaving. She reports burning pelvic pain has improved.  reports patient has home oxygen concentrator and will get meds to beds on discharge. Son via phone reports he is concerned that patient has borderline personality and is concerned about her ability to self-care at home.       REVIEW OF SYSTEMS:  CONSTITUTIONAL: No fever, weight loss, or fatigue  EYES: No eye pain, visual disturbances, or discharge  ENMT:  No difficulty hearing, tinnitus, vertigo; No sinus or throat pain  NECK: No pain or stiffness  RESPIRATORY: Productive cough, no wheezing, no chills or hemoptysis; No shortness of breath  CARDIOVASCULAR: R sided chest pain, no palpitations, lightheadedness, or leg swelling  GASTROINTESTINAL: No abdominal or epigastric pain. No nausea, vomiting, or hematemesis; No diarrhea or constipation. No melena or hematochezia.  GENITOURINARY: Pelvic pain, no frequency, hematuria, or incontinence  NEUROLOGICAL: No headaches, memory loss, vertigo, loss of strength, numbness, or tremors  SKIN: No itching, burning, rashes, or lesions   MUSCULOSKELETAL: No joint pain or swelling; No muscle, back, or extremity pain      PHYSICAL EXAM:  GENERAL: Adult Female, NAD, well-groomed, well-developed  HEAD:  Atraumatic, Normocephalic  EYES: EOMI, PERRLA, conjunctiva and sclera clear  ENMT: Moist mucous membranes, Good dentition, No lesions  NECK: Supple, No JVD appreciated  NERVOUS SYSTEM:  Alert & Oriented X3, Good concentration; All 4 extremities mobile, no gross sensory deficits.   CHEST/LUNG: Diminished to auscultation bilaterally; Scattered wheezes, No rales, rhonchi, or rubs appreciated  HEART: Regular rate and rhythm; No murmurs, rubs, or gallops  ABDOMEN: Soft, Nondistended, tender to palpation of suprapubic region at midline, no guarding  EXTREMITIES:  No clubbing, cyanosis, or edema appreciated  LYMPH: No lymphadenopathy noted  SKIN: No rashes or lesions appreciated      MEDICATIONS  (STANDING):  apixaban 5 milliGRAM(s) Oral two times a day  benzonatate 100 milliGRAM(s) Oral three times a day  buprenorphine 2 mG/naloxone 0.5 mG SL  Tablet 2 Tablet(s) SubLingual two times a day  diltiazem    milliGRAM(s) Oral daily  fluticasone propionate/ salmeterol 250-50 MICROgram(s) Diskus 1 Dose(s) Inhalation two times a day  pantoprazole    Tablet 40 milliGRAM(s) Oral before breakfast  phenazopyridine 200 milliGRAM(s) Oral every 8 hours  polyethylene glycol 3350 17 Gram(s) Oral daily  tiotropium 2.5 MICROgram(s) Inhaler 2 Puff(s) Inhalation daily  venlafaxine XR. 37.5 milliGRAM(s) Oral daily    MEDICATIONS  (PRN):  acetaminophen     Tablet .. 650 milliGRAM(s) Oral every 6 hours PRN Mild Pain (1 - 3)  albuterol/ipratropium for Nebulization 3 milliLiter(s) Nebulizer every 6 hours PRN Shortness of Breath and/or Wheezing  cyclobenzaprine 5 milliGRAM(s) Oral three times a day PRN chest pain  guaifenesin/dextromethorphan Oral Liquid 10 milliLiter(s) Oral every 4 hours PRN Cough  traZODone 50 milliGRAM(s) Oral at bedtime PRN insomnia      Allergies    No Known Allergies    Intolerances        Vital Signs Last 24 Hrs  T(C): 37.3 (13 May 2025 16:34), Max: 37.3 (13 May 2025 16:34)  T(F): 99.2 (13 May 2025 16:34), Max: 99.2 (13 May 2025 16:34)  HR: 79 (13 May 2025 16:34) (66 - 98)  BP: 120/69 (13 May 2025 16:34) (99/61 - 132/72)  BP(mean): --  RR: 18 (13 May 2025 16:34) (18 - 18)  SpO2: 92% (13 May 2025 16:34) (92% - 97%)    Parameters below as of 13 May 2025 16:34  Patient On (Oxygen Delivery Method): nasal cannula  O2 Flow (L/min): 2      LABS:              CAPILLARY BLOOD GLUCOSE     Patient is a 69y old  Female who presents with a chief complaint of SOB. (13 May 2025 13:28)      INTERVAL HPI/OVERNIGHT EVENTS:  Patient seen awake and laying in bed. She reports sleeping poorly overnight, reports anxiety and became tearful saying she is not ready to go home as she doesn't feel safe leaving. She reports burning pelvic pain has improved.  reports patient has home oxygen concentrator and will get meds to beds on discharge. Son via phone reports he is concerned that patient has borderline personality and is concerned about her ability to self-care at home. Patient is unwilling to leave, ordered PT/OT as patient may need SNF or [facility].      REVIEW OF SYSTEMS:  CONSTITUTIONAL: No fever, weight loss, or fatigue  EYES: No eye pain, visual disturbances, or discharge  ENMT:  No difficulty hearing, tinnitus, vertigo; No sinus or throat pain  NECK: No pain or stiffness  RESPIRATORY: Productive cough, no wheezing, no chills or hemoptysis; No shortness of breath  CARDIOVASCULAR: R sided chest pain, no palpitations, lightheadedness, or leg swelling  GASTROINTESTINAL: No abdominal or epigastric pain. No nausea, vomiting, or hematemesis; No diarrhea or constipation. No melena or hematochezia.  GENITOURINARY: Pelvic pain, no frequency, hematuria, or incontinence  NEUROLOGICAL: No headaches, memory loss, vertigo, loss of strength, numbness, or tremors  SKIN: No itching, burning, rashes, or lesions   MUSCULOSKELETAL: No joint pain or swelling; No muscle, back, or extremity pain      PHYSICAL EXAM:  GENERAL: Adult Female, NAD, well-groomed, well-developed  HEAD:  Atraumatic, Normocephalic  EYES: EOMI, PERRLA, conjunctiva and sclera clear  ENMT: Moist mucous membranes, Good dentition, No lesions  NECK: Supple, No JVD appreciated  NERVOUS SYSTEM:  Alert & Oriented X3, Good concentration; All 4 extremities mobile, no gross sensory deficits.   CHEST/LUNG: Diminished to auscultation bilaterally; Scattered wheezes, No rales, rhonchi, or rubs appreciated  HEART: Regular rate and rhythm; No murmurs, rubs, or gallops  ABDOMEN: Soft, Nondistended, tender to palpation of suprapubic region at midline, no guarding  EXTREMITIES:  No clubbing, cyanosis, or edema appreciated  LYMPH: No lymphadenopathy noted  SKIN: No rashes or lesions appreciated      MEDICATIONS  (STANDING):  apixaban 5 milliGRAM(s) Oral two times a day  benzonatate 100 milliGRAM(s) Oral three times a day  buprenorphine 2 mG/naloxone 0.5 mG SL  Tablet 2 Tablet(s) SubLingual two times a day  diltiazem    milliGRAM(s) Oral daily  fluticasone propionate/ salmeterol 250-50 MICROgram(s) Diskus 1 Dose(s) Inhalation two times a day  pantoprazole    Tablet 40 milliGRAM(s) Oral before breakfast  phenazopyridine 200 milliGRAM(s) Oral every 8 hours  polyethylene glycol 3350 17 Gram(s) Oral daily  tiotropium 2.5 MICROgram(s) Inhaler 2 Puff(s) Inhalation daily  venlafaxine XR. 37.5 milliGRAM(s) Oral daily    MEDICATIONS  (PRN):  acetaminophen     Tablet .. 650 milliGRAM(s) Oral every 6 hours PRN Mild Pain (1 - 3)  albuterol/ipratropium for Nebulization 3 milliLiter(s) Nebulizer every 6 hours PRN Shortness of Breath and/or Wheezing  cyclobenzaprine 5 milliGRAM(s) Oral three times a day PRN chest pain  guaifenesin/dextromethorphan Oral Liquid 10 milliLiter(s) Oral every 4 hours PRN Cough  traZODone 50 milliGRAM(s) Oral at bedtime PRN insomnia      Allergies    No Known Allergies    Intolerances        Vital Signs Last 24 Hrs  T(C): 37.3 (13 May 2025 16:34), Max: 37.3 (13 May 2025 16:34)  T(F): 99.2 (13 May 2025 16:34), Max: 99.2 (13 May 2025 16:34)  HR: 79 (13 May 2025 16:34) (66 - 98)  BP: 120/69 (13 May 2025 16:34) (99/61 - 132/72)  BP(mean): --  RR: 18 (13 May 2025 16:34) (18 - 18)  SpO2: 92% (13 May 2025 16:34) (92% - 97%)    Parameters below as of 13 May 2025 16:34  Patient On (Oxygen Delivery Method): nasal cannula  O2 Flow (L/min): 2      LABS:              CAPILLARY BLOOD GLUCOSE

## 2025-05-13 NOTE — PROGRESS NOTE ADULT - SUBJECTIVE AND OBJECTIVE BOX
Date/Time Patient Seen:  		  Referring MD:   Data Reviewed	       Patient is a 69y old  Female who presents with a chief complaint of SOB.    Per HPI: This is a 70 y/o F with PMH of HTN, Dyslipidemia, A. Fib on Eliquis, DVT/PE, ILD, COPD, Chronic Respiratory Failure on home oxygent, Protein S Deficiency, GERD, Drug Abuse, Anxiety, and Depression who presented with worsening SOB with dropping of her SPO2. Patient stooped all meds including her Eliquis over the past month because she lost her health insurance, also she doesn't have access to her PCP anymore for the same reason. Yesterday she was feeling more SOB, found that her SPO2 is down in the 80's so she came to the hospital. No reported fever or chills. At the ED she tested negative for influenza A & B, RSV, and COVID-19 PCR, and her CTA didn't show evidence of PE.  (08 May 2025 04:51) (12 May 2025 15:37)      Subjective/HPI     PAST MEDICAL & SURGICAL HISTORY:  Drug abuse  was addicted to pain killers    Protein S deficiency    Depression    COPD (chronic obstructive pulmonary disease)    DVT (deep venous thrombosis)    Pulmonary embolism    Afib    No pertinent past medical history    COPD exacerbation    History of COPD    HTN (hypertension)    HLD (hyperlipidemia)    History of protein S deficiency    History of back surgery    No significant past surgical history          Medication list         MEDICATIONS  (STANDING):  apixaban 5 milliGRAM(s) Oral two times a day  benzonatate 100 milliGRAM(s) Oral three times a day  buprenorphine 2 mG/naloxone 0.5 mG SL  Tablet 2 Tablet(s) SubLingual two times a day  diltiazem    milliGRAM(s) Oral daily  fluticasone propionate/ salmeterol 250-50 MICROgram(s) Diskus 1 Dose(s) Inhalation two times a day  pantoprazole    Tablet 40 milliGRAM(s) Oral before breakfast  phenazopyridine 200 milliGRAM(s) Oral every 8 hours  polyethylene glycol 3350 17 Gram(s) Oral daily  tiotropium 2.5 MICROgram(s) Inhaler 2 Puff(s) Inhalation daily  venlafaxine XR. 37.5 milliGRAM(s) Oral daily    MEDICATIONS  (PRN):  acetaminophen     Tablet .. 650 milliGRAM(s) Oral every 6 hours PRN Mild Pain (1 - 3)  albuterol/ipratropium for Nebulization 3 milliLiter(s) Nebulizer every 6 hours PRN Shortness of Breath and/or Wheezing  cyclobenzaprine 5 milliGRAM(s) Oral three times a day PRN chest pain  guaifenesin/dextromethorphan Oral Liquid 10 milliLiter(s) Oral every 4 hours PRN Cough  melatonin 3 milliGRAM(s) Oral at bedtime PRN Insomnia         Vitals log        ICU Vital Signs Last 24 Hrs  T(C): 36.7 (13 May 2025 04:57), Max: 37.2 (12 May 2025 21:52)  T(F): 98.1 (13 May 2025 04:57), Max: 98.9 (12 May 2025 21:52)  HR: 67 (13 May 2025 05:00) (65 - 98)  BP: 111/64 (13 May 2025 05:00) (99/61 - 132/72)  BP(mean): --  ABP: --  ABP(mean): --  RR: 18 (13 May 2025 04:57) (18 - 18)  SpO2: 97% (13 May 2025 04:57) (94% - 97%)    O2 Parameters below as of 13 May 2025 04:57  Patient On (Oxygen Delivery Method): nasal cannula  O2 Flow (L/min): 2               Input and Output:  I&O's Detail      Lab Data                  Review of Systems	      Objective     Physical Examination    heart s1s2  lung dc bs  head nc  head at      Pertinent Lab findings & Imaging      Dillon:  NO   Adequate UO     I&O's Detail           Discussed with:     Cultures:	        Radiology

## 2025-05-13 NOTE — PROGRESS NOTE ADULT - PROBLEM SELECTOR PLAN 6
likely reactive, trending down   hematology eval noted, MPL, BCR/ABL test  ordered   s/p IVF Likely secondary to coughing  ordered cyclobenzaprine 5mg PO TID PRN muscle spasms  will monitor

## 2025-05-13 NOTE — PROGRESS NOTE ADULT - PROBLEM SELECTOR PLAN 2
continue spiriva QD, Advair BID  Albuterol/Ipratroprium nebulizer q6h PRN  Continuous pulse ox, SpO2 goal>88%   CT chest: No evidence of  pulmonary emboli or other acute change compared to 8/19/2024.Emphysema with areas of bronchiectasis and small airway impaction similar to prior. However, some pachy opacities seen , concern for pneumonia.   ID marie noted: recently completed levaquin course  guaifenesin-DM PRN cough, add tessalon perles TID scheduled  pulmonology following

## 2025-05-13 NOTE — PROGRESS NOTE ADULT - PROBLEM SELECTOR PLAN 1
due to COPD exacerbation, viral infection and secondary bacterial pneumonia   CT chest shows emphysema and areas of bronchiectasis and small airway impaction similar to prior  RVP negative  On home O2 2L NC, was increased to 3L this admission  Continuous pulse ox, SpO2 goal>88%,  still need 3L   continue spiriva QD, Advair BID  Albuterol/Ipratroprium nebulizer q6h PRN  CT chest: Stable 9 mm subpleural right upper lobe nodule. Recommend three-month follow-up CT  ID marie noted : recently completed levaquin course  guaifenesin-DM PRN cough, add tessalon perles TID scheduled  improved, d/c planning. Patient will need home oxygen on discharge, discharge delayed until she can be safely discharged on oxygen. due to COPD exacerbation, viral infection and secondary bacterial pneumonia   CT chest shows emphysema and areas of bronchiectasis and small airway impaction similar to prior  RVP negative  On home O2 2L NC, was increased to 3L this admission  Continuous pulse ox, SpO2 goal>88%,  still need 3L   continue spiriva QD, Advair BID  Albuterol/Ipratroprium nebulizer q6h PRN  CT chest: Stable 9 mm subpleural right upper lobe nodule. Recommend three-month follow-up CT  ID marie noted : recently completed levaquin course  guaifenesin-DM PRN cough, add tessalon perles TID scheduled  improved, d/c planning. Patient will need home oxygen on discharge, she has an existing oxygen concentrator. As per social work this can be continued on discharge.

## 2025-05-13 NOTE — DISCHARGE NOTE PROVIDER - NSDCMRMEDTOKEN_GEN_ALL_CORE_FT
albuterol 2.5 mg/0.5 mL (0.5%) inhalation solution: by nebulizer  buprenorphine 2 mg sublingual tablet: 2 tab(s) sublingually 2 times a day  DilTIAZem (Eqv-Cardizem CD) 240 mg/24 hours oral capsule, extended release: 1 cap(s) orally once a day  Eliquis 5 mg oral tablet: 1 tab(s) orally 2 times a day  Incruse Ellipta 62.5 mcg/inh inhalation powder: 1 puff(s) inhaled once a day  PriLOSEC OTC 20 mg oral delayed release tablet: 1 tab(s) orally once a day  Symbicort 160 mcg-4.5 mcg/inh inhalation aerosol: 2 inhaled  venlafaxine 37.5 mg oral capsule, extended release: 1 cap(s) orally once a day   benzonatate 100 mg oral capsule: 1 cap(s) orally 3 times a day  buprenorphine 2 mg sublingual tablet: 2 tab(s) sublingually 2 times a day MDD: 4 tabs  DilTIAZem (Eqv-Cardizem CD) 240 mg/24 hours oral capsule, extended release: 1 cap(s) orally once a day  Eliquis 5 mg oral tablet: 1 tab(s) orally 2 times a day  Incruse Ellipta 62.5 mcg/inh inhalation powder: 1 puff(s) inhaled once a day  PriLOSEC OTC 20 mg oral delayed release tablet: 1 tab(s) orally once a day  sodium chloride 0.65% nasal spray: 1 puff(s) nasal 2 times a day  Symbicort 160 mcg-4.5 mcg/inh inhalation aerosol: 2 puff(s) inhaled once a day  venlafaxine 37.5 mg oral capsule, extended release: 1 cap(s) orally once a day  Ventolin HFA 90 mcg/inh inhalation aerosol: 2 puff(s) inhaled every 4 hours as needed for  shortness of breath and/or wheezing

## 2025-05-13 NOTE — PROGRESS NOTE ADULT - PROBLEM SELECTOR PLAN 4
CT chest: No evidence of  pulmonary emboli or other acute change compared to 8/19/2024. Emphysema with areas of bronchiectasis and small airway impaction similar to prior. Some pachy opacities seen , concern for pneumonia.   completed levaquin as per ID recommendation.

## 2025-05-13 NOTE — PROGRESS NOTE ADULT - PROBLEM SELECTOR PLAN 9
DVT Prophylaxis: eliquis 5 mg BID. Chronic, stable as per patient  Patient is on anticoagulation (eliquis 5 mg BID) as per heme/onc doctor who she does not regularly follow up with  Recommend patient follow up with heme onc doctor upon discharge.

## 2025-05-13 NOTE — CASE MANAGEMENT PROGRESS NOTE - NSCMPROGRESSNOTE_GEN_ALL_CORE
TRANSITION OF CARE PLAN UPDATE:  As per RN CCM conversation with Attending MD; patient's family/ son has concerns about patient being DC'd to home; Attending confirmed that he will need to speak to family first prior to facilitating DC; ordering meds to beds prescriptions;  anticipated DC date Wednesday 05/13/2025; pending attending MD medical clearance;

## 2025-05-13 NOTE — CASE MANAGEMENT PROGRESS NOTE - NSCMPROGRESSNOTE_GEN_ALL_CORE
Update Communication: As per morning rounds, 69y old  Female who presents with a chief complaint of SOB. Called [facility]  [phone] to request billing of Medicare. No response. Pt. unable to d/c home unless O2 will continued to be supplied by [facility] in the home. Plan Discharge Home Today.      ESSENCE called [facility]  phone number: [phone] CM spoke with Corazon  from [facility] stated patient has a portable oxygen and home concentrator. Primary Insurance is Medicare and Corazon stated patient has No Open balance.  Will continue to follow patient. Update Communication: As per morning rounds, 69y old  Female who presents with a chief complaint of SOB. Called [facility]  [phone] to request billing of Medicare. No response. Pt. unable to d/c home unless O2 will continued to be supplied by [facility] in the home. Plan Discharge Home Today.      ESSENCE called [employer]  phone number: [phone] ESSENCE spoke with Corazon  from [facility] stated patient has a portable oxygen and home concentrator. Primary Insurance is Medicare and Corazon stated patient has No Open balance.  ESSENCE went to speak with patient and stated to me that she applied for Emergency Medicaid in March 2025 and nobody has not gotten back to her. Patient stated she will not be able to afford her Eliquis and Inhaler Medications. ESSENCE/MSW Called  Sherrie Roy to make aware of case. ESSENCE made DR. Hernandez aware.     ESSENCE sent referral to [facility] Home care agency [phone] will reach out to you within 24-72 hours of your discharge to schedule home care visit/eval appointment with you. Please call agency for any queries regarding home care services for Home Skilled Nursing. Will continue to follow patient.  Update Communication: As per morning rounds, 69y old  Female who presents with a chief complaint of SOB. Called [facility]  [phone] to request billing of Medicare. No response. Pt. unable to d/c home unless O2 will continued to be supplied by [facility] in the home. Plan Discharge Home Today.      ESSENCE called [employer]  phone number: [phone] ESSENCE spoke with Corazon  from [facility] stated patient has a portable oxygen and home concentrator. Primary Insurance is Medicare and Corazon stated patient has No Open balance.  CM went to speak with patient and stated to me that she applied for Emergency Medicaid in March 2025 and nobody has not gotten back to her. Patient stated she will not be able to afford her Eliquis and Inhaler Medications. CM/MSW Called  Sherrie Roy to make aware of case. ESSENCE made DR. Hernandez aware. ESSENCE called [facility] Pharmacist and getting patient Coupons for Eliquis and looking into for patient inhalers as well.  ESSENCE asked DR. Hernandez to send medications to [facility] and everyone is doing everything we can to make sure patient has her medications that she needs to be a Safe Discharge To Home.    CM sent referral to [facility] Home care agency [phone] will reach out to you within 24-72 hours of your discharge to schedule home care visit/eval appointment with you. Please call agency for any queries regarding home care services for Home Skilled Nursing. Will continue to follow patient.  Update Communication: As per morning rounds, 69y old  Female who presents with a chief complaint of SOB. Called [employer]  [phone] to request billing of Medicare. No response. Pt. unable to d/c home unless O2 will continued to be supplied by [facility] in the home. Plan Discharge Home Today.      ESSENCE called [facility]  phone number: [phone] ESSENCE spoke with Corazon  from [facility] stated patient has a portable oxygen and home concentrator. Primary Insurance is Medicare and Corazon stated patient has No Open balance.  CM went to speak with patient and stated to me that she applied for Emergency Medicaid in March 2025 and nobody has not gotten back to her. Patient stated she will not be able to afford her Eliquis and Inhaler Medications. CM/MSW Called  Sherrie Roy to make aware of case. ESSENCE made DR. Hernandez aware. ESSENCE called [facility] Pharmacist and getting patient Coupons for Eliquis and looking into for patient inhalers as well.  ESSENCE asked DR. Hernandez to send medications to [facility] and everyone is doing everything we can to make sure patient has her medications that she needs to be a Safe Discharge To Home.      Patient son Regan phone number: [phone] called and ask to speak with CM stated that his mother is not compliment with her medications and purposely does it CM made DR. Hernandez aware. DR. Hernandez is coming down to assess patient. Will continue to follow patient.     ESSENCE sent referral to [facility] Home care agency [phone] will reach out to you within 24-72 hours of your discharge to schedule home care visit/eval appointment with you. Please call agency for any queries regarding home care services for Home Skilled Nursing. Will continue to follow patient.

## 2025-05-13 NOTE — PROGRESS NOTE ADULT - PROBLEM SELECTOR PLAN 3
UA shows large LE, negative nitrites, some bacteria  on phenazopyridine q8   will treat empirically with Bactrim DS BID x5 days UA shows large LE, negative nitrites, some bacteria  on phenazopyridine q8 x 2 days  started on Bactrim DS for empiric treatment of UTI. Urine cx negative, discontinued antibiotic

## 2025-05-13 NOTE — PROGRESS NOTE ADULT - PROBLEM SELECTOR PLAN 7
Continue home diltiazem  Hx of tachycardia, stable as per pt and follows with cardiologist outpt, tele monitor show sinus tachy, range 90s to 140s,  discussed with cardio, will c/w current management   DASH/TLC diet. likely reactive, trending down   hematology eval noted, MPL, BCR/ABL test  ordered   s/p IVF

## 2025-05-13 NOTE — DISCHARGE NOTE PROVIDER - HOSPITAL COURSE
This is a 70 y/o F with PMH of HTN, Dyslipidemia, A. Fib on Eliquis, DVT/PE, ILD, COPD, Chronic Respiratory Failure on home oxygent, Protein S Deficiency, GERD, Drug Abuse, Anxiety, and Depression who presented with worsening SOB with dropping of her SPO2. Patient stooped all meds including her Eliquis over the past month because she lost her health insurance, also she doesn't have access to her PCP anymore for the same reason. Yesterday she was feeling more SOB, found that her SPO2 is down in the 80's so she came to the hospital. No reported fever or chills. At the ED she tested negative for influenza A & B, RSV, and COVID-19 PCR, and her CTA didn't show evidence of PE.

## 2025-05-13 NOTE — DISCHARGE NOTE PROVIDER - NSDCCPCAREPLAN_GEN_ALL_CORE_FT
PRINCIPAL DISCHARGE DIAGNOSIS  Diagnosis: Chronic respiratory failure with hypoxia  Assessment and Plan of Treatment:

## 2025-05-13 NOTE — DISCHARGE NOTE PROVIDER - DISCHARGE DATE
Pt has been notiifed about dose change and RX sent to pharmacy, she has also requested that her labs be sent to [address] [place] [address], not . RN has changed her preferred lab to [address] [place] [address] and lab reqs were faxed to [phone] 13-May-2025

## 2025-05-13 NOTE — PROGRESS NOTE ADULT - PROBLEM SELECTOR PLAN 5
Likely secondary to coughing  ordered cyclobenzaprine 5mg PO TID PRN muscle spasms  will monitor continue home Effexor. Patient is on a relatively low dose at home and may need an increase  as per son, he is concerned patient has worsening features and is concerned about her self-care. Psych consulted, appreciate reccs

## 2025-05-13 NOTE — PROGRESS NOTE ADULT - PROBLEM SELECTOR PLAN 8
Chronic, stable as per patient  Patient is on anticoagulation (eliquis 5 mg BID) as per heme/onc doctor who she does not regularly follow up with  Recommend patient follow up with heme onc doctor upon discharge. Continue home diltiazem  Hx of tachycardia, stable as per pt and follows with cardiologist outpt, tele monitor show sinus tachy, range 90s to 140s,  discussed with cardio, will c/w current management   DASH/TLC diet.

## 2025-05-14 DIAGNOSIS — F41.9 ANXIETY DISORDER, UNSPECIFIED: ICD-10-CM

## 2025-05-14 PROCEDURE — 99239 HOSP IP/OBS DSCHRG MGMT >30: CPT

## 2025-05-14 PROCEDURE — 99232 SBSQ HOSP IP/OBS MODERATE 35: CPT

## 2025-05-14 PROCEDURE — 99221 1ST HOSP IP/OBS SF/LOW 40: CPT

## 2025-05-14 RX ORDER — SERTRALINE 100 MG/1
1 TABLET, FILM COATED ORAL
Qty: 30 | Refills: 0
Start: 2025-05-14 | End: 2025-06-12

## 2025-05-14 RX ORDER — SERTRALINE 100 MG/1
50 TABLET, FILM COATED ORAL DAILY
Refills: 0 | Status: DISCONTINUED | OUTPATIENT
Start: 2025-05-14 | End: 2025-05-15

## 2025-05-14 RX ORDER — SODIUM CHLORIDE 0.65 %
1 AEROSOL, SPRAY (ML) NASAL
Qty: 1 | Refills: 0
Start: 2025-05-14 | End: 2025-05-23

## 2025-05-14 RX ORDER — SODIUM CHLORIDE 0.65 %
1 AEROSOL, SPRAY (ML) NASAL THREE TIMES A DAY
Refills: 0 | Status: DISCONTINUED | OUTPATIENT
Start: 2025-05-14 | End: 2025-05-15

## 2025-05-14 RX ORDER — MIRTAZAPINE 30 MG/1
1 TABLET, FILM COATED ORAL
Qty: 30 | Refills: 0
Start: 2025-05-14 | End: 2025-06-12

## 2025-05-14 RX ORDER — MIRTAZAPINE 30 MG/1
15 TABLET, FILM COATED ORAL AT BEDTIME
Refills: 0 | Status: DISCONTINUED | OUTPATIENT
Start: 2025-05-14 | End: 2025-05-15

## 2025-05-14 RX ADMIN — Medication 100 MILLIGRAM(S): at 21:23

## 2025-05-14 RX ADMIN — Medication 200 MILLIGRAM(S): at 13:07

## 2025-05-14 RX ADMIN — Medication 1 DOSE(S): at 07:30

## 2025-05-14 RX ADMIN — APIXABAN 5 MILLIGRAM(S): 2.5 TABLET, FILM COATED ORAL at 12:04

## 2025-05-14 RX ADMIN — MIRTAZAPINE 15 MILLIGRAM(S): 30 TABLET, FILM COATED ORAL at 21:23

## 2025-05-14 RX ADMIN — Medication 100 MILLIGRAM(S): at 13:07

## 2025-05-14 RX ADMIN — TIOTROPIUM BROMIDE INHALATION SPRAY 2 PUFF(S): 3.12 SPRAY, METERED RESPIRATORY (INHALATION) at 07:30

## 2025-05-14 RX ADMIN — Medication 40 MILLIGRAM(S): at 05:31

## 2025-05-14 RX ADMIN — BUPRENORPHINE HYDROCHLORIDE, NALOXONE HYDROCHLORIDE 2 TABLET(S): 4; 1 FILM, SOLUBLE BUCCAL; SUBLINGUAL at 09:05

## 2025-05-14 RX ADMIN — SERTRALINE 50 MILLIGRAM(S): 100 TABLET, FILM COATED ORAL at 13:07

## 2025-05-14 RX ADMIN — DILTIAZEM HYDROCHLORIDE 240 MILLIGRAM(S): 120 CAPSULE, EXTENDED RELEASE ORAL at 05:31

## 2025-05-14 RX ADMIN — Medication 200 MILLIGRAM(S): at 05:31

## 2025-05-14 RX ADMIN — BUPRENORPHINE HYDROCHLORIDE, NALOXONE HYDROCHLORIDE 2 TABLET(S): 4; 1 FILM, SOLUBLE BUCCAL; SUBLINGUAL at 21:23

## 2025-05-14 RX ADMIN — Medication 100 MILLIGRAM(S): at 05:31

## 2025-05-14 RX ADMIN — APIXABAN 5 MILLIGRAM(S): 2.5 TABLET, FILM COATED ORAL at 23:32

## 2025-05-14 RX ADMIN — Medication 1 DOSE(S): at 18:23

## 2025-05-14 NOTE — CASE MANAGEMENT PROGRESS NOTE - NSCMPROGRESSNOTE_GEN_ALL_CORE
Update Communication Note: As per morning rounds,  69-year-old female with PMH of COPD on home O2, protein s deficiency with hypertension presenting to the emergency department at with one week history of worsening SOB. Patient is being admitted for COPD exacerbation. Financial issues patient cannot afford to pay for her medications. Medicaid Pending. Pending Psyche Consult/ for her Suboxone. Can give a month supply of inhalers from hospital and [facility]. CM called sister in law Cerrato phone number: [phone] and left  a message awaiting a call back. Will continue to follow up. Update Communication Note: As per morning rounds,  69-year-old female with PMH of COPD on home O2, protein s deficiency with hypertension presenting to the emergency department at with one week history of worsening SOB. Patient is being admitted for COPD exacerbation. Financial issues patient cannot afford to pay for her medications. Medicaid Pending. Pending Psyche Consult/ for her Suboxone. Can give a month supply of inhalers from hospital and [facility]. CM called sister in law Cerrato phone number: [phone] and left  a message awaiting a call back. Will continue to follow up. Update Communication Note: As per morning rounds,  69-year-old female with PMH of COPD on home O2, protein s deficiency with hypertension presenting to the emergency department at with one week history of worsening SOB. Patient is being admitted for COPD exacerbation. Financial issues patient cannot afford to pay for her medications. Medicaid Pending. Pending Psyche Consult/ for her Suboxone. Can give a month supply of inhalers from hospital and Eliquis. CM called sister in law Cerrato phone number: [phone] and left  a message awaiting a call back. Will continue to follow up.        ESSENCE spoke with [facility] Pharmacist and stated [facility] covering all patients medications except 2 inhaler but administration will give 1 month  supply. Meds will be delivered Today. Waiting for the Medication from Buprenorphine.    CM spoke with patient to make her aware she will be ready for Discharge Tomorrow before 12 clock. Patient stated her  Javier will be picking patient up tomorrow for Discharge. Patient is accepted with [facility] Home care agency [phone] will reach out to you within 24-72 hours of your discharge to schedule home care visit/eval appointment with you. Please call agency for any queries regarding home care services for Home PT. Will continue to follow patient. Update Communication Note: As per morning rounds,  69-year-old female with PMH of COPD on home O2, protein s deficiency with hypertension presenting to the emergency department at with one week history of worsening SOB. Patient is being admitted for COPD exacerbation. Financial issues patient cannot afford to pay for her medications. Medicaid Pending. Pending Psyche Consult/ for her Suboxone. Can give a month supply of inhalers from hospital and [facility]. CM called sister in law Cerrato phone number: [phone] and left  a message awaiting a call back. Will continue to follow up.        CM spoke with [facility] Pharmacist and stated [facility] covering all patients medications except 2 inhaler but administration will give 1 month  supply. Meds will be delivered Today. Waiting for the Medication  Buprenorphine.    CM spoke with patient to make her aware she will be ready for Discharge Tomorrow before 12 clock. Patient stated her  Javier will be picking patient up tomorrow for Discharge. Patient is accepted with [facility] Home care agency [phone] will reach out to you within 24-72 hours of your discharge to schedule home care visit/eval appointment with you. Please call agency for any queries regarding home care services for Home PT. Will continue to follow patient.

## 2025-05-14 NOTE — PROGRESS NOTE ADULT - SUBJECTIVE AND OBJECTIVE BOX
Date/Time Patient Seen:  		  Referring MD:   Data Reviewed	       Patient is a 69y old  Female who presents with a chief complaint of SOB. (13 May 2025 16:40)      Subjective/HPI     PAST MEDICAL & SURGICAL HISTORY:  Drug abuse  was addicted to pain killers    Protein S deficiency    Depression    COPD (chronic obstructive pulmonary disease)    DVT (deep venous thrombosis)    Pulmonary embolism    Afib    No pertinent past medical history    COPD exacerbation    History of COPD    HTN (hypertension)    HLD (hyperlipidemia)    History of protein S deficiency    History of back surgery    No significant past surgical history          Medication list         MEDICATIONS  (STANDING):  apixaban 5 milliGRAM(s) Oral two times a day  benzonatate 100 milliGRAM(s) Oral three times a day  buprenorphine 2 mG/naloxone 0.5 mG SL  Tablet 2 Tablet(s) SubLingual two times a day  diltiazem    milliGRAM(s) Oral daily  fluticasone propionate/ salmeterol 250-50 MICROgram(s) Diskus 1 Dose(s) Inhalation two times a day  pantoprazole    Tablet 40 milliGRAM(s) Oral before breakfast  phenazopyridine 200 milliGRAM(s) Oral every 8 hours  polyethylene glycol 3350 17 Gram(s) Oral daily  tiotropium 2.5 MICROgram(s) Inhaler 2 Puff(s) Inhalation daily  venlafaxine XR. 37.5 milliGRAM(s) Oral daily    MEDICATIONS  (PRN):  acetaminophen     Tablet .. 650 milliGRAM(s) Oral every 6 hours PRN Mild Pain (1 - 3)  albuterol/ipratropium for Nebulization 3 milliLiter(s) Nebulizer every 6 hours PRN Shortness of Breath and/or Wheezing  cyclobenzaprine 5 milliGRAM(s) Oral three times a day PRN chest pain  guaifenesin/dextromethorphan Oral Liquid 10 milliLiter(s) Oral every 4 hours PRN Cough  sodium chloride 0.65% Nasal 1 Spray(s) Both Nostrils three times a day PRN Nasal Congestion  traZODone 50 milliGRAM(s) Oral at bedtime PRN insomnia         Vitals log        ICU Vital Signs Last 24 Hrs  T(C): 37 (14 May 2025 10:07), Max: 37.3 (13 May 2025 16:34)  T(F): 98.6 (14 May 2025 10:07), Max: 99.2 (13 May 2025 16:34)  HR: 112 (14 May 2025 10:17) (71 - 112)  BP: 120/63 (14 May 2025 10:07) (100/56 - 134/78)  BP(mean): --  ABP: --  ABP(mean): --  RR: 18 (14 May 2025 10:07) (18 - 18)  SpO2: 92% (14 May 2025 10:54) (92% - 94%)    O2 Parameters below as of 14 May 2025 10:54  Patient On (Oxygen Delivery Method): nasal cannula  O2 Flow (L/min): 3               Input and Output:  I&O's Detail      Lab Data                  Review of Systems	      Objective     Physical Examination    heart s1s2  lung dc bs  head nc  head at      Pertinent Lab findings & Imaging      [place]:  NO   Adequate UO     I&O's Detail           Discussed with:     Cultures:	        Radiology

## 2025-05-14 NOTE — SOCIAL WORK PROGRESS NOTE - NSSWPROGRESSNOTE_GEN_ALL_CORE
Spoke with pts sister in law Cerrato to address her concerns about dc needs, pt's medicaid coverage, costs of dc meds. Pt has dc meds with significant costs, no current prescription plan. Per sister in , they were unaware pts managed medicare/medicaid insurance had lapsed and reverted back to straight medicare only, family is not able to assist and cover costs of dc meds at this time. She has been in touch with medicaid worker and pt's son will be following up, explained that applications will take some time, medicaid cov'g will not be back in place on dc.  SW/pharmacy to arrange to provide a 30 day supply of meds on dc, sister in law made aware, family will need to follow up to reinstate coverage by time supply ends so pt can refill meds, she expressed understanding of this. She also expressed concern about her brother, condition of home, his ability to follow though on their needs, she inquired about making APS referral for him. Advised her to call to make referral related to her brother and she stated she will do so. SW to make APS referral re: pt on dc as well. CM also to refer for [facility]  [place]  in  also given info on [facility] program which may be able to assist with resources, follow up on sw needs in community. Pt medically stable, plan will be for dc home tomorrow, sister in law aware, SW and CM to discuss with pt and  on unit as well.

## 2025-05-14 NOTE — PHYSICAL THERAPY INITIAL EVALUATION ADULT - NSPTDMEREC_GEN_A_CORE
Patient has a mobility limitation that impairs the abilty to participate in mobility activities in the home. Patient can safely use a RW. The mobilty deficit can be resolved by a RW/rolling walker

## 2025-05-14 NOTE — OCCUPATIONAL THERAPY INITIAL EVALUATION ADULT - ADL RETRAINING, OT EVAL
Patient will dress lower body Independently  AE as needed within 2-3 sessions.  Patient will dress upper body Independently  within 1-2 sessions

## 2025-05-14 NOTE — PROGRESS NOTE ADULT - SUBJECTIVE AND OBJECTIVE BOX
Patient is a 69y old  Female who presents with a chief complaint of SOB. (15 May 2025 06:15)      INTERVAL HPI/OVERNIGHT EVENTS:  Overnight nothing significant happened.   Pt was seen today at bedside, no acute complaints. Denies fever, chills, abdominal pain, cough , sob, chest pain. She reports her only concern is her insurance at the moment. Otherwise feeling better.     MEDICATIONS  (STANDING):  apixaban 5 milliGRAM(s) Oral two times a day  benzonatate 100 milliGRAM(s) Oral three times a day  buprenorphine 2 mG/naloxone 0.5 mG SL  Tablet 2 Tablet(s) SubLingual two times a day  diltiazem    milliGRAM(s) Oral daily  fluticasone propionate/ salmeterol 250-50 MICROgram(s) Diskus 1 Dose(s) Inhalation two times a day  mirtazapine 15 milliGRAM(s) Oral at bedtime  pantoprazole    Tablet 40 milliGRAM(s) Oral before breakfast  polyethylene glycol 3350 17 Gram(s) Oral daily  sertraline 50 milliGRAM(s) Oral daily  tiotropium 2.5 MICROgram(s) Inhaler 2 Puff(s) Inhalation daily    MEDICATIONS  (PRN):  acetaminophen     Tablet .. 650 milliGRAM(s) Oral every 6 hours PRN Mild Pain (1 - 3)  albuterol/ipratropium for Nebulization 3 milliLiter(s) Nebulizer every 6 hours PRN Shortness of Breath and/or Wheezing  cyclobenzaprine 5 milliGRAM(s) Oral three times a day PRN chest pain  guaifenesin/dextromethorphan Oral Liquid 10 milliLiter(s) Oral every 4 hours PRN Cough  sodium chloride 0.65% Nasal 1 Spray(s) Both Nostrils three times a day PRN Nasal Congestion      Allergies    No Known Allergies    Intolerances        REVIEW OF SYSTEMS:  CONSTITUTIONAL: No fever, weight loss, or fatigue  EYES: No eye pain, visual disturbances, or discharge  ENMT:  No difficulty hearing, tinnitus, vertigo; No sinus or throat pain  NECK: No pain or stiffness  RESPIRATORY: No cough, wheezing, chills or hemoptysis; No shortness of breath  CARDIOVASCULAR: No chest pain, palpitations, lightheadedness, or leg swelling  GASTROINTESTINAL: No abdominal or epigastric pain. No nausea, vomiting, or hematemesis; No diarrhea or constipation. No melena or hematochezia.  GENITOURINARY: No dysuria, frequency, hematuria, or incontinence  NEUROLOGICAL: No headaches, memory loss, vertigo, loss of strength, numbness, or tremors  SKIN: No itching, burning, rashes, or lesions   MUSCULOSKELETAL: No joint pain or swelling; No muscle, back, or extremity pain  PSYCHIATRIC: No depression, anxiety, or mood swings        Vital Signs Last 24 Hrs  T(C): 36.7 (15 May 2025 08:25), Max: 37.1 (14 May 2025 21:22)  T(F): 98 (15 May 2025 08:25), Max: 98.7 (14 May 2025 21:22)  HR: 67 (15 May 2025 08:25) (56 - 112)  BP: 109/61 (15 May 2025 08:25) (103/60 - 129/77)  BP(mean): --  RR: 18 (15 May 2025 08:25) (18 - 18)  SpO2: 97% (15 May 2025 08:25) (92% - 100%)    Parameters below as of 15 May 2025 08:25  Patient On (Oxygen Delivery Method): nasal cannula  O2 Flow (L/min): 3      PHYSICAL EXAM:  GENERAL: NAD, well-groomed, well-developed  HEAD:  Atraumatic, Normocephalic  EYES: EOMI, PERRLA, conjunctiva and sclera clear  ENMT: Moist mucous membranes,   NECK: Supple, No JVD, Normal thyroid  NERVOUS SYSTEM:  Alert & Oriented X 3  CHEST/LUNG: decreased breath sound bilaterally; No rales, rhonchi, wheezing, or rubs  HEART: Regular rate and rhythm; No murmurs, rubs, or gallops  ABDOMEN: Soft, Nontender, Nondistended; Bowel sounds present  EXTREMITIES:  2+ Peripheral Pulses, No clubbing, cyanosis, or edema  SKIN: No rashes or lesions

## 2025-05-14 NOTE — OCCUPATIONAL THERAPY INITIAL EVALUATION ADULT - ADDITIONAL COMMENTS
Pt lives w/ spouse who is disabled. + tub w/ doors. Pt owns a shower chair. Pt was a household ambulator on home oxygen. Reports  requiring increase time to complete ADL's

## 2025-05-14 NOTE — BH CONSULTATION LIAISON ASSESSMENT NOTE - CURRENT MEDICATION
MEDICATIONS  (STANDING):  apixaban 5 milliGRAM(s) Oral two times a day  benzonatate 100 milliGRAM(s) Oral three times a day  buprenorphine 2 mG/naloxone 0.5 mG SL  Tablet 2 Tablet(s) SubLingual two times a day  diltiazem    milliGRAM(s) Oral daily  fluticasone propionate/ salmeterol 250-50 MICROgram(s) Diskus 1 Dose(s) Inhalation two times a day  mirtazapine 15 milliGRAM(s) Oral at bedtime  pantoprazole    Tablet 40 milliGRAM(s) Oral before breakfast  phenazopyridine 200 milliGRAM(s) Oral every 8 hours  polyethylene glycol 3350 17 Gram(s) Oral daily  sertraline 50 milliGRAM(s) Oral daily  tiotropium 2.5 MICROgram(s) Inhaler 2 Puff(s) Inhalation daily    MEDICATIONS  (PRN):  acetaminophen     Tablet .. 650 milliGRAM(s) Oral every 6 hours PRN Mild Pain (1 - 3)  albuterol/ipratropium for Nebulization 3 milliLiter(s) Nebulizer every 6 hours PRN Shortness of Breath and/or Wheezing  cyclobenzaprine 5 milliGRAM(s) Oral three times a day PRN chest pain  guaifenesin/dextromethorphan Oral Liquid 10 milliLiter(s) Oral every 4 hours PRN Cough  sodium chloride 0.65% Nasal 1 Spray(s) Both Nostrils three times a day PRN Nasal Congestion

## 2025-05-14 NOTE — SOCIAL WORK PROGRESS NOTE - NSSWPROGRESSNOTE_GEN_ALL_CORE
received call from patient's sister-in-law/ emergency contact, Ms. Blossom Sinha, @ cell [phone] to discuss discharge barriers. Jasmin identified that she feels patient is not able to properly care for herself and that her home situation is unsafe due to hoarding. Jasmin stated that she does not feel patient would benefit from a sub-acute rehab on hospital discharge, nor does she feel patient would agree to such placement, but would like services such as CDPAP aides to be put in place in the home --  provided contact info for unit  [facility] to discuss in-home services and provided education regarding roles of hospital  and hospital . Jasmin also requested a referral be made to [facility] Services due to concern for patient's ability to care for herself, @ which time  explained that [facility] referral for hospitalized patients must be made on discharge; Jasmin identified that she would like to make a referral to [facility] herself for patient's , so  provided contact info for [facility] to Jasmin. Jasmin also had questions about Medicaid, so  advised her to speak w/ financial counseling dept for such.  escalated concerns to Social Work Dept Manager as well and will continue to follow.

## 2025-05-14 NOTE — OCCUPATIONAL THERAPY INITIAL EVALUATION ADULT - NSOTDISCHREC_GEN_A_CORE
Home with Home Occupational Therapy for home safety evaluation, functional mobility and ADL/IADL performance./Home OT

## 2025-05-14 NOTE — BH CONSULTATION LIAISON ASSESSMENT NOTE - HPI (INCLUDE ILLNESS QUALITY, SEVERITY, DURATION, TIMING, CONTEXT, MODIFYING FACTORS, ASSOCIATED SIGNS AND SYMPTOMS)
Patient seen, evaluated and chart reviewed. Patient is a 70 y/o MWF, with no prior psychiatric hospitalizations, history of anxiety and depression, opioid dependence, with PMH of HTN, Dyslipidemia, A. Fib on Eliquis, DVT/PE, ILD, COPD, Chronic Respiratory Failure on home oxygent, Protein S Deficiency, GERD, who presented with worsening SOB with dropping of her SPO2. Patient stooped all meds including her Eliquis over the past month because she lost her health insurance, also she doesn't have access to her PCP anymore for the same reason. Yesterday she was feeling more SOB, found that her SPO2 is down in the 80's so she came to the hospital. No reported fever or chills. At the ED she tested negative for influenza A & B, RSV, and COVID-19 PCR, and her CTA didn't show evidence of PE. Patient at this time admits to feeling overwhelmed with her social situation, her  being disabled and having a hoarding problem, and difficulty managing the available resources. Patient presents as anxious, but there is no evidence of acute depression, jabier or psychosis. There is no evidence of cognitive impairment present.

## 2025-05-14 NOTE — PHYSICAL THERAPY INITIAL EVALUATION ADULT - ADDITIONAL COMMENTS
Pt lives in a house with 5 steps to enter with HR.0 steps inside, can stay on first floor. Has a walk  tub shower. Owns shower chair. Pt states spouse unable to assist at home as he is mainly disabled.  Increasing difficulty with ADLs due to SOB. Has own O2 at home.

## 2025-05-14 NOTE — BH CONSULTATION LIAISON ASSESSMENT NOTE - NSBHCHARTREVIEWINVESTIGATE_PSY_A_CORE FT
< from: 12 Lead ECG (05.07.25 @ 21:47) >    Ventricular Rate 94 BPM    Atrial Rate 94 BPM    P-R Interval 178 ms    QRS Duration 72 ms    Q-T Interval 352 ms    QTC Calculation(Bazett) 440 ms    P Axis 87 degrees    R Axis 52 degrees    T Axis 84 degrees    Diagnosis Line Normal sinus rhythm  Nonspecific T wave abnormality  When compared with ECG of 26-SEP-2019 10:12,  Nonspecific T wave abnormality aVL  Confirmed by Palla MD, Carlos ([id number]) on 5/8/2025 9:57:28 AM    < end of copied text >

## 2025-05-14 NOTE — BH CONSULTATION LIAISON ASSESSMENT NOTE - NSBHCHARTREVIEWVS_PSY_A_CORE FT
Vital Signs Last 24 Hrs  T(C): 37 (14 May 2025 10:07), Max: 37.3 (13 May 2025 16:34)  T(F): 98.6 (14 May 2025 10:07), Max: 99.2 (13 May 2025 16:34)  HR: 112 (14 May 2025 10:17) (71 - 112)  BP: 120/63 (14 May 2025 10:07) (100/56 - 134/78)  BP(mean): --  RR: 18 (14 May 2025 10:07) (18 - 18)  SpO2: 92% (14 May 2025 10:54) (92% - 94%)    Parameters below as of 14 May 2025 10:54  Patient On (Oxygen Delivery Method): nasal cannula  O2 Flow (L/min): 3

## 2025-05-14 NOTE — PHYSICAL THERAPY INITIAL EVALUATION ADULT - PERTINENT HX OF CURRENT PROBLEM, REHAB EVAL
Reason for Admission: SOB.  History of Present Illness:   This is a 70 y/o F with PMH of HTN, Dyslipidemia, A. Fib on Eliquis, DVT/PE, ILD, COPD, Chronic Respiratory Failure on home oxygent, Protein S Deficiency, GERD, Drug Abuse, Anxiety, and Depression who presented with worsening SOB with dropping of her SPO2. Patient stooped all meds including her Eliquis over the past month because she lost her health insurance, also she doesn't have access to her PCP anymore for the same reason. Yesterday she was feeling more SOB, found that her SPO2 is down in the 80's so she came to the hospital. No reported fever or chills. At the ED she tested negative for influenza A & B, RSV, and COVID-19 PCR, and her CTA didn't show evidence of PE.

## 2025-05-15 ENCOUNTER — NON-APPOINTMENT (OUTPATIENT)
Age: 70
End: 2025-05-15

## 2025-05-15 ENCOUNTER — TRANSCRIPTION ENCOUNTER (OUTPATIENT)
Age: 70
End: 2025-05-15

## 2025-05-15 VITALS
HEART RATE: 89 BPM | SYSTOLIC BLOOD PRESSURE: 142 MMHG | RESPIRATION RATE: 18 BRPM | TEMPERATURE: 98 F | OXYGEN SATURATION: 93 % | DIASTOLIC BLOOD PRESSURE: 64 MMHG

## 2025-05-15 PROCEDURE — 85730 THROMBOPLASTIN TIME PARTIAL: CPT

## 2025-05-15 PROCEDURE — 81001 URINALYSIS AUTO W/SCOPE: CPT

## 2025-05-15 PROCEDURE — 83735 ASSAY OF MAGNESIUM: CPT

## 2025-05-15 PROCEDURE — 85027 COMPLETE CBC AUTOMATED: CPT

## 2025-05-15 PROCEDURE — 71045 X-RAY EXAM CHEST 1 VIEW: CPT

## 2025-05-15 PROCEDURE — 80053 COMPREHEN METABOLIC PANEL: CPT

## 2025-05-15 PROCEDURE — 97165 OT EVAL LOW COMPLEX 30 MIN: CPT

## 2025-05-15 PROCEDURE — 80048 BASIC METABOLIC PNL TOTAL CA: CPT

## 2025-05-15 PROCEDURE — 97161 PT EVAL LOW COMPLEX 20 MIN: CPT

## 2025-05-15 PROCEDURE — 87086 URINE CULTURE/COLONY COUNT: CPT

## 2025-05-15 PROCEDURE — 86140 C-REACTIVE PROTEIN: CPT

## 2025-05-15 PROCEDURE — 85610 PROTHROMBIN TIME: CPT

## 2025-05-15 PROCEDURE — 83880 ASSAY OF NATRIURETIC PEPTIDE: CPT

## 2025-05-15 PROCEDURE — 85025 COMPLETE CBC W/AUTO DIFF WBC: CPT

## 2025-05-15 PROCEDURE — 84443 ASSAY THYROID STIM HORMONE: CPT

## 2025-05-15 PROCEDURE — 71275 CT ANGIOGRAPHY CHEST: CPT

## 2025-05-15 PROCEDURE — 99239 HOSP IP/OBS DSCHRG MGMT >30: CPT

## 2025-05-15 PROCEDURE — 94640 AIRWAY INHALATION TREATMENT: CPT

## 2025-05-15 PROCEDURE — 87637 SARSCOV2&INF A&B&RSV AMP PRB: CPT

## 2025-05-15 PROCEDURE — 36415 COLL VENOUS BLD VENIPUNCTURE: CPT

## 2025-05-15 PROCEDURE — 84145 PROCALCITONIN (PCT): CPT

## 2025-05-15 PROCEDURE — 93005 ELECTROCARDIOGRAM TRACING: CPT

## 2025-05-15 PROCEDURE — 99285 EMERGENCY DEPT VISIT HI MDM: CPT

## 2025-05-15 PROCEDURE — 82803 BLOOD GASES ANY COMBINATION: CPT

## 2025-05-15 PROCEDURE — 84484 ASSAY OF TROPONIN QUANT: CPT

## 2025-05-15 RX ORDER — TIOTROPIUM BROMIDE INHALATION SPRAY 3.12 UG/1
0 SPRAY, METERED RESPIRATORY (INHALATION)
Qty: 1 | Refills: 0
Start: 2025-05-15

## 2025-05-15 RX ADMIN — Medication 40 MILLIGRAM(S): at 05:24

## 2025-05-15 RX ADMIN — APIXABAN 5 MILLIGRAM(S): 2.5 TABLET, FILM COATED ORAL at 11:26

## 2025-05-15 RX ADMIN — Medication 100 MILLIGRAM(S): at 05:24

## 2025-05-15 RX ADMIN — TIOTROPIUM BROMIDE INHALATION SPRAY 2 PUFF(S): 3.12 SPRAY, METERED RESPIRATORY (INHALATION) at 07:00

## 2025-05-15 RX ADMIN — DILTIAZEM HYDROCHLORIDE 240 MILLIGRAM(S): 120 CAPSULE, EXTENDED RELEASE ORAL at 05:24

## 2025-05-15 RX ADMIN — Medication 1 DOSE(S): at 07:00

## 2025-05-15 RX ADMIN — SERTRALINE 50 MILLIGRAM(S): 100 TABLET, FILM COATED ORAL at 11:26

## 2025-05-15 RX ADMIN — BUPRENORPHINE HYDROCHLORIDE, NALOXONE HYDROCHLORIDE 2 TABLET(S): 4; 1 FILM, SOLUBLE BUCCAL; SUBLINGUAL at 09:14

## 2025-05-15 NOTE — PROGRESS NOTE ADULT - SUBJECTIVE AND OBJECTIVE BOX
Date/Time Patient Seen:  		  Referring MD:   Data Reviewed	       Patient is a 69y old  Female who presents with a chief complaint of SOB. (14 May 2025 11:51)      Subjective/HPI     PAST MEDICAL & SURGICAL HISTORY:  Drug abuse  was addicted to pain killers    Protein S deficiency    Depression    COPD (chronic obstructive pulmonary disease)    DVT (deep venous thrombosis)    Pulmonary embolism    Afib    No pertinent past medical history    COPD exacerbation    History of COPD    HTN (hypertension)    HLD (hyperlipidemia)    History of protein S deficiency    History of back surgery    No significant past surgical history          Medication list         MEDICATIONS  (STANDING):  apixaban 5 milliGRAM(s) Oral two times a day  benzonatate 100 milliGRAM(s) Oral three times a day  buprenorphine 2 mG/naloxone 0.5 mG SL  Tablet 2 Tablet(s) SubLingual two times a day  diltiazem    milliGRAM(s) Oral daily  fluticasone propionate/ salmeterol 250-50 MICROgram(s) Diskus 1 Dose(s) Inhalation two times a day  mirtazapine 15 milliGRAM(s) Oral at bedtime  pantoprazole    Tablet 40 milliGRAM(s) Oral before breakfast  polyethylene glycol 3350 17 Gram(s) Oral daily  sertraline 50 milliGRAM(s) Oral daily  tiotropium 2.5 MICROgram(s) Inhaler 2 Puff(s) Inhalation daily    MEDICATIONS  (PRN):  acetaminophen     Tablet .. 650 milliGRAM(s) Oral every 6 hours PRN Mild Pain (1 - 3)  albuterol/ipratropium for Nebulization 3 milliLiter(s) Nebulizer every 6 hours PRN Shortness of Breath and/or Wheezing  cyclobenzaprine 5 milliGRAM(s) Oral three times a day PRN chest pain  guaifenesin/dextromethorphan Oral Liquid 10 milliLiter(s) Oral every 4 hours PRN Cough  sodium chloride 0.65% Nasal 1 Spray(s) Both Nostrils three times a day PRN Nasal Congestion         Vitals log        ICU Vital Signs Last 24 Hrs  T(C): 36.4 (15 May 2025 05:10), Max: 37.1 (14 May 2025 21:22)  T(F): 97.5 (15 May 2025 05:10), Max: 98.7 (14 May 2025 21:22)  HR: 56 (15 May 2025 05:10) (56 - 112)  BP: 115/67 (15 May 2025 05:10) (103/60 - 129/77)  BP(mean): --  ABP: --  ABP(mean): --  RR: 18 (15 May 2025 05:10) (18 - 18)  SpO2: 100% (15 May 2025 05:10) (92% - 100%)    O2 Parameters below as of 15 May 2025 05:10  Patient On (Oxygen Delivery Method): nasal cannula                 Input and Output:  I&O's Detail      Lab Data                  Review of Systems	      Objective     Physical Examination    heart s1s2  lung dc bs  head nc  head at      Pertinent Lab findings & Imaging      Dillon:  NO   Adequate UO     I&O's Detail           Discussed with:     Cultures:	        Radiology

## 2025-05-15 NOTE — DISCHARGE NOTE NURSING/CASE MANAGEMENT/SOCIAL WORK - NSDCVIVACCINE_GEN_ALL_CORE_FT
influenza, injectable, quadrivalent, preservative free; 12-Mar-2016 13:59; Rebekah Maldonado (VICKI); [employer]; [id number]; IntraMuscular; Dorsogluteal Left.; 0.5 milliLiter(s); VIS (VIS Published: 07-Aug-2015, VIS Presented: 12-Mar-2016);   pneumococcal polysaccharide PPV23; 12-Mar-2016 14:00; Rebekah Maldonado (VICKI); [employer] [id number]; IntraMuscular; Deltoid Left.; 0.5 milliLiter(s); VIS (VIS Published: 06-Oct-2009, VIS Presented: 12-Mar-2016);

## 2025-05-15 NOTE — DISCHARGE NOTE NURSING/CASE MANAGEMENT/SOCIAL WORK - FINANCIAL ASSISTANCE
[facility] provides services at a reduced cost to those who are determined to be eligible through [facility]’s financial assistance program. Information regarding [facility]’s financial assistance program can be found by going to [email or web address] or by calling [phone].

## 2025-05-15 NOTE — PHARMACOTHERAPY INTERVENTION NOTE - COMMENTS
Meds to beds requested by provider.    The following prescriptions were filled at [facility] which was covered by hospital administration:  Ventolin HFA  Venlafaxine 37.5mg ER capsule  Prilosec 20mg  Eliquis 5mg  Buprenorphine 2mg sublingual tablet  Benzonatate  Saline spray  Sertraline 50mg  Diltiazem ER 240mg   Mirtazapine 15mg   Symbicort   Spiriva - formulary equivalent to incruse    Medications delivered by pharmacist at bedside and counseled on indication, directions, and side effects.  Patient verbalized understanding and had no further questions. Patient given 1 month supply since hardship with obtaining medications due to no prescription coverage.  Meds to beds requested by provider.    The following prescriptions were filled at [facility] which was covered by hospital administration (symbicort and spiriva provided by pharmacy):  Ventolin HFA  Venlafaxine 37.5mg ER capsule  Prilosec 20mg  Eliquis 5mg  Buprenorphine 2mg sublingual tablet  Benzonatate  Saline spray  Sertraline 50mg  Diltiazem ER 240mg   Mirtazapine 15mg   Symbicort   Spiriva - formulary equivalent to incruse    Medications delivered by pharmacist at bedside and counseled on indication, directions, and side effects.  Patient verbalized understanding and had no further questions. Patient given 1 month supply since hardship with obtaining medications due to no prescription coverage.

## 2025-05-15 NOTE — PROGRESS NOTE ADULT - ASSESSMENT
68 y/o F with PMH of HTN, Dyslipidemia, A. Fib on Eliquis, DVT/PE, ILD, COPD, Chronic Respiratory Failure on home oxygent, Protein S Deficiency, GERD, Drug Abuse, Anxiety, and Depression who presented with worsening SOB with dropping of her SPO2. Patient stooped all meds including her Eliquis over the past month because she lost her health insurance, also she doesn't have access to her PCP anymore    copd  bronchiectasis  OP  OA  VTE  Dyspnea  eval LRTI - PNA  HLD  HTN  GERD  Protein S Def  Anxiety  Depression    VBG noted  vs noted  meds reviewed    Consider ABX  cx - biomarkers -   ct neg for PE - c/w poss LRTI and COPD ex  NEBS prn - Inhaler rx regimen - consideration for Systemic Steroids  oral hygiene  skin care  cvs rx regimen  BP control  on Eliquis - hx of VTE  on Suboxone - OUD hx  emotional support  goal sat > 88 pct  
68 y/o F with PMH of HTN, Dyslipidemia, A. Fib on Eliquis, DVT/PE, ILD, COPD, Chronic Respiratory Failure on home oxygent, Protein S Deficiency, GERD, Drug Abuse, Anxiety, and Depression who presented with worsening SOB with dropping of her SPO2. Patient stooped all meds including her Eliquis over the past month because she lost her health insurance, also she doesn't have access to her PCP anymore    copd  bronchiectasis  OP  OA  VTE  Dyspnea  eval LRTI - PNA  HLD  HTN  GERD  Protein S Def  Anxiety  Depression    VBG noted  vs noted  meds reviewed  on flexeril  on pyridium    cx - biomarkers -   ct neg for PE - c/w poss LRTI and COPD ex  NEBS prn - Inhaler rx regimen - consideration for Systemic Steroids  oral hygiene  skin care  cvs rx regimen  BP control  on Eliquis - hx of VTE  on Suboxone - OUD hx  emotional support  goal sat > 88 pct
70 y/o F with PMH of HTN, Dyslipidemia, A. Fib on Eliquis, DVT/PE, ILD, COPD, Chronic Respiratory Failure on home oxygent, Protein S Deficiency, GERD, Drug Abuse, Anxiety, and Depression who presented with worsening SOB with dropping of her SPO2. Patient stooped all meds including her Eliquis over the past month because she lost her health insurance, also she doesn't have access to her PCP anymore    copd  bronchiectasis  OP  OA  VTE  Dyspnea  eval LRTI - PNA  HLD  HTN  GERD  Protein S Def  Anxiety  Depression    VBG noted  vs noted  meds reviewed  on flexeril  on pyridium    cx - biomarkers -   ct neg for PE - c/w poss LRTI and COPD ex  NEBS prn - Inhaler rx regimen - consideration for Systemic Steroids  oral hygiene  skin care  cvs rx regimen  BP control  on Eliquis - hx of VTE  on Suboxone - OUD hx  emotional support  goal sat > 88 pct
70 y/o F with PMH of HTN, Dyslipidemia, A. Fib on Eliquis, DVT/PE, ILD, COPD, Chronic Respiratory Failure on home oxygent, Protein S Deficiency, GERD, Drug Abuse, Anxiety, and Depression who presented with worsening SOB with dropping of her SPO2. Patient stooped all meds including her Eliquis over the past month because she lost her health insurance, also she doesn't have access to her PCP anymore    copd  bronchiectasis  OP  OA  VTE  Dyspnea  eval LRTI - PNA  HLD  HTN  GERD  Protein S Def  Anxiety  Depression    VBG noted  vs noted  meds reviewed  cm follow up   dc planning    cx - biomarkers -   ct neg for PE - c/w poss LRTI and COPD ex  NEBS prn - Inhaler rx regimen -   oral hygiene  skin care  cvs rx regimen  BP control  on Eliquis - hx of VTE  on Suboxone - OUD hx  emotional support  goal sat > 88 pct
68 y/o F with PMH of HTN, Dyslipidemia, A. Fib on Eliquis, DVT/PE, ILD, COPD, Chronic Respiratory Failure on home oxygent, Protein S Deficiency, GERD, Drug Abuse, Anxiety, and Depression who presented with worsening SOB with dropping of her SPO2. Patient stooped all meds including her Eliquis over the past month because she lost her health insurance, also she doesn't have access to her PCP anymore    copd  bronchiectasis  OP  OA  VTE  Dyspnea  eval LRTI - PNA  HLD  HTN  GERD  Protein S Def  Anxiety  Depression    VBG noted  vs noted  meds reviewed  cm follow up   dc planning  SW follow up reviewed    cx - biomarkers -   ct neg for PE - c/w poss LRTI and COPD ex  NEBS prn - Inhaler rx regimen -   oral hygiene  skin care  cvs rx regimen  BP control  on Eliquis - hx of VTE  on Suboxone - OUD hx  emotional support  goal sat > 88 pct  
70 y/o F with PMH of HTN, Dyslipidemia, A. Fib on Eliquis, DVT/PE, ILD, COPD, Chronic Respiratory Failure on home oxygent, Protein S Deficiency, GERD, Drug Abuse, Anxiety, and Depression who presented with worsening SOB with dropping of her SPO2. Patient stooped all meds including her Eliquis over the past month because she lost her health insurance, also she doesn't have access to her PCP anymore    copd  bronchiectasis  OP  OA  VTE  Dyspnea  eval LRTI - PNA  HLD  HTN  GERD  Protein S Def  Anxiety  Depression    VBG noted    Consider ABX  cx - biomarkers -   ct neg for PE - c/w poss LRTI and COPD ex  NEBS prn - Inhaler rx regimen - consideration for Systemic Steroids  oral hygiene  skin care  cvs rx regimen  BP control  on Eliquis - hx of VTE  on Suboxone - OUD hx  emotional support  goal sat > 88 pct    
70 y/o F with PMH of HTN, Dyslipidemia, A. Fib on Eliquis, DVT/PE, ILD, COPD, Chronic Respiratory Failure on home oxygent, Protein S Deficiency, GERD, Drug Abuse, Anxiety, and Depression who presented with worsening SOB with dropping of her SPO2. Patient stooped all meds including her Eliquis over the past month because she lost her health insurance, also she doesn't have access to her PCP anymore    copd  bronchiectasis  OP  OA  VTE  Dyspnea  eval LRTI - PNA  HLD  HTN  GERD  Protein S Def  Anxiety  Depression    VBG noted  vs noted  meds reviewed  on flexeril  on pyridium    cx - biomarkers -   ct neg for PE - c/w poss LRTI and COPD ex  NEBS prn - Inhaler rx regimen -   oral hygiene  skin care  cvs rx regimen  BP control  on Eliquis - hx of VTE  on Suboxone - OUD hx  emotional support  goal sat > 88 pct
Patient is a 69-year-old female with PMH of COPD on home O2, protein s deficiency with hypertension presenting to the emergency department at with one week history of worsening SOB. Patient is being admitted for COPD exacerbation.      Acute on chronic respiratory failure with hypoxia   Due to COPD exacerbation, viral infection and secondary bacterial pneumonia   CT chest shows emphysema and areas of bronchiectasis and small airway impaction similar to prior  RVP negative  On home O2 2L NC, was increased to 3L this admission  Continuous pulse ox, SpO2 goal>88%,  still need 3L   Continue spiriva QD, Advair BID  Albuterol/Ipratroprium nebulizer q6h PRN  ID eval noted: recently completed levaquin course  Pulm following   CT chest: Stable 9 mm subpleural right upper lobe nodule. Recommend three-month follow-up CT  ID eval noted : recently completed levaquin course  guaifenesin-DM PRN cough, add tessalon perles TID scheduled  improved, d/c planning. Patient will need home oxygen on discharge, she has an existing oxygen concentrator. As per social work this can be continued on discharge         Dysuria.   UA shows large LE, negative nitrites, some bacteria  On phenazopyridine q8 x 2 days  Started on Bactrim DS for empiric treatment of UTI. Urine cx negative, discontinued antibiotic.    Pneumonia  CT chest: No evidence of  pulmonary emboli or other acute change compared to 8/19/2024. Emphysema with areas of bronchiectasis and small airway impaction similar to prior. Some pachy opacities seen , concern for pneumonia.   Completed Levaquin as per ID recommendation.     Anxiety and depression  Psychiatry was consulted   Discontinue home Effexor  Started on sertraline and mirtazapine     Thrombocytosis   Likely reactive, trending down   Hematology eval noted  Follow up MPL, BCR/ABL test      Hypertension   Continue home diltiazem  Hx of tachycardia, stable as per pt and follows with cardiologist outpt, tele monitor show sinus tachy, range 90s to 140s,  discussed with cardio, will c/w current management   DASH/TLC diet.    Chronic, stable as per patient  Patient is on anticoagulation Eliquis 5 mg BID) as per heme/onc doctor who she does not regularly follow up with  Recommend patient follow up with heme onc doctor upon discharge.    DVT prophylaxis   Eliquis 
Patient is a 69-year-old female with PMH of COPD on home O2, protein s deficiency with hypertension presenting to the emergency department at with one week history of worsening SOB. Patient is being admitted for COPD exacerbation.

## 2025-05-15 NOTE — CASE MANAGEMENT PROGRESS NOTE - NSCMPROGRESSNOTE_GEN_ALL_CORE
Update Communication Note: AS per morning rounds, CM spoke with Moshe Pharmacist and stated [facility] covering all patients medications except 2 inhaler but administration will give 1 month  supply.  Pharmacist Moshe will be coming down to go over medications once family arrives for Discharge this morning.     CM spoke with patient to make her aware she will be ready for Discharge Today  before 12 clock. Patient stated her  Javier will be picking patient up today for Discharge. Patient is accepted with [facility] care agency [phone] will reach out to you within 24-72 hours of your discharge to schedule home care visit/eval appointment with you. Please call agency for any queries regarding home care services for Home PT. Will continue to follow patient.    CM delivered walker to bedside. Patient signed Consignment Form for Rolling Walker. Will continue to follow patient.       Update Communication Note: AS per morning rounds, CM spoke with Moshe Pharmacist and stated [facility] covering all patients medications except 2 inhaler but administration will give 1 month  supply.  Pharmacist Moshe will be coming down to go over medications once family arrives for Discharge this morning.     CM spoke with patient to make her aware she will be ready for Discharge Today  before 12 clock. Patient stated her  Javier will be picking patient up today for Discharge. Patient is accepted with [facility] care agency [phone] will reach out to you within 24-72 hours of your discharge to schedule home care visit/eval appointment with you. Please call agency for any queries regarding home care services for Home PT. Will continue to follow patient. Patient has her portable oxygen at bedside.     CM delivered walker to bedside. Patient signed Consignment Form for Rolling Walker. Will continue to follow patient.       Update Communication Note: AS per morning rounds, CM spoke with Moshe Pharmacist and stated [facility] covering all patients medications except 2 inhaler but administration will give 1 month  supply.  Pharmacist Moshe will be coming down to go over medications once family arrives for Discharge this morning.     CM spoke with patient to make her aware she will be ready for Discharge Today  before 12 clock. Patient stated her  Javier will be picking patient up today for Discharge. Patient is accepted with [facility] care agency [phone] will reach out to you within 24-72 hours of your discharge to schedule home care visit/eval appointment with you. Please call agency for any queries regarding home care services for Home PT. Will continue to follow patient. Patient has her portable oxygen at bedside.     CM delivered walker to bedside. Patient signed Consignment Form for Rolling Walker.   Will continue to follow patient.      ** Patient decided not to take the Rolling Walker and states has one at home CM made [employer] [facility] Representative aware. Will continue to follow patient

## 2025-05-15 NOTE — PROGRESS NOTE ADULT - PROVIDER SPECIALTY LIST ADULT
Pulmonology
Hospitalist
Pulmonology
Pulmonology
Hospitalist
Hospitalist
Pulmonology
Pulmonology
Hospitalist

## 2025-05-23 ENCOUNTER — TRANSCRIPTION ENCOUNTER (OUTPATIENT)
Age: 70
End: 2025-05-23

## 2025-06-05 ENCOUNTER — RX RENEWAL (OUTPATIENT)
Age: 70
End: 2025-06-05

## 2025-06-08 NOTE — DISCHARGE NOTE NURSING/CASE MANAGEMENT/SOCIAL WORK - NSDCPEFALRISK_GEN_ALL_CORE
For information on Fall & Injury Prevention, visit: [email or web address] OR  [email or web address] OR  https://www.cdc.gov/steadi/patient.html
[phone]

## 2025-06-18 RX ORDER — SERTRALINE HYDROCHLORIDE 50 MG/1
50 TABLET, FILM COATED ORAL
Qty: 30 | Refills: 0 | Status: ACTIVE | COMMUNITY
Start: 2025-06-18 | End: 1900-01-01

## 2025-07-09 ENCOUNTER — APPOINTMENT (OUTPATIENT)
Dept: INTERNAL MEDICINE | Facility: CLINIC | Age: 70
End: 2025-07-09